# Patient Record
Sex: FEMALE | Race: WHITE | Employment: OTHER | URBAN - METROPOLITAN AREA
[De-identification: names, ages, dates, MRNs, and addresses within clinical notes are randomized per-mention and may not be internally consistent; named-entity substitution may affect disease eponyms.]

---

## 2017-02-12 PROBLEM — E10.10 DIABETIC KETOACIDOSIS WITHOUT COMA ASSOCIATED WITH TYPE 1 DIABETES MELLITUS (HCC): Status: RESOLVED | Noted: 2017-02-12 | Resolved: 2017-02-12

## 2017-02-12 PROBLEM — E87.29 HIGH ANION GAP METABOLIC ACIDOSIS: Chronic | Status: ACTIVE | Noted: 2017-02-12

## 2017-02-12 PROBLEM — E11.10 DKA (DIABETIC KETOACIDOSIS) (HCC): Status: ACTIVE | Noted: 2017-02-12

## 2017-02-12 PROBLEM — E10.10 DIABETIC KETOACIDOSIS WITHOUT COMA ASSOCIATED WITH TYPE 1 DIABETES MELLITUS (HCC): Status: ACTIVE | Noted: 2017-02-12

## 2017-03-29 PROBLEM — G56.03 BILATERAL CARPAL TUNNEL SYNDROME: Status: ACTIVE | Noted: 2017-03-29

## 2017-10-03 PROBLEM — M51.369 BULGING LUMBAR DISC: Status: ACTIVE | Noted: 2017-10-03

## 2017-10-03 PROBLEM — M51.36 BULGING LUMBAR DISC: Status: ACTIVE | Noted: 2017-10-03

## 2017-10-03 PROBLEM — S33.100A: Status: ACTIVE | Noted: 2017-10-03

## 2018-01-08 PROBLEM — M53.2X6 LUMBAR SPINE INSTABILITY: Status: ACTIVE | Noted: 2018-01-08

## 2018-01-09 PROBLEM — I10 HTN (HYPERTENSION), BENIGN: Chronic | Status: ACTIVE | Noted: 2018-01-09

## 2018-03-12 DIAGNOSIS — R52 PAIN: Primary | ICD-10-CM

## 2018-03-20 ENCOUNTER — HOSPITAL ENCOUNTER (OUTPATIENT)
Dept: CT IMAGING | Age: 53
Discharge: HOME | End: 2018-03-22
Payer: MEDICAID

## 2018-03-20 DIAGNOSIS — S33.100D SUBLUXATION OF LUMBAR VERTEBRA, SUBSEQUENT ENCOUNTER: ICD-10-CM

## 2018-03-20 DIAGNOSIS — M53.2X6 LUMBAR SPINE INSTABILITY: ICD-10-CM

## 2018-03-20 DIAGNOSIS — M51.36 BULGING LUMBAR DISC: ICD-10-CM

## 2018-03-20 PROCEDURE — 72131 CT LUMBAR SPINE W/O DYE: CPT

## 2018-03-27 ENCOUNTER — TELEPHONE (OUTPATIENT)
Dept: ORTHOPEDIC SURGERY | Age: 53
End: 2018-03-27

## 2018-04-04 ENCOUNTER — OFFICE VISIT (OUTPATIENT)
Dept: ORTHOPEDIC SURGERY | Age: 53
End: 2018-04-04
Payer: MEDICAID

## 2018-04-04 ENCOUNTER — HOSPITAL ENCOUNTER (OUTPATIENT)
Dept: GENERAL RADIOLOGY | Age: 53
Discharge: HOME | End: 2018-04-06
Payer: MEDICAID

## 2018-04-04 VITALS
HEART RATE: 79 BPM | BODY MASS INDEX: 22.35 KG/M2 | HEIGHT: 72 IN | RESPIRATION RATE: 17 BRPM | WEIGHT: 165 LBS | DIASTOLIC BLOOD PRESSURE: 71 MMHG | SYSTOLIC BLOOD PRESSURE: 102 MMHG

## 2018-04-04 DIAGNOSIS — S82.871A CLOSED DISPLACED PILON FRACTURE OF RIGHT TIBIA, INITIAL ENCOUNTER: Primary | ICD-10-CM

## 2018-04-04 DIAGNOSIS — R52 PAIN: ICD-10-CM

## 2018-04-04 PROCEDURE — 1036F TOBACCO NON-USER: CPT | Performed by: ORTHOPAEDIC SURGERY

## 2018-04-04 PROCEDURE — G8427 DOCREV CUR MEDS BY ELIG CLIN: HCPCS | Performed by: ORTHOPAEDIC SURGERY

## 2018-04-04 PROCEDURE — 3017F COLORECTAL CA SCREEN DOC REV: CPT | Performed by: ORTHOPAEDIC SURGERY

## 2018-04-04 PROCEDURE — 99203 OFFICE O/P NEW LOW 30 MIN: CPT

## 2018-04-04 PROCEDURE — 73590 X-RAY EXAM OF LOWER LEG: CPT

## 2018-04-04 PROCEDURE — G8420 CALC BMI NORM PARAMETERS: HCPCS | Performed by: ORTHOPAEDIC SURGERY

## 2018-04-04 PROCEDURE — 99203 OFFICE O/P NEW LOW 30 MIN: CPT | Performed by: ORTHOPAEDIC SURGERY

## 2018-04-04 PROCEDURE — 3014F SCREEN MAMMO DOC REV: CPT | Performed by: ORTHOPAEDIC SURGERY

## 2018-04-11 ENCOUNTER — HOSPITAL ENCOUNTER (OUTPATIENT)
Dept: CT IMAGING | Age: 53
Discharge: HOME | End: 2018-04-13
Payer: MEDICAID

## 2018-04-11 ENCOUNTER — HOSPITAL ENCOUNTER (OUTPATIENT)
Age: 53
Discharge: HOME | End: 2018-04-11
Payer: MEDICAID

## 2018-04-11 DIAGNOSIS — S82.871A CLOSED DISPLACED PILON FRACTURE OF RIGHT TIBIA, INITIAL ENCOUNTER: ICD-10-CM

## 2018-04-11 LAB
C-REACTIVE PROTEIN: <0.1 MG/DL (ref 0–0.4)
SEDIMENTATION RATE, ERYTHROCYTE: 6 MM/HR (ref 0–20)

## 2018-04-11 PROCEDURE — 36415 COLL VENOUS BLD VENIPUNCTURE: CPT

## 2018-04-11 PROCEDURE — 86140 C-REACTIVE PROTEIN: CPT

## 2018-04-11 PROCEDURE — 85651 RBC SED RATE NONAUTOMATED: CPT

## 2018-04-11 PROCEDURE — 73700 CT LOWER EXTREMITY W/O DYE: CPT

## 2018-04-25 ENCOUNTER — OFFICE VISIT (OUTPATIENT)
Dept: ORTHOPEDIC SURGERY | Age: 53
End: 2018-04-25
Payer: MEDICAID

## 2018-04-25 VITALS
DIASTOLIC BLOOD PRESSURE: 82 MMHG | TEMPERATURE: 97.8 F | RESPIRATION RATE: 18 BRPM | HEIGHT: 72 IN | SYSTOLIC BLOOD PRESSURE: 123 MMHG | BODY MASS INDEX: 22.35 KG/M2 | HEART RATE: 72 BPM | WEIGHT: 165 LBS

## 2018-04-25 DIAGNOSIS — S82.871K CLOSED DISPLACED PILON FRACTURE OF RIGHT TIBIA WITH NONUNION, SUBSEQUENT ENCOUNTER: Primary | ICD-10-CM

## 2018-04-25 PROCEDURE — 3017F COLORECTAL CA SCREEN DOC REV: CPT | Performed by: NURSE PRACTITIONER

## 2018-04-25 PROCEDURE — G8420 CALC BMI NORM PARAMETERS: HCPCS | Performed by: ORTHOPAEDIC SURGERY

## 2018-04-25 PROCEDURE — 1036F TOBACCO NON-USER: CPT | Performed by: NURSE PRACTITIONER

## 2018-04-25 PROCEDURE — G8427 DOCREV CUR MEDS BY ELIG CLIN: HCPCS | Performed by: NURSE PRACTITIONER

## 2018-04-25 PROCEDURE — G8427 DOCREV CUR MEDS BY ELIG CLIN: HCPCS | Performed by: ORTHOPAEDIC SURGERY

## 2018-04-25 PROCEDURE — 99213 OFFICE O/P EST LOW 20 MIN: CPT

## 2018-04-25 PROCEDURE — 3017F COLORECTAL CA SCREEN DOC REV: CPT | Performed by: ORTHOPAEDIC SURGERY

## 2018-04-25 PROCEDURE — G8420 CALC BMI NORM PARAMETERS: HCPCS | Performed by: NURSE PRACTITIONER

## 2018-04-25 PROCEDURE — 99215 OFFICE O/P EST HI 40 MIN: CPT | Performed by: ORTHOPAEDIC SURGERY

## 2018-04-25 PROCEDURE — 1036F TOBACCO NON-USER: CPT | Performed by: ORTHOPAEDIC SURGERY

## 2018-04-25 RX ORDER — ACETAMINOPHEN 325 MG/1
650 TABLET ORAL EVERY 6 HOURS PRN
Status: ON HOLD | COMMUNITY
End: 2018-05-14 | Stop reason: ALTCHOICE

## 2018-05-09 ENCOUNTER — PREP FOR PROCEDURE (OUTPATIENT)
Dept: ORTHOPEDIC SURGERY | Age: 53
End: 2018-05-09

## 2018-05-09 RX ORDER — SODIUM CHLORIDE 0.9 % (FLUSH) 0.9 %
10 SYRINGE (ML) INJECTION PRN
Status: CANCELLED | OUTPATIENT
Start: 2018-05-09

## 2018-05-09 RX ORDER — IBUPROFEN 200 MG
200 TABLET ORAL EVERY 6 HOURS PRN
Status: ON HOLD | COMMUNITY
End: 2018-05-15 | Stop reason: HOSPADM

## 2018-05-09 RX ORDER — LORATADINE 10 MG/1
10 CAPSULE, LIQUID FILLED ORAL DAILY
COMMUNITY
End: 2018-06-28

## 2018-05-09 RX ORDER — ACETAMINOPHEN AND CODEINE PHOSPHATE 300; 30 MG/1; MG/1
1 TABLET ORAL EVERY 4 HOURS PRN
Status: ON HOLD | COMMUNITY
End: 2018-05-14 | Stop reason: ALTCHOICE

## 2018-05-09 RX ORDER — GABAPENTIN 300 MG/1
100 CAPSULE ORAL 2 TIMES DAILY
COMMUNITY
End: 2019-01-08

## 2018-05-09 RX ORDER — SODIUM CHLORIDE, SODIUM LACTATE, POTASSIUM CHLORIDE, CALCIUM CHLORIDE 600; 310; 30; 20 MG/100ML; MG/100ML; MG/100ML; MG/100ML
INJECTION, SOLUTION INTRAVENOUS CONTINUOUS
Status: CANCELLED | OUTPATIENT
Start: 2018-05-09

## 2018-05-09 RX ORDER — SODIUM CHLORIDE 0.9 % (FLUSH) 0.9 %
10 SYRINGE (ML) INJECTION EVERY 12 HOURS SCHEDULED
Status: CANCELLED | OUTPATIENT
Start: 2018-05-09

## 2018-05-13 ENCOUNTER — ANESTHESIA EVENT (OUTPATIENT)
Dept: OPERATING ROOM | Age: 53
End: 2018-05-13
Payer: MEDICAID

## 2018-05-14 ENCOUNTER — APPOINTMENT (OUTPATIENT)
Dept: GENERAL RADIOLOGY | Age: 53
End: 2018-05-14
Attending: ORTHOPAEDIC SURGERY
Payer: MEDICAID

## 2018-05-14 ENCOUNTER — ANESTHESIA (OUTPATIENT)
Dept: OPERATING ROOM | Age: 53
End: 2018-05-14
Payer: MEDICAID

## 2018-05-14 ENCOUNTER — HOSPITAL ENCOUNTER (OUTPATIENT)
Age: 53
Discharge: HOME | End: 2018-05-15
Attending: ORTHOPAEDIC SURGERY | Admitting: ORTHOPAEDIC SURGERY
Payer: MEDICAID

## 2018-05-14 VITALS — SYSTOLIC BLOOD PRESSURE: 102 MMHG | DIASTOLIC BLOOD PRESSURE: 59 MMHG | OXYGEN SATURATION: 96 % | TEMPERATURE: 98.4 F

## 2018-05-14 DIAGNOSIS — S82.251K CLOSED DISPLACED COMMINUTED FRACTURE OF SHAFT OF RIGHT TIBIA WITH NONUNION: Primary | ICD-10-CM

## 2018-05-14 DIAGNOSIS — R52 PAIN: ICD-10-CM

## 2018-05-14 LAB
GLUCOSE BLD-MCNC: 50 MG/DL
HBA1C MFR BLD: 6.1 % (ref 4.8–5.9)
METER GLUCOSE: 111 MG/DL (ref 70–110)
METER GLUCOSE: 208 MG/DL (ref 70–110)
METER GLUCOSE: 50 MG/DL (ref 70–110)
METER GLUCOSE: 68 MG/DL (ref 70–110)

## 2018-05-14 PROCEDURE — 87206 SMEAR FLUORESCENT/ACID STAI: CPT

## 2018-05-14 PROCEDURE — 3700000001 HC ADD 15 MINUTES (ANESTHESIA): Performed by: ORTHOPAEDIC SURGERY

## 2018-05-14 PROCEDURE — 82652 VIT D 1 25-DIHYDROXY: CPT

## 2018-05-14 PROCEDURE — 2500000003 HC RX 250 WO HCPCS: Performed by: NURSE ANESTHETIST, CERTIFIED REGISTERED

## 2018-05-14 PROCEDURE — 6360000002 HC RX W HCPCS: Performed by: STUDENT IN AN ORGANIZED HEALTH CARE EDUCATION/TRAINING PROGRAM

## 2018-05-14 PROCEDURE — 27726 REPAIR FIBULA NONUNION: CPT | Performed by: ORTHOPAEDIC SURGERY

## 2018-05-14 PROCEDURE — 2700000000 HC OXYGEN THERAPY PER DAY

## 2018-05-14 PROCEDURE — 64445 NJX AA&/STRD SCIATIC NRV IMG: CPT | Performed by: ANESTHESIOLOGY

## 2018-05-14 PROCEDURE — 6360000002 HC RX W HCPCS: Performed by: ANESTHESIOLOGY

## 2018-05-14 PROCEDURE — 84134 ASSAY OF PREALBUMIN: CPT

## 2018-05-14 PROCEDURE — 6360000002 HC RX W HCPCS: Performed by: NURSE ANESTHETIST, CERTIFIED REGISTERED

## 2018-05-14 PROCEDURE — 36415 COLL VENOUS BLD VENIPUNCTURE: CPT

## 2018-05-14 PROCEDURE — 6370000000 HC RX 637 (ALT 250 FOR IP): Performed by: STUDENT IN AN ORGANIZED HEALTH CARE EDUCATION/TRAINING PROGRAM

## 2018-05-14 PROCEDURE — 7100000001 HC PACU RECOVERY - ADDTL 15 MIN: Performed by: ORTHOPAEDIC SURGERY

## 2018-05-14 PROCEDURE — C1713 ANCHOR/SCREW BN/BN,TIS/BN: HCPCS | Performed by: ORTHOPAEDIC SURGERY

## 2018-05-14 PROCEDURE — 2720000010 HC SURG SUPPLY STERILE: Performed by: ORTHOPAEDIC SURGERY

## 2018-05-14 PROCEDURE — 87116 MYCOBACTERIA CULTURE: CPT

## 2018-05-14 PROCEDURE — 87205 SMEAR GRAM STAIN: CPT

## 2018-05-14 PROCEDURE — 73610 X-RAY EXAM OF ANKLE: CPT

## 2018-05-14 PROCEDURE — 2580000003 HC RX 258: Performed by: NURSE PRACTITIONER

## 2018-05-14 PROCEDURE — 3600000002 HC SURGERY LEVEL 2 BASE: Performed by: ORTHOPAEDIC SURGERY

## 2018-05-14 PROCEDURE — 3600000012 HC SURGERY LEVEL 2 ADDTL 15MIN: Performed by: ORTHOPAEDIC SURGERY

## 2018-05-14 PROCEDURE — 6370000000 HC RX 637 (ALT 250 FOR IP): Performed by: INTERNAL MEDICINE

## 2018-05-14 PROCEDURE — 87075 CULTR BACTERIA EXCEPT BLOOD: CPT

## 2018-05-14 PROCEDURE — 27724 REPAIR/GRAFT OF TIBIA: CPT | Performed by: ORTHOPAEDIC SURGERY

## 2018-05-14 PROCEDURE — 83036 HEMOGLOBIN GLYCOSYLATED A1C: CPT

## 2018-05-14 PROCEDURE — 73590 X-RAY EXAM OF LOWER LEG: CPT

## 2018-05-14 PROCEDURE — 20680 REMOVAL OF IMPLANT DEEP: CPT | Performed by: ORTHOPAEDIC SURGERY

## 2018-05-14 PROCEDURE — 87102 FUNGUS ISOLATION CULTURE: CPT

## 2018-05-14 PROCEDURE — 7100000000 HC PACU RECOVERY - FIRST 15 MIN: Performed by: ORTHOPAEDIC SURGERY

## 2018-05-14 PROCEDURE — 3700000000 HC ANESTHESIA ATTENDED CARE: Performed by: ORTHOPAEDIC SURGERY

## 2018-05-14 PROCEDURE — 6370000000 HC RX 637 (ALT 250 FOR IP): Performed by: ORTHOPAEDIC SURGERY

## 2018-05-14 PROCEDURE — 82962 GLUCOSE BLOOD TEST: CPT

## 2018-05-14 PROCEDURE — 64447 NJX AA&/STRD FEMORAL NRV IMG: CPT | Performed by: ANESTHESIOLOGY

## 2018-05-14 PROCEDURE — 88331 PATH CONSLTJ SURG 1 BLK 1SPC: CPT

## 2018-05-14 PROCEDURE — 87070 CULTURE OTHR SPECIMN AEROBIC: CPT

## 2018-05-14 PROCEDURE — 3209999900 FLUORO FOR SURGICAL PROCEDURES

## 2018-05-14 PROCEDURE — 87015 SPECIMEN INFECT AGNT CONCNTJ: CPT

## 2018-05-14 PROCEDURE — 88307 TISSUE EXAM BY PATHOLOGIST: CPT

## 2018-05-14 DEVICE — IMPLANTABLE DEVICE: Type: IMPLANTABLE DEVICE | Site: TIBIA | Status: FUNCTIONAL

## 2018-05-14 DEVICE — SCREW BNE L26MM DIA3.5MM CORT S STL ST NONCANNULATED LOK: Type: IMPLANTABLE DEVICE | Site: TIBIA | Status: FUNCTIONAL

## 2018-05-14 DEVICE — SCREW BNE L16MM DIA3.5MM CORT S STL ST NONCANNULATED LOK: Type: IMPLANTABLE DEVICE | Site: TIBIA | Status: FUNCTIONAL

## 2018-05-14 DEVICE — SCREW BNE L46MM DIA3.5MM S STL ST FULL THRD T15 STARDRV: Type: IMPLANTABLE DEVICE | Site: TIBIA | Status: FUNCTIONAL

## 2018-05-14 DEVICE — SCREW BNE L30MM DIA3.5MM CORT S STL ST LOK FULL THRD: Type: IMPLANTABLE DEVICE | Site: TIBIA | Status: FUNCTIONAL

## 2018-05-14 DEVICE — PLATE BNE L W10.1XL84MM THK3.5MM 6 H BILAT S STL STR RIG: Type: IMPLANTABLE DEVICE | Site: TIBIA | Status: FUNCTIONAL

## 2018-05-14 DEVICE — GRAFT BNE SUB 30CC 1.7-10MM CANC CHIP MORSELIZED FRZ DRY: Type: IMPLANTABLE DEVICE | Site: TIBIA | Status: FUNCTIONAL

## 2018-05-14 DEVICE — SCREW BNE L42MM DIA3.5MM CORT S STL ST NONCANNULATED LOK: Type: IMPLANTABLE DEVICE | Site: TIBIA | Status: FUNCTIONAL

## 2018-05-14 DEVICE — BIOACTIVE BONE GRAFT SUBSTITUTE, FOAM PACK; BETA-TRICALCIUM PHOSPHATE, TYPE I BOVINE COLLAGEN, AND BIOACTIVE GLASS
Type: IMPLANTABLE DEVICE | Site: TIBIA | Status: FUNCTIONAL
Brand: VITOSS BBTRAUMA

## 2018-05-14 DEVICE — SCREW BNE L40MM DIA3.5MM CORT S STL ST LOK FULL THRD: Type: IMPLANTABLE DEVICE | Site: TIBIA | Status: FUNCTIONAL

## 2018-05-14 DEVICE — SCREW BNE L34MM DIA3.5MM CORT S STL ST LOK FULL THRD: Type: IMPLANTABLE DEVICE | Site: TIBIA | Status: FUNCTIONAL

## 2018-05-14 DEVICE — SCREW BNE L28MM DIA3.5MM CORT S STL ST NONCANNULATED LOK: Type: IMPLANTABLE DEVICE | Site: TIBIA | Status: FUNCTIONAL

## 2018-05-14 DEVICE — SCREW BNE L14MM DIA3.5MM CORT S STL ST NONCANNULATED LOK: Type: IMPLANTABLE DEVICE | Site: TIBIA | Status: FUNCTIONAL

## 2018-05-14 DEVICE — SCREW BNE L12MM DIA3.5MM CORT S STL ST NONCANNULATED LOK: Type: IMPLANTABLE DEVICE | Site: TIBIA | Status: FUNCTIONAL

## 2018-05-14 RX ORDER — MORPHINE SULFATE 2 MG/ML
2 INJECTION, SOLUTION INTRAMUSCULAR; INTRAVENOUS
Status: DISCONTINUED | OUTPATIENT
Start: 2018-05-14 | End: 2018-05-15 | Stop reason: HOSPADM

## 2018-05-14 RX ORDER — MIDAZOLAM HYDROCHLORIDE 1 MG/ML
1 INJECTION INTRAMUSCULAR; INTRAVENOUS EVERY 5 MIN PRN
Status: COMPLETED | OUTPATIENT
Start: 2018-05-14 | End: 2018-05-14

## 2018-05-14 RX ORDER — FENTANYL CITRATE 50 UG/ML
25 INJECTION, SOLUTION INTRAMUSCULAR; INTRAVENOUS EVERY 5 MIN PRN
Status: DISCONTINUED | OUTPATIENT
Start: 2018-05-14 | End: 2018-05-14 | Stop reason: HOSPADM

## 2018-05-14 RX ORDER — NICOTINE POLACRILEX 4 MG
15 LOZENGE BUCCAL PRN
Status: DISCONTINUED | OUTPATIENT
Start: 2018-05-14 | End: 2018-05-15 | Stop reason: HOSPADM

## 2018-05-14 RX ORDER — CEFAZOLIN SODIUM 1 G/3ML
INJECTION, POWDER, FOR SOLUTION INTRAMUSCULAR; INTRAVENOUS PRN
Status: DISCONTINUED | OUTPATIENT
Start: 2018-05-14 | End: 2018-05-14 | Stop reason: SDUPTHER

## 2018-05-14 RX ORDER — HYDROCODONE BITARTRATE AND ACETAMINOPHEN 5; 325 MG/1; MG/1
2 TABLET ORAL EVERY 4 HOURS PRN
Status: DISCONTINUED | OUTPATIENT
Start: 2018-05-14 | End: 2018-05-15 | Stop reason: HOSPADM

## 2018-05-14 RX ORDER — ACETAMINOPHEN 325 MG/1
650 TABLET ORAL EVERY 4 HOURS PRN
Status: DISCONTINUED | OUTPATIENT
Start: 2018-05-14 | End: 2018-05-15 | Stop reason: HOSPADM

## 2018-05-14 RX ORDER — NEOSTIGMINE METHYLSULFATE 1 MG/ML
INJECTION, SOLUTION INTRAVENOUS PRN
Status: DISCONTINUED | OUTPATIENT
Start: 2018-05-14 | End: 2018-05-14 | Stop reason: SDUPTHER

## 2018-05-14 RX ORDER — DIAPER,BRIEF,INFANT-TODD,DISP
EACH MISCELLANEOUS PRN
Status: DISCONTINUED | OUTPATIENT
Start: 2018-05-14 | End: 2018-05-14 | Stop reason: HOSPADM

## 2018-05-14 RX ORDER — DULOXETIN HYDROCHLORIDE 30 MG/1
90 CAPSULE, DELAYED RELEASE ORAL DAILY
Status: DISCONTINUED | OUTPATIENT
Start: 2018-05-14 | End: 2018-05-15 | Stop reason: HOSPADM

## 2018-05-14 RX ORDER — HYDROCODONE BITARTRATE AND ACETAMINOPHEN 5; 325 MG/1; MG/1
1 TABLET ORAL EVERY 4 HOURS PRN
Status: DISCONTINUED | OUTPATIENT
Start: 2018-05-14 | End: 2018-05-15 | Stop reason: HOSPADM

## 2018-05-14 RX ORDER — SODIUM CHLORIDE 0.9 % (FLUSH) 0.9 %
10 SYRINGE (ML) INJECTION EVERY 12 HOURS SCHEDULED
Status: DISCONTINUED | OUTPATIENT
Start: 2018-05-14 | End: 2018-05-14 | Stop reason: HOSPADM

## 2018-05-14 RX ORDER — ALPRAZOLAM 0.25 MG/1
0.25 TABLET ORAL NIGHTLY
Status: DISCONTINUED | OUTPATIENT
Start: 2018-05-14 | End: 2018-05-15 | Stop reason: HOSPADM

## 2018-05-14 RX ORDER — GABAPENTIN 300 MG/1
300 CAPSULE ORAL 2 TIMES DAILY
Status: DISCONTINUED | OUTPATIENT
Start: 2018-05-14 | End: 2018-05-15 | Stop reason: HOSPADM

## 2018-05-14 RX ORDER — PANTOPRAZOLE SODIUM 40 MG/1
40 TABLET, DELAYED RELEASE ORAL
Status: DISCONTINUED | OUTPATIENT
Start: 2018-05-15 | End: 2018-05-15 | Stop reason: HOSPADM

## 2018-05-14 RX ORDER — PROPOFOL 10 MG/ML
INJECTION, EMULSION INTRAVENOUS PRN
Status: DISCONTINUED | OUTPATIENT
Start: 2018-05-14 | End: 2018-05-14 | Stop reason: SDUPTHER

## 2018-05-14 RX ORDER — ONDANSETRON 2 MG/ML
INJECTION INTRAMUSCULAR; INTRAVENOUS PRN
Status: DISCONTINUED | OUTPATIENT
Start: 2018-05-14 | End: 2018-05-14 | Stop reason: SDUPTHER

## 2018-05-14 RX ORDER — PROMETHAZINE HYDROCHLORIDE 25 MG/ML
6.25 INJECTION, SOLUTION INTRAMUSCULAR; INTRAVENOUS
Status: DISCONTINUED | OUTPATIENT
Start: 2018-05-14 | End: 2018-05-14 | Stop reason: HOSPADM

## 2018-05-14 RX ORDER — FENTANYL CITRATE 50 UG/ML
INJECTION, SOLUTION INTRAMUSCULAR; INTRAVENOUS PRN
Status: DISCONTINUED | OUTPATIENT
Start: 2018-05-14 | End: 2018-05-14 | Stop reason: SDUPTHER

## 2018-05-14 RX ORDER — ERGOCALCIFEROL 1.25 MG/1
50000 CAPSULE ORAL WEEKLY
Status: DISCONTINUED | OUTPATIENT
Start: 2018-05-20 | End: 2018-05-15 | Stop reason: HOSPADM

## 2018-05-14 RX ORDER — INSULIN GLARGINE 100 [IU]/ML
24 INJECTION, SOLUTION SUBCUTANEOUS NIGHTLY
Status: DISCONTINUED | OUTPATIENT
Start: 2018-05-14 | End: 2018-05-15 | Stop reason: HOSPADM

## 2018-05-14 RX ORDER — DEXAMETHASONE SODIUM PHOSPHATE 10 MG/ML
INJECTION, SOLUTION INTRAMUSCULAR; INTRAVENOUS PRN
Status: DISCONTINUED | OUTPATIENT
Start: 2018-05-14 | End: 2018-05-14 | Stop reason: SDUPTHER

## 2018-05-14 RX ORDER — ONDANSETRON 2 MG/ML
4 INJECTION INTRAMUSCULAR; INTRAVENOUS EVERY 6 HOURS PRN
Status: DISCONTINUED | OUTPATIENT
Start: 2018-05-14 | End: 2018-05-15 | Stop reason: HOSPADM

## 2018-05-14 RX ORDER — EPHEDRINE SULFATE 50 MG/ML
INJECTION, SOLUTION INTRAVENOUS PRN
Status: DISCONTINUED | OUTPATIENT
Start: 2018-05-14 | End: 2018-05-14 | Stop reason: SDUPTHER

## 2018-05-14 RX ORDER — DOCUSATE SODIUM 100 MG/1
100 CAPSULE, LIQUID FILLED ORAL 2 TIMES DAILY
Status: DISCONTINUED | OUTPATIENT
Start: 2018-05-14 | End: 2018-05-15 | Stop reason: HOSPADM

## 2018-05-14 RX ORDER — DEXTROSE MONOHYDRATE 50 MG/ML
100 INJECTION, SOLUTION INTRAVENOUS PRN
Status: DISCONTINUED | OUTPATIENT
Start: 2018-05-14 | End: 2018-05-15 | Stop reason: HOSPADM

## 2018-05-14 RX ORDER — SODIUM CHLORIDE 0.9 % (FLUSH) 0.9 %
10 SYRINGE (ML) INJECTION PRN
Status: DISCONTINUED | OUTPATIENT
Start: 2018-05-14 | End: 2018-05-15 | Stop reason: HOSPADM

## 2018-05-14 RX ORDER — OXYCODONE HYDROCHLORIDE AND ACETAMINOPHEN 5; 325 MG/1; MG/1
1 TABLET ORAL
Status: DISCONTINUED | OUTPATIENT
Start: 2018-05-14 | End: 2018-05-14 | Stop reason: HOSPADM

## 2018-05-14 RX ORDER — LIDOCAINE HYDROCHLORIDE 20 MG/ML
INJECTION, SOLUTION EPIDURAL; INFILTRATION; INTRACAUDAL; PERINEURAL PRN
Status: DISCONTINUED | OUTPATIENT
Start: 2018-05-14 | End: 2018-05-14 | Stop reason: SDUPTHER

## 2018-05-14 RX ORDER — HYDROCODONE BITARTRATE AND ACETAMINOPHEN 5; 325 MG/1; MG/1
1 TABLET ORAL EVERY 4 HOURS PRN
Qty: 42 TABLET | Refills: 0 | Status: SHIPPED | OUTPATIENT
Start: 2018-05-14 | End: 2018-06-06 | Stop reason: SDUPTHER

## 2018-05-14 RX ORDER — GLYCOPYRROLATE 0.2 MG/ML
INJECTION INTRAMUSCULAR; INTRAVENOUS PRN
Status: DISCONTINUED | OUTPATIENT
Start: 2018-05-14 | End: 2018-05-14 | Stop reason: SDUPTHER

## 2018-05-14 RX ORDER — SODIUM CHLORIDE 0.9 % (FLUSH) 0.9 %
10 SYRINGE (ML) INJECTION PRN
Status: DISCONTINUED | OUTPATIENT
Start: 2018-05-14 | End: 2018-05-14 | Stop reason: HOSPADM

## 2018-05-14 RX ORDER — FENTANYL CITRATE 50 UG/ML
50 INJECTION, SOLUTION INTRAMUSCULAR; INTRAVENOUS EVERY 5 MIN PRN
Status: DISCONTINUED | OUTPATIENT
Start: 2018-05-14 | End: 2018-05-14 | Stop reason: HOSPADM

## 2018-05-14 RX ORDER — ROCURONIUM BROMIDE 10 MG/ML
INJECTION, SOLUTION INTRAVENOUS PRN
Status: DISCONTINUED | OUTPATIENT
Start: 2018-05-14 | End: 2018-05-14 | Stop reason: SDUPTHER

## 2018-05-14 RX ORDER — ALBUTEROL SULFATE 90 UG/1
2 AEROSOL, METERED RESPIRATORY (INHALATION) EVERY 6 HOURS PRN
Status: DISCONTINUED | OUTPATIENT
Start: 2018-05-14 | End: 2018-05-15 | Stop reason: HOSPADM

## 2018-05-14 RX ORDER — SODIUM CHLORIDE, SODIUM LACTATE, POTASSIUM CHLORIDE, CALCIUM CHLORIDE 600; 310; 30; 20 MG/100ML; MG/100ML; MG/100ML; MG/100ML
INJECTION, SOLUTION INTRAVENOUS CONTINUOUS
Status: DISCONTINUED | OUTPATIENT
Start: 2018-05-14 | End: 2018-05-14

## 2018-05-14 RX ORDER — ATENOLOL 50 MG/1
50 TABLET ORAL NIGHTLY
Status: DISCONTINUED | OUTPATIENT
Start: 2018-05-14 | End: 2018-05-15

## 2018-05-14 RX ORDER — ROPIVACAINE HYDROCHLORIDE 5 MG/ML
40 INJECTION, SOLUTION EPIDURAL; INFILTRATION; PERINEURAL ONCE
Status: COMPLETED | OUTPATIENT
Start: 2018-05-14 | End: 2018-05-14

## 2018-05-14 RX ORDER — FENTANYL CITRATE 50 UG/ML
50 INJECTION, SOLUTION INTRAMUSCULAR; INTRAVENOUS EVERY 5 MIN PRN
Status: COMPLETED | OUTPATIENT
Start: 2018-05-14 | End: 2018-05-14

## 2018-05-14 RX ORDER — DIPHENHYDRAMINE HYDROCHLORIDE 50 MG/ML
12.5 INJECTION INTRAMUSCULAR; INTRAVENOUS
Status: DISCONTINUED | OUTPATIENT
Start: 2018-05-14 | End: 2018-05-14 | Stop reason: HOSPADM

## 2018-05-14 RX ORDER — DEXTROSE MONOHYDRATE 25 G/50ML
12.5 INJECTION, SOLUTION INTRAVENOUS PRN
Status: DISCONTINUED | OUTPATIENT
Start: 2018-05-14 | End: 2018-05-15 | Stop reason: HOSPADM

## 2018-05-14 RX ORDER — MEPERIDINE HYDROCHLORIDE 50 MG/ML
12.5 INJECTION INTRAMUSCULAR; INTRAVENOUS; SUBCUTANEOUS EVERY 5 MIN PRN
Status: DISCONTINUED | OUTPATIENT
Start: 2018-05-14 | End: 2018-05-14 | Stop reason: HOSPADM

## 2018-05-14 RX ORDER — SODIUM CHLORIDE 0.9 % (FLUSH) 0.9 %
10 SYRINGE (ML) INJECTION EVERY 12 HOURS SCHEDULED
Status: DISCONTINUED | OUTPATIENT
Start: 2018-05-14 | End: 2018-05-15 | Stop reason: HOSPADM

## 2018-05-14 RX ORDER — MORPHINE SULFATE 4 MG/ML
4 INJECTION, SOLUTION INTRAMUSCULAR; INTRAVENOUS
Status: DISCONTINUED | OUTPATIENT
Start: 2018-05-14 | End: 2018-05-15 | Stop reason: HOSPADM

## 2018-05-14 RX ADMIN — EPHEDRINE SULFATE 10 MG: 50 INJECTION INTRAMUSCULAR; INTRAVENOUS; SUBCUTANEOUS at 10:43

## 2018-05-14 RX ADMIN — GLYCOPYRROLATE 0.6 MG: 0.2 INJECTION, SOLUTION INTRAMUSCULAR; INTRAVENOUS at 12:43

## 2018-05-14 RX ADMIN — EPHEDRINE SULFATE 10 MG: 50 INJECTION INTRAMUSCULAR; INTRAVENOUS; SUBCUTANEOUS at 10:53

## 2018-05-14 RX ADMIN — INSULIN LISPRO 1 UNITS: 100 INJECTION, SOLUTION INTRAVENOUS; SUBCUTANEOUS at 20:25

## 2018-05-14 RX ADMIN — FENTANYL CITRATE 50 MCG: 50 INJECTION, SOLUTION INTRAMUSCULAR; INTRAVENOUS at 12:03

## 2018-05-14 RX ADMIN — SODIUM CHLORIDE, POTASSIUM CHLORIDE, SODIUM LACTATE AND CALCIUM CHLORIDE: 600; 310; 30; 20 INJECTION, SOLUTION INTRAVENOUS at 09:08

## 2018-05-14 RX ADMIN — INSULIN GLARGINE 24 UNITS: 100 INJECTION, SOLUTION SUBCUTANEOUS at 20:25

## 2018-05-14 RX ADMIN — MIDAZOLAM HYDROCHLORIDE 1 MG: 1 INJECTION, SOLUTION INTRAMUSCULAR; INTRAVENOUS at 09:45

## 2018-05-14 RX ADMIN — GABAPENTIN 300 MG: 300 CAPSULE ORAL at 20:19

## 2018-05-14 RX ADMIN — EPHEDRINE SULFATE 10 MG: 50 INJECTION INTRAMUSCULAR; INTRAVENOUS; SUBCUTANEOUS at 10:49

## 2018-05-14 RX ADMIN — Medication 3 MG: at 12:43

## 2018-05-14 RX ADMIN — CEFAZOLIN SODIUM 2 G: 2 SOLUTION INTRAVENOUS at 20:20

## 2018-05-14 RX ADMIN — MORPHINE SULFATE 4 MG: 4 INJECTION, SOLUTION INTRAMUSCULAR; INTRAVENOUS at 17:40

## 2018-05-14 RX ADMIN — ROCURONIUM BROMIDE 40 MG: 10 INJECTION, SOLUTION INTRAVENOUS at 10:04

## 2018-05-14 RX ADMIN — ROPIVACAINE HYDROCHLORIDE 40 ML: 5 INJECTION, SOLUTION EPIDURAL; INFILTRATION; PERINEURAL at 09:45

## 2018-05-14 RX ADMIN — SODIUM CHLORIDE, POTASSIUM CHLORIDE, SODIUM LACTATE AND CALCIUM CHLORIDE: 600; 310; 30; 20 INJECTION, SOLUTION INTRAVENOUS at 10:40

## 2018-05-14 RX ADMIN — PHENYLEPHRINE HYDROCHLORIDE 100 MCG: 10 INJECTION INTRAMUSCULAR; INTRAVENOUS; SUBCUTANEOUS at 11:07

## 2018-05-14 RX ADMIN — PROPOFOL 150 MG: 10 INJECTION, EMULSION INTRAVENOUS at 10:04

## 2018-05-14 RX ADMIN — ROCURONIUM BROMIDE 10 MG: 10 INJECTION, SOLUTION INTRAVENOUS at 11:55

## 2018-05-14 RX ADMIN — ROCURONIUM BROMIDE 10 MG: 10 INJECTION, SOLUTION INTRAVENOUS at 11:28

## 2018-05-14 RX ADMIN — MIDAZOLAM HYDROCHLORIDE 1 MG: 1 INJECTION, SOLUTION INTRAMUSCULAR; INTRAVENOUS at 09:47

## 2018-05-14 RX ADMIN — SODIUM CHLORIDE, POTASSIUM CHLORIDE, SODIUM LACTATE AND CALCIUM CHLORIDE: 600; 310; 30; 20 INJECTION, SOLUTION INTRAVENOUS at 14:40

## 2018-05-14 RX ADMIN — FENTANYL CITRATE 50 MCG: 50 INJECTION, SOLUTION INTRAMUSCULAR; INTRAVENOUS at 10:04

## 2018-05-14 RX ADMIN — PHENYLEPHRINE HYDROCHLORIDE 100 MCG: 10 INJECTION INTRAMUSCULAR; INTRAVENOUS; SUBCUTANEOUS at 10:50

## 2018-05-14 RX ADMIN — ALPRAZOLAM 0.25 MG: 0.25 TABLET ORAL at 20:19

## 2018-05-14 RX ADMIN — LIDOCAINE HYDROCHLORIDE 80 MG: 20 INJECTION, SOLUTION EPIDURAL; INFILTRATION; INTRACAUDAL; PERINEURAL at 10:04

## 2018-05-14 RX ADMIN — HYDROMORPHONE HYDROCHLORIDE 0.5 MG: 1 INJECTION, SOLUTION INTRAMUSCULAR; INTRAVENOUS; SUBCUTANEOUS at 14:03

## 2018-05-14 RX ADMIN — CEFAZOLIN 2000 MG: 1 INJECTION, POWDER, FOR SOLUTION INTRAVENOUS at 11:03

## 2018-05-14 RX ADMIN — ROCURONIUM BROMIDE 10 MG: 10 INJECTION, SOLUTION INTRAVENOUS at 10:44

## 2018-05-14 RX ADMIN — FENTANYL CITRATE 50 MCG: 50 INJECTION, SOLUTION INTRAMUSCULAR; INTRAVENOUS at 09:53

## 2018-05-14 RX ADMIN — DOCUSATE SODIUM 100 MG: 100 CAPSULE, LIQUID FILLED ORAL at 20:20

## 2018-05-14 RX ADMIN — FENTANYL CITRATE 50 MCG: 50 INJECTION, SOLUTION INTRAMUSCULAR; INTRAVENOUS at 12:59

## 2018-05-14 RX ADMIN — ROCURONIUM BROMIDE 10 MG: 10 INJECTION, SOLUTION INTRAVENOUS at 10:33

## 2018-05-14 RX ADMIN — HYDROCODONE BITARTRATE AND ACETAMINOPHEN 2 TABLET: 5; 325 TABLET ORAL at 20:30

## 2018-05-14 RX ADMIN — ONDANSETRON 4 MG: 2 INJECTION INTRAMUSCULAR; INTRAVENOUS at 12:43

## 2018-05-14 RX ADMIN — METFORMIN HYDROCHLORIDE 500 MG: 500 TABLET ORAL at 20:20

## 2018-05-14 RX ADMIN — FENTANYL CITRATE 50 MCG: 50 INJECTION, SOLUTION INTRAMUSCULAR; INTRAVENOUS at 12:22

## 2018-05-14 RX ADMIN — DEXAMETHASONE SODIUM PHOSPHATE 10 MG: 10 INJECTION, SOLUTION INTRAMUSCULAR; INTRAVENOUS at 10:05

## 2018-05-14 RX ADMIN — PHENYLEPHRINE HYDROCHLORIDE 100 MCG: 10 INJECTION INTRAMUSCULAR; INTRAVENOUS; SUBCUTANEOUS at 10:54

## 2018-05-14 RX ADMIN — FENTANYL CITRATE 50 MCG: 50 INJECTION, SOLUTION INTRAMUSCULAR; INTRAVENOUS at 09:48

## 2018-05-14 RX ADMIN — ROCURONIUM BROMIDE 10 MG: 10 INJECTION, SOLUTION INTRAVENOUS at 11:20

## 2018-05-14 RX ADMIN — FENTANYL CITRATE 50 MCG: 50 INJECTION, SOLUTION INTRAMUSCULAR; INTRAVENOUS at 12:47

## 2018-05-14 ASSESSMENT — PULMONARY FUNCTION TESTS
PIF_VALUE: 23
PIF_VALUE: 21
PIF_VALUE: 22
PIF_VALUE: 2
PIF_VALUE: 12
PIF_VALUE: 23
PIF_VALUE: 22
PIF_VALUE: 21
PIF_VALUE: 23
PIF_VALUE: 22
PIF_VALUE: 22
PIF_VALUE: 23
PIF_VALUE: 23
PIF_VALUE: 17
PIF_VALUE: 23
PIF_VALUE: 22
PIF_VALUE: 23
PIF_VALUE: 23
PIF_VALUE: 21
PIF_VALUE: 23
PIF_VALUE: 20
PIF_VALUE: 24
PIF_VALUE: 24
PIF_VALUE: 22
PIF_VALUE: 22
PIF_VALUE: 23
PIF_VALUE: 24
PIF_VALUE: 17
PIF_VALUE: 22
PIF_VALUE: 23
PIF_VALUE: 22
PIF_VALUE: 21
PIF_VALUE: 23
PIF_VALUE: 19
PIF_VALUE: 23
PIF_VALUE: 2
PIF_VALUE: 23
PIF_VALUE: 16
PIF_VALUE: 23
PIF_VALUE: 21
PIF_VALUE: 15
PIF_VALUE: 0
PIF_VALUE: 21
PIF_VALUE: 21
PIF_VALUE: 23
PIF_VALUE: 21
PIF_VALUE: 23
PIF_VALUE: 19
PIF_VALUE: 23
PIF_VALUE: 22
PIF_VALUE: 24
PIF_VALUE: 23
PIF_VALUE: 2
PIF_VALUE: 2
PIF_VALUE: 21
PIF_VALUE: 23
PIF_VALUE: 22
PIF_VALUE: 23
PIF_VALUE: 23
PIF_VALUE: 21
PIF_VALUE: 20
PIF_VALUE: 13
PIF_VALUE: 23
PIF_VALUE: 22
PIF_VALUE: 23
PIF_VALUE: 19
PIF_VALUE: 24
PIF_VALUE: 21
PIF_VALUE: 22
PIF_VALUE: 22
PIF_VALUE: 23
PIF_VALUE: 23
PIF_VALUE: 22
PIF_VALUE: 23
PIF_VALUE: 19
PIF_VALUE: 15
PIF_VALUE: 23
PIF_VALUE: 22
PIF_VALUE: 2
PIF_VALUE: 21
PIF_VALUE: 23
PIF_VALUE: 21
PIF_VALUE: 23
PIF_VALUE: 21
PIF_VALUE: 21
PIF_VALUE: 23
PIF_VALUE: 21
PIF_VALUE: 22
PIF_VALUE: 23
PIF_VALUE: 22
PIF_VALUE: 5
PIF_VALUE: 23
PIF_VALUE: 24
PIF_VALUE: 21
PIF_VALUE: 22
PIF_VALUE: 22
PIF_VALUE: 23
PIF_VALUE: 3
PIF_VALUE: 22
PIF_VALUE: 22
PIF_VALUE: 16
PIF_VALUE: 22
PIF_VALUE: 23
PIF_VALUE: 22
PIF_VALUE: 23
PIF_VALUE: 18
PIF_VALUE: 24
PIF_VALUE: 23
PIF_VALUE: 23
PIF_VALUE: 22
PIF_VALUE: 22
PIF_VALUE: 24
PIF_VALUE: 22
PIF_VALUE: 22
PIF_VALUE: 23
PIF_VALUE: 22
PIF_VALUE: 21
PIF_VALUE: 22
PIF_VALUE: 21
PIF_VALUE: 22
PIF_VALUE: 23
PIF_VALUE: 21
PIF_VALUE: 18
PIF_VALUE: 24
PIF_VALUE: 23
PIF_VALUE: 1
PIF_VALUE: 23
PIF_VALUE: 23
PIF_VALUE: 22
PIF_VALUE: 23
PIF_VALUE: 1
PIF_VALUE: 23
PIF_VALUE: 21
PIF_VALUE: 23
PIF_VALUE: 16
PIF_VALUE: 23
PIF_VALUE: 22
PIF_VALUE: 24
PIF_VALUE: 22
PIF_VALUE: 2
PIF_VALUE: 23
PIF_VALUE: 22
PIF_VALUE: 23
PIF_VALUE: 1
PIF_VALUE: 23
PIF_VALUE: 16
PIF_VALUE: 23
PIF_VALUE: 22
PIF_VALUE: 3
PIF_VALUE: 23
PIF_VALUE: 2
PIF_VALUE: 22
PIF_VALUE: 21

## 2018-05-14 ASSESSMENT — PAIN DESCRIPTION - ORIENTATION
ORIENTATION: RIGHT

## 2018-05-14 ASSESSMENT — PAIN DESCRIPTION - PAIN TYPE
TYPE: SURGICAL PAIN

## 2018-05-14 ASSESSMENT — PAIN SCALES - GENERAL
PAINLEVEL_OUTOF10: 0
PAINLEVEL_OUTOF10: 2
PAINLEVEL_OUTOF10: 10
PAINLEVEL_OUTOF10: 0
PAINLEVEL_OUTOF10: 3
PAINLEVEL_OUTOF10: 10
PAINLEVEL_OUTOF10: 3
PAINLEVEL_OUTOF10: 10
PAINLEVEL_OUTOF10: 3
PAINLEVEL_OUTOF10: 0

## 2018-05-14 ASSESSMENT — PAIN DESCRIPTION - DESCRIPTORS
DESCRIPTORS: DISCOMFORT;HEAVINESS;PENETRATING
DESCRIPTORS: ACHING;CONSTANT;DISCOMFORT
DESCRIPTORS: ACHING;THROBBING

## 2018-05-14 ASSESSMENT — PAIN DESCRIPTION - LOCATION
LOCATION: LEG
LOCATION: ANKLE;LEG
LOCATION: LEG

## 2018-05-14 ASSESSMENT — PAIN DESCRIPTION - ONSET: ONSET: ON-GOING

## 2018-05-14 ASSESSMENT — PAIN DESCRIPTION - FREQUENCY: FREQUENCY: CONTINUOUS

## 2018-05-14 ASSESSMENT — PAIN - FUNCTIONAL ASSESSMENT: PAIN_FUNCTIONAL_ASSESSMENT: 0-10

## 2018-05-14 NOTE — BRIEF OP NOTE
SYNTHES  Right 1   SCREW CORTX SLFTP FTHRD 3.5X28MM Screw/Plate/Nail/Dharmesh SCREW CORTX SLFTP FTHRD 3.5X28MM  SYNTHES  Right 2   SCREW CORTX SLFTP FTHRD 3.5X26MM Screw/Plate/Nail/Dharmesh SCREW CORTX SLFTP FTHRD 3.5X26MM  SYNTHES  Right 2   PLATE RECON LCP SS 6 HL 3.5X84MM Screw/Plate/Nail/Dharmesh PLATE RECON LCP SS 6 HL 3.5X84MM  SYNTHES  Right 1   SCREW CORTX SLFTP FTHRD 3.5X12MM Screw/Plate/Nail/Dharmesh SCREW CORTX SLFTP FTHRD 3.5X12MM  SYNTHES  Right 1   SCREW CORTX SLFTP FTHRD 3.5X14MM Screw/Plate/Nail/Dharmesh SCREW CORTX SLFTP FTHRD 3.5X14MM  SYNTHES  Right 3   SCREW CORTX SLFTP FTHRD 3.5X16MM Screw/Plate/Nail/Dharmesh SCREW CORTX SLFTP FTHRD 3.5X16MM   SYNTHES   Right 1         Drains:   [REMOVED] Urethral Catheter Non-latex 16 fr (Removed)       Findings: See Op Note    Zaida Griffins, DO  Date: 5/14/2018  Time: 1:09 PM

## 2018-05-15 VITALS
RESPIRATION RATE: 16 BRPM | SYSTOLIC BLOOD PRESSURE: 112 MMHG | OXYGEN SATURATION: 95 % | WEIGHT: 165 LBS | HEART RATE: 67 BPM | HEIGHT: 72 IN | DIASTOLIC BLOOD PRESSURE: 86 MMHG | TEMPERATURE: 98.4 F | BODY MASS INDEX: 22.35 KG/M2

## 2018-05-15 PROBLEM — G56.03 BILATERAL CARPAL TUNNEL SYNDROME: Status: RESOLVED | Noted: 2017-03-29 | Resolved: 2018-05-15

## 2018-05-15 PROBLEM — S33.100A: Status: RESOLVED | Noted: 2017-10-03 | Resolved: 2018-05-15

## 2018-05-15 PROBLEM — M51.36 BULGING LUMBAR DISC: Status: RESOLVED | Noted: 2017-10-03 | Resolved: 2018-05-15

## 2018-05-15 PROBLEM — M51.369 BULGING LUMBAR DISC: Status: RESOLVED | Noted: 2017-10-03 | Resolved: 2018-05-15

## 2018-05-15 PROBLEM — S82.871A CLOSED DISPLACED PILON FRACTURE OF RIGHT TIBIA: Status: RESOLVED | Noted: 2018-04-04 | Resolved: 2018-05-15

## 2018-05-15 PROBLEM — M53.2X6 LUMBAR SPINE INSTABILITY: Status: RESOLVED | Noted: 2018-01-08 | Resolved: 2018-05-15

## 2018-05-15 LAB
ANION GAP SERPL CALCULATED.3IONS-SCNC: 13 MMOL/L (ref 7–16)
BASOPHILS ABSOLUTE: 0.02 E9/L (ref 0–0.2)
BASOPHILS RELATIVE PERCENT: 0.2 % (ref 0–2)
BUN BLDV-MCNC: 13 MG/DL (ref 6–20)
CALCIUM SERPL-MCNC: 8.5 MG/DL (ref 8.6–10.2)
CHLORIDE BLD-SCNC: 100 MMOL/L (ref 98–107)
CO2: 28 MMOL/L (ref 22–29)
CREAT SERPL-MCNC: 0.7 MG/DL (ref 0.5–1)
EOSINOPHILS ABSOLUTE: 0.03 E9/L (ref 0.05–0.5)
EOSINOPHILS RELATIVE PERCENT: 0.3 % (ref 0–6)
GFR AFRICAN AMERICAN: >60
GFR NON-AFRICAN AMERICAN: >60 ML/MIN/1.73
GLUCOSE BLD-MCNC: 107 MG/DL (ref 74–109)
GRAM STAIN ORDERABLE: NORMAL
HCT VFR BLD CALC: 28.2 % (ref 34–48)
HCT VFR BLD CALC: 29.5 % (ref 34–48)
HEMOGLOBIN: 9.4 G/DL (ref 11.5–15.5)
HEMOGLOBIN: 9.5 G/DL (ref 11.5–15.5)
IMMATURE GRANULOCYTES #: 0.09 E9/L
IMMATURE GRANULOCYTES %: 0.9 % (ref 0–5)
LYMPHOCYTES ABSOLUTE: 2.71 E9/L (ref 1.5–4)
LYMPHOCYTES RELATIVE PERCENT: 26.4 % (ref 20–42)
MAGNESIUM: 1.8 MG/DL (ref 1.6–2.6)
MCH RBC QN AUTO: 30.4 PG (ref 26–35)
MCH RBC QN AUTO: 31 PG (ref 26–35)
MCHC RBC AUTO-ENTMCNC: 32.2 % (ref 32–34.5)
MCHC RBC AUTO-ENTMCNC: 33.3 % (ref 32–34.5)
MCV RBC AUTO: 93.1 FL (ref 80–99.9)
MCV RBC AUTO: 94.6 FL (ref 80–99.9)
METER GLUCOSE: 119 MG/DL (ref 70–110)
METER GLUCOSE: 121 MG/DL (ref 70–110)
MONOCYTES ABSOLUTE: 1.5 E9/L (ref 0.1–0.95)
MONOCYTES RELATIVE PERCENT: 14.6 % (ref 2–12)
NEUTROPHILS ABSOLUTE: 5.92 E9/L (ref 1.8–7.3)
NEUTROPHILS RELATIVE PERCENT: 57.6 % (ref 43–80)
PDW BLD-RTO: 13.6 FL (ref 11.5–15)
PDW BLD-RTO: 13.8 FL (ref 11.5–15)
PLATELET # BLD: 198 E9/L (ref 130–450)
PLATELET # BLD: 220 E9/L (ref 130–450)
PMV BLD AUTO: 10 FL (ref 7–12)
PMV BLD AUTO: 9.2 FL (ref 7–12)
POTASSIUM SERPL-SCNC: 4 MMOL/L (ref 3.5–5)
PREALBUMIN: 18 MG/DL (ref 20–40)
RBC # BLD: 3.03 E12/L (ref 3.5–5.5)
RBC # BLD: 3.12 E12/L (ref 3.5–5.5)
SODIUM BLD-SCNC: 141 MMOL/L (ref 132–146)
WBC # BLD: 10.3 E9/L (ref 4.5–11.5)
WBC # BLD: 12.5 E9/L (ref 4.5–11.5)

## 2018-05-15 PROCEDURE — 85025 COMPLETE CBC W/AUTO DIFF WBC: CPT

## 2018-05-15 PROCEDURE — 6360000002 HC RX W HCPCS: Performed by: STUDENT IN AN ORGANIZED HEALTH CARE EDUCATION/TRAINING PROGRAM

## 2018-05-15 PROCEDURE — G8979 MOBILITY GOAL STATUS: HCPCS

## 2018-05-15 PROCEDURE — 36415 COLL VENOUS BLD VENIPUNCTURE: CPT

## 2018-05-15 PROCEDURE — 6370000000 HC RX 637 (ALT 250 FOR IP): Performed by: INTERNAL MEDICINE

## 2018-05-15 PROCEDURE — 97161 PT EVAL LOW COMPLEX 20 MIN: CPT

## 2018-05-15 PROCEDURE — 82962 GLUCOSE BLOOD TEST: CPT

## 2018-05-15 PROCEDURE — 97165 OT EVAL LOW COMPLEX 30 MIN: CPT

## 2018-05-15 PROCEDURE — 85027 COMPLETE CBC AUTOMATED: CPT

## 2018-05-15 PROCEDURE — 2500000003 HC RX 250 WO HCPCS: Performed by: INTERNAL MEDICINE

## 2018-05-15 PROCEDURE — G8987 SELF CARE CURRENT STATUS: HCPCS

## 2018-05-15 PROCEDURE — 2580000003 HC RX 258: Performed by: INTERNAL MEDICINE

## 2018-05-15 PROCEDURE — 6370000000 HC RX 637 (ALT 250 FOR IP): Performed by: STUDENT IN AN ORGANIZED HEALTH CARE EDUCATION/TRAINING PROGRAM

## 2018-05-15 PROCEDURE — 80048 BASIC METABOLIC PNL TOTAL CA: CPT

## 2018-05-15 PROCEDURE — 83735 ASSAY OF MAGNESIUM: CPT

## 2018-05-15 PROCEDURE — 97530 THERAPEUTIC ACTIVITIES: CPT

## 2018-05-15 PROCEDURE — 6360000002 HC RX W HCPCS: Performed by: INTERNAL MEDICINE

## 2018-05-15 PROCEDURE — G8988 SELF CARE GOAL STATUS: HCPCS

## 2018-05-15 PROCEDURE — 2580000003 HC RX 258: Performed by: STUDENT IN AN ORGANIZED HEALTH CARE EDUCATION/TRAINING PROGRAM

## 2018-05-15 PROCEDURE — G8978 MOBILITY CURRENT STATUS: HCPCS

## 2018-05-15 RX ADMIN — FOLIC ACID: 5 INJECTION, SOLUTION INTRAMUSCULAR; INTRAVENOUS; SUBCUTANEOUS at 09:26

## 2018-05-15 RX ADMIN — HYDROCODONE BITARTRATE AND ACETAMINOPHEN 2 TABLET: 5; 325 TABLET ORAL at 08:44

## 2018-05-15 RX ADMIN — HYDROCODONE BITARTRATE AND ACETAMINOPHEN 2 TABLET: 5; 325 TABLET ORAL at 00:40

## 2018-05-15 RX ADMIN — ENOXAPARIN SODIUM 30 MG: 30 INJECTION SUBCUTANEOUS at 09:24

## 2018-05-15 RX ADMIN — DOCUSATE SODIUM 100 MG: 100 CAPSULE, LIQUID FILLED ORAL at 09:25

## 2018-05-15 RX ADMIN — HYDROCODONE BITARTRATE AND ACETAMINOPHEN 2 TABLET: 5; 325 TABLET ORAL at 04:45

## 2018-05-15 RX ADMIN — GABAPENTIN 300 MG: 300 CAPSULE ORAL at 09:25

## 2018-05-15 RX ADMIN — SODIUM CHLORIDE: 9 INJECTION, SOLUTION INTRAVENOUS at 00:00

## 2018-05-15 RX ADMIN — HYDROCODONE BITARTRATE AND ACETAMINOPHEN 2 TABLET: 5; 325 TABLET ORAL at 12:58

## 2018-05-15 RX ADMIN — Medication 10 ML: at 09:24

## 2018-05-15 RX ADMIN — DULOXETINE HYDROCHLORIDE 90 MG: 30 CAPSULE, DELAYED RELEASE ORAL at 09:24

## 2018-05-15 RX ADMIN — CEFAZOLIN SODIUM 2 G: 2 SOLUTION INTRAVENOUS at 02:57

## 2018-05-15 RX ADMIN — METFORMIN HYDROCHLORIDE 500 MG: 500 TABLET ORAL at 08:25

## 2018-05-15 ASSESSMENT — PAIN DESCRIPTION - ORIENTATION
ORIENTATION: RIGHT
ORIENTATION: RIGHT

## 2018-05-15 ASSESSMENT — PAIN SCALES - GENERAL
PAINLEVEL_OUTOF10: 9
PAINLEVEL_OUTOF10: 7
PAINLEVEL_OUTOF10: 0
PAINLEVEL_OUTOF10: 9
PAINLEVEL_OUTOF10: 0
PAINLEVEL_OUTOF10: 10
PAINLEVEL_OUTOF10: 10

## 2018-05-15 ASSESSMENT — PAIN DESCRIPTION - PAIN TYPE
TYPE: SURGICAL PAIN
TYPE: SURGICAL PAIN

## 2018-05-15 ASSESSMENT — PAIN DESCRIPTION - FREQUENCY
FREQUENCY: CONTINUOUS
FREQUENCY: CONTINUOUS

## 2018-05-15 ASSESSMENT — PAIN DESCRIPTION - DESCRIPTORS
DESCRIPTORS: ACHING;CONSTANT;DISCOMFORT
DESCRIPTORS: ACHING;CONSTANT;DISCOMFORT

## 2018-05-15 ASSESSMENT — PAIN DESCRIPTION - LOCATION
LOCATION: LEG
LOCATION: LEG

## 2018-05-15 ASSESSMENT — PAIN DESCRIPTION - ONSET
ONSET: ON-GOING
ONSET: ON-GOING

## 2018-05-15 NOTE — CONSULTS
Consults   Subjective: The patient is awake and alert. Low blood pressure overnight. Denies chest pain, angina, and dyspnea. Denies abdominal pain. Tolerating diet. No nausea or vomiting. Objective:    BP (!) 91/43   Pulse 58   Temp 98.8 °F (37.1 °C) (Temporal)   Resp 16   Ht 6' 1\" (1.854 m)   Wt 165 lb (74.8 kg)   LMP 08/13/2013   SpO2 94%   BMI 21.77 kg/m²     Current medications that patient is taking have been reviewed. Heart:  RRR, no murmurs, gallops, or rubs.   Lungs:  CTA bilaterally, no wheeze, rales or rhonchi  Abd: bowel sounds present, nontender, nondistended, no masses  Extrem:  No clubbing, cyanosis, or edema    CBC with Differential:    Lab Results   Component Value Date    WBC 10.3 05/15/2018    RBC 3.03 05/15/2018    HGB 9.4 05/15/2018    HCT 28.2 05/15/2018     05/15/2018    MCV 93.1 05/15/2018    MCH 31.0 05/15/2018    MCHC 33.3 05/15/2018    RDW 13.8 05/15/2018    SEGSPCT 57 11/08/2013    LYMPHOPCT 26.4 05/15/2018    MONOPCT 14.6 05/15/2018    BASOPCT 0.2 05/15/2018    MONOSABS 1.50 05/15/2018    LYMPHSABS 2.71 05/15/2018    EOSABS 0.03 05/15/2018    BASOSABS 0.02 05/15/2018     CMP:    Lab Results   Component Value Date     05/15/2018    K 4.0 05/15/2018     05/15/2018    CO2 28 05/15/2018    BUN 13 05/15/2018    CREATININE 0.7 05/15/2018    GFRAA >60 05/15/2018    LABGLOM >60 05/15/2018    GLUCOSE 107 05/15/2018    GLUCOSE 96 05/24/2012    PROT 5.3 01/11/2018    LABALBU 2.9 01/11/2018    LABALBU 4.5 05/24/2012    CALCIUM 8.5 05/15/2018    BILITOT 0.5 01/11/2018    ALKPHOS 77 01/11/2018    AST 35 01/11/2018    ALT 26 01/11/2018     BMP:    Lab Results   Component Value Date     05/15/2018    K 4.0 05/15/2018     05/15/2018    CO2 28 05/15/2018    BUN 13 05/15/2018    LABALBU 2.9 01/11/2018    LABALBU 4.5 05/24/2012    CREATININE 0.7 05/15/2018    CALCIUM 8.5 05/15/2018    GFRAA >60 05/15/2018    LABGLOM >60 05/15/2018    GLUCOSE 107 05/15/2018 GLUCOSE 96 05/24/2012     Magnesium:    Lab Results   Component Value Date    MG 1.8 05/15/2018     Phosphorus:    Lab Results   Component Value Date    PHOS 2.8 02/13/2017     PT/INR:    Lab Results   Component Value Date    PROTIME 10.9 01/02/2018    PROTIME 11.0 09/30/2011    INR 1.0 01/02/2018     PTT:    Lab Results   Component Value Date    APTT 18.4 09/30/2011   [APTT}     Assessment:    Patient Active Problem List   Diagnosis    Diabetes mellitus type 2, uncontrolled ([address]    HTN (hypertension), benign    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 Blood pressure running low. Hold atenolol. Status post ORIF. Pt/Ot, pain management and DVT prophylaxis as per ortho. Pt/Ot evaluations for discharge planning.       Claudeen Basta MD  9:26 AM  5/15/2018

## 2018-05-15 NOTE — CARE COORDINATION
[facility] Lovenox form faxed to [phone]  Explained to pt that she will be receiving 2 weeks at her pharmacy and will be receiving the remainder at her home

## 2018-05-15 NOTE — PROGRESS NOTES
Admitted to Same Day Surgery Unit. Preop instructions given to patient & family. Povidine-iodine 5% nasal antiseptic pre-op prep completed 15 seconds per nare and repeated. 2% CHG pre-op skin prep completed in appropriate order using all 6 cloths:    With 1st cloth, wipe the neck, chest, and abdomen. Scrub inside nd  around the navel/belly-button area.  With 2nd cloth, wipe both arms, starting each with the shoulder  and ending at the fingertips. Be sure to thoroughly wipe the arm  pit area.  With 3rd cloth, wipe the right and left hip followed by the groin. Be sure to wipe folds in the abdominal and groin areas.  With 4th cloth, wipe both legs, starting at the thigh and ending   at the toes. Be sure to thoroughly wipe behind the knees.  With 5th cloth, wipe the back starting at the base of the neck and   Ending at the waist line.  With 6th cloth, wipe the buttocks.
Awaiting surgical pathology per ortho for discharge, will reeval if observation order needed later in day
OCCUPATIONAL THERAPY INITIAL EVALUATION      Date:5/15/2018  Patient Name: Rashard Patel  MRN: [id number]  : 1965  Room: [address] [place] [address]     Evaluating OT: Simon Lara, OTR/L   License #  [id number]    Recommended Adaptive Equipment: w/c with elevating R LE leg rest     AM PAC ADL RAW SCORE -  19/24  G code:  CK    Diagnosis: Non-Union R tibia  Surgery: s/p ORIF R tib/fib non union    Past Medical History:       Diagnosis Date    Anxiety     Arthritis     Cervical radiculopathy     Chronic back pain     Depression     Diabetes mellitus type 2, uncontrolled ([address] 2017    GERD (gastroesophageal reflux disease)     History of blood transfusion 2018    back surgery, lumbar, dr Rick Samaniego Hypertension      Precautions: falls, NWB R LE     Pt was agreeable to eval/treatment this day: yes    Home Living: Pt lives alone in a 1st floor Senior apt. with no stairs to enter and no rails; bed/bath on main. Pt. states she has friends who live close and can assist upon d/c  Bathroom setup: tub/shower with grab bars  Equipment owned: shower chair, grab bars in shower, st. walker, st. cane    Prior Level of Function: Pt. states was Ind. with ADLs Ind. with IADLs; using no A.D. for ambulation. Driving: pt. states she does not currently own vehicle, uses public transport or friends drive. Occupation: unemployed    Pain Level: pt c/o 10/10 R LE pain this session     Cognition: oriented x 3; follows 2 step directions with min. cues   Fair- Problem solving skills  Fair- Memory   Fair- Sequencing  Additional Comments: Pt. required cues for walker technique, safety, precautions. Pt. demonstrates min/mod distraction, cues to re-direct to task.   Perseverates on smoking    Sensory:   Hearing: wfl  Vision: wfl    Glasses: yes [x] no [] reading []      UE Assessment:  Hand Dominance: Right [x]  Left []     Strength ROM Additional Info:    RUE   / wfl wfl  and wfl FMC/dexterity noted during ADL tasks     LUE
REMOVED HARDWARE OBTAINED INTROPERATIVELY & NOT FORWARDED TO PATHOLOGY PER DR Vikash Adair. ..Antwon Adams, BSN, RN
Independently. Pt required minimal assistance during sit to stand transfer to initiate transfer. Pt ambulated 40 feet with [facility] CGA for safety and with VCs for sequencing. Pt ambulated back to a bedside chair where she was left with call light in reach and all needs met. Patient education  Pt educated on bed mobility and transfer techniques and NWB status of RLE. Pt educated on using [facility] and proper gait techniques and speed with [facility].     Patient response to education:   Pt verbalized understanding Pt demonstrated skill Pt requires further education in this area   Yes Yes Reinforce     Rehab potential is Good for reaching above PT goals. Pts/ family goals   1. To return home    Patient and or family understand(s) diagnosis, prognosis, and plan of care: yes     PLAN  PT care will be provided in accordance with the objectives noted above. Whenever appropriate, clear delegation orders will be provided for nursing staff. Exercises and functional mobility practice will be used as well as appropriate assistive devices or modalities to obtain goals. Patient and family education will also be administered as needed. Frequency of treatments will be 2-5x/week x 2-5 days. Time in: 1305  Time out: [address], [facility]    Otilia Butcher.  Devi Snyder, [address]  number:  [phone] [phone]
procedure, you may call the pre-op area if you have concerns about your blood sugar [phone]. [x] Use your inhalers the morning of surgery. Bring your emergency inhaler with you day of surgery. [] Follow physician instructions regarding any blood thinners you may be taking. WHAT TO EXPECT:  [x] The day of surgery you will be greeted and  checked in by the [employer] .  A nurse will greet you in accordance to the time you are needed in the pre-op area to prepare you for surgery. Please do not be discouraged if you are not greeted in the order you arrive as there are many variables that are involved in patient preparation. Your patience is greatly appreciated as you wait for your nurse. Please bring in items such as: books, magazines, newspapers, electronics, or any other items  to occupy your time in the waiting area. []  Delays may occur with surgery and staff will make a sincere effort to keep you informed of delays. If any delays occur with your procedure, we apologize ahead of time for your inconvenience as we recognize the value of your time.

## 2018-05-15 NOTE — H&P
New Patient        Handy Tolliver is a 47 y.o. female, her YOB: 1965 with the following history as recorded in [facility]:     HPI: Patient is a very pleasant 50-year-old female who comes in today with a chief complaint of right lower extremity pain. She suffered a traumatic fracture of the right distal tibia/weightbearing surface of the distal tibia in August 2017. She was treated in [place]. She was noted to have a nonunion which was undergoing active treatment. She then subsequently ended up having decompressive surgery on her lower back for right leg pain in January. She was then sent to me for definitive management of her nonunion today. She came with plain film x-rays which reveal a nonunion with mild alignment of the right distal tibia. She does have a tibial nail position which has broken screws. She has not had a CT scan or infectious workup at this point in time. She does currently walk without assistance, a limp and significant pain.  She denies any recent trauma.        Review of Systems - General ROS: negative for - chills, fatigue, fever, malaise or night sweats  Ophthalmic ROS: negative for - blurry vision, decreased vision, double vision, dry eyes, excessive tearing, eye pain or loss of vision  ENT ROS: negative for - headaches, hearing change, nasal congestion, sinus pain, sore throat, tinnitus or vertigo  Allergy and Immunology ROS: negative for - itchy/watery eyes, nasal congestion, postnasal drip or seasonal allergies  Hematological and Lymphatic ROS: negative for - bleeding problems, blood clots, bruising, fatigue, jaundice, night sweats or swollen lymph nodes  Endocrine ROS: negative for - mood swings, palpitations, polydipsia/polyuria, skin changes or temperature intolerance  Respiratory ROS: no cough, shortness of breath, or wheezing  Cardiovascular ROS: no chest pain or dyspnea on palpable lymphadenopathy, no hepatosplenomegaly  Back exam - full range of motion, no tenderness, palpable spasm or pain on motion, negative straight leg raise bilaterally, sacroiliac joints and sciatic notches nontender, normal reflexes and strength bilateral lower extremities, sensory exam intact bilateral lower extremities  Neurological - alert, oriented, normal speech, no focal findings or movement disorder noted, DTR's normal and symmetric, Babinski sign negative, motor and sensory grossly normal bilaterally, normal muscle tone, no tremors, strength 5/5  Musculoskeletal -   Right lower extremity              Skin intact, previous surgical incisions well-healed with no signs of infection              Compartments soft and compressible              Calves soft and non tender               4/5 EHL, TA, GS              2/4 DP and PT pulses              Paresthesias in the superficial radial nerve and along non-anatomic distributions over the foot              Capillary refill less than 3 seconds               Tenderness to palpation over her cross locking screws as well as over the fracture site distally in the tibia              No gross motion fracture site appreciated on physical examination     Extremities - peripheral pulses normal, no pedal edema, no clubbing or cyanosis, no pedal edema noted, no edema, redness or tenderness in the calves or thighs, Rolan's sign negative bilaterally, no ulcers, gangrene or atrophic changes  Skin - normal coloration and turgor, no rashes, no suspicious skin lesions noted        XR: Nonunion right distal tibia with locking screw breakage of tibial nail, ankle and a valgus position     DISCUSSION: I talked to the patient detail about the fact that she has a nonunion. The fracture appears to have been treated appropriately. She has subsequently gone onto a nonunion which is a risk of these fractures.  I did explain to her that we needed do a CT scan and laboratory workup for

## 2018-05-15 NOTE — OP NOTE
tibia.  3.  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 PROCEDURES:  1. Removal of hardware, right tibia and right fibula. 2.  Repair of nonunion with autograft, right tibia using plate and screws. 3.  Repair of nonunion, right fibula, with internal fixation. SURGEON:  Arin Cespedes MD    ASSISTANTS:  Ngoc Lantigua DO, resident. EBL:  Approximately 25 mL. FLUIDS:  Crystalloid. The patient was given 2 gm of Ancef after cultures  were taken. COMPLICATIONS:  There were no complications. PACKS AND DRAINS:  No packs or drains. ANESTHESIA:  General endotracheal with a block. OPERATIVE PROCEDURE:  The patient was brought to the operating room in  supine position on the hospital bed. The patient was transferred to the  operating table by multiple individuals in a safe fashion with Anesthesia  in control of the patient's C-spine and airway. Once on the operating  table, all points of pressure were identified and well padded. A  tourniquet was applied to her right upper thigh. The right lower extremity  was sterilely prepped and draped in a sterile orthopedic fashion. A  time-out was performed indicating the appropriate identification of the  patient, the procedure to be performed, and the site to be performed upon. This was agreed upon by all individuals in the room. After the time-out  was performed, her previous tibial nail was placed through the  suprapatellar approach, went through a straight anterior approach for a  medial parapatellar arthrotomy and marked this out. The knee was placed  over a triangle. An incision was made at the knee. Careful dissection was  carried out down to the retinaculum, which was incised. A guide pin was  inserted under fluoroscopic guidance on the proximal nail. Once this was  in appropriate position, the anterior reamer was used to clear out soft  tissue. The removal device was threaded in the top of the nail.   We was taken  after pathology came back from the distal femur and spun down. The bone  marrow autograft was then put into position along with another 10 mL of  cancellous bone chips as well. This was packed tightly into the fracture  site. X-rays confirmed good filling of the defect. At this point in time,  this wound was then copiously irrigated and closed with 2-0 Monocryl and  3-0 nylon. Attention was turned to the fibula where the incision was made  over the previous incision. The subperiosteal dissection was carried out  around the ____, superficial peroneal nerve was identified and protected. After the plate was removed, the nonunion site was taken down. The  fracture was significantly shortened and therefore it was lengthened and  then a reconstruction plate was put into position with six cortices above  and below the fracture. The defect was then filled with cancellous bone  chips, which were impacted into the nonunion site. This wound was then  copiously irrigated with sterile normal saline. It had a small amount of  bone marrow placed. It was closed with 2-0 Monocryl and 3-0 nylon. Final  x-rays showed good improvement of the fracture with hardware in good  position. The patient then had bacitracin and Adaptic placed over the  incisions with a well-padded splint. She was taken to the PACU in stable  condition. POSTOPERATIVE PLAN: Includes:  1. Strict nonweightbearing, right lower extremity for 8 to 12 weeks. 2.  DVT prophylaxis in the form of Lovenox in the hospital and aspirin as  an outpatient. 3.  We will follow her cultures. If it becomes possible, we will consult  Infectious Disease.         Myron Cheadle, MD    D: 05/14/2018 20:52:00       T: 05/15/2018 0:26:51     [email or web address]  Job#: [id number]     Doc#: [id number]    CC:

## 2018-05-16 ENCOUNTER — TELEPHONE (OUTPATIENT)
Dept: ORTHOPEDIC SURGERY | Age: 53
End: 2018-05-16

## 2018-05-16 LAB
CULTURE SURGICAL: NORMAL
GRAM STAIN RESULT: NORMAL
VITAMIN D 1,25-DIHYDROXY: 36.1 PG/ML (ref 19.9–79.3)

## 2018-05-17 ENCOUNTER — TELEPHONE (OUTPATIENT)
Dept: ORTHOPEDIC SURGERY | Age: 53
End: 2018-05-17

## 2018-05-18 ENCOUNTER — TELEPHONE (OUTPATIENT)
Dept: ORTHOPEDIC SURGERY | Age: 53
End: 2018-05-18

## 2018-05-19 LAB — ANAEROBIC CULTURE: NORMAL

## 2018-05-24 RX ORDER — HYDROCODONE BITARTRATE AND ACETAMINOPHEN 5; 325 MG/1; MG/1
1 TABLET ORAL EVERY 4 HOURS PRN
Qty: 42 TABLET | Refills: 0 | Status: CANCELLED | OUTPATIENT
Start: 2018-05-24 | End: 2018-05-31

## 2018-05-25 DIAGNOSIS — S82.251K CLOSED DISPLACED COMMINUTED FRACTURE OF SHAFT OF RIGHT TIBIA WITH NONUNION: Primary | ICD-10-CM

## 2018-05-29 ENCOUNTER — TELEPHONE (OUTPATIENT)
Dept: ADMINISTRATIVE | Age: 53
End: 2018-05-29

## 2018-05-30 ENCOUNTER — TELEPHONE (OUTPATIENT)
Dept: ORTHOPEDIC SURGERY | Age: 53
End: 2018-05-30

## 2018-06-06 ENCOUNTER — OFFICE VISIT (OUTPATIENT)
Dept: ORTHOPEDIC SURGERY | Age: 53
End: 2018-06-06
Payer: MEDICAID

## 2018-06-06 ENCOUNTER — HOSPITAL ENCOUNTER (OUTPATIENT)
Dept: GENERAL RADIOLOGY | Age: 53
Discharge: HOME | End: 2018-06-08
Payer: MEDICAID

## 2018-06-06 VITALS
DIASTOLIC BLOOD PRESSURE: 76 MMHG | WEIGHT: 165 LBS | HEIGHT: 72 IN | HEART RATE: 66 BPM | BODY MASS INDEX: 22.35 KG/M2 | SYSTOLIC BLOOD PRESSURE: 110 MMHG | RESPIRATION RATE: 18 BRPM

## 2018-06-06 DIAGNOSIS — S82.251K CLOSED DISPLACED COMMINUTED FRACTURE OF SHAFT OF RIGHT TIBIA WITH NONUNION: ICD-10-CM

## 2018-06-06 PROCEDURE — 73610 X-RAY EXAM OF ANKLE: CPT

## 2018-06-06 PROCEDURE — 99213 OFFICE O/P EST LOW 20 MIN: CPT

## 2018-06-06 PROCEDURE — 99024 POSTOP FOLLOW-UP VISIT: CPT | Performed by: ORTHOPAEDIC SURGERY

## 2018-06-06 PROCEDURE — 73590 X-RAY EXAM OF LOWER LEG: CPT

## 2018-06-06 RX ORDER — HYDROCODONE BITARTRATE AND ACETAMINOPHEN 5; 325 MG/1; MG/1
1 TABLET ORAL EVERY 6 HOURS PRN
Qty: 28 TABLET | Refills: 0 | Status: SHIPPED | OUTPATIENT
Start: 2018-06-06 | End: 2018-06-27 | Stop reason: SDUPTHER

## 2018-06-18 LAB
FUNGUS (MYCOLOGY) CULTURE: NORMAL
FUNGUS STAIN: NORMAL

## 2018-06-27 ENCOUNTER — HOSPITAL ENCOUNTER (OUTPATIENT)
Dept: GENERAL RADIOLOGY | Age: 53
Discharge: HOME | End: 2018-06-29
Payer: MEDICAID

## 2018-06-27 ENCOUNTER — OFFICE VISIT (OUTPATIENT)
Dept: ORTHOPEDIC SURGERY | Age: 53
End: 2018-06-27
Payer: MEDICAID

## 2018-06-27 VITALS
HEART RATE: 67 BPM | WEIGHT: 165 LBS | SYSTOLIC BLOOD PRESSURE: 102 MMHG | BODY MASS INDEX: 22.35 KG/M2 | HEIGHT: 72 IN | DIASTOLIC BLOOD PRESSURE: 74 MMHG

## 2018-06-27 DIAGNOSIS — S82.251K CLOSED DISPLACED COMMINUTED FRACTURE OF SHAFT OF RIGHT TIBIA WITH NONUNION: Primary | ICD-10-CM

## 2018-06-27 DIAGNOSIS — S82.251K CLOSED DISPLACED COMMINUTED FRACTURE OF SHAFT OF RIGHT TIBIA WITH NONUNION: ICD-10-CM

## 2018-06-27 PROCEDURE — 99213 OFFICE O/P EST LOW 20 MIN: CPT | Performed by: NURSE PRACTITIONER

## 2018-06-27 PROCEDURE — 73610 X-RAY EXAM OF ANKLE: CPT

## 2018-06-27 PROCEDURE — 99024 POSTOP FOLLOW-UP VISIT: CPT | Performed by: NURSE PRACTITIONER

## 2018-06-27 RX ORDER — HYDROCODONE BITARTRATE AND ACETAMINOPHEN 5; 325 MG/1; MG/1
1 TABLET ORAL EVERY 6 HOURS PRN
Qty: 20 TABLET | Refills: 0 | Status: SHIPPED | OUTPATIENT
Start: 2018-06-27 | End: 2018-06-28

## 2018-06-27 RX ORDER — RANITIDINE 150 MG/1
150 TABLET ORAL DAILY
COMMUNITY
End: 2020-12-11

## 2018-06-27 ASSESSMENT — ENCOUNTER SYMPTOMS
EYE ITCHING: 0
EYE REDNESS: 0
BACK PAIN: 1
SORE THROAT: 0
NAUSEA: 0
EYE PAIN: 0
EYE DISCHARGE: 0
ABDOMINAL PAIN: 0
SHORTNESS OF BREATH: 0
CONSTIPATION: 0
VOMITING: 0
BLOOD IN STOOL: 0
DIARRHEA: 0
COUGH: 0
SINUS PRESSURE: 0
WHEEZING: 0

## 2018-06-29 RX ORDER — SODIUM CHLORIDE 0.9 % (FLUSH) 0.9 %
10 SYRINGE (ML) INJECTION EVERY 12 HOURS SCHEDULED
Status: CANCELLED | OUTPATIENT
Start: 2018-06-29 | End: 2019-06-29

## 2018-06-29 RX ORDER — SODIUM CHLORIDE, SODIUM LACTATE, POTASSIUM CHLORIDE, CALCIUM CHLORIDE 600; 310; 30; 20 MG/100ML; MG/100ML; MG/100ML; MG/100ML
INJECTION, SOLUTION INTRAVENOUS CONTINUOUS
Status: CANCELLED | OUTPATIENT
Start: 2018-06-29 | End: 2019-06-29

## 2018-06-29 RX ORDER — SODIUM CHLORIDE 0.9 % (FLUSH) 0.9 %
10 SYRINGE (ML) INJECTION PRN
Status: CANCELLED | OUTPATIENT
Start: 2018-06-29 | End: 2019-06-29

## 2018-07-03 ENCOUNTER — ANESTHESIA EVENT (OUTPATIENT)
Dept: OPERATING ROOM | Age: 53
DRG: 313 | End: 2018-07-03
Payer: MEDICAID

## 2018-07-03 LAB
AFB CULTURE (MYCOBACTERIA): NORMAL
AFB SMEAR: NORMAL

## 2018-07-05 ENCOUNTER — APPOINTMENT (OUTPATIENT)
Dept: GENERAL RADIOLOGY | Age: 53
DRG: 313 | End: 2018-07-05
Attending: ORTHOPAEDIC SURGERY
Payer: MEDICAID

## 2018-07-05 ENCOUNTER — HOSPITAL ENCOUNTER (INPATIENT)
Age: 53
LOS: 3 days | Discharge: HOME HEALTH | DRG: 313 | End: 2018-07-10
Attending: ORTHOPAEDIC SURGERY | Admitting: ORTHOPAEDIC SURGERY
Payer: MEDICAID

## 2018-07-05 ENCOUNTER — ANESTHESIA (OUTPATIENT)
Dept: OPERATING ROOM | Age: 53
DRG: 313 | End: 2018-07-05
Payer: MEDICAID

## 2018-07-05 VITALS
RESPIRATION RATE: 20 BRPM | OXYGEN SATURATION: 100 % | DIASTOLIC BLOOD PRESSURE: 73 MMHG | TEMPERATURE: 98.4 F | SYSTOLIC BLOOD PRESSURE: 129 MMHG

## 2018-07-05 DIAGNOSIS — R52 PAIN: ICD-10-CM

## 2018-07-05 DIAGNOSIS — Z01.812 PRE-OPERATIVE LABORATORY EXAMINATION: Primary | ICD-10-CM

## 2018-07-05 DIAGNOSIS — Z98.890 HISTORY OF SURGERY: ICD-10-CM

## 2018-07-05 DIAGNOSIS — Z01.818 PREOP TESTING: ICD-10-CM

## 2018-07-05 PROBLEM — S82.201G: Status: ACTIVE | Noted: 2018-07-05

## 2018-07-05 LAB
C-REACTIVE PROTEIN: 0.1 MG/DL (ref 0–0.4)
GLUCOSE BLD-MCNC: 98 MG/DL
METER GLUCOSE: 194 MG/DL (ref 70–110)
METER GLUCOSE: 246 MG/DL (ref 70–110)
METER GLUCOSE: 98 MG/DL (ref 70–110)
SEDIMENTATION RATE, ERYTHROCYTE: 11 MM/HR (ref 0–20)

## 2018-07-05 PROCEDURE — 6360000002 HC RX W HCPCS: Performed by: STUDENT IN AN ORGANIZED HEALTH CARE EDUCATION/TRAINING PROGRAM

## 2018-07-05 PROCEDURE — 0QSG04Z REPOSITION RIGHT TIBIA WITH INTERNAL FIXATION DEVICE, OPEN APPROACH: ICD-10-PCS | Performed by: ORTHOPAEDIC SURGERY

## 2018-07-05 PROCEDURE — 3700000000 HC ANESTHESIA ATTENDED CARE: Performed by: ORTHOPAEDIC SURGERY

## 2018-07-05 PROCEDURE — 20680 REMOVAL OF IMPLANT DEEP: CPT | Performed by: ORTHOPAEDIC SURGERY

## 2018-07-05 PROCEDURE — 6360000002 HC RX W HCPCS: Performed by: ANESTHESIOLOGY

## 2018-07-05 PROCEDURE — 0QPJ04Z REMOVAL OF INTERNAL FIXATION DEVICE FROM RIGHT FIBULA, OPEN APPROACH: ICD-10-PCS | Performed by: ORTHOPAEDIC SURGERY

## 2018-07-05 PROCEDURE — 3600000002 HC SURGERY LEVEL 2 BASE: Performed by: ORTHOPAEDIC SURGERY

## 2018-07-05 PROCEDURE — 87205 SMEAR GRAM STAIN: CPT

## 2018-07-05 PROCEDURE — 85651 RBC SED RATE NONAUTOMATED: CPT

## 2018-07-05 PROCEDURE — 87075 CULTR BACTERIA EXCEPT BLOOD: CPT

## 2018-07-05 PROCEDURE — 0QUG0JZ SUPPLEMENT RIGHT TIBIA WITH SYNTHETIC SUBSTITUTE, OPEN APPROACH: ICD-10-PCS | Performed by: ORTHOPAEDIC SURGERY

## 2018-07-05 PROCEDURE — 96365 THER/PROPH/DIAG IV INF INIT: CPT

## 2018-07-05 PROCEDURE — 6370000000 HC RX 637 (ALT 250 FOR IP): Performed by: ORTHOPAEDIC SURGERY

## 2018-07-05 PROCEDURE — 2720000010 HC SURG SUPPLY STERILE: Performed by: ORTHOPAEDIC SURGERY

## 2018-07-05 PROCEDURE — 87102 FUNGUS ISOLATION CULTURE: CPT

## 2018-07-05 PROCEDURE — 86140 C-REACTIVE PROTEIN: CPT

## 2018-07-05 PROCEDURE — 2700000000 HC OXYGEN THERAPY PER DAY

## 2018-07-05 PROCEDURE — 0QPG04Z REMOVAL OF INTERNAL FIXATION DEVICE FROM RIGHT TIBIA, OPEN APPROACH: ICD-10-PCS | Performed by: ORTHOPAEDIC SURGERY

## 2018-07-05 PROCEDURE — 2500000003 HC RX 250 WO HCPCS: Performed by: NURSE ANESTHETIST, CERTIFIED REGISTERED

## 2018-07-05 PROCEDURE — 87186 SC STD MICRODIL/AGAR DIL: CPT

## 2018-07-05 PROCEDURE — 87077 CULTURE AEROBIC IDENTIFY: CPT

## 2018-07-05 PROCEDURE — 3209999900 FLUORO FOR SURGICAL PROCEDURES

## 2018-07-05 PROCEDURE — 87070 CULTURE OTHR SPECIMN AEROBIC: CPT

## 2018-07-05 PROCEDURE — G0378 HOSPITAL OBSERVATION PER HR: HCPCS

## 2018-07-05 PROCEDURE — 0QUJ0JZ SUPPLEMENT RIGHT FIBULA WITH SYNTHETIC SUBSTITUTE, OPEN APPROACH: ICD-10-PCS | Performed by: ORTHOPAEDIC SURGERY

## 2018-07-05 PROCEDURE — 2580000003 HC RX 258: Performed by: PHYSICIAN ASSISTANT

## 2018-07-05 PROCEDURE — 7100000001 HC PACU RECOVERY - ADDTL 15 MIN: Performed by: ORTHOPAEDIC SURGERY

## 2018-07-05 PROCEDURE — 0QSJ04Z REPOSITION RIGHT FIBULA WITH INTERNAL FIXATION DEVICE, OPEN APPROACH: ICD-10-PCS | Performed by: ORTHOPAEDIC SURGERY

## 2018-07-05 PROCEDURE — C1713 ANCHOR/SCREW BN/BN,TIS/BN: HCPCS | Performed by: ORTHOPAEDIC SURGERY

## 2018-07-05 PROCEDURE — 64447 NJX AA&/STRD FEMORAL NRV IMG: CPT | Performed by: ANESTHESIOLOGY

## 2018-07-05 PROCEDURE — 3700000001 HC ADD 15 MINUTES (ANESTHESIA): Performed by: ORTHOPAEDIC SURGERY

## 2018-07-05 PROCEDURE — 96376 TX/PRO/DX INJ SAME DRUG ADON: CPT

## 2018-07-05 PROCEDURE — 88331 PATH CONSLTJ SURG 1 BLK 1SPC: CPT

## 2018-07-05 PROCEDURE — 27828 TREAT LOWER LEG FRACTURE: CPT | Performed by: ORTHOPAEDIC SURGERY

## 2018-07-05 PROCEDURE — 88300 SURGICAL PATH GROSS: CPT

## 2018-07-05 PROCEDURE — 87015 SPECIMEN INFECT AGNT CONCNTJ: CPT

## 2018-07-05 PROCEDURE — 96375 TX/PRO/DX INJ NEW DRUG ADDON: CPT

## 2018-07-05 PROCEDURE — 6360000002 HC RX W HCPCS: Performed by: NURSE ANESTHETIST, CERTIFIED REGISTERED

## 2018-07-05 PROCEDURE — 7100000000 HC PACU RECOVERY - FIRST 15 MIN: Performed by: ORTHOPAEDIC SURGERY

## 2018-07-05 PROCEDURE — 96372 THER/PROPH/DIAG INJ SC/IM: CPT

## 2018-07-05 PROCEDURE — 87206 SMEAR FLUORESCENT/ACID STAI: CPT

## 2018-07-05 PROCEDURE — 3600000012 HC SURGERY LEVEL 2 ADDTL 15MIN: Performed by: ORTHOPAEDIC SURGERY

## 2018-07-05 PROCEDURE — 88311 DECALCIFY TISSUE: CPT

## 2018-07-05 PROCEDURE — 73590 X-RAY EXAM OF LOWER LEG: CPT

## 2018-07-05 PROCEDURE — 73610 X-RAY EXAM OF ANKLE: CPT

## 2018-07-05 PROCEDURE — 36415 COLL VENOUS BLD VENIPUNCTURE: CPT

## 2018-07-05 PROCEDURE — 6370000000 HC RX 637 (ALT 250 FOR IP): Performed by: STUDENT IN AN ORGANIZED HEALTH CARE EDUCATION/TRAINING PROGRAM

## 2018-07-05 PROCEDURE — 2580000003 HC RX 258: Performed by: STUDENT IN AN ORGANIZED HEALTH CARE EDUCATION/TRAINING PROGRAM

## 2018-07-05 PROCEDURE — 88304 TISSUE EXAM BY PATHOLOGIST: CPT

## 2018-07-05 PROCEDURE — 87116 MYCOBACTERIA CULTURE: CPT

## 2018-07-05 PROCEDURE — 82962 GLUCOSE BLOOD TEST: CPT

## 2018-07-05 DEVICE — SCREW BNE L40MM DIA3.5MM CORT S STL ST LOK FULL THRD: Type: IMPLANTABLE DEVICE | Site: ANKLE | Status: FUNCTIONAL

## 2018-07-05 DEVICE — SCREW BNE L24MM DIA3.5MM CORT S STL ST NONCANNULATED LOK: Type: IMPLANTABLE DEVICE | Site: ANKLE | Status: FUNCTIONAL

## 2018-07-05 DEVICE — SCREW BNE L30MM DIA3.5MM CORT S STL ST NONCANNULATED LOK: Type: IMPLANTABLE DEVICE | Site: ANKLE | Status: FUNCTIONAL

## 2018-07-05 DEVICE — BIOACTIVE BONE GRAFT SUBSTITUTE, FOAM PACK; BETA-TRICALCIUM PHOSPHATE, TYPE I BOVINE COLLAGEN, AND BIOACTIVE GLASS
Type: IMPLANTABLE DEVICE | Site: ANKLE | Status: FUNCTIONAL
Brand: VITOSS BBTRAUMA

## 2018-07-05 DEVICE — SCREW BNE L34MM DIA3.5MM CORT S STL ST LOK FULL THRD: Type: IMPLANTABLE DEVICE | Site: ANKLE | Status: FUNCTIONAL

## 2018-07-05 DEVICE — PLATE BNE L124MM THK3.4MM 9 H BILAT S STL STR LOK COMPR FOR: Type: IMPLANTABLE DEVICE | Site: ANKLE | Status: FUNCTIONAL

## 2018-07-05 DEVICE — GRAFT BNE SUB M 5ML CANC DBM FRMBL CELLULAR VIVIGEN: Type: IMPLANTABLE DEVICE | Site: ANKLE | Status: FUNCTIONAL

## 2018-07-05 DEVICE — SCREW BNE L26MM DIA3.5MM CORT S STL ST NONCANNULATED LOK: Type: IMPLANTABLE DEVICE | Site: ANKLE | Status: FUNCTIONAL

## 2018-07-05 DEVICE — SCREW BNE L16MM DIA3.5MM CORT S STL ST NONCANNULATED LOK: Type: IMPLANTABLE DEVICE | Site: ANKLE | Status: FUNCTIONAL

## 2018-07-05 DEVICE — SCREW BNE L14MM DIA3.5MM CORT S STL ST NONCANNULATED LOK: Type: IMPLANTABLE DEVICE | Site: ANKLE | Status: FUNCTIONAL

## 2018-07-05 DEVICE — IMPLANTABLE DEVICE: Type: IMPLANTABLE DEVICE | Site: ANKLE | Status: FUNCTIONAL

## 2018-07-05 DEVICE — SCREW CORTX SLFTP FTHRD 3.5X18MM: Type: IMPLANTABLE DEVICE | Site: ANKLE | Status: FUNCTIONAL

## 2018-07-05 DEVICE — SCREW BNE L36MM DIA3.5MM CORT S STL ST LOK FULL THRD: Type: IMPLANTABLE DEVICE | Site: ANKLE | Status: FUNCTIONAL

## 2018-07-05 DEVICE — SCREW BNE L22MM DIA3.5MM CORT S STL ST NONCANNULATED LOK: Type: IMPLANTABLE DEVICE | Site: ANKLE | Status: FUNCTIONAL

## 2018-07-05 RX ORDER — FAMOTIDINE 20 MG/1
20 TABLET, FILM COATED ORAL DAILY
Status: DISCONTINUED | OUTPATIENT
Start: 2018-07-06 | End: 2018-07-11 | Stop reason: HOSPADM

## 2018-07-05 RX ORDER — DEXTROSE MONOHYDRATE 50 MG/ML
100 INJECTION, SOLUTION INTRAVENOUS PRN
Status: DISCONTINUED | OUTPATIENT
Start: 2018-07-05 | End: 2018-07-11 | Stop reason: HOSPADM

## 2018-07-05 RX ORDER — ALPRAZOLAM 0.25 MG/1
0.25 TABLET ORAL NIGHTLY
Status: DISCONTINUED | OUTPATIENT
Start: 2018-07-05 | End: 2018-07-11 | Stop reason: HOSPADM

## 2018-07-05 RX ORDER — PROPOFOL 10 MG/ML
INJECTION, EMULSION INTRAVENOUS PRN
Status: DISCONTINUED | OUTPATIENT
Start: 2018-07-05 | End: 2018-07-05 | Stop reason: SDUPTHER

## 2018-07-05 RX ORDER — PROMETHAZINE HYDROCHLORIDE 25 MG/ML
6.25 INJECTION, SOLUTION INTRAMUSCULAR; INTRAVENOUS EVERY 10 MIN PRN
Status: DISCONTINUED | OUTPATIENT
Start: 2018-07-05 | End: 2018-07-05 | Stop reason: HOSPADM

## 2018-07-05 RX ORDER — NICOTINE POLACRILEX 4 MG
15 LOZENGE BUCCAL PRN
Status: DISCONTINUED | OUTPATIENT
Start: 2018-07-05 | End: 2018-07-11 | Stop reason: HOSPADM

## 2018-07-05 RX ORDER — ATENOLOL 50 MG/1
50 TABLET ORAL NIGHTLY
Status: DISCONTINUED | OUTPATIENT
Start: 2018-07-05 | End: 2018-07-11 | Stop reason: HOSPADM

## 2018-07-05 RX ORDER — HYDROCODONE BITARTRATE AND ACETAMINOPHEN 5; 325 MG/1; MG/1
1 TABLET ORAL EVERY 4 HOURS PRN
Qty: 40 TABLET | Refills: 0 | Status: SHIPPED | OUTPATIENT
Start: 2018-07-05 | End: 2018-07-10

## 2018-07-05 RX ORDER — ASPIRIN 325 MG
325 TABLET, DELAYED RELEASE (ENTERIC COATED) ORAL 2 TIMES DAILY
Qty: 56 TABLET | Refills: 3 | Status: SHIPPED | OUTPATIENT
Start: 2018-07-05 | End: 2018-10-17

## 2018-07-05 RX ORDER — FENTANYL CITRATE 50 UG/ML
25 INJECTION, SOLUTION INTRAMUSCULAR; INTRAVENOUS ONCE
Status: DISCONTINUED | OUTPATIENT
Start: 2018-07-05 | End: 2018-07-05

## 2018-07-05 RX ORDER — HYDROCODONE BITARTRATE AND ACETAMINOPHEN 5; 325 MG/1; MG/1
2 TABLET ORAL PRN
Status: DISCONTINUED | OUTPATIENT
Start: 2018-07-05 | End: 2018-07-05 | Stop reason: HOSPADM

## 2018-07-05 RX ORDER — MORPHINE SULFATE 2 MG/ML
2 INJECTION, SOLUTION INTRAMUSCULAR; INTRAVENOUS EVERY 5 MIN PRN
Status: DISCONTINUED | OUTPATIENT
Start: 2018-07-05 | End: 2018-07-05 | Stop reason: HOSPADM

## 2018-07-05 RX ORDER — ROPIVACAINE HYDROCHLORIDE 5 MG/ML
30 INJECTION, SOLUTION EPIDURAL; INFILTRATION; PERINEURAL ONCE
Status: COMPLETED | OUTPATIENT
Start: 2018-07-05 | End: 2018-07-05

## 2018-07-05 RX ORDER — MORPHINE SULFATE 2 MG/ML
2 INJECTION, SOLUTION INTRAMUSCULAR; INTRAVENOUS
Status: DISCONTINUED | OUTPATIENT
Start: 2018-07-05 | End: 2018-07-07

## 2018-07-05 RX ORDER — SODIUM CHLORIDE 0.9 % (FLUSH) 0.9 %
10 SYRINGE (ML) INJECTION EVERY 12 HOURS SCHEDULED
Status: DISCONTINUED | OUTPATIENT
Start: 2018-07-05 | End: 2018-07-05 | Stop reason: HOSPADM

## 2018-07-05 RX ORDER — NEOSTIGMINE METHYLSULFATE 0.5 MG/ML
INJECTION, SOLUTION INTRAVENOUS PRN
Status: DISCONTINUED | OUTPATIENT
Start: 2018-07-05 | End: 2018-07-05 | Stop reason: SDUPTHER

## 2018-07-05 RX ORDER — DIAPER,BRIEF,INFANT-TODD,DISP
EACH MISCELLANEOUS PRN
Status: DISCONTINUED | OUTPATIENT
Start: 2018-07-05 | End: 2018-07-05 | Stop reason: HOSPADM

## 2018-07-05 RX ORDER — HYDROCODONE BITARTRATE AND ACETAMINOPHEN 5; 325 MG/1; MG/1
1 TABLET ORAL PRN
Status: DISCONTINUED | OUTPATIENT
Start: 2018-07-05 | End: 2018-07-05 | Stop reason: HOSPADM

## 2018-07-05 RX ORDER — SODIUM CHLORIDE 0.9 % (FLUSH) 0.9 %
10 SYRINGE (ML) INJECTION EVERY 12 HOURS SCHEDULED
Status: DISCONTINUED | OUTPATIENT
Start: 2018-07-05 | End: 2018-07-11 | Stop reason: HOSPADM

## 2018-07-05 RX ORDER — ROCURONIUM BROMIDE 10 MG/ML
INJECTION, SOLUTION INTRAVENOUS PRN
Status: DISCONTINUED | OUTPATIENT
Start: 2018-07-05 | End: 2018-07-05 | Stop reason: SDUPTHER

## 2018-07-05 RX ORDER — ROPIVACAINE HYDROCHLORIDE 5 MG/ML
30 INJECTION, SOLUTION EPIDURAL; INFILTRATION; PERINEURAL ONCE
Status: DISCONTINUED | OUTPATIENT
Start: 2018-07-05 | End: 2018-07-05

## 2018-07-05 RX ORDER — FENTANYL CITRATE 50 UG/ML
INJECTION, SOLUTION INTRAMUSCULAR; INTRAVENOUS PRN
Status: DISCONTINUED | OUTPATIENT
Start: 2018-07-05 | End: 2018-07-05 | Stop reason: SDUPTHER

## 2018-07-05 RX ORDER — ACETAMINOPHEN 325 MG/1
650 TABLET ORAL EVERY 4 HOURS PRN
Status: DISCONTINUED | OUTPATIENT
Start: 2018-07-05 | End: 2018-07-11 | Stop reason: HOSPADM

## 2018-07-05 RX ORDER — GLYCOPYRROLATE 1 MG/5 ML
SYRINGE (ML) INTRAVENOUS PRN
Status: DISCONTINUED | OUTPATIENT
Start: 2018-07-05 | End: 2018-07-05 | Stop reason: SDUPTHER

## 2018-07-05 RX ORDER — ONDANSETRON 2 MG/ML
INJECTION INTRAMUSCULAR; INTRAVENOUS PRN
Status: DISCONTINUED | OUTPATIENT
Start: 2018-07-05 | End: 2018-07-05 | Stop reason: SDUPTHER

## 2018-07-05 RX ORDER — FENTANYL CITRATE 50 UG/ML
100 INJECTION, SOLUTION INTRAMUSCULAR; INTRAVENOUS ONCE
Status: DISCONTINUED | OUTPATIENT
Start: 2018-07-05 | End: 2018-07-05

## 2018-07-05 RX ORDER — SODIUM CHLORIDE, SODIUM LACTATE, POTASSIUM CHLORIDE, CALCIUM CHLORIDE 600; 310; 30; 20 MG/100ML; MG/100ML; MG/100ML; MG/100ML
INJECTION, SOLUTION INTRAVENOUS CONTINUOUS
Status: DISCONTINUED | OUTPATIENT
Start: 2018-07-05 | End: 2018-07-05

## 2018-07-05 RX ORDER — ONDANSETRON 2 MG/ML
4 INJECTION INTRAMUSCULAR; INTRAVENOUS EVERY 6 HOURS PRN
Status: DISCONTINUED | OUTPATIENT
Start: 2018-07-05 | End: 2018-07-11 | Stop reason: HOSPADM

## 2018-07-05 RX ORDER — MIDAZOLAM HYDROCHLORIDE 1 MG/ML
INJECTION INTRAMUSCULAR; INTRAVENOUS PRN
Status: DISCONTINUED | OUTPATIENT
Start: 2018-07-05 | End: 2018-07-05

## 2018-07-05 RX ORDER — MIDAZOLAM HYDROCHLORIDE 1 MG/ML
2 INJECTION INTRAMUSCULAR; INTRAVENOUS ONCE
Status: COMPLETED | OUTPATIENT
Start: 2018-07-05 | End: 2018-07-05

## 2018-07-05 RX ORDER — ALBUTEROL SULFATE 90 UG/1
2 AEROSOL, METERED RESPIRATORY (INHALATION) EVERY 6 HOURS PRN
Status: DISCONTINUED | OUTPATIENT
Start: 2018-07-05 | End: 2018-07-11 | Stop reason: HOSPADM

## 2018-07-05 RX ORDER — CEFAZOLIN SODIUM 1 G/3ML
INJECTION, POWDER, FOR SOLUTION INTRAMUSCULAR; INTRAVENOUS PRN
Status: DISCONTINUED | OUTPATIENT
Start: 2018-07-05 | End: 2018-07-05 | Stop reason: SDUPTHER

## 2018-07-05 RX ORDER — MEPERIDINE HYDROCHLORIDE 50 MG/ML
12.5 INJECTION INTRAMUSCULAR; INTRAVENOUS; SUBCUTANEOUS EVERY 5 MIN PRN
Status: DISCONTINUED | OUTPATIENT
Start: 2018-07-05 | End: 2018-07-05 | Stop reason: HOSPADM

## 2018-07-05 RX ORDER — GABAPENTIN 300 MG/1
300 CAPSULE ORAL 2 TIMES DAILY
Status: DISCONTINUED | OUTPATIENT
Start: 2018-07-05 | End: 2018-07-11 | Stop reason: HOSPADM

## 2018-07-05 RX ORDER — ERGOCALCIFEROL 1.25 MG/1
50000 CAPSULE ORAL
Status: DISCONTINUED | OUTPATIENT
Start: 2018-07-08 | End: 2018-07-11 | Stop reason: HOSPADM

## 2018-07-05 RX ORDER — SODIUM CHLORIDE 0.9 % (FLUSH) 0.9 %
10 SYRINGE (ML) INJECTION PRN
Status: DISCONTINUED | OUTPATIENT
Start: 2018-07-05 | End: 2018-07-11 | Stop reason: HOSPADM

## 2018-07-05 RX ORDER — DOCUSATE SODIUM 100 MG/1
100 CAPSULE, LIQUID FILLED ORAL 2 TIMES DAILY
Status: DISCONTINUED | OUTPATIENT
Start: 2018-07-05 | End: 2018-07-11 | Stop reason: HOSPADM

## 2018-07-05 RX ORDER — SODIUM CHLORIDE 0.9 % (FLUSH) 0.9 %
10 SYRINGE (ML) INJECTION PRN
Status: DISCONTINUED | OUTPATIENT
Start: 2018-07-05 | End: 2018-07-05 | Stop reason: HOSPADM

## 2018-07-05 RX ORDER — DEXAMETHASONE SODIUM PHOSPHATE 10 MG/ML
INJECTION INTRAMUSCULAR; INTRAVENOUS PRN
Status: DISCONTINUED | OUTPATIENT
Start: 2018-07-05 | End: 2018-07-05 | Stop reason: SDUPTHER

## 2018-07-05 RX ORDER — HYDROCODONE BITARTRATE AND ACETAMINOPHEN 5; 325 MG/1; MG/1
1 TABLET ORAL EVERY 4 HOURS PRN
Status: DISCONTINUED | OUTPATIENT
Start: 2018-07-05 | End: 2018-07-11 | Stop reason: HOSPADM

## 2018-07-05 RX ORDER — FENTANYL CITRATE 50 UG/ML
50 INJECTION, SOLUTION INTRAMUSCULAR; INTRAVENOUS ONCE
Status: COMPLETED | OUTPATIENT
Start: 2018-07-05 | End: 2018-07-05

## 2018-07-05 RX ORDER — DEXTROSE MONOHYDRATE 25 G/50ML
12.5 INJECTION, SOLUTION INTRAVENOUS PRN
Status: DISCONTINUED | OUTPATIENT
Start: 2018-07-05 | End: 2018-07-11 | Stop reason: HOSPADM

## 2018-07-05 RX ORDER — MIDAZOLAM HYDROCHLORIDE 1 MG/ML
2 INJECTION INTRAMUSCULAR; INTRAVENOUS ONCE
Status: DISCONTINUED | OUTPATIENT
Start: 2018-07-05 | End: 2018-07-05

## 2018-07-05 RX ORDER — MORPHINE SULFATE 2 MG/ML
1 INJECTION, SOLUTION INTRAMUSCULAR; INTRAVENOUS EVERY 5 MIN PRN
Status: DISCONTINUED | OUTPATIENT
Start: 2018-07-05 | End: 2018-07-05 | Stop reason: HOSPADM

## 2018-07-05 RX ORDER — HYDROCODONE BITARTRATE AND ACETAMINOPHEN 5; 325 MG/1; MG/1
2 TABLET ORAL EVERY 4 HOURS PRN
Status: DISCONTINUED | OUTPATIENT
Start: 2018-07-05 | End: 2018-07-11 | Stop reason: HOSPADM

## 2018-07-05 RX ORDER — MORPHINE SULFATE 4 MG/ML
4 INJECTION, SOLUTION INTRAMUSCULAR; INTRAVENOUS
Status: DISCONTINUED | OUTPATIENT
Start: 2018-07-05 | End: 2018-07-07

## 2018-07-05 RX ADMIN — MIDAZOLAM HYDROCHLORIDE 1 MG: 1 INJECTION, SOLUTION INTRAMUSCULAR; INTRAVENOUS at 10:10

## 2018-07-05 RX ADMIN — MORPHINE SULFATE 4 MG: 4 INJECTION INTRAVENOUS at 17:54

## 2018-07-05 RX ADMIN — INSULIN LISPRO 1 UNITS: 100 INJECTION, SOLUTION INTRAVENOUS; SUBCUTANEOUS at 18:25

## 2018-07-05 RX ADMIN — HYDROCODONE BITARTRATE AND ACETAMINOPHEN 2 TABLET: 5; 325 TABLET ORAL at 20:19

## 2018-07-05 RX ADMIN — ROCURONIUM BROMIDE 10 MG: 10 INJECTION, SOLUTION INTRAVENOUS at 10:43

## 2018-07-05 RX ADMIN — FENTANYL CITRATE 25 MCG: 50 INJECTION, SOLUTION INTRAMUSCULAR; INTRAVENOUS at 11:40

## 2018-07-05 RX ADMIN — Medication 0.6 MG: at 11:50

## 2018-07-05 RX ADMIN — GABAPENTIN 300 MG: 300 CAPSULE ORAL at 20:22

## 2018-07-05 RX ADMIN — FENTANYL CITRATE 50 MCG: 50 INJECTION, SOLUTION INTRAMUSCULAR; INTRAVENOUS at 12:05

## 2018-07-05 RX ADMIN — CEFAZOLIN 2000 MG: 1 INJECTION, POWDER, FOR SOLUTION INTRAVENOUS at 10:28

## 2018-07-05 RX ADMIN — PROPOFOL 150 MG: 10 INJECTION, EMULSION INTRAVENOUS at 10:13

## 2018-07-05 RX ADMIN — FENTANYL CITRATE 50 MCG: 50 INJECTION, SOLUTION INTRAMUSCULAR; INTRAVENOUS at 11:50

## 2018-07-05 RX ADMIN — INSULIN LISPRO 1 UNITS: 100 INJECTION, SOLUTION INTRAVENOUS; SUBCUTANEOUS at 23:32

## 2018-07-05 RX ADMIN — SODIUM CHLORIDE, POTASSIUM CHLORIDE, SODIUM LACTATE AND CALCIUM CHLORIDE: 600; 310; 30; 20 INJECTION, SOLUTION INTRAVENOUS at 08:07

## 2018-07-05 RX ADMIN — ROPIVACAINE HYDROCHLORIDE 30 ML: 5 INJECTION, SOLUTION EPIDURAL; INFILTRATION; PERINEURAL at 10:05

## 2018-07-05 RX ADMIN — HYDROCODONE BITARTRATE AND ACETAMINOPHEN 2 TABLET: 5; 325 TABLET ORAL at 15:29

## 2018-07-05 RX ADMIN — CEFAZOLIN SODIUM 2 G: 2 SOLUTION INTRAVENOUS at 17:58

## 2018-07-05 RX ADMIN — ROCURONIUM BROMIDE 40 MG: 10 INJECTION, SOLUTION INTRAVENOUS at 10:13

## 2018-07-05 RX ADMIN — NEOSTIGMINE METHYLSULFATE 3 MG: 0.5 INJECTION INTRAVENOUS at 11:50

## 2018-07-05 RX ADMIN — FENTANYL CITRATE 50 MCG: 50 INJECTION, SOLUTION INTRAMUSCULAR; INTRAVENOUS at 10:07

## 2018-07-05 RX ADMIN — PROPOFOL 30 MG: 10 INJECTION, EMULSION INTRAVENOUS at 11:38

## 2018-07-05 RX ADMIN — FENTANYL CITRATE 25 MCG: 50 INJECTION, SOLUTION INTRAMUSCULAR; INTRAVENOUS at 11:38

## 2018-07-05 RX ADMIN — LIDOCAINE HYDROCHLORIDE 80 MG: 20 INJECTION, SOLUTION INTRAVENOUS at 10:13

## 2018-07-05 RX ADMIN — DEXAMETHASONE SODIUM PHOSPHATE 10 MG: 10 INJECTION INTRAMUSCULAR; INTRAVENOUS at 10:26

## 2018-07-05 RX ADMIN — DOCUSATE SODIUM 100 MG: 100 CAPSULE, LIQUID FILLED ORAL at 20:20

## 2018-07-05 RX ADMIN — ONDANSETRON 4 MG: 2 INJECTION INTRAMUSCULAR; INTRAVENOUS at 11:06

## 2018-07-05 RX ADMIN — MORPHINE SULFATE 2 MG: 2 INJECTION, SOLUTION INTRAMUSCULAR; INTRAVENOUS at 12:42

## 2018-07-05 RX ADMIN — FENTANYL CITRATE 100 MCG: 50 INJECTION, SOLUTION INTRAMUSCULAR; INTRAVENOUS at 10:13

## 2018-07-05 RX ADMIN — Medication 10 ML: at 20:21

## 2018-07-05 RX ADMIN — MORPHINE SULFATE 2 MG: 2 INJECTION, SOLUTION INTRAMUSCULAR; INTRAVENOUS at 12:36

## 2018-07-05 RX ADMIN — MORPHINE SULFATE 2 MG: 2 INJECTION, SOLUTION INTRAMUSCULAR; INTRAVENOUS at 13:00

## 2018-07-05 RX ADMIN — MORPHINE SULFATE 2 MG: 2 INJECTION, SOLUTION INTRAMUSCULAR; INTRAVENOUS at 12:50

## 2018-07-05 RX ADMIN — ALPRAZOLAM 0.25 MG: 0.25 TABLET ORAL at 23:31

## 2018-07-05 RX ADMIN — MORPHINE SULFATE 4 MG: 4 INJECTION INTRAVENOUS at 15:01

## 2018-07-05 ASSESSMENT — PAIN DESCRIPTION - PROGRESSION
CLINICAL_PROGRESSION: GRADUALLY IMPROVING

## 2018-07-05 ASSESSMENT — PULMONARY FUNCTION TESTS
PIF_VALUE: 15
PIF_VALUE: 17
PIF_VALUE: 12
PIF_VALUE: 17
PIF_VALUE: 17
PIF_VALUE: 22
PIF_VALUE: 17
PIF_VALUE: 0
PIF_VALUE: 3
PIF_VALUE: 19
PIF_VALUE: 17
PIF_VALUE: 2
PIF_VALUE: 17
PIF_VALUE: 4
PIF_VALUE: 22
PIF_VALUE: 4
PIF_VALUE: 17
PIF_VALUE: 19
PIF_VALUE: 17
PIF_VALUE: 17
PIF_VALUE: 3
PIF_VALUE: 15
PIF_VALUE: 19
PIF_VALUE: 22
PIF_VALUE: 2
PIF_VALUE: 17
PIF_VALUE: 16
PIF_VALUE: 2
PIF_VALUE: 17
PIF_VALUE: 16
PIF_VALUE: 17
PIF_VALUE: 17
PIF_VALUE: 16
PIF_VALUE: 18
PIF_VALUE: 2
PIF_VALUE: 17
PIF_VALUE: 2
PIF_VALUE: 17
PIF_VALUE: 3
PIF_VALUE: 17
PIF_VALUE: 16
PIF_VALUE: 16
PIF_VALUE: 22
PIF_VALUE: 2
PIF_VALUE: 3
PIF_VALUE: 17
PIF_VALUE: 13
PIF_VALUE: 22
PIF_VALUE: 3
PIF_VALUE: 16
PIF_VALUE: 3
PIF_VALUE: 17
PIF_VALUE: 2
PIF_VALUE: 2
PIF_VALUE: 17
PIF_VALUE: 18
PIF_VALUE: 17
PIF_VALUE: 22
PIF_VALUE: 19
PIF_VALUE: 3
PIF_VALUE: 17
PIF_VALUE: 13
PIF_VALUE: 2
PIF_VALUE: 22
PIF_VALUE: 22
PIF_VALUE: 24
PIF_VALUE: 22
PIF_VALUE: 22
PIF_VALUE: 3
PIF_VALUE: 17
PIF_VALUE: 17
PIF_VALUE: 22
PIF_VALUE: 2
PIF_VALUE: 16
PIF_VALUE: 2
PIF_VALUE: 2
PIF_VALUE: 13
PIF_VALUE: 2
PIF_VALUE: 18
PIF_VALUE: 16
PIF_VALUE: 22
PIF_VALUE: 2
PIF_VALUE: 22
PIF_VALUE: 2
PIF_VALUE: 16
PIF_VALUE: 2
PIF_VALUE: 2
PIF_VALUE: 22
PIF_VALUE: 13
PIF_VALUE: 21
PIF_VALUE: 15
PIF_VALUE: 2
PIF_VALUE: 17
PIF_VALUE: 22
PIF_VALUE: 17
PIF_VALUE: 3
PIF_VALUE: 3
PIF_VALUE: 22
PIF_VALUE: 2
PIF_VALUE: 13
PIF_VALUE: 17
PIF_VALUE: 2
PIF_VALUE: 2
PIF_VALUE: 16
PIF_VALUE: 16
PIF_VALUE: 17
PIF_VALUE: 2
PIF_VALUE: 17
PIF_VALUE: 22
PIF_VALUE: 13
PIF_VALUE: 1
PIF_VALUE: 18
PIF_VALUE: 3
PIF_VALUE: 2
PIF_VALUE: 2
PIF_VALUE: 17
PIF_VALUE: 2
PIF_VALUE: 23
PIF_VALUE: 0

## 2018-07-05 ASSESSMENT — PAIN DESCRIPTION - LOCATION
LOCATION: LEG
LOCATION: ANKLE
LOCATION: LEG

## 2018-07-05 ASSESSMENT — PAIN SCALES - GENERAL
PAINLEVEL_OUTOF10: 5
PAINLEVEL_OUTOF10: 7
PAINLEVEL_OUTOF10: 10
PAINLEVEL_OUTOF10: 4
PAINLEVEL_OUTOF10: 5
PAINLEVEL_OUTOF10: 9
PAINLEVEL_OUTOF10: 2
PAINLEVEL_OUTOF10: 6
PAINLEVEL_OUTOF10: 8
PAINLEVEL_OUTOF10: 9
PAINLEVEL_OUTOF10: 7
PAINLEVEL_OUTOF10: 10
PAINLEVEL_OUTOF10: 5

## 2018-07-05 ASSESSMENT — PAIN - FUNCTIONAL ASSESSMENT: PAIN_FUNCTIONAL_ASSESSMENT: 0-10

## 2018-07-05 ASSESSMENT — PAIN DESCRIPTION - DESCRIPTORS
DESCRIPTORS: ACHING;DISCOMFORT;DULL
DESCRIPTORS: ACHING;PRESSURE
DESCRIPTORS: SHARP;THROBBING
DESCRIPTORS: ACHING;DULL
DESCRIPTORS: ACHING;CONSTANT
DESCRIPTORS: ACHING;DISCOMFORT

## 2018-07-05 ASSESSMENT — PAIN DESCRIPTION - PAIN TYPE
TYPE: SURGICAL PAIN

## 2018-07-05 ASSESSMENT — PAIN DESCRIPTION - DIRECTION
RADIATING_TOWARDS: LOWER

## 2018-07-05 ASSESSMENT — PAIN DESCRIPTION - ORIENTATION
ORIENTATION: RIGHT

## 2018-07-05 ASSESSMENT — PAIN DESCRIPTION - FREQUENCY
FREQUENCY: CONTINUOUS

## 2018-07-05 ASSESSMENT — PAIN DESCRIPTION - ONSET: ONSET: ON-GOING

## 2018-07-05 ASSESSMENT — LIFESTYLE VARIABLES: SMOKING_STATUS: 0

## 2018-07-05 NOTE — H&P
Office Note with  History and Physical             Collins Espana is a 47 y.o. 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 Intended supply: 7 days. Take lowest dose possible to manage pain. 20 tablet 0    aspirin 325 MG EC tablet Take 1 tablet by mouth 2 times daily for 28 days 56 tablet 0    gabapentin (NEURONTIN) 300 MG capsule Take 300 mg by mouth 2 times daily. .        Multiple Vitamins-Minerals (HAIR SKIN AND NAILS FORMULA) TABS Take by mouth daily hold        loratadine (CLARITIN) 10 MG capsule Take 10 mg by mouth daily        atenolol (TENORMIN) 50 MG tablet Take 1 tablet by mouth nightly 30 tablet 0    Insulin Glargine (TOUJEO SOLOSTAR SC) Inject 30 Units into the skin nightly PATIENT HAD THAT IT WAS 30 MG.        METFORMIN HCL PO Take 500 mg by mouth 2 times daily         albuterol sulfate  (90 BASE) MCG/ACT inhaler Inhale 2 puffs into the lungs every 6 hours as needed for Wheezing        DULoxetine (CYMBALTA) 30 MG capsule Take 90 mg by mouth daily        vitamin D (ERGOCALCIFEROL) 91385 UNITS CAPS capsule Take 50,000 Units by mouth once a week On Sundays        ALPRAZolam (XANAX) 0.25 MG tablet Take 0.25 mg by mouth nightly           No current facility-administered medications for this visit.          Allergies: Patient has no known allergies.   Past Medical History fracture in 08/2017.  She had surgery in [place]. She presented to Dr Claire Curry with continued pain and was found to have nonunion of the right distal fib/tib. She had surgery for nonunion on 5/14/18 with cultures negative. She is now 6 weeks post op and complaining of increasing pain at the nonunion sites. 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 She has been taking Norco for pain which has been helping. She has not had therapy as of yet as she has been in a splint. She does have small area of incision not completely closed. She states she had place very minimal weight on the leg for balance. She does not recall walking on the leg.            Review of Systems   Constitutional: Negative for appetite change, chills, diaphoresis, fatigue and fever. HENT: Negative for congestion, dental problem, hearing loss, sinus pressure, sneezing, sore throat and tinnitus. Eyes: Negative for pain, discharge, redness and itching. Respiratory: Negative for cough, shortness of breath and wheezing. Cardiovascular: Negative for chest pain, palpitations and leg swelling. Gastrointestinal: Negative for abdominal pain, blood in stool, constipation, diarrhea, nausea and vomiting. Genitourinary: Negative for difficulty urinating and hematuria. Musculoskeletal: Positive for back pain, gait problem, joint swelling, neck pain and neck stiffness. Skin: Positive for wound. Negative for pallor and rash. Small noninfective opening at incision line   Neurological: Positive for numbness. Negative for dizziness, tremors, seizures, weakness, light-headedness and headaches. Psychiatric/Behavioral: Negative.          Objective:   Physical Exam   Constitutional: She is oriented to person, place, and time. She appears well-developed and well-nourished. No distress. HENT:   Head: Normocephalic and atraumatic.    Right Ear: External ear normal.   Left Ear: External ear normal.   Eyes: EOM are normal.   Neck: No thyromegaly present. Stiffness and pain with ROM   Cardiovascular: Normal rate, regular rhythm and normal heart sounds. Exam reveals no gallop and no friction rub. No murmur heard. Pulmonary/Chest: Effort normal and breath sounds normal. No respiratory distress. She has no wheezes. She has no rales. Abdominal: Soft. Bowel sounds are normal. She exhibits no distension. There is no tenderness. Lymphadenopathy:     She has no cervical adenopathy. Neurological: She is alert and oriented to person, place, and time. Skin: Skin is warm and dry. No rash noted. She is not diaphoretic. No erythema. Psychiatric: She has a normal mood and affect. MSK:  Right Lower Extremity  Skin clean dry and intact, previous surgical Incisions with two small opening with scant sang drainage. without signs of redness, warmth or drainage-    Mild to moderate edema noted  Slight deformity of lower leg ankle  Compartments supple throughout thigh and leg,   Calf supple and nontender  Demonstrates active knee flexion/extension,  Weak ankle plantar/dorsiflexion and weak great toe extension. States sensation intact to touch in sural/deep peroneal/superficial peroneal/saphenous/posterior tibial nerve distributions diminished with paresthesias  to foot/ankle. Palpable dorsalis pedis and posterior tibialis pulses, cap refill brisk in toes, foot warm/perfused.      XR: 6/27/2018  Fracture show shift in alignment which broken hardware proximal screw and plate pulling away from tibia proximally. Fibula plate with slight bend and minor displacement of that repair.      Assessment:        Diagnosis Orders   1. 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  She does have a failed fixation which is most likely due to placing weight on that leg. Her bone graft has oozed from the fracture site and to the sides of the bone. For her best recovery this must be re-repaired with surgery. Surgical details were explained to the patient. Plus dr Leon Hopkins did see the patient as well and reviewed the above as well. It was explain that if this second operation fails she may need TTC nail or worse amputation depending on the outcome. She may be placed in long leg cast post-op if necessary to keep her from wB  Risk, benefits and treatment options discussed with Sierra Haskins. She has verbalized understanding of options. The possibility of complications were also discussed to include but not limited to nerve damage, infection, problems with wound healing, vascular injury, chronic pain, stiffness, dysfunction, nonhealing of the bone, symptomatic hardware and/or its failure, need for subsequent surgery, dislocation, and blood clots as well as medical related problems and other problems not specifically discussed. Risk of anesthesia also discussed to include death. Post-op care, work, activity and restrictions which included the use of pain medication and possibility of using blood thinner post op were also discussed with Sierra Haskins and she verbalized and agreed with the restrictions. She will see her PCP Friday for clearance. For surgery  NWB in splint -do not get splint wet  HOLD aspirin and NsAIDs such as ibuprofen and aleve, naproxen  For surgery tentatively Monday  Seen with dr Shannon Hong have seen and evaluated the patient and agree with the above assessment on today's visit. I have performed the key components of the history and physical examination and concur completely with the findings and plans as documented. Agree with ROS, examination, FMH, PMH, PSH, SocHx, and allergies as above. . Patient was physically seen and examined.  Patient was noncompliant applying weight to the right lower extremity. The plate is failed. I discussed the fact this could also be infected or symptoms of failure due to mechanical stress. We will get her medically cleared to get her back in the operating room for revision open reduction internal fixation. I went over at length the fact that she is putting her lower extremity at risk for amputation with her noncompliance. She understands and is tearful in the office today. We will proceed with open reduction internal fixation of the malunion. I explained the risks and complications of surgery with the patient including but not limited to death from anesthesia, possible neurovascular damage, possible infection, possible nonunion, possible hardware failure, possible need for further surgery, etc.  Patient understood this, asked appropriate questions and decided to go forward with the procedure.         Joan Rao MD

## 2018-07-05 NOTE — PROGRESS NOTES
Pt arrived in PACU- All monitors placed, side rails up x2, bed locked and in low position. Pt drowsy s/p Anesthesia, airway clear.  Deep breathing encouraged. -Kamala Wing RN

## 2018-07-05 NOTE — BRIEF OP NOTE
Brief Postoperative Note    Rigoberto Denson  YOB: 1965  [id number]    Pre-operative Diagnosis: Nonunion R pilon fx    Post-operative Diagnosis: Same    Procedure: Removal of hardware, ORIF R pilon fx and distal fibula fx with aplpication of Vitoss and Vivogen bone graft    Anesthesia: General    Surgeons/Assistants: Dr. Mitzi Hutson    Estimated Blood Loss: less than 50     Complications: None    Specimens: Was Obtained: Frozen and Culture of R tibia        Electronically signed by Abner Mckeon DO on 7/5/2018 at 12:20 PM

## 2018-07-05 NOTE — ANESTHESIA PRE PROCEDURE
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 S82.307K       Past Medical History:        Diagnosis Date    Anxiety     Arthritis     Cervical radiculopathy     Chronic back pain     Depression     Diabetes mellitus type 2, uncontrolled ([facility] [address] 2/12/2017    GERD (gastroesophageal reflux disease)     History of blood transfusion 01/2018    back surgery, lumbar, dr Rosana Kapadia    Hypertension        Past Surgical History:        Procedure Laterality Date    BACK SURGERY  01/08/2018    PLIF L2-L5    CARPAL TUNNEL RELEASE Right 04/09/2017    CERVICAL FUSION  2008    dr hu anterior    CERVICAL FUSION  2015    dr Rosana Kapadia anterior    CERVICAL FUSION  04/25/16    ANTERIOR C4-C5, C6-C7 PLATES AND SCREWS    CHOLECYSTECTOMY  2000    COLONOSCOPY      DILATION AND CURETTAGE OF UTERUS  2013    dr Amira Kimbrough ERCP     [address] fauzia jo bilateral    HYSTERECTOMY  2013    dr Sunday Patten partial    OTHER SURGICAL HISTORY Right 05/14/2018    removal of hardware repair nonunion right tibia/fibula    VT OPEN RX TIBIA SHAFT FX,SCREWS Right 5/14/2018    REPAIR NON-UNION RIGHT TIBIA AND FIBULA WITH REMOVAL OF HARDWARE AND BONE GRAFT performed by Romaine Mccall MD at [address]    WISDOM TOOTH EXTRACTION         Social History:    Social History   Substance Use Topics    Smoking status: Former Smoker     Packs/day: 0.50     Years: 10.00     Types: Cigarettes     Quit date: 3/15/2015    Smokeless tobacco: Never Used      Comment: stop 2015    Alcohol use No Counseling given: Not Answered      Vital Signs (Current):   Vitals:    07/05/18 0742   BP: (!) 113/58   Pulse: 68   Resp: 20   Temp: 36.1 °C (97 °F)   TempSrc: Temporal   SpO2: 100%   Weight: 165 lb (74.8 kg)   Height: 6' 1\" (1.854 m)                                              BP Readings from Last 3 Encounters:   07/05/18 (!) 113/58   06/27/18 102/74   06/06/18 110/76       NPO Status: Time of last liquid consumption: 2200                        Time of last solid consumption: 2200                        Date of last liquid consumption: 07/04/18                        Date of last solid food consumption: 07/04/18    BMI:   Wt Readings from Last 3 Encounters:   07/05/18 165 lb (74.8 kg)   06/27/18 165 lb (74.8 kg)   06/06/18 165 lb (74.8 kg)     Body mass index is 21.77 kg/m². CBC:   Lab Results   Component Value Date    WBC 10.3 05/15/2018    RBC 3.03 05/15/2018    HGB 9.4 05/15/2018    HCT 28.2 05/15/2018    MCV 93.1 05/15/2018    RDW 13.8 05/15/2018     05/15/2018       CMP:   Lab Results   Component Value Date     05/15/2018    K 4.0 05/15/2018     05/15/2018    CO2 28 05/15/2018    BUN 13 05/15/2018    CREATININE 0.7 05/15/2018    GFRAA >60 05/15/2018    LABGLOM >60 05/15/2018    GLUCOSE 107 05/15/2018    GLUCOSE 96 05/24/2012    PROT 5.3 01/11/2018    CALCIUM 8.5 05/15/2018    BILITOT 0.5 01/11/2018    ALKPHOS 77 01/11/2018    AST 35 01/11/2018    ALT 26 01/11/2018       POC Tests: No results for input(s): POCGLU, POCNA, POCK, POCCL, POCBUN, POCHEMO, POCHCT in the last 72 hours.     Coags:   Lab Results   Component Value Date    PROTIME 10.9 01/02/2018    PROTIME 11.0 09/30/2011    INR 1.0 01/02/2018    APTT 18.4 09/30/2011       HCG (If Applicable):   Lab Results   Component Value Date    PREGTESTUR negative 05/04/2015        ABGs: No results found for: PHART, PO2ART, EPU9LXX, IZR4PEJ, BEART, J5QIZGWP     Type & Screen (If Applicable):  No results found for: [facility]    2/12/17

## 2018-07-06 LAB
ANION GAP SERPL CALCULATED.3IONS-SCNC: 13 MMOL/L (ref 7–16)
BUN BLDV-MCNC: 14 MG/DL (ref 6–20)
CALCIUM SERPL-MCNC: 8.8 MG/DL (ref 8.6–10.2)
CHLORIDE BLD-SCNC: 94 MMOL/L (ref 98–107)
CO2: 26 MMOL/L (ref 22–29)
CREAT SERPL-MCNC: 0.9 MG/DL (ref 0.5–1)
GFR AFRICAN AMERICAN: >60
GFR NON-AFRICAN AMERICAN: >60 ML/MIN/1.73
GLUCOSE BLD-MCNC: 166 MG/DL (ref 74–109)
GRAM STAIN ORDERABLE: NORMAL
HCT VFR BLD CALC: 31.1 % (ref 34–48)
HEMOGLOBIN: 10 G/DL (ref 11.5–15.5)
MCH RBC QN AUTO: 30.5 PG (ref 26–35)
MCHC RBC AUTO-ENTMCNC: 32.2 % (ref 32–34.5)
MCV RBC AUTO: 94.8 FL (ref 80–99.9)
METER GLUCOSE: 160 MG/DL (ref 70–110)
METER GLUCOSE: 161 MG/DL (ref 70–110)
METER GLUCOSE: 183 MG/DL (ref 70–110)
METER GLUCOSE: 260 MG/DL (ref 70–110)
PDW BLD-RTO: 13.5 FL (ref 11.5–15)
PLATELET # BLD: 285 E9/L (ref 130–450)
PMV BLD AUTO: 9.8 FL (ref 7–12)
POTASSIUM REFLEX MAGNESIUM: 4.6 MMOL/L (ref 3.5–5)
RBC # BLD: 3.28 E12/L (ref 3.5–5.5)
SODIUM BLD-SCNC: 133 MMOL/L (ref 132–146)
WBC # BLD: 12.1 E9/L (ref 4.5–11.5)

## 2018-07-06 PROCEDURE — 82962 GLUCOSE BLOOD TEST: CPT

## 2018-07-06 PROCEDURE — 96366 THER/PROPH/DIAG IV INF ADDON: CPT

## 2018-07-06 PROCEDURE — 97165 OT EVAL LOW COMPLEX 30 MIN: CPT

## 2018-07-06 PROCEDURE — G8987 SELF CARE CURRENT STATUS: HCPCS

## 2018-07-06 PROCEDURE — 2580000003 HC RX 258: Performed by: STUDENT IN AN ORGANIZED HEALTH CARE EDUCATION/TRAINING PROGRAM

## 2018-07-06 PROCEDURE — G0378 HOSPITAL OBSERVATION PER HR: HCPCS

## 2018-07-06 PROCEDURE — G8979 MOBILITY GOAL STATUS: HCPCS

## 2018-07-06 PROCEDURE — G8988 SELF CARE GOAL STATUS: HCPCS

## 2018-07-06 PROCEDURE — 80048 BASIC METABOLIC PNL TOTAL CA: CPT

## 2018-07-06 PROCEDURE — 96372 THER/PROPH/DIAG INJ SC/IM: CPT

## 2018-07-06 PROCEDURE — G8978 MOBILITY CURRENT STATUS: HCPCS

## 2018-07-06 PROCEDURE — 6370000000 HC RX 637 (ALT 250 FOR IP): Performed by: STUDENT IN AN ORGANIZED HEALTH CARE EDUCATION/TRAINING PROGRAM

## 2018-07-06 PROCEDURE — 6370000000 HC RX 637 (ALT 250 FOR IP): Performed by: ORTHOPAEDIC SURGERY

## 2018-07-06 PROCEDURE — 97161 PT EVAL LOW COMPLEX 20 MIN: CPT

## 2018-07-06 PROCEDURE — G8989 SELF CARE D/C STATUS: HCPCS

## 2018-07-06 PROCEDURE — 96376 TX/PRO/DX INJ SAME DRUG ADON: CPT

## 2018-07-06 PROCEDURE — 85027 COMPLETE CBC AUTOMATED: CPT

## 2018-07-06 PROCEDURE — G8980 MOBILITY D/C STATUS: HCPCS

## 2018-07-06 PROCEDURE — 6360000002 HC RX W HCPCS: Performed by: STUDENT IN AN ORGANIZED HEALTH CARE EDUCATION/TRAINING PROGRAM

## 2018-07-06 PROCEDURE — 36415 COLL VENOUS BLD VENIPUNCTURE: CPT

## 2018-07-06 RX ADMIN — DOCUSATE SODIUM 100 MG: 100 CAPSULE, LIQUID FILLED ORAL at 08:05

## 2018-07-06 RX ADMIN — MORPHINE SULFATE 4 MG: 4 INJECTION INTRAVENOUS at 23:15

## 2018-07-06 RX ADMIN — GABAPENTIN 300 MG: 300 CAPSULE ORAL at 23:02

## 2018-07-06 RX ADMIN — ALPRAZOLAM 0.25 MG: 0.25 TABLET ORAL at 23:03

## 2018-07-06 RX ADMIN — MORPHINE SULFATE 4 MG: 4 INJECTION INTRAVENOUS at 20:11

## 2018-07-06 RX ADMIN — CEFAZOLIN SODIUM 2 G: 2 SOLUTION INTRAVENOUS at 10:34

## 2018-07-06 RX ADMIN — DOCUSATE SODIUM 100 MG: 100 CAPSULE, LIQUID FILLED ORAL at 22:58

## 2018-07-06 RX ADMIN — INSULIN LISPRO 1 UNITS: 100 INJECTION, SOLUTION INTRAVENOUS; SUBCUTANEOUS at 08:06

## 2018-07-06 RX ADMIN — MORPHINE SULFATE 4 MG: 4 INJECTION INTRAVENOUS at 01:13

## 2018-07-06 RX ADMIN — Medication 10 ML: at 23:00

## 2018-07-06 RX ADMIN — Medication 1 LOZENGE: at 22:30

## 2018-07-06 RX ADMIN — INSULIN LISPRO 2 UNITS: 100 INJECTION, SOLUTION INTRAVENOUS; SUBCUTANEOUS at 23:06

## 2018-07-06 RX ADMIN — GABAPENTIN 300 MG: 300 CAPSULE ORAL at 08:05

## 2018-07-06 RX ADMIN — FAMOTIDINE 20 MG: 20 TABLET ORAL at 08:05

## 2018-07-06 RX ADMIN — CEFAZOLIN SODIUM 2 G: 2 SOLUTION INTRAVENOUS at 01:47

## 2018-07-06 RX ADMIN — HYDROCODONE BITARTRATE AND ACETAMINOPHEN 2 TABLET: 5; 325 TABLET ORAL at 11:12

## 2018-07-06 RX ADMIN — Medication 10 ML: at 08:05

## 2018-07-06 RX ADMIN — INSULIN LISPRO 1 UNITS: 100 INJECTION, SOLUTION INTRAVENOUS; SUBCUTANEOUS at 12:29

## 2018-07-06 RX ADMIN — HYDROCODONE BITARTRATE AND ACETAMINOPHEN 2 TABLET: 5; 325 TABLET ORAL at 06:59

## 2018-07-06 RX ADMIN — DULOXETINE HYDROCHLORIDE 90 MG: 60 CAPSULE, DELAYED RELEASE ORAL at 08:06

## 2018-07-06 RX ADMIN — ENOXAPARIN SODIUM 40 MG: 40 INJECTION SUBCUTANEOUS at 08:05

## 2018-07-06 RX ADMIN — HYDROCODONE BITARTRATE AND ACETAMINOPHEN 2 TABLET: 5; 325 TABLET ORAL at 15:40

## 2018-07-06 RX ADMIN — CEFAZOLIN SODIUM 2 G: 2 SOLUTION INTRAVENOUS at 17:10

## 2018-07-06 RX ADMIN — MORPHINE SULFATE 4 MG: 4 INJECTION INTRAVENOUS at 17:18

## 2018-07-06 RX ADMIN — INSULIN LISPRO 1 UNITS: 100 INJECTION, SOLUTION INTRAVENOUS; SUBCUTANEOUS at 17:10

## 2018-07-06 RX ADMIN — Medication 10 ML: at 01:14

## 2018-07-06 ASSESSMENT — PAIN DESCRIPTION - DESCRIPTORS
DESCRIPTORS: ACHING;CONSTANT;DISCOMFORT
DESCRIPTORS: ACHING;CONSTANT;DISCOMFORT
DESCRIPTORS: ACHING
DESCRIPTORS: ACHING;CONSTANT;DISCOMFORT
DESCRIPTORS: ACHING
DESCRIPTORS: ACHING;DISCOMFORT;SORE

## 2018-07-06 ASSESSMENT — PAIN DESCRIPTION - ORIENTATION
ORIENTATION: RIGHT

## 2018-07-06 ASSESSMENT — PAIN DESCRIPTION - PAIN TYPE
TYPE: SURGICAL PAIN

## 2018-07-06 ASSESSMENT — PAIN DESCRIPTION - LOCATION
LOCATION: LEG
LOCATION: ANKLE
LOCATION: LEG
LOCATION: ANKLE
LOCATION: LEG

## 2018-07-06 ASSESSMENT — PAIN DESCRIPTION - FREQUENCY
FREQUENCY: CONTINUOUS

## 2018-07-06 ASSESSMENT — PAIN SCALES - GENERAL
PAINLEVEL_OUTOF10: 10
PAINLEVEL_OUTOF10: 4
PAINLEVEL_OUTOF10: 10
PAINLEVEL_OUTOF10: 10
PAINLEVEL_OUTOF10: 9
PAINLEVEL_OUTOF10: 3
PAINLEVEL_OUTOF10: 0
PAINLEVEL_OUTOF10: 4
PAINLEVEL_OUTOF10: 8
PAINLEVEL_OUTOF10: 7
PAINLEVEL_OUTOF10: 9
PAINLEVEL_OUTOF10: 0

## 2018-07-06 ASSESSMENT — PAIN DESCRIPTION - PROGRESSION: CLINICAL_PROGRESSION: GRADUALLY IMPROVING

## 2018-07-06 ASSESSMENT — PAIN DESCRIPTION - ONSET: ONSET: ON-GOING

## 2018-07-06 NOTE — CONSULTS
[address] [facility]  Initial Consult Note      REASON FOR CONSULTATION:  Medical management medical management    ATTENDING/ADMITTING PHYSICIAN: patient     HISTORY OF PRESENT ILLNESS:      The patient is a 47 y.o. female patient of  Dr. Elvie Oseguera who presents  For orthopedic intervention-she has undergone revision and fixation of right tibial pilon fracture including TIB/fib and removal of hardware of the right distal tib-fib  On 705 2018. We are asked to follow along for her medical issues. She has a known history of diabetes mellitus type 2 , chronic back pain and hypertension. On my evaluation she indicates her pain is pretty well controlled. She denies any other complaints. No fevers chills chest pain or shortness of breath reported. Past Medical History:   Diagnosis Date    Anxiety     Arthritis     Cervical radiculopathy     Chronic back pain     Depression     Diabetes mellitus type 2, uncontrolled ([address] 2/12/2017    GERD (gastroesophageal reflux disease)     History of blood transfusion 01/2018    back surgery, lumbar, dr Sherman Combs    Hypertension            Past Surgical History:   Procedure Laterality Date    BACK SURGERY  01/08/2018    PLIF L2-L5    CARPAL TUNNEL RELEASE Right 04/09/2017    CERVICAL FUSION  2008    dr hu anterior    CERVICAL FUSION  2015    dr Sherman Combs anterior    CERVICAL FUSION  04/25/16    ANTERIOR C4-C5, C6-C7 PLATES AND SCREWS    CHOLECYSTECTOMY  2000    COLONOSCOPY      DILATION AND CURETTAGE OF UTERUS  2013    dr Chani Cole    heel spurs bilateral    HYSTERECTOMY  2013    dr Lulu Cordova partial    OTHER SURGICAL HISTORY Right 05/14/2018    removal of hardware repair nonunion right tibia/fibula    [place] OFFICE/OUTPT VISIT,PROCEDURE ONLY Right 7/5/2018    REPAIR NON UNION FAILED FIXATION RIGHT DISTAL TIB / FIB PILON FX. WITH REMOVAL OF HARDWARE & O.R. I. F ------BOP performed by Krissy Rodriguez MD at [facility] round, reactive to light. No scleral icterus. No conjunctival injection. Normal lips, teeth, and gums. No nasal discharge. Neck:  Supple  Heart:  RRR, no murmurs, gallops, rubs  Lungs:  CTA bilaterally, bilat symmetrical expansion, no wheeze, rales, or rhonchi  Abdomen:   Bowel sounds present, soft, nontender, no masses, no organomegaly, no peritoneal signs  Extremities:   Right lower extremity and dressing postop  Skin:  Warm and dry, no open lesions or rash  Neuro:  Cranial nerves 2-12 intact, no focal deficits  Breast: deferred  Rectal: deferred  Genitalia:  deferred    LABS:    CBC with Differential:    Lab Results   Component Value Date    WBC 12.1 07/06/2018    RBC 3.28 07/06/2018    HGB 10.0 07/06/2018    HCT 31.1 07/06/2018     07/06/2018    MCV 94.8 07/06/2018    MCH 30.5 07/06/2018    MCHC 32.2 07/06/2018    RDW 13.5 07/06/2018    SEGSPCT 57 11/08/2013    LYMPHOPCT 26.4 05/15/2018    MONOPCT 14.6 05/15/2018    BASOPCT 0.2 05/15/2018    MONOSABS 1.50 05/15/2018    LYMPHSABS 2.71 05/15/2018    EOSABS 0.03 05/15/2018    BASOSABS 0.02 05/15/2018     BMP:    Lab Results   Component Value Date     07/06/2018    K 4.6 07/06/2018    CL 94 07/06/2018    CO2 26 07/06/2018    BUN 14 07/06/2018    LABALBU 2.9 01/11/2018    LABALBU 4.5 05/24/2012    CREATININE 0.9 07/06/2018    CALCIUM 8.8 07/06/2018    GFRAA >60 07/06/2018    LABGLOM >60 07/06/2018    GLUCOSE 166 07/06/2018    GLUCOSE 96 05/24/2012       ASSESSMENT:      Patient Active Problem List   Diagnosis    Diabetes mellitus type 2, uncontrolled ([address]    HTN (hypertension), benign    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

## 2018-07-06 NOTE — PLAN OF CARE
Problem: Falls - Risk of: Intervention: Gait assessment    Kalin Moreno moved decently w walker and nwb on (operative) R leg.   02 sat at 94/95 prior to getting oob.    02 sat at 90/91 during exertion / ambulating. 02 sat at 94/95 after rtb / being seated. Each of the above readings were on room air. Additionally, she is maxing out on the spirometer.

## 2018-07-06 NOTE — PROGRESS NOTES
Physical Therapy    Room #:  5756/5391-I  Patient Name: Yemi Elizabeth    PHYSICAL THERAPY INITIAL EVALUATION    Plan of care: No skilled needs identified; will discharge from services. If condition changes, please reorder physical therapy. Due to previous surgeries and NWB she has good understanding of mobility. TALKED AT LENGTH ABOUT NEED TO MAINTAIN NWB RLE. 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    Delayed union of tibial shaft fracture, right, closed       Past medical history:       Diagnosis Date    Anxiety     Arthritis     Cervical radiculopathy     Chronic back pain     Depression     Diabetes mellitus type 2, uncontrolled ([facility] [address] 2/12/2017    GERD (gastroesophageal reflux disease)     History of blood transfusion 01/2018    back surgery, lumbar, dr Irene Guevara    Hypertension    ;      Procedure Laterality Date    BACK SURGERY  01/08/2018    PLIF L2-L5    CARPAL TUNNEL RELEASE Right 04/09/2017    CERVICAL FUSION  2008    dr hu anterior    CERVICAL FUSION  2015    dr Irene Guevara anterior    CERVICAL FUSION  04/25/16    ANTERIOR C4-C5, C6-C7 PLATES AND SCREWS    CHOLECYSTECTOMY  2000    COLONOSCOPY      DILATION AND CURETTAGE OF UTERUS  2013    dr Chris Wen ERCP     [address] fauzia jo bilateral    HYSTERECTOMY  2013    dr Cornell Nunez partial    OTHER SURGICAL HISTORY Right 05/14/2018    removal of hardware repair nonunion right tibia/fibula    [place] OFFICE/OUTPT VISIT,PROCEDURE ONLY Right 7/5/2018    REPAIR NON UNION FAILED FIXATION RIGHT DISTAL TIB / FIB PILON FX. WITH REMOVAL OF HARDWARE & O.R. I. F ------BOP performed by Alirio Ivan MD at [address] [place] [address] Right

## 2018-07-06 NOTE — PROGRESS NOTES
Department of Orthopedic Surgery  Resident Progress Note    Patient seen and examined. Pain controlled. No new complaints. Denies chest pain, shortness of breath, calf pain, dizziness/lightheadedness. VITALS:  BP (!) 93/51   Pulse 62   Temp 97.4 °F (36.3 °C) (Temporal)   Resp 16   Ht 6' 1\" (1.854 m)   Wt 165 lb (74.8 kg)   LMP 08/13/2013   SpO2 93%   BMI 21.77 kg/m²     GENERAL: awake, alert  MUSCULOSKELETAL:   right lower extremity:  · Splint C/D/I  · Compartments soft and compressible  · brisk cap refill to toes, foot warm and perfused  · Sensation intact to toes  · Demonstrates +AROM of toes    CBC:   Lab Results   Component Value Date    WBC 10.3 05/15/2018    HGB 9.4 05/15/2018    HCT 28.2 05/15/2018     05/15/2018       ASSESSMENT  · S/p revision R Pilon Fx with bone graft    PLAN    NWB RLE  24 hour abx coverage  Deep venous thrombosis prophylaxis - lovenox, early mobilization  PT/OT  Pain Control: IV and PO  Monitor H&H  · Await surgical cultures    Addendum:  Cultures show growth evaluating for gram + cocci, ID consulted today. Will not discharge until final cultures, and IV abx finalized with PICC line in place    Patient had contacted our office regarding discharge home today. Did talk with the patient face to face this afternoon stated we are unable to discharge home her home today. Also discussed regarding growth on her cultures, awaiting ID consult, and PICC line placement. She expressed understanding of this, will await final cultures and ID recommendations on antibiotics prior to considering discharge home. Rewrapped her splint.      Electronically signed by Marcio Álvarez PA-C on 7/6/2018 at 1:21 PM

## 2018-07-06 NOTE — OP NOTE
[address]                   [address] [facility]  [facility]                                 OPERATIVE REPORT    PATIENT NAME: Trang Morrell                     :        1965  MED REC NO:   [id number]                            ROOM:       0632  ACCOUNT NO:   [de-identified]                           ADMIT DATE: 2018  PROVIDER:     Jessica Shannon MD    DATE OF PROCEDURE:  2018    BRIEF HISTORY:  The patient is a 23-year-old female who had a right tibial  pilon fracture suffered in 2017, fixed with intramedullary nailing in  [place]. She then was subsequently fixed by me in May for a nonunion  with malunion as well. She subsequently was noncompliant, walked on the  fracture and her hardware failed approximately six weeks postoperative. I  had a long discussion with her indicting the fact that she is taking her  limb at risk for amputation. I went over the revision of surgery. I  stated if this did not work, we may need to fuse her ankle or perform  further limb salvage. I explained the risks of surgery including death  from anesthesia, possible infection, possible neurovascular damage,  possibility of limb loss, possibility of deep venous thrombosis, pulmonary  embolism, etc.  She understood this and decided to go forward with the  procedure. PREOPERATIVE DIAGNOSIS:  Noncompliance leading to failed fixation of right  tibial pilon malunion/nonunion including the tibia and the fibula. POSTOPERATIVE DIAGNOSIS:  Noncompliance leading to failed fixation of right  tibial pilon malunion/nonunion including the tibia and the fibula. PROCEDURES:  1. Revision and fixation of right tibial pilon fracture including the  tibia and the fibula. 2.  Removal of hardware, right distal tibia and fibula. SURGEON:  Jessica Shannon M.D.    ASSISTANTS:  Arik Chan D.O; and Serjio Herbert. Robin Patel. Residents.     EBL:  Approximately 25 mL.    FLUIDS:  Crystalloid. ANTIBIOTICS:  The patient was given 2 gm of Ancef IV after cultures of  frozen section taken. COMPLICATIONS:  There were no complications. PACKS AND DRAINS:  No packs or drains. ANESTHESIA:  General endotracheal.    OPERATIVE PROCEDURE:  The patient was brought into the operating room in  supine position on the hospital bed. The patient was then transferred to  the operating room table by multiple individuals in a safe fashion with the  Anesthesia in control with the patient's C-spine and airway. Once on the  operating table, all points of pressure were identified and well padded. A  tourniquet was applied to her right upper thigh. Her right lower extremity  was sterilely prepped and draped in a standard orthopedic fashion. A  time-out was performed indicating the appropriate identification of the  patient, the procedure to be performed, and the site to be performed upon. This was agreed upon by all individuals in the room. After the time-out  was performed, the fibular incision was reopened after the Esmarch and  tourniquet were elevated. Careful dissection was carried out down to the  plate which was then safely removed. A 3.5 LC-DCP plate was placed, the  bone was cleaned off and the fracture was cleaned out. This recreated her  alignment which she had failed, we got into varus, it was now near  anatomically aligned. She is starting to loose bone stock from multiple  failures of fixation. Once this was fixed, it was copiously irrigated with  sterile normal saline. The deep tissue was closed with 0 Vicryl,  subcutaneous tissues with 2-0 Monocryl and the skin with 3-0 nylons. Attention was turned to the tibia, at which the previous anterior approach  was utilized using a 10 blade. A careful dissection was carried out to the  interval between extensor digitorum and the peroneus tertius. The plate  was identified and safely removed.   There were two broken screws, scored  out as well. The previous fracture site was cleaned out and frozen section  cultures were taken. Frozen section showed no PMNs. Therefore, decision  was made to perform definitive fixation and bone grafting. A longer  anterolateral plate was then K-wired in position after we had a reduction. Locking screws were placed along the distal fracture _____. Proximally,  bicortical screw fixation of eight cortices was obtained. This aligned the  tibia nicely in AP, lateral, and oblique views. At this point in time  after meticulous debridement on the fracture site, 5 mL of ViviGen and 5 mL  of Vitoss were then packed into the fracture site after copious irrigation. Final x-ray showed good placement of hardware and near anatomic alignment  of the distal tibia. At this point in time, the site was irrigated once  again. Subcutaneous tissue was closed with 2-0 Monocryl and skin with 3-0  nylons. Patient had Xeroform and a well-padded splint put into position. The patient was taken to PACU in stable condition. POSTOPERATIVE PLAN:  Includes:  1. Strict nonweightbearing and re-education on compliance, right lower  extremity. 2.  DVT prophylaxis in the form of Lovenox in the hospital and aspirin as  an outpatient. 3.  Follow her cultures. If need be, we will get Infectious Disease on  board if something grows out.         Melinda Cramer MD    D: 07/05/2018 11:50:29       T: 07/05/2018 18:01:51     [email or web address]  Job#: [id number]     Doc#: [id number]    CC:

## 2018-07-06 NOTE — PLAN OF CARE
Problem: Falls - Risk of:  Goal: Will remain free from falls  Will remain free from falls   Outcome: Met This Shift    Goal: Absence of physical injury  Absence of physical injury   Outcome: Met This Shift      Problem: Risk for Impaired Skin Integrity  Goal: Tissue integrity - skin and mucous membranes  Structural intactness and normal physiological function of skin and  mucous membranes.    Outcome: Met This Shift      Problem: Pain:  Goal: Pain level will decrease  Pain level will decrease   Outcome: Met This Shift

## 2018-07-06 NOTE — PROGRESS NOTES
Occupational Therapy  OCCUPATIONAL THERAPY INITIAL EVALUATION      Date:2018  Patient Name: Zeinab Mancini  MRN: [id number]  : 1965  Room: [address] [place] [address]     Evaluating OT: Orlando Mehta OTR/L #[id number]     AM PAC:   G-code:  509 [address]  Recommended Adaptive Equipment: none    Diagnosis: Delayed union tibial shaft   Surgery: S/p revision R Pilon Fx with bone graft 18  Past Medical History:   Past Medical History:   Diagnosis Date    Anxiety     Arthritis     Cervical radiculopathy     Chronic back pain     Depression     Diabetes mellitus type 2, uncontrolled ([facility] [address] 2017    GERD (gastroesophageal reflux disease)     History of blood transfusion 2018    back surgery, lumbar, dr Lillian Green Hypertension       Precautions: NWB R LE     Home Living: Pt lives alone in a 1 story home with level entry    Bathroom setup: tub/shower combo with seat  Equipment owned: w/w, w/c, tub bench    Prior Level of Function: Independent with ADLs; mod I with IADLs; ambulated with w/w, utilized w/c for longer disances  Occupation: na    Pain Level: pt c/o 7/10 R LE this session ; therapist facilitated repositioning and elevation of R LE    Cognition: oriented x 4  Additional Comments: Pleasant and cooperative.   Followed commands   Sensory:   Hearing: wfl  Vision: wfl    Glasses: yes [] no [] reading []      UE Assessment:  Hand Dominance: Right [x]  Left []     Strength ROM Additional Info:    RUE  5/5 wfl good  and wfl FMC/dexterity noted during ADL tasks     LUE 5/5 wfl good  and wfl FMC/dexterity noted during ADL tasks   Sensation: wfl  Tone: wfl  Edema: none noted     Functional Assessment:   Current Status  Comments   Feeding  indep    Grooming  indep    Upper Body Dressing indep     Lower Body Dressing indep    Bathing indep    Toileting  indep    Bed Mobility  Supine to Sit: indep  Sit to Supine: NT    Functional Transfers indep    Functional Mobility indep      Sit balance: indep  Stand balance:

## 2018-07-07 PROBLEM — S82.202G: Status: ACTIVE | Noted: 2018-07-07

## 2018-07-07 PROBLEM — S82.402G: Status: ACTIVE | Noted: 2018-07-07

## 2018-07-07 LAB
ANAEROBIC CULTURE: NORMAL
METER GLUCOSE: 136 MG/DL (ref 70–110)
METER GLUCOSE: 145 MG/DL (ref 70–110)
METER GLUCOSE: 152 MG/DL (ref 70–110)
METER GLUCOSE: 244 MG/DL (ref 70–110)

## 2018-07-07 PROCEDURE — G0378 HOSPITAL OBSERVATION PER HR: HCPCS

## 2018-07-07 PROCEDURE — 1200000000 HC SEMI PRIVATE

## 2018-07-07 PROCEDURE — 36415 COLL VENOUS BLD VENIPUNCTURE: CPT

## 2018-07-07 PROCEDURE — 87040 BLOOD CULTURE FOR BACTERIA: CPT

## 2018-07-07 PROCEDURE — 6360000002 HC RX W HCPCS: Performed by: INTERNAL MEDICINE

## 2018-07-07 PROCEDURE — 6360000002 HC RX W HCPCS: Performed by: STUDENT IN AN ORGANIZED HEALTH CARE EDUCATION/TRAINING PROGRAM

## 2018-07-07 PROCEDURE — 6370000000 HC RX 637 (ALT 250 FOR IP): Performed by: STUDENT IN AN ORGANIZED HEALTH CARE EDUCATION/TRAINING PROGRAM

## 2018-07-07 PROCEDURE — 2580000003 HC RX 258: Performed by: STUDENT IN AN ORGANIZED HEALTH CARE EDUCATION/TRAINING PROGRAM

## 2018-07-07 PROCEDURE — 2580000003 HC RX 258: Performed by: INTERNAL MEDICINE

## 2018-07-07 PROCEDURE — 6370000000 HC RX 637 (ALT 250 FOR IP): Performed by: INTERNAL MEDICINE

## 2018-07-07 PROCEDURE — 82962 GLUCOSE BLOOD TEST: CPT

## 2018-07-07 RX ADMIN — INSULIN LISPRO 2 UNITS: 100 INJECTION, SOLUTION INTRAVENOUS; SUBCUTANEOUS at 19:14

## 2018-07-07 RX ADMIN — Medication 10 ML: at 21:20

## 2018-07-07 RX ADMIN — ENOXAPARIN SODIUM 40 MG: 40 INJECTION SUBCUTANEOUS at 08:47

## 2018-07-07 RX ADMIN — HYDROCODONE BITARTRATE AND ACETAMINOPHEN 2 TABLET: 5; 325 TABLET ORAL at 13:26

## 2018-07-07 RX ADMIN — INSULIN LISPRO 1 UNITS: 100 INJECTION, SOLUTION INTRAVENOUS; SUBCUTANEOUS at 22:17

## 2018-07-07 RX ADMIN — CEFAZOLIN SODIUM 2 G: 2 SOLUTION INTRAVENOUS at 08:48

## 2018-07-07 RX ADMIN — HYDROCODONE BITARTRATE AND ACETAMINOPHEN 2 TABLET: 5; 325 TABLET ORAL at 22:16

## 2018-07-07 RX ADMIN — VANCOMYCIN HYDROCHLORIDE 1250 MG: 10 INJECTION, POWDER, LYOPHILIZED, FOR SOLUTION INTRAVENOUS at 21:17

## 2018-07-07 RX ADMIN — DULOXETINE HYDROCHLORIDE 90 MG: 60 CAPSULE, DELAYED RELEASE ORAL at 08:48

## 2018-07-07 RX ADMIN — INSULIN LISPRO 1 UNITS: 100 INJECTION, SOLUTION INTRAVENOUS; SUBCUTANEOUS at 08:56

## 2018-07-07 RX ADMIN — HYDROCODONE BITARTRATE AND ACETAMINOPHEN 2 TABLET: 5; 325 TABLET ORAL at 08:48

## 2018-07-07 RX ADMIN — DOCUSATE SODIUM 100 MG: 100 CAPSULE, LIQUID FILLED ORAL at 13:26

## 2018-07-07 RX ADMIN — FAMOTIDINE 20 MG: 20 TABLET ORAL at 08:47

## 2018-07-07 RX ADMIN — ALPRAZOLAM 0.25 MG: 0.25 TABLET ORAL at 20:51

## 2018-07-07 RX ADMIN — GABAPENTIN 300 MG: 300 CAPSULE ORAL at 20:51

## 2018-07-07 RX ADMIN — Medication 1 LOZENGE: at 00:15

## 2018-07-07 RX ADMIN — HYDROCODONE BITARTRATE AND ACETAMINOPHEN 2 TABLET: 5; 325 TABLET ORAL at 17:51

## 2018-07-07 RX ADMIN — ATENOLOL 50 MG: 50 TABLET ORAL at 20:51

## 2018-07-07 RX ADMIN — GABAPENTIN 300 MG: 300 CAPSULE ORAL at 08:48

## 2018-07-07 RX ADMIN — Medication 10 ML: at 08:47

## 2018-07-07 RX ADMIN — DOCUSATE SODIUM 100 MG: 100 CAPSULE, LIQUID FILLED ORAL at 20:51

## 2018-07-07 ASSESSMENT — PAIN SCALES - GENERAL
PAINLEVEL_OUTOF10: 7
PAINLEVEL_OUTOF10: 8
PAINLEVEL_OUTOF10: 5
PAINLEVEL_OUTOF10: 8
PAINLEVEL_OUTOF10: 0
PAINLEVEL_OUTOF10: 4
PAINLEVEL_OUTOF10: 7

## 2018-07-07 ASSESSMENT — PAIN DESCRIPTION - FREQUENCY
FREQUENCY: CONTINUOUS
FREQUENCY: CONTINUOUS

## 2018-07-07 ASSESSMENT — PAIN DESCRIPTION - ORIENTATION
ORIENTATION: RIGHT
ORIENTATION: RIGHT

## 2018-07-07 ASSESSMENT — PAIN DESCRIPTION - DESCRIPTORS
DESCRIPTORS: CONSTANT;ACHING;SORE
DESCRIPTORS: CONSTANT;ACHING;SORE

## 2018-07-07 ASSESSMENT — PAIN DESCRIPTION - PROGRESSION
CLINICAL_PROGRESSION: GRADUALLY IMPROVING
CLINICAL_PROGRESSION: GRADUALLY IMPROVING

## 2018-07-07 ASSESSMENT — PAIN DESCRIPTION - PAIN TYPE
TYPE: ACUTE PAIN
TYPE: ACUTE PAIN
TYPE: SURGICAL PAIN

## 2018-07-07 ASSESSMENT — PAIN DESCRIPTION - LOCATION
LOCATION: LEG
LOCATION: LEG

## 2018-07-07 NOTE — PROGRESS NOTES
Department of Orthopedic Surgery  Resident Progress Note    Patient seen and examined. Pain medication does deliver relief. No new complaints. Denies chest pain, shortness of breath, calf pain, dizziness/lightheadedness.     VITALS:  BP (!) 96/52   Pulse 80   Temp 99.8 °F (37.7 °C) (Temporal)   Resp 16   Ht 6' 1\" (1.854 m)   Wt 165 lb (74.8 kg)   LMP 08/13/2013   SpO2 92%   BMI 21.77 kg/m²     GENERAL: awake, alert  MUSCULOSKELETAL:   right lower extremity:  · Splint C/D/I  · Compartments soft and compressible  · brisk cap refill to toes, foot warm and perfused  · Sensation intact to toes  · Demonstrates +AROM of toes    CBC:   Lab Results   Component Value Date    WBC 12.1 07/06/2018    HGB 10.0 07/06/2018    HCT 31.1 07/06/2018     07/06/2018       ASSESSMENT  · S/p revision R Pilon Fx with bone graft    PLAN    NWB RLE  abx per ID  Deep venous thrombosis prophylaxis - lovenox, early mobilization  PT/OT  Pain Control:  PO  Monitor H&H  Trend H&H  · Await surgical cultures        Electronically signed by Tasneem Mckeon DO on 7/7/2018 at 6:45 AM

## 2018-07-08 LAB
CULTURE SURGICAL: ABNORMAL
CULTURE SURGICAL: ABNORMAL
GRAM STAIN RESULT: ABNORMAL
METER GLUCOSE: 138 MG/DL (ref 70–110)
METER GLUCOSE: 189 MG/DL (ref 70–110)
METER GLUCOSE: 283 MG/DL (ref 70–110)
ORGANISM: ABNORMAL

## 2018-07-08 PROCEDURE — 02HV33Z INSERTION OF INFUSION DEVICE INTO SUPERIOR VENA CAVA, PERCUTANEOUS APPROACH: ICD-10-PCS | Performed by: INTERNAL MEDICINE

## 2018-07-08 PROCEDURE — C1751 CATH, INF, PER/CENT/MIDLINE: HCPCS

## 2018-07-08 PROCEDURE — 6370000000 HC RX 637 (ALT 250 FOR IP): Performed by: STUDENT IN AN ORGANIZED HEALTH CARE EDUCATION/TRAINING PROGRAM

## 2018-07-08 PROCEDURE — 6360000002 HC RX W HCPCS: Performed by: STUDENT IN AN ORGANIZED HEALTH CARE EDUCATION/TRAINING PROGRAM

## 2018-07-08 PROCEDURE — 82962 GLUCOSE BLOOD TEST: CPT

## 2018-07-08 PROCEDURE — 2580000003 HC RX 258: Performed by: INTERNAL MEDICINE

## 2018-07-08 PROCEDURE — 6360000002 HC RX W HCPCS: Performed by: INTERNAL MEDICINE

## 2018-07-08 PROCEDURE — 36569 INSJ PICC 5 YR+ W/O IMAGING: CPT

## 2018-07-08 PROCEDURE — 76937 US GUIDE VASCULAR ACCESS: CPT

## 2018-07-08 PROCEDURE — 1200000000 HC SEMI PRIVATE

## 2018-07-08 PROCEDURE — 2580000003 HC RX 258: Performed by: STUDENT IN AN ORGANIZED HEALTH CARE EDUCATION/TRAINING PROGRAM

## 2018-07-08 PROCEDURE — 2500000003 HC RX 250 WO HCPCS: Performed by: INTERNAL MEDICINE

## 2018-07-08 RX ORDER — HEPARIN SODIUM (PORCINE) LOCK FLUSH IV SOLN 100 UNIT/ML 100 UNIT/ML
3 SOLUTION INTRAVENOUS EVERY 12 HOURS SCHEDULED
Status: DISCONTINUED | OUTPATIENT
Start: 2018-07-08 | End: 2018-07-11 | Stop reason: HOSPADM

## 2018-07-08 RX ORDER — LIDOCAINE HYDROCHLORIDE 10 MG/ML
5 INJECTION, SOLUTION EPIDURAL; INFILTRATION; INTRACAUDAL; PERINEURAL ONCE
Status: COMPLETED | OUTPATIENT
Start: 2018-07-08 | End: 2018-07-08

## 2018-07-08 RX ORDER — SODIUM CHLORIDE 0.9 % (FLUSH) 0.9 %
10 SYRINGE (ML) INJECTION PRN
Status: DISCONTINUED | OUTPATIENT
Start: 2018-07-08 | End: 2018-07-11 | Stop reason: HOSPADM

## 2018-07-08 RX ADMIN — INSULIN LISPRO 2 UNITS: 100 INJECTION, SOLUTION INTRAVENOUS; SUBCUTANEOUS at 22:02

## 2018-07-08 RX ADMIN — INSULIN LISPRO 1 UNITS: 100 INJECTION, SOLUTION INTRAVENOUS; SUBCUTANEOUS at 12:59

## 2018-07-08 RX ADMIN — FAMOTIDINE 20 MG: 20 TABLET ORAL at 09:04

## 2018-07-08 RX ADMIN — Medication 10 ML: at 09:05

## 2018-07-08 RX ADMIN — ENOXAPARIN SODIUM 40 MG: 40 INJECTION SUBCUTANEOUS at 09:05

## 2018-07-08 RX ADMIN — VANCOMYCIN HYDROCHLORIDE 1250 MG: 10 INJECTION, POWDER, LYOPHILIZED, FOR SOLUTION INTRAVENOUS at 09:09

## 2018-07-08 RX ADMIN — DOCUSATE SODIUM 100 MG: 100 CAPSULE, LIQUID FILLED ORAL at 21:55

## 2018-07-08 RX ADMIN — ERGOCALCIFEROL 50000 UNITS: 1.25 CAPSULE ORAL at 09:05

## 2018-07-08 RX ADMIN — HYDROCODONE BITARTRATE AND ACETAMINOPHEN 2 TABLET: 5; 325 TABLET ORAL at 21:54

## 2018-07-08 RX ADMIN — GABAPENTIN 300 MG: 300 CAPSULE ORAL at 09:05

## 2018-07-08 RX ADMIN — HYDROCODONE BITARTRATE AND ACETAMINOPHEN 2 TABLET: 5; 325 TABLET ORAL at 12:14

## 2018-07-08 RX ADMIN — HYDROCODONE BITARTRATE AND ACETAMINOPHEN 2 TABLET: 5; 325 TABLET ORAL at 03:03

## 2018-07-08 RX ADMIN — ALPRAZOLAM 0.25 MG: 0.25 TABLET ORAL at 21:52

## 2018-07-08 RX ADMIN — HYDROCODONE BITARTRATE AND ACETAMINOPHEN 2 TABLET: 5; 325 TABLET ORAL at 07:56

## 2018-07-08 RX ADMIN — Medication 10 ML: at 21:55

## 2018-07-08 RX ADMIN — LIDOCAINE HYDROCHLORIDE 5 ML: 10 INJECTION, SOLUTION EPIDURAL; INFILTRATION; INTRACAUDAL; PERINEURAL at 13:00

## 2018-07-08 RX ADMIN — DOCUSATE SODIUM 100 MG: 100 CAPSULE, LIQUID FILLED ORAL at 09:05

## 2018-07-08 RX ADMIN — GABAPENTIN 300 MG: 300 CAPSULE ORAL at 21:53

## 2018-07-08 RX ADMIN — DULOXETINE HYDROCHLORIDE 90 MG: 60 CAPSULE, DELAYED RELEASE ORAL at 09:05

## 2018-07-08 RX ADMIN — VANCOMYCIN HYDROCHLORIDE 1250 MG: 10 INJECTION, POWDER, LYOPHILIZED, FOR SOLUTION INTRAVENOUS at 21:53

## 2018-07-08 RX ADMIN — HYDROCODONE BITARTRATE AND ACETAMINOPHEN 2 TABLET: 5; 325 TABLET ORAL at 17:04

## 2018-07-08 ASSESSMENT — PAIN SCALES - GENERAL
PAINLEVEL_OUTOF10: 8
PAINLEVEL_OUTOF10: 7
PAINLEVEL_OUTOF10: 8
PAINLEVEL_OUTOF10: 7
PAINLEVEL_OUTOF10: 8

## 2018-07-08 ASSESSMENT — PAIN DESCRIPTION - ORIENTATION
ORIENTATION: RIGHT
ORIENTATION: RIGHT

## 2018-07-08 ASSESSMENT — PAIN DESCRIPTION - DESCRIPTORS
DESCRIPTORS: ACHING
DESCRIPTORS: ACHING;CONSTANT

## 2018-07-08 ASSESSMENT — PAIN DESCRIPTION - PAIN TYPE
TYPE: SURGICAL PAIN
TYPE: SURGICAL PAIN

## 2018-07-08 ASSESSMENT — PAIN DESCRIPTION - PROGRESSION: CLINICAL_PROGRESSION: GRADUALLY IMPROVING

## 2018-07-08 ASSESSMENT — PAIN DESCRIPTION - LOCATION
LOCATION: LEG
LOCATION: LEG

## 2018-07-08 NOTE — PLAN OF CARE
Problem: Pain:  Intervention: Promote participation in pain management plan    Mireille Ingram had max w ortho team.   Splint adjusted. picc line plan reviewed. vanc dose started after cultures were drawn.

## 2018-07-08 NOTE — CONSULTS
[facility]     Consult Note        Date:   7/8/2018  Patient name:  Norma Narayan  Date of admission:  7/5/2018  7:21 AM  MRN:   [id number]  YOB: 1965    Reason for Consult:  Nonunion infected rt leg    HISTORY OF PRESENT ILLNESS:                The patient is a 47 y.o. with past medical history of chronic back pain, cervical radiculopathy, diabetes type II, hypertension underwent revision and fixation of right EBL pilon fracture including the Tibia and the fibula as well as removal of hardware from the right distal Tibia and fibula old one. Patient currently follows Dr. Israel Lombard   from orthopedics. She had occasional right EBL pilon fracture in August 2017 after she was walking in her driveway and her legs gave up she [facility] but she was not able to follow up with them as her hometown is [facility]. According to the previous no she was noncompliant and walked on the fracture and hardware field 6 weeks postoperative. I WBC count is 12.1 ESR and CRP are low. ESR 11 and CRP 0.1. However her surgical cultures are growing staph capitis, methicillin sensitive  She had a fistulous tract before her surgery with necrotic tissue which clinically explains that she had chronic osteomyelitis. Past Medical History:   has a past medical history of Anxiety; Arthritis; Cervical radiculopathy; Chronic back pain; Depression; Diabetes mellitus type 2, uncontrolled ([address] GERD (gastroesophageal reflux disease); History of blood transfusion; and Hypertension. Past Surgical History:   has a past surgical history that includes Cholecystectomy (2000); Foot surgery (1996); ERCP; Tubal ligation (1991); cervical fusion (2008); cervical fusion (2015); Colonoscopy; Ivor tooth extraction; Dilation and curettage of uterus (2013); Hysterectomy (2013); cervical fusion (04/25/16);  Carpal tunnel release Result   1. Hardware traversing the tibia and fibula, the broken screws seen on   previous exam are no longer present. 2. Otherwise limited evaluation. XR TIBIA FIBULA RIGHT (2 VIEWS)   Final Result   1. Incomplete visualization of hardware of the tibia and fibula,   incompletely visualized. 2. Previous external fixation device removal proximal tibia. 3. Moderate medial compartment joint space narrowing of the right   knee. FLUORO FOR SURGICAL PROCEDURES   Final Result   Fluoroscopic guidance as above, recommend correlation with   ankle and tibia/fibular radiographs. Assessment and Plan:        Surgical site infection  - Wound cultures are growing staph capitis methicillin sensitive  -Pathology on 5/14/2018 was negative for infection  Non union  Removal of hardware  S/p bone grafting and placement of new hardware      plan  - Overall low ESR and CRP but clinically there is concern for acute on chronic osteomyelitis based on fistulous tracks and description from OR note  - Will await pathology results however for now I'll start her on IV antibiotics   - DC Vancomycin and change to cefazolin 2 g every 8 hourly staph sps  - Frozen section had no PMNs but this is not the ideal way to rule out osteomyelitis  - Since a new hardware has been placed already with staph from surgical culture, will need iV abx and possible supression therapy depending on the union  - PICC line order placed  - Duration of treatment will be determined after the pathology results come back. If no signs of osteomyelitis overtreat only for skin and soft tissue with staph capitis.                 Kiara Delong MD

## 2018-07-09 LAB
EKG ATRIAL RATE: 85 BPM
EKG P AXIS: 74 DEGREES
EKG P-R INTERVAL: 150 MS
EKG Q-T INTERVAL: 398 MS
EKG QRS DURATION: 80 MS
EKG QTC CALCULATION (BAZETT): 473 MS
EKG R AXIS: -4 DEGREES
EKG T AXIS: 39 DEGREES
EKG VENTRICULAR RATE: 85 BPM
METER GLUCOSE: 153 MG/DL (ref 70–110)
METER GLUCOSE: 176 MG/DL (ref 70–110)
METER GLUCOSE: 268 MG/DL (ref 70–110)
METER GLUCOSE: 311 MG/DL (ref 70–110)
METER GLUCOSE: 338 MG/DL (ref 70–110)
VANCOMYCIN RANDOM: 5.4 MCG/ML (ref 5–40)

## 2018-07-09 PROCEDURE — 2580000003 HC RX 258: Performed by: STUDENT IN AN ORGANIZED HEALTH CARE EDUCATION/TRAINING PROGRAM

## 2018-07-09 PROCEDURE — 82962 GLUCOSE BLOOD TEST: CPT

## 2018-07-09 PROCEDURE — 6370000000 HC RX 637 (ALT 250 FOR IP): Performed by: STUDENT IN AN ORGANIZED HEALTH CARE EDUCATION/TRAINING PROGRAM

## 2018-07-09 PROCEDURE — 93005 ELECTROCARDIOGRAM TRACING: CPT | Performed by: INTERNAL MEDICINE

## 2018-07-09 PROCEDURE — 6360000002 HC RX W HCPCS: Performed by: INTERNAL MEDICINE

## 2018-07-09 PROCEDURE — 2580000003 HC RX 258: Performed by: INTERNAL MEDICINE

## 2018-07-09 PROCEDURE — 36415 COLL VENOUS BLD VENIPUNCTURE: CPT

## 2018-07-09 PROCEDURE — 6360000002 HC RX W HCPCS: Performed by: STUDENT IN AN ORGANIZED HEALTH CARE EDUCATION/TRAINING PROGRAM

## 2018-07-09 PROCEDURE — 6370000000 HC RX 637 (ALT 250 FOR IP): Performed by: INTERNAL MEDICINE

## 2018-07-09 PROCEDURE — 80202 ASSAY OF VANCOMYCIN: CPT

## 2018-07-09 PROCEDURE — 1200000000 HC SEMI PRIVATE

## 2018-07-09 RX ORDER — 0.9 % SODIUM CHLORIDE 0.9 %
500 INTRAVENOUS SOLUTION INTRAVENOUS ONCE
Status: COMPLETED | OUTPATIENT
Start: 2018-07-09 | End: 2018-07-09

## 2018-07-09 RX ORDER — DIPHENHYDRAMINE HYDROCHLORIDE 50 MG/ML
25 INJECTION INTRAMUSCULAR; INTRAVENOUS ONCE
Status: COMPLETED | OUTPATIENT
Start: 2018-07-09 | End: 2018-07-09

## 2018-07-09 RX ORDER — DIPHENHYDRAMINE HYDROCHLORIDE 50 MG/ML
25 INJECTION INTRAMUSCULAR; INTRAVENOUS ONCE
Status: COMPLETED | OUTPATIENT
Start: 2018-07-09 | End: 2018-07-10

## 2018-07-09 RX ORDER — CEFTRIAXONE 500 MG/1
2 INJECTION, POWDER, FOR SOLUTION INTRAMUSCULAR; INTRAVENOUS EVERY 24 HOURS
Qty: 52 G | Refills: 0 | Status: SHIPPED | OUTPATIENT
Start: 2018-07-09 | End: 2018-07-09 | Stop reason: SINTOL

## 2018-07-09 RX ADMIN — SODIUM CHLORIDE, PRESERVATIVE FREE 300 UNITS: 5 INJECTION INTRAVENOUS at 21:15

## 2018-07-09 RX ADMIN — INSULIN LISPRO 3 UNITS: 100 INJECTION, SOLUTION INTRAVENOUS; SUBCUTANEOUS at 17:45

## 2018-07-09 RX ADMIN — INSULIN LISPRO 1 UNITS: 100 INJECTION, SOLUTION INTRAVENOUS; SUBCUTANEOUS at 13:16

## 2018-07-09 RX ADMIN — DOCUSATE SODIUM 100 MG: 100 CAPSULE, LIQUID FILLED ORAL at 08:25

## 2018-07-09 RX ADMIN — ALPRAZOLAM 0.25 MG: 0.25 TABLET ORAL at 21:14

## 2018-07-09 RX ADMIN — SODIUM CHLORIDE, PRESERVATIVE FREE 300 UNITS: 5 INJECTION INTRAVENOUS at 00:21

## 2018-07-09 RX ADMIN — INSULIN LISPRO 2 UNITS: 100 INJECTION, SOLUTION INTRAVENOUS; SUBCUTANEOUS at 21:16

## 2018-07-09 RX ADMIN — GABAPENTIN 300 MG: 300 CAPSULE ORAL at 21:14

## 2018-07-09 RX ADMIN — DIPHENHYDRAMINE HYDROCHLORIDE 25 MG: 50 INJECTION, SOLUTION INTRAMUSCULAR; INTRAVENOUS at 17:46

## 2018-07-09 RX ADMIN — Medication 10 ML: at 08:25

## 2018-07-09 RX ADMIN — DULOXETINE HYDROCHLORIDE 90 MG: 60 CAPSULE, DELAYED RELEASE ORAL at 08:24

## 2018-07-09 RX ADMIN — HYDROCODONE BITARTRATE AND ACETAMINOPHEN 2 TABLET: 5; 325 TABLET ORAL at 15:14

## 2018-07-09 RX ADMIN — GABAPENTIN 300 MG: 300 CAPSULE ORAL at 08:24

## 2018-07-09 RX ADMIN — ENOXAPARIN SODIUM 40 MG: 40 INJECTION SUBCUTANEOUS at 08:24

## 2018-07-09 RX ADMIN — DOCUSATE SODIUM 100 MG: 100 CAPSULE, LIQUID FILLED ORAL at 21:14

## 2018-07-09 RX ADMIN — ATENOLOL 50 MG: 50 TABLET ORAL at 00:20

## 2018-07-09 RX ADMIN — INSULIN LISPRO 1 UNITS: 100 INJECTION, SOLUTION INTRAVENOUS; SUBCUTANEOUS at 08:26

## 2018-07-09 RX ADMIN — SODIUM CHLORIDE, PRESERVATIVE FREE 300 UNITS: 5 INJECTION INTRAVENOUS at 08:25

## 2018-07-09 RX ADMIN — SODIUM CHLORIDE 500 ML: 9 INJECTION, SOLUTION INTRAVENOUS at 21:03

## 2018-07-09 RX ADMIN — FAMOTIDINE 20 MG: 20 TABLET ORAL at 08:24

## 2018-07-09 RX ADMIN — WATER 2 G: 1 INJECTION INTRAMUSCULAR; INTRAVENOUS; SUBCUTANEOUS at 18:24

## 2018-07-09 RX ADMIN — HYDROCODONE BITARTRATE AND ACETAMINOPHEN 2 TABLET: 5; 325 TABLET ORAL at 10:19

## 2018-07-09 RX ADMIN — Medication 10 ML: at 21:15

## 2018-07-09 ASSESSMENT — PAIN SCALES - GENERAL
PAINLEVEL_OUTOF10: 4
PAINLEVEL_OUTOF10: 7
PAINLEVEL_OUTOF10: 7

## 2018-07-09 NOTE — PLAN OF CARE
MESSAGE LEFT THROUGH MED/DENT THAT PT HAD AN IMMEDIATE REACTION TO HER IV ANTIBIOTIC,DR Taiwo Graham TO CALL BACK

## 2018-07-09 NOTE — SIGNIFICANT EVENT
Medicines:    Infusion(s):   [x] Normal Saline    Amount: 500 cc bolus       [] Lactate Ringers      [] Other:     Imaging:   [] CXR:   [] Normal         [] Pneumothorax         [] Pulmonary Edema  [] Infiltrate          [] CT Head  [] Normal          [] ICB          [] SAH          [] Other:     Laboratory Tests:  vancomycin level        Teams Assessment and Plan:  1. Allergic reaction to rocephin   -Pt is s/p revision R Pilon Fx with bone graft on 07/05  -RRT called because pt had SOB,palpitation,N/V and diarrhea after receiving rocephin  -No known penicillin or other drug allergy   -Rocephin discontinued and added to allergy list  -Received 500 cc bolus,BP improved to 101/62  -She also received Benadryl 25 mg IV  -No wheezing on exam so no breathing treatments ordered   -No sign of shock,pt alert and oriented and BP okay   -Updated ortho resident on call Dr Noble White and Dr Cori Alexander (ID)  -Advised to draw Lata Bannister level   ? ?   Disposition:  [x] No transfer   [] Transfer to monitor floor  [] Transfer to: [] MICU [] NICU [] CCU [] SICU    Patients family updated: [] Yes  [x] No    Discussed with:  [] Critical Care Intensivist:       [] Primary Care Provider: Marisel Herrmann, DO      [x] Other:  Dr Noble White (ortho resident) and [address] DO PGY-3  7/9/2018 7:31 PM  Attending [address], *

## 2018-07-09 NOTE — PROGRESS NOTES
Department of Orthopedic Surgery  Resident Progress Note    Patient seen and examined. Pain medication does deliver relief. No new complaints. Denies chest pain, shortness of breath, calf pain, dizziness/lightheadedness.     VITALS:  /75   Pulse 64   Temp 97.3 °F (36.3 °C) (Temporal)   Resp 18   Ht 6' 1\" (1.854 m)   Wt 165 lb (74.8 kg)   LMP 08/13/2013   SpO2 94%   BMI 21.77 kg/m²     GENERAL: awake, alert  MUSCULOSKELETAL:   right lower extremity:  · Splint C/D/I  · Compartments soft and compressible  · brisk cap refill to toes, foot warm and perfused  · Sensation intact to toes  · Demonstrates +AROM of toes    CBC:   Lab Results   Component Value Date    WBC 12.1 07/06/2018    HGB 10.0 07/06/2018    HCT 31.1 07/06/2018     07/06/2018       ASSESSMENT  · S/p revision R Pilon Fx with bone graft    PLAN    NWB RLE  abx per ID  ID awaiting path reports   PICC placed  Deep venous thrombosis prophylaxis - lovenox, early mobilization  PT/OT  Pain Control:  PO  Monitor H&H  Trend H&H  · surgical cultures- SA  · D/c planning        Electronically signed by Momo Esparza DO on 7/9/2018 at 7:09 AM

## 2018-07-09 NOTE — PROGRESS NOTES
ID Progress Note      Brief Interval History    Subjective: The patient is awake and alert. Tolerating medications. Reports no side effects. Afebrile. 10 ROS otherwise negative unless otherwise specified above. Objective:    Vitals:    07/09/18 0745   BP: 125/68   Pulse: 71   Resp: 18   Temp: 98.8 °F (37.1 °C)   SpO2: 95%     General appearance: Alert, Awake, Oriented times 3, no distress  Skin: Warm and dry  Eyes: Pink palpebral conjunctivae. PERRL  Ears: External ears normal. No tragal tenderness. TM intact  Nose/Sinuses: Nares normal. Septum midline. Mucosa normal. No sinus tenderness. Oropharynx: Oropharynx clear with no exudates seen  Neck: Neck supple. No jugular venous distension, lymphadenopathy or thyromegaly Trachea midline  Lungs: Lungs clear to auscultation bilaterally. No rhonchi, crackles or wheezes  Heart: S1 S2  Regular rate and rhythm. No rub, murmur or gallop  Abdomen: Abdomen soft, non-tender. BS normal. No masses, organomegaly  Extremities: No edema, Peripheral pulses palpable  Musculoskeletal: Right lower leg dressing in place    Labs:  No results for input(s): WBC, RBC, HGB, HCT, MCV, MCH, MCHC, RDW, PLT, MPV in the last 72 hours. CMP:    Lab Results   Component Value Date     07/06/2018    K 4.6 07/06/2018    CL 94 07/06/2018    CO2 26 07/06/2018    BUN 14 07/06/2018    CREATININE 0.9 07/06/2018    GFRAA >60 07/06/2018    LABGLOM >60 07/06/2018    GLUCOSE 166 07/06/2018    GLUCOSE 96 05/24/2012    PROT 5.3 01/11/2018    LABALBU 2.9 01/11/2018    LABALBU 4.5 05/24/2012    CALCIUM 8.8 07/06/2018    BILITOT 0.5 01/11/2018    ALKPHOS 77 01/11/2018    AST 35 01/11/2018    ALT 26 01/11/2018          Microbiology :  Recent Labs      07/07/18 2038   BC  24 Hours- no growth     No results for input(s): BLOODCULT2 in the last 72 hours. No results for input(s): LABURIN in the last 72 hours. No results for input(s): CULTRESP in the last 72 hours.   No results for input(s):

## 2018-07-10 VITALS
HEART RATE: 84 BPM | WEIGHT: 165 LBS | BODY MASS INDEX: 22.35 KG/M2 | DIASTOLIC BLOOD PRESSURE: 64 MMHG | HEIGHT: 72 IN | RESPIRATION RATE: 16 BRPM | TEMPERATURE: 98.6 F | OXYGEN SATURATION: 96 % | SYSTOLIC BLOOD PRESSURE: 112 MMHG

## 2018-07-10 LAB
METER GLUCOSE: 186 MG/DL (ref 70–110)
METER GLUCOSE: 209 MG/DL (ref 70–110)
METER GLUCOSE: 308 MG/DL (ref 70–110)
VANCOMYCIN TROUGH: 8 MCG/ML (ref 5–16)

## 2018-07-10 PROCEDURE — 80202 ASSAY OF VANCOMYCIN: CPT

## 2018-07-10 PROCEDURE — 6360000002 HC RX W HCPCS: Performed by: STUDENT IN AN ORGANIZED HEALTH CARE EDUCATION/TRAINING PROGRAM

## 2018-07-10 PROCEDURE — 6370000000 HC RX 637 (ALT 250 FOR IP): Performed by: STUDENT IN AN ORGANIZED HEALTH CARE EDUCATION/TRAINING PROGRAM

## 2018-07-10 PROCEDURE — 6360000002 HC RX W HCPCS: Performed by: INTERNAL MEDICINE

## 2018-07-10 PROCEDURE — 82962 GLUCOSE BLOOD TEST: CPT

## 2018-07-10 PROCEDURE — 36415 COLL VENOUS BLD VENIPUNCTURE: CPT

## 2018-07-10 PROCEDURE — 2580000003 HC RX 258: Performed by: INTERNAL MEDICINE

## 2018-07-10 PROCEDURE — 2580000003 HC RX 258: Performed by: STUDENT IN AN ORGANIZED HEALTH CARE EDUCATION/TRAINING PROGRAM

## 2018-07-10 RX ORDER — HYDROCODONE BITARTRATE AND ACETAMINOPHEN 5; 325 MG/1; MG/1
1 TABLET ORAL EVERY 4 HOURS PRN
Qty: 40 TABLET | Refills: 0 | Status: SHIPPED | OUTPATIENT
Start: 2018-07-10 | End: 2018-07-15

## 2018-07-10 RX ADMIN — SODIUM CHLORIDE, PRESERVATIVE FREE 300 UNITS: 5 INJECTION INTRAVENOUS at 09:04

## 2018-07-10 RX ADMIN — INSULIN LISPRO 1 UNITS: 100 INJECTION, SOLUTION INTRAVENOUS; SUBCUTANEOUS at 17:53

## 2018-07-10 RX ADMIN — ENOXAPARIN SODIUM 40 MG: 40 INJECTION SUBCUTANEOUS at 09:04

## 2018-07-10 RX ADMIN — HYDROCODONE BITARTRATE AND ACETAMINOPHEN 2 TABLET: 5; 325 TABLET ORAL at 14:23

## 2018-07-10 RX ADMIN — HYDROCODONE BITARTRATE AND ACETAMINOPHEN 2 TABLET: 5; 325 TABLET ORAL at 18:57

## 2018-07-10 RX ADMIN — INSULIN LISPRO 4 UNITS: 100 INJECTION, SOLUTION INTRAVENOUS; SUBCUTANEOUS at 13:40

## 2018-07-10 RX ADMIN — DULOXETINE HYDROCHLORIDE 90 MG: 60 CAPSULE, DELAYED RELEASE ORAL at 09:04

## 2018-07-10 RX ADMIN — DIPHENHYDRAMINE HYDROCHLORIDE 25 MG: 50 INJECTION, SOLUTION INTRAMUSCULAR; INTRAVENOUS at 12:04

## 2018-07-10 RX ADMIN — DOCUSATE SODIUM 100 MG: 100 CAPSULE, LIQUID FILLED ORAL at 09:04

## 2018-07-10 RX ADMIN — INSULIN LISPRO 2 UNITS: 100 INJECTION, SOLUTION INTRAVENOUS; SUBCUTANEOUS at 09:01

## 2018-07-10 RX ADMIN — VANCOMYCIN HYDROCHLORIDE 1250 MG: 10 INJECTION, POWDER, LYOPHILIZED, FOR SOLUTION INTRAVENOUS at 04:29

## 2018-07-10 RX ADMIN — FAMOTIDINE 20 MG: 20 TABLET ORAL at 09:04

## 2018-07-10 RX ADMIN — VANCOMYCIN HYDROCHLORIDE 1500 MG: 10 INJECTION, POWDER, LYOPHILIZED, FOR SOLUTION INTRAVENOUS at 17:05

## 2018-07-10 RX ADMIN — GABAPENTIN 300 MG: 300 CAPSULE ORAL at 09:04

## 2018-07-10 RX ADMIN — Medication 10 ML: at 09:04

## 2018-07-10 ASSESSMENT — PAIN SCALES - GENERAL
PAINLEVEL_OUTOF10: 7
PAINLEVEL_OUTOF10: 7
PAINLEVEL_OUTOF10: 3

## 2018-07-10 ASSESSMENT — PAIN DESCRIPTION - FREQUENCY: FREQUENCY: CONTINUOUS

## 2018-07-10 ASSESSMENT — PAIN DESCRIPTION - PAIN TYPE: TYPE: SURGICAL PAIN

## 2018-07-10 NOTE — PROGRESS NOTES
Call placed to insurance for transportation and they are having difficulty finding provider.  They will call unit with an update as able

## 2018-07-10 NOTE — PROGRESS NOTES
Department of Orthopedic Surgery  Resident Progress Note    Patient seen and examined. Pain medication does deliver relief. Doing much better after RRT from reaction to ceftriaxone on 7/9. ID has switched her abx and she is tolerating it. She denies any new chest pain or shortness of breath. She states she is ready to head home.      VITALS:  BP (!) 102/58   Pulse 89   Temp 98.8 °F (37.1 °C)   Resp 14   Ht 6' 1\" (1.854 m)   Wt 165 lb (74.8 kg)   LMP 08/13/2013   SpO2 93%   BMI 21.77 kg/m²     GENERAL: awake, alert  MUSCULOSKELETAL:   right lower extremity:  · Splint C/D/I  · Compartments soft and compressible  · brisk cap refill to toes, foot warm and perfused  · Sensation intact to toes  · Demonstrates +AROM of toes    CBC:   Lab Results   Component Value Date    WBC 12.1 07/06/2018    HGB 10.0 07/06/2018    HCT 31.1 07/06/2018     07/06/2018       ASSESSMENT  · S/p revision R Pilon Fx with bone graft    PLAN    NWB RLE  abx per ID  PICC placed  Deep venous thrombosis prophylaxis - lovenox, early mobilization  PT/OT  Pain Control:  PO  Monitor H&H  Trend H&H  · surgical cultures- SA  · D/c planning- DC today        Electronically signed by Jan Rothman DO on 7/10/2018 at 1:12 PM

## 2018-07-11 PROCEDURE — 93010 ELECTROCARDIOGRAM REPORT: CPT | Performed by: INTERNAL MEDICINE

## 2018-07-12 ENCOUNTER — TELEPHONE (OUTPATIENT)
Dept: ORTHOPEDIC SURGERY | Age: 53
End: 2018-07-12

## 2018-07-12 ENCOUNTER — HOSPITAL ENCOUNTER (OUTPATIENT)
Age: 53
Discharge: HOME | End: 2018-07-14
Payer: MEDICAID

## 2018-07-12 LAB
ALBUMIN SERPL-MCNC: 3.7 G/DL (ref 3.5–5.2)
ALP BLD-CCNC: 160 U/L (ref 35–104)
ALT SERPL-CCNC: 32 U/L (ref 0–32)
ANION GAP SERPL CALCULATED.3IONS-SCNC: 20 MMOL/L (ref 7–16)
AST SERPL-CCNC: 36 U/L (ref 0–31)
BASOPHILS ABSOLUTE: 0.08 E9/L (ref 0–0.2)
BASOPHILS RELATIVE PERCENT: 0.8 % (ref 0–2)
BILIRUB SERPL-MCNC: 0.3 MG/DL (ref 0–1.2)
BLOOD CULTURE, ROUTINE: NORMAL
BUN BLDV-MCNC: 10 MG/DL (ref 6–20)
C-REACTIVE PROTEIN: 1.3 MG/DL (ref 0–0.4)
CALCIUM SERPL-MCNC: 9.7 MG/DL (ref 8.6–10.2)
CHLORIDE BLD-SCNC: 97 MMOL/L (ref 98–107)
CO2: 23 MMOL/L (ref 22–29)
CREAT SERPL-MCNC: 0.7 MG/DL (ref 0.5–1)
EOSINOPHILS ABSOLUTE: 0.41 E9/L (ref 0.05–0.5)
EOSINOPHILS RELATIVE PERCENT: 4 % (ref 0–6)
GFR AFRICAN AMERICAN: >60
GFR NON-AFRICAN AMERICAN: >60 ML/MIN/1.73
GLUCOSE BLD-MCNC: 142 MG/DL (ref 74–109)
HCT VFR BLD CALC: 36.2 % (ref 34–48)
HEMOGLOBIN: 11.2 G/DL (ref 11.5–15.5)
IMMATURE GRANULOCYTES #: 0.04 E9/L
IMMATURE GRANULOCYTES %: 0.4 % (ref 0–5)
LYMPHOCYTES ABSOLUTE: 1.77 E9/L (ref 1.5–4)
LYMPHOCYTES RELATIVE PERCENT: 17.3 % (ref 20–42)
MCH RBC QN AUTO: 30.2 PG (ref 26–35)
MCHC RBC AUTO-ENTMCNC: 30.9 % (ref 32–34.5)
MCV RBC AUTO: 97.6 FL (ref 80–99.9)
MONOCYTES ABSOLUTE: 1.04 E9/L (ref 0.1–0.95)
MONOCYTES RELATIVE PERCENT: 10.2 % (ref 2–12)
NEUTROPHILS ABSOLUTE: 6.87 E9/L (ref 1.8–7.3)
NEUTROPHILS RELATIVE PERCENT: 67.3 % (ref 43–80)
PDW BLD-RTO: 13.8 FL (ref 11.5–15)
PLATELET # BLD: 287 E9/L (ref 130–450)
PMV BLD AUTO: 10 FL (ref 7–12)
POTASSIUM SERPL-SCNC: 4.4 MMOL/L (ref 3.5–5)
RBC # BLD: 3.71 E12/L (ref 3.5–5.5)
SEDIMENTATION RATE, ERYTHROCYTE: 17 MM/HR (ref 0–20)
SODIUM BLD-SCNC: 140 MMOL/L (ref 132–146)
TOTAL PROTEIN: 6.8 G/DL (ref 6.4–8.3)
VANCOMYCIN TROUGH: 6.8 MCG/ML (ref 5–16)
WBC # BLD: 10.2 E9/L (ref 4.5–11.5)

## 2018-07-12 PROCEDURE — 86140 C-REACTIVE PROTEIN: CPT

## 2018-07-12 PROCEDURE — 80202 ASSAY OF VANCOMYCIN: CPT

## 2018-07-12 PROCEDURE — 85651 RBC SED RATE NONAUTOMATED: CPT

## 2018-07-12 PROCEDURE — 85025 COMPLETE CBC W/AUTO DIFF WBC: CPT

## 2018-07-12 PROCEDURE — 80053 COMPREHEN METABOLIC PANEL: CPT

## 2018-07-12 NOTE — TELEPHONE ENCOUNTER
[facility] [address] called re:  [address] referral.  Shea Little states she saw patient today for [facility] PT evaluation. Patient is doing very well and is maintaining her NWB status with transfers. She is using a standard wheelchair and walker vs crutches. She has all equipment in the home. She still knows her HEP from about 1 month ago when they were in the home. Patient would benefit from PT when her WB status is progressed. She is waiting for a call from us for her post op appt. Phone call forwarded to [facility] at ext [phone] for appt.

## 2018-07-14 ENCOUNTER — HOSPITAL ENCOUNTER (OUTPATIENT)
Age: 53
Discharge: HOME | End: 2018-07-16
Payer: MEDICAID

## 2018-07-14 PROCEDURE — 80202 ASSAY OF VANCOMYCIN: CPT

## 2018-07-14 NOTE — DISCHARGE SUMMARY
Physician Discharge Summary    Patient ID:  Eliana Palencia  [id number]  65 y.o.  1965    Admit date: 7/5/2018    Discharge date and time: 7/10/2018 10:06 PM     Admitting Physician: Mary Squires MD     Discharge Physician: Mary Squires MD    Admission Diagnoses: Delayed union of tibial shaft fracture, right, closed [S82.201G]  Tibia/fibula fracture, left, closed, with delayed healing, subsequent encounter [S82.202G, S82.402G]    Discharge Diagnoses: s/p   1. Revision and fixation of right tibial pilon fracture including the  tibia and the fibula. 2.  Removal of hardware, right distal tibia and fibula. Admission Condition: good    Discharged Condition: good    Hospital Course: The patient was admitted on 7/5/2018. The patient underwent an uneventful course of  Revision and fixation of right tibial pilon fracture including the  tibia and the fibula. Removal of hardware, right distal tibia and fibula. by Mary Squires MD on 7/5/18. The patient was subsequently taken to the PACU and the floor in stable condition. The patient was started on pain control, DVT prophylaxis, antibiotics, and physical therapy as indicated. Appropriate consults were obtained and their recommendations and treatments were distributed as applicable. Blood counts were followed as needed. Discharge planning was performed and tailored in regards to patient progress, therapy progress, home needs, and support. Once the patient had made appropriate gains, they were able to be discharged    Treatments: s/p     1. Revision and fixation of right tibial pilon fracture including the  tibia and the fibula. 2.  Removal of hardware, right distal tibia and fibula. Disposition: The patient was provided instructions to continue antibiotics, pain medication, DVT prophylaxis, Dressing changes as applicable. The patient was instructed on restrictions and activities.  The patient was to follow up with Deedee Beckford Lily Ashley MD, and to call the office for an appointment.       Signed:  Juliet Gerard  7/14/2018  8:18 AM

## 2018-07-15 LAB — VANCOMYCIN TROUGH: 19.5 MCG/ML (ref 5–16)

## 2018-07-16 ENCOUNTER — HOSPITAL ENCOUNTER (OUTPATIENT)
Age: 53
Discharge: HOME | End: 2018-07-18
Payer: MEDICAID

## 2018-07-16 LAB
ALBUMIN SERPL-MCNC: 3.4 G/DL (ref 3.5–5.2)
ALP BLD-CCNC: 151 U/L (ref 35–104)
ALT SERPL-CCNC: 17 U/L (ref 0–32)
ANION GAP SERPL CALCULATED.3IONS-SCNC: 15 MMOL/L (ref 7–16)
AST SERPL-CCNC: 18 U/L (ref 0–31)
BASOPHILS ABSOLUTE: 0.1 E9/L (ref 0–0.2)
BASOPHILS RELATIVE PERCENT: 1 % (ref 0–2)
BILIRUB SERPL-MCNC: 0.3 MG/DL (ref 0–1.2)
BUN BLDV-MCNC: 6 MG/DL (ref 6–20)
C-REACTIVE PROTEIN: 1.3 MG/DL (ref 0–0.4)
CALCIUM SERPL-MCNC: 9.2 MG/DL (ref 8.6–10.2)
CHLORIDE BLD-SCNC: 101 MMOL/L (ref 98–107)
CO2: 22 MMOL/L (ref 22–29)
CREAT SERPL-MCNC: 0.6 MG/DL (ref 0.5–1)
EOSINOPHILS ABSOLUTE: 0.43 E9/L (ref 0.05–0.5)
EOSINOPHILS RELATIVE PERCENT: 4.4 % (ref 0–6)
GFR AFRICAN AMERICAN: >60
GFR NON-AFRICAN AMERICAN: >60 ML/MIN/1.73
GLUCOSE BLD-MCNC: 85 MG/DL (ref 74–109)
HCT VFR BLD CALC: 34.8 % (ref 34–48)
HEMOGLOBIN: 11.1 G/DL (ref 11.5–15.5)
IMMATURE GRANULOCYTES #: 0.03 E9/L
IMMATURE GRANULOCYTES %: 0.3 % (ref 0–5)
LYMPHOCYTES ABSOLUTE: 2.63 E9/L (ref 1.5–4)
LYMPHOCYTES RELATIVE PERCENT: 27 % (ref 20–42)
MCH RBC QN AUTO: 31.2 PG (ref 26–35)
MCHC RBC AUTO-ENTMCNC: 31.9 % (ref 32–34.5)
MCV RBC AUTO: 97.8 FL (ref 80–99.9)
MONOCYTES ABSOLUTE: 1.27 E9/L (ref 0.1–0.95)
MONOCYTES RELATIVE PERCENT: 13.1 % (ref 2–12)
NEUTROPHILS ABSOLUTE: 5.27 E9/L (ref 1.8–7.3)
NEUTROPHILS RELATIVE PERCENT: 54.2 % (ref 43–80)
PDW BLD-RTO: 14.1 FL (ref 11.5–15)
PLATELET # BLD: 412 E9/L (ref 130–450)
PMV BLD AUTO: 9.5 FL (ref 7–12)
POTASSIUM SERPL-SCNC: 4.4 MMOL/L (ref 3.5–5)
RBC # BLD: 3.56 E12/L (ref 3.5–5.5)
SODIUM BLD-SCNC: 138 MMOL/L (ref 132–146)
TOTAL PROTEIN: 6.5 G/DL (ref 6.4–8.3)
VANCOMYCIN TROUGH: 28.6 MCG/ML (ref 5–16)
WBC # BLD: 9.7 E9/L (ref 4.5–11.5)

## 2018-07-16 PROCEDURE — 85025 COMPLETE CBC W/AUTO DIFF WBC: CPT

## 2018-07-16 PROCEDURE — 85651 RBC SED RATE NONAUTOMATED: CPT

## 2018-07-16 PROCEDURE — 80053 COMPREHEN METABOLIC PANEL: CPT

## 2018-07-16 PROCEDURE — 86140 C-REACTIVE PROTEIN: CPT

## 2018-07-16 PROCEDURE — 80202 ASSAY OF VANCOMYCIN: CPT

## 2018-07-17 LAB — SEDIMENTATION RATE, ERYTHROCYTE: 10 MM/HR (ref 0–20)

## 2018-07-19 ENCOUNTER — HOSPITAL ENCOUNTER (OUTPATIENT)
Age: 53
Discharge: HOME | End: 2018-07-21
Payer: MEDICAID

## 2018-07-19 LAB
BASOPHILS ABSOLUTE: 0.15 E9/L (ref 0–0.2)
BASOPHILS RELATIVE PERCENT: 1.5 % (ref 0–2)
EOSINOPHILS ABSOLUTE: 0.92 E9/L (ref 0.05–0.5)
EOSINOPHILS RELATIVE PERCENT: 9 % (ref 0–6)
HCT VFR BLD CALC: 37.6 % (ref 34–48)
HEMOGLOBIN: 11.1 G/DL (ref 11.5–15.5)
IMMATURE GRANULOCYTES #: 0.03 E9/L
IMMATURE GRANULOCYTES %: 0.3 % (ref 0–5)
LYMPHOCYTES ABSOLUTE: 2.42 E9/L (ref 1.5–4)
LYMPHOCYTES RELATIVE PERCENT: 23.7 % (ref 20–42)
MCH RBC QN AUTO: 30.3 PG (ref 26–35)
MCHC RBC AUTO-ENTMCNC: 29.5 % (ref 32–34.5)
MCV RBC AUTO: 102.7 FL (ref 80–99.9)
MONOCYTES ABSOLUTE: 1.07 E9/L (ref 0.1–0.95)
MONOCYTES RELATIVE PERCENT: 10.5 % (ref 2–12)
NEUTROPHILS ABSOLUTE: 5.61 E9/L (ref 1.8–7.3)
NEUTROPHILS RELATIVE PERCENT: 55 % (ref 43–80)
PDW BLD-RTO: 14.4 FL (ref 11.5–15)
PLATELET # BLD: 422 E9/L (ref 130–450)
PMV BLD AUTO: 9.7 FL (ref 7–12)
RBC # BLD: 3.66 E12/L (ref 3.5–5.5)
WBC # BLD: 10.2 E9/L (ref 4.5–11.5)

## 2018-07-19 PROCEDURE — 80202 ASSAY OF VANCOMYCIN: CPT

## 2018-07-19 PROCEDURE — 85025 COMPLETE CBC W/AUTO DIFF WBC: CPT

## 2018-07-19 PROCEDURE — 80053 COMPREHEN METABOLIC PANEL: CPT

## 2018-07-20 DIAGNOSIS — S82.402G TIBIA/FIBULA FRACTURE, LEFT, CLOSED, WITH DELAYED HEALING, SUBSEQUENT ENCOUNTER: Primary | ICD-10-CM

## 2018-07-20 DIAGNOSIS — S82.202G TIBIA/FIBULA FRACTURE, LEFT, CLOSED, WITH DELAYED HEALING, SUBSEQUENT ENCOUNTER: Primary | ICD-10-CM

## 2018-07-20 LAB
ALBUMIN SERPL-MCNC: 3.5 G/DL (ref 3.5–5.2)
ALP BLD-CCNC: 139 U/L (ref 35–104)
ALT SERPL-CCNC: 10 U/L (ref 0–32)
ANION GAP SERPL CALCULATED.3IONS-SCNC: 21 MMOL/L (ref 7–16)
AST SERPL-CCNC: 17 U/L (ref 0–31)
BILIRUB SERPL-MCNC: 0.2 MG/DL (ref 0–1.2)
BUN BLDV-MCNC: 5 MG/DL (ref 6–20)
CALCIUM SERPL-MCNC: 9.4 MG/DL (ref 8.6–10.2)
CHLORIDE BLD-SCNC: 99 MMOL/L (ref 98–107)
CO2: 20 MMOL/L (ref 22–29)
CREAT SERPL-MCNC: 0.7 MG/DL (ref 0.5–1)
GFR AFRICAN AMERICAN: >60
GFR NON-AFRICAN AMERICAN: >60 ML/MIN/1.73
GLUCOSE BLD-MCNC: 17 MG/DL (ref 74–109)
POTASSIUM SERPL-SCNC: 4.3 MMOL/L (ref 3.5–5)
SODIUM BLD-SCNC: 140 MMOL/L (ref 132–146)
TOTAL PROTEIN: 6.4 G/DL (ref 6.4–8.3)
VANCOMYCIN TROUGH: 18.4 MCG/ML (ref 5–16)

## 2018-07-23 ENCOUNTER — HOSPITAL ENCOUNTER (OUTPATIENT)
Dept: GENERAL RADIOLOGY | Age: 53
Discharge: HOME | End: 2018-07-25
Payer: MEDICAID

## 2018-07-23 ENCOUNTER — HOSPITAL ENCOUNTER (OUTPATIENT)
Age: 53
Discharge: HOME | End: 2018-07-25
Payer: MEDICAID

## 2018-07-23 ENCOUNTER — OFFICE VISIT (OUTPATIENT)
Dept: ORTHOPEDIC SURGERY | Age: 53
End: 2018-07-23
Payer: MEDICAID

## 2018-07-23 VITALS
HEART RATE: 76 BPM | SYSTOLIC BLOOD PRESSURE: 131 MMHG | BODY MASS INDEX: 22.35 KG/M2 | HEIGHT: 72 IN | DIASTOLIC BLOOD PRESSURE: 86 MMHG | TEMPERATURE: 98.8 F | WEIGHT: 165 LBS

## 2018-07-23 DIAGNOSIS — S82.402G TIBIA/FIBULA FRACTURE, LEFT, CLOSED, WITH DELAYED HEALING, SUBSEQUENT ENCOUNTER: ICD-10-CM

## 2018-07-23 DIAGNOSIS — S82.202G TIBIA/FIBULA FRACTURE, LEFT, CLOSED, WITH DELAYED HEALING, SUBSEQUENT ENCOUNTER: ICD-10-CM

## 2018-07-23 DIAGNOSIS — S82.251K CLOSED DISPLACED COMMINUTED FRACTURE OF SHAFT OF RIGHT TIBIA WITH NONUNION: Primary | ICD-10-CM

## 2018-07-23 DIAGNOSIS — S82.871K CLOSED DISPLACED PILON FRACTURE OF RIGHT TIBIA WITH NONUNION, SUBSEQUENT ENCOUNTER: ICD-10-CM

## 2018-07-23 LAB
ALBUMIN SERPL-MCNC: 3.4 G/DL (ref 3.5–5.2)
ALP BLD-CCNC: 136 U/L (ref 35–104)
ALT SERPL-CCNC: 12 U/L (ref 0–32)
ANION GAP SERPL CALCULATED.3IONS-SCNC: 21 MMOL/L (ref 7–16)
AST SERPL-CCNC: 23 U/L (ref 0–31)
BASOPHILS ABSOLUTE: 0.12 E9/L (ref 0–0.2)
BASOPHILS RELATIVE PERCENT: 1.4 % (ref 0–2)
BILIRUB SERPL-MCNC: 0.2 MG/DL (ref 0–1.2)
BUN BLDV-MCNC: 7 MG/DL (ref 6–20)
C-REACTIVE PROTEIN: 0.3 MG/DL (ref 0–0.4)
CALCIUM SERPL-MCNC: 9.4 MG/DL (ref 8.6–10.2)
CHLORIDE BLD-SCNC: 100 MMOL/L (ref 98–107)
CO2: 21 MMOL/L (ref 22–29)
CREAT SERPL-MCNC: 0.7 MG/DL (ref 0.5–1)
EOSINOPHILS ABSOLUTE: 0.96 E9/L (ref 0.05–0.5)
EOSINOPHILS RELATIVE PERCENT: 11.1 % (ref 0–6)
GFR AFRICAN AMERICAN: >60
GFR NON-AFRICAN AMERICAN: >60 ML/MIN/1.73
GLUCOSE BLD-MCNC: 51 MG/DL (ref 74–109)
HCT VFR BLD CALC: 37.1 % (ref 34–48)
HEMOGLOBIN: 11.2 G/DL (ref 11.5–15.5)
IMMATURE GRANULOCYTES #: 0.03 E9/L
IMMATURE GRANULOCYTES %: 0.3 % (ref 0–5)
LYMPHOCYTES ABSOLUTE: 2 E9/L (ref 1.5–4)
LYMPHOCYTES RELATIVE PERCENT: 23.1 % (ref 20–42)
MCH RBC QN AUTO: 29.9 PG (ref 26–35)
MCHC RBC AUTO-ENTMCNC: 30.2 % (ref 32–34.5)
MCV RBC AUTO: 98.9 FL (ref 80–99.9)
MONOCYTES ABSOLUTE: 0.9 E9/L (ref 0.1–0.95)
MONOCYTES RELATIVE PERCENT: 10.4 % (ref 2–12)
NEUTROPHILS ABSOLUTE: 4.66 E9/L (ref 1.8–7.3)
NEUTROPHILS RELATIVE PERCENT: 53.7 % (ref 43–80)
PDW BLD-RTO: 14 FL (ref 11.5–15)
PLATELET # BLD: 359 E9/L (ref 130–450)
PMV BLD AUTO: 9.9 FL (ref 7–12)
POTASSIUM SERPL-SCNC: 3.9 MMOL/L (ref 3.5–5)
RBC # BLD: 3.75 E12/L (ref 3.5–5.5)
SEDIMENTATION RATE, ERYTHROCYTE: 7 MM/HR (ref 0–20)
SODIUM BLD-SCNC: 142 MMOL/L (ref 132–146)
TOTAL PROTEIN: 6.5 G/DL (ref 6.4–8.3)
WBC # BLD: 8.7 E9/L (ref 4.5–11.5)

## 2018-07-23 PROCEDURE — 73590 X-RAY EXAM OF LOWER LEG: CPT

## 2018-07-23 PROCEDURE — 86140 C-REACTIVE PROTEIN: CPT

## 2018-07-23 PROCEDURE — 36415 COLL VENOUS BLD VENIPUNCTURE: CPT

## 2018-07-23 PROCEDURE — 85025 COMPLETE CBC W/AUTO DIFF WBC: CPT

## 2018-07-23 PROCEDURE — 80053 COMPREHEN METABOLIC PANEL: CPT

## 2018-07-23 PROCEDURE — 99212 OFFICE O/P EST SF 10 MIN: CPT

## 2018-07-23 PROCEDURE — L4386 NON-PNEUM WALK BOOT PRE CST: HCPCS | Performed by: NURSE PRACTITIONER

## 2018-07-23 PROCEDURE — 85651 RBC SED RATE NONAUTOMATED: CPT

## 2018-07-23 PROCEDURE — 99024 POSTOP FOLLOW-UP VISIT: CPT | Performed by: NURSE PRACTITIONER

## 2018-07-23 NOTE — PATIENT INSTRUCTIONS
We Remove sutures today-steri strips applied  Remove steri strips if they do not fall off in 10 days  Apply cam boot today  WB:  Non-weight bearing in boot and use crutches  boot: On at all times, can remove for range of motion to ankle and bathing only  Evaluate need for therapy next visit  Can shower in couple days, but NO soaking or swimming    DVT: Continue with ASA as ordered  Elevate leg in bed and chair.     Follow up in 4 weeks with XR of the tib and ankle   Dr Madai Azevedo for pain meds

## 2018-07-24 NOTE — PROGRESS NOTES
Sutures were removed from patients right lower extremity without difficulty. Steri strips were applied to area and patient given verbal instructions for care. Patient was fitted for Cam walker boot.    Elise Encarnacion  7/24/18  7:53 AM

## 2018-07-26 ENCOUNTER — HOSPITAL ENCOUNTER (OUTPATIENT)
Age: 53
Discharge: HOME | End: 2018-07-28
Payer: MEDICAID

## 2018-07-26 LAB
ANION GAP SERPL CALCULATED.3IONS-SCNC: 18 MMOL/L (ref 7–16)
BASOPHILS ABSOLUTE: 0.11 E9/L (ref 0–0.2)
BASOPHILS RELATIVE PERCENT: 1.4 % (ref 0–2)
BUN BLDV-MCNC: 5 MG/DL (ref 6–20)
CALCIUM SERPL-MCNC: 9 MG/DL (ref 8.6–10.2)
CHLORIDE BLD-SCNC: 100 MMOL/L (ref 98–107)
CO2: 21 MMOL/L (ref 22–29)
CREAT SERPL-MCNC: 0.7 MG/DL (ref 0.5–1)
EOSINOPHILS ABSOLUTE: 0.63 E9/L (ref 0.05–0.5)
EOSINOPHILS RELATIVE PERCENT: 8 % (ref 0–6)
GFR AFRICAN AMERICAN: >60
GFR NON-AFRICAN AMERICAN: >60 ML/MIN/1.73
GLUCOSE BLD-MCNC: 75 MG/DL (ref 74–109)
HCT VFR BLD CALC: 35.6 % (ref 34–48)
HEMOGLOBIN: 10.8 G/DL (ref 11.5–15.5)
IMMATURE GRANULOCYTES #: 0.02 E9/L
IMMATURE GRANULOCYTES %: 0.3 % (ref 0–5)
LYMPHOCYTES ABSOLUTE: 1.99 E9/L (ref 1.5–4)
LYMPHOCYTES RELATIVE PERCENT: 25.1 % (ref 20–42)
MCH RBC QN AUTO: 29.8 PG (ref 26–35)
MCHC RBC AUTO-ENTMCNC: 30.3 % (ref 32–34.5)
MCV RBC AUTO: 98.1 FL (ref 80–99.9)
MONOCYTES ABSOLUTE: 1.05 E9/L (ref 0.1–0.95)
MONOCYTES RELATIVE PERCENT: 13.3 % (ref 2–12)
NEUTROPHILS ABSOLUTE: 4.12 E9/L (ref 1.8–7.3)
NEUTROPHILS RELATIVE PERCENT: 51.9 % (ref 43–80)
PDW BLD-RTO: 13.8 FL (ref 11.5–15)
PLATELET # BLD: 278 E9/L (ref 130–450)
PMV BLD AUTO: 10.2 FL (ref 7–12)
POTASSIUM SERPL-SCNC: 3.3 MMOL/L (ref 3.5–5)
RBC # BLD: 3.63 E12/L (ref 3.5–5.5)
SEDIMENTATION RATE, ERYTHROCYTE: 10 MM/HR (ref 0–20)
SODIUM BLD-SCNC: 139 MMOL/L (ref 132–146)
VANCOMYCIN TROUGH: 19.1 MCG/ML (ref 5–16)
WBC # BLD: 7.9 E9/L (ref 4.5–11.5)

## 2018-07-26 PROCEDURE — 80048 BASIC METABOLIC PNL TOTAL CA: CPT

## 2018-07-26 PROCEDURE — 85651 RBC SED RATE NONAUTOMATED: CPT

## 2018-07-26 PROCEDURE — 85025 COMPLETE CBC W/AUTO DIFF WBC: CPT

## 2018-07-26 PROCEDURE — 36415 COLL VENOUS BLD VENIPUNCTURE: CPT

## 2018-07-26 PROCEDURE — 80202 ASSAY OF VANCOMYCIN: CPT

## 2018-07-30 ENCOUNTER — HOSPITAL ENCOUNTER (OUTPATIENT)
Age: 53
Discharge: HOME | End: 2018-08-01
Payer: MEDICAID

## 2018-07-30 LAB
ALBUMIN SERPL-MCNC: 3.5 G/DL (ref 3.5–5.2)
ALP BLD-CCNC: 129 U/L (ref 35–104)
ALT SERPL-CCNC: 16 U/L (ref 0–32)
ANION GAP SERPL CALCULATED.3IONS-SCNC: 18 MMOL/L (ref 7–16)
AST SERPL-CCNC: 22 U/L (ref 0–31)
BASOPHILS ABSOLUTE: 0.09 E9/L (ref 0–0.2)
BASOPHILS RELATIVE PERCENT: 1.2 % (ref 0–2)
BILIRUB SERPL-MCNC: 0.2 MG/DL (ref 0–1.2)
BUN BLDV-MCNC: 5 MG/DL (ref 6–20)
CALCIUM SERPL-MCNC: 9.1 MG/DL (ref 8.6–10.2)
CHLORIDE BLD-SCNC: 99 MMOL/L (ref 98–107)
CO2: 22 MMOL/L (ref 22–29)
CREAT SERPL-MCNC: 0.6 MG/DL (ref 0.5–1)
EOSINOPHILS ABSOLUTE: 1.11 E9/L (ref 0.05–0.5)
EOSINOPHILS RELATIVE PERCENT: 15 % (ref 0–6)
GFR AFRICAN AMERICAN: >60
GFR NON-AFRICAN AMERICAN: >60 ML/MIN/1.73
GLUCOSE BLD-MCNC: 69 MG/DL (ref 74–109)
HCT VFR BLD CALC: 32.5 % (ref 34–48)
HEMOGLOBIN: 10.4 G/DL (ref 11.5–15.5)
IMMATURE GRANULOCYTES #: 0.02 E9/L
IMMATURE GRANULOCYTES %: 0.3 % (ref 0–5)
LYMPHOCYTES ABSOLUTE: 1.83 E9/L (ref 1.5–4)
LYMPHOCYTES RELATIVE PERCENT: 24.8 % (ref 20–42)
MCH RBC QN AUTO: 30.9 PG (ref 26–35)
MCHC RBC AUTO-ENTMCNC: 32 % (ref 32–34.5)
MCV RBC AUTO: 96.4 FL (ref 80–99.9)
MONOCYTES ABSOLUTE: 0.76 E9/L (ref 0.1–0.95)
MONOCYTES RELATIVE PERCENT: 10.3 % (ref 2–12)
NEUTROPHILS ABSOLUTE: 3.57 E9/L (ref 1.8–7.3)
NEUTROPHILS RELATIVE PERCENT: 48.4 % (ref 43–80)
PDW BLD-RTO: 13.7 FL (ref 11.5–15)
PLATELET # BLD: 238 E9/L (ref 130–450)
PMV BLD AUTO: 10.4 FL (ref 7–12)
POTASSIUM SERPL-SCNC: 3.5 MMOL/L (ref 3.5–5)
RBC # BLD: 3.37 E12/L (ref 3.5–5.5)
SEDIMENTATION RATE, ERYTHROCYTE: 10 MM/HR (ref 0–20)
SODIUM BLD-SCNC: 139 MMOL/L (ref 132–146)
TOTAL PROTEIN: 6.3 G/DL (ref 6.4–8.3)
VANCOMYCIN TROUGH: 16.7 MCG/ML (ref 5–16)
WBC # BLD: 7.4 E9/L (ref 4.5–11.5)

## 2018-07-30 PROCEDURE — 85651 RBC SED RATE NONAUTOMATED: CPT

## 2018-07-30 PROCEDURE — 80202 ASSAY OF VANCOMYCIN: CPT

## 2018-07-30 PROCEDURE — 86140 C-REACTIVE PROTEIN: CPT

## 2018-07-30 PROCEDURE — 85025 COMPLETE CBC W/AUTO DIFF WBC: CPT

## 2018-07-30 PROCEDURE — 80053 COMPREHEN METABOLIC PANEL: CPT

## 2018-07-30 PROCEDURE — 36415 COLL VENOUS BLD VENIPUNCTURE: CPT

## 2018-07-30 RX ORDER — HYDROCODONE BITARTRATE AND ACETAMINOPHEN 5; 325 MG/1; MG/1
1 TABLET ORAL EVERY 4 HOURS PRN
Qty: 40 TABLET | Refills: 0 | OUTPATIENT
Start: 2018-07-30 | End: 2018-08-06

## 2018-07-30 NOTE — TELEPHONE ENCOUNTER
She will have to check with [address] [place] [address] office to see if he will allow her to increase to Q6, she did get 60 T#3 on 7/15/18 (30 day supply) she already has more than we can give her. She picked up the T# 3  Three days after she was given the Norco. She has been getting these from him for over 2 year.

## 2018-07-31 LAB — C-REACTIVE PROTEIN: 0.3 MG/DL (ref 0–0.4)

## 2018-08-02 ENCOUNTER — HOSPITAL ENCOUNTER (OUTPATIENT)
Age: 53
Discharge: HOME | End: 2018-08-04
Payer: MEDICAID

## 2018-08-02 LAB
ALBUMIN SERPL-MCNC: 3.4 G/DL (ref 3.5–5.2)
ALP BLD-CCNC: 146 U/L (ref 35–104)
ALT SERPL-CCNC: 18 U/L (ref 0–32)
ANION GAP SERPL CALCULATED.3IONS-SCNC: 16 MMOL/L (ref 7–16)
AST SERPL-CCNC: 19 U/L (ref 0–31)
BASOPHILS ABSOLUTE: 0.1 E9/L (ref 0–0.2)
BASOPHILS RELATIVE PERCENT: 1.2 % (ref 0–2)
BILIRUB SERPL-MCNC: <0.2 MG/DL (ref 0–1.2)
BUN BLDV-MCNC: 9 MG/DL (ref 6–20)
C-REACTIVE PROTEIN: 0.3 MG/DL (ref 0–0.4)
CALCIUM SERPL-MCNC: 8.9 MG/DL (ref 8.6–10.2)
CHLORIDE BLD-SCNC: 105 MMOL/L (ref 98–107)
CO2: 22 MMOL/L (ref 22–29)
CREAT SERPL-MCNC: 0.7 MG/DL (ref 0.5–1)
EOSINOPHILS ABSOLUTE: 1.2 E9/L (ref 0.05–0.5)
EOSINOPHILS RELATIVE PERCENT: 13.9 % (ref 0–6)
GFR AFRICAN AMERICAN: >60
GFR NON-AFRICAN AMERICAN: >60 ML/MIN/1.73
GLUCOSE BLD-MCNC: 60 MG/DL (ref 74–109)
HCT VFR BLD CALC: 34.4 % (ref 34–48)
HEMOGLOBIN: 11 G/DL (ref 11.5–15.5)
IMMATURE GRANULOCYTES #: 0.03 E9/L
IMMATURE GRANULOCYTES %: 0.3 % (ref 0–5)
LYMPHOCYTES ABSOLUTE: 1.87 E9/L (ref 1.5–4)
LYMPHOCYTES RELATIVE PERCENT: 21.7 % (ref 20–42)
MCH RBC QN AUTO: 29.7 PG (ref 26–35)
MCHC RBC AUTO-ENTMCNC: 32 % (ref 32–34.5)
MCV RBC AUTO: 93 FL (ref 80–99.9)
MONOCYTES ABSOLUTE: 0.9 E9/L (ref 0.1–0.95)
MONOCYTES RELATIVE PERCENT: 10.4 % (ref 2–12)
NEUTROPHILS ABSOLUTE: 4.53 E9/L (ref 1.8–7.3)
NEUTROPHILS RELATIVE PERCENT: 52.5 % (ref 43–80)
PDW BLD-RTO: 13.6 FL (ref 11.5–15)
PLATELET # BLD: 255 E9/L (ref 130–450)
PMV BLD AUTO: 10.1 FL (ref 7–12)
POTASSIUM SERPL-SCNC: 3.7 MMOL/L (ref 3.5–5)
RBC # BLD: 3.7 E12/L (ref 3.5–5.5)
SEDIMENTATION RATE, ERYTHROCYTE: 10 MM/HR (ref 0–20)
SODIUM BLD-SCNC: 143 MMOL/L (ref 132–146)
TOTAL PROTEIN: 6.1 G/DL (ref 6.4–8.3)
VANCOMYCIN TROUGH: 24 MCG/ML (ref 5–16)
WBC # BLD: 8.6 E9/L (ref 4.5–11.5)

## 2018-08-02 PROCEDURE — 80202 ASSAY OF VANCOMYCIN: CPT

## 2018-08-02 PROCEDURE — 80053 COMPREHEN METABOLIC PANEL: CPT

## 2018-08-02 PROCEDURE — 36415 COLL VENOUS BLD VENIPUNCTURE: CPT

## 2018-08-02 PROCEDURE — 85651 RBC SED RATE NONAUTOMATED: CPT

## 2018-08-02 PROCEDURE — 85025 COMPLETE CBC W/AUTO DIFF WBC: CPT

## 2018-08-02 PROCEDURE — 86140 C-REACTIVE PROTEIN: CPT

## 2018-08-06 ENCOUNTER — HOSPITAL ENCOUNTER (OUTPATIENT)
Age: 53
Discharge: HOME | End: 2018-08-08
Admitting: ORTHOPAEDIC SURGERY
Payer: MEDICAID

## 2018-08-06 LAB
ALBUMIN SERPL-MCNC: 3.7 G/DL (ref 3.5–5.2)
ALP BLD-CCNC: 163 U/L (ref 35–104)
ALT SERPL-CCNC: 14 U/L (ref 0–32)
ANION GAP SERPL CALCULATED.3IONS-SCNC: 17 MMOL/L (ref 7–16)
AST SERPL-CCNC: 21 U/L (ref 0–31)
BASOPHILS ABSOLUTE: 0.14 E9/L (ref 0–0.2)
BASOPHILS RELATIVE PERCENT: 1.4 % (ref 0–2)
BILIRUB SERPL-MCNC: 0.2 MG/DL (ref 0–1.2)
BUN BLDV-MCNC: 4 MG/DL (ref 6–20)
C-REACTIVE PROTEIN: 0.4 MG/DL (ref 0–0.4)
CALCIUM SERPL-MCNC: 9.2 MG/DL (ref 8.6–10.2)
CHLORIDE BLD-SCNC: 101 MMOL/L (ref 98–107)
CO2: 23 MMOL/L (ref 22–29)
CREAT SERPL-MCNC: 0.7 MG/DL (ref 0.5–1)
EOSINOPHILS ABSOLUTE: 1.27 E9/L (ref 0.05–0.5)
EOSINOPHILS RELATIVE PERCENT: 12.3 % (ref 0–6)
FUNGUS (MYCOLOGY) CULTURE: NORMAL
FUNGUS STAIN: NORMAL
GFR AFRICAN AMERICAN: >60
GFR NON-AFRICAN AMERICAN: >60 ML/MIN/1.73
GLUCOSE BLD-MCNC: 95 MG/DL (ref 74–109)
HCT VFR BLD CALC: 35.3 % (ref 34–48)
HEMOGLOBIN: 11.6 G/DL (ref 11.5–15.5)
IMMATURE GRANULOCYTES #: 0.04 E9/L
IMMATURE GRANULOCYTES %: 0.4 % (ref 0–5)
LYMPHOCYTES ABSOLUTE: 2.22 E9/L (ref 1.5–4)
LYMPHOCYTES RELATIVE PERCENT: 21.5 % (ref 20–42)
MCH RBC QN AUTO: 30.6 PG (ref 26–35)
MCHC RBC AUTO-ENTMCNC: 32.9 % (ref 32–34.5)
MCV RBC AUTO: 93.1 FL (ref 80–99.9)
MONOCYTES ABSOLUTE: 1.28 E9/L (ref 0.1–0.95)
MONOCYTES RELATIVE PERCENT: 12.4 % (ref 2–12)
NEUTROPHILS ABSOLUTE: 5.38 E9/L (ref 1.8–7.3)
NEUTROPHILS RELATIVE PERCENT: 52 % (ref 43–80)
PDW BLD-RTO: 13.7 FL (ref 11.5–15)
PLATELET # BLD: 272 E9/L (ref 130–450)
PMV BLD AUTO: 10.5 FL (ref 7–12)
POTASSIUM SERPL-SCNC: 4.1 MMOL/L (ref 3.5–5)
RBC # BLD: 3.79 E12/L (ref 3.5–5.5)
SEDIMENTATION RATE, ERYTHROCYTE: 10 MM/HR (ref 0–20)
SODIUM BLD-SCNC: 141 MMOL/L (ref 132–146)
TOTAL PROTEIN: 6.4 G/DL (ref 6.4–8.3)
VANCOMYCIN TROUGH: 19 MCG/ML (ref 5–16)
WBC # BLD: 10.3 E9/L (ref 4.5–11.5)

## 2018-08-06 PROCEDURE — 80202 ASSAY OF VANCOMYCIN: CPT

## 2018-08-06 PROCEDURE — 85025 COMPLETE CBC W/AUTO DIFF WBC: CPT

## 2018-08-06 PROCEDURE — 85651 RBC SED RATE NONAUTOMATED: CPT

## 2018-08-06 PROCEDURE — 86140 C-REACTIVE PROTEIN: CPT

## 2018-08-06 PROCEDURE — 80053 COMPREHEN METABOLIC PANEL: CPT

## 2018-08-07 ENCOUNTER — TELEPHONE (OUTPATIENT)
Dept: ORTHOPEDIC SURGERY | Age: 53
End: 2018-08-07

## 2018-08-07 NOTE — TELEPHONE ENCOUNTER
Spoke with pt re leg--she stated that nurse was in changing her picc line dressing and noticed a hard nodule to side of pts leg--pt stated she has had increased pain and numbness to leg and that her toes are \"pulling up\"--she stated she has not noticed lump until nurse saw it yesterday--she also stated she has a \"black ring\" around her leg--I did tell her I was concerned about the changes in her leg and that she should go to ED to have her leg checked and US done to make sure there is not a DVT forming--she stated she does not have a ride to get her there and I suggested she call an ambulance if necessary--she stated she will just watch it and wait to see the dr on 8/22--I did let her know that the dr is out of the office this week and can not see her and that she really does need to go to ED--she understood this and still wishes to wait--pt did request pain meds as she has not had any for some time and the pain has gotten worse

## 2018-08-07 NOTE — TELEPHONE ENCOUNTER
Agree needs to go to the ED regarding leg.  Electronically signed by Elvin Shipley PA-C on 8/7/2018 at 5:10 PM

## 2018-08-09 ENCOUNTER — HOSPITAL ENCOUNTER (OUTPATIENT)
Age: 53
Discharge: HOME | End: 2018-08-11
Payer: MEDICAID

## 2018-08-09 LAB
ALBUMIN SERPL-MCNC: 3.5 G/DL (ref 3.5–5.2)
ALP BLD-CCNC: 155 U/L (ref 35–104)
ALT SERPL-CCNC: 12 U/L (ref 0–32)
ANION GAP SERPL CALCULATED.3IONS-SCNC: 18 MMOL/L (ref 7–16)
AST SERPL-CCNC: 14 U/L (ref 0–31)
BASOPHILS ABSOLUTE: 0.08 E9/L (ref 0–0.2)
BASOPHILS RELATIVE PERCENT: 1 % (ref 0–2)
BILIRUB SERPL-MCNC: 0.2 MG/DL (ref 0–1.2)
BUN BLDV-MCNC: 7 MG/DL (ref 6–20)
CALCIUM SERPL-MCNC: 8.8 MG/DL (ref 8.6–10.2)
CHLORIDE BLD-SCNC: 99 MMOL/L (ref 98–107)
CO2: 22 MMOL/L (ref 22–29)
CREAT SERPL-MCNC: 0.7 MG/DL (ref 0.5–1)
EOSINOPHILS ABSOLUTE: 0.82 E9/L (ref 0.05–0.5)
EOSINOPHILS RELATIVE PERCENT: 10.2 % (ref 0–6)
GFR AFRICAN AMERICAN: >60
GFR NON-AFRICAN AMERICAN: >60 ML/MIN/1.73
GLUCOSE BLD-MCNC: 197 MG/DL (ref 74–109)
HCT VFR BLD CALC: 33.3 % (ref 34–48)
HEMOGLOBIN: 10.8 G/DL (ref 11.5–15.5)
IMMATURE GRANULOCYTES #: 0.04 E9/L
IMMATURE GRANULOCYTES %: 0.5 % (ref 0–5)
LYMPHOCYTES ABSOLUTE: 2.16 E9/L (ref 1.5–4)
LYMPHOCYTES RELATIVE PERCENT: 26.9 % (ref 20–42)
MCH RBC QN AUTO: 30.3 PG (ref 26–35)
MCHC RBC AUTO-ENTMCNC: 32.4 % (ref 32–34.5)
MCV RBC AUTO: 93.5 FL (ref 80–99.9)
MONOCYTES ABSOLUTE: 1.06 E9/L (ref 0.1–0.95)
MONOCYTES RELATIVE PERCENT: 13.2 % (ref 2–12)
NEUTROPHILS ABSOLUTE: 3.86 E9/L (ref 1.8–7.3)
NEUTROPHILS RELATIVE PERCENT: 48.2 % (ref 43–80)
PDW BLD-RTO: 13.8 FL (ref 11.5–15)
PLATELET # BLD: 296 E9/L (ref 130–450)
PMV BLD AUTO: 10.2 FL (ref 7–12)
POTASSIUM SERPL-SCNC: 3.8 MMOL/L (ref 3.5–5)
RBC # BLD: 3.56 E12/L (ref 3.5–5.5)
SODIUM BLD-SCNC: 139 MMOL/L (ref 132–146)
TOTAL PROTEIN: 6 G/DL (ref 6.4–8.3)
WBC # BLD: 8 E9/L (ref 4.5–11.5)

## 2018-08-09 PROCEDURE — 36415 COLL VENOUS BLD VENIPUNCTURE: CPT

## 2018-08-09 PROCEDURE — 85025 COMPLETE CBC W/AUTO DIFF WBC: CPT

## 2018-08-09 PROCEDURE — 80053 COMPREHEN METABOLIC PANEL: CPT

## 2018-08-13 ENCOUNTER — HOSPITAL ENCOUNTER (OUTPATIENT)
Age: 53
Discharge: HOME | End: 2018-08-15
Payer: MEDICAID

## 2018-08-13 LAB
ALBUMIN SERPL-MCNC: 3.5 G/DL (ref 3.5–5.2)
ALP BLD-CCNC: 158 U/L (ref 35–104)
ALT SERPL-CCNC: 29 U/L (ref 0–32)
ANION GAP SERPL CALCULATED.3IONS-SCNC: 21 MMOL/L (ref 7–16)
AST SERPL-CCNC: 35 U/L (ref 0–31)
BASOPHILS ABSOLUTE: 0.09 E9/L (ref 0–0.2)
BASOPHILS RELATIVE PERCENT: 1.1 % (ref 0–2)
BILIRUB SERPL-MCNC: 0.2 MG/DL (ref 0–1.2)
BUN BLDV-MCNC: 8 MG/DL (ref 6–20)
C-REACTIVE PROTEIN: 0.3 MG/DL (ref 0–0.4)
CALCIUM SERPL-MCNC: 9.2 MG/DL (ref 8.6–10.2)
CHLORIDE BLD-SCNC: 98 MMOL/L (ref 98–107)
CO2: 21 MMOL/L (ref 22–29)
CREAT SERPL-MCNC: 0.7 MG/DL (ref 0.5–1)
EOSINOPHILS ABSOLUTE: 1.26 E9/L (ref 0.05–0.5)
EOSINOPHILS RELATIVE PERCENT: 15.3 % (ref 0–6)
GFR AFRICAN AMERICAN: >60
GFR NON-AFRICAN AMERICAN: >60 ML/MIN/1.73
GLUCOSE BLD-MCNC: 95 MG/DL (ref 74–109)
HCT VFR BLD CALC: 36.8 % (ref 34–48)
HEMOGLOBIN: 11.3 G/DL (ref 11.5–15.5)
IMMATURE GRANULOCYTES #: 0.02 E9/L
IMMATURE GRANULOCYTES %: 0.2 % (ref 0–5)
LYMPHOCYTES ABSOLUTE: 2.05 E9/L (ref 1.5–4)
LYMPHOCYTES RELATIVE PERCENT: 24.9 % (ref 20–42)
MCH RBC QN AUTO: 29.9 PG (ref 26–35)
MCHC RBC AUTO-ENTMCNC: 30.7 % (ref 32–34.5)
MCV RBC AUTO: 97.4 FL (ref 80–99.9)
MONOCYTES ABSOLUTE: 0.96 E9/L (ref 0.1–0.95)
MONOCYTES RELATIVE PERCENT: 11.7 % (ref 2–12)
NEUTROPHILS ABSOLUTE: 3.85 E9/L (ref 1.8–7.3)
NEUTROPHILS RELATIVE PERCENT: 46.8 % (ref 43–80)
PDW BLD-RTO: 14 FL (ref 11.5–15)
PLATELET # BLD: 327 E9/L (ref 130–450)
PMV BLD AUTO: 10.3 FL (ref 7–12)
POTASSIUM SERPL-SCNC: 4.2 MMOL/L (ref 3.5–5)
RBC # BLD: 3.78 E12/L (ref 3.5–5.5)
SODIUM BLD-SCNC: 140 MMOL/L (ref 132–146)
TOTAL PROTEIN: 6.5 G/DL (ref 6.4–8.3)
WBC # BLD: 8.2 E9/L (ref 4.5–11.5)

## 2018-08-13 PROCEDURE — 80202 ASSAY OF VANCOMYCIN: CPT

## 2018-08-13 PROCEDURE — 86140 C-REACTIVE PROTEIN: CPT

## 2018-08-13 PROCEDURE — 85025 COMPLETE CBC W/AUTO DIFF WBC: CPT

## 2018-08-13 PROCEDURE — 80053 COMPREHEN METABOLIC PANEL: CPT

## 2018-08-13 PROCEDURE — 85651 RBC SED RATE NONAUTOMATED: CPT

## 2018-08-14 LAB
SEDIMENTATION RATE, ERYTHROCYTE: 2 MM/HR (ref 0–20)
VANCOMYCIN TROUGH: 23.6 MCG/ML (ref 5–16)

## 2018-08-17 ENCOUNTER — TELEPHONE (OUTPATIENT)
Dept: ORTHOPEDIC SURGERY | Age: 53
End: 2018-08-17

## 2018-08-17 NOTE — TELEPHONE ENCOUNTER
Jazz nurse [address] AT [facility] called re [address] [facility] discharge. She reports she is discharging patient from [address] [facility] services and will be sending the papers over.

## 2018-08-21 LAB
AFB CULTURE (MYCOBACTERIA): NORMAL
AFB SMEAR: NORMAL

## 2018-08-22 ENCOUNTER — HOSPITAL ENCOUNTER (OUTPATIENT)
Dept: GENERAL RADIOLOGY | Age: 53
Discharge: HOME | End: 2018-08-24
Payer: MEDICAID

## 2018-08-22 ENCOUNTER — OFFICE VISIT (OUTPATIENT)
Dept: ORTHOPEDIC SURGERY | Age: 53
End: 2018-08-22
Payer: MEDICAID

## 2018-08-22 VITALS
WEIGHT: 165 LBS | TEMPERATURE: 97.4 F | HEIGHT: 72 IN | BODY MASS INDEX: 22.35 KG/M2 | HEART RATE: 75 BPM | DIASTOLIC BLOOD PRESSURE: 91 MMHG | SYSTOLIC BLOOD PRESSURE: 133 MMHG

## 2018-08-22 DIAGNOSIS — S82.251K CLOSED DISPLACED COMMINUTED FRACTURE OF SHAFT OF RIGHT TIBIA WITH NONUNION: ICD-10-CM

## 2018-08-22 DIAGNOSIS — S82.871K CLOSED DISPLACED PILON FRACTURE OF RIGHT TIBIA WITH NONUNION, SUBSEQUENT ENCOUNTER: ICD-10-CM

## 2018-08-22 DIAGNOSIS — S82.251K CLOSED DISPLACED COMMINUTED FRACTURE OF SHAFT OF RIGHT TIBIA WITH NONUNION: Primary | ICD-10-CM

## 2018-08-22 PROCEDURE — 73610 X-RAY EXAM OF ANKLE: CPT

## 2018-08-22 PROCEDURE — 73590 X-RAY EXAM OF LOWER LEG: CPT

## 2018-08-22 PROCEDURE — 99024 POSTOP FOLLOW-UP VISIT: CPT | Performed by: NURSE PRACTITIONER

## 2018-08-22 PROCEDURE — 99212 OFFICE O/P EST SF 10 MIN: CPT

## 2018-08-22 NOTE — PROGRESS NOTES
OP: DATE OF PROCEDURE:  07/05/2018  SURGEON:  Yessica Perez M.D. 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     PROCEDURES:  1. Revision and fixation of right tibial pilon fracture including the tibia and the fibula. 2.  Removal of hardware, right distal tibia and fibula.       Subjective:  Erin Merino is approximately 6 weeks follow-up from the above surgery. Patient is NWB on that extremity. She ambulates with assistive device, crutches. Pain to extremity is mild and moderate and is taking pain medication, Tylenol with Codeine per family doctor. They continues to complain of   numbness, tingling today to peroneal nerve SP/DP. Denies Calf pain. Patient continues to use  DVT prophylaxis, ASA . Patient is not participating in home therapy at this time. She did have + culture and is seeing ID, she has finished on ATB per ID and has PICC line recently removed. She is currently on no antibiotics. Review of Systems -    General ROS: negative for - chills, fatigue, fever or night sweats  Respiratory ROS: no cough, shortness of breath, or wheezing  Cardiovascular ROS: no chest pain or dyspnea on exertion  Gastrointestinal ROS: no abdominal pain, nausea, vomiting, diarrhea, constipation,or black or bloody stools  Genitourinary: no hematuria, dysuria, or incontinence   Musculoskeletal ROS: +for back or neck pain or stiffness, also see HPI-she states this is why she is on tylenol with codeine  Neurological ROS: no TIA or stroke symptoms       Objective:    General: Alert and oriented X 3, normocephalic atraumatic, external ears and eye normal, sclera clear, no acute distress, respirations easy and unlabored with no audible wheezes, skin warm and dry, speech and dress appropriate for noted age, affect euthymic.     Extremity:  Right Lower Extremity  Skin clean dry and intact, without signs of infection  Incisions

## 2018-08-22 NOTE — PATIENT INSTRUCTIONS
No weightbearing right leg  Boot on only when up, can have off at night  Continue with following ID  Pain meds per PCP  Follow up in 6 weeks with XR  Home health PT-they will call

## 2018-09-05 ENCOUNTER — HOSPITAL ENCOUNTER (OUTPATIENT)
Age: 53
Discharge: HOME | End: 2018-09-07
Payer: MEDICAID

## 2018-09-05 PROCEDURE — 86140 C-REACTIVE PROTEIN: CPT

## 2018-09-06 LAB — C-REACTIVE PROTEIN: 0.3 MG/DL (ref 0–0.4)

## 2018-09-10 ENCOUNTER — HOSPITAL ENCOUNTER (OUTPATIENT)
Age: 53
Discharge: HOME | End: 2018-09-12
Payer: MEDICAID

## 2018-09-10 LAB — SEDIMENTATION RATE, ERYTHROCYTE: 8 MM/HR (ref 0–20)

## 2018-09-10 PROCEDURE — 36415 COLL VENOUS BLD VENIPUNCTURE: CPT

## 2018-09-10 PROCEDURE — 85651 RBC SED RATE NONAUTOMATED: CPT

## 2018-09-12 ENCOUNTER — TELEPHONE (OUTPATIENT)
Dept: ORTHOPEDIC SURGERY | Age: 53
End: 2018-09-12

## 2018-10-03 ENCOUNTER — OFFICE VISIT (OUTPATIENT)
Dept: ORTHOPEDIC SURGERY | Age: 53
End: 2018-10-03
Payer: MEDICAID

## 2018-10-03 ENCOUNTER — HOSPITAL ENCOUNTER (OUTPATIENT)
Dept: GENERAL RADIOLOGY | Age: 53
Discharge: HOME | End: 2018-10-05
Payer: MEDICAID

## 2018-10-03 VITALS
HEART RATE: 88 BPM | DIASTOLIC BLOOD PRESSURE: 66 MMHG | HEIGHT: 72 IN | BODY MASS INDEX: 22.35 KG/M2 | SYSTOLIC BLOOD PRESSURE: 104 MMHG | WEIGHT: 165 LBS

## 2018-10-03 DIAGNOSIS — S82.251K CLOSED DISPLACED COMMINUTED FRACTURE OF SHAFT OF RIGHT TIBIA WITH NONUNION: Primary | ICD-10-CM

## 2018-10-03 DIAGNOSIS — S82.202G TIBIA/FIBULA FRACTURE, LEFT, CLOSED, WITH DELAYED HEALING, SUBSEQUENT ENCOUNTER: ICD-10-CM

## 2018-10-03 DIAGNOSIS — S82.402G TIBIA/FIBULA FRACTURE, LEFT, CLOSED, WITH DELAYED HEALING, SUBSEQUENT ENCOUNTER: ICD-10-CM

## 2018-10-03 DIAGNOSIS — S82.251K CLOSED DISPLACED COMMINUTED FRACTURE OF SHAFT OF RIGHT TIBIA WITH NONUNION: ICD-10-CM

## 2018-10-03 PROCEDURE — 73610 X-RAY EXAM OF ANKLE: CPT

## 2018-10-03 PROCEDURE — 99024 POSTOP FOLLOW-UP VISIT: CPT | Performed by: ORTHOPAEDIC SURGERY

## 2018-10-03 PROCEDURE — 99212 OFFICE O/P EST SF 10 MIN: CPT

## 2018-10-03 NOTE — PROGRESS NOTES
OP: DATE OF PROCEDURE:  07/05/2018  SURGEON:  Lawrence Rasheed M.D. 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     PROCEDURES:  1. Revision and fixation of right tibial pilon fracture including the tibia and the fibula. 2.  Removal of hardware, right distal tibia and fibula.     Subjective:  Cordell Henriquez is approximately 12 weeks follow-up from the above surgery. Patient is ordered NWB on that extremity, but has been noncompliant in the past. She mariscal profess she is not weightbearing at all. She ambulates with assistive device, crutches and states she recently did get a scooter. Pain to extremity is mild and moderate and is taking pain medication, Tylenol with Codeine per family doctor, but she states pain relief is subpar for her. They continues to complain of numbness, tingling today to peroneal nerve SP/DP. Denies Calf pain. Patient continues to use  DVT prophylaxis, ASA . Patient is not participating in home therapy at this time. She did have + culture and is seeing ID, she has finished on ATB per ID and has PICC line recently removed. She is currently on no antibiotics.      Review of Systems -    General ROS: negative for - chills, fatigue, fever or night sweats  Respiratory ROS: no cough, shortness of breath, or wheezing  Cardiovascular ROS: no chest pain or dyspnea on exertion  Gastrointestinal ROS: no abdominal pain, nausea, vomiting, diarrhea, constipation,or black or bloody stools  Genitourinary: no hematuria, dysuria, or incontinence   Musculoskeletal ROS: +for back or neck pain or stiffness, also see HPI-she states this is why she is on tylenol with codeine  Neurological ROS: no TIA or stroke symptoms       Objective:    General: Alert and oriented X 3, normocephalic atraumatic, external ears and eye normal, sclera clear, no acute distress, respirations easy and unlabored with no audible wheezes, denies weightbearing although she is clearly been weightbearing on the right lower extremity to cause again hardware failure. This is the 2nd time she has done this. I told that we would order a CT scan of her distal tibia see how well it is healing. I would not offer any further intervention due to the fact the patient clearly cannot be compliant. We will see her back after CT scan and discuss touch weightbearing status versus placing her in a cast and transferred this nonoperatively at this point. She appeared frustrated but agreeable with the plan.       Donna Michael MD

## 2018-10-15 ENCOUNTER — HOSPITAL ENCOUNTER (OUTPATIENT)
Dept: CT IMAGING | Age: 53
Discharge: HOME | End: 2018-10-17
Payer: MEDICAID

## 2018-10-15 DIAGNOSIS — S82.251K CLOSED DISPLACED COMMINUTED FRACTURE OF SHAFT OF RIGHT TIBIA WITH NONUNION: ICD-10-CM

## 2018-10-15 DIAGNOSIS — S82.402G TIBIA/FIBULA FRACTURE, LEFT, CLOSED, WITH DELAYED HEALING, SUBSEQUENT ENCOUNTER: ICD-10-CM

## 2018-10-15 DIAGNOSIS — S82.202G TIBIA/FIBULA FRACTURE, LEFT, CLOSED, WITH DELAYED HEALING, SUBSEQUENT ENCOUNTER: ICD-10-CM

## 2018-10-15 PROCEDURE — 73700 CT LOWER EXTREMITY W/O DYE: CPT

## 2018-10-17 ENCOUNTER — OFFICE VISIT (OUTPATIENT)
Dept: ORTHOPEDIC SURGERY | Age: 53
End: 2018-10-17
Payer: MEDICAID

## 2018-10-17 VITALS
SYSTOLIC BLOOD PRESSURE: 129 MMHG | HEIGHT: 72 IN | DIASTOLIC BLOOD PRESSURE: 79 MMHG | HEART RATE: 90 BPM | WEIGHT: 165 LBS | BODY MASS INDEX: 22.35 KG/M2

## 2018-10-17 DIAGNOSIS — S82.251K CLOSED DISPLACED COMMINUTED FRACTURE OF SHAFT OF RIGHT TIBIA WITH NONUNION: Primary | ICD-10-CM

## 2018-10-17 PROCEDURE — G8427 DOCREV CUR MEDS BY ELIG CLIN: HCPCS | Performed by: ORTHOPAEDIC SURGERY

## 2018-10-17 PROCEDURE — 99213 OFFICE O/P EST LOW 20 MIN: CPT

## 2018-10-17 PROCEDURE — 3017F COLORECTAL CA SCREEN DOC REV: CPT | Performed by: ORTHOPAEDIC SURGERY

## 2018-10-17 PROCEDURE — 1036F TOBACCO NON-USER: CPT | Performed by: ORTHOPAEDIC SURGERY

## 2018-10-17 PROCEDURE — G8420 CALC BMI NORM PARAMETERS: HCPCS | Performed by: ORTHOPAEDIC SURGERY

## 2018-10-17 PROCEDURE — 99213 OFFICE O/P EST LOW 20 MIN: CPT | Performed by: ORTHOPAEDIC SURGERY

## 2018-10-17 PROCEDURE — 29405 APPL SHORT LEG CAST: CPT | Performed by: ORTHOPAEDIC SURGERY

## 2018-10-17 PROCEDURE — G8484 FLU IMMUNIZE NO ADMIN: HCPCS | Performed by: ORTHOPAEDIC SURGERY

## 2018-10-17 RX ORDER — ASPIRIN 81 MG/1
81 TABLET ORAL DAILY
Qty: 30 TABLET | Refills: 3 | Status: ON HOLD | OUTPATIENT
Start: 2018-10-17 | End: 2019-01-10 | Stop reason: HOSPADM

## 2018-11-07 ENCOUNTER — HOSPITAL ENCOUNTER (OUTPATIENT)
Dept: GENERAL RADIOLOGY | Age: 53
Discharge: HOME | End: 2018-11-09
Payer: MEDICAID

## 2018-11-07 ENCOUNTER — OFFICE VISIT (OUTPATIENT)
Dept: ORTHOPEDIC SURGERY | Age: 53
End: 2018-11-07
Payer: MEDICAID

## 2018-11-07 ENCOUNTER — HOSPITAL ENCOUNTER (OUTPATIENT)
Age: 53
Discharge: HOME | End: 2018-11-07
Payer: MEDICAID

## 2018-11-07 VITALS
BODY MASS INDEX: 22.35 KG/M2 | HEIGHT: 72 IN | RESPIRATION RATE: 18 BRPM | SYSTOLIC BLOOD PRESSURE: 113 MMHG | WEIGHT: 165 LBS | HEART RATE: 78 BPM | DIASTOLIC BLOOD PRESSURE: 70 MMHG

## 2018-11-07 DIAGNOSIS — M86.9 LEG OSTEOMYELITIS, RIGHT (HCC): ICD-10-CM

## 2018-11-07 DIAGNOSIS — S82.251K CLOSED DISPLACED COMMINUTED FRACTURE OF SHAFT OF RIGHT TIBIA WITH NONUNION: Primary | ICD-10-CM

## 2018-11-07 DIAGNOSIS — S82.251K CLOSED DISPLACED COMMINUTED FRACTURE OF SHAFT OF RIGHT TIBIA WITH NONUNION: ICD-10-CM

## 2018-11-07 LAB
C-REACTIVE PROTEIN: 0.2 MG/DL (ref 0–0.4)
SEDIMENTATION RATE, ERYTHROCYTE: 14 MM/HR (ref 0–20)

## 2018-11-07 PROCEDURE — 36415 COLL VENOUS BLD VENIPUNCTURE: CPT

## 2018-11-07 PROCEDURE — 85651 RBC SED RATE NONAUTOMATED: CPT

## 2018-11-07 PROCEDURE — 29405 APPL SHORT LEG CAST: CPT | Performed by: ORTHOPAEDIC SURGERY

## 2018-11-07 PROCEDURE — 99024 POSTOP FOLLOW-UP VISIT: CPT | Performed by: ORTHOPAEDIC SURGERY

## 2018-11-07 PROCEDURE — 73610 X-RAY EXAM OF ANKLE: CPT

## 2018-11-07 PROCEDURE — 86140 C-REACTIVE PROTEIN: CPT

## 2018-11-12 NOTE — PROGRESS NOTES
Subjective: Patient is back for follow-up on her right distal tibia nonunion. She has had significant noncompliance issues of bearing weight when she is not supposed to. She comes in today for x-rays 3 weeks into immobilization for treatment. She is per her wearing her bone stimulator. She states she has not been bearing weight at this point in time. She states that her pain symptoms are really unchanged. Review of systems: Patient denies chest pain, shortness of breath, fevers, chills, nausea, vomiting        Objective:  Right lower extremity   Skin intact, previous surgical incisions well-healed with no signs of infection   Compartments soft and compressible   Calves soft and non tender    Tenderness to palpation over fracture sites   Fires EHL, TA, GS   2/4 DP and PT pulses   Sensation intact distally, does have some paresthesias since her original surgery in the dorsum of her smaller toes   Capillary refill less than 3 seconds       X-ray-fracture has not performed any further than at the last visit. There is still scant callus formation is difficult to tell there is any further callus formation. Discussion: I talked her detail about the fact that think we should immobilize this for an additional 3 weeks. At that point time we'll get x-rays of her nonviable tell how the callus is forming we'll get another CT scan. She is agreeable to plan.     Impression: Nonunion right distal tibia with noncompliance    Shortly cast  Strict nonweightbearing right lower showing  DVT prophylaxis a form of aspirin  Follow-up in 3 weeks with x-rays  Call sooner with questions or concerns

## 2018-11-17 ENCOUNTER — HOSPITAL ENCOUNTER (EMERGENCY)
Age: 53
Discharge: HOME | End: 2018-11-17
Attending: EMERGENCY MEDICINE
Payer: MEDICAID

## 2018-11-17 VITALS
WEIGHT: 165 LBS | DIASTOLIC BLOOD PRESSURE: 63 MMHG | TEMPERATURE: 98.7 F | SYSTOLIC BLOOD PRESSURE: 105 MMHG | HEART RATE: 72 BPM | HEIGHT: 72 IN | BODY MASS INDEX: 22.35 KG/M2 | OXYGEN SATURATION: 99 % | RESPIRATION RATE: 14 BRPM

## 2018-11-17 DIAGNOSIS — M79.671 RIGHT FOOT PAIN: ICD-10-CM

## 2018-11-17 DIAGNOSIS — Z47.89 PROBLEM WITH FIBERGLASS CAST: Primary | ICD-10-CM

## 2018-11-17 PROCEDURE — 99282 EMERGENCY DEPT VISIT SF MDM: CPT

## 2018-11-17 ASSESSMENT — ENCOUNTER SYMPTOMS
SHORTNESS OF BREATH: 0
ABDOMINAL PAIN: 0
BACK PAIN: 0
COUGH: 0
DIARRHEA: 0
COLOR CHANGE: 1
SORE THROAT: 0
VOMITING: 0
NAUSEA: 0

## 2018-11-18 NOTE — ED NOTES
[employer] contacted, we do not have any orthopedic boots.      Prakash Duran RN  11/17/18 8603

## 2018-11-18 NOTE — ED PROVIDER NOTES
Arthritis; Cervical radiculopathy; Chronic back pain; Depression; Diabetes mellitus type 2, uncontrolled ([facility] [address] GERD (gastroesophageal reflux disease); History of blood transfusion; and Hypertension. Past Surgical History:  has a past surgical history that includes Cholecystectomy (2000); Foot surgery (1996); ERCP; Tubal ligation (1991); cervical fusion (2008); cervical fusion (2015); Colonoscopy; [place] tooth extraction; Dilation and curettage of uterus (2013); Hysterectomy (2013); cervical fusion (04/25/16); Carpal tunnel release (Right, 04/09/2017); back surgery (01/08/2018); Spine surgery; other surgical history (Right, 05/14/2018); pr open rx tibia shaft fx,screws (Right, 5/14/2018); and pr office/outpt visit,procedure only (Right, 7/5/2018). Social History:  reports that she quit smoking about 3 years ago. Her smoking use included Cigarettes. She has a 5.00 pack-year smoking history. She has never used smokeless tobacco. She reports that she does not drink alcohol or use drugs. Family History: family history includes Cancer in her father; Diabetes in her mother. The patients home medications have been reviewed. Allergies: Ceftriaxone    -------------------------------------------------- RESULTS -------------------------------------------------  Labs:  No results found for this visit on 11/17/18. Radiology:  No orders to display       ------------------------- NURSING NOTES AND VITALS REVIEWED ---------------------------  Date / Time Roomed:  11/17/2018  6:38 PM  ED Bed Assignment:  08/08    The nursing notes within the ED encounter and vital signs as below have been reviewed.    /63   Pulse 72   Temp 98.7 °F (37.1 °C) (Oral)   Resp 14   Ht 6' 1\" (1.854 m)   Wt 165 lb (74.8 kg)   LMP 08/13/2013   SpO2 99%   BMI 21.77 kg/m²   Oxygen Saturation Interpretation: Normal      ------------------------------------------ PROGRESS NOTES ------------------------------------------  8:51 PM  I have spoken with the patient and discussed todays results, in addition to providing specific details for the plan of care and counseling regarding the diagnosis and prognosis. Their questions are answered at this time and they are agreeable with the plan. I discussed at length with them reasons for immediate return here for re evaluation. They will followup with their orthopedic physician by calling their office on Monday.      --------------------------------- ADDITIONAL PROVIDER NOTES ---------------------------------  At this time the patient is without objective evidence of an acute process requiring hospitalization or inpatient management. They have remained hemodynamically stable throughout their entire ED visit and are stable for discharge with outpatient follow-up. The plan has been discussed in detail and they are aware of the specific conditions for emergent return, as well as the importance of follow-up. Current Discharge Medication List          Diagnosis:  1. Problem with fiberglass cast    2. Right foot pain        Disposition:  Patient's disposition: Discharge to home  Patient's condition is stable.        [place],   Resident  11/17/18 6233

## 2018-11-18 NOTE — ED NOTES
Patient is asking her transportation if they will pick her up in [place], so she can get cast removed.        Prakash Duran RN  11/17/18 0051

## 2018-12-04 DIAGNOSIS — S82.251K CLOSED DISPLACED COMMINUTED FRACTURE OF SHAFT OF RIGHT TIBIA WITH NONUNION: Primary | ICD-10-CM

## 2018-12-05 ENCOUNTER — HOSPITAL ENCOUNTER (OUTPATIENT)
Dept: GENERAL RADIOLOGY | Age: 53
Discharge: HOME | End: 2018-12-07
Payer: MEDICAID

## 2018-12-05 ENCOUNTER — OFFICE VISIT (OUTPATIENT)
Dept: ORTHOPEDIC SURGERY | Age: 53
End: 2018-12-05
Payer: MEDICAID

## 2018-12-05 VITALS
BODY MASS INDEX: 22.35 KG/M2 | DIASTOLIC BLOOD PRESSURE: 73 MMHG | HEART RATE: 73 BPM | WEIGHT: 165 LBS | SYSTOLIC BLOOD PRESSURE: 106 MMHG | HEIGHT: 72 IN

## 2018-12-05 DIAGNOSIS — S82.251K CLOSED DISPLACED COMMINUTED FRACTURE OF SHAFT OF RIGHT TIBIA WITH NONUNION: ICD-10-CM

## 2018-12-05 DIAGNOSIS — S82.251K CLOSED DISPLACED COMMINUTED FRACTURE OF SHAFT OF RIGHT TIBIA WITH NONUNION: Primary | ICD-10-CM

## 2018-12-05 PROCEDURE — 73590 X-RAY EXAM OF LOWER LEG: CPT

## 2018-12-05 PROCEDURE — G8484 FLU IMMUNIZE NO ADMIN: HCPCS | Performed by: ORTHOPAEDIC SURGERY

## 2018-12-05 PROCEDURE — G8427 DOCREV CUR MEDS BY ELIG CLIN: HCPCS | Performed by: ORTHOPAEDIC SURGERY

## 2018-12-05 PROCEDURE — G8420 CALC BMI NORM PARAMETERS: HCPCS | Performed by: ORTHOPAEDIC SURGERY

## 2018-12-05 PROCEDURE — 3017F COLORECTAL CA SCREEN DOC REV: CPT | Performed by: ORTHOPAEDIC SURGERY

## 2018-12-05 PROCEDURE — 99213 OFFICE O/P EST LOW 20 MIN: CPT | Performed by: ORTHOPAEDIC SURGERY

## 2018-12-05 PROCEDURE — 73610 X-RAY EXAM OF ANKLE: CPT

## 2018-12-05 PROCEDURE — 1036F TOBACCO NON-USER: CPT | Performed by: ORTHOPAEDIC SURGERY

## 2018-12-05 PROCEDURE — 99212 OFFICE O/P EST SF 10 MIN: CPT

## 2018-12-05 NOTE — PROGRESS NOTES
lower extremity   Skin intact, surgical incision well-healed with no signs of infection   Patient does still have some medial swelling although with mild nature   Minimal tenderness to palpation of fracture site   Compartments soft and compressible   Calves soft and non tender    5/5 EHL, TA, GS   2/4 DP and PT pulses   Sensation intact distally   Capillary refill less than 3 seconds     Extremities - peripheral pulses normal, no pedal edema, no clubbing or cyanosis, no pedal edema noted, no edema, redness or tenderness in the calves or thighs, Rolan's sign negative bilaterally, no ulcers, gangrene or atrophic changes  Skin - normal coloration and turgor, no rashes, no suspicious skin lesions noted        X-ray-x-ray showed continued broken screws and proximal portion the plate. Her right distal tibia does appear to have increasing callus formation potentially be bridging with callus. There is no further displacement of the fracture. Discussion: I talked in detail about the fact that would need a CT scan to see she has consolidated her fracture. This is the case he can start weightbearing. She is agreeable to plan.     Impression: Right distal tibia nonunion with what appears to be signs of bridging    CT scan right ankle    Follow-up after CT scan    Continue strict nonweightbearing until CT performed and we can make a decision based on the results      Call with any questions or concerns

## 2018-12-13 ENCOUNTER — HOSPITAL ENCOUNTER (OUTPATIENT)
Dept: CT IMAGING | Age: 53
Discharge: HOME | End: 2018-12-15
Payer: MEDICAID

## 2018-12-13 DIAGNOSIS — S82.251K CLOSED DISPLACED COMMINUTED FRACTURE OF SHAFT OF RIGHT TIBIA WITH NONUNION: ICD-10-CM

## 2018-12-13 PROCEDURE — 73700 CT LOWER EXTREMITY W/O DYE: CPT

## 2018-12-21 ENCOUNTER — TELEPHONE (OUTPATIENT)
Dept: ORTHOPEDIC SURGERY | Age: 53
End: 2018-12-21

## 2019-01-02 ENCOUNTER — HOSPITAL ENCOUNTER (OUTPATIENT)
Age: 54
Discharge: HOME | End: 2019-01-04
Payer: MEDICAID

## 2019-01-02 ENCOUNTER — OFFICE VISIT (OUTPATIENT)
Dept: ORTHOPEDIC SURGERY | Age: 54
End: 2019-01-02
Payer: MEDICAID

## 2019-01-02 ENCOUNTER — HOSPITAL ENCOUNTER (OUTPATIENT)
Dept: GENERAL RADIOLOGY | Age: 54
Discharge: HOME | End: 2019-01-04
Payer: MEDICAID

## 2019-01-02 VITALS
HEART RATE: 79 BPM | HEIGHT: 72 IN | DIASTOLIC BLOOD PRESSURE: 80 MMHG | WEIGHT: 165 LBS | BODY MASS INDEX: 22.35 KG/M2 | SYSTOLIC BLOOD PRESSURE: 114 MMHG

## 2019-01-02 DIAGNOSIS — T84.498D FAILED ORTHOPEDIC IMPLANT, SUBSEQUENT ENCOUNTER: ICD-10-CM

## 2019-01-02 DIAGNOSIS — R52 PAIN: ICD-10-CM

## 2019-01-02 PROBLEM — T84.498A FAILED ORTHOPEDIC IMPLANT (HCC): Status: ACTIVE | Noted: 2019-01-02

## 2019-01-02 PROBLEM — S82.401K: Status: ACTIVE | Noted: 2018-07-07

## 2019-01-02 PROBLEM — S82.201K: Status: ACTIVE | Noted: 2018-07-07

## 2019-01-02 PROCEDURE — G8484 FLU IMMUNIZE NO ADMIN: HCPCS | Performed by: ORTHOPAEDIC SURGERY

## 2019-01-02 PROCEDURE — G8427 DOCREV CUR MEDS BY ELIG CLIN: HCPCS | Performed by: ORTHOPAEDIC SURGERY

## 2019-01-02 PROCEDURE — 99212 OFFICE O/P EST SF 10 MIN: CPT

## 2019-01-02 PROCEDURE — 1036F TOBACCO NON-USER: CPT | Performed by: ORTHOPAEDIC SURGERY

## 2019-01-02 PROCEDURE — 72110 X-RAY EXAM L-2 SPINE 4/>VWS: CPT

## 2019-01-02 PROCEDURE — 99214 OFFICE O/P EST MOD 30 MIN: CPT | Performed by: ORTHOPAEDIC SURGERY

## 2019-01-02 PROCEDURE — G8420 CALC BMI NORM PARAMETERS: HCPCS | Performed by: ORTHOPAEDIC SURGERY

## 2019-01-02 PROCEDURE — 3017F COLORECTAL CA SCREEN DOC REV: CPT | Performed by: ORTHOPAEDIC SURGERY

## 2019-01-03 ENCOUNTER — PREP FOR PROCEDURE (OUTPATIENT)
Dept: ORTHOPEDIC SURGERY | Age: 54
End: 2019-01-03

## 2019-01-03 RX ORDER — SODIUM CHLORIDE 0.9 % (FLUSH) 0.9 %
10 SYRINGE (ML) INJECTION PRN
Status: CANCELLED | OUTPATIENT
Start: 2019-01-03

## 2019-01-03 RX ORDER — SODIUM CHLORIDE 0.9 % (FLUSH) 0.9 %
10 SYRINGE (ML) INJECTION EVERY 12 HOURS SCHEDULED
Status: CANCELLED | OUTPATIENT
Start: 2019-01-03

## 2019-01-03 RX ORDER — SODIUM CHLORIDE, SODIUM LACTATE, POTASSIUM CHLORIDE, CALCIUM CHLORIDE 600; 310; 30; 20 MG/100ML; MG/100ML; MG/100ML; MG/100ML
INJECTION, SOLUTION INTRAVENOUS CONTINUOUS
Status: CANCELLED | OUTPATIENT
Start: 2019-01-03

## 2019-01-09 ENCOUNTER — ANESTHESIA EVENT (OUTPATIENT)
Dept: OPERATING ROOM | Age: 54
End: 2019-01-09
Payer: MEDICAID

## 2019-01-10 ENCOUNTER — ANESTHESIA (OUTPATIENT)
Dept: OPERATING ROOM | Age: 54
End: 2019-01-10
Payer: MEDICAID

## 2019-01-10 ENCOUNTER — HOSPITAL ENCOUNTER (OUTPATIENT)
Age: 54
Setting detail: OBSERVATION
Discharge: HOME | End: 2019-01-11
Attending: ORTHOPAEDIC SURGERY | Admitting: ORTHOPAEDIC SURGERY
Payer: MEDICAID

## 2019-01-10 ENCOUNTER — APPOINTMENT (OUTPATIENT)
Dept: GENERAL RADIOLOGY | Age: 54
End: 2019-01-10
Attending: ORTHOPAEDIC SURGERY
Payer: MEDICAID

## 2019-01-10 VITALS
TEMPERATURE: 98.2 F | RESPIRATION RATE: 2 BRPM | DIASTOLIC BLOOD PRESSURE: 80 MMHG | SYSTOLIC BLOOD PRESSURE: 93 MMHG | OXYGEN SATURATION: 99 %

## 2019-01-10 DIAGNOSIS — S82.201K TIBIA/FIBULA FRACTURE, RIGHT, CLOSED, WITH NONUNION, SUBSEQUENT ENCOUNTER: ICD-10-CM

## 2019-01-10 DIAGNOSIS — S82.401K TIBIA/FIBULA FRACTURE, RIGHT, CLOSED, WITH NONUNION, SUBSEQUENT ENCOUNTER: ICD-10-CM

## 2019-01-10 DIAGNOSIS — T84.498A FAILED ORTHOPEDIC IMPLANT, INITIAL ENCOUNTER (HCC): ICD-10-CM

## 2019-01-10 DIAGNOSIS — S82.201G DELAYED UNION OF TIBIAL SHAFT FRACTURE, RIGHT, CLOSED: Primary | ICD-10-CM

## 2019-01-10 LAB
ANION GAP SERPL CALCULATED.3IONS-SCNC: 10 MMOL/L (ref 7–16)
BUN BLDV-MCNC: 15 MG/DL (ref 6–20)
CALCIUM SERPL-MCNC: 10 MG/DL (ref 8.6–10.2)
CHLORIDE BLD-SCNC: 100 MMOL/L (ref 98–107)
CO2: 27 MMOL/L (ref 22–29)
CREAT SERPL-MCNC: 0.8 MG/DL (ref 0.5–1)
GFR AFRICAN AMERICAN: >60
GFR NON-AFRICAN AMERICAN: >60 ML/MIN/1.73
GLUCOSE BLD-MCNC: 133 MG/DL (ref 74–99)
HCT VFR BLD CALC: 39.7 % (ref 34–48)
HEMOGLOBIN: 13.1 G/DL (ref 11.5–15.5)
MCH RBC QN AUTO: 32.1 PG (ref 26–35)
MCHC RBC AUTO-ENTMCNC: 33 % (ref 32–34.5)
MCV RBC AUTO: 97.3 FL (ref 80–99.9)
METER GLUCOSE: 132 MG/DL (ref 74–99)
METER GLUCOSE: 286 MG/DL (ref 74–99)
METER GLUCOSE: 313 MG/DL (ref 74–99)
PDW BLD-RTO: 12.4 FL (ref 11.5–15)
PLATELET # BLD: 316 E9/L (ref 130–450)
PMV BLD AUTO: 9.8 FL (ref 7–12)
POTASSIUM SERPL-SCNC: 4.4 MMOL/L (ref 3.5–5)
RBC # BLD: 4.08 E12/L (ref 3.5–5.5)
SODIUM BLD-SCNC: 137 MMOL/L (ref 132–146)
WBC # BLD: 12 E9/L (ref 4.5–11.5)

## 2019-01-10 PROCEDURE — 80048 BASIC METABOLIC PNL TOTAL CA: CPT

## 2019-01-10 PROCEDURE — 2500000003 HC RX 250 WO HCPCS: Performed by: ANESTHESIOLOGIST ASSISTANT

## 2019-01-10 PROCEDURE — 20680 REMOVAL OF IMPLANT DEEP: CPT | Performed by: ORTHOPAEDIC SURGERY

## 2019-01-10 PROCEDURE — 87015 SPECIMEN INFECT AGNT CONCNTJ: CPT

## 2019-01-10 PROCEDURE — 6360000002 HC RX W HCPCS: Performed by: ANESTHESIOLOGY

## 2019-01-10 PROCEDURE — 3600000003 HC SURGERY LEVEL 3 BASE: Performed by: ORTHOPAEDIC SURGERY

## 2019-01-10 PROCEDURE — 2709999900 HC NON-CHARGEABLE SUPPLY: Performed by: ORTHOPAEDIC SURGERY

## 2019-01-10 PROCEDURE — 87205 SMEAR GRAM STAIN: CPT

## 2019-01-10 PROCEDURE — 88300 SURGICAL PATH GROSS: CPT

## 2019-01-10 PROCEDURE — 27827 TREAT LOWER LEG FRACTURE: CPT | Performed by: ORTHOPAEDIC SURGERY

## 2019-01-10 PROCEDURE — 2720000010 HC SURG SUPPLY STERILE: Performed by: ORTHOPAEDIC SURGERY

## 2019-01-10 PROCEDURE — 3600000013 HC SURGERY LEVEL 3 ADDTL 15MIN: Performed by: ORTHOPAEDIC SURGERY

## 2019-01-10 PROCEDURE — 7100000001 HC PACU RECOVERY - ADDTL 15 MIN: Performed by: ORTHOPAEDIC SURGERY

## 2019-01-10 PROCEDURE — 97165 OT EVAL LOW COMPLEX 30 MIN: CPT

## 2019-01-10 PROCEDURE — 87206 SMEAR FLUORESCENT/ACID STAI: CPT

## 2019-01-10 PROCEDURE — 64447 NJX AA&/STRD FEMORAL NRV IMG: CPT | Performed by: ANESTHESIOLOGY

## 2019-01-10 PROCEDURE — 87102 FUNGUS ISOLATION CULTURE: CPT

## 2019-01-10 PROCEDURE — 6360000002 HC RX W HCPCS: Performed by: ANESTHESIOLOGIST ASSISTANT

## 2019-01-10 PROCEDURE — 82962 GLUCOSE BLOOD TEST: CPT

## 2019-01-10 PROCEDURE — 6360000002 HC RX W HCPCS: Performed by: STUDENT IN AN ORGANIZED HEALTH CARE EDUCATION/TRAINING PROGRAM

## 2019-01-10 PROCEDURE — G0378 HOSPITAL OBSERVATION PER HR: HCPCS

## 2019-01-10 PROCEDURE — 3700000000 HC ANESTHESIA ATTENDED CARE: Performed by: ORTHOPAEDIC SURGERY

## 2019-01-10 PROCEDURE — 87075 CULTR BACTERIA EXCEPT BLOOD: CPT

## 2019-01-10 PROCEDURE — 87116 MYCOBACTERIA CULTURE: CPT

## 2019-01-10 PROCEDURE — 97530 THERAPEUTIC ACTIVITIES: CPT

## 2019-01-10 PROCEDURE — 2580000003 HC RX 258: Performed by: PHYSICIAN ASSISTANT

## 2019-01-10 PROCEDURE — 6370000000 HC RX 637 (ALT 250 FOR IP): Performed by: STUDENT IN AN ORGANIZED HEALTH CARE EDUCATION/TRAINING PROGRAM

## 2019-01-10 PROCEDURE — C1713 ANCHOR/SCREW BN/BN,TIS/BN: HCPCS | Performed by: ORTHOPAEDIC SURGERY

## 2019-01-10 PROCEDURE — 2580000003 HC RX 258: Performed by: STUDENT IN AN ORGANIZED HEALTH CARE EDUCATION/TRAINING PROGRAM

## 2019-01-10 PROCEDURE — G0480 DRUG TEST DEF 1-7 CLASSES: HCPCS

## 2019-01-10 PROCEDURE — 87070 CULTURE OTHR SPECIMN AEROBIC: CPT

## 2019-01-10 PROCEDURE — 3209999900 FLUORO FOR SURGICAL PROCEDURES

## 2019-01-10 PROCEDURE — 7100000000 HC PACU RECOVERY - FIRST 15 MIN: Performed by: ORTHOPAEDIC SURGERY

## 2019-01-10 PROCEDURE — 3700000001 HC ADD 15 MINUTES (ANESTHESIA): Performed by: ORTHOPAEDIC SURGERY

## 2019-01-10 PROCEDURE — 85027 COMPLETE CBC AUTOMATED: CPT

## 2019-01-10 PROCEDURE — 97161 PT EVAL LOW COMPLEX 20 MIN: CPT

## 2019-01-10 PROCEDURE — 36415 COLL VENOUS BLD VENIPUNCTURE: CPT

## 2019-01-10 DEVICE — PLATE BNE L114MM 5 H STD PROX HUM S STL LOK COMPR FOR 3.5MM: Type: IMPLANTABLE DEVICE | Site: ANKLE | Status: FUNCTIONAL

## 2019-01-10 DEVICE — SCREW BNE L48MM DIA3.5MM CORT S STL ST LOK FULL THRD: Type: IMPLANTABLE DEVICE | Site: ANKLE | Status: FUNCTIONAL

## 2019-01-10 DEVICE — IMPLANTABLE DEVICE: Type: IMPLANTABLE DEVICE | Site: ANKLE | Status: FUNCTIONAL

## 2019-01-10 DEVICE — GII QUICKANCHOR PLUS SIZE 2 (5 METRIC) PANACRYL BRAIDED ABSORBABLE SUTURE 36 INCHES (91CM), DOUBLE ARMED WITH CP-2 NEEDLES, WITH DISPOSABLE INSERTER.
Type: IMPLANTABLE DEVICE | Site: ANKLE | Status: FUNCTIONAL
Brand: GII QUICKANCHOR PANACRYL

## 2019-01-10 DEVICE — SCREW BNE L20MM DIA3.5MM CORT S STL ST LOK FULL THRD: Type: IMPLANTABLE DEVICE | Site: ANKLE | Status: FUNCTIONAL

## 2019-01-10 DEVICE — SCREW BNE L30MM DIA3.5MM CORT S STL ST NONCANNULATED LOK: Type: IMPLANTABLE DEVICE | Site: ANKLE | Status: FUNCTIONAL

## 2019-01-10 DEVICE — SCREW BNE L34MM DIA3.5MM CORT S STL ST LOK FULL THRD: Type: IMPLANTABLE DEVICE | Site: ANKLE | Status: FUNCTIONAL

## 2019-01-10 DEVICE — SCREW BNE L28MM DIA3.5MM CORT S STL ST NONCANNULATED LOK: Type: IMPLANTABLE DEVICE | Site: ANKLE | Status: FUNCTIONAL

## 2019-01-10 DEVICE — SCREW BNE L38MM DIA3.5MM CORT S STL ST LOK FULL THRD: Type: IMPLANTABLE DEVICE | Site: ANKLE | Status: FUNCTIONAL

## 2019-01-10 DEVICE — SCREW BNE L26MM DIA3.5MM CORT S STL ST NONCANNULATED LOK: Type: IMPLANTABLE DEVICE | Site: ANKLE | Status: FUNCTIONAL

## 2019-01-10 DEVICE — SCREW BNE L36MM DIA3.5MM CORT S STL ST LOK FULL THRD: Type: IMPLANTABLE DEVICE | Site: ANKLE | Status: FUNCTIONAL

## 2019-01-10 DEVICE — GRAFT HUM TISS 10ML FRMBL CELLULAR BNE MTRX CRYOPRESERVED: Type: IMPLANTABLE DEVICE | Site: ANKLE | Status: FUNCTIONAL

## 2019-01-10 RX ORDER — OXYCODONE HYDROCHLORIDE AND ACETAMINOPHEN 5; 325 MG/1; MG/1
2 TABLET ORAL EVERY 4 HOURS PRN
Status: DISCONTINUED | OUTPATIENT
Start: 2019-01-10 | End: 2019-01-11 | Stop reason: HOSPADM

## 2019-01-10 RX ORDER — FAMOTIDINE 20 MG/1
20 TABLET, FILM COATED ORAL DAILY
Status: DISCONTINUED | OUTPATIENT
Start: 2019-01-11 | End: 2019-01-11 | Stop reason: HOSPADM

## 2019-01-10 RX ORDER — NICOTINE POLACRILEX 4 MG
15 LOZENGE BUCCAL PRN
Status: DISCONTINUED | OUTPATIENT
Start: 2019-01-10 | End: 2019-01-11 | Stop reason: HOSPADM

## 2019-01-10 RX ORDER — DEXTROSE MONOHYDRATE 25 G/50ML
12.5 INJECTION, SOLUTION INTRAVENOUS PRN
Status: DISCONTINUED | OUTPATIENT
Start: 2019-01-10 | End: 2019-01-11 | Stop reason: HOSPADM

## 2019-01-10 RX ORDER — MORPHINE SULFATE 2 MG/ML
2 INJECTION, SOLUTION INTRAMUSCULAR; INTRAVENOUS
Status: DISCONTINUED | OUTPATIENT
Start: 2019-01-10 | End: 2019-01-11 | Stop reason: HOSPADM

## 2019-01-10 RX ORDER — EPHEDRINE SULFATE/0.9% NACL/PF 50 MG/5 ML
SYRINGE (ML) INTRAVENOUS PRN
Status: DISCONTINUED | OUTPATIENT
Start: 2019-01-10 | End: 2019-01-10 | Stop reason: SDUPTHER

## 2019-01-10 RX ORDER — ALBUTEROL SULFATE 90 UG/1
2 AEROSOL, METERED RESPIRATORY (INHALATION) EVERY 6 HOURS PRN
Status: DISCONTINUED | OUTPATIENT
Start: 2019-01-10 | End: 2019-01-11 | Stop reason: HOSPADM

## 2019-01-10 RX ORDER — PROPOFOL 10 MG/ML
INJECTION, EMULSION INTRAVENOUS PRN
Status: DISCONTINUED | OUTPATIENT
Start: 2019-01-10 | End: 2019-01-10 | Stop reason: SDUPTHER

## 2019-01-10 RX ORDER — OXYCODONE HYDROCHLORIDE AND ACETAMINOPHEN 5; 325 MG/1; MG/1
1 TABLET ORAL EVERY 4 HOURS PRN
Status: DISCONTINUED | OUTPATIENT
Start: 2019-01-10 | End: 2019-01-11 | Stop reason: HOSPADM

## 2019-01-10 RX ORDER — FENTANYL CITRATE 50 UG/ML
100 INJECTION, SOLUTION INTRAMUSCULAR; INTRAVENOUS ONCE
Status: COMPLETED | OUTPATIENT
Start: 2019-01-10 | End: 2019-01-10

## 2019-01-10 RX ORDER — FENTANYL CITRATE 50 UG/ML
INJECTION, SOLUTION INTRAMUSCULAR; INTRAVENOUS PRN
Status: DISCONTINUED | OUTPATIENT
Start: 2019-01-10 | End: 2019-01-10 | Stop reason: SDUPTHER

## 2019-01-10 RX ORDER — MIDAZOLAM HYDROCHLORIDE 1 MG/ML
2 INJECTION INTRAMUSCULAR; INTRAVENOUS ONCE
Status: COMPLETED | OUTPATIENT
Start: 2019-01-10 | End: 2019-01-10

## 2019-01-10 RX ORDER — OXYCODONE HYDROCHLORIDE AND ACETAMINOPHEN 5; 325 MG/1; MG/1
1 TABLET ORAL EVERY 6 HOURS PRN
Qty: 28 TABLET | Refills: 0 | Status: SHIPPED | OUTPATIENT
Start: 2019-01-10 | End: 2019-01-25 | Stop reason: SDUPTHER

## 2019-01-10 RX ORDER — SODIUM CHLORIDE 0.9 % (FLUSH) 0.9 %
10 SYRINGE (ML) INJECTION PRN
Status: DISCONTINUED | OUTPATIENT
Start: 2019-01-10 | End: 2019-01-10 | Stop reason: HOSPADM

## 2019-01-10 RX ORDER — ONDANSETRON 2 MG/ML
4 INJECTION INTRAMUSCULAR; INTRAVENOUS EVERY 6 HOURS PRN
Status: DISCONTINUED | OUTPATIENT
Start: 2019-01-10 | End: 2019-01-11 | Stop reason: HOSPADM

## 2019-01-10 RX ORDER — HYDROCODONE BITARTRATE AND ACETAMINOPHEN 5; 325 MG/1; MG/1
2 TABLET ORAL PRN
Status: DISCONTINUED | OUTPATIENT
Start: 2019-01-10 | End: 2019-01-10 | Stop reason: HOSPADM

## 2019-01-10 RX ORDER — ROPIVACAINE HYDROCHLORIDE 5 MG/ML
30 INJECTION, SOLUTION EPIDURAL; INFILTRATION; PERINEURAL ONCE
Status: COMPLETED | OUTPATIENT
Start: 2019-01-10 | End: 2019-01-10

## 2019-01-10 RX ORDER — MORPHINE SULFATE 2 MG/ML
1 INJECTION, SOLUTION INTRAMUSCULAR; INTRAVENOUS EVERY 5 MIN PRN
Status: DISCONTINUED | OUTPATIENT
Start: 2019-01-10 | End: 2019-01-10 | Stop reason: HOSPADM

## 2019-01-10 RX ORDER — SODIUM CHLORIDE, SODIUM LACTATE, POTASSIUM CHLORIDE, CALCIUM CHLORIDE 600; 310; 30; 20 MG/100ML; MG/100ML; MG/100ML; MG/100ML
INJECTION, SOLUTION INTRAVENOUS CONTINUOUS
Status: DISCONTINUED | OUTPATIENT
Start: 2019-01-10 | End: 2019-01-10

## 2019-01-10 RX ORDER — ERGOCALCIFEROL 1.25 MG/1
50000 CAPSULE ORAL WEEKLY
Status: DISCONTINUED | OUTPATIENT
Start: 2019-01-13 | End: 2019-01-11 | Stop reason: HOSPADM

## 2019-01-10 RX ORDER — DIPHENHYDRAMINE HYDROCHLORIDE 50 MG/ML
25 INJECTION INTRAMUSCULAR; INTRAVENOUS EVERY 6 HOURS PRN
Status: DISCONTINUED | OUTPATIENT
Start: 2019-01-10 | End: 2019-01-11 | Stop reason: HOSPADM

## 2019-01-10 RX ORDER — SODIUM CHLORIDE 0.9 % (FLUSH) 0.9 %
10 SYRINGE (ML) INJECTION EVERY 12 HOURS SCHEDULED
Status: DISCONTINUED | OUTPATIENT
Start: 2019-01-10 | End: 2019-01-11 | Stop reason: HOSPADM

## 2019-01-10 RX ORDER — ALPRAZOLAM 0.25 MG/1
0.25 TABLET ORAL NIGHTLY
Status: DISCONTINUED | OUTPATIENT
Start: 2019-01-10 | End: 2019-01-11 | Stop reason: HOSPADM

## 2019-01-10 RX ORDER — ONDANSETRON 2 MG/ML
INJECTION INTRAMUSCULAR; INTRAVENOUS PRN
Status: DISCONTINUED | OUTPATIENT
Start: 2019-01-10 | End: 2019-01-10 | Stop reason: SDUPTHER

## 2019-01-10 RX ORDER — ROCURONIUM BROMIDE 10 MG/ML
INJECTION, SOLUTION INTRAVENOUS PRN
Status: DISCONTINUED | OUTPATIENT
Start: 2019-01-10 | End: 2019-01-10 | Stop reason: SDUPTHER

## 2019-01-10 RX ORDER — LIDOCAINE HYDROCHLORIDE 20 MG/ML
INJECTION, SOLUTION INFILTRATION; PERINEURAL PRN
Status: DISCONTINUED | OUTPATIENT
Start: 2019-01-10 | End: 2019-01-10 | Stop reason: SDUPTHER

## 2019-01-10 RX ORDER — PROMETHAZINE HYDROCHLORIDE 25 MG/ML
6.25 INJECTION, SOLUTION INTRAMUSCULAR; INTRAVENOUS EVERY 10 MIN PRN
Status: DISCONTINUED | OUTPATIENT
Start: 2019-01-10 | End: 2019-01-10 | Stop reason: HOSPADM

## 2019-01-10 RX ORDER — DEXAMETHASONE SODIUM PHOSPHATE 10 MG/ML
INJECTION, SOLUTION INTRAMUSCULAR; INTRAVENOUS PRN
Status: DISCONTINUED | OUTPATIENT
Start: 2019-01-10 | End: 2019-01-10 | Stop reason: SDUPTHER

## 2019-01-10 RX ORDER — HYDROCODONE BITARTRATE AND ACETAMINOPHEN 5; 325 MG/1; MG/1
1 TABLET ORAL PRN
Status: DISCONTINUED | OUTPATIENT
Start: 2019-01-10 | End: 2019-01-10 | Stop reason: HOSPADM

## 2019-01-10 RX ORDER — DEXTROSE MONOHYDRATE 50 MG/ML
100 INJECTION, SOLUTION INTRAVENOUS PRN
Status: DISCONTINUED | OUTPATIENT
Start: 2019-01-10 | End: 2019-01-11 | Stop reason: HOSPADM

## 2019-01-10 RX ORDER — SODIUM CHLORIDE 0.9 % (FLUSH) 0.9 %
10 SYRINGE (ML) INJECTION EVERY 12 HOURS SCHEDULED
Status: DISCONTINUED | OUTPATIENT
Start: 2019-01-10 | End: 2019-01-10 | Stop reason: HOSPADM

## 2019-01-10 RX ORDER — CEFAZOLIN SODIUM 1 G/3ML
INJECTION, POWDER, FOR SOLUTION INTRAMUSCULAR; INTRAVENOUS PRN
Status: DISCONTINUED | OUTPATIENT
Start: 2019-01-10 | End: 2019-01-10 | Stop reason: SDUPTHER

## 2019-01-10 RX ORDER — ATENOLOL 50 MG/1
50 TABLET ORAL NIGHTLY
Status: DISCONTINUED | OUTPATIENT
Start: 2019-01-10 | End: 2019-01-11 | Stop reason: HOSPADM

## 2019-01-10 RX ORDER — INSULIN GLARGINE 100 [IU]/ML
24 INJECTION, SOLUTION SUBCUTANEOUS NIGHTLY
Status: DISCONTINUED | OUTPATIENT
Start: 2019-01-10 | End: 2019-01-11 | Stop reason: HOSPADM

## 2019-01-10 RX ORDER — ASPIRIN 325 MG
325 TABLET, DELAYED RELEASE (ENTERIC COATED) ORAL 2 TIMES DAILY
Qty: 56 TABLET | Refills: 0 | Status: SHIPPED | OUTPATIENT
Start: 2019-01-10 | End: 2019-02-18 | Stop reason: SDUPTHER

## 2019-01-10 RX ORDER — NEOSTIGMINE METHYLSULFATE 1 MG/ML
INJECTION, SOLUTION INTRAVENOUS PRN
Status: DISCONTINUED | OUTPATIENT
Start: 2019-01-10 | End: 2019-01-10 | Stop reason: SDUPTHER

## 2019-01-10 RX ORDER — MORPHINE SULFATE 2 MG/ML
2 INJECTION, SOLUTION INTRAMUSCULAR; INTRAVENOUS EVERY 5 MIN PRN
Status: DISCONTINUED | OUTPATIENT
Start: 2019-01-10 | End: 2019-01-10 | Stop reason: HOSPADM

## 2019-01-10 RX ORDER — GLYCOPYRROLATE 1 MG/5 ML
SYRINGE (ML) INTRAVENOUS PRN
Status: DISCONTINUED | OUTPATIENT
Start: 2019-01-10 | End: 2019-01-10 | Stop reason: SDUPTHER

## 2019-01-10 RX ORDER — SODIUM CHLORIDE 0.9 % (FLUSH) 0.9 %
10 SYRINGE (ML) INJECTION PRN
Status: DISCONTINUED | OUTPATIENT
Start: 2019-01-10 | End: 2019-01-11 | Stop reason: HOSPADM

## 2019-01-10 RX ORDER — MEPERIDINE HYDROCHLORIDE 50 MG/ML
12.5 INJECTION INTRAMUSCULAR; INTRAVENOUS; SUBCUTANEOUS EVERY 5 MIN PRN
Status: DISCONTINUED | OUTPATIENT
Start: 2019-01-10 | End: 2019-01-10 | Stop reason: HOSPADM

## 2019-01-10 RX ORDER — MORPHINE SULFATE 4 MG/ML
4 INJECTION, SOLUTION INTRAMUSCULAR; INTRAVENOUS
Status: DISCONTINUED | OUTPATIENT
Start: 2019-01-10 | End: 2019-01-11 | Stop reason: HOSPADM

## 2019-01-10 RX ADMIN — SODIUM CHLORIDE, POTASSIUM CHLORIDE, SODIUM LACTATE AND CALCIUM CHLORIDE: 600; 310; 30; 20 INJECTION, SOLUTION INTRAVENOUS at 09:37

## 2019-01-10 RX ADMIN — OXYCODONE AND ACETAMINOPHEN 2 TABLET: 5; 325 TABLET ORAL at 14:21

## 2019-01-10 RX ADMIN — FENTANYL CITRATE 25 MCG: 50 INJECTION, SOLUTION INTRAMUSCULAR; INTRAVENOUS at 10:58

## 2019-01-10 RX ADMIN — MORPHINE SULFATE 2 MG: 2 INJECTION, SOLUTION INTRAMUSCULAR; INTRAVENOUS at 11:57

## 2019-01-10 RX ADMIN — FENTANYL CITRATE 25 MCG: 50 INJECTION, SOLUTION INTRAMUSCULAR; INTRAVENOUS at 10:29

## 2019-01-10 RX ADMIN — FENTANYL CITRATE 50 MCG: 50 INJECTION, SOLUTION INTRAMUSCULAR; INTRAVENOUS at 10:12

## 2019-01-10 RX ADMIN — DEXAMETHASONE SODIUM PHOSPHATE 10 MG: 10 INJECTION INTRAMUSCULAR; INTRAVENOUS at 08:47

## 2019-01-10 RX ADMIN — FENTANYL CITRATE 25 MCG: 50 INJECTION, SOLUTION INTRAMUSCULAR; INTRAVENOUS at 10:46

## 2019-01-10 RX ADMIN — OXYCODONE AND ACETAMINOPHEN 2 TABLET: 5; 325 TABLET ORAL at 22:52

## 2019-01-10 RX ADMIN — ROCURONIUM BROMIDE 30 MG: 10 INJECTION INTRAVENOUS at 08:41

## 2019-01-10 RX ADMIN — DIPHENHYDRAMINE HYDROCHLORIDE 25 MG: 50 INJECTION, SOLUTION INTRAMUSCULAR; INTRAVENOUS at 22:21

## 2019-01-10 RX ADMIN — Medication 0.2 MG: at 09:38

## 2019-01-10 RX ADMIN — OXYCODONE AND ACETAMINOPHEN 2 TABLET: 5; 325 TABLET ORAL at 19:03

## 2019-01-10 RX ADMIN — PROPOFOL 50 MG: 10 INJECTION, EMULSION INTRAVENOUS at 09:05

## 2019-01-10 RX ADMIN — Medication 10 ML: at 22:21

## 2019-01-10 RX ADMIN — FENTANYL CITRATE 100 MCG: 50 INJECTION, SOLUTION INTRAMUSCULAR; INTRAVENOUS at 08:21

## 2019-01-10 RX ADMIN — ALPRAZOLAM 0.25 MG: 0.25 TABLET ORAL at 19:39

## 2019-01-10 RX ADMIN — Medication 0.2 MG: at 10:25

## 2019-01-10 RX ADMIN — PHENYLEPHRINE HYDROCHLORIDE 100 MCG: 10 INJECTION INTRAVENOUS at 09:37

## 2019-01-10 RX ADMIN — Medication 5 MG: at 09:35

## 2019-01-10 RX ADMIN — LIDOCAINE HYDROCHLORIDE 60 MG: 20 INJECTION, SOLUTION INFILTRATION; PERINEURAL at 08:41

## 2019-01-10 RX ADMIN — Medication 10 ML: at 19:38

## 2019-01-10 RX ADMIN — PROPOFOL 150 MG: 10 INJECTION, EMULSION INTRAVENOUS at 08:41

## 2019-01-10 RX ADMIN — ROPIVACAINE HYDROCHLORIDE 30 ML: 5 INJECTION, SOLUTION EPIDURAL; INFILTRATION; PERINEURAL at 08:21

## 2019-01-10 RX ADMIN — MIDAZOLAM HYDROCHLORIDE 2 MG: 1 INJECTION, SOLUTION INTRAMUSCULAR; INTRAVENOUS at 08:21

## 2019-01-10 RX ADMIN — MORPHINE SULFATE 2 MG: 2 INJECTION, SOLUTION INTRAMUSCULAR; INTRAVENOUS at 11:50

## 2019-01-10 RX ADMIN — Medication 5 MG: at 09:40

## 2019-01-10 RX ADMIN — FENTANYL CITRATE 25 MCG: 50 INJECTION, SOLUTION INTRAMUSCULAR; INTRAVENOUS at 10:52

## 2019-01-10 RX ADMIN — ONDANSETRON HYDROCHLORIDE 4 MG: 2 INJECTION, SOLUTION INTRAMUSCULAR; INTRAVENOUS at 10:22

## 2019-01-10 RX ADMIN — METFORMIN HYDROCHLORIDE 500 MG: 500 TABLET ORAL at 17:49

## 2019-01-10 RX ADMIN — PHENYLEPHRINE HYDROCHLORIDE 100 MCG: 10 INJECTION INTRAVENOUS at 09:40

## 2019-01-10 RX ADMIN — Medication 10 MG: at 09:38

## 2019-01-10 RX ADMIN — MORPHINE SULFATE 4 MG: 4 INJECTION, SOLUTION INTRAMUSCULAR; INTRAVENOUS at 22:21

## 2019-01-10 RX ADMIN — Medication 2 MG: at 10:25

## 2019-01-10 RX ADMIN — PHENYLEPHRINE HYDROCHLORIDE 100 MCG: 10 INJECTION INTRAVENOUS at 09:47

## 2019-01-10 RX ADMIN — INSULIN GLARGINE 24 UNITS: 100 INJECTION, SOLUTION SUBCUTANEOUS at 19:47

## 2019-01-10 RX ADMIN — CEFAZOLIN 2000 MG: 1 INJECTION, POWDER, FOR SOLUTION INTRAMUSCULAR; INTRAVENOUS at 09:23

## 2019-01-10 RX ADMIN — SODIUM CHLORIDE, POTASSIUM CHLORIDE, SODIUM LACTATE AND CALCIUM CHLORIDE: 600; 310; 30; 20 INJECTION, SOLUTION INTRAVENOUS at 07:57

## 2019-01-10 RX ADMIN — MORPHINE SULFATE 4 MG: 4 INJECTION, SOLUTION INTRAMUSCULAR; INTRAVENOUS at 19:37

## 2019-01-10 RX ADMIN — FENTANYL CITRATE 100 MCG: 50 INJECTION, SOLUTION INTRAMUSCULAR; INTRAVENOUS at 08:41

## 2019-01-10 ASSESSMENT — PAIN DESCRIPTION - ONSET
ONSET: ON-GOING

## 2019-01-10 ASSESSMENT — PULMONARY FUNCTION TESTS
PIF_VALUE: 23
PIF_VALUE: 2
PIF_VALUE: 23
PIF_VALUE: 2
PIF_VALUE: 22
PIF_VALUE: 0
PIF_VALUE: 24
PIF_VALUE: 23
PIF_VALUE: 24
PIF_VALUE: 23
PIF_VALUE: 2
PIF_VALUE: 19
PIF_VALUE: 3
PIF_VALUE: 18
PIF_VALUE: 2
PIF_VALUE: 24
PIF_VALUE: 23
PIF_VALUE: 2
PIF_VALUE: 2
PIF_VALUE: 4
PIF_VALUE: 24
PIF_VALUE: 21
PIF_VALUE: 24
PIF_VALUE: 23
PIF_VALUE: 2
PIF_VALUE: 2
PIF_VALUE: 5
PIF_VALUE: 24
PIF_VALUE: 23
PIF_VALUE: 22
PIF_VALUE: 23
PIF_VALUE: 25
PIF_VALUE: 24
PIF_VALUE: 23
PIF_VALUE: 1
PIF_VALUE: 2
PIF_VALUE: 22
PIF_VALUE: 2
PIF_VALUE: 22
PIF_VALUE: 2
PIF_VALUE: 24
PIF_VALUE: 2
PIF_VALUE: 3
PIF_VALUE: 7
PIF_VALUE: 24
PIF_VALUE: 3
PIF_VALUE: 24
PIF_VALUE: 24
PIF_VALUE: 23
PIF_VALUE: 3
PIF_VALUE: 23
PIF_VALUE: 2
PIF_VALUE: 4
PIF_VALUE: 2
PIF_VALUE: 2
PIF_VALUE: 24
PIF_VALUE: 24
PIF_VALUE: 2
PIF_VALUE: 24
PIF_VALUE: 18
PIF_VALUE: 24
PIF_VALUE: 2
PIF_VALUE: 16
PIF_VALUE: 23
PIF_VALUE: 2
PIF_VALUE: 3
PIF_VALUE: 23
PIF_VALUE: 22
PIF_VALUE: 23
PIF_VALUE: 2
PIF_VALUE: 23
PIF_VALUE: 23
PIF_VALUE: 21
PIF_VALUE: 23
PIF_VALUE: 2
PIF_VALUE: 23
PIF_VALUE: 3
PIF_VALUE: 3
PIF_VALUE: 5
PIF_VALUE: 2
PIF_VALUE: 24
PIF_VALUE: 24
PIF_VALUE: 2
PIF_VALUE: 23
PIF_VALUE: 22
PIF_VALUE: 2
PIF_VALUE: 24
PIF_VALUE: 24
PIF_VALUE: 3
PIF_VALUE: 3
PIF_VALUE: 23
PIF_VALUE: 19
PIF_VALUE: 2
PIF_VALUE: 2
PIF_VALUE: 24
PIF_VALUE: 16
PIF_VALUE: 22
PIF_VALUE: 24
PIF_VALUE: 3
PIF_VALUE: 24
PIF_VALUE: 24
PIF_VALUE: 4
PIF_VALUE: 24
PIF_VALUE: 23
PIF_VALUE: 23
PIF_VALUE: 5
PIF_VALUE: 23
PIF_VALUE: 24
PIF_VALUE: 23
PIF_VALUE: 15
PIF_VALUE: 24
PIF_VALUE: 2
PIF_VALUE: 23
PIF_VALUE: 23
PIF_VALUE: 24
PIF_VALUE: 2
PIF_VALUE: 2
PIF_VALUE: 25
PIF_VALUE: 24
PIF_VALUE: 23
PIF_VALUE: 23
PIF_VALUE: 2
PIF_VALUE: 24
PIF_VALUE: 23
PIF_VALUE: 24
PIF_VALUE: 2
PIF_VALUE: 2
PIF_VALUE: 20
PIF_VALUE: 24
PIF_VALUE: 2
PIF_VALUE: 23
PIF_VALUE: 19
PIF_VALUE: 2
PIF_VALUE: 23
PIF_VALUE: 24
PIF_VALUE: 23
PIF_VALUE: 0
PIF_VALUE: 23
PIF_VALUE: 4
PIF_VALUE: 2
PIF_VALUE: 2
PIF_VALUE: 17
PIF_VALUE: 22
PIF_VALUE: 23
PIF_VALUE: 2
PIF_VALUE: 23
PIF_VALUE: 1
PIF_VALUE: 1
PIF_VALUE: 24
PIF_VALUE: 2
PIF_VALUE: 24
PIF_VALUE: 23
PIF_VALUE: 2
PIF_VALUE: 24
PIF_VALUE: 24

## 2019-01-10 ASSESSMENT — PAIN DESCRIPTION - FREQUENCY
FREQUENCY: CONTINUOUS

## 2019-01-10 ASSESSMENT — PAIN DESCRIPTION - ORIENTATION
ORIENTATION: RIGHT

## 2019-01-10 ASSESSMENT — PAIN DESCRIPTION - LOCATION
LOCATION: LEG

## 2019-01-10 ASSESSMENT — PAIN SCALES - GENERAL
PAINLEVEL_OUTOF10: 0
PAINLEVEL_OUTOF10: 2
PAINLEVEL_OUTOF10: 0
PAINLEVEL_OUTOF10: 0
PAINLEVEL_OUTOF10: 8
PAINLEVEL_OUTOF10: 0
PAINLEVEL_OUTOF10: 9
PAINLEVEL_OUTOF10: 9
PAINLEVEL_OUTOF10: 8
PAINLEVEL_OUTOF10: 8
PAINLEVEL_OUTOF10: 0
PAINLEVEL_OUTOF10: 10
PAINLEVEL_OUTOF10: 9

## 2019-01-10 ASSESSMENT — PAIN DESCRIPTION - PROGRESSION
CLINICAL_PROGRESSION: GRADUALLY WORSENING
CLINICAL_PROGRESSION: NOT CHANGED

## 2019-01-10 ASSESSMENT — PAIN DESCRIPTION - PAIN TYPE
TYPE: SURGICAL PAIN

## 2019-01-10 ASSESSMENT — PAIN DESCRIPTION - DESCRIPTORS
DESCRIPTORS: ACHING;SHARP;THROBBING
DESCRIPTORS: ACHING;CONSTANT;DISCOMFORT
DESCRIPTORS: ACHING;DISCOMFORT;SHARP;THROBBING
DESCRIPTORS: ACHING;DISCOMFORT;SORE;THROBBING
DESCRIPTORS: CONSTANT;ACHING;BURNING

## 2019-01-10 ASSESSMENT — ENCOUNTER SYMPTOMS: DYSPNEA ACTIVITY LEVEL: AFTER AMBULATING 1 FLIGHT OF STAIRS

## 2019-01-10 ASSESSMENT — PAIN - FUNCTIONAL ASSESSMENT: PAIN_FUNCTIONAL_ASSESSMENT: 0-10

## 2019-01-11 VITALS
HEIGHT: 72 IN | SYSTOLIC BLOOD PRESSURE: 98 MMHG | OXYGEN SATURATION: 96 % | HEART RATE: 76 BPM | DIASTOLIC BLOOD PRESSURE: 56 MMHG | BODY MASS INDEX: 22.35 KG/M2 | TEMPERATURE: 97.7 F | RESPIRATION RATE: 16 BRPM | WEIGHT: 165 LBS

## 2019-01-11 LAB
BASOPHILS ABSOLUTE: 0.05 E9/L (ref 0–0.2)
BASOPHILS RELATIVE PERCENT: 0.3 % (ref 0–2)
EOSINOPHILS ABSOLUTE: 0.04 E9/L (ref 0.05–0.5)
EOSINOPHILS RELATIVE PERCENT: 0.3 % (ref 0–6)
GRAM STAIN ORDERABLE: NORMAL
HCT VFR BLD CALC: 33 % (ref 34–48)
HEMOGLOBIN: 10.9 G/DL (ref 11.5–15.5)
IMMATURE GRANULOCYTES #: 0.05 E9/L
IMMATURE GRANULOCYTES %: 0.3 % (ref 0–5)
LYMPHOCYTES ABSOLUTE: 3.23 E9/L (ref 1.5–4)
LYMPHOCYTES RELATIVE PERCENT: 21.1 % (ref 20–42)
MCH RBC QN AUTO: 32.2 PG (ref 26–35)
MCHC RBC AUTO-ENTMCNC: 33 % (ref 32–34.5)
MCV RBC AUTO: 97.6 FL (ref 80–99.9)
METER GLUCOSE: 129 MG/DL (ref 74–99)
METER GLUCOSE: 173 MG/DL (ref 74–99)
MONOCYTES ABSOLUTE: 2.05 E9/L (ref 0.1–0.95)
MONOCYTES RELATIVE PERCENT: 13.4 % (ref 2–12)
NEUTROPHILS ABSOLUTE: 9.86 E9/L (ref 1.8–7.3)
NEUTROPHILS RELATIVE PERCENT: 64.6 % (ref 43–80)
PDW BLD-RTO: 12.3 FL (ref 11.5–15)
PLATELET # BLD: 277 E9/L (ref 130–450)
PMV BLD AUTO: 10.2 FL (ref 7–12)
RBC # BLD: 3.38 E12/L (ref 3.5–5.5)
RBC # BLD: NORMAL 10*6/UL
WBC # BLD: 15.3 E9/L (ref 4.5–11.5)

## 2019-01-11 PROCEDURE — 82962 GLUCOSE BLOOD TEST: CPT

## 2019-01-11 PROCEDURE — G0378 HOSPITAL OBSERVATION PER HR: HCPCS

## 2019-01-11 PROCEDURE — 96372 THER/PROPH/DIAG INJ SC/IM: CPT

## 2019-01-11 PROCEDURE — 2580000003 HC RX 258: Performed by: STUDENT IN AN ORGANIZED HEALTH CARE EDUCATION/TRAINING PROGRAM

## 2019-01-11 PROCEDURE — 6360000002 HC RX W HCPCS: Performed by: STUDENT IN AN ORGANIZED HEALTH CARE EDUCATION/TRAINING PROGRAM

## 2019-01-11 PROCEDURE — 85025 COMPLETE CBC W/AUTO DIFF WBC: CPT

## 2019-01-11 PROCEDURE — 6370000000 HC RX 637 (ALT 250 FOR IP): Performed by: STUDENT IN AN ORGANIZED HEALTH CARE EDUCATION/TRAINING PROGRAM

## 2019-01-11 PROCEDURE — 36415 COLL VENOUS BLD VENIPUNCTURE: CPT

## 2019-01-11 PROCEDURE — 99024 POSTOP FOLLOW-UP VISIT: CPT | Performed by: PHYSICIAN ASSISTANT

## 2019-01-11 RX ADMIN — Medication 10 ML: at 10:19

## 2019-01-11 RX ADMIN — ENOXAPARIN SODIUM 40 MG: 40 INJECTION SUBCUTANEOUS at 10:20

## 2019-01-11 RX ADMIN — MORPHINE SULFATE 4 MG: 4 INJECTION, SOLUTION INTRAMUSCULAR; INTRAVENOUS at 00:38

## 2019-01-11 RX ADMIN — MORPHINE SULFATE 4 MG: 4 INJECTION, SOLUTION INTRAMUSCULAR; INTRAVENOUS at 04:42

## 2019-01-11 RX ADMIN — OXYCODONE AND ACETAMINOPHEN 2 TABLET: 5; 325 TABLET ORAL at 10:15

## 2019-01-11 RX ADMIN — FAMOTIDINE 20 MG: 20 TABLET, FILM COATED ORAL at 10:16

## 2019-01-11 RX ADMIN — METFORMIN HYDROCHLORIDE 500 MG: 500 TABLET ORAL at 10:16

## 2019-01-11 RX ADMIN — Medication 10 ML: at 07:09

## 2019-01-11 RX ADMIN — Medication 10 ML: at 04:42

## 2019-01-11 RX ADMIN — MORPHINE SULFATE 4 MG: 4 INJECTION, SOLUTION INTRAMUSCULAR; INTRAVENOUS at 07:09

## 2019-01-11 RX ADMIN — DULOXETINE HYDROCHLORIDE 90 MG: 60 CAPSULE, DELAYED RELEASE ORAL at 10:15

## 2019-01-11 RX ADMIN — Medication 10 ML: at 00:38

## 2019-01-11 RX ADMIN — OXYCODONE AND ACETAMINOPHEN 2 TABLET: 5; 325 TABLET ORAL at 03:52

## 2019-01-11 ASSESSMENT — PAIN DESCRIPTION - PAIN TYPE
TYPE: SURGICAL PAIN
TYPE: ACUTE PAIN;CHRONIC PAIN
TYPE: SURGICAL PAIN
TYPE: ACUTE PAIN

## 2019-01-11 ASSESSMENT — PAIN DESCRIPTION - FREQUENCY
FREQUENCY: CONTINUOUS

## 2019-01-11 ASSESSMENT — PAIN DESCRIPTION - LOCATION
LOCATION: LEG

## 2019-01-11 ASSESSMENT — PAIN SCALES - GENERAL
PAINLEVEL_OUTOF10: 0
PAINLEVEL_OUTOF10: 3
PAINLEVEL_OUTOF10: 8
PAINLEVEL_OUTOF10: 8
PAINLEVEL_OUTOF10: 0
PAINLEVEL_OUTOF10: 8
PAINLEVEL_OUTOF10: 7
PAINLEVEL_OUTOF10: 8
PAINLEVEL_OUTOF10: 9

## 2019-01-11 ASSESSMENT — PAIN DESCRIPTION - ORIENTATION
ORIENTATION: RIGHT

## 2019-01-11 ASSESSMENT — PAIN DESCRIPTION - ONSET
ONSET: ON-GOING

## 2019-01-11 ASSESSMENT — PAIN DESCRIPTION - PROGRESSION: CLINICAL_PROGRESSION: NOT CHANGED

## 2019-01-11 ASSESSMENT — PAIN DESCRIPTION - DESCRIPTORS
DESCRIPTORS: ACHING;DISCOMFORT;SORE
DESCRIPTORS: ACHING

## 2019-01-15 LAB
ANAEROBIC CULTURE: NORMAL
CULTURE SURGICAL: NORMAL
GRAM STAIN RESULT: NORMAL

## 2019-01-16 LAB
3-OH-COTININE: 208 NG/ML
COTININE: 354 NG/ML
NICOTINE: 16 NG/ML

## 2019-01-17 ENCOUNTER — TELEPHONE (OUTPATIENT)
Dept: ORTHOPEDIC SURGERY | Age: 54
End: 2019-01-17

## 2019-01-24 DIAGNOSIS — S82.401K: Primary | ICD-10-CM

## 2019-01-24 DIAGNOSIS — S82.201K: Primary | ICD-10-CM

## 2019-01-25 ENCOUNTER — HOSPITAL ENCOUNTER (OUTPATIENT)
Dept: GENERAL RADIOLOGY | Age: 54
Discharge: HOME | End: 2019-01-27
Payer: MEDICAID

## 2019-01-25 ENCOUNTER — OFFICE VISIT (OUTPATIENT)
Dept: ORTHOPEDIC SURGERY | Age: 54
End: 2019-01-25
Payer: MEDICAID

## 2019-01-25 VITALS
HEART RATE: 79 BPM | DIASTOLIC BLOOD PRESSURE: 69 MMHG | SYSTOLIC BLOOD PRESSURE: 101 MMHG | WEIGHT: 165 LBS | BODY MASS INDEX: 22.35 KG/M2 | HEIGHT: 72 IN

## 2019-01-25 DIAGNOSIS — S82.401K TIBIA/FIBULA FRACTURE, RIGHT, CLOSED, WITH NONUNION, SUBSEQUENT ENCOUNTER: ICD-10-CM

## 2019-01-25 DIAGNOSIS — S82.202G TIBIA/FIBULA FRACTURE, LEFT, CLOSED, WITH DELAYED HEALING, SUBSEQUENT ENCOUNTER: Primary | ICD-10-CM

## 2019-01-25 DIAGNOSIS — S82.401K: ICD-10-CM

## 2019-01-25 DIAGNOSIS — Z98.890 STATUS POST OPEN REDUCTION AND INTERNAL FIXATION (ORIF) OF FRACTURE WITH NONUNION: ICD-10-CM

## 2019-01-25 DIAGNOSIS — S82.201K: ICD-10-CM

## 2019-01-25 DIAGNOSIS — Z87.81 STATUS POST OPEN REDUCTION AND INTERNAL FIXATION (ORIF) OF FRACTURE WITH NONUNION: ICD-10-CM

## 2019-01-25 DIAGNOSIS — S82.201K TIBIA/FIBULA FRACTURE, RIGHT, CLOSED, WITH NONUNION, SUBSEQUENT ENCOUNTER: ICD-10-CM

## 2019-01-25 DIAGNOSIS — S82.402G TIBIA/FIBULA FRACTURE, LEFT, CLOSED, WITH DELAYED HEALING, SUBSEQUENT ENCOUNTER: Primary | ICD-10-CM

## 2019-01-25 DIAGNOSIS — T84.498D FAILED ORTHOPEDIC IMPLANT, SUBSEQUENT ENCOUNTER: ICD-10-CM

## 2019-01-25 PROCEDURE — 73610 X-RAY EXAM OF ANKLE: CPT

## 2019-01-25 PROCEDURE — 73590 X-RAY EXAM OF LOWER LEG: CPT

## 2019-01-25 PROCEDURE — 99212 OFFICE O/P EST SF 10 MIN: CPT | Performed by: ORTHOPAEDIC SURGERY

## 2019-01-25 PROCEDURE — 99024 POSTOP FOLLOW-UP VISIT: CPT | Performed by: ORTHOPAEDIC SURGERY

## 2019-01-25 RX ORDER — OXYCODONE HYDROCHLORIDE AND ACETAMINOPHEN 5; 325 MG/1; MG/1
1 TABLET ORAL EVERY 8 HOURS PRN
Qty: 21 TABLET | Refills: 0 | Status: SHIPPED | OUTPATIENT
Start: 2019-01-25 | End: 2019-02-01

## 2019-02-11 LAB
FUNGUS (MYCOLOGY) CULTURE: NORMAL
FUNGUS STAIN: NORMAL

## 2019-02-18 ENCOUNTER — HOSPITAL ENCOUNTER (OUTPATIENT)
Dept: GENERAL RADIOLOGY | Age: 54
Discharge: HOME | End: 2019-02-20
Payer: MEDICAID

## 2019-02-18 ENCOUNTER — OFFICE VISIT (OUTPATIENT)
Dept: ORTHOPEDIC SURGERY | Age: 54
End: 2019-02-18
Payer: MEDICAID

## 2019-02-18 VITALS
HEART RATE: 71 BPM | WEIGHT: 165 LBS | DIASTOLIC BLOOD PRESSURE: 78 MMHG | RESPIRATION RATE: 15 BRPM | HEIGHT: 72 IN | BODY MASS INDEX: 22.35 KG/M2 | TEMPERATURE: 97.2 F | SYSTOLIC BLOOD PRESSURE: 118 MMHG

## 2019-02-18 DIAGNOSIS — Z87.81 STATUS POST OPEN REDUCTION AND INTERNAL FIXATION (ORIF) OF FRACTURE WITH NONUNION: ICD-10-CM

## 2019-02-18 DIAGNOSIS — S82.201K TIBIA/FIBULA FRACTURE, RIGHT, CLOSED, WITH NONUNION, SUBSEQUENT ENCOUNTER: ICD-10-CM

## 2019-02-18 DIAGNOSIS — S82.401K TIBIA/FIBULA FRACTURE, RIGHT, CLOSED, WITH NONUNION, SUBSEQUENT ENCOUNTER: ICD-10-CM

## 2019-02-18 DIAGNOSIS — Z98.890 STATUS POST OPEN REDUCTION AND INTERNAL FIXATION (ORIF) OF FRACTURE WITH NONUNION: ICD-10-CM

## 2019-02-18 PROCEDURE — 99024 POSTOP FOLLOW-UP VISIT: CPT | Performed by: PHYSICIAN ASSISTANT

## 2019-02-18 PROCEDURE — 99213 OFFICE O/P EST LOW 20 MIN: CPT

## 2019-02-18 PROCEDURE — 73610 X-RAY EXAM OF ANKLE: CPT

## 2019-02-18 RX ORDER — ASPIRIN 325 MG
325 TABLET, DELAYED RELEASE (ENTERIC COATED) ORAL 2 TIMES DAILY
Qty: 60 TABLET | Refills: 1 | Status: SHIPPED | OUTPATIENT
Start: 2019-02-18 | End: 2019-05-03

## 2019-02-26 LAB
AFB CULTURE (MYCOBACTERIA): NORMAL
AFB SMEAR: NORMAL

## 2019-03-13 ENCOUNTER — OFFICE VISIT (OUTPATIENT)
Dept: ORTHOPEDIC SURGERY | Age: 54
End: 2019-03-13
Payer: MEDICAID

## 2019-03-13 ENCOUNTER — HOSPITAL ENCOUNTER (OUTPATIENT)
Dept: GENERAL RADIOLOGY | Age: 54
Discharge: HOME | End: 2019-03-15
Payer: MEDICAID

## 2019-03-13 VITALS
HEIGHT: 72 IN | SYSTOLIC BLOOD PRESSURE: 116 MMHG | HEART RATE: 74 BPM | BODY MASS INDEX: 22.35 KG/M2 | DIASTOLIC BLOOD PRESSURE: 82 MMHG | WEIGHT: 165 LBS

## 2019-03-13 DIAGNOSIS — T84.498D FAILED ORTHOPEDIC IMPLANT, SUBSEQUENT ENCOUNTER: ICD-10-CM

## 2019-03-13 DIAGNOSIS — S82.201K TIBIA/FIBULA FRACTURE, RIGHT, CLOSED, WITH NONUNION, SUBSEQUENT ENCOUNTER: ICD-10-CM

## 2019-03-13 DIAGNOSIS — S82.401K TIBIA/FIBULA FRACTURE, RIGHT, CLOSED, WITH NONUNION, SUBSEQUENT ENCOUNTER: ICD-10-CM

## 2019-03-13 DIAGNOSIS — S82.201K TIBIA/FIBULA FRACTURE, RIGHT, CLOSED, WITH NONUNION, SUBSEQUENT ENCOUNTER: Primary | ICD-10-CM

## 2019-03-13 DIAGNOSIS — S82.401K TIBIA/FIBULA FRACTURE, RIGHT, CLOSED, WITH NONUNION, SUBSEQUENT ENCOUNTER: Primary | ICD-10-CM

## 2019-03-13 PROCEDURE — 99213 OFFICE O/P EST LOW 20 MIN: CPT | Performed by: ORTHOPAEDIC SURGERY

## 2019-03-13 PROCEDURE — 73610 X-RAY EXAM OF ANKLE: CPT

## 2019-03-13 PROCEDURE — 99024 POSTOP FOLLOW-UP VISIT: CPT | Performed by: ORTHOPAEDIC SURGERY

## 2019-04-10 ENCOUNTER — HOSPITAL ENCOUNTER (OUTPATIENT)
Dept: GENERAL RADIOLOGY | Age: 54
Discharge: HOME | End: 2019-04-12
Payer: MEDICAID

## 2019-04-10 ENCOUNTER — OFFICE VISIT (OUTPATIENT)
Dept: ORTHOPEDIC SURGERY | Age: 54
End: 2019-04-10
Payer: MEDICAID

## 2019-04-10 VITALS
WEIGHT: 165 LBS | HEART RATE: 67 BPM | SYSTOLIC BLOOD PRESSURE: 122 MMHG | BODY MASS INDEX: 22.35 KG/M2 | DIASTOLIC BLOOD PRESSURE: 83 MMHG | HEIGHT: 72 IN

## 2019-04-10 DIAGNOSIS — S82.401K TIBIA/FIBULA FRACTURE, RIGHT, CLOSED, WITH NONUNION, SUBSEQUENT ENCOUNTER: ICD-10-CM

## 2019-04-10 DIAGNOSIS — S82.201K TIBIA/FIBULA FRACTURE, RIGHT, CLOSED, WITH NONUNION, SUBSEQUENT ENCOUNTER: ICD-10-CM

## 2019-04-10 DIAGNOSIS — S82.401K TIBIA/FIBULA FRACTURE, RIGHT, CLOSED, WITH NONUNION, SUBSEQUENT ENCOUNTER: Primary | ICD-10-CM

## 2019-04-10 DIAGNOSIS — S82.201K TIBIA/FIBULA FRACTURE, RIGHT, CLOSED, WITH NONUNION, SUBSEQUENT ENCOUNTER: Primary | ICD-10-CM

## 2019-04-10 PROCEDURE — 99024 POSTOP FOLLOW-UP VISIT: CPT | Performed by: PHYSICIAN ASSISTANT

## 2019-04-10 PROCEDURE — 73610 X-RAY EXAM OF ANKLE: CPT

## 2019-04-10 PROCEDURE — 99212 OFFICE O/P EST SF 10 MIN: CPT

## 2019-04-10 NOTE — PROGRESS NOTES
OP: SURGEON: Melissa Escobar MD  DATE OF PROCEDURE:  01/10/2019  PROCEDURES PERFORMED:  1.  Removal of deep implant, right distal tibia. 2.  Open reduction and internal fixation of right distal tibia with plate and screws at the weightbearing portion of the tibia. Subjective:  Jus Haas is approximately 3 months out from the above mentioned procedure. States she has been NWB, in cast. Again denies smoking but smells of nicotine in the office. Continues to use bone stimulator daily and takes Vit D and Calcium. Pain is controlled. Denies new complaints or paresthesias. Denies SOB, chest pain, or calf pain. Denies fevers or chills. Review of Systems -    General ROS: negative for - chills, fatigue, fever or night sweats  Respiratory ROS: no cough, shortness of breath, or wheezing  Cardiovascular ROS: no chest pain or dyspnea on exertion  Gastrointestinal ROS: no abdominal pain, nausea, vomiting, diarrhea, constipation,or black or bloody stools  Genitourinary: no hematuria, dysuria, or incontinence   Musculoskeletal ROS: negative for -back or neck pain or stiffness, also see HPI  Neurological ROS: no TIA or stroke symptoms       Objective:    General: Alert and oriented X 3, normocephalic atraumatic, external ears and eye normal, sclera clear, no acute distress, respirations easy and unlabored with no audible wheezes, skin warm and dry, speech and dress appropriate for noted age, affect euthymic. Extremity:  right Lower Extremity Exam:    Incisions over right leg well healed no signs of infection: No erythema, warmth, drainage, or fluctuance appreciated. Skin intact no signs of infection. No erythema/induration/fluctuence present. mild swelling present. no ecchymosis present. Tender to palpation over distal tibia mildly. Stiffness of the right ankle noted  Demonstrates active knee flexion/extension, ankle plantar/dorsiflexion/great toe extension.    Sensation intact to light touch in sural/deep peroneal/superficial peroneal/saphenous/posterior tibial nerve distributions to foot/ankle. Palpable dorsalis pedis and posterior tibialis pulses, cap refill brisk in toes, foot warm/perfused. Compartments supple throughout thigh and leg. Calves soft non tender    /83 (Site: Left Upper Arm, Position: Sitting, Cuff Size: Large Adult)   Pulse 67   Ht 6' 1\" (1.854 m)   Wt 165 lb (74.8 kg)   LMP 08/13/2013   BMI 21.77 kg/m²     XR:  Xrays of the right ankle obtained demonstrating a pilon fracture, no change in fracture alignment evidence of minimal interval healing noted. Xrays also reviewed by Dr. Lindsay Ziegler today. Assessment:   Diagnosis Orders   1.  Tibia/fibula fracture, right, closed, with nonunion, subsequent encounter  CT TIBIA FIBULA RIGHT WO CONTRAST       Plan:  WB:  Non-weight bearing, continue  Boot: On at all times, can remove for bathing and therapy  Ct scan of the right tibia and fibula to be obtained will call with results , if healing noted will increase to [facility]  Return to the office in 6 weeks for recheck  Continue Vit D and Calcium  Continue Bone stimulator daily  Electronically signed by Antwon Esposito PA-C on 4/11/2019 at 9:35 AM

## 2019-04-10 NOTE — PATIENT INSTRUCTIONS
WB:  Non-weight bearing, continue  Boot: On at all times, can remove for bathing and therapy  Ct scan of the right tibia and fibula to be obtained will call with results   Return to the office in 6 weeks for recheck  Continue Vit D and Calcium  Continue Bone stimulator daily

## 2019-04-19 ENCOUNTER — HOSPITAL ENCOUNTER (OUTPATIENT)
Dept: CT IMAGING | Age: 54
Discharge: HOME | End: 2019-04-21
Payer: MEDICAID

## 2019-04-19 DIAGNOSIS — S82.201K TIBIA/FIBULA FRACTURE, RIGHT, CLOSED, WITH NONUNION, SUBSEQUENT ENCOUNTER: ICD-10-CM

## 2019-04-19 DIAGNOSIS — S82.401K TIBIA/FIBULA FRACTURE, RIGHT, CLOSED, WITH NONUNION, SUBSEQUENT ENCOUNTER: ICD-10-CM

## 2019-04-19 PROCEDURE — 73700 CT LOWER EXTREMITY W/O DYE: CPT

## 2019-04-29 ENCOUNTER — TELEPHONE (OUTPATIENT)
Dept: ORTHOPEDIC SURGERY | Age: 54
End: 2019-04-29

## 2019-04-29 NOTE — TELEPHONE ENCOUNTER
Val Catrachita called and left message asking for results of her CT. Also stating that she has a lump on her leg. Her phone number is [phone].

## 2019-04-30 NOTE — TELEPHONE ENCOUNTER
Patient called back in asking to speak to Ronal Palencia PA-C. Patient asked for a return call back, saying she wants to discuss things with Vitor Castañeda. Call back #[phone].

## 2019-05-03 ENCOUNTER — OFFICE VISIT (OUTPATIENT)
Dept: ORTHOPEDIC SURGERY | Age: 54
End: 2019-05-03
Payer: MEDICAID

## 2019-05-03 VITALS — HEIGHT: 72 IN | HEART RATE: 73 BPM | BODY MASS INDEX: 22.35 KG/M2 | RESPIRATION RATE: 17 BRPM | WEIGHT: 165 LBS

## 2019-05-03 DIAGNOSIS — S82.401K TIBIA/FIBULA FRACTURE, RIGHT, CLOSED, WITH NONUNION, SUBSEQUENT ENCOUNTER: Primary | ICD-10-CM

## 2019-05-03 DIAGNOSIS — S82.201K TIBIA/FIBULA FRACTURE, RIGHT, CLOSED, WITH NONUNION, SUBSEQUENT ENCOUNTER: Primary | ICD-10-CM

## 2019-05-03 PROCEDURE — 99212 OFFICE O/P EST SF 10 MIN: CPT | Performed by: ORTHOPAEDIC SURGERY

## 2019-05-03 PROCEDURE — 99213 OFFICE O/P EST LOW 20 MIN: CPT | Performed by: ORTHOPAEDIC SURGERY

## 2019-05-03 PROCEDURE — G8427 DOCREV CUR MEDS BY ELIG CLIN: HCPCS | Performed by: ORTHOPAEDIC SURGERY

## 2019-05-03 PROCEDURE — 3017F COLORECTAL CA SCREEN DOC REV: CPT | Performed by: ORTHOPAEDIC SURGERY

## 2019-05-03 PROCEDURE — 1036F TOBACCO NON-USER: CPT | Performed by: ORTHOPAEDIC SURGERY

## 2019-05-03 PROCEDURE — G8420 CALC BMI NORM PARAMETERS: HCPCS | Performed by: ORTHOPAEDIC SURGERY

## 2019-05-03 RX ORDER — ASPIRIN 81 MG/1
81 TABLET ORAL DAILY
Qty: 28 TABLET | Refills: 0 | Status: SHIPPED | OUTPATIENT
Start: 2019-05-03 | End: 2021-09-29

## 2019-05-03 NOTE — PROGRESS NOTES
OP: SURGEON: Jigar Dorsey MD  DATE OF PROCEDURE:  01/10/2019  PROCEDURES PERFORMED:  1.  Removal of deep implant, right distal tibia. 2.  Open reduction and internal fixation of right distal tibia with plate and screws at the weightbearing portion of the tibia. Subjective:  Adebayo Ewing is approximately 3.5 months out from the above mentioned procedure. States she has been NWB, in boot. Again denies smoking but smells of nicotine in the office. Continues to use bone stimulator daily and takes Vit D and Calcium. Needs refill of aspirin Pain is controlled. Patient complaining of bump on front of shin. Denies new complaints or paresthesias. Denies SOB, chest pain, or calf pain. Denies fevers or chills. Review of Systems -    General ROS: negative for - chills, fatigue, fever or night sweats  Respiratory ROS: no cough, shortness of breath, or wheezing  Cardiovascular ROS: no chest pain or dyspnea on exertion  Gastrointestinal ROS: no abdominal pain, nausea, vomiting, diarrhea, constipation,or black or bloody stools  Genitourinary: no hematuria, dysuria, or incontinence   Musculoskeletal ROS: negative for -back or neck pain or stiffness, also see HPI  Neurological ROS: no TIA or stroke symptoms       Objective:    General: Alert and oriented X 3, normocephalic atraumatic, external ears and eye normal, sclera clear, no acute distress, respirations easy and unlabored with no audible wheezes, skin warm and dry, speech and dress appropriate for noted age, affect euthymic. Extremity:  right Lower Extremity Exam:    Incisions over right leg well healed no signs of infection: No erythema, warmth, drainage, or fluctuance appreciated. Skin intact no signs of infection. No erythema/induration/fluctuence present. mild swelling present. no ecchymosis present. Tender to palpation over distal tibia mildly. Stiffness of the right ankle noted  The bump on anterior shin is the top of the anterior plate. Demonstrates active knee flexion/extension, ankle plantar/dorsiflexion/great toe extension. Sensation intact to light touch in sural/deep peroneal/superficial peroneal/saphenous/posterior tibial nerve distributions to foot/ankle. Palpable dorsalis pedis and posterior tibialis pulses, cap refill brisk in toes, foot warm/perfused. Compartments supple throughout thigh and leg. Calves soft non tender    Pulse 73   Resp 17   Ht 6' 1\" (1.854 m)   Wt 165 lb (74.8 kg)   LMP 08/13/2013   BMI 21.77 kg/m²     XR:  CT right ankle  Shows some callus formation to the distal tibia. The hardware is still well aligned no signs of failure. Xrays of the right ankle obtained demonstrating a pilon fracture, no change in fracture alignment evidence of minimal interval healing noted. Xrays also reviewed by Dr. Ty Guardado today. Assessment:   Diagnosis Orders   1. Tibia/fibula fracture, right, closed, with nonunion, subsequent encounter  [address]:  WB:  Non-weight bearing, continue  Boot: On at all times, can remove for bathing and therapy  Can increase to partial weight bearing increase   Return to the office in 6 weeks for recheck  Continue Vit D and Calcium  Continue Bone stimulator daily    Electronically signed by Tian Esquivel DO on 5/3/2019 at 10:53 AM        Is seen along with resident today. She is over 3 months out from multiply revised right distal tibia fracture due to noncompliance. She continues to smoke although she continues to deny smoking fact is that she smells like cigarette smoke in the office. Her CT scan did show good bridging callus although there are still some deficiencies. Described this to her in the office today. We will start partial weightbearing and see her back in 6 weeks' time. She makes further callus we will advance weightbearing as tolerated that point in time and wean her out of her boot. She is agreeable with the plan.

## 2019-05-15 ENCOUNTER — TELEPHONE (OUTPATIENT)
Dept: ORTHOPEDIC SURGERY | Age: 54
End: 2019-05-15

## 2020-06-16 ENCOUNTER — HOSPITAL ENCOUNTER (OUTPATIENT)
Dept: INTERVENTIONAL RADIOLOGY/VASCULAR | Age: 55
Discharge: HOME | End: 2020-06-18
Payer: MEDICAID

## 2020-06-16 PROCEDURE — 93922 UPR/L XTREMITY ART 2 LEVELS: CPT

## 2020-07-15 ENCOUNTER — HOSPITAL ENCOUNTER (EMERGENCY)
Age: 55
Discharge: HOME | End: 2020-07-15
Attending: EMERGENCY MEDICINE
Payer: MEDICAID

## 2020-07-15 ENCOUNTER — APPOINTMENT (OUTPATIENT)
Dept: GENERAL RADIOLOGY | Age: 55
End: 2020-07-15
Payer: MEDICAID

## 2020-07-15 VITALS
HEART RATE: 77 BPM | RESPIRATION RATE: 18 BRPM | SYSTOLIC BLOOD PRESSURE: 158 MMHG | OXYGEN SATURATION: 97 % | BODY MASS INDEX: 22.35 KG/M2 | DIASTOLIC BLOOD PRESSURE: 88 MMHG | WEIGHT: 165 LBS | HEIGHT: 72 IN | TEMPERATURE: 97.8 F

## 2020-07-15 PROCEDURE — 99283 EMERGENCY DEPT VISIT LOW MDM: CPT

## 2020-07-15 PROCEDURE — 6370000000 HC RX 637 (ALT 250 FOR IP): Performed by: EMERGENCY MEDICINE

## 2020-07-15 PROCEDURE — 71101 X-RAY EXAM UNILAT RIBS/CHEST: CPT

## 2020-07-15 RX ORDER — HYDROCODONE BITARTRATE AND ACETAMINOPHEN 5; 325 MG/1; MG/1
1 TABLET ORAL ONCE
Status: COMPLETED | OUTPATIENT
Start: 2020-07-15 | End: 2020-07-15

## 2020-07-15 RX ADMIN — HYDROCODONE BITARTRATE AND ACETAMINOPHEN 1 TABLET: 5; 325 TABLET ORAL at 17:11

## 2020-07-15 ASSESSMENT — PAIN DESCRIPTION - LOCATION: LOCATION: RIB CAGE

## 2020-07-15 ASSESSMENT — PAIN SCALES - GENERAL
PAINLEVEL_OUTOF10: 8
PAINLEVEL_OUTOF10: 8

## 2020-07-15 ASSESSMENT — PAIN DESCRIPTION - FREQUENCY: FREQUENCY: CONTINUOUS

## 2020-07-15 ASSESSMENT — PAIN DESCRIPTION - ORIENTATION: ORIENTATION: RIGHT

## 2020-07-15 ASSESSMENT — PAIN DESCRIPTION - PAIN TYPE: TYPE: ACUTE PAIN

## 2020-07-15 ASSESSMENT — PAIN DESCRIPTION - ONSET: ONSET: SUDDEN

## 2020-07-15 ASSESSMENT — PAIN DESCRIPTION - DESCRIPTORS: DESCRIPTORS: ACHING
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
## 2020-12-04 ENCOUNTER — TELEPHONE (OUTPATIENT)
Dept: BREAST CENTER | Age: 55
End: 2020-12-04

## 2020-12-04 NOTE — TELEPHONE ENCOUNTER
MA returned patient call and patient unable to move appt due to transportation.          Electronically signed by Susana Mi MA on 12/4/20 at 4:30 PM EST

## 2020-12-04 NOTE — TELEPHONE ENCOUNTER
MA attempt to contact patient to move appt on 12/11/2020 up to 9:30am. MA reached patient VM and left detailed message of why was calling and office number for patient to call office back.       Electronically signed by Benson Lopez MA on 12/4/20 at 3:46 PM EST

## 2020-12-04 NOTE — TELEPHONE ENCOUNTER
Pt returned call back to office. Staff was unavailable due to pt care. Pt was directed to MA line to .   She called from [phone]

## 2020-12-10 ENCOUNTER — TELEPHONE (OUTPATIENT)
Dept: CASE MANAGEMENT | Age: 55
End: 2020-12-10

## 2020-12-10 NOTE — TELEPHONE ENCOUNTER
Patient breast surgery education packet assembled. Nurse Navigator is scheduled to met with patient at new patient appointment on 12/11/20 with Dr. Wanda Singh.

## 2020-12-11 ENCOUNTER — OFFICE VISIT (OUTPATIENT)
Dept: BREAST CENTER | Age: 55
End: 2020-12-11
Payer: MEDICAID

## 2020-12-11 ENCOUNTER — HOSPITAL ENCOUNTER (OUTPATIENT)
Dept: GENERAL RADIOLOGY | Age: 55
Discharge: HOME | End: 2020-12-13
Payer: MEDICAID

## 2020-12-11 ENCOUNTER — TELEPHONE (OUTPATIENT)
Dept: CASE MANAGEMENT | Age: 55
End: 2020-12-11

## 2020-12-11 VITALS
HEART RATE: 76 BPM | HEIGHT: 72 IN | OXYGEN SATURATION: 95 % | RESPIRATION RATE: 15 BRPM | DIASTOLIC BLOOD PRESSURE: 84 MMHG | BODY MASS INDEX: 23.7 KG/M2 | WEIGHT: 175 LBS | SYSTOLIC BLOOD PRESSURE: 138 MMHG | TEMPERATURE: 98.3 F

## 2020-12-11 DIAGNOSIS — C50.911 INVASIVE DUCTAL CARCINOMA OF RIGHT BREAST (HCC): ICD-10-CM

## 2020-12-11 LAB
ALBUMIN SERPL-MCNC: 3.9 G/DL (ref 3.5–5.2)
ALP BLD-CCNC: 102 U/L (ref 35–104)
ALT SERPL-CCNC: 24 U/L (ref 0–32)
ANION GAP SERPL CALCULATED.3IONS-SCNC: 13 MMOL/L (ref 7–16)
AST SERPL-CCNC: 21 U/L (ref 0–31)
BILIRUB SERPL-MCNC: 0.3 MG/DL (ref 0–1.2)
BUN BLDV-MCNC: 12 MG/DL (ref 6–20)
CALCIUM SERPL-MCNC: 9.7 MG/DL (ref 8.6–10.2)
CHLORIDE BLD-SCNC: 101 MMOL/L (ref 98–107)
CO2: 23 MMOL/L (ref 22–29)
CREAT SERPL-MCNC: 0.9 MG/DL (ref 0.5–1)
GFR AFRICAN AMERICAN: >60
GFR NON-AFRICAN AMERICAN: >60 ML/MIN/1.73
GLUCOSE BLD-MCNC: 187 MG/DL (ref 74–99)
HCT VFR BLD CALC: 39.8 % (ref 34–48)
HEMOGLOBIN: 13 G/DL (ref 11.5–15.5)
MCH RBC QN AUTO: 31 PG (ref 26–35)
MCHC RBC AUTO-ENTMCNC: 32.7 % (ref 32–34.5)
MCV RBC AUTO: 95 FL (ref 80–99.9)
PDW BLD-RTO: 12.5 FL (ref 11.5–15)
PLATELET # BLD: 329 E9/L (ref 130–450)
PMV BLD AUTO: 9.8 FL (ref 7–12)
POTASSIUM SERPL-SCNC: 4.5 MMOL/L (ref 3.5–5)
RBC # BLD: 4.19 E12/L (ref 3.5–5.5)
SODIUM BLD-SCNC: 137 MMOL/L (ref 132–146)
TOTAL PROTEIN: 6.7 G/DL (ref 6.4–8.3)
WBC # BLD: 8.9 E9/L (ref 4.5–11.5)

## 2020-12-11 PROCEDURE — G8427 DOCREV CUR MEDS BY ELIG CLIN: HCPCS | Performed by: SURGERY

## 2020-12-11 PROCEDURE — 99203 OFFICE O/P NEW LOW 30 MIN: CPT | Performed by: SURGERY

## 2020-12-11 PROCEDURE — G8484 FLU IMMUNIZE NO ADMIN: HCPCS | Performed by: SURGERY

## 2020-12-11 PROCEDURE — 76642 ULTRASOUND BREAST LIMITED: CPT

## 2020-12-11 PROCEDURE — G9899 SCRN MAM PERF RSLTS DOC: HCPCS | Performed by: SURGERY

## 2020-12-11 PROCEDURE — 99205 OFFICE O/P NEW HI 60 MIN: CPT | Performed by: SURGERY

## 2020-12-11 PROCEDURE — G8420 CALC BMI NORM PARAMETERS: HCPCS | Performed by: SURGERY

## 2020-12-11 PROCEDURE — 71046 X-RAY EXAM CHEST 2 VIEWS: CPT

## 2020-12-11 RX ORDER — AMITRIPTYLINE HYDROCHLORIDE 50 MG/1
50 TABLET, FILM COATED ORAL NIGHTLY
Status: ON HOLD | COMMUNITY
Start: 2020-10-26 | End: 2022-02-24 | Stop reason: HOSPADM

## 2020-12-11 RX ORDER — LORATADINE/PSEUDOEPHEDRINE 10MG-240MG
1 TABLET, EXTENDED RELEASE 24 HR ORAL DAILY
COMMUNITY
Start: 2020-11-23

## 2020-12-11 RX ORDER — ATENOLOL 25 MG/1
TABLET ORAL EVERY EVENING
Status: ON HOLD | COMMUNITY
Start: 2020-11-24 | End: 2022-02-09 | Stop reason: HOSPADM

## 2020-12-11 RX ORDER — FAMOTIDINE 20 MG/1
20 TABLET, FILM COATED ORAL DAILY
COMMUNITY

## 2020-12-11 RX ORDER — ALPRAZOLAM 0.5 MG/1
0.5 TABLET ORAL NIGHTLY
Status: ON HOLD | COMMUNITY
End: 2022-02-24 | Stop reason: HOSPADM

## 2020-12-11 RX ORDER — INSULIN GLARGINE 100 [IU]/ML
30 INJECTION, SOLUTION SUBCUTANEOUS NIGHTLY
COMMUNITY
Start: 2020-11-23

## 2020-12-11 RX ORDER — MIRTAZAPINE 15 MG/1
15 TABLET, FILM COATED ORAL NIGHTLY
Status: ON HOLD | COMMUNITY
End: 2022-02-24 | Stop reason: HOSPADM

## 2020-12-11 RX ORDER — AMITRIPTYLINE HYDROCHLORIDE 100 MG/1
TABLET, FILM COATED ORAL
COMMUNITY
Start: 2020-11-24 | End: 2021-02-02

## 2020-12-11 RX ORDER — PREGABALIN 75 MG/1
75 CAPSULE ORAL DAILY
COMMUNITY
Start: 2020-10-26

## 2020-12-11 ASSESSMENT — ENCOUNTER SYMPTOMS
GASTROINTESTINAL NEGATIVE: 1
NAUSEA: 0
VOMITING: 0
CONSTIPATION: 0
DIARRHEA: 0
RESPIRATORY NEGATIVE: 1
COUGH: 0
BACK PAIN: 1
BLOOD IN STOOL: 0
EYES NEGATIVE: 1
ALLERGIC/IMMUNOLOGIC NEGATIVE: 1
SHORTNESS OF BREATH: 0
ABDOMINAL PAIN: 0

## 2020-12-11 NOTE — TELEPHONE ENCOUNTER
Met with daniel regarding her recent breast cancer diagnosis. Instructed in detail on her breast biopsy pathology findings including cancer type (IDC) and hormone receptor status (ER+, AL+, Her2+). Instructed on next steps including breast surgery options, lymph node biopsy procedures and additional imaging that may be required. Informed patient that this is the beginning of their breast cancer journey and when treatment has been completed she will receive a [address] detailing the events of her specific treatment plan. Provided with extensive literature including \"Be A Survivor: Your guide to breast cancer treatment\", Your Guide to Your Breast Cancer Pathology Report, American Cancer society Exercises after breast surgery and Lymphedema Early Signs and Symptoms. Written materials on local and national support and informational groups. Today patient received copies of their pathology and imaging reports (if available) as well as a list of local medical oncology providers. Provided patient with my contact information, office hours, and encouragement to call me with questions or concerns. Patient verbalizes understanding and appreciative of nurse navigator visit. Emotional support provided and greater than 30 minutes spent with patient. Nurse navigator will continue to follow.

## 2020-12-11 NOTE — LETTER
[address] [address]  Phone: [phone]  Fax: [phone]    Sarahi Villarreal MD        December 11, 2020     [facility], [address] [place] [address]    Patient: Keyla Liu  MR Number: [id number]  YOB: 1965  Date of Visit: 12/11/2020    Dear  [facility]: Thank you for the request for consultation for Josh Carroll to me for the evaluation of right breast cancer. Below are the relevant portions of my assessment and plan of care. Arleth Landa has triple positive breast cancer. I have ordered additional images and labs. Once I have the size of the mass from the MRI, that will determine if she will get neoadjuvant or adjuvant chemotherapy. If you have questions, please do not hesitate to call me. I look forward to following Arleth Landa along with you.     Sincerely,        Sarahi Villarreal MD

## 2020-12-11 NOTE — PROGRESS NOTES
[facility]  PROGRESS NOTE  ATTENDING NOTE    Chief Complaint   Patient presents with    Breast Cancer     NEW BREAST CANCER:  Right breast invasive ductal carcinoma - ER(+) UT(+) HER2 (over expressed) - imaging/biopsy [place] - (Request pathology slides) - Referring Dr Balaji Durham     History and Physical    Patient's Name/Date of Birth: Alma Arredondo / 1965    Date: 2020        Alma Arredondo presents for evaluation of a mammographic abnormality. PCP: Yanna Cheney DO. Gynecologist:none. The mammogram was performed at [facility] on 10/06/2020. The patient has noted a change in BSE since presentation. Patient denies nipple discharge. Patient denies a personal history of breast cancer. Breast cancer risk factors include infrequent SMEs and age and gender. Ashkenazi Sikhism Ancestry: No.    OBSTETRIC RELATED HISTORY:  Age of menarche was 14. Age of menopause was unknown. Patient denies hormonal therapy. Patient is . Age of first live birth was 31. Patient did not breast feed. Is patient interested in fertility information about fertility preservation? No    CANCER SURVEILLANCE HISTORY:  Mammograms: Yes   Breast MRI's: No   Breast Biopsies: No   Colonoscopy: No   GI Polyps: Not Applicable   EGD: Yes   Pelvic Exam: No  Pap Smear: No   Dermatology: No   Lung screening: no      Estimated body mass index is 21.77 kg/m² as calculated from the following:    Height as of 7/15/20: 6' 1\" (1.854 m). Weight as of 7/15/20: 165 lb (74.8 kg). Bra Size: 34B    Because violence is so common, we ask all our patients: are you in a relationship or do you live with a person who threatens, hurts, or controls you:  no    Patient drinks moderate caffeinated beverages. Patient does not smoke cigarettes. Patient does not use recreational drugs.       Past Medical History:   Diagnosis Date    Anxiety     Arthritis     Cervical radiculopathy  Chronic back pain     Depression     Diabetes mellitus type 2, uncontrolled ([facility] [address] 2/12/2017    GERD (gastroesophageal reflux disease)     History of blood transfusion 01/2018    back surgery, lumbar, dr Olga Lidia Garcias    Hypertension        Past Surgical History:   Procedure Laterality Date    BACK SURGERY  01/08/2018    PLIF L2-L5    CARPAL TUNNEL RELEASE Right 04/09/2017    Dr. 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 WITH REMOVAL OF HARDWARE & O.R. I. F ------BOP performed by Tiana Hinojosa MD at [address] [place] [address] Right 5/14/2018    REPAIR NON-UNION RIGHT TIBIA AND FIBULA WITH REMOVAL OF HARDWARE AND BONE GRAFT performed by Tiana Hinojosa MD at [address] [place] [address] Right 1/10/2019    RIGHT TIBIA REMOVAL HARDWARE, RIGHT TIBIA OPEN  TREATMENT OF NON UNION performed by Tiana Hinojosa MD at [facility]    WISDOM TOOTH EXTRACTION         Current Outpatient Medications   Medication Sig Dispense Refill    mirtazapine (REMERON) 15 MG tablet Take 15 mg by mouth nightly      atenolol (TENORMIN) 25 MG tablet       ALPRAZolam (XANAX) 0.5 MG tablet Take 0.5 mg by mouth nightly.       pregabalin (LYRICA) 75 MG capsule       famotidine (PEPCID) 20 MG tablet Take 20 mg by mouth daily      amitriptyline (ELAVIL) 50 MG tablet       LORATADINE-D 24HR  MG per extended release tablet       LANTUS SOLOSTAR 100 UNIT/ML injection pen       aspirin EC 81 MG EC tablet Take 1 tablet by mouth daily for 28 days 28 tablet 0    metFORMIN (GLUCOPHAGE) 500 MG tablet Take 500 mg by mouth 2 times daily       albuterol sulfate  (90 BASE) MCG/ACT inhaler Inhale 2 puffs into the lungs every 6 hours as needed for Wheezing      vitamin D (ERGOCALCIFEROL) 75404 UNITS CAPS capsule Take 50,000 Units by mouth once a week On Sundays      amitriptyline (ELAVIL) 100 MG tablet        No current facility-administered medications for this visit.         Allergies   Allergen Reactions    Ceftriaxone Shortness Of Breath       Family History   Problem Relation Age of Onset    Diabetes Mother     Cancer Father      Ashkenazi Mormonism Ancestry: No    Social History     Socioeconomic History    Marital status:      Spouse name: Not on file    Number of children: Not on file    Years of education: Not on file    Highest education level: Not on file   Occupational History    Not on file   Social Needs    Financial resource strain: Not on file    Food insecurity     Worry: Not on file     Inability: Not on file    Transportation needs     Medical: Not on file     Non-medical: Not on file   Tobacco Use    Smoking status: Former Smoker     Packs/day: 0.50     Years: 10.00     Pack years: 5.00     Types: Cigarettes     Last attempt to quit: 3/15/2015     Years since quittin.7    Smokeless tobacco: Never Used    Tobacco comment: stop    Substance and Sexual Activity    Alcohol use: No    Drug use: No    Sexual activity: Not on file   Lifestyle    Physical activity     Days per week: Not on file     Minutes per session: Not on file    Stress: Not on file   Relationships    Social connections     Talks on phone: Not on file     Gets together: Not on file     Attends Methodist service: Not on file     Active member of club or organization: Not on file     Attends meetings of clubs or organizations: Not on file     Relationship status: Not on file    Intimate partner violence     Fear of current or ex partner: Not on file     Emotionally abused: Not on file     Physically abused: Not on file     Forced sexual activity: Not on file   Other Topics Concern    Not on file   Social History Narrative    Not on file       Occupation: disability; 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 Negative for appetite change, chills, fever and unexpected weight change. HENT: Negative. Eyes: Negative. Respiratory: Negative. Negative for cough and shortness of breath. Cardiovascular: Positive for chest pain (recent, notified PCP, lasted a brief period of time). Gastrointestinal: Negative. Negative for abdominal pain, blood in stool, constipation, diarrhea, nausea and vomiting. Endocrine: Negative. Genitourinary: Negative. Musculoskeletal: Positive for arthralgias, back pain, gait problem, myalgias, neck pain and neck stiffness. All chronic   Skin: Negative. Allergic/Immunologic: Negative. Neurological: Negative for light-headedness and headaches. Hematological: Negative. Psychiatric/Behavioral: Negative. Physical Exam  Constitutional:       Appearance: Normal appearance. HENT:      Head: Normocephalic and atraumatic. Eyes:      Extraocular Movements: Extraocular movements intact. Pupils: Pupils are equal, round, and reactive to light. Neck:      Musculoskeletal: Neck supple. Cardiovascular:      Rate and Rhythm: Normal rate and regular rhythm. Pulses: Normal pulses. Pulmonary:      Effort: Pulmonary effort is normal.      Breath sounds: Normal breath sounds. Chest:      Breasts:         Right: Mass and tenderness present. No swelling, bleeding, inverted nipple, nipple discharge or skin change.          Left: No

## 2020-12-15 ENCOUNTER — TELEPHONE (OUTPATIENT)
Dept: BREAST CENTER | Age: 55
End: 2020-12-15

## 2021-01-05 ENCOUNTER — HOSPITAL ENCOUNTER (OUTPATIENT)
Dept: MRI IMAGING | Age: 56
Discharge: HOME | End: 2021-01-07
Payer: MEDICAID

## 2021-01-05 DIAGNOSIS — Z17.0 MALIGNANT NEOPLASM OF LOWER-INNER QUADRANT OF RIGHT BREAST OF FEMALE, ESTROGEN RECEPTOR POSITIVE (HCC): ICD-10-CM

## 2021-01-05 DIAGNOSIS — C50.311 MALIGNANT NEOPLASM OF LOWER-INNER QUADRANT OF RIGHT BREAST OF FEMALE, ESTROGEN RECEPTOR POSITIVE (HCC): ICD-10-CM

## 2021-01-05 PROCEDURE — 77049 MRI BREAST C-+ W/CAD BI: CPT

## 2021-01-05 PROCEDURE — 6360000004 HC RX CONTRAST MEDICATION: Performed by: RADIOLOGY

## 2021-01-05 PROCEDURE — A9585 GADOBUTROL INJECTION: HCPCS | Performed by: RADIOLOGY

## 2021-01-05 RX ADMIN — GADOBUTROL 8 ML: 604.72 INJECTION INTRAVENOUS at 13:12

## 2021-01-06 ENCOUNTER — TELEPHONE (OUTPATIENT)
Dept: SURGERY | Age: 56
End: 2021-01-06

## 2021-01-06 DIAGNOSIS — Z17.0 MALIGNANT NEOPLASM OF OVERLAPPING SITES OF RIGHT BREAST IN FEMALE, ESTROGEN RECEPTOR POSITIVE (HCC): Primary | ICD-10-CM

## 2021-01-06 DIAGNOSIS — C50.811 MALIGNANT NEOPLASM OF OVERLAPPING SITES OF RIGHT BREAST IN FEMALE, ESTROGEN RECEPTOR POSITIVE (HCC): Primary | ICD-10-CM

## 2021-01-12 NOTE — TELEPHONE ENCOUNTER
Pt scheduled with  on 1/14/2021 @ 11:45am.       Electronically signed by Everett Henry MA on 1/12/21 at 3:18 PM EST

## 2021-01-14 ENCOUNTER — OFFICE VISIT (OUTPATIENT)
Dept: ONCOLOGY | Age: 56
End: 2021-01-14
Payer: MEDICAID

## 2021-01-14 ENCOUNTER — HOSPITAL ENCOUNTER (OUTPATIENT)
Dept: INFUSION THERAPY | Age: 56
Discharge: HOME | End: 2021-01-14
Payer: MEDICAID

## 2021-01-14 ENCOUNTER — TELEPHONE (OUTPATIENT)
Dept: CASE MANAGEMENT | Age: 56
End: 2021-01-14

## 2021-01-14 VITALS
OXYGEN SATURATION: 100 % | BODY MASS INDEX: 22.82 KG/M2 | DIASTOLIC BLOOD PRESSURE: 75 MMHG | HEIGHT: 72 IN | SYSTOLIC BLOOD PRESSURE: 112 MMHG | HEART RATE: 72 BPM | WEIGHT: 168.5 LBS | TEMPERATURE: 97.8 F

## 2021-01-14 DIAGNOSIS — Z85.3 PERSONAL HISTORY OF BREAST CANCER: Primary | ICD-10-CM

## 2021-01-14 PROCEDURE — G8484 FLU IMMUNIZE NO ADMIN: HCPCS | Performed by: INTERNAL MEDICINE

## 2021-01-14 PROCEDURE — G8420 CALC BMI NORM PARAMETERS: HCPCS | Performed by: INTERNAL MEDICINE

## 2021-01-14 PROCEDURE — 99205 OFFICE O/P NEW HI 60 MIN: CPT | Performed by: INTERNAL MEDICINE

## 2021-01-14 PROCEDURE — 99214 OFFICE O/P EST MOD 30 MIN: CPT

## 2021-01-14 PROCEDURE — G9899 SCRN MAM PERF RSLTS DOC: HCPCS | Performed by: INTERNAL MEDICINE

## 2021-01-14 PROCEDURE — G8427 DOCREV CUR MEDS BY ELIG CLIN: HCPCS | Performed by: INTERNAL MEDICINE

## 2021-01-14 NOTE — TELEPHONE ENCOUNTER
Met with patient during their initial consultation with Dr. Cayden Whipple for their recent breast cancer diagnosis. Introduced myself and explained my role with patients receiving treatment at our center. Patient was friendly and receptive. Instructed in detail on their biopsy pathology findings including cancer type . Instructed on next steps including chemotherapy treatments and any additional imaging per Dr. Cayden Whipple' recommendations and follow up care. New patient nursing assessment, medical history, surgical history, family history completed. Medication list reviewed and updated. Provided with my contact information and instructed patient to call me with questions or concerns. Patient Verbalizes understanding and was appreciative of visit.   Patient will continue to be followed my the navigation team.

## 2021-01-14 NOTE — PROGRESS NOTES
Date    Anxiety     Arthritis     Cervical radiculopathy     Chronic back pain     Depression     Diabetes mellitus type 2, uncontrolled ([address] 2/12/2017    GERD (gastroesophageal reflux disease)     History of blood transfusion 01/2018    back surgery, lumbar, dr Wil Hogan    Hypertension        Past Surgical History:   Procedure Laterality Date    BACK SURGERY  01/08/2018    PLIF L2-L5    CARPAL TUNNEL RELEASE Right 04/09/2017    Dr. 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 WITH REMOVAL OF HARDWARE & O.R. I. F ------BOP performed by Abraham Alcazar MD at [address] [place] [address] Right 5/14/2018    REPAIR NON-UNION RIGHT TIBIA AND FIBULA WITH REMOVAL OF HARDWARE AND BONE GRAFT performed by Abraham Alcazar MD at [address] [place] [address] Right 1/10/2019    RIGHT TIBIA REMOVAL HARDWARE, RIGHT TIBIA OPEN  TREATMENT OF NON UNION performed by [facility] MD at [facility]    WISDOM TOOTH EXTRACTION         Family History   Problem Relation Age of Onset    Diabetes Mother        Social History     Socioeconomic History    Marital status:      Spouse name: Not on file    Number of children: Not on file    Years of education: Not on file    Highest education level: Not on file   Occupational History    Not on file   Social Needs  Financial resource strain: Not on file    Food insecurity     Worry: Not on file     Inability: Not on file   [employer] needs     Medical: Not on file     Non-medical: Not on file   Tobacco Use    Smoking status: Former Smoker     Packs/day: 0.50     Years: 10.00     Pack years: 5.00     Types: Cigarettes     Quit date: 3/15/2015     Years since quittin.8    Smokeless tobacco: Never Used    Tobacco comment: stop    Substance and Sexual Activity    Alcohol use: No    Drug use: No    Sexual activity: Not on file   Lifestyle    Physical activity     Days per week: Not on file     Minutes per session: Not on file    Stress: Not on file   Relationships    Social connections     Talks on phone: Not on file     Gets together: Not on file     Attends Hinduism service: Not on file     Active member of club or organization: Not on file     Attends meetings of clubs or organizations: Not on file     Relationship status: Not on file    Intimate partner violence     Fear of current or ex partner: Not on file     Emotionally abused: Not on file     Physically abused: Not on file     Forced sexual activity: Not on file   Other Topics Concern    Not on file   Social History Narrative    Not on file           Occupation: Disability  Retired:  NO          REVIEW OF SYSTEMS: <<For Level 5, 10 or more systems>>     Pacemaker/Defibulator/ICD:  No    Mediport: No           FALLS RISK SCREENING ASSESSMENT    Instructions:  Assess the patient and Solomon the appropriate indicators that are present for fall risk identification. Total the numbers circled and assign a fall risk score from Table 2.  Reassess patient at a minimum every 12 weeks or with status change. Assessment   Date  2021     1. Mental Ability: confusion/cognitively impaired No - 0       2. Elimination Issues: incontinence, frequency No - 0       3.   Ambulatory: use of assistive devices (walker, cane, off-loading devices), attached SCREENING ASSESSMENT    Instructions:  Assess the patient and enter the appropriate indicators that are present for nutrition risk identification. Total the numbers entered and assign a risk score. Follow the appropriate action for total score listed below. Assessment   Date  1/14/2021     1. Have you lost weight without trying? 0- No     2. Have you been eating poorly because of a decreased appetite? 0- No   3. Do you have a diagnosis of head and neck cancer? 0- No                                                                                    TOTAL 0        Score of 0-1: No action  Score 2 or greater:  · For Non-Diabetic Patient: Recommend adding Ensure Enlive 2 x daily and provide patient with Ensure wellness bag with coupons  · For Diabetic Patient: Recommend adding Glucerna Shake 2 x daily and provide patient with Glucerna Wellness bag with coupons  · Route to the dietitian via [employer]    · Are you having  difficulty performing daily routine tasks  due to fatigue or weakness (ie: bathing/showering, dressing, housework, meal prep, work, child Cheryl Larve): No     · Do you have any arm flexibility/ROM restrictions, swelling or pain that limit activity: No     · Any changes in memory, attention/focus that impact daily activities: No     · Do you avoid participation in leisure/social activity due weakness, fatigue or pain: No     ARE ANY OF THE ABOVE ARE ANSWERED YES: No          PT ASSESSMENT FOR REFERRAL    · Have you had any recent falls in past 2 months: Yes     · Do you have difficulty  going up/down stairs: Yes     · Are you having difficulty walking:  Yes     · Do you often hold onto furniture/environmental supports or feel off balance when you are walking: No     · Do you need to take rest breaks when you are walking: No     · Any pain on scale of 1-10 that limits your mobility: Yes 7/10    ARE ANY OF THE ABOVE ARE ANSWERED YES: Yes - PT referral request sent to Patient seeing provider outside of [address]    Is this patient a breast cancer patient requiring neoadjuvant chemotherapy: Yes, but NEITHER PT referral NOR Nutrition request sent due to not needed at this time. PREHAB AUDIOLOGY REFERRAL    - Is patient planned to receive Cisplatin? No. This patient is not planned to start Cisplatin. - Is patient complaining of new onset hearing loss? No. Patient is not complaining of new onset hearing loss.         Mickie Barragan

## 2021-01-14 NOTE — PROGRESS NOTES
Department of [facility] Oncology  Attending Consult Note    Reason for Visit: Consultation on a patient with Right Breast Cancer    Referring Physician: Magui Ricks MD    PCP:  Donn Morgan DO    History of Present Illness: The mass was located in the 6 o'clock position of right breast    Breast cancer risk factors include infrequent SMEs and age and gender    Bilateral Screening Mammogram 10/06/2020: There are suspicious calcifications in the right breast at the 6 o'clock position which appear pleomorphic. These calcifications are in a segmental distribution. These clustered calcifications are suspicious for malignancy. Right breast, calcifications at the 6:00, core biopsy on 10/30/2020:    - Small focus of invasive ductal carcinoma, grade 3 (3+3+2 = 8)    - Ductal carcinoma in situ, high nuclear grade, comedo/cribriform/solid types;    - Microcalcifications in DCIS    - Breast receptor and biomarkers (per outside report without looking the   slides:     ER: Positive, 89.8%, strong intensity     HI: Positive, 52.2%, moderate intensity     HER-2: Positive (score 3+)     Ki-67: High proliferation, 26.2%     P53: Negative, 0.2%     Comment:The invasive carcinoma is intermingled with DCIS and measures   3.0 x 2.0 mm in greatest dimension on the slides. CXR PA/Lateral 12/11/2020:  No acute process. Right Axillary U/S on 12/11/2020: There is no axillary LN. MRI Bilateral Breast 01/05/2021:  Focal, heterogeneous non mass enhancement identified in the right breast 6 o'clock which measures approximately 1.9 x 1.4 x 1.7 cm (image 19 of the axial T1 post contrast series).    No additional foci of disease identified on the right  There is no lymphadenopathy    Review of Systems;  CONSTITUTIONAL: No fever, chills. Good appetite and energy level. ENMT: Eyes: No diplopia; Nose: No epistaxis. Mouth: No sore throat. RESPIRATORY: No hemoptysis, shortness of breath, cough.    CARDIOVASCULAR: No chest pain, palpitations. GASTROINTESTINAL: No nausea/vomiting, abdominal pain, diarrhea/constipation. GENITOURINARY: No dysuria, urinary frequency, hematuria. NEURO: No syncope, presyncope, headache. Remainder:  ROS NEGATIVE    Past Medical History:      Diagnosis Date    Anxiety     Arthritis     Cervical radiculopathy     Chronic back pain     Depression     Diabetes mellitus type 2, uncontrolled ([facility] [address] 2/12/2017    GERD (gastroesophageal reflux disease)     History of blood transfusion 01/2018    back surgery, lumbar, dr Elenora Cockayne    Hypertension      Past Surgical History:      Procedure Laterality Date    BACK SURGERY  01/08/2018    PLIF L2-L5    CARPAL TUNNEL RELEASE Right 04/09/2017    Dr. Ruff Me  2008    dr hu anterior    CERVICAL FUSION  2015    dr Elenora Cockayne anterior    CERVICAL FUSION  04/25/16    ANTERIOR C4-C5, C6-C7 PLATES AND SCREWS    CHOLECYSTECTOMY  2000    COLONOSCOPY      patient did cologuard in 2018   [facility]  2013    dr Brent Valadez    heel spurs bilateral    HYSTERECTOMY  2013    dr Betancur Cage partial    OTHER SURGICAL HISTORY Right 05/14/2018    removal of hardware repair nonunion right tibia/fibula    MD OFFICE/OUTPT VISIT,PROCEDURE ONLY Right 7/5/2018    REPAIR NON UNION FAILED FIXATION RIGHT DISTAL TIB / FIB PILON FX. WITH REMOVAL OF HARDWARE & O.R. I. F ------BOP performed by Gonzalo Urban MD at [address] [facility] Right 5/14/2018    REPAIR NON-UNION RIGHT TIBIA AND FIBULA WITH REMOVAL OF HARDWARE AND BONE GRAFT performed by Gonzalo Urban MD at [address] Right 1/10/2019    RIGHT TIBIA REMOVAL HARDWARE, RIGHT TIBIA OPEN  TREATMENT OF NON UNION performed by Gonzalo Urban MD at [address] [facility]    WISDOM TOOTH EXTRACTION       Family History:  Family History   Problem Relation Age of Onset    Diabetes Mother      Medications:  Reviewed and reconciled. Social History:  Social History     Socioeconomic History    Marital status:      Spouse name: Not on file    Number of children: Not on file    Years of education: Not on file    Highest education level: Not on file   Occupational History    Not on file   Social Needs    Financial resource strain: Not on file    Food insecurity     Worry: Not on file     Inability: Not on file   [employer] needs     Medical: Not on file     Non-medical: Not on file   Tobacco Use    Smoking status: Former Smoker     Packs/day: 0.50     Years: 10.00     Pack years: 5.00     Types: Cigarettes     Quit date: 3/15/2015     Years since quittin.8    Smokeless tobacco: Never Used    Tobacco comment: stop    Substance and Sexual Activity    Alcohol use: No    Drug use: No    Sexual activity: Not on file   Lifestyle    Physical activity     Days per week: Not on file     Minutes per session: Not on file    Stress: Not on file   Relationships    Social connections     Talks on phone: Not on file     Gets together: Not on file     Attends Worship service: Not on file     Active member of club or organization: Not on file     Attends meetings of clubs or organizations: Not on file     Relationship status: Not on file    Intimate partner violence     Fear of current or ex partner: Not on file     Emotionally abused: Not on file     Physically abused: Not on file     Forced sexual activity: Not on file   Other Topics Concern    Not on file   Social History Narrative    Not on file     Allergies: Allergies   Allergen Reactions    Ceftriaxone Shortness Of Breath     Physical Exam:  /75   Pulse 72   Temp 97.8 °F (36.6 °C)   Ht 6' 1\" (1.854 m)   Wt 168 lb 8 oz (76.4 kg)   LMP 2013   SpO2 100%   BMI 22.23 kg/m²   GENERAL: Alert, oriented x 3, not in acute distress. HEENT: PERRLA; EOMI. Oropharynx clear. NECK: Supple.  Without lymphadenopathy. LUNGS: Good air entry bilaterally. No wheezing, crackles or ronchi. CARDIOVASCULAR: Regular rate. No murmurs, rubs or gallops. ABDOMEN: Soft. Non-tender, non-distended. Positive bowel sounds. EXTREMITIES: Without clubbing, cyanosis, or edema. NEUROLOGIC: No focal deficits. ECOG PS 1    Impression/Plan:  64 y/o female with Right Breast Cancer    Bilateral Screening Mammogram 10/06/2020: There are suspicious calcifications in the right breast at the 6 o'clock position which appear pleomorphic. These calcifications are in a segmental distribution. These clustered calcifications are suspicious for malignancy. Right breast, calcifications at the 6:00, core biopsy on 10/30/2020:    - Small focus of invasive ductal carcinoma, grade 3 (3+3+2 = 8)    - Ductal carcinoma in situ, high nuclear grade, comedo/cribriform/solid types;    - Microcalcifications in DCIS    - Breast receptor and biomarkers (per outside report without looking the   slides:     ER: Positive, 89.8%, strong intensity     TN: Positive, 52.2%, moderate intensity     HER-2: Positive (score 3+)     Ki-67: High proliferation, 26.2%     P53: Negative, 0.2%     Comment:The invasive carcinoma is intermingled with DCIS and measures   3.0 x 2.0 mm in greatest dimension on the slides. CXR PA/Lateral 12/11/2020:  No acute process. Right Axillary U/S on 12/11/2020: There is no axillary LN. MRI Bilateral Breast 01/05/2021:  Focal, heterogeneous non mass enhancement identified in the right breast 6 o'clock which measures approximately 1.9 x 1.4 x 1.7 cm (image 19 of the axial T1 post contrast series).    No additional foci of disease identified on the right  There is no lymphadenopathy    Pathology report imaging studies reviewed with patient. Recommended Needle localized Right lumpectomy with SLNBx with mediport placement.     RTC 2 weeks post-operatively to review surgical pathology and discuss adjuvant chemotherapy regimen plan accordingly. 2d-Echo ordered in the interim.     Thank you for allowing us to participate in the care of . Jonas Adhikari MD   6/39/6304  Board Certified Medical Oncologist

## 2021-01-19 ENCOUNTER — TELEPHONE (OUTPATIENT)
Dept: BREAST CENTER | Age: 56
End: 2021-01-19

## 2021-01-19 DIAGNOSIS — C50.911 INVASIVE DUCTAL CARCINOMA OF RIGHT BREAST (HCC): Primary | ICD-10-CM

## 2021-01-22 ENCOUNTER — TELEPHONE (OUTPATIENT)
Dept: BREAST CENTER | Age: 56
End: 2021-01-22

## 2021-01-22 NOTE — TELEPHONE ENCOUNTER
ISABEL Jenkins contacted [facility] for prior authorization of outpatient procedure. No prior authorization needed for right breast lumpectomy with SLNB, poss ALND, poss biozorb & mediport placement ([id number] & [id number]) with Dr. Joe Quesada at [address] [place] [address] in [facility]. MA Spoke with Rebecca Martins, authorization Specialist. Reference number [id number]. MA scanned authorization form in media tab.     Electronically signed by Wei Busby MA on 1/22/21 at 1:45 PM EST

## 2021-02-01 ENCOUNTER — HOSPITAL ENCOUNTER (OUTPATIENT)
Age: 56
Discharge: HOME | End: 2021-02-03
Payer: MEDICAID

## 2021-02-01 DIAGNOSIS — U07.1 COVID-19: ICD-10-CM

## 2021-02-01 PROCEDURE — U0003 INFECTIOUS AGENT DETECTION BY NUCLEIC ACID (DNA OR RNA); SEVERE ACUTE RESPIRATORY SYNDROME CORONAVIRUS 2 (SARS-COV-2) (CORONAVIRUS DISEASE [COVID-19]), AMPLIFIED PROBE TECHNIQUE, MAKING USE OF HIGH THROUGHPUT TECHNOLOGIES AS DESCRIBED BY CMS-2020-01-R: HCPCS

## 2021-02-02 ENCOUNTER — HOSPITAL ENCOUNTER (OUTPATIENT)
Age: 56
Discharge: HOME | End: 2021-02-02
Payer: MEDICAID

## 2021-02-02 LAB
SARS-COV-2: NOT DETECTED
SOURCE: NORMAL

## 2021-02-02 PROCEDURE — 93005 ELECTROCARDIOGRAM TRACING: CPT | Performed by: ANESTHESIOLOGY

## 2021-02-02 RX ORDER — IBUPROFEN 200 MG
200 TABLET ORAL EVERY 6 HOURS PRN
Status: ON HOLD | COMMUNITY
End: 2021-06-23 | Stop reason: HOSPADM

## 2021-02-02 NOTE — PROGRESS NOTES
[facility] [facility] PRE-ADMISSION TESTING GENERAL INSTRUCTIONS- [facility]-phone number:[phone]    GENERAL INSTRUCTIONS  [x] Antibacterial Soap shower Night before and/or AM of Surgery  [] Scott wipe instruction sheet and wipes given. [x] Nothing by mouth after midnight, including gum, candy, mints, or water. [x] You may brush your teeth, gargle, but do NOT swallow water. []Hibiclens shower  the night before and the morning of surgery. Do not use             Hibiclens on your face or head. [x]No smoking, chewing tobacco, illegal drugs, or alcohol within 24 hours of your surgery. [x] Jewelry, valuables or body piercing's should not be brought to the hospital. All body and/or tongue piercing's must be removed prior to arriving to hospital.  ALL hair pins must be removed. [x] Do not wear makeup, lotions, powders, deodorant. Nail polish as directed by the nurse. [x] Arrange transportation with a responsible adult  to and from the hospital. If you do not have a responsible adult  to transport you, you will need to make arrangements with a medical transportation company (i.e. [employer]. A Uber/taxi/bus is not appropriate unless you are accompanied by a responsible adult ). Arrange for someone to be with you for the remainder of the day and for 24 hours after your procedure due to having had anesthesia. Who will be your  for transportation?__________sister________   Who will be staying with you for 24 hrs after your procedure?_______________sister___  [] Bring insurance card and photo ID.  [] Transfusion Bracelet: Please bring with you to hospital, day of surgery  [] Bring urine specimen day of surgery. Any small container is acceptable. [] Use inhalers the morning of surgery and bring with you to hospital.  [] Bring copy of living will or healthcare power of  papers to be placed in your electronic record.   [] CPAP/BI-PAP: Please bring your machine if you are to spend the night in the hospital.     PARKING INSTRUCTIONS:   [x] Arrival Time:__0630 via [address] door___________  · [] Parking lot '\"I\"  is located on [facility] (the corner of [facility] and [facility]). To enter, press the button and the gate will lift. A free token will be provided to exit the lot. One car per patient is allowed to park in this lot. All other cars are to park on [address] [place] [address] Street either in the parking garage or the handicap lot. [] To reach the [facility] lobby from [address] [place] [address], upon entering the hospital, take elevator B to the 3rd floor. EDUCATION INSTRUCTIONS:      [] Knee or hip replacement booklet & exercise pamphlets given. [] [address] placed in chart. [] Pre-admission Testing educational folder given  [] Incentive Spirometry,coughing & deep breathing exercises reviewed. []Medication information sheet(s)   []Fluoroscopy-Xray used in surgery reviewed with patient. Educational pamphlet placed in chart. []Pain: Post-op pain is normal and to be expected. You will be asked to rate your pain from 0-10(a zero is not acceptable-education is needed). Your post-op pain goal is:  [] Ask your nurse for your pain medication. [] Joint camp offered. [] Joint replacement booklets given. [] Other:___________________________    MEDICATION INSTRUCTIONS:   [x]Bring a complete list of your medications, please write the last time you took the medicine, give this list to the nurse. [x] Take the following medications the morning of surgery with 1-2 ounces of water: lyrica pepcid [x] Stop herbal supplements and vitamins 5 days before your surgery. [x] DO NOT take any diabetic medicine the morning of surgery. Follow instructions for insulin the day before surgery. [x] If you are diabetic and your blood sugar is low or you feel symptomatic, you may drink 1-2 ounces of apple juice or take a glucose tablet.   The morning of your procedure, you may call the pre-op area if you have concerns about your blood sugar [phone]. [x] Use your inhalers the morning of surgery. Bring your emergency inhaler with you day of surgery. [x] Follow physician instructions regarding any blood thinners you may be taking. WHAT TO EXPECT:  [x] The day of surgery you will be greeted and checked in by the [facility] [employer] [facility].  In addition, you will be registered in the [facility] by a Patient Access Representative. Please bring your photo ID and insurance card. A nurse will greet you in accordance to the time you are needed in the pre-op area to prepare you for surgery. Please do not be discouraged if you are not greeted in the order you arrive as there are many variables that are involved in patient preparation. Your patience is greatly appreciated as you wait for your nurse. Please bring in items such as: books, magazines, newspapers, electronics, or any other items  to occupy your time in the waiting area. []  Delays may occur with surgery and staff will make a sincere effort to keep you informed of delays. If any delays occur with your procedure, we apologize ahead of time for your inconvenience as we recognize the value of your time.

## 2021-02-03 LAB
EKG ATRIAL RATE: 78 BPM
EKG P AXIS: 65 DEGREES
EKG P-R INTERVAL: 160 MS
EKG Q-T INTERVAL: 380 MS
EKG QRS DURATION: 98 MS
EKG QTC CALCULATION (BAZETT): 433 MS
EKG R AXIS: -16 DEGREES
EKG T AXIS: 33 DEGREES
EKG VENTRICULAR RATE: 78 BPM

## 2021-02-04 ENCOUNTER — PREP FOR PROCEDURE (OUTPATIENT)
Dept: SURGERY | Age: 56
End: 2021-02-04

## 2021-02-04 ENCOUNTER — ANESTHESIA EVENT (OUTPATIENT)
Dept: OPERATING ROOM | Age: 56
End: 2021-02-04
Payer: MEDICAID

## 2021-02-04 RX ORDER — SODIUM CHLORIDE 9 MG/ML
INJECTION, SOLUTION INTRAVENOUS CONTINUOUS
Status: CANCELLED | OUTPATIENT
Start: 2021-02-04

## 2021-02-04 RX ORDER — SODIUM CHLORIDE 0.9 % (FLUSH) 0.9 %
10 SYRINGE (ML) INJECTION EVERY 12 HOURS SCHEDULED
Status: CANCELLED | OUTPATIENT
Start: 2021-02-04

## 2021-02-04 RX ORDER — SODIUM CHLORIDE 0.9 % (FLUSH) 0.9 %
10 SYRINGE (ML) INJECTION PRN
Status: CANCELLED | OUTPATIENT
Start: 2021-02-04

## 2021-02-04 NOTE — H&P
[facility]  PROGRESS NOTE  ATTENDING NOTE          Chief Complaint   Patient presents with    Breast Cancer       NEW BREAST CANCER:  Right breast invasive ductal carcinoma - ER(+) MA(+) HER2 (over expressed) - imaging/biopsy [facility] - (Request pathology slides) - Referring Dr Delfin King      History and Physical     Patient's Name/Date of Birth: Nena Sim / 1965     Date: 2020         Nena Sim presents for evaluation of a mammographic abnormality.     PCP: Kervin Corey DO. Gynecologist:none.     The mammogram was performed at [facility] on 10/06/2020. The patient has noted a change in BSE since presentation. Patient denies nipple discharge. Patient denies a personal history of breast cancer. Breast cancer risk factors include infrequent SMEs and age and gender. Ashkenazi [place] Ancestry: No.     OBSTETRIC RELATED HISTORY:  Age of menarche was 14. Age of menopause was unknown. Patient denies hormonal therapy. Patient is . Age of first live birth was 31. Patient did not breast feed. Is patient interested in fertility information about fertility preservation? No     CANCER SURVEILLANCE HISTORY:  Mammograms: Yes   Breast MRI's: No   Breast Biopsies: No   Colonoscopy: No   GI Polyps: Not Applicable   EGD: Yes   Pelvic Exam: No  Pap Smear: No   Dermatology: No   Lung screening: no        Estimated body mass index is 21.77 kg/m² as calculated from the following:    Height as of 7/15/20: 6' 1\" (1.854 m). Weight as of 7/15/20: 165 lb (74.8 kg). Bra Size: 34B     Because violence is so common, we ask all our patients: are you in a relationship or do you live with a person who threatens, hurts, or controls you:  no     Patient drinks moderate caffeinated beverages. Patient does not smoke cigarettes.  Patient does not use recreational drugs.        Past Medical History        Past Medical History:   Diagnosis Date    0.5 mg by mouth nightly.        pregabalin (LYRICA) 75 MG capsule          famotidine (PEPCID) 20 MG tablet Take 20 mg by mouth daily        amitriptyline (ELAVIL) 50 MG tablet          LORATADINE-D 24HR  MG per extended release tablet          LANTUS SOLOSTAR 100 UNIT/ML injection pen          aspirin EC 81 MG EC tablet Take 1 tablet by mouth daily for 28 days 28 tablet 0    metFORMIN (GLUCOPHAGE) 500 MG tablet Take 500 mg by mouth 2 times daily         albuterol sulfate  (90 BASE) MCG/ACT inhaler Inhale 2 puffs into the lungs every 6 hours as needed for Wheezing        vitamin D (ERGOCALCIFEROL) 87063 UNITS CAPS capsule Take 50,000 Units by mouth once a week On Sundays        amitriptyline (ELAVIL) 100 MG tablet            No current facility-administered medications for this visit.                  Allergies   Allergen Reactions    Ceftriaxone Shortness Of Breath         Family History         Family History   Problem Relation Age of Onset    Diabetes Mother      Cancer Father           Ashkenazi Catholic Ancestry: No     Social History               Socioeconomic History    Marital status:        Spouse name: Not on file    Number of children: Not on file    Years of education: Not on file    Highest education level: Not on file   Occupational History    Not on file   Social Needs    Financial resource strain: Not on file    Food insecurity       Worry: Not on file       Inability: Not on file    Transportation needs       Medical: Not on file       Non-medical: Not on file   Tobacco Use    Smoking status: Former Smoker       Packs/day: 0.50       Years: 10.00       Pack years: 5.00       Types: Cigarettes       Last attempt to quit: 3/15/2015       Years since quittin.7    Smokeless tobacco: Never Used    Tobacco comment: stop    Substance and Sexual Activity    Alcohol use: No    Drug use: No    Sexual activity: Not on file   Lifestyle    Physical activity     Days per week: Not on file       Minutes per session: Not on file    Stress: Not on file   Relationships    Social connections       Talks on phone: Not on file       Gets together: Not on file       Attends Muslim service: Not on file       Active member of club or organization: Not on file       Attends meetings of clubs or organizations: Not on file       Relationship status: Not on file    Intimate partner violence       Fear of current or ex partner: Not on file       Emotionally abused: Not on file       Physically abused: Not on file       Forced sexual activity: Not on file   Other Topics Concern    Not on file   Social History Narrative    Not on file            Occupation: disability; 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 Negative for appetite change, chills, fever and unexpected weight change. HENT: Negative. Eyes: Negative. Respiratory: Negative. Negative for cough and shortness of breath. Cardiovascular: Positive for chest pain (recent, notified PCP, lasted a brief period of time). Gastrointestinal: Negative. Negative for abdominal pain, blood in stool, constipation, diarrhea, nausea and vomiting. Endocrine: Negative. Genitourinary: Negative. Musculoskeletal: Positive for arthralgias, back pain, gait problem, myalgias, neck pain and neck stiffness. All chronic   Skin: Negative. Allergic/Immunologic: Negative. Neurological: Negative for light-headedness and headaches. Hematological: Negative. Psychiatric/Behavioral: Negative.             Physical Exam  Constitutional:       Appearance: Normal appearance. HENT:      Head: Normocephalic and atraumatic. Eyes:      Extraocular Movements: Extraocular movements intact. Pupils: Pupils are equal, round, and reactive to light. Neck:      Musculoskeletal: Neck supple.    Cardiovascular:      Rate and Rhythm: Normal rate and regular rhythm. Pulses: Normal pulses. Pulmonary:      Effort: Pulmonary effort is normal.      Breath sounds: Normal breath sounds. Chest:      Breasts:         Right: Mass and tenderness present. No swelling, bleeding, inverted nipple, nipple discharge or skin change. Left: No swelling, bleeding, inverted nipple, mass, nipple discharge, skin change or tenderness. Musculoskeletal:         General: No tenderness or signs of injury. Lymphadenopathy:      Cervical: No cervical adenopathy. Right cervical: No superficial cervical adenopathy. Left cervical: No superficial cervical adenopathy. Upper Body:      Right upper body: No supraclavicular or axillary adenopathy. Left upper body: No supraclavicular or axillary adenopathy. Skin:     General: Skin is warm and dry. Neurological:      General: No focal deficit present. Mental Status: She is alert and oriented to person, place, and time. Psychiatric:         Mood and Affect: Mood normal.         Behavior: Behavior normal.         Thought Content: Thought content normal.         Judgment: Judgment normal.               MAMMOGRAM:       PATHOLOGY:              ASSESSMENT/PLAN:  Right breast cancer, ER/MA+ HER2+  --CBC, CMP  --CXR  --US breast and axilla  --MRI breast  --I discussed further treatment options will be based on size and axillary status. Clinically her axilla is negative. I will call patient with her results. --I discussed that she will need chemo at some point and when will be determined after reviewing MRI.       Paul Dan MD, MSc, FACS  2/4/2021  6:14 PM

## 2021-02-04 NOTE — H&P (VIEW-ONLY)
[facility]  PROGRESS NOTE  ATTENDING NOTE          Chief Complaint   Patient presents with    Breast Cancer       NEW BREAST CANCER:  Right breast invasive ductal carcinoma - ER(+) MA(+) HER2 (over expressed) - imaging/biopsy [facility] - (Request pathology slides) - Referring Dr Fabrizio Harden      History and Physical     Patient's Name/Date of Birth: Colton Saint Peters / 1965     Date: 2020         Ashlyn Saint Peters presents for evaluation of a mammographic abnormality.     PCP: Viky Barrios DO. Gynecologist:none.     The mammogram was performed at [facility] on 10/06/2020. The patient has noted a change in BSE since presentation. Patient denies nipple discharge. Patient denies a personal history of breast cancer. Breast cancer risk factors include infrequent SMEs and age and gender. Ashkenazi Taoist Ancestry: No.     OBSTETRIC RELATED HISTORY:  Age of menarche was 14. Age of menopause was unknown. Patient denies hormonal therapy. Patient is . Age of first live birth was 31. Patient did not breast feed. Is patient interested in fertility information about fertility preservation? No     CANCER SURVEILLANCE HISTORY:  Mammograms: Yes   Breast MRI's: No   Breast Biopsies: No   Colonoscopy: No   GI Polyps: Not Applicable   EGD: Yes   Pelvic Exam: No  Pap Smear: No   Dermatology: No   Lung screening: no        Estimated body mass index is 21.77 kg/m² as calculated from the following:    Height as of 7/15/20: 6' 1\" (1.854 m). Weight as of 7/15/20: 165 lb (74.8 kg). Bra Size: 34B     Because violence is so common, we ask all our patients: are you in a relationship or do you live with a person who threatens, hurts, or controls you:  no     Patient drinks moderate caffeinated beverages. Patient does not smoke cigarettes.  Patient does not use recreational drugs.        Past Medical History        Past Medical History:   Diagnosis Date  Anxiety      Arthritis      Cervical radiculopathy      Chronic back pain      Depression      Diabetes mellitus type 2, uncontrolled ([facility] [address] 2/12/2017    GERD (gastroesophageal reflux disease)      History of blood transfusion 01/2018     back surgery, lumbar, dr Estrella Neither Hypertension              Past Surgical History         Past Surgical History:   Procedure Laterality Date    BACK SURGERY   01/08/2018     PLIF L2-L5    CARPAL TUNNEL RELEASE Right 04/09/2017     Dr. Renato Sherwood   2008     dr hu anterior    CERVICAL FUSION   2015     dr Ludivina Aguiar anterior    CERVICAL FUSION   04/25/16     ANTERIOR C4-C5, C6-C7 PLATES AND SCREWS    CHOLECYSTECTOMY   2000    COLONOSCOPY         patient did cologuard in 2018   [facility]   2013     dr Neal Panchal ERCP       [address] [facility]     heel spurs bilateral    HYSTERECTOMY   2013     dr Galina Silva partial    OTHER SURGICAL HISTORY Right 05/14/2018     removal of hardware repair nonunion right tibia/fibula    MA OFFICE/OUTPT VISIT,PROCEDURE ONLY Right 7/5/2018     REPAIR NON UNION FAILED FIXATION RIGHT DISTAL TIB / FIB PILON FX. WITH REMOVAL OF HARDWARE & O.R. I. F ------BOP performed by Nahun Mullins MD at [address] [place] [address] Right 5/14/2018     REPAIR NON-UNION RIGHT TIBIA AND FIBULA WITH REMOVAL OF HARDWARE AND BONE GRAFT performed by Nahun Mullins MD at [address] [place] [address] Right 1/10/2019     RIGHT TIBIA REMOVAL HARDWARE, RIGHT TIBIA OPEN  TREATMENT OF NON UNION performed by Nahun Mullins MD at [address]                Current Facility-Administered Medications          Current Outpatient Medications   Medication Sig Dispense Refill    mirtazapine (REMERON) 15 MG tablet Take 15 mg by mouth nightly        atenolol (TENORMIN) 25 MG tablet        ALPRAZolam (XANAX) 0.5 MG tablet Take 0.5 mg by mouth nightly.        pregabalin (LYRICA) 75 MG capsule          famotidine (PEPCID) 20 MG tablet Take 20 mg by mouth daily        amitriptyline (ELAVIL) 50 MG tablet          LORATADINE-D 24HR  MG per extended release tablet          LANTUS SOLOSTAR 100 UNIT/ML injection pen          aspirin EC 81 MG EC tablet Take 1 tablet by mouth daily for 28 days 28 tablet 0    metFORMIN (GLUCOPHAGE) 500 MG tablet Take 500 mg by mouth 2 times daily         albuterol sulfate  (90 BASE) MCG/ACT inhaler Inhale 2 puffs into the lungs every 6 hours as needed for Wheezing        vitamin D (ERGOCALCIFEROL) 22207 UNITS CAPS capsule Take 50,000 Units by mouth once a week On Sundays        amitriptyline (ELAVIL) 100 MG tablet            No current facility-administered medications for this visit.                  Allergies   Allergen Reactions    Ceftriaxone Shortness Of Breath         Family History         Family History   Problem Relation Age of Onset    Diabetes Mother      Cancer Father           Ashkenazi Synagogue Ancestry: No     Social History               Socioeconomic History    Marital status:        Spouse name: Not on file    Number of children: Not on file    Years of education: Not on file    Highest education level: Not on file   Occupational History    Not on file   Social Needs    Financial resource strain: Not on file    Food insecurity       Worry: Not on file       Inability: Not on file    Transportation needs       Medical: Not on file       Non-medical: Not on file   Tobacco Use    Smoking status: Former Smoker       Packs/day: 0.50       Years: 10.00       Pack years: 5.00       Types: Cigarettes       Last attempt to quit: 3/15/2015       Years since quittin.7    Smokeless tobacco: Never Used    Tobacco comment: stop    Substance and Sexual Activity    Alcohol use: No    Drug use:  No Musculoskeletal: Neck supple. Cardiovascular:      Rate and Rhythm: Normal rate and regular rhythm. Pulses: Normal pulses. Pulmonary:      Effort: Pulmonary effort is normal.      Breath sounds: Normal breath sounds. Chest:      Breasts:         Right: Mass and tenderness present. No swelling, bleeding, inverted nipple, nipple discharge or skin change. Left: No swelling, bleeding, inverted nipple, mass, nipple discharge, skin change or tenderness. Musculoskeletal:         General: No tenderness or signs of injury. Lymphadenopathy:      Cervical: No cervical adenopathy. Right cervical: No superficial cervical adenopathy. Left cervical: No superficial cervical adenopathy. Upper Body:      Right upper body: No supraclavicular or axillary adenopathy. Left upper body: No supraclavicular or axillary adenopathy. Skin:     General: Skin is warm and dry. Neurological:      General: No focal deficit present. Mental Status: She is alert and oriented to person, place, and time. Psychiatric:         Mood and Affect: Mood normal.         Behavior: Behavior normal.         Thought Content: Thought content normal.         Judgment: Judgment normal.               MAMMOGRAM:       PATHOLOGY:              ASSESSMENT/PLAN:  Right breast cancer, ER/SC+ HER2+  --CBC, CMP  --CXR  --US breast and axilla  --MRI breast  --I discussed further treatment options will be based on size and axillary status. Clinically her axilla is negative. I will call patient with her results. --I discussed that she will need chemo at some point and when will be determined after reviewing MRI.       Bharati Lam MD, MSc, FACS  2/4/2021  6:14 PM

## 2021-02-05 ENCOUNTER — HOSPITAL ENCOUNTER (OUTPATIENT)
Dept: NUCLEAR MEDICINE | Age: 56
Discharge: HOME | End: 2021-02-07
Payer: MEDICAID

## 2021-02-05 ENCOUNTER — APPOINTMENT (OUTPATIENT)
Dept: GENERAL RADIOLOGY | Age: 56
End: 2021-02-05
Attending: SURGERY
Payer: MEDICAID

## 2021-02-05 ENCOUNTER — HOSPITAL ENCOUNTER (OUTPATIENT)
Age: 56
Setting detail: OUTPATIENT SURGERY
Discharge: HOME | End: 2021-02-05
Attending: SURGERY | Admitting: SURGERY
Payer: MEDICAID

## 2021-02-05 ENCOUNTER — ANESTHESIA (OUTPATIENT)
Dept: OPERATING ROOM | Age: 56
End: 2021-02-05
Payer: MEDICAID

## 2021-02-05 VITALS
DIASTOLIC BLOOD PRESSURE: 74 MMHG | TEMPERATURE: 97.9 F | WEIGHT: 175 LBS | BODY MASS INDEX: 23.7 KG/M2 | HEART RATE: 70 BPM | OXYGEN SATURATION: 95 % | HEIGHT: 72 IN | SYSTOLIC BLOOD PRESSURE: 121 MMHG | RESPIRATION RATE: 17 BRPM

## 2021-02-05 VITALS — OXYGEN SATURATION: 99 % | TEMPERATURE: 97.2 F | SYSTOLIC BLOOD PRESSURE: 119 MMHG | DIASTOLIC BLOOD PRESSURE: 85 MMHG

## 2021-02-05 DIAGNOSIS — U07.1 COVID-19: Primary | ICD-10-CM

## 2021-02-05 DIAGNOSIS — C50.911 MALIGNANT NEOPLASM OF RIGHT FEMALE BREAST, UNSPECIFIED ESTROGEN RECEPTOR STATUS, UNSPECIFIED SITE OF BREAST (HCC): ICD-10-CM

## 2021-02-05 DIAGNOSIS — Z17.0 MALIGNANT NEOPLASM OF CENTRAL PORTION OF RIGHT BREAST IN FEMALE, ESTROGEN RECEPTOR POSITIVE (HCC): ICD-10-CM

## 2021-02-05 DIAGNOSIS — C50.111 MALIGNANT NEOPLASM OF CENTRAL PORTION OF RIGHT BREAST IN FEMALE, ESTROGEN RECEPTOR POSITIVE (HCC): ICD-10-CM

## 2021-02-05 DIAGNOSIS — C50.911 INVASIVE DUCTAL CARCINOMA OF RIGHT BREAST (HCC): ICD-10-CM

## 2021-02-05 DIAGNOSIS — Z01.818 PREOP EXAMINATION: ICD-10-CM

## 2021-02-05 LAB
ANION GAP SERPL CALCULATED.3IONS-SCNC: 10 MMOL/L (ref 7–16)
BUN BLDV-MCNC: 12 MG/DL (ref 6–20)
CALCIUM SERPL-MCNC: 9.7 MG/DL (ref 8.6–10.2)
CHLORIDE BLD-SCNC: 102 MMOL/L (ref 98–107)
CO2: 26 MMOL/L (ref 22–29)
CREAT SERPL-MCNC: 0.8 MG/DL (ref 0.5–1)
GFR AFRICAN AMERICAN: >60
GFR NON-AFRICAN AMERICAN: >60 ML/MIN/1.73
GLUCOSE BLD-MCNC: 65 MG/DL (ref 74–99)
METER GLUCOSE: 70 MG/DL (ref 74–99)
METER GLUCOSE: 79 MG/DL (ref 74–99)
POTASSIUM SERPL-SCNC: 4.6 MMOL/L (ref 3.5–5)
REASON FOR REJECTION: NORMAL
REJECTED TEST: NORMAL
SODIUM BLD-SCNC: 138 MMOL/L (ref 132–146)

## 2021-02-05 PROCEDURE — 38792 RA TRACER ID OF SENTINL NODE: CPT | Performed by: SURGERY

## 2021-02-05 PROCEDURE — 2580000003 HC RX 258: Performed by: SURGERY

## 2021-02-05 PROCEDURE — 36561 INSERT TUNNELED CV CATH: CPT | Performed by: SURGERY

## 2021-02-05 PROCEDURE — 82962 GLUCOSE BLOOD TEST: CPT

## 2021-02-05 PROCEDURE — 6370000000 HC RX 637 (ALT 250 FOR IP)

## 2021-02-05 PROCEDURE — 2709999900 HC NON-CHARGEABLE SUPPLY: Performed by: SURGERY

## 2021-02-05 PROCEDURE — C1788 PORT, INDWELLING, IMP: HCPCS | Performed by: SURGERY

## 2021-02-05 PROCEDURE — 6360000002 HC RX W HCPCS

## 2021-02-05 PROCEDURE — 71045 X-RAY EXAM CHEST 1 VIEW: CPT

## 2021-02-05 PROCEDURE — 2500000003 HC RX 250 WO HCPCS: Performed by: NURSE ANESTHETIST, CERTIFIED REGISTERED

## 2021-02-05 PROCEDURE — 2500000003 HC RX 250 WO HCPCS: Performed by: SURGERY

## 2021-02-05 PROCEDURE — 19301 PARTIAL MASTECTOMY: CPT | Performed by: SURGERY

## 2021-02-05 PROCEDURE — 88307 TISSUE EXAM BY PATHOLOGIST: CPT

## 2021-02-05 PROCEDURE — 3209999900 FLUORO FOR SURGICAL PROCEDURES

## 2021-02-05 PROCEDURE — 3600000003 HC SURGERY LEVEL 3 BASE: Performed by: SURGERY

## 2021-02-05 PROCEDURE — 3430000000 HC RX DIAGNOSTIC RADIOPHARMACEUTICAL: Performed by: RADIOLOGY

## 2021-02-05 PROCEDURE — 80048 BASIC METABOLIC PNL TOTAL CA: CPT

## 2021-02-05 PROCEDURE — 77001 FLUOROGUIDE FOR VEIN DEVICE: CPT | Performed by: SURGERY

## 2021-02-05 PROCEDURE — 7100000001 HC PACU RECOVERY - ADDTL 15 MIN: Performed by: SURGERY

## 2021-02-05 PROCEDURE — 36415 COLL VENOUS BLD VENIPUNCTURE: CPT

## 2021-02-05 PROCEDURE — 76098 X-RAY EXAM SURGICAL SPECIMEN: CPT

## 2021-02-05 PROCEDURE — A9520 TC99 TILMANOCEPT DIAG 0.5MCI: HCPCS | Performed by: RADIOLOGY

## 2021-02-05 PROCEDURE — 7100000000 HC PACU RECOVERY - FIRST 15 MIN: Performed by: SURGERY

## 2021-02-05 PROCEDURE — 3600000013 HC SURGERY LEVEL 3 ADDTL 15MIN: Performed by: SURGERY

## 2021-02-05 PROCEDURE — 38792 RA TRACER ID OF SENTINL NODE: CPT

## 2021-02-05 PROCEDURE — 2720000010 HC SURG SUPPLY STERILE: Performed by: SURGERY

## 2021-02-05 PROCEDURE — 38525 BIOPSY/REMOVAL LYMPH NODES: CPT | Performed by: SURGERY

## 2021-02-05 PROCEDURE — 3700000001 HC ADD 15 MINUTES (ANESTHESIA): Performed by: SURGERY

## 2021-02-05 PROCEDURE — 6360000002 HC RX W HCPCS: Performed by: NURSE ANESTHETIST, CERTIFIED REGISTERED

## 2021-02-05 PROCEDURE — 7100000011 HC PHASE II RECOVERY - ADDTL 15 MIN: Performed by: SURGERY

## 2021-02-05 PROCEDURE — 3700000000 HC ANESTHESIA ATTENDED CARE: Performed by: SURGERY

## 2021-02-05 PROCEDURE — 6360000002 HC RX W HCPCS: Performed by: SURGERY

## 2021-02-05 PROCEDURE — 7100000010 HC PHASE II RECOVERY - FIRST 15 MIN: Performed by: SURGERY

## 2021-02-05 DEVICE — PORT INFUS 8FR PWR INJ CT FOR VASC ACCS CATH: Type: IMPLANTABLE DEVICE | Site: SUBCLAVIAN | Status: FUNCTIONAL

## 2021-02-05 RX ORDER — BUPIVACAINE HYDROCHLORIDE AND EPINEPHRINE 2.5; 5 MG/ML; UG/ML
INJECTION, SOLUTION EPIDURAL; INFILTRATION; INTRACAUDAL; PERINEURAL PRN
Status: DISCONTINUED | OUTPATIENT
Start: 2021-02-05 | End: 2021-02-05 | Stop reason: ALTCHOICE

## 2021-02-05 RX ORDER — CLINDAMYCIN PHOSPHATE 900 MG/50ML
900 INJECTION INTRAVENOUS
Status: COMPLETED | OUTPATIENT
Start: 2021-02-05 | End: 2021-02-05

## 2021-02-05 RX ORDER — SODIUM CHLORIDE 0.9 % (FLUSH) 0.9 %
10 SYRINGE (ML) INJECTION PRN
Status: DISCONTINUED | OUTPATIENT
Start: 2021-02-05 | End: 2021-02-05 | Stop reason: HOSPADM

## 2021-02-05 RX ORDER — HYDROCODONE BITARTRATE AND ACETAMINOPHEN 5; 325 MG/1; MG/1
TABLET ORAL
Status: COMPLETED
Start: 2021-02-05 | End: 2021-02-05

## 2021-02-05 RX ORDER — PROPOFOL 10 MG/ML
INJECTION, EMULSION INTRAVENOUS PRN
Status: DISCONTINUED | OUTPATIENT
Start: 2021-02-05 | End: 2021-02-05 | Stop reason: SDUPTHER

## 2021-02-05 RX ORDER — LIDOCAINE HYDROCHLORIDE 20 MG/ML
INJECTION, SOLUTION INTRAVENOUS PRN
Status: DISCONTINUED | OUTPATIENT
Start: 2021-02-05 | End: 2021-02-05 | Stop reason: SDUPTHER

## 2021-02-05 RX ORDER — GLYCOPYRROLATE 1 MG/5 ML
SYRINGE (ML) INTRAVENOUS PRN
Status: DISCONTINUED | OUTPATIENT
Start: 2021-02-05 | End: 2021-02-05 | Stop reason: SDUPTHER

## 2021-02-05 RX ORDER — NEOSTIGMINE METHYLSULFATE 1 MG/ML
INJECTION, SOLUTION INTRAVENOUS PRN
Status: DISCONTINUED | OUTPATIENT
Start: 2021-02-05 | End: 2021-02-05 | Stop reason: SDUPTHER

## 2021-02-05 RX ORDER — MIDAZOLAM HYDROCHLORIDE 1 MG/ML
INJECTION INTRAMUSCULAR; INTRAVENOUS PRN
Status: DISCONTINUED | OUTPATIENT
Start: 2021-02-05 | End: 2021-02-05 | Stop reason: SDUPTHER

## 2021-02-05 RX ORDER — FENTANYL CITRATE 50 UG/ML
INJECTION, SOLUTION INTRAMUSCULAR; INTRAVENOUS PRN
Status: DISCONTINUED | OUTPATIENT
Start: 2021-02-05 | End: 2021-02-05 | Stop reason: SDUPTHER

## 2021-02-05 RX ORDER — METHYLENE BLUE 10 MG/ML
INJECTION INTRAVENOUS PRN
Status: DISCONTINUED | OUTPATIENT
Start: 2021-02-05 | End: 2021-02-05 | Stop reason: ALTCHOICE

## 2021-02-05 RX ORDER — SODIUM CHLORIDE 9 MG/ML
INJECTION INTRAVENOUS PRN
Status: DISCONTINUED | OUTPATIENT
Start: 2021-02-05 | End: 2021-02-05 | Stop reason: ALTCHOICE

## 2021-02-05 RX ORDER — HYDROCODONE BITARTRATE AND ACETAMINOPHEN 5; 325 MG/1; MG/1
1 TABLET ORAL ONCE
Status: COMPLETED | OUTPATIENT
Start: 2021-02-05 | End: 2021-02-05

## 2021-02-05 RX ORDER — OXYCODONE HYDROCHLORIDE AND ACETAMINOPHEN 5; 325 MG/1; MG/1
1 TABLET ORAL EVERY 6 HOURS PRN
Qty: 28 TABLET | Refills: 0 | Status: SHIPPED | OUTPATIENT
Start: 2021-02-05 | End: 2021-02-12

## 2021-02-05 RX ORDER — SODIUM CHLORIDE 9 MG/ML
INJECTION, SOLUTION INTRAVENOUS CONTINUOUS
Status: DISCONTINUED | OUTPATIENT
Start: 2021-02-05 | End: 2021-02-05 | Stop reason: HOSPADM

## 2021-02-05 RX ORDER — SODIUM CHLORIDE 0.9 % (FLUSH) 0.9 %
10 SYRINGE (ML) INJECTION EVERY 12 HOURS SCHEDULED
Status: DISCONTINUED | OUTPATIENT
Start: 2021-02-05 | End: 2021-02-05 | Stop reason: HOSPADM

## 2021-02-05 RX ORDER — BUPIVACAINE HYDROCHLORIDE AND EPINEPHRINE 5; 5 MG/ML; UG/ML
INJECTION, SOLUTION EPIDURAL; INTRACAUDAL; PERINEURAL PRN
Status: DISCONTINUED | OUTPATIENT
Start: 2021-02-05 | End: 2021-02-05 | Stop reason: ALTCHOICE

## 2021-02-05 RX ORDER — DEXAMETHASONE SODIUM PHOSPHATE 10 MG/ML
INJECTION INTRAMUSCULAR; INTRAVENOUS PRN
Status: DISCONTINUED | OUTPATIENT
Start: 2021-02-05 | End: 2021-02-05 | Stop reason: SDUPTHER

## 2021-02-05 RX ORDER — ROCURONIUM BROMIDE 10 MG/ML
INJECTION, SOLUTION INTRAVENOUS PRN
Status: DISCONTINUED | OUTPATIENT
Start: 2021-02-05 | End: 2021-02-05 | Stop reason: SDUPTHER

## 2021-02-05 RX ORDER — ONDANSETRON 2 MG/ML
INJECTION INTRAMUSCULAR; INTRAVENOUS PRN
Status: DISCONTINUED | OUTPATIENT
Start: 2021-02-05 | End: 2021-02-05 | Stop reason: SDUPTHER

## 2021-02-05 RX ADMIN — TILMANOCEPT 0.5 MILLICURIE: KIT at 08:35

## 2021-02-05 RX ADMIN — ROCURONIUM BROMIDE 50 MG: 10 INJECTION, SOLUTION INTRAVENOUS at 11:26

## 2021-02-05 RX ADMIN — CLINDAMYCIN IN 5 PERCENT DEXTROSE 900 MG: 18 INJECTION, SOLUTION INTRAVENOUS at 11:32

## 2021-02-05 RX ADMIN — HYDROCODONE BITARTRATE AND ACETAMINOPHEN 1 TABLET: 5; 325 TABLET ORAL at 15:59

## 2021-02-05 RX ADMIN — FENTANYL CITRATE 150 MCG: 50 INJECTION, SOLUTION INTRAMUSCULAR; INTRAVENOUS at 11:25

## 2021-02-05 RX ADMIN — PROPOFOL 50 MG: 10 INJECTION, EMULSION INTRAVENOUS at 13:32

## 2021-02-05 RX ADMIN — PROPOFOL 25 MG: 10 INJECTION, EMULSION INTRAVENOUS at 12:45

## 2021-02-05 RX ADMIN — Medication 0.6 MG: at 14:05

## 2021-02-05 RX ADMIN — Medication 0.5 MG: at 14:45

## 2021-02-05 RX ADMIN — MIDAZOLAM 2 MG: 1 INJECTION INTRAMUSCULAR; INTRAVENOUS at 11:08

## 2021-02-05 RX ADMIN — LIDOCAINE HYDROCHLORIDE 100 MG: 20 INJECTION, SOLUTION INTRAVENOUS at 11:25

## 2021-02-05 RX ADMIN — PROPOFOL 25 MG: 10 INJECTION, EMULSION INTRAVENOUS at 12:42

## 2021-02-05 RX ADMIN — DEXAMETHASONE SODIUM PHOSPHATE 10 MG: 10 INJECTION INTRAMUSCULAR; INTRAVENOUS at 11:25

## 2021-02-05 RX ADMIN — Medication 3 MG: at 14:05

## 2021-02-05 RX ADMIN — ONDANSETRON HYDROCHLORIDE 4 MG: 2 INJECTION, SOLUTION INTRAMUSCULAR; INTRAVENOUS at 11:08

## 2021-02-05 RX ADMIN — PROPOFOL 150 MG: 10 INJECTION, EMULSION INTRAVENOUS at 11:25

## 2021-02-05 RX ADMIN — SODIUM CHLORIDE: 9 INJECTION, SOLUTION INTRAVENOUS at 11:08

## 2021-02-05 RX ADMIN — HYDROMORPHONE HYDROCHLORIDE 0.5 MG: 1 INJECTION, SOLUTION INTRAMUSCULAR; INTRAVENOUS; SUBCUTANEOUS at 14:45

## 2021-02-05 RX ADMIN — FENTANYL CITRATE 100 MCG: 50 INJECTION, SOLUTION INTRAMUSCULAR; INTRAVENOUS at 12:09

## 2021-02-05 ASSESSMENT — PULMONARY FUNCTION TESTS
PIF_VALUE: 19
PIF_VALUE: 15
PIF_VALUE: 20
PIF_VALUE: 15
PIF_VALUE: 19
PIF_VALUE: 18
PIF_VALUE: 17
PIF_VALUE: 18
PIF_VALUE: 19
PIF_VALUE: 18
PIF_VALUE: 18
PIF_VALUE: 19
PIF_VALUE: 18
PIF_VALUE: 17
PIF_VALUE: 19
PIF_VALUE: 16
PIF_VALUE: 20
PIF_VALUE: 19
PIF_VALUE: 20
PIF_VALUE: 19
PIF_VALUE: 36
PIF_VALUE: 20
PIF_VALUE: 19
PIF_VALUE: 18
PIF_VALUE: 20
PIF_VALUE: 19
PIF_VALUE: 20
PIF_VALUE: 19
PIF_VALUE: 19
PIF_VALUE: 20
PIF_VALUE: 19
PIF_VALUE: 19
PIF_VALUE: 4
PIF_VALUE: 0
PIF_VALUE: 15
PIF_VALUE: 18
PIF_VALUE: 33
PIF_VALUE: 20
PIF_VALUE: 19
PIF_VALUE: 20
PIF_VALUE: 20
PIF_VALUE: 3
PIF_VALUE: 19
PIF_VALUE: 17
PIF_VALUE: 20
PIF_VALUE: 20
PIF_VALUE: 19
PIF_VALUE: 18
PIF_VALUE: 18
PIF_VALUE: 20
PIF_VALUE: 0
PIF_VALUE: 2
PIF_VALUE: 18
PIF_VALUE: 16
PIF_VALUE: 1
PIF_VALUE: 20
PIF_VALUE: 18
PIF_VALUE: 19
PIF_VALUE: 18
PIF_VALUE: 0
PIF_VALUE: 19
PIF_VALUE: 18
PIF_VALUE: 19
PIF_VALUE: 15
PIF_VALUE: 1
PIF_VALUE: 18
PIF_VALUE: 19
PIF_VALUE: 15
PIF_VALUE: 43
PIF_VALUE: 2
PIF_VALUE: 17
PIF_VALUE: 20
PIF_VALUE: 20
PIF_VALUE: 19
PIF_VALUE: 18
PIF_VALUE: 19
PIF_VALUE: 18
PIF_VALUE: 19
PIF_VALUE: 18
PIF_VALUE: 3
PIF_VALUE: 19
PIF_VALUE: 16
PIF_VALUE: 1
PIF_VALUE: 1
PIF_VALUE: 18
PIF_VALUE: 18
PIF_VALUE: 19
PIF_VALUE: 17
PIF_VALUE: 1
PIF_VALUE: 19
PIF_VALUE: 20
PIF_VALUE: 19
PIF_VALUE: 19
PIF_VALUE: 20
PIF_VALUE: 18
PIF_VALUE: 18
PIF_VALUE: 16
PIF_VALUE: 20
PIF_VALUE: 19
PIF_VALUE: 18
PIF_VALUE: 19
PIF_VALUE: 30
PIF_VALUE: 20
PIF_VALUE: 19
PIF_VALUE: 20
PIF_VALUE: 19
PIF_VALUE: 20

## 2021-02-05 ASSESSMENT — PAIN DESCRIPTION - LOCATION
LOCATION: BREAST

## 2021-02-05 ASSESSMENT — PAIN - FUNCTIONAL ASSESSMENT: PAIN_FUNCTIONAL_ASSESSMENT: 0-10

## 2021-02-05 ASSESSMENT — PAIN SCALES - GENERAL
PAINLEVEL_OUTOF10: 2
PAINLEVEL_OUTOF10: 5
PAINLEVEL_OUTOF10: 10
PAINLEVEL_OUTOF10: 5
PAINLEVEL_OUTOF10: 3

## 2021-02-05 ASSESSMENT — PAIN DESCRIPTION - DESCRIPTORS
DESCRIPTORS: DISCOMFORT;BURNING
DESCRIPTORS: ACHING;BURNING;CONSTANT
DESCRIPTORS: BURNING;DISCOMFORT

## 2021-02-05 ASSESSMENT — ENCOUNTER SYMPTOMS: DYSPNEA ACTIVITY LEVEL: AFTER AMBULATING 1 FLIGHT OF STAIRS

## 2021-02-05 ASSESSMENT — PAIN DESCRIPTION - ORIENTATION
ORIENTATION: RIGHT

## 2021-02-05 NOTE — INTERVAL H&P NOTE
Update History & Physical    The patient's History and Physical of February 4, 2021 was reviewed with the patient and I examined the patient. There was no change. The surgical site was confirmed by the patient and me. Plan: The risks, benefits, expected outcome, and alternative to the recommended procedure have been discussed with the patient. Patient understands and wants to proceed with the procedure.      Electronically signed by Paul Dan MD on 2/5/2021 at 10:54 AM

## 2021-02-05 NOTE — ANESTHESIA POSTPROCEDURE EVALUATION
Department of Anesthesiology  Postprocedure Note    Patient: Davey Cortez  MRN: [id number]  YOB: 1965  Date of evaluation: 2/5/2021  Time:  4:21 PM     Procedure Summary     Date: 02/05/21 Room / Location: [facility] OR 10 / [facility]    Anesthesia Start: 1108 Anesthesia Stop: 5511    Procedures:       RIGHT BREAST LUMPECTOMY WITH  AXILLARY LYMPHNODE DISSECTION (Right Breast)      LEFT MEDI PORT INSERTION (Left Chest) Diagnosis: (BREAST CA)    Surgeons: Mickie Reyes MD Responsible Provider: Zia Avilez MD    Anesthesia Type: general ASA Status: 3          Anesthesia Type: general    Berny Phase I: Berny Score: 10    Berny Phase II:      Last vitals: Reviewed and per EMR flowsheets.        Anesthesia Post Evaluation    Patient location during evaluation: PACU  Patient participation: complete - patient participated  Level of consciousness: awake and alert  Airway patency: patent  Nausea & Vomiting: no nausea and no vomiting  Complications: no  Cardiovascular status: hemodynamically stable  Respiratory status: acceptable  Hydration status: euvolemic

## 2021-02-05 NOTE — OP NOTE
[facility]  OPERATIVE REPORT    Teresa Portal  1965      DATE OF PROCEDURE: 2/5/2021     SURGEON: Shun Jones MD, MSc, FACS     ASSISTANT: ALEKSANDAR Kimball DO, PGY-I     PREOPERATIVE DIAGNOSIS:  Right breast cancer, Stage IA, ER/CA+, HER2- grade 3. POSTOPERATIVE DIAGNOSIS: Same    OPERATION: right lumpectomy with sentinel lymph node biopsy, methylene blue dye injection and insertion of left subclavian mediport, 8F Bard (MRI and CT compatible), using fluoroscopic guidance     ANESTHESIA: GETA     ESTIMATED BLOOD LOSS: 77VS     COMPLICATIONS: None    SPECIMEN:  Right breast lumpectomy, right sentinel lymph node #1 (125, blue), axillary contents    DRAINS:  none    HISTORY:  This is a 63 y. o.female who presented with a palpable mass in the right breast with skin retraction. She underwent biopsy, metastatic work-up and MRI. She presents today for surgery. DESCRIPTION OF PROCEDURE: The patient was identified and the procedure was confirmed. Clindamycin 900mg IV was given, bilateral SCDs in place, lower Xochilt Hugger applied. She was prepped and draped in the standard surgical fashion. 5cc of dilute 1:1 methylene blue was injected in the retroareolar space and the breast massaged x 5 min. 0.25% Marcaine was used to anesthetize throughout the case. An incision in the axillary fold was made with the plasma blade. Dissection carried down through the clavipectoral fascia into the axilla. The Trunode was used to locate the sentinel node. The blue lymphatics were also followed. The node was removed using the Harmonic scalpel. The node was blue and count was 125. There was a second node that was hard that a blue lymphatic appeared to be going to that was removed in the same fashion. Once removed, however it did not appear blue and there was no counts. The axilla was explore. No further blue nodes, no counts seen.   The axilla was irrigated the fascia closed with 2-0 vicryl and the skin closed with 3-0 and 4-0 vicryl in the usual fashion. We then turned our attention to the breast mass. A triangular incision was made at the 6 o'clock position with the plasmablade. Dissection was carried down close to the chest wall. The specimen was marked on the back table with 6 colors of paint using the Vector marking kit. The specimen was x-rayed with the Trident device. The clip was visualized. Flaps were mobilized. The cavity marked with titanium clips. The flaps were tacked together with 2-0 vicryl. The skin incision extended on the inframammary fold to bring the skin together. The skin was closed with 3-0 and 4-0 vicryl. The points of tension were tacked together with 5-0 vicryl. The wounds cleaned and covered with dermabond and 1/2\" steristrips. The patient was the reprepped and draped for the [facility]. The arms were tucked and rolled towel was placed behind the scapula. 0.25% marcaine used for local anesthesia. The left subclavian vein was accessed on the first stick was the 14G needle. Wire was easily passed into the IVC and checked with fluoro. A subcutaneous pocket was made over the left chest wall by first anesthetizing with 0.25% marcaine then making a 4cm skin incision with #15 blade scalpel. Bovie electrocautery was used to incise the subcutaneous tissue to creat a pocket for the port. An #11 blade scalpel was used to enlarge the wire insertion site. The tunneling device was used to tunnel the catheter from the insertion site to the pocket. The dilator and sheath were placed over the wire under fluoro. The dilator was removed and the catheter placed under fluoro. Once in the SVC, the catheter was cut to length and the catheter attached to the locking device and port. The port was flushed with heparin and had good return of blood and then flushed. The pocket irrigated with normal saline. The port was attached to Bari's fascia with 3-0 Prolene x 2.   The skin was then closed with 3-0 Vicryl in a deep dermal fashion and skin closed with 4-0 monocryl in a running subcuticular fashion. The insertion site was closed with 4-0 monocryl. The skin was cleaned with wet and dry sponge. The wounds were dressed with Dermabond. The sponge, instrument and needle counts were correct at the end of the case. The patient tolerated the procedure well. She will now go to recovery room and get a chest x-ray and be discharge home later today. Needle, sponge, and instrument counts were reported as correct x2. The patient tolerated the procedure and was transferred to the recovery area in satisfactory condition.       Agent(s) utilized for sentinel lymph node identification: Blue dye and Radioactive tracer  Agent(s) utilized for sentinel lymph node identification after neoadjuvant chemotherapy: Not Applicable  All colored nodes or non-colored nodes present at the end of a dye-filled lymphatic channel were removed: yes  All significantly radioactive nodes were removed if radionuclide was used: yes  All palpably suspicious nodes were removed, if present: Yes  If clips were placed preoperatively in pathologically-involved nodes, those nodes were identified and removed: Ayleen Price MD, MSc, FACS  2/5/2021  2:26 PM

## 2021-02-05 NOTE — PROGRESS NOTES
Dr zapata up to see patient and her toe, said it ok and to send patient home when criteria met, also aware of cxr.  Melonie Jordan

## 2021-02-05 NOTE — PROGRESS NOTES
Patient complaining of her big left toe burning and is having a red spot, she said it was not there this morning, will page dr zapata to look at it in pacu.

## 2021-02-05 NOTE — ANESTHESIA PRE PROCEDURE
Department of Anesthesiology  Preprocedure Note       Name:  Marcell Valles   Age:  63 y.o.  :  1965                                          MRN:  [id number]         Date:  2021      Surgeon: Sailaja Schulz):  Abraham Bee MD    Procedure: RIGHT BREAST LUMPECTOMY WITH SENTINEL LYMPHNODE BIOPSY, POSSIBLE AXILLARY LYMPHNODE DISSECTION, POSSIBLE BIOZORB (Right )  MEDI PORT INSERTION (N/A )    Medications prior to admission:   Prior to Admission medications    Medication Sig Start Date End Date Taking? Authorizing Provider   ibuprofen (ADVIL;MOTRIN) 200 MG tablet Take 200 mg by mouth every 6 hours as needed for Pain STOP PREOP MED    Historical Provider, MD   MELATONIN PO Take by mouth nightly    Historical Provider, MD   Multiple Vitamins-Minerals (HAIR SKIN AND NAILS FORMULA) TABS Take by mouth STOP PREOP MED    Historical Provider, MD   mirtazapine (REMERON) 15 MG tablet Take 15 mg by mouth nightly    Historical Provider, MD   atenolol (TENORMIN) 25 MG tablet every evening  20   Historical Provider, MD   ALPRAZolam Mariaa [place]ua) 0.5 MG tablet Take 0.5 mg by mouth nightly. Historical Provider, MD   pregabalin (LYRICA) 75 MG capsule daily.   10/26/20   Historical Provider, MD   famotidine (PEPCID) 20 MG tablet Take 20 mg by mouth daily    Historical Provider, MD   amitriptyline (ELAVIL) 50 MG tablet daily Take morning of surgery with a sip of water 10/26/20   Historical Provider, MD   LORATADINE-D 24HR  MG per extended release tablet  20   Historical Provider, MD   LANTUS SOLOSTAR 100 UNIT/ML injection pen 30 Units nightly 1/2 night before surgery 20   Historical Provider, MD   aspirin EC 81 MG EC tablet Take 1 tablet by mouth daily for 28 days 5/3/19 2/2/21  Tiana Ganser, DO   metFORMIN (GLUCOPHAGE) 500 MG tablet Take 500 mg by mouth 2 times daily     Historical Provider, MD   albuterol sulfate  (90 BASE) MCG/ACT inhaler Inhale 2 puffs into the lungs every 6 hours as needed for Wheezing    Historical Provider, MD   vitamin D (ERGOCALCIFEROL) 19718 UNITS CAPS capsule Take 50,000 Units by mouth once a week On Sundays    Historical Provider, MD       Current medications:    No current facility-administered medications for this visit. No current outpatient medications on file. Facility-Administered Medications Ordered in Other Visits   Medication Dose Route Frequency Provider Last Rate Last Admin    0.9 % sodium chloride infusion   Intravenous Continuous Alyssa Foley MD        clindamycin (CLEOCIN) 900 mg in dextrose 5 % 50 mL IVPB  900 mg Intravenous On Call to [address], MD        sodium chloride flush 0.9 % injection 10 mL  10 mL Intravenous 2 times per day Alyssa Foley MD        sodium chloride flush 0.9 % injection 10 mL  10 mL Intravenous PRN Alyssa Foley MD        technetium Tc 99m tilmanocept (LYMPHOSEEK) injection 0.5 millicurie  627 micro curie Injection ONCE PRN Chastity Cabrera II, MD   0.5 millicurie at 04/23/75 7316       Allergies:     Allergies   Allergen Reactions    Ceftriaxone Shortness Of Breath       Problem List:    Patient Active Problem List   Diagnosis Code    Diabetes mellitus type 2, uncontrolled (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 [address] T84.498A    Fracture of tibia and fibula, right, closed, with nonunion, subsequent encounter S82.201K, S82.401K       Past Medical History:        Diagnosis Date    Anxiety     Arthritis     Cervical radiculopathy     Chronic back pain     Depression     Diabetes mellitus type 2, uncontrolled ([address] 2/12/2017    GERD (gastroesophageal reflux disease)     History of blood transfusion 01/2018 back surgery, lumbar, dr Clare Le Hypertension        Past Surgical History:        Procedure Laterality Date    BACK SURGERY  2018    PLIF L2-L5    CARPAL TUNNEL RELEASE Right 2017    Dr. Alexandra Nava      dr hu anterior    CERVICAL FUSION      dr Ayleen Forde anterior    CERVICAL FUSION  16    ANTERIOR C4-C5, C6-C7 PLATES AND SCREWS    CHOLECYSTECTOMY  2000    COLONOSCOPY      patient did cologuard in 2018   [facility]      dr Ricardo Griffin    heel spurs bilateral    HYSTERECTOMY      dr Jb Juárez partial    OTHER SURGICAL HISTORY Right 2018    removal of hardware repair nonunion right tibia/fibula    CT OFFICE/OUTPT VISIT,PROCEDURE ONLY Right 2018    REPAIR NON UNION FAILED FIXATION RIGHT DISTAL TIB / FIB PILON FX. WITH REMOVAL OF HARDWARE & O.R. I. F ------BOP performed by Malka Sidhu MD at [address] [place] [address] Right 2018    REPAIR NON-UNION RIGHT TIBIA AND FIBULA WITH REMOVAL OF HARDWARE AND BONE GRAFT performed by Malka Sidhu MD at [address] [place] [address] Right 1/10/2019    RIGHT TIBIA REMOVAL HARDWARE, RIGHT TIBIA OPEN  TREATMENT OF NON UNION performed by [facility]a [facility]hu MD at [facility]    WISDOM TOOTH EXTRACTION         Social History:    Social History     Tobacco Use    Smoking status: Former Smoker     Packs/day: 0.50     Years: 10.00     Pack years: 5.00     Types: Cigarettes     Quit date: 3/15/2015     Years since quittin.9    Smokeless tobacco: Never Used    Tobacco comment: stop    Substance Use Topics    Alcohol use: No                                Counseling given: Not Answered  Comment: stop       Vital Signs (Current): There were no vitals filed for this visit.                                            BP Readings from Last 3 Encounters:   21 127/74 01/14/21 112/75   12/11/20 138/84       NPO Status:                                                                                 BMI:   Wt Readings from Last 3 Encounters:   02/05/21 175 lb (79.4 kg)   01/14/21 168 lb 8 oz (76.4 kg)   12/11/20 175 lb (79.4 kg)     There is no height or weight on file to calculate BMI.    CBC:   Lab Results   Component Value Date    WBC 8.9 12/11/2020    RBC 4.19 12/11/2020    HGB 13.0 12/11/2020    HCT 39.8 12/11/2020    MCV 95.0 12/11/2020    RDW 12.5 12/11/2020     12/11/2020       CMP:   Lab Results   Component Value Date     02/05/2021    K 4.6 02/05/2021    K 4.6 07/06/2018     02/05/2021    CO2 26 02/05/2021    BUN 12 02/05/2021    CREATININE 0.8 02/05/2021    GFRAA >60 02/05/2021    LABGLOM >60 02/05/2021    GLUCOSE 65 02/05/2021    GLUCOSE 96 05/24/2012    PROT 6.7 12/11/2020    CALCIUM 9.7 02/05/2021    BILITOT 0.3 12/11/2020    ALKPHOS 102 12/11/2020    AST 21 12/11/2020    ALT 24 12/11/2020       POC Tests: No results for input(s): POCGLU, POCNA, POCK, POCCL, POCBUN, POCHEMO, POCHCT in the last 72 hours. Coags:   Lab Results   Component Value Date    PROTIME 10.9 01/02/2018    PROTIME 11.0 09/30/2011    INR 1.0 01/02/2018    APTT 18.4 09/30/2011       HCG (If Applicable):   Lab Results   Component Value Date    PREGTESTUR negative 05/04/2015        ABGs: No results found for: PHART, PO2ART, ODY5JLU, RIE1SGB, BEART, E5ENELHQ     Type & Screen (If Applicable):  No results found for: LABABO, [address]    Anesthesia Evaluation  Patient summary reviewed and Nursing notes reviewed no history of anesthetic complications:   Airway: Mallampati: II  TM distance: >3 FB   Neck ROM: limited  Mouth opening: > = 3 FB Dental:      Comment: Denies missing loose or chipped teeth    Pulmonary:normal exam  breath sounds clear to auscultation  (+) asthma:     Smoker: former smoker.                           ROS comment: Former smoker   Cardiovascular:  Exercise tolerance:

## 2021-02-08 ENCOUNTER — TELEPHONE (OUTPATIENT)
Dept: ONCOLOGY | Age: 56
End: 2021-02-08

## 2021-02-08 NOTE — TELEPHONE ENCOUNTER
Spoke with representative, Amelia, @ [facility] concerning if there was a prior auth needed for an echocardiogram. She stated that no prior Silvia Rosas is required for this procedure. The call ref.  # for this is [phone]

## 2021-02-11 ENCOUNTER — TELEPHONE (OUTPATIENT)
Dept: SURGERY | Age: 56
End: 2021-02-11

## 2021-02-11 DIAGNOSIS — Z17.0 MALIGNANT NEOPLASM OF LOWER-INNER QUADRANT OF RIGHT BREAST OF FEMALE, ESTROGEN RECEPTOR POSITIVE (HCC): Primary | ICD-10-CM

## 2021-02-11 DIAGNOSIS — C50.311 MALIGNANT NEOPLASM OF LOWER-INNER QUADRANT OF RIGHT BREAST OF FEMALE, ESTROGEN RECEPTOR POSITIVE (HCC): Primary | ICD-10-CM

## 2021-02-11 NOTE — TELEPHONE ENCOUNTER
MA scheduled patient for appointment per  request on 2/12/2020 @ 11am in [facility].          Electronically signed by Rhona Homans, MA on 2/11/21 at 2:12 PM EST

## 2021-02-11 NOTE — TELEPHONE ENCOUNTER
I called Samuel Javy and went over her pathology. I am going to see her tomorrow in the office as I don't think she was taking in all the information I was giving her. She will need CT scans and bone scan. She will need reexcision for margins.

## 2021-02-12 ENCOUNTER — OFFICE VISIT (OUTPATIENT)
Dept: BREAST CENTER | Age: 56
End: 2021-02-12
Payer: MEDICAID

## 2021-02-12 ENCOUNTER — TELEPHONE (OUTPATIENT)
Dept: BREAST CENTER | Age: 56
End: 2021-02-12

## 2021-02-12 VITALS
BODY MASS INDEX: 23.7 KG/M2 | SYSTOLIC BLOOD PRESSURE: 110 MMHG | RESPIRATION RATE: 15 BRPM | DIASTOLIC BLOOD PRESSURE: 58 MMHG | HEIGHT: 72 IN | OXYGEN SATURATION: 98 % | HEART RATE: 78 BPM | TEMPERATURE: 97.5 F | WEIGHT: 175 LBS

## 2021-02-12 DIAGNOSIS — C50.111 MALIGNANT NEOPLASM OF CENTRAL PORTION OF RIGHT BREAST IN FEMALE, ESTROGEN RECEPTOR POSITIVE (HCC): ICD-10-CM

## 2021-02-12 DIAGNOSIS — Z17.0 MALIGNANT NEOPLASM OF CENTRAL PORTION OF RIGHT BREAST IN FEMALE, ESTROGEN RECEPTOR POSITIVE (HCC): ICD-10-CM

## 2021-02-12 PROCEDURE — 99212 OFFICE O/P EST SF 10 MIN: CPT | Performed by: SURGERY

## 2021-02-12 PROCEDURE — 99024 POSTOP FOLLOW-UP VISIT: CPT | Performed by: SURGERY

## 2021-02-12 NOTE — TELEPHONE ENCOUNTER
ISABEL Jenkins contacted [facility] for prior authorization of outpatient imaging. A prior authorization needed for [place] Bone scan & Ct C/A/P ([id number]) [facility] in [place]. MA subitted authorization via web. Authorization number NM bone scan [id number], CT chest [id number], CT A/P [id number]. Authorization good from 02/12/2021-03/29/2021 MA scanned authorization form in media tab.     Electronically signed by Wei Busby MA on 2/12/21 at 3:38 PM EST

## 2021-02-16 ENCOUNTER — TELEPHONE (OUTPATIENT)
Dept: BREAST CENTER | Age: 56
End: 2021-02-16

## 2021-02-17 ENCOUNTER — HOSPITAL ENCOUNTER (OUTPATIENT)
Dept: NON INVASIVE DIAGNOSTICS | Age: 56
Discharge: HOME | End: 2021-02-17
Payer: MEDICAID

## 2021-02-17 DIAGNOSIS — Z85.3 PERSONAL HISTORY OF BREAST CANCER: ICD-10-CM

## 2021-02-17 PROCEDURE — 93308 TTE F-UP OR LMTD: CPT

## 2021-02-22 DIAGNOSIS — Z85.3 PERSONAL HISTORY OF BREAST CANCER: Primary | ICD-10-CM

## 2021-02-25 ENCOUNTER — OFFICE VISIT (OUTPATIENT)
Dept: ONCOLOGY | Age: 56
End: 2021-02-25
Payer: MEDICAID

## 2021-02-25 ENCOUNTER — HOSPITAL ENCOUNTER (OUTPATIENT)
Dept: INFUSION THERAPY | Age: 56
Discharge: HOME | End: 2021-02-25
Payer: MEDICAID

## 2021-02-25 VITALS
HEART RATE: 93 BPM | BODY MASS INDEX: 22.58 KG/M2 | WEIGHT: 166.7 LBS | OXYGEN SATURATION: 99 % | HEIGHT: 72 IN | DIASTOLIC BLOOD PRESSURE: 79 MMHG | SYSTOLIC BLOOD PRESSURE: 108 MMHG | TEMPERATURE: 97.3 F

## 2021-02-25 DIAGNOSIS — Z85.3 PERSONAL HISTORY OF BREAST CANCER: ICD-10-CM

## 2021-02-25 DIAGNOSIS — Z85.3 PERSONAL HISTORY OF BREAST CANCER: Primary | ICD-10-CM

## 2021-02-25 PROCEDURE — G9899 SCRN MAM PERF RSLTS DOC: HCPCS | Performed by: INTERNAL MEDICINE

## 2021-02-25 PROCEDURE — G8484 FLU IMMUNIZE NO ADMIN: HCPCS | Performed by: INTERNAL MEDICINE

## 2021-02-25 PROCEDURE — G8420 CALC BMI NORM PARAMETERS: HCPCS | Performed by: INTERNAL MEDICINE

## 2021-02-25 PROCEDURE — 99215 OFFICE O/P EST HI 40 MIN: CPT | Performed by: INTERNAL MEDICINE

## 2021-02-25 PROCEDURE — 99212 OFFICE O/P EST SF 10 MIN: CPT

## 2021-02-25 PROCEDURE — G8427 DOCREV CUR MEDS BY ELIG CLIN: HCPCS | Performed by: INTERNAL MEDICINE

## 2021-02-25 PROCEDURE — 1036F TOBACCO NON-USER: CPT | Performed by: INTERNAL MEDICINE

## 2021-02-25 PROCEDURE — 3017F COLORECTAL CA SCREEN DOC REV: CPT | Performed by: INTERNAL MEDICINE

## 2021-02-25 RX ORDER — SODIUM CHLORIDE 0.9 % (FLUSH) 0.9 %
10 SYRINGE (ML) INJECTION PRN
Status: CANCELLED | OUTPATIENT
Start: 2021-02-25

## 2021-02-25 RX ORDER — HEPARIN SODIUM (PORCINE) LOCK FLUSH IV SOLN 100 UNIT/ML 100 UNIT/ML
500 SOLUTION INTRAVENOUS PRN
Status: CANCELLED | OUTPATIENT
Start: 2021-02-25

## 2021-02-25 NOTE — PROGRESS NOTES
Department of [facility] Oncology  Attending Clinic Note    Reason for Visit: Follow-up on a patient with Right Breast Cancer    PCP:  Adry Hyde DO    History of Present Illness: The mass was located in the 6 o'clock position of right breast    Breast cancer risk factors include infrequent SMEs and age and gender    Bilateral Screening Mammogram 10/06/2020: There are suspicious calcifications in the right breast at the 6 o'clock position which appear pleomorphic. These calcifications are in a segmental distribution. These clustered calcifications are suspicious for malignancy. Right breast, calcifications at the 6:00, core biopsy on 10/30/2020:    - Small focus of invasive ductal carcinoma, grade 3 (3+3+2 = 8)    - Ductal carcinoma in situ, high nuclear grade, comedo/cribriform/solid types;    - Microcalcifications in DCIS    - Breast receptor and biomarkers (per outside report without looking the   slides:     ER: Positive, 89.8%, strong intensity     [place]: Positive, 52.2%, moderate intensity     HER-2: Positive (score 3+)     Ki-67: High proliferation, 26.2%     P53: Negative, 0.2%     Comment:The invasive carcinoma is intermingled with DCIS and measures   3.0 x 2.0 mm in greatest dimension on the slides. CXR PA/Lateral 12/11/2020:  No acute process. Right Axillary U/S on 12/11/2020: There is no axillary LN.     MRI Bilateral Breast 01/05/2021:  Focal, heterogeneous non mass enhancement identified in the right breast 6 o'clock which measures approximately 1.9 x 1.4 x 1.7 cm (image 19 of the axial T1 post contrast series).    No additional foci of disease identified on the right  There is no lymphadenopathy    Needle localized Right lumpectomy with SLNBx with mediport placement on 02/05/2021  A.  Right axillary sentinel lymph node #1, excision: 1 lymph node negative for malignancy     B.  Right axillary contents, regional resection: 1 lymph node positive for metastatic, poorly differentiated ductal carcinoma (grade 3)   Extranodal extension present     C.  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;   positive for extensive intraductal component   Skin-uninvolved by malignancy   Invasive carcinoma margins-uninvolved by invasive carcinoma        Distance from closest margin: 4 mm from anterior margin   DCIS margins-inferior and medial margins involved by DCIS   Regional lymph nodes-involved by tumor cells        Number of lymph nodes with macrometastasis: 1        Number of lymph nodes with micrometastasis: 0        Number of lymph nodes with isolated tumor cells: 0        Size of largest metastatic deposit: 1.5 cm        Extranodal extension: Present        Total number of lymph nodes examined: 2        Number of sentinel nodes examined: 1   Treatment effect in the breast-no known presurgical therapy   TNM classification (AJCC eighth edition)-  pT1c N1a MX     Review of Systems;  CONSTITUTIONAL: No fever, chills. Good appetite and energy level. ENMT: Eyes: No diplopia; Nose: No epistaxis. Mouth: No sore throat. RESPIRATORY: No hemoptysis, shortness of breath, cough. CARDIOVASCULAR: No chest pain, palpitations. GASTROINTESTINAL: No nausea/vomiting, abdominal pain, diarrhea/constipation. GENITOURINARY: No dysuria, urinary frequency, hematuria. NEURO: No syncope, presyncope, headache.   Remainder:  ROS NEGATIVE    Past Medical History:      Diagnosis Date    Anxiety     Arthritis     Cervical radiculopathy     Chronic back pain     Depression     Diabetes mellitus type 2, uncontrolled ([facility] [address] 2/12/2017    GERD (gastroesophageal reflux disease)     History of blood transfusion 01/2018    back surgery, lumbar, dr Roger Langston    Hypertension      Medications:  Reviewed and reconciled. Allergies: Allergies   Allergen Reactions    Ceftriaxone Shortness Of Breath     Physical Exam:  /79 (Site: Right Upper Arm, Position: Sitting, Cuff Size: Medium Adult)   Pulse 93   Temp 97.3 °F (36.3 °C) (Temporal)   Ht 6' 1\" (1.854 m)   Wt 166 lb 11.2 oz (75.6 kg)   LMP 08/13/2013   SpO2 99%   BMI 21.99 kg/m²   GENERAL: Alert, oriented x 3, not in acute distress. HEENT: PERRLA; EOMI. Oropharynx clear. NECK: Supple. Without lymphadenopathy. LUNGS: Good air entry bilaterally. No wheezing, crackles or ronchi. CARDIOVASCULAR: Regular rate. No murmurs, rubs or gallops. ABDOMEN: Soft. Non-tender, non-distended. Positive bowel sounds. EXTREMITIES: Without clubbing, cyanosis, or edema. NEUROLOGIC: No focal deficits. ECOG PS 1    Impression/Plan:  64 y/o female with Right Breast Cancer    Bilateral Screening Mammogram 10/06/2020: There are suspicious calcifications in the right breast at the 6 o'clock position which appear pleomorphic. These calcifications are in a segmental distribution. These clustered calcifications are suspicious for malignancy. Right breast, calcifications at the 6:00, core biopsy on 10/30/2020:    - Small focus of invasive ductal carcinoma, grade 3 (3+3+2 = 8)    - Ductal carcinoma in situ, high nuclear grade, comedo/cribriform/solid types;    - Microcalcifications in DCIS    - Breast receptor and biomarkers (per outside report without looking the   slides:     ER: Positive, 89.8%, strong intensity     MS: Positive, 52.2%, moderate intensity     HER-2: Positive (score 3+)     Ki-67: High proliferation, 26.2%     P53: Negative, 0.2%     Comment: The of largest metastatic deposit: 1.5 cm        Extranodal extension: Present        Total number of lymph nodes examined: 2        Number of sentinel nodes examined: 1   Treatment effect in the breast-no known presurgical therapy   TNM classification (AJCC eighth edition)-  pT1c N1a MX     Pathology report reviewed extensively with patient. CT chest/abdomen/pelvis, bone scan scheduled tomorrow 02/26/2021.     Re-excision lumpectomy of inferior and medial margins on 03/09/2021    RTC 03/17/2021 to review pathology and discuss adjuvant chemotherapy ([facility])   2d-echo 02/17/2021 noted EF 60-65%    Ese Baugh MD   3/71/3999  Board Certified Medical Oncologist

## 2021-02-26 ENCOUNTER — HOSPITAL ENCOUNTER (OUTPATIENT)
Dept: NUCLEAR MEDICINE | Age: 56
Discharge: HOME | End: 2021-02-28
Payer: MEDICAID

## 2021-02-26 ENCOUNTER — HOSPITAL ENCOUNTER (OUTPATIENT)
Dept: CT IMAGING | Age: 56
Discharge: HOME | End: 2021-02-28
Payer: MEDICAID

## 2021-02-26 ENCOUNTER — APPOINTMENT (OUTPATIENT)
Dept: NUCLEAR MEDICINE | Age: 56
End: 2021-02-26
Payer: MEDICAID

## 2021-02-26 DIAGNOSIS — Z17.0 MALIGNANT NEOPLASM OF LOWER-INNER QUADRANT OF RIGHT BREAST OF FEMALE, ESTROGEN RECEPTOR POSITIVE (HCC): ICD-10-CM

## 2021-02-26 DIAGNOSIS — C50.311 MALIGNANT NEOPLASM OF LOWER-INNER QUADRANT OF RIGHT BREAST OF FEMALE, ESTROGEN RECEPTOR POSITIVE (HCC): ICD-10-CM

## 2021-02-26 PROCEDURE — A9503 TC99M MEDRONATE: HCPCS | Performed by: RADIOLOGY

## 2021-02-26 PROCEDURE — 6360000004 HC RX CONTRAST MEDICATION: Performed by: RADIOLOGY

## 2021-02-26 PROCEDURE — 2580000003 HC RX 258: Performed by: RADIOLOGY

## 2021-02-26 PROCEDURE — 74177 CT ABD & PELVIS W/CONTRAST: CPT

## 2021-02-26 PROCEDURE — 3430000000 HC RX DIAGNOSTIC RADIOPHARMACEUTICAL: Performed by: RADIOLOGY

## 2021-02-26 PROCEDURE — 78306 BONE IMAGING WHOLE BODY: CPT

## 2021-02-26 PROCEDURE — 78306 BONE IMAGING WHOLE BODY: CPT | Performed by: RADIOLOGY

## 2021-02-26 PROCEDURE — 71260 CT THORAX DX C+: CPT

## 2021-02-26 RX ORDER — TC 99M MEDRONATE 20 MG/10ML
25 INJECTION, POWDER, LYOPHILIZED, FOR SOLUTION INTRAVENOUS
Status: COMPLETED | OUTPATIENT
Start: 2021-02-26 | End: 2021-02-26

## 2021-02-26 RX ORDER — SODIUM CHLORIDE 0.9 % (FLUSH) 0.9 %
10 SYRINGE (ML) INJECTION
Status: COMPLETED | OUTPATIENT
Start: 2021-02-26 | End: 2021-02-26

## 2021-02-26 RX ADMIN — IOPAMIDOL 90 ML: 755 INJECTION, SOLUTION INTRAVENOUS at 09:33

## 2021-02-26 RX ADMIN — IOHEXOL 50 ML: 240 INJECTION, SOLUTION INTRATHECAL; INTRAVASCULAR; INTRAVENOUS; ORAL at 09:33

## 2021-02-26 RX ADMIN — TC 99M MEDRONATE 29 MILLICURIE: 20 INJECTION, POWDER, LYOPHILIZED, FOR SOLUTION INTRAVENOUS at 08:20

## 2021-02-26 RX ADMIN — SODIUM CHLORIDE, PRESERVATIVE FREE 10 ML: 5 INJECTION INTRAVENOUS at 09:33

## 2021-03-01 NOTE — PROGRESS NOTES
Patient agreed to COVID test on 3-4 at the  [facility] ([facility])   located at  off of [address].between the hours of 6 am- 2:30 pm, instructed to bring ID. Patient instructed to self isolate until day of surgery.

## 2021-03-01 NOTE — PROGRESS NOTES
[facility] [facility] PRE-ADMISSION TESTING GENERAL INSTRUCTIONS- [facility]-phone number:[phone]    GENERAL INSTRUCTIONS  [x] Antibacterial Soap shower Night before and/or AM of Surgery  [] Scott wipe instruction sheet and wipes given. [x] Nothing by mouth after midnight, including gum, candy, mints, or water. [x] You may brush your teeth, gargle, but do NOT swallow water. []Hibiclens shower  the night before and the morning of surgery. Do not use             Hibiclens on your face or head. [x]No smoking, chewing tobacco, illegal drugs, or alcohol within 24 hours of your surgery. [x] Jewelry, valuables or body piercing's should not be brought to the hospital. All body and/or tongue piercing's must be removed prior to arriving to hospital.  ALL hair pins must be removed. [x] Do not wear makeup, lotions, powders, deodorant. Nail polish as directed by the nurse. [x] Arrange transportation with a responsible adult  to and from the hospital. If you do not have a responsible adult  to transport you, you will need to make arrangements with a medical transportation company (i.e. [employer]. A Uber/taxi/bus is not appropriate unless you are accompanied by a responsible adult ). Arrange for someone to be with you for the remainder of the day and for 24 hours after your procedure due to having had anesthesia. Who will be your  for transportation?____________family member______   Who will be staying with you for 24 hrs after your procedure?___________sister_______  [] Bring insurance card and photo ID.  [] Transfusion Bracelet: Please bring with you to hospital, day of surgery  [] Bring urine specimen day of surgery. Any small container is acceptable. [x] Use inhalers the morning of surgery and bring with you to hospital.  [] Bring copy of living will or healthcare power of  papers to be placed in your electronic record.   [] CPAP/BI-PAP: Please bring your machine if you are to spend the night in the hospital.     PARKING INSTRUCTIONS:   [x] Arrival Time:_0630 via [address] door____________  · [] Parking lot '\"I\"  is located on [facility] (the corner of [facility] and [facility]). To enter, press the button and the gate will lift. A free token will be provided to exit the lot. One car per patient is allowed to park in this lot. All other cars are to park on [address] [place] [address] Street either in the parking garage or the handicap lot. [] To reach the [facility] lobby from [address] [place] [address], upon entering the hospital, take elevator B to the 3rd floor. EDUCATION INSTRUCTIONS:      [] Knee or hip replacement booklet & exercise pamphlets given. [] [address] placed in chart. [] Pre-admission Testing educational folder given  [] Incentive Spirometry,coughing & deep breathing exercises reviewed. []Medication information sheet(s)   []Fluoroscopy-Xray used in surgery reviewed with patient. Educational pamphlet placed in chart. []Pain: Post-op pain is normal and to be expected. You will be asked to rate your pain from 0-10(a zero is not acceptable-education is needed). Your post-op pain goal is:  [] Ask your nurse for your pain medication. [] Joint camp offered. [] Joint replacement booklets given. [] Other:___________________________    MEDICATION INSTRUCTIONS:   [x]Bring a complete list of your medications, please write the last time you took the medicine, give this list to the nurse. [x] Take the following medications the morning of surgery with 1-2 ounces of water: lyrica elavil pepcid [x] Stop herbal supplements and vitamins 5 days before your surgery. [x] DO NOT take any diabetic medicine the morning of surgery. Follow instructions for insulin the day before surgery. [x] If you are diabetic and your blood sugar is low or you feel symptomatic, you may drink 1-2 ounces of apple juice or take a glucose tablet.   The morning of your procedure, you may call the pre-op area if you have concerns about your blood sugar [phone]. [x] Use your inhalers the morning of surgery. Bring your emergency inhaler with you day of surgery. [x] Follow physician instructions regarding any blood thinners you may be taking. Stop motrin  No need to hold asa per note not to take morning of surgery  WHAT TO EXPECT:  [x] The day of surgery you will be greeted and checked in by the [employer].  In addition, you will be registered in the [place] by a Patient Access Representative. Please bring your photo ID and insurance card. A nurse will greet you in accordance to the time you are needed in the pre-op area to prepare you for surgery. Please do not be discouraged if you are not greeted in the order you arrive as there are many variables that are involved in patient preparation. Your patience is greatly appreciated as you wait for your nurse. Please bring in items such as: books, magazines, newspapers, electronics, or any other items  to occupy your time in the waiting area. []  Delays may occur with surgery and staff will make a sincere effort to keep you informed of delays. If any delays occur with your procedure, we apologize ahead of time for your inconvenience as we recognize the value of your time.

## 2021-03-03 ENCOUNTER — TELEPHONE (OUTPATIENT)
Dept: SURGERY | Age: 56
End: 2021-03-03

## 2021-03-05 ENCOUNTER — HOSPITAL ENCOUNTER (OUTPATIENT)
Age: 56
Discharge: HOME | End: 2021-03-07
Payer: MEDICAID

## 2021-03-05 DIAGNOSIS — U07.1 COVID-19: ICD-10-CM

## 2021-03-05 PROCEDURE — U0003 INFECTIOUS AGENT DETECTION BY NUCLEIC ACID (DNA OR RNA); SEVERE ACUTE RESPIRATORY SYNDROME CORONAVIRUS 2 (SARS-COV-2) (CORONAVIRUS DISEASE [COVID-19]), AMPLIFIED PROBE TECHNIQUE, MAKING USE OF HIGH THROUGHPUT TECHNOLOGIES AS DESCRIBED BY CMS-2020-01-R: HCPCS

## 2021-03-07 LAB — SARS-COV-2, PCR: NOT DETECTED

## 2021-03-08 ENCOUNTER — PREP FOR PROCEDURE (OUTPATIENT)
Dept: SURGERY | Age: 56
End: 2021-03-08

## 2021-03-08 RX ORDER — SODIUM CHLORIDE 9 MG/ML
INJECTION, SOLUTION INTRAVENOUS CONTINUOUS
Status: CANCELLED | OUTPATIENT
Start: 2021-03-08

## 2021-03-08 RX ORDER — SODIUM CHLORIDE 0.9 % (FLUSH) 0.9 %
10 SYRINGE (ML) INJECTION PRN
Status: CANCELLED | OUTPATIENT
Start: 2021-03-08

## 2021-03-08 RX ORDER — SODIUM CHLORIDE 0.9 % (FLUSH) 0.9 %
10 SYRINGE (ML) INJECTION EVERY 12 HOURS SCHEDULED
Status: CANCELLED | OUTPATIENT
Start: 2021-03-08

## 2021-03-08 NOTE — H&P (VIEW-ONLY)
[facility]  PROGRESS NOTE  ATTENDING NOTE             Chief Complaint   Patient presents with    Breast Cancer       NEW BREAST CANCER:  Right breast invasive ductal carcinoma - ER(+) ID(+) HER2 (over expressed) - imaging/biopsy [place] - (Request pathology slides) - Referring Dr Chandni Beckman      History and Physical     Patient's Name/Date of Birth: Sidra Alberts / 1965     Date: 2020         Jessica Alberts presents for evaluation of a mammographic abnormality.     PCP: Keyon Quintero DO. Gynecologist:none.     The mammogram was performed at [facility] on 10/06/2020. The patient has noted a change in BSE since presentation.  Patient denies nipple discharge. Patient denies a personal history of breast cancer.  Breast cancer risk factors include infrequent SMEs and age and gender. [place] Hindu Ancestry: No.     OBSTETRIC RELATED HISTORY:  Age of menarche was 11. Age of menopause was unknown.    Patient denies hormonal therapy. Patient is . Age of first live birth was 29. Patient did not breast feed. Is patient interested in fertility information about fertility preservation? No     CANCER SURVEILLANCE HISTORY:  Mammograms: Yes   Breast MRI's: No   Breast Biopsies: No   Colonoscopy: No   GI Polyps: Not Applicable   EGD: Yes   Pelvic Exam: No  Pap Smear: No   Dermatology: No   Lung screening: no        Estimated body mass index is 21.77 kg/m² as calculated from the following:    Height as of 7/15/20: 6' 1\" (1.854 m).    Weight as of 7/15/20: 165 lb (74.8 kg). Bra Size: 34B     Because violence is so common, we ask all our patients: are you in a relationship or do you live with a person who threatens, hurts, or controls you:  no     Patient drinks moderate caffeinated beverages. Patient does not smoke cigarettes.  Patient does not use recreational drugs.        Past Medical History           Past Medical History:   Diagnosis Date  Anxiety      Arthritis      Cervical radiculopathy      Chronic back pain      Depression      Diabetes mellitus type 2, uncontrolled ([address] 2/12/2017    GERD (gastroesophageal reflux disease)      History of blood transfusion 01/2018     back surgery, lumbar, dr Fredo Costello Hypertension              Past Surgical History             Past Surgical History:   Procedure Laterality Date    BACK SURGERY   01/08/2018     PLIF L2-L5    CARPAL TUNNEL RELEASE Right 04/09/2017     Dr. Justin Foy   2008     dr hu anterior    CERVICAL FUSION   2015     dr Gil Middleton anterior    CERVICAL FUSION   04/25/16     ANTERIOR C4-C5, C6-C7 PLATES AND SCREWS    CHOLECYSTECTOMY   2000    COLONOSCOPY         patient did cologuard in 2018   [facility] [place]   2013     dr Corinne Horvath ERCP       [facility]     heel spurs bilateral    HYSTERECTOMY   2013     dr Farrah Brooks partial    OTHER SURGICAL HISTORY Right 05/14/2018     removal of hardware repair nonunion right tibia/fibula    NC OFFICE/OUTPT VISIT,PROCEDURE ONLY Right 7/5/2018     REPAIR NON UNION FAILED FIXATION RIGHT DISTAL TIB / FIB PILON FX. WITH REMOVAL OF HARDWARE & O.R. I. F ------BOP performed by Miguel Angel Wood MD at [address] [place] [address] Right 5/14/2018     REPAIR NON-UNION RIGHT TIBIA AND FIBULA WITH REMOVAL OF HARDWARE AND BONE GRAFT performed by Miguel Angel Wood MD at [address] [place] [address] Right 1/10/2019     RIGHT TIBIA REMOVAL HARDWARE, RIGHT TIBIA OPEN  TREATMENT OF NON UNION performed by Miguel Angel Wood MD at [address]                Current Facility-Administered Medications               Current Outpatient Medications   Medication Sig Dispense Refill    mirtazapine (REMERON) 15 MG tablet Take 15 mg by mouth nightly        atenolol (TENORMIN) 25 MG tablet          ALPRAZolam (XANAX) 0.5 MG Lifestyle    Physical activity       Days per week: Not on file       Minutes per session: Not on file    Stress: Not on file   Relationships    Social connections       Talks on phone: Not on file       Gets together: Not on file       Attends Baptism service: Not on file       Active member of club or organization: Not on file       Attends meetings of clubs or organizations: Not on file       Relationship status: Not on file    Intimate partner violence       Fear of current or ex partner: Not on file       Emotionally abused: Not on file       Physically abused: Not on file       Forced sexual activity: Not on file   Other Topics Concern    Not on file   Social History Narrative    Not on file            Occupation: disability; use to be a , [employer],      ECOG PS 1        Review of Systems   Constitutional: Positive for activity change. Negative for appetite change, chills, fever and unexpected weight change. HENT: Negative.    Eyes: Negative.    Respiratory: Negative.  Negative for cough and shortness of breath.    Cardiovascular: Positive for chest pain (recent, notified PCP, lasted a brief period of time). Gastrointestinal: Negative. Danette Burdock for abdominal pain, blood in stool, constipation, diarrhea, nausea and vomiting. Endocrine: Negative.    Genitourinary: Negative.    Musculoskeletal: Positive for arthralgias, back pain, gait problem, myalgias, neck pain and neck stiffness.        All chronic   Skin: Negative.    Allergic/Immunologic: Negative.    Neurological: Negative for light-headedness and headaches. Hematological: Negative.    Psychiatric/Behavioral: Negative.             Physical Exam  Constitutional:       Appearance: Normal appearance. HENT:      Head: Normocephalic and atraumatic. Eyes:      Extraocular Movements: Extraocular movements intact.      Pupils: Pupils are equal, round, and reactive to light. Neck:      Musculoskeletal: Neck supple. Cardiovascular:      Rate and Rhythm: Normal rate and regular rhythm.      Pulses: Normal pulses. Pulmonary:      Effort: Pulmonary effort is normal.      Breath sounds: Normal breath sounds. Chest:      Breasts:         Right: Mass and tenderness present. No swelling, bleeding, inverted nipple, nipple discharge or skin change.         Left: No swelling, bleeding, inverted nipple, mass, nipple discharge, skin change or tenderness.       Musculoskeletal:         General: No tenderness or signs of injury. Lymphadenopathy:      Cervical: No cervical adenopathy.      Right cervical: No superficial cervical adenopathy.     Left cervical: No superficial cervical adenopathy.      Upper Body:      Right upper body: No supraclavicular or axillary adenopathy.      Left upper body: No supraclavicular or axillary adenopathy. Skin:     General: Skin is warm and dry. Neurological:      General: No focal deficit present.      Mental Status: She is alert and oriented to person, place, and time. Psychiatric:         Mood and Affect: Mood normal.         BehaviorCheral Congo         Thought Content: Thought content normal.         Judgment: Judgment normal.               MAMMOGRAM:       PATHOLOGY:              MRI BREAST:  EXAMINATION:   MRI OF THE BILATERAL BREASTS WITHOUT AND WITH CONTRAST 1/5/2021 12:46 pm:   TECHNIQUE:   Multiplanar, multisequence MRI of the bilateral breasts was performed without   and with the administration of intravenous contrast.   Data analysis was performed with color parametric mapping, image subtraction,   and 3D reconstructions. COMPARISON:   Multiple prior studies, the most recent dated December 11, 2020.    HISTORY:   ORDERING SYSTEM PROVIDED HISTORY: Malignant neoplasm of lower-inner quadrant   of right breast of female, estrogen receptor positive ([facility] [address]   TECHNOLOGIST PROVIDED HISTORY:   Reason for exam:->breast cancer   FINDINGS:   There is heterogeneous fibroglandular tissue with minimal background   parenchymal enhancement.  Focal, heterogeneous non mass enhancement   identified in the right breast 6 o'clock which measures approximately 1.9 x   1.4 x 1.7 cm (image 19 of the axial T1 post contrast series).  No suspicious   mass or non mass enhancement seen on the left.  There is no lymphadenopathy. Bilateral skin and nipple-areolar complexes are normal.  Visualized chest and   abdominal organs are unremarkable.       Impression   1.  Focal, heterogeneous non mass enhancement in the right breast 6 o'clock   is consistent with biopsy proven neoplasm.  No additional foci of disease   identified on the right. 2.  No evidence of neoplasm on the left. 3. Lawerance Reichmann is no lymphadenopathy. RECOMMENDATION:   Continued surgical and oncologic consultation is advised. BIRADS:   BIRADS - CATEGORY 6   Known Biopsy-Proven Cancer. Appropriate action should be taken. OVERALL ASSESSMENT - KNOWN BIOPSY PROVEN MALIGNANCY. PATHOLOGY:    Diagnosis:   A.  Right axillary sentinel lymph node #1, excision: 1 lymph node   negative for malignancy     B.  Right axillary contents, regional resection: 1 lymph node positive   for metastatic, poorly differentiated ductal carcinoma (grade 3)   Extranodal extension present     C.  Right breast, lumpectomy: Invasive, poorly differentiated ductal   carcinoma (grade 3) and high-grade ductal carcinoma in situ   High-grade ductal carcinoma in situ involves inferior and medial surgical   margins     Comment:     Intradepartmental consultation is obtained.      CANCER CASE SUMMARY   Procedure-excision   Specimen laterality-right   Tumor size-1.1 x 0.8 x 0.5 cm   Histologic type-invasive carcinoma of no special type (ductal)   [place] histologic score-3 (tubule formation) +3 (nuclear   pleomorphism) +2 (mitotic count) = 8   Overall grade-grade 3   Ductal carcinoma in situ-high-grade DCIS with comedonecrosis is present;   positive for extensive intraductal component Skin-uninvolved by malignancy   Invasive carcinoma margins-uninvolved by invasive carcinoma        Distance from closest margin: 4 mm from anterior margin   DCIS margins-inferior and medial margins involved by DCIS   Regional lymph nodes-involved by tumor cells        Number of lymph nodes with macrometastasis: 1        Number of lymph nodes with micrometastasis: 0        Number of lymph nodes with isolated tumor cells: 0        Size of largest metastatic deposit: 1.5 cm        Extranodal extension: Present        Total number of lymph nodes examined: 2        Number of sentinel nodes examined: 1   Treatment effect in the breast-no known presurgical therapy   TNM classification (AJCC eighth edition)-pT1c N1a MX   Additional pathologic findings-fibrocystic change with fibroadenomatoid   nodule, apocrine metaplasia, and sclerosing adenosis   Ancillary studies-see prior report [facility]  (ER positive, KS positive,   HER-2 positive)     CT chest/abdomen/pelvis:  EXAMINATION:   CT OF THE CHEST WITH CONTRAST 2/26/2021 9:30 am   TECHNIQUE:   CT of the chest was performed with the administration of intravenous   contrast. Multiplanar reformatted images are provided for review. Dose   modulation, iterative reconstruction, and/or weight based adjustment of the   mA/kV was utilized to reduce the radiation dose to as low as reasonably   achievable. COMPARISON:   CT lumbar spine January 9, 2018. HISTORY:   ORDERING SYSTEM PROVIDED HISTORY: Malignant neoplasm of lower-inner quadrant   of right breast of female, estrogen receptor positive ([facility] [address]   TECHNOLOGIST PROVIDED HISTORY:   Reason for exam:->metastatic breast cancer work-up   What reading provider will be dictating this exam?->[facility]   FINDINGS:   Mediastinum: No abnormal lymphadenopathy.  The pulmonary trunk is normal in   size.  Airways are clear. Lungs/pleura: No suspicious pulmonary nodules.  No pleural effusions. Bibasilar atelectasis.    Upper Abdomen: Pneumobilia identified in the ducts of the left and right   lobes. Soft Tissues/Bones: Fluid collection identified in the right breast measuring   approximately 2 x 7 x 4.6 cm.  Increase in soft tissue density in the right   axilla postoperative.  Again demonstrated is diffuse sclerosis of the   thoracic spine, unchanged 2018.       Impression   Fluid collection in the right breast likely represent postoperative seroma. Partially imaged pneumobilia.  Please correlate with recent biliary   intervention.  Otherwise, further evaluation with CT of the abdomen pelvis   may be considered. Bone scan:  Patient MRN: [id number]   : 1965   Age:  62 years   Gender: Female   Order Date: 2021 7:50 AM   Exam: NM BONE SCAN WHOLE BODY   Number of Images: 3 views   Indication:  C50.311 Malignant neoplasm of lower-inner quadrant of   right breast of female, estrogen receptor positive ([address]    metastatic breast cancer work-up   What reading provider will be dictating this exam?->[facility]   Comparison: CT scan abdomen 2021   Findings:   Bone scan was performed following the injection of mCi of MDP. Tracer uptake is seen compatible with degenerative changes   There is increased tracer uptake seen at the left hemipelvis and a   pattern suspicious for Paget's disease. There are corresponding   findings on the CT scan. Conceivably stress reaction could give this   appearance   In addition there is increased tracer uptake seen at T10 and L2-3 and   L5 possibly posttraumatic. There is evidence for prior lumbar spine   surgery   There is tracer uptake involving the right leg below the knee   specifically involving the tibia and fibula distally although   extending proximally compatible with prior trauma.    There is otherwise no convincing evidence for metastatic disease   The remaining biodistribution of radiotracer appears to be within   normal limits       Impression   Findings compatible with degenerative changes    Findings related to prior trauma   Findings consistent with postsurgical changes   Increased tracer uptake involving the left pelvis, the appearance   would be unusual for metastatic disease, stress reaction, or Paget's   disease could give this appearance         ASSESSMENT/PLAN:  Right breast cancer + DCIS, ER/ND+ HER2+  --reexcision of inferior and medial margins for DICS at margins  --patient understands r/b/a to procedure and wishes to proceed.     Pat Crook MD, MSc, FACS  3/8/2021  4:28 PM

## 2021-03-08 NOTE — H&P
[facility]  PROGRESS NOTE  ATTENDING NOTE             Chief Complaint   Patient presents with    Breast Cancer       NEW BREAST CANCER:  Right breast invasive ductal carcinoma - ER(+) MT(+) HER2 (over expressed) - imaging/biopsy [place] - (Request pathology slides) - Referring Dr Diandra Esposito      History and Physical     Patient's Name/Date of Birth: Sidra Alberts / 1965     Date: 2020         Felicia Alberts presents for evaluation of a mammographic abnormality.     PCP: Keyon Quintero DO. Gynecologist:none.     The mammogram was performed at [facility] on 10/06/2020. The patient has noted a change in BSE since presentation.  Patient denies nipple discharge. Patient denies a personal history of breast cancer.  Breast cancer risk factors include infrequent SMEs and age and gender. Jenny Eng Yarsanism Ancestry: No.     OBSTETRIC RELATED HISTORY:  Age of menarche was 11. Age of menopause was unknown.    Patient denies hormonal therapy. Patient is . Age of first live birth was 29. Patient did not breast feed. Is patient interested in fertility information about fertility preservation? No     CANCER SURVEILLANCE HISTORY:  Mammograms: Yes   Breast MRI's: No   Breast Biopsies: No   Colonoscopy: No   GI Polyps: Not Applicable   EGD: Yes   Pelvic Exam: No  Pap Smear: No   Dermatology: No   Lung screening: no        Estimated body mass index is 21.77 kg/m² as calculated from the following:    Height as of 7/15/20: 6' 1\" (1.854 m).    Weight as of 7/15/20: 165 lb (74.8 kg). Bra Size: 34B     Because violence is so common, we ask all our patients: are you in a relationship or do you live with a person who threatens, hurts, or controls you:  no     Patient drinks moderate caffeinated beverages. Patient does not smoke cigarettes.  Patient does not use recreational drugs.        Past Medical History           Past Medical History:   Diagnosis Date tablet Take 0.5 mg by mouth nightly.        pregabalin (LYRICA) 75 MG capsule          famotidine (PEPCID) 20 MG tablet Take 20 mg by mouth daily        amitriptyline (ELAVIL) 50 MG tablet          LORATADINE-D 24HR  MG per extended release tablet          LANTUS SOLOSTAR 100 UNIT/ML injection pen          aspirin EC 81 MG EC tablet Take 1 tablet by mouth daily for 28 days 28 tablet 0    metFORMIN (GLUCOPHAGE) 500 MG tablet Take 500 mg by mouth 2 times daily         albuterol sulfate  (90 BASE) MCG/ACT inhaler Inhale 2 puffs into the lungs every 6 hours as needed for Wheezing        vitamin D (ERGOCALCIFEROL) 51228 UNITS CAPS capsule Take 50,000 Units by mouth once a week On Sundays        amitriptyline (ELAVIL) 100 MG tablet            No current facility-administered medications for this visit.                     Allergies   Allergen Reactions    Ceftriaxone Shortness Of Breath         Family History             Family History   Problem Relation Age of Onset    Diabetes Mother      Cancer Father           [employer] Ancestry: No     Social History                   Socioeconomic History    Marital status:        Spouse name: Not on file    Number of children: Not on file    Years of education: Not on file    Highest education level: Not on file   Occupational History    Not on file   Social Needs    Financial resource strain: Not on file    Food insecurity       Worry: Not on file       Inability: Not on file    Transportation needs       Medical: Not on file       Non-medical: Not on file   Tobacco Use    Smoking status: Former Smoker       Packs/day: 0.50       Years: 10.00       Pack years: 5.00       Types: Cigarettes       Last attempt to quit: 3/15/2015       Years since quittin.7    Smokeless tobacco: Never Used    Tobacco comment: stop    Substance and Sexual Activity    Alcohol use: No    Drug use: No    Sexual activity: Not on file Cardiovascular:      Rate and Rhythm: Normal rate and regular rhythm.      Pulses: Normal pulses. Pulmonary:      Effort: Pulmonary effort is normal.      Breath sounds: Normal breath sounds. Chest:      Breasts:         Right: Mass and tenderness present. No swelling, bleeding, inverted nipple, nipple discharge or skin change.         Left: No swelling, bleeding, inverted nipple, mass, nipple discharge, skin change or tenderness.       Musculoskeletal:         General: No tenderness or signs of injury. Lymphadenopathy:      Cervical: No cervical adenopathy.      Right cervical: No superficial cervical adenopathy.     Left cervical: No superficial cervical adenopathy.      Upper Body:      Right upper body: No supraclavicular or axillary adenopathy.      Left upper body: No supraclavicular or axillary adenopathy. Skin:     General: Skin is warm and dry. Neurological:      General: No focal deficit present.      Mental Status: She is alert and oriented to person, place, and time. Psychiatric:         Mood and Affect: Mood normal.         BehaviorPascal Román         Thought Content: Thought content normal.         Judgment: Judgment normal.               MAMMOGRAM:       PATHOLOGY:              MRI BREAST:  EXAMINATION:   MRI OF THE BILATERAL BREASTS WITHOUT AND WITH CONTRAST 1/5/2021 12:46 pm:   TECHNIQUE:   Multiplanar, multisequence MRI of the bilateral breasts was performed without   and with the administration of intravenous contrast.   Data analysis was performed with color parametric mapping, image subtraction,   and 3D reconstructions. COMPARISON:   Multiple prior studies, the most recent dated December 11, 2020.    HISTORY:   ORDERING SYSTEM PROVIDED HISTORY: Malignant neoplasm of lower-inner quadrant   of right breast of female, estrogen receptor positive ([facility] [address]   TECHNOLOGIST PROVIDED HISTORY:   Reason for exam:->breast cancer   FINDINGS:   There is heterogeneous fibroglandular tissue with minimal background   parenchymal enhancement.  Focal, heterogeneous non mass enhancement   identified in the right breast 6 o'clock which measures approximately 1.9 x   1.4 x 1.7 cm (image 19 of the axial T1 post contrast series).  No suspicious   mass or non mass enhancement seen on the left.  There is no lymphadenopathy. Bilateral skin and nipple-areolar complexes are normal.  Visualized chest and   abdominal organs are unremarkable.       Impression   1.  Focal, heterogeneous non mass enhancement in the right breast 6 o'clock   is consistent with biopsy proven neoplasm.  No additional foci of disease   identified on the right. 2.  No evidence of neoplasm on the left. 3. Otilia Mash is no lymphadenopathy. RECOMMENDATION:   Continued surgical and oncologic consultation is advised. BIRADS:   BIRADS - CATEGORY 6   Known Biopsy-Proven Cancer. Appropriate action should be taken. OVERALL ASSESSMENT - KNOWN BIOPSY PROVEN MALIGNANCY. PATHOLOGY:    Diagnosis:   A.  Right axillary sentinel lymph node #1, excision: 1 lymph node   negative for malignancy     B.  Right axillary contents, regional resection: 1 lymph node positive   for metastatic, poorly differentiated ductal carcinoma (grade 3)   Extranodal extension present     C.  Right breast, lumpectomy: Invasive, poorly differentiated ductal   carcinoma (grade 3) and high-grade ductal carcinoma in situ   High-grade ductal carcinoma in situ involves inferior and medial surgical   margins     Comment:     Intradepartmental consultation is obtained.      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;   positive for extensive intraductal component Skin-uninvolved by malignancy   Invasive carcinoma margins-uninvolved by invasive carcinoma        Distance from closest margin: 4 mm from anterior margin   DCIS margins-inferior and medial margins involved by DCIS   Regional lymph nodes-involved by tumor cells        Number of lymph nodes with macrometastasis: 1        Number of lymph nodes with micrometastasis: 0        Number of lymph nodes with isolated tumor cells: 0        Size of largest metastatic deposit: 1.5 cm        Extranodal extension: Present        Total number of lymph nodes examined: 2        Number of sentinel nodes examined: 1   Treatment effect in the breast-no known presurgical therapy   TNM classification (AJCC eighth edition)-pT1c N1a MX   Additional pathologic findings-fibrocystic change with fibroadenomatoid   nodule, apocrine metaplasia, and sclerosing adenosis   Ancillary studies-see prior report [facility]  (ER positive, OK positive,   HER-2 positive)     CT chest/abdomen/pelvis:  EXAMINATION:   CT OF THE CHEST WITH CONTRAST 2/26/2021 9:30 am   TECHNIQUE:   CT of the chest was performed with the administration of intravenous   contrast. Multiplanar reformatted images are provided for review. Dose   modulation, iterative reconstruction, and/or weight based adjustment of the   mA/kV was utilized to reduce the radiation dose to as low as reasonably   achievable. COMPARISON:   CT lumbar spine January 9, 2018. HISTORY:   ORDERING SYSTEM PROVIDED HISTORY: Malignant neoplasm of lower-inner quadrant   of right breast of female, estrogen receptor positive ([facility] [address]   TECHNOLOGIST PROVIDED HISTORY:   Reason for exam:->metastatic breast cancer work-up   What reading provider will be dictating this exam?->[facility]   FINDINGS:   Mediastinum: No abnormal lymphadenopathy.  The pulmonary trunk is normal in   size.  Airways are clear. Lungs/pleura: No suspicious pulmonary nodules.  No pleural effusions. Bibasilar atelectasis.    Upper Abdomen: Pneumobilia identified in the ducts of the left and right   lobes. Soft Tissues/Bones: Fluid collection identified in the right breast measuring   approximately 2 x 7 x 4.6 cm.  Increase in soft tissue density in the right   axilla postoperative.  Again demonstrated is diffuse sclerosis of the   thoracic spine, unchanged 2018.       Impression   Fluid collection in the right breast likely represent postoperative seroma. Partially imaged pneumobilia.  Please correlate with recent biliary   intervention.  Otherwise, further evaluation with CT of the abdomen pelvis   may be considered. Bone scan:  Patient MRN: [id number]   : 1965   Age:  62 years   Gender: Female   Order Date: 2021 7:50 AM   Exam: NM BONE SCAN WHOLE BODY   Number of Images: 3 views   Indication:  C50.311 Malignant neoplasm of lower-inner quadrant of   right breast of female, estrogen receptor positive ([address]    metastatic breast cancer work-up   What reading provider will be dictating this exam?->[facility]   Comparison: CT scan abdomen 2021   Findings:   Bone scan was performed following the injection of mCi of MDP. Tracer uptake is seen compatible with degenerative changes   There is increased tracer uptake seen at the left hemipelvis and a   pattern suspicious for Paget's disease. There are corresponding   findings on the CT scan. Conceivably stress reaction could give this   appearance   In addition there is increased tracer uptake seen at T10 and L2-3 and   L5 possibly posttraumatic. There is evidence for prior lumbar spine   surgery   There is tracer uptake involving the right leg below the knee   specifically involving the tibia and fibula distally although   extending proximally compatible with prior trauma.    There is otherwise no convincing evidence for metastatic disease   The remaining biodistribution of radiotracer appears to be within   normal limits       Impression   Findings compatible with degenerative changes    Findings related to prior trauma   Findings consistent with postsurgical changes   Increased tracer uptake involving the left pelvis, the appearance   would be unusual for metastatic disease, stress reaction, or Paget's   disease could give this appearance         ASSESSMENT/PLAN:  Right breast cancer + DCIS, ER/OR+ HER2+  --reexcision of inferior and medial margins for DICS at margins  --patient understands r/b/a to procedure and wishes to proceed.     Zach Diego MD, MSc, FACS  3/8/2021  4:28 PM

## 2021-03-09 ENCOUNTER — ANESTHESIA (OUTPATIENT)
Dept: OPERATING ROOM | Age: 56
End: 2021-03-09
Payer: MEDICAID

## 2021-03-09 ENCOUNTER — ANESTHESIA EVENT (OUTPATIENT)
Dept: OPERATING ROOM | Age: 56
End: 2021-03-09
Payer: MEDICAID

## 2021-03-09 ENCOUNTER — HOSPITAL ENCOUNTER (OUTPATIENT)
Age: 56
Setting detail: OUTPATIENT SURGERY
Discharge: HOME | End: 2021-03-09
Attending: SURGERY | Admitting: SURGERY
Payer: MEDICAID

## 2021-03-09 VITALS — TEMPERATURE: 93 F | DIASTOLIC BLOOD PRESSURE: 55 MMHG | SYSTOLIC BLOOD PRESSURE: 90 MMHG | OXYGEN SATURATION: 97 %

## 2021-03-09 VITALS
TEMPERATURE: 97 F | DIASTOLIC BLOOD PRESSURE: 66 MMHG | BODY MASS INDEX: 22.48 KG/M2 | OXYGEN SATURATION: 100 % | RESPIRATION RATE: 18 BRPM | SYSTOLIC BLOOD PRESSURE: 118 MMHG | WEIGHT: 166 LBS | HEIGHT: 72 IN | HEART RATE: 65 BPM

## 2021-03-09 DIAGNOSIS — U07.1 COVID-19: Primary | ICD-10-CM

## 2021-03-09 DIAGNOSIS — C50.111 MALIGNANT NEOPLASM OF CENTRAL PORTION OF RIGHT BREAST IN FEMALE, ESTROGEN RECEPTOR POSITIVE (HCC): ICD-10-CM

## 2021-03-09 DIAGNOSIS — Z17.0 MALIGNANT NEOPLASM OF CENTRAL PORTION OF RIGHT BREAST IN FEMALE, ESTROGEN RECEPTOR POSITIVE (HCC): ICD-10-CM

## 2021-03-09 DIAGNOSIS — Z01.818 PREOP TESTING: ICD-10-CM

## 2021-03-09 LAB
ANION GAP SERPL CALCULATED.3IONS-SCNC: 9 MMOL/L (ref 7–16)
BUN BLDV-MCNC: 16 MG/DL (ref 6–20)
CALCIUM SERPL-MCNC: 10 MG/DL (ref 8.6–10.2)
CHLORIDE BLD-SCNC: 102 MMOL/L (ref 98–107)
CO2: 28 MMOL/L (ref 22–29)
CREAT SERPL-MCNC: 1 MG/DL (ref 0.5–1)
GFR AFRICAN AMERICAN: >60
GFR NON-AFRICAN AMERICAN: 57 ML/MIN/1.73
GLUCOSE BLD-MCNC: 77 MG/DL (ref 74–99)
HCT VFR BLD CALC: 42.4 % (ref 34–48)
HEMOGLOBIN: 13.7 G/DL (ref 11.5–15.5)
MCH RBC QN AUTO: 30.7 PG (ref 26–35)
MCHC RBC AUTO-ENTMCNC: 32.3 % (ref 32–34.5)
MCV RBC AUTO: 95.1 FL (ref 80–99.9)
METER GLUCOSE: 83 MG/DL (ref 74–99)
PDW BLD-RTO: 12.5 FL (ref 11.5–15)
PLATELET # BLD: 335 E9/L (ref 130–450)
PMV BLD AUTO: 9.5 FL (ref 7–12)
POTASSIUM SERPL-SCNC: 4.5 MMOL/L (ref 3.5–5)
RBC # BLD: 4.46 E12/L (ref 3.5–5.5)
SODIUM BLD-SCNC: 139 MMOL/L (ref 132–146)
WBC # BLD: 12.8 E9/L (ref 4.5–11.5)

## 2021-03-09 PROCEDURE — 6360000002 HC RX W HCPCS: Performed by: SURGERY

## 2021-03-09 PROCEDURE — 7100000001 HC PACU RECOVERY - ADDTL 15 MIN: Performed by: SURGERY

## 2021-03-09 PROCEDURE — 88307 TISSUE EXAM BY PATHOLOGIST: CPT

## 2021-03-09 PROCEDURE — 2580000003 HC RX 258: Performed by: SURGERY

## 2021-03-09 PROCEDURE — 7100000010 HC PHASE II RECOVERY - FIRST 15 MIN: Performed by: SURGERY

## 2021-03-09 PROCEDURE — 19301 PARTIAL MASTECTOMY: CPT | Performed by: SURGERY

## 2021-03-09 PROCEDURE — 80048 BASIC METABOLIC PNL TOTAL CA: CPT

## 2021-03-09 PROCEDURE — 85027 COMPLETE CBC AUTOMATED: CPT

## 2021-03-09 PROCEDURE — 7100000011 HC PHASE II RECOVERY - ADDTL 15 MIN: Performed by: SURGERY

## 2021-03-09 PROCEDURE — 3700000000 HC ANESTHESIA ATTENDED CARE: Performed by: SURGERY

## 2021-03-09 PROCEDURE — 2580000003 HC RX 258

## 2021-03-09 PROCEDURE — 82962 GLUCOSE BLOOD TEST: CPT

## 2021-03-09 PROCEDURE — 6360000002 HC RX W HCPCS

## 2021-03-09 PROCEDURE — 2500000003 HC RX 250 WO HCPCS

## 2021-03-09 PROCEDURE — 36415 COLL VENOUS BLD VENIPUNCTURE: CPT

## 2021-03-09 PROCEDURE — 2709999900 HC NON-CHARGEABLE SUPPLY: Performed by: SURGERY

## 2021-03-09 PROCEDURE — 6360000002 HC RX W HCPCS: Performed by: ANESTHESIOLOGY

## 2021-03-09 PROCEDURE — 3600000002 HC SURGERY LEVEL 2 BASE: Performed by: SURGERY

## 2021-03-09 PROCEDURE — 7100000000 HC PACU RECOVERY - FIRST 15 MIN: Performed by: SURGERY

## 2021-03-09 PROCEDURE — 3700000001 HC ADD 15 MINUTES (ANESTHESIA): Performed by: SURGERY

## 2021-03-09 PROCEDURE — 6370000000 HC RX 637 (ALT 250 FOR IP): Performed by: ANESTHESIOLOGY

## 2021-03-09 PROCEDURE — 3600000012 HC SURGERY LEVEL 2 ADDTL 15MIN: Performed by: SURGERY

## 2021-03-09 PROCEDURE — 2720000010 HC SURG SUPPLY STERILE: Performed by: SURGERY

## 2021-03-09 RX ORDER — OXYCODONE HYDROCHLORIDE AND ACETAMINOPHEN 5; 325 MG/1; MG/1
1 TABLET ORAL PRN
Status: COMPLETED | OUTPATIENT
Start: 2021-03-09 | End: 2021-03-09

## 2021-03-09 RX ORDER — ONDANSETRON 2 MG/ML
INJECTION INTRAMUSCULAR; INTRAVENOUS PRN
Status: DISCONTINUED | OUTPATIENT
Start: 2021-03-09 | End: 2021-03-09 | Stop reason: SDUPTHER

## 2021-03-09 RX ORDER — DIPHENHYDRAMINE HYDROCHLORIDE 50 MG/ML
12.5 INJECTION INTRAMUSCULAR; INTRAVENOUS
Status: DISCONTINUED | OUTPATIENT
Start: 2021-03-09 | End: 2021-03-09 | Stop reason: HOSPADM

## 2021-03-09 RX ORDER — LIDOCAINE HYDROCHLORIDE AND EPINEPHRINE 10; 10 MG/ML; UG/ML
INJECTION, SOLUTION INFILTRATION; PERINEURAL PRN
Status: DISCONTINUED | OUTPATIENT
Start: 2021-03-09 | End: 2021-03-09 | Stop reason: ALTCHOICE

## 2021-03-09 RX ORDER — SODIUM CHLORIDE 9 MG/ML
INJECTION, SOLUTION INTRAVENOUS CONTINUOUS PRN
Status: DISCONTINUED | OUTPATIENT
Start: 2021-03-09 | End: 2021-03-09 | Stop reason: SDUPTHER

## 2021-03-09 RX ORDER — FENTANYL CITRATE 50 UG/ML
25 INJECTION, SOLUTION INTRAMUSCULAR; INTRAVENOUS EVERY 5 MIN PRN
Status: DISCONTINUED | OUTPATIENT
Start: 2021-03-09 | End: 2021-03-09 | Stop reason: HOSPADM

## 2021-03-09 RX ORDER — SODIUM CHLORIDE 0.9 % (FLUSH) 0.9 %
10 SYRINGE (ML) INJECTION EVERY 12 HOURS SCHEDULED
Status: DISCONTINUED | OUTPATIENT
Start: 2021-03-09 | End: 2021-03-09 | Stop reason: HOSPADM

## 2021-03-09 RX ORDER — GLYCOPYRROLATE 1 MG/5 ML
SYRINGE (ML) INTRAVENOUS PRN
Status: DISCONTINUED | OUTPATIENT
Start: 2021-03-09 | End: 2021-03-09 | Stop reason: SDUPTHER

## 2021-03-09 RX ORDER — LIDOCAINE HYDROCHLORIDE 20 MG/ML
INJECTION, SOLUTION INTRAVENOUS PRN
Status: DISCONTINUED | OUTPATIENT
Start: 2021-03-09 | End: 2021-03-09 | Stop reason: SDUPTHER

## 2021-03-09 RX ORDER — MEPERIDINE HYDROCHLORIDE 25 MG/ML
12.5 INJECTION INTRAMUSCULAR; INTRAVENOUS; SUBCUTANEOUS EVERY 10 MIN PRN
Status: COMPLETED | OUTPATIENT
Start: 2021-03-09 | End: 2021-03-09

## 2021-03-09 RX ORDER — PROPOFOL 10 MG/ML
INJECTION, EMULSION INTRAVENOUS PRN
Status: DISCONTINUED | OUTPATIENT
Start: 2021-03-09 | End: 2021-03-09 | Stop reason: SDUPTHER

## 2021-03-09 RX ORDER — ROCURONIUM BROMIDE 10 MG/ML
INJECTION, SOLUTION INTRAVENOUS PRN
Status: DISCONTINUED | OUTPATIENT
Start: 2021-03-09 | End: 2021-03-09 | Stop reason: SDUPTHER

## 2021-03-09 RX ORDER — OXYCODONE HYDROCHLORIDE AND ACETAMINOPHEN 5; 325 MG/1; MG/1
1 TABLET ORAL EVERY 6 HOURS PRN
Qty: 12 TABLET | Refills: 0 | Status: SHIPPED | OUTPATIENT
Start: 2021-03-09 | End: 2021-03-12

## 2021-03-09 RX ORDER — FENTANYL CITRATE 50 UG/ML
INJECTION, SOLUTION INTRAMUSCULAR; INTRAVENOUS PRN
Status: DISCONTINUED | OUTPATIENT
Start: 2021-03-09 | End: 2021-03-09 | Stop reason: SDUPTHER

## 2021-03-09 RX ORDER — SODIUM CHLORIDE 9 MG/ML
INJECTION, SOLUTION INTRAVENOUS CONTINUOUS
Status: DISCONTINUED | OUTPATIENT
Start: 2021-03-09 | End: 2021-03-09 | Stop reason: HOSPADM

## 2021-03-09 RX ORDER — PROMETHAZINE HYDROCHLORIDE 25 MG/ML
6.25 INJECTION, SOLUTION INTRAMUSCULAR; INTRAVENOUS PRN
Status: DISCONTINUED | OUTPATIENT
Start: 2021-03-09 | End: 2021-03-09 | Stop reason: HOSPADM

## 2021-03-09 RX ORDER — SODIUM CHLORIDE 0.9 % (FLUSH) 0.9 %
10 SYRINGE (ML) INJECTION PRN
Status: DISCONTINUED | OUTPATIENT
Start: 2021-03-09 | End: 2021-03-09 | Stop reason: HOSPADM

## 2021-03-09 RX ORDER — CLINDAMYCIN PHOSPHATE 150 MG/ML
INJECTION, SOLUTION INTRAVENOUS PRN
Status: DISCONTINUED | OUTPATIENT
Start: 2021-03-09 | End: 2021-03-09 | Stop reason: SDUPTHER

## 2021-03-09 RX ORDER — MIDAZOLAM HYDROCHLORIDE 1 MG/ML
INJECTION INTRAMUSCULAR; INTRAVENOUS PRN
Status: DISCONTINUED | OUTPATIENT
Start: 2021-03-09 | End: 2021-03-09 | Stop reason: SDUPTHER

## 2021-03-09 RX ORDER — CLINDAMYCIN PHOSPHATE 900 MG/50ML
900 INJECTION INTRAVENOUS
Status: DISCONTINUED | OUTPATIENT
Start: 2021-03-09 | End: 2021-03-09 | Stop reason: HOSPADM

## 2021-03-09 RX ADMIN — PHENYLEPHRINE HYDROCHLORIDE 100 MCG: 10 INJECTION INTRAVENOUS at 09:36

## 2021-03-09 RX ADMIN — PROPOFOL 200 MG: 10 INJECTION, EMULSION INTRAVENOUS at 09:15

## 2021-03-09 RX ADMIN — PHENYLEPHRINE HYDROCHLORIDE 200 MCG: 10 INJECTION INTRAVENOUS at 09:23

## 2021-03-09 RX ADMIN — FENTANYL CITRATE 25 MCG: 50 INJECTION, SOLUTION INTRAMUSCULAR; INTRAVENOUS at 10:12

## 2021-03-09 RX ADMIN — ONDANSETRON HYDROCHLORIDE 4 MG: 2 INJECTION, SOLUTION INTRAMUSCULAR; INTRAVENOUS at 09:56

## 2021-03-09 RX ADMIN — ROCURONIUM BROMIDE 10 MG: 10 INJECTION, SOLUTION INTRAVENOUS at 09:15

## 2021-03-09 RX ADMIN — FENTANYL CITRATE 50 MCG: 50 INJECTION, SOLUTION INTRAMUSCULAR; INTRAVENOUS at 09:58

## 2021-03-09 RX ADMIN — SODIUM CHLORIDE: 9 INJECTION, SOLUTION INTRAVENOUS at 09:09

## 2021-03-09 RX ADMIN — MIDAZOLAM 2 MG: 1 INJECTION INTRAMUSCULAR; INTRAVENOUS at 09:05

## 2021-03-09 RX ADMIN — MEPERIDINE HYDROCHLORIDE 12.5 MG: 25 INJECTION, SOLUTION INTRAMUSCULAR; INTRAVENOUS; SUBCUTANEOUS at 11:12

## 2021-03-09 RX ADMIN — SODIUM CHLORIDE: 9 INJECTION, SOLUTION INTRAVENOUS at 06:56

## 2021-03-09 RX ADMIN — PHENYLEPHRINE HYDROCHLORIDE 100 MCG: 10 INJECTION INTRAVENOUS at 09:26

## 2021-03-09 RX ADMIN — OXYCODONE AND ACETAMINOPHEN 1 TABLET: 5; 325 TABLET ORAL at 12:57

## 2021-03-09 RX ADMIN — FENTANYL CITRATE 50 MCG: 50 INJECTION, SOLUTION INTRAMUSCULAR; INTRAVENOUS at 09:15

## 2021-03-09 RX ADMIN — SODIUM CHLORIDE: 9 INJECTION, SOLUTION INTRAVENOUS at 09:54

## 2021-03-09 RX ADMIN — MEPERIDINE HYDROCHLORIDE 12.5 MG: 25 INJECTION, SOLUTION INTRAMUSCULAR; INTRAVENOUS; SUBCUTANEOUS at 11:01

## 2021-03-09 RX ADMIN — Medication 0.2 MG: at 09:27

## 2021-03-09 RX ADMIN — LIDOCAINE HYDROCHLORIDE 20 MG: 20 INJECTION, SOLUTION INTRAVENOUS at 09:15

## 2021-03-09 RX ADMIN — CLINDAMYCIN PHOSPHATE 900 MG: 150 INJECTION, SOLUTION INTRAVENOUS at 09:18

## 2021-03-09 ASSESSMENT — PULMONARY FUNCTION TESTS
PIF_VALUE: 0
PIF_VALUE: 16
PIF_VALUE: 2
PIF_VALUE: 2
PIF_VALUE: 17
PIF_VALUE: 2
PIF_VALUE: 2
PIF_VALUE: 16
PIF_VALUE: 2
PIF_VALUE: 1
PIF_VALUE: 17
PIF_VALUE: 16
PIF_VALUE: 2
PIF_VALUE: 1
PIF_VALUE: 15
PIF_VALUE: 2
PIF_VALUE: 2
PIF_VALUE: 16
PIF_VALUE: 16
PIF_VALUE: 2
PIF_VALUE: 2
PIF_VALUE: 16
PIF_VALUE: 0
PIF_VALUE: 1
PIF_VALUE: 16
PIF_VALUE: 2
PIF_VALUE: 17
PIF_VALUE: 2
PIF_VALUE: 16
PIF_VALUE: 0
PIF_VALUE: 16

## 2021-03-09 ASSESSMENT — PAIN SCALES - GENERAL
PAINLEVEL_OUTOF10: 0
PAINLEVEL_OUTOF10: 7
PAINLEVEL_OUTOF10: 0
PAINLEVEL_OUTOF10: 10
PAINLEVEL_OUTOF10: 0

## 2021-03-09 ASSESSMENT — PAIN DESCRIPTION - PROGRESSION: CLINICAL_PROGRESSION: GRADUALLY WORSENING

## 2021-03-09 ASSESSMENT — LIFESTYLE VARIABLES: SMOKING_STATUS: 0

## 2021-03-09 ASSESSMENT — PAIN DESCRIPTION - DESCRIPTORS: DESCRIPTORS: ACHING;DISCOMFORT

## 2021-03-09 ASSESSMENT — PAIN DESCRIPTION - PAIN TYPE
TYPE: SURGICAL PAIN
TYPE: SURGICAL PAIN

## 2021-03-09 ASSESSMENT — PAIN DESCRIPTION - ONSET: ONSET: ON-GOING

## 2021-03-09 ASSESSMENT — PAIN DESCRIPTION - ORIENTATION: ORIENTATION: RIGHT

## 2021-03-09 ASSESSMENT — ENCOUNTER SYMPTOMS
DYSPNEA ACTIVITY LEVEL: AFTER AMBULATING 1 FLIGHT OF STAIRS
SHORTNESS OF BREATH: 0

## 2021-03-09 ASSESSMENT — PAIN DESCRIPTION - LOCATION: LOCATION: BREAST

## 2021-03-09 NOTE — ANESTHESIA PRE PROCEDURE
Department of Anesthesiology  Preprocedure Note       Name:  Jacob Jin   Age:  63 y.o.  :  1965                                          MRN:  [id number]         Date:  3/9/2021      Surgeon: Cassandra Brar):  Tia Bey MD    Procedure: RIGHT BREAST RE-EXCISION LUMPECTOMY OF INFERIOR AND MEDIAL MARGINS, POSSIBLE BIOZORB (Right )    Medications prior to admission:   Prior to Admission medications    Medication Sig Start Date End Date Taking? Authorizing Provider   ibuprofen (ADVIL;MOTRIN) 200 MG tablet Take 200 mg by mouth every 6 hours as needed for Pain STOP PREOP MED    Historical Provider, MD   MELATONIN PO Take by mouth nightly    Historical Provider, MD   Multiple Vitamins-Minerals (HAIR SKIN AND NAILS FORMULA) TABS Take by mouth STOP PREOP MED    Historical Provider, MD   mirtazapine (REMERON) 15 MG tablet Take 15 mg by mouth nightly    Historical Provider, MD   atenolol (TENORMIN) 25 MG tablet every evening  20   Historical Provider, MD   ALPRAZolam Clorinda Mary) 0.5 MG tablet Take 0.5 mg by mouth nightly. Historical Provider, MD   pregabalin (LYRICA) 75 MG capsule daily.   10/26/20   Historical Provider, MD   famotidine (PEPCID) 20 MG tablet Take 20 mg by mouth daily    Historical Provider, MD   amitriptyline (ELAVIL) 50 MG tablet daily Take morning of surgery with a sip of water 10/26/20   Historical Provider, MD   LORATADINE-D 24HR  MG per extended release tablet  20   Historical Provider, MD   LANPETER SOLOSTAR 100 UNIT/ML injection pen 30 Units nightly 1/2 night before surgery 20   Historical Provider, MD   aspirin EC 81 MG EC tablet Take 1 tablet by mouth daily for 28 days 5/3/19 3/1/21  Refugia DanitaDO   metFORMIN (GLUCOPHAGE) 500 MG tablet Take 500 mg by mouth 2 times daily     Historical Provider, MD   albuterol sulfate  (90 BASE) MCG/ACT inhaler Inhale 2 puffs into the lungs every 6 hours as needed for Wheezing    Historical Provider, MD   vitamin D (ERGOCALCIFEROL) 12528 UNITS CAPS capsule Take 50,000 Units by mouth once a week On Sundays    Historical Provider, MD       Current medications:    No current facility-administered medications for this visit. No current outpatient medications on file. Facility-Administered Medications Ordered in Other Visits   Medication Dose Route Frequency Provider Last Rate Last Admin    0.9 % sodium chloride infusion   Intravenous Continuous Jurgen Aparicio  mL/hr at 03/09/21 0656 New Bag at 03/09/21 0656    clindamycin (CLEOCIN) 900 mg in dextrose 5 % 50 mL IVPB  900 mg Intravenous On Call to [address], MD        sodium chloride flush 0.9 % injection 10 mL  10 mL Intravenous 2 times per day Jurgen Aparicio MD        sodium chloride flush 0.9 % injection 10 mL  10 mL Intravenous PRN Jurgen Aparicio MD           Allergies:     Allergies   Allergen Reactions    Ceftriaxone Shortness Of Breath       Problem List:    Patient Active Problem List   Diagnosis Code    Diabetes mellitus type 2, uncontrolled ([address] E11.65    HTN (hypertension), benign I10    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 S82.251K    Delayed union of tibial shaft fracture, right, closed S82.201G    Tibia/fibula fracture, right, closed, with nonunion, subsequent encounter S82.201K, S82.401K    Failed orthopedic implant ([address] T84.498A    Fracture of tibia and fibula, right, closed, with nonunion, subsequent encounter S82.201K, S82.401K    Malignant neoplasm of central portion of right breast in female, estrogen receptor positive ([address] C50.111, Z17.0       Past Medical History:        Diagnosis Date    Anxiety     Arthritis     Cancer ([address] 2021    breast    Cervical radiculopathy     Chronic back pain     Depression     Diabetes mellitus type 2, uncontrolled ([address] 2/12/2017    GERD (gastroesophageal reflux disease)     History of blood transfusion 01/2018    back surgery, lumbar, dr Ramon Rodriguez Hypertension     Right leg pain     seeing doctor currently problems with hardware       Past Surgical History:        Procedure Laterality Date    BACK SURGERY  2018    PLIF L2-L5    BREAST BIOPSY Right 2021    RIGHT BREAST LUMPECTOMY WITH  AXILLARY LYMPHNODE DISSECTION performed by Grabiel Loyola MD at [address] Right 2017    Dr. Truett Soulier      dr hu anterior    CERVICAL FUSION      dr Shayan Dhaliwal anterior    CERVICAL FUSION  16    ANTERIOR C4-C5, C6-C7 PLATES AND SCREWS    CHOLECYSTECTOMY  2000    COLONOSCOPY      patient did cologuard in 2018   [facility]      dr Alyssa Johnson    heel spurs bilateral    HYSTERECTOMY      dr Marie Santos partial    OTHER SURGICAL HISTORY Right 2018    removal of hardware repair nonunion right tibia/fibula    PORT SURGERY Left 2021    LEFT MEDI PORT INSERTION performed by rGabiel Loyola MD at [place] [address] OFFICE/OUTPT VISIT,PROCEDURE ONLY Right 2018    REPAIR NON UNION FAILED FIXATION RIGHT DISTAL TIB / FIB PILON FX. WITH REMOVAL OF HARDWARE & O.R. I. F ------BOP performed by David Martin MD at [address] [place] [address] Right 2018    REPAIR NON-UNION RIGHT TIBIA AND FIBULA WITH REMOVAL OF HARDWARE AND BONE GRAFT performed by David Martin MD at [address] [place] [address] Right 1/10/2019    RIGHT TIBIA REMOVAL HARDWARE, RIGHT TIBIA OPEN  TREATMENT OF NON UNION performed by David Martin MD at [place] [address]    WISDOM TOOTH EXTRACTION         Social History:    Social History     Tobacco Use    Smoking status: Former Smoker     Packs/day: 0.50     Years: 10.00     Pack years: 5.00     Types: Cigarettes     Quit date: 3/15/2015     Years since quittin.9    Smokeless tobacco: Never Used    Tobacco comment: stop  Substance Use Topics    Alcohol use: No                                Counseling given: Not Answered  Comment: stop 2015      Vital Signs (Current): There were no vitals filed for this visit. BP Readings from Last 3 Encounters:   03/09/21 (!) 104/55   02/25/21 108/79   02/12/21 (!) 110/58       NPO Status:                                                                                 BMI:   Wt Readings from Last 3 Encounters:   03/09/21 166 lb (75.3 kg)   02/25/21 166 lb 11.2 oz (75.6 kg)   02/12/21 175 lb (79.4 kg)     There is no height or weight on file to calculate BMI.    CBC:   Lab Results   Component Value Date    WBC 8.9 12/11/2020    RBC 4.19 12/11/2020    HGB 13.0 12/11/2020    HCT 39.8 12/11/2020    MCV 95.0 12/11/2020    RDW 12.5 12/11/2020     12/11/2020       CMP:   Lab Results   Component Value Date     02/05/2021    K 4.6 02/05/2021    K 4.6 07/06/2018     02/05/2021    CO2 26 02/05/2021    BUN 12 02/05/2021    CREATININE 0.8 02/05/2021    GFRAA >60 02/05/2021    LABGLOM >60 02/05/2021    GLUCOSE 65 02/05/2021    GLUCOSE 96 05/24/2012    PROT 6.7 12/11/2020    CALCIUM 9.7 02/05/2021    BILITOT 0.3 12/11/2020    ALKPHOS 102 12/11/2020    AST 21 12/11/2020    ALT 24 12/11/2020       POC Tests: No results for input(s): POCGLU, POCNA, POCK, POCCL, POCBUN, POCHEMO, POCHCT in the last 72 hours.     Coags:   Lab Results   Component Value Date    PROTIME 10.9 01/02/2018    PROTIME 11.0 09/30/2011    INR 1.0 01/02/2018    APTT 18.4 09/30/2011       HCG (If Applicable):   Lab Results   Component Value Date    PREGTESTUR negative 05/04/2015        ABGs: No results found for: PHART, PO2ART, JUC4YZL, NHA7PBR, BEART, F9YGXCKJ     Type & Screen (If Applicable):  No results found for: [facility]    Anesthesia Evaluation  Patient summary reviewed and Nursing notes reviewed no history of anesthetic complications:   Airway: Mallampati: II  TM distance: >3 FB   Neck ROM: limited  Mouth opening: > = 3 FB Dental:      Comment: Denies missing loose or chipped teeth    Pulmonary:normal exam  breath sounds clear to auscultation  (+) asthma:     (-) shortness of breath, recent URI and not a current smoker (former smoker)                          ROS comment: Former smoker   Cardiovascular:  Exercise tolerance: poor (<4 METS),   (+) hypertension:, [place]: after ambulating 1 flight of stairs,       ECG reviewed  Rhythm: regular  Rate: normal           Beta Blocker:  Dose within 24 Hrs         Neuro/Psych:   (+) neuromuscular disease:, psychiatric history:             ROS comment: Previous cervical fusion  CHRONIC BACK PAIN  r LEG PAIN GI/Hepatic/Renal:   (+) GERD: well controlled,           Endo/Other:    (+) DiabetesType II DM, , : arthritis:., malignancy/cancer. 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 Anesthesia Plan      general     ASA 3       Induction: intravenous. BIS    Anesthetic plan and risks discussed with patient and child/children.                       Gary Olivas MD   3/9/2021

## 2021-03-09 NOTE — BRIEF OP NOTE
Brief Postoperative Note      Patient: Melissa Hodge  YOB: 1965  MRN: [id number]    Date of Procedure: 3/9/2021    Pre-Op Diagnosis: BREAST CA    Post-Op Diagnosis: Same       Procedure(s):  RIGHT BREAST RE-EXCISION LUMPECTOMY OF INFERIOR AND MEDIAL MARGINS    Surgeon(s):  Earlean Klinefelter, MD    Assistant:  Resident: Ellen Frankel DO    Anesthesia: General    Estimated Blood Loss (mL): Minimal    Complications: None    Specimens:   ID Type Source Tests Collected by Time Destination   A : New inferior margin right breast Tissue Tissue SURGICAL PATHOLOGY Earlean Klinefelter, MD 3/9/2021 5519    B : New medial margin right breast Tissue Tissue SURGICAL PATHOLOGY Earlean Klinefelter, MD 3/9/2021 1307        Implants:  * No implants in log *      Drains: * No LDAs found *    Findings: Previous lumpectomy cavity with small seroma present.  One inferior and one medial margin from previous lumpectomy cavity obtained for specimen    Electronically signed by Narciso Aguillon DO on 3/9/2021 at 10:25 AM

## 2021-03-09 NOTE — PROGRESS NOTES
Patient signed out per anesthesia. VSS. Waiting for space in [address]. Patient comfortable at this time.

## 2021-03-09 NOTE — OP NOTE
[address] [facility]  OPERATIVE REPORT    Nena Sim  1965      DATE OF PROCEDURE: 3/9/2021     SURGEON: Mireille López MD, MSc, FACS     ASSISTANT: ALEKSANDAR Kimball DO, PGY-I; Elena Reyes, MS-3     PREOPERATIVE DIAGNOSIS:  Right breast cancer, Stage IA ER/LA+ HER2+; DCIS at inferior and medial margins. POSTOPERATIVE DIAGNOSIS: Same    OPERATION: re-excision lumpectomy of medial and inferior margins     ANESTHESIA: LMAC     ESTIMATED BLOOD LOSS: 51PX     COMPLICATIONS: None    SPECIMEN:  New medial margin; new inferior margin    DRAINS:  none    HISTORY:  This is a 63 y. o.female who presented with palpable mass originally. She underwent a right breast lumpectomy with SLNBx and the pathology had DCIS at the inferior and medial margins. She consented for re-excision. She understood r/b/a to procedure and wished to proceed. DESCRIPTION OF PROCEDURE: The patient was identified and the procedure was confirmed. Clindamycin  900mg IV was given, bilateral SCDs in place, lower Xochilt hugger applied. She was cleansed with MICHAEL wipes and then prepped and draped in the standard surgical fashion. I reopened the inframammary incision with the PEAK plasma blade. I dissected to the seroma cavity. The seroma cavity was evacuated. A new inferior margin was taken and marked on the back table with 6 colors of paint using the Vector margin marker kit. A new medial margin was taken using the plasma blade and marked in the same fashion. The cavity was marked with titanium clips. The cavity irrigated with gentamicin solution. Lateral and superior tissue was mobilized to fill in the space. Note the dissection was all the way to the chest wall posteriorly. The tissue was tacked with 2-0 vicryl to fill the space. The breast was irrigated again. The incision closed with 3-0 and 4-0 vicryl in the usual fashion. The breast cleaned and Dermabond Prineo applied.       Needle, sponge, and instrument counts were reported as correct x2. The patient tolerated the procedure and was transferred to the recovery area in satisfactory condition. Family was not present to update.       Analy Ayala MD, MSc, FACS  3/9/2021  10:40 AM

## 2021-03-09 NOTE — INTERVAL H&P NOTE
Update History & Physical    The patient's History and Physical of March 8, 2021 was reviewed with the patient and I examined the patient. There was no change. The surgical site was confirmed by the patient and me. Plan: The risks, benefits, expected outcome, and alternative to the recommended procedure have been discussed with the patient. Patient understands and wants to proceed with the procedure.      Electronically signed by Clark Arauz MD on 3/9/2021 at 8:43 AM

## 2021-03-11 ENCOUNTER — TELEPHONE (OUTPATIENT)
Dept: SURGERY | Age: 56
End: 2021-03-11

## 2021-03-11 NOTE — TELEPHONE ENCOUNTER
I called Perla Mendez and went over her pathology results. All the DCIS has been removed. Although the margins are close, it has been completely removed and the IDC outranks the DCIS according to the NCCN guidelines. She will undergo chemo and radiation.

## 2021-03-12 DIAGNOSIS — G89.18 POSTOPERATIVE PAIN: Primary | ICD-10-CM

## 2021-03-12 RX ORDER — OXYCODONE HYDROCHLORIDE AND ACETAMINOPHEN 5; 325 MG/1; MG/1
1 TABLET ORAL EVERY 6 HOURS PRN
Qty: 28 TABLET | Refills: 0 | Status: SHIPPED | OUTPATIENT
Start: 2021-03-12 | End: 2021-03-19

## 2021-03-12 NOTE — ANESTHESIA POSTPROCEDURE EVALUATION
Department of Anesthesiology  Postprocedure Note    Patient: Charity Mahmood  MRN: [id number]  YOB: 1965  Date of evaluation: 3/12/2021  Time:  9:26 AM     Procedure Summary     Date: 03/09/21 Room / Location: [facility] / [facility]    Anesthesia Start: 5482 Anesthesia Stop: 4067    Procedure: RIGHT BREAST RE-EXCISION LUMPECTOMY OF INFERIOR AND MEDIAL MARGINS (Right ) Diagnosis: (BREAST CA)    Surgeons: Mor Oliveira MD Responsible Provider: Corie Leblanc MD    Anesthesia Type: general ASA Status: 3          Anesthesia Type: general    Berny Phase I: Berny Score: 9    Berny Phase II: Berny Score: 10    Last vitals: Reviewed and per EMR flowsheets.        Anesthesia Post Evaluation    Patient location during evaluation: PACU  Patient participation: complete - patient participated  Level of consciousness: awake and alert  Airway patency: patent  Nausea & Vomiting: no vomiting and no nausea  Complications: no  Cardiovascular status: blood pressure returned to baseline  Respiratory status: acceptable  Hydration status: euvolemic

## 2021-03-22 ENCOUNTER — TELEPHONE (OUTPATIENT)
Dept: BREAST CENTER | Age: 56
End: 2021-03-22

## 2021-03-22 NOTE — TELEPHONE ENCOUNTER
MA contacted patient to arrange appointment for Wednesday per . Patient has her ride coming to have her at hospital at [address] [facility] verbalized understanding and advised patient that the appointment for 03/26/2021 has been rescheduled to 03/24/2021.       Electronically signed by Johnathon Wong MA on 3/22/21 at 1:32 PM EDT

## 2021-03-22 NOTE — TELEPHONE ENCOUNTER
Patient called in to state she awoke this morning with her right breast dressing saturated with yellow drainage. She states her breast is swollen and hard and she has some burning under her left arm. She denies any fever. Pharmacy is [facility] in [facility]. Will discuss with Dr Salina Dodson.

## 2021-03-22 NOTE — TELEPHONE ENCOUNTER
Spoke with patient after notifying Dr Dulce Sutton. Appointment give for Wednesday, the 24th of March at 1100.

## 2021-03-23 DIAGNOSIS — Z85.3 PERSONAL HISTORY OF BREAST CANCER: Primary | ICD-10-CM

## 2021-03-24 ENCOUNTER — OFFICE VISIT (OUTPATIENT)
Dept: BREAST CENTER | Age: 56
End: 2021-03-24
Payer: MEDICAID

## 2021-03-24 VITALS
TEMPERATURE: 97 F | SYSTOLIC BLOOD PRESSURE: 132 MMHG | OXYGEN SATURATION: 93 % | DIASTOLIC BLOOD PRESSURE: 70 MMHG | HEART RATE: 87 BPM | WEIGHT: 165 LBS | BODY MASS INDEX: 22.35 KG/M2 | RESPIRATION RATE: 14 BRPM | HEIGHT: 72 IN

## 2021-03-24 DIAGNOSIS — Z59.9 INABILITY TO RETURN TO LIVING SITUATION: ICD-10-CM

## 2021-03-24 DIAGNOSIS — Z17.0 MALIGNANT NEOPLASM OF CENTRAL PORTION OF RIGHT BREAST IN FEMALE, ESTROGEN RECEPTOR POSITIVE (HCC): Primary | ICD-10-CM

## 2021-03-24 DIAGNOSIS — C50.111 MALIGNANT NEOPLASM OF CENTRAL PORTION OF RIGHT BREAST IN FEMALE, ESTROGEN RECEPTOR POSITIVE (HCC): Primary | ICD-10-CM

## 2021-03-24 PROCEDURE — 99024 POSTOP FOLLOW-UP VISIT: CPT | Performed by: SURGERY

## 2021-03-24 RX ORDER — CLINDAMYCIN HYDROCHLORIDE 300 MG/1
300 CAPSULE ORAL 3 TIMES DAILY
Qty: 21 CAPSULE | Refills: 0 | Status: SHIPPED | OUTPATIENT
Start: 2021-03-24 | End: 2021-03-31

## 2021-03-24 SDOH — ECONOMIC STABILITY - INCOME SECURITY: PROBLEM RELATED TO HOUSING AND ECONOMIC CIRCUMSTANCES, UNSPECIFIED: Z59.9

## 2021-03-24 NOTE — PROGRESS NOTES
[facility]  PROGRESS NOTE  ATTENDING NOTE    POSTOP APPOINTMENT    Chief Complaint   Patient presents with    Post-Op Check     P/O right breast re-excision lumpectomy DOS 03/9/2021    Drainage from Incision     yellow drainge, saturating gauze pads and through clothing. Patient states started about a week ago.  Breast Pain     Patient states has breast pain and burning feeling. all the time. S:  63 y. o.female s/p right lumpectomy requiring re-excision. She presents today for f/u but also having drainage along incision under breast.  She states the dermabond prineo just wore off. She was using cast webril as dressing. She states it burns 6/10 pain and she had a yellow to brown drainage. She has gone through 3 shirts so far. She has been wearing a bra. She denies paresthesias. /70 (Site: Left Upper Arm, Position: Sitting, Cuff Size: Medium Adult)   Pulse 87   Temp 97 °F (36.1 °C) (Temporal)   Resp 14   Ht 6' (1.829 m)   Wt 165 lb (74.8 kg)   LMP 08/13/2013   SpO2 93%   BMI 22.38 kg/m²   Physical Exam  Constitutional:       Appearance: Normal appearance. HENT:      Head: Normocephalic and atraumatic. Eyes:      Extraocular Movements: Extraocular movements intact. Pupils: Pupils are equal, round, and reactive to light. Chest:       Skin:     General: Skin is warm and dry. Neurological:      General: No focal deficit present. Mental Status: She is alert and oriented to person, place, and time. Psychiatric:         Mood and Affect: Mood normal.         Behavior: Behavior normal.         Thought Content:  Thought content normal.         Judgment: Judgment normal.           PATHOLOGY:    Diagnosis:   A.  Right axillary sentinel lymph node #1, excision: 1 lymph node   negative for malignancy     B.  Right axillary contents, regional resection: 1 lymph node positive   for metastatic, poorly differentiated ductal carcinoma (grade 3) Extranodal extension present     C.  Right breast, lumpectomy: Invasive, poorly differentiated ductal   carcinoma (grade 3) and high-grade ductal carcinoma in situ   High-grade ductal carcinoma in situ involves inferior and medial surgical   margins     Comment:     Intradepartmental consultation is obtained. CANCER CASE SUMMARY   Procedure-excision   Specimen laterality-right   Tumor size-1.1 x 0.8 x 0.5 cm   Histologic type-invasive carcinoma of no special type (ductal)   [place] histologic score-3 (tubule formation) +3 (nuclear   pleomorphism) +2 (mitotic count) = 8   Overall grade-grade 3   Ductal carcinoma in situ-high-grade DCIS with comedonecrosis is present;   positive for extensive intraductal component   Skin-uninvolved by malignancy   Invasive carcinoma margins-uninvolved by invasive carcinoma        Distance from closest margin: 4 mm from anterior margin   DCIS margins-inferior and medial margins involved by DCIS   Regional lymph nodes-involved by tumor cells        Number of lymph nodes with macrometastasis: 1        Number of lymph nodes with micrometastasis: 0        Number of lymph nodes with isolated tumor cells: 0        Size of largest metastatic deposit: 1.5 cm        Extranodal extension: Present        Total number of lymph nodes examined: 2        Number of sentinel nodes examined: 1   Treatment effect in the breast-no known presurgical therapy   TNM classification (AJCC eighth edition)-pT1c N1a MX   Additional pathologic findings-fibrocystic change with fibroadenomatoid   nodule, apocrine metaplasia, and sclerosing adenosis   Ancillary studies-see prior report [facility]  (ER positive, PA positive,   HER-2 positive)       Diagnosis:   A.  Right breast, new inferior margin, excision: Fat necrosis, foreign   body-type giant cell reaction, chronic inflammation, and fibrosis   consistent with previous procedure   No evidence of residual carcinoma   Final inferior margin negative for carcinoma     B. Right breast, new medial margin, excision: Residual high-grade ductal   carcinoma in situ   Ductal carcinoma in situ present less than 1 mm from red/superior margin   and green/anterior margin   Fat necrosis, foreign body-type giant cell reaction, chronic   inflammation, and fibrosis consistent with previous procedure   Fibrocystic change   Microcalcifications associated with benign breast tissue and DCIS     Comment:    Residual ductal carcinoma in situ is present in 5 out of 13   tissue blocks of part B.  Intradepartmental consultation is obtained on   part B. GENETIC TESTING:  N/A    LAST COLONOSCOPY:  Will need colonoscopy at some point, never had      ASSESSMENT/PLAN:  right breast cancer, T1cN1(sn)M0 ER+WV+HER2+, grade 3, Stage IA  1. Patient instructed to keep incision clean and dry well after bathing. Patient can start scar massage once incision is completely healed and strong enough to handle the motion (usually 10 - 14 days post operatively). Rub in a circular motion on and around the scar with firm, even pressure for 5 minutes four times per day. Use lotion or moisturizer to do the scar massage to allow ease with motion over the scar and prevent friction at the area. 2. Continue monthly breast self examination. Bring any changes to your physician's attention. 3. Range of motion exercises for right shoulder encouraged. Lymphedema precautions discussed. 4. no IV, venipuncture, BPs on right side  5. Education/Lifestyle recommendations: A regular exercise program is encouraged. Avoid excessive caffeine intake. Maintain a diet rich in vegetables and fruits avoiding processed and fast food. 6. Consultations: Oncology appointment will be scheduled with Oncologist of patient's and/or PCP's preference. 7. Consultations:  radiation oncology appointment with be scheduled with radiation oncologist of patient's and/or PCP's preference.     8. Continue regular appointments with PCP & Gynecologist.  9. Office follow-up visit should be scheduled in 1  weeks and PRN. Will see patient next Friday  Referral made to XRT, [facility]  Keep wound clean and dry. Use dry dressing NOT webril  Clindamycin escribed.       Ana Diaz MD, MSc, FACS  3/24/2021  11:40 AM

## 2021-03-25 ENCOUNTER — HOSPITAL ENCOUNTER (OUTPATIENT)
Dept: INFUSION THERAPY | Age: 56
Discharge: HOME | End: 2021-03-25
Payer: MEDICAID

## 2021-03-25 ENCOUNTER — OFFICE VISIT (OUTPATIENT)
Dept: ONCOLOGY | Age: 56
End: 2021-03-25
Payer: MEDICAID

## 2021-03-25 VITALS
WEIGHT: 164 LBS | BODY MASS INDEX: 22.21 KG/M2 | DIASTOLIC BLOOD PRESSURE: 81 MMHG | TEMPERATURE: 96.8 F | SYSTOLIC BLOOD PRESSURE: 121 MMHG | HEIGHT: 72 IN | OXYGEN SATURATION: 98 % | HEART RATE: 83 BPM

## 2021-03-25 DIAGNOSIS — Z85.3 PERSONAL HISTORY OF BREAST CANCER: ICD-10-CM

## 2021-03-25 DIAGNOSIS — Z85.3 PERSONAL HISTORY OF BREAST CANCER: Primary | ICD-10-CM

## 2021-03-25 LAB
ALBUMIN SERPL-MCNC: 3.9 G/DL (ref 3.5–5.2)
ALP BLD-CCNC: 136 U/L (ref 35–104)
ALT SERPL-CCNC: 12 U/L (ref 0–32)
ANION GAP SERPL CALCULATED.3IONS-SCNC: 7 MMOL/L (ref 7–16)
AST SERPL-CCNC: 13 U/L (ref 0–31)
BASOPHILS ABSOLUTE: 0.09 E9/L (ref 0–0.2)
BASOPHILS RELATIVE PERCENT: 1.2 % (ref 0–2)
BILIRUB SERPL-MCNC: <0.2 MG/DL (ref 0–1.2)
BUN BLDV-MCNC: 8 MG/DL (ref 6–20)
CALCIUM SERPL-MCNC: 9.9 MG/DL (ref 8.6–10.2)
CHLORIDE BLD-SCNC: 103 MMOL/L (ref 98–107)
CO2: 27 MMOL/L (ref 22–29)
CREAT SERPL-MCNC: 0.8 MG/DL (ref 0.5–1)
EOSINOPHILS ABSOLUTE: 0.19 E9/L (ref 0.05–0.5)
EOSINOPHILS RELATIVE PERCENT: 2.5 % (ref 0–6)
GFR AFRICAN AMERICAN: >60
GFR NON-AFRICAN AMERICAN: >60 ML/MIN/1.73
GLUCOSE BLD-MCNC: 201 MG/DL (ref 74–99)
HCT VFR BLD CALC: 42 % (ref 34–48)
HEMOGLOBIN: 13.5 G/DL (ref 11.5–15.5)
IMMATURE GRANULOCYTES #: 0.04 E9/L
IMMATURE GRANULOCYTES %: 0.5 % (ref 0–5)
LYMPHOCYTES ABSOLUTE: 3.15 E9/L (ref 1.5–4)
LYMPHOCYTES RELATIVE PERCENT: 41.2 % (ref 20–42)
MAGNESIUM: 2 MG/DL (ref 1.6–2.6)
MCH RBC QN AUTO: 30.5 PG (ref 26–35)
MCHC RBC AUTO-ENTMCNC: 32.1 % (ref 32–34.5)
MCV RBC AUTO: 94.8 FL (ref 80–99.9)
MONOCYTES ABSOLUTE: 0.64 E9/L (ref 0.1–0.95)
MONOCYTES RELATIVE PERCENT: 8.4 % (ref 2–12)
NEUTROPHILS ABSOLUTE: 3.54 E9/L (ref 1.8–7.3)
NEUTROPHILS RELATIVE PERCENT: 46.2 % (ref 43–80)
PDW BLD-RTO: 12.3 FL (ref 11.5–15)
PLATELET # BLD: 363 E9/L (ref 130–450)
PMV BLD AUTO: 9.4 FL (ref 7–12)
POTASSIUM SERPL-SCNC: 4.8 MMOL/L (ref 3.5–5)
RBC # BLD: 4.43 E12/L (ref 3.5–5.5)
SODIUM BLD-SCNC: 137 MMOL/L (ref 132–146)
TOTAL PROTEIN: 6.8 G/DL (ref 6.4–8.3)
WBC # BLD: 7.7 E9/L (ref 4.5–11.5)

## 2021-03-25 PROCEDURE — 80053 COMPREHEN METABOLIC PANEL: CPT

## 2021-03-25 PROCEDURE — G8427 DOCREV CUR MEDS BY ELIG CLIN: HCPCS | Performed by: INTERNAL MEDICINE

## 2021-03-25 PROCEDURE — G8484 FLU IMMUNIZE NO ADMIN: HCPCS | Performed by: INTERNAL MEDICINE

## 2021-03-25 PROCEDURE — G8420 CALC BMI NORM PARAMETERS: HCPCS | Performed by: INTERNAL MEDICINE

## 2021-03-25 PROCEDURE — 1036F TOBACCO NON-USER: CPT | Performed by: INTERNAL MEDICINE

## 2021-03-25 PROCEDURE — 3017F COLORECTAL CA SCREEN DOC REV: CPT | Performed by: INTERNAL MEDICINE

## 2021-03-25 PROCEDURE — 83735 ASSAY OF MAGNESIUM: CPT

## 2021-03-25 PROCEDURE — 36415 COLL VENOUS BLD VENIPUNCTURE: CPT

## 2021-03-25 PROCEDURE — G9899 SCRN MAM PERF RSLTS DOC: HCPCS | Performed by: INTERNAL MEDICINE

## 2021-03-25 PROCEDURE — 99214 OFFICE O/P EST MOD 30 MIN: CPT | Performed by: INTERNAL MEDICINE

## 2021-03-25 PROCEDURE — 85025 COMPLETE CBC W/AUTO DIFF WBC: CPT

## 2021-03-25 PROCEDURE — 99212 OFFICE O/P EST SF 10 MIN: CPT

## 2021-03-25 RX ORDER — LIDOCAINE AND PRILOCAINE 25; 25 MG/G; MG/G
CREAM TOPICAL
Qty: 1 TUBE | Refills: 0 | Status: SHIPPED | OUTPATIENT
Start: 2021-03-25 | End: 2021-03-30

## 2021-03-25 NOTE — PROGRESS NOTES
Department of [facility] Oncology  Attending Clinic Note    Reason for Visit: Follow-up on a patient with Right Breast Cancer    PCP:  Robinson Horowitz DO    History of Present Illness: The mass was located in the 6 o'clock position of right breast    Breast cancer risk factors include infrequent SMEs and age and gender    Bilateral Screening Mammogram 10/06/2020: There are suspicious calcifications in the right breast at the 6 o'clock position which appear pleomorphic. These calcifications are in a segmental distribution. These clustered calcifications are suspicious for malignancy. Right breast, calcifications at the 6:00, core biopsy on 10/30/2020:    - Small focus of invasive ductal carcinoma, grade 3 (3+3+2 = 8)    - Ductal carcinoma in situ, high nuclear grade, comedo/cribriform/solid types;    - Microcalcifications in DCIS    - Breast receptor and biomarkers (per outside report without looking the   slides:     ER: Positive, 89.8%, strong intensity     CO: Positive, 52.2%, moderate intensity     HER-2: Positive (score 3+)     Ki-67: High proliferation, 26.2%     P53: Negative, 0.2%     Comment:The invasive carcinoma is intermingled with DCIS and measures   3.0 x 2.0 mm in greatest dimension on the slides. CXR PA/Lateral 12/11/2020:  No acute process. Right Axillary U/S on 12/11/2020: There is no axillary LN.     MRI Bilateral Breast 01/05/2021:  Focal, heterogeneous non mass enhancement identified in the right breast 6 o'clock which measures approximately 1.9 x 1.4 x 1.7 cm (image 19 of the axial T1 post contrast series).    No additional foci of disease identified on the right  There is no lymphadenopathy    Needle localized Right lumpectomy with SLNBx with mediport placement on 02/05/2021  A.  Right axillary sentinel lymph node #1, excision: 1 lymph node negative for malignancy     B.  Right axillary contents, regional resection: 1 lymph node positive for metastatic, poorly differentiated ductal carcinoma (grade 3)   Extranodal extension present     C.  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;   positive for extensive intraductal component   Skin-uninvolved by malignancy   Invasive carcinoma margins-uninvolved by invasive carcinoma        Distance from closest margin: 4 mm from anterior margin   DCIS margins-inferior and medial margins involved by DCIS   Regional lymph nodes-involved by tumor cells        Number of lymph nodes with macrometastasis: 1        Number of lymph nodes with micrometastasis: 0        Number of lymph nodes with isolated tumor cells: 0        Size of largest metastatic deposit: 1.5 cm        Extranodal extension: Present        Total number of lymph nodes examined: 2        Number of sentinel nodes examined: 1   Treatment effect in the breast-no known presurgical therapy   TNM classification (AJCC eighth edition)-  pT1c N1a MX     Bone scan, CT chest/abdomen/pelvis 02/26/2021 negative for metastatic disease    Re-excision lumpectomy of inferior and medial margins on 03/09/2021  A. Right breast, new inferior margin, excision: Fat necrosis, foreign body-type giant cell reaction, chronic inflammation, and fibrosis consistent with previous procedure   No evidence of residual carcinoma   Final inferior margin negative for carcinoma     B.  Right breast, new medial margin, excision: Residual high-grade ductal carcinoma in situ   Ductal carcinoma in situ present less than 1 mm from red/superior margin and green/anterior margin   Fat necrosis, foreign body-type giant cell reaction, chronic   inflammation, and fibrosis consistent with previous procedure   Fibrocystic change   Microcalcifications associated with benign breast tissue and DCIS   Comment:Residual ductal carcinoma in situ is present in 5 out of 13 tissue blocks of part B    Review of Systems;  CONSTITUTIONAL: No fever, chills. Good appetite and energy level. ENMT: Eyes: No diplopia; Nose: No epistaxis. Mouth: No sore throat. RESPIRATORY: No hemoptysis, shortness of breath, cough. CARDIOVASCULAR: No chest pain, palpitations. GASTROINTESTINAL: No nausea/vomiting, abdominal pain, diarrhea/constipation. GENITOURINARY: No dysuria, urinary frequency, hematuria. NEURO: No syncope, presyncope, headache. Remainder:  ROS NEGATIVE    Past Medical History:      Diagnosis Date    Anxiety     Arthritis     [facility]    [facility]    Cervical radiculopathy     Chronic back pain     Depression     Diabetes mellitus type 2, uncontrolled ([facility] 2/12/2017    GERD (gastroesophageal reflux disease)     History of blood transfusion 01/2018    back surgery, lumbar, dr Carol Wong    Hypertension     Right leg pain     seeing doctor currently problems with hardware     Medications:  Reviewed and reconciled. Allergies: Allergies   Allergen Reactions    Ceftriaxone Shortness Of Breath     Physical Exam:  /81 (Site: Left Upper Arm, Position: Sitting, Cuff Size: Medium Adult)   Pulse 83   Temp 96.8 °F (36 °C) (Temporal)   Ht 6' 1\" (1.854 m)   Wt 164 lb (74.4 kg)   LMP 08/13/2013   SpO2 98%   BMI 21.64 kg/m²   GENERAL: Alert, oriented x 3, not in acute distress. HEENT: PERRLA; EOMI. Oropharynx clear. NECK: Supple. Without lymphadenopathy. LUNGS: Good air entry bilaterally. No wheezing, crackles or ronchi. CARDIOVASCULAR: Regular rate. No murmurs, rubs or gallops. ABDOMEN: Soft.  Non-tender, non-distended. Positive bowel sounds. EXTREMITIES: Without clubbing, cyanosis, or edema. NEUROLOGIC: No focal deficits. ECOG PS 1    Impression/Plan:  64 y/o female with Right Breast Cancer    Bilateral Screening Mammogram 10/06/2020: There are suspicious calcifications in the right breast at the 6 o'clock position which appear pleomorphic. These calcifications are in a segmental distribution. These clustered calcifications are suspicious for malignancy. Right breast, calcifications at the 6:00, core biopsy on 10/30/2020:    - Small focus of invasive ductal carcinoma, grade 3 (3+3+2 = 8)    - Ductal carcinoma in situ, high nuclear grade, comedo/cribriform/solid types;    - Microcalcifications in DCIS    - Breast receptor and biomarkers (per outside report without looking the   slides:     ER: Positive, 89.8%, strong intensity     ME: Positive, 52.2%, moderate intensity     HER-2: Positive (score 3+)     Ki-67: High proliferation, 26.2%     P53: Negative, 0.2%     Comment:The invasive carcinoma is intermingled with DCIS and measures   3.0 x 2.0 mm in greatest dimension on the slides. CXR PA/Lateral 12/11/2020:  No acute process. Right Axillary U/S on 12/11/2020: There is no axillary LN.     MRI Bilateral Breast 01/05/2021:  Focal, heterogeneous non mass enhancement identified in the right breast 6 o'clock which measures approximately 1.9 x 1.4 x 1.7 cm (image 19 of the axial T1 post contrast series).    No additional foci of disease identified on the right  There is no lymphadenopathy    Needle localized Right lumpectomy with SLNBx with mediport placement on 02/05/2021  A.  Right axillary sentinel lymph node #1, excision: 1 lymph node negative for malignancy     B.  Right axillary contents, regional resection: 1 lymph node positive for metastatic, poorly differentiated ductal carcinoma (grade 3)   Extranodal extension present     C.  Right breast, lumpectomy: Invasive, poorly differentiated ductal carcinoma (grade 3) and high-grade ductal carcinoma in situ   High-grade ductal carcinoma in situ involves inferior and medial surgical margins     CANCER CASE SUMMARY   Procedure-excision   Specimen laterality-right   Tumor size-1.1 x 0.8 x 0.5 cm   Histologic type-invasive carcinoma of no special type (ductal)   [place] histologic score-3 (tubule formation) +3 (nuclear   pleomorphism) +2 (mitotic count) = 8   Overall grade-grade 3   Ductal carcinoma in situ-high-grade DCIS with comedonecrosis is present;   positive for extensive intraductal component   Skin-uninvolved by malignancy   Invasive carcinoma margins-uninvolved by invasive carcinoma        Distance from closest margin: 4 mm from anterior margin   DCIS margins-inferior and medial margins involved by DCIS   Regional lymph nodes-involved by tumor cells        Number of lymph nodes with macrometastasis: 1        Number of lymph nodes with micrometastasis: 0        Number of lymph nodes with isolated tumor cells: 0        Size of largest metastatic deposit: 1.5 cm        Extranodal extension: Present        Total number of lymph nodes examined: 2        Number of sentinel nodes examined: 1   Treatment effect in the breast-no known presurgical therapy   TNM classification (AJCC eighth edition)-  pT1c N1a MX     Bone scan, CT chest/abdomen/pelvis 02/26/2021 negative for metastatic disease    Re-excision lumpectomy of inferior and medial margins on 03/09/2021  A. Right breast, new inferior margin, excision: Fat necrosis, foreign body-type giant cell reaction, chronic inflammation, and fibrosis consistent with previous procedure   No evidence of residual carcinoma   Final inferior margin negative for carcinoma     B.  Right breast, new medial margin, excision: Residual high-grade ductal carcinoma in situ   Ductal carcinoma in situ present less than 1 mm from red/superior margin and green/anterior margin   Fat necrosis, foreign body-type giant cell reaction, chronic   inflammation, and fibrosis consistent with previous procedure   Fibrocystic change   Microcalcifications associated with benign breast tissue and DCIS   Comment:Residual ductal carcinoma in situ is present in 5 out of 13 tissue blocks of part B    Recommended adjuvant chemotherapy (TCHP) for 6 cycles followed by adjuvant RT followed lastly by endocrine therapy. Side effects TCHP reviewed with patient. She agreed to proceed.  2d-echo 02/17/2021 noted EF 60-65%    RTC 2 weeks for Cycle # 1 adjuvant TCHP    Jason Mercer MD   0/29/5225  Board Certified Medical Oncologist

## 2021-03-26 ENCOUNTER — TELEPHONE (OUTPATIENT)
Dept: INFUSION THERAPY | Age: 56
End: 2021-03-26

## 2021-03-26 NOTE — TELEPHONE ENCOUNTER
SW received referral to contact pt today re: housing concerns. Pt currently residing at home with her sister in law but states she lives in the basement area as she was evicted from her previous home. She has been looking for alternative housing, but states that with an eviction on her record and has been very difficult. Pt also notes that she has been arrested for an assault which has further complicated things. She states that she has retained an  to assist her but has been having financial difficulties trying to continue payments without her stimulus check. Pt does state that she collects disability monthly for previous leg surgeries. Pt tearful throughout conversation and states she has been overwhelmed by the information and appointments that are required of her. She is also worried about her hair loss which she states she was informed will occur after her second cycle of treatment. Discussed community resources available for wigs/hair loss and pt was receptive to receiving this resource. Will request nurse navigator provide during chemo teach as SW will be at alternative cancer center location. Support provided at length today and offered to assist pt with completing a [place] application for alternative housing options. Pt did not want to apply in [place] their housing is only open for [place] and [place]. Application completed with pt via phone today and confirmation number provided to pt. No other needs at this time. Contact information provided for further follow up as needed.  Sara Thomas, MSW, TERRI-S, OSW-C  Oncology Social Worker

## 2021-03-29 ENCOUNTER — TELEPHONE (OUTPATIENT)
Dept: BREAST CENTER | Age: 56
End: 2021-03-29

## 2021-03-29 NOTE — TELEPHONE ENCOUNTER
Patient called stating she still has the same yellow drainage soaking through the guaze and onto her clothes. No other complaints. She was prescribed Clindamycin since 3/24/21 and has been taking it as directed. She comes in for a chemotherapy teach tomorrow at [facility]. She has a follow up appointment this Friday with Dr. Marko Alejandro as well. Will notify Dr. Sebastián Arndt in the meantime to see if anything else is needed at this time.

## 2021-03-29 NOTE — TELEPHONE ENCOUNTER
Just keep changing out the gauze. May need to change more than once per day. Clean with dial soap daily in shower. If having fevers or chills, please notify us.

## 2021-03-29 NOTE — TELEPHONE ENCOUNTER
MA contacted patient and relayed message. Patient verbalized understanding. MA reminded patient of Post op appointment on 04/2/2021.       Electronically signed by Uma Sargent MA on 3/29/21 at 10:54 AM EDT

## 2021-03-29 NOTE — PROGRESS NOTES
Peer to peer with Dr Livia Argueta completed 3/29/2021 for Samreen was approved. Reference # [id number].  Selene bryant notified

## 2021-03-30 ENCOUNTER — OFFICE VISIT (OUTPATIENT)
Dept: ONCOLOGY | Age: 56
End: 2021-03-30
Payer: MEDICAID

## 2021-03-30 DIAGNOSIS — L65.8 ALOPECIA DUE TO CYTOTOXIC DRUG: ICD-10-CM

## 2021-03-30 DIAGNOSIS — T45.1X5A ALOPECIA DUE TO CYTOTOXIC DRUG: ICD-10-CM

## 2021-03-30 DIAGNOSIS — Z17.0 MALIGNANT NEOPLASM OF CENTRAL PORTION OF RIGHT BREAST IN FEMALE, ESTROGEN RECEPTOR POSITIVE (HCC): Primary | ICD-10-CM

## 2021-03-30 DIAGNOSIS — C50.111 MALIGNANT NEOPLASM OF CENTRAL PORTION OF RIGHT BREAST IN FEMALE, ESTROGEN RECEPTOR POSITIVE (HCC): Primary | ICD-10-CM

## 2021-03-30 PROCEDURE — 99999 PR OFFICE/OUTPT VISIT,PROCEDURE ONLY: CPT | Performed by: INTERNAL MEDICINE

## 2021-03-30 PROCEDURE — G8484 FLU IMMUNIZE NO ADMIN: HCPCS | Performed by: INTERNAL MEDICINE

## 2021-03-30 PROCEDURE — 99999 PR CRANIAL PROSTHESIS: CPT | Performed by: INTERNAL MEDICINE

## 2021-03-30 PROCEDURE — 1036F TOBACCO NON-USER: CPT | Performed by: INTERNAL MEDICINE

## 2021-03-30 PROCEDURE — G8428 CUR MEDS NOT DOCUMENT: HCPCS | Performed by: INTERNAL MEDICINE

## 2021-03-30 PROCEDURE — G8420 CALC BMI NORM PARAMETERS: HCPCS | Performed by: INTERNAL MEDICINE

## 2021-03-30 PROCEDURE — 3017F COLORECTAL CA SCREEN DOC REV: CPT | Performed by: INTERNAL MEDICINE

## 2021-03-30 PROCEDURE — G9899 SCRN MAM PERF RSLTS DOC: HCPCS | Performed by: INTERNAL MEDICINE

## 2021-03-30 RX ORDER — PROCHLORPERAZINE MALEATE 10 MG
10 TABLET ORAL EVERY 6 HOURS PRN
Qty: 60 TABLET | Refills: 1 | Status: SHIPPED
Start: 2021-03-30 | End: 2021-07-22 | Stop reason: SDUPTHER

## 2021-03-30 RX ORDER — ONDANSETRON 4 MG/1
4 TABLET, FILM COATED ORAL EVERY 8 HOURS PRN
Qty: 60 TABLET | Refills: 1 | Status: SHIPPED
Start: 2021-03-30 | End: 2021-07-22 | Stop reason: SDUPTHER

## 2021-03-30 RX ORDER — LOPERAMIDE HYDROCHLORIDE 2 MG/1
2 CAPSULE ORAL 4 TIMES DAILY PRN
Qty: 120 CAPSULE | Refills: 1 | Status: SHIPPED
Start: 2021-03-30 | End: 2021-04-14

## 2021-03-30 RX ORDER — LIDOCAINE AND PRILOCAINE 25; 25 MG/G; MG/G
CREAM TOPICAL
Qty: 30 G | Refills: 2 | Status: SHIPPED
Start: 2021-03-30 | End: 2021-09-23 | Stop reason: SDUPTHER

## 2021-03-30 NOTE — PROGRESS NOTES
Spoke with patient about adjuvant chemotherapy (pertuzumab/trastuzumab/docetaxel/carboplatin with pegfilgrastim). We went over the protocol to be used, what to expect as far as side effects, and when to call with problems. This was intended to reinforce the education done by Dr. Kena Lindsey. Patient was provided Rx for cranial prothesis and sent in the following prescriptions for patient to have prior to start of chemotherapy - loperamide, compazine, zofran, and emla cream for port access for treatment days. Patient is aware of labs to be drawn before treatment and provided lab location list.    Patient was provided medication handouts to the patient to take home and told patient to call if there are any questions once they had a chance to absorb the information. Patient was provided business cards for Rachel DYSON, RN-BC, Nurse Manager for Medical Oncology/[phone] and Ramu Robb RN, Assistant Nurse Manager for [address] [place] [address], [phone] for all questions regarding Oncology Infusion restrictions and education. Provided patient with contacts for Oncology Nurse Navigation, JUSTIN Soto, for follow up with paper work related to short term, FMLA, and start date for chemotherapy. Patient to follow up with Dr. Jazz Delgado on 4/2/2021 for clearance to start chemotherapy due to drainage still and antibiotics for incision. Patient rides needs 4 days notice for scheduling to appointments. Provided patient with contact for dietitian, Mauricio Romano, for any restrictions and education and follow up for diarrhea recommendations. Patient requesting Celestina Miguel, MSW, LISW-S, OSW-C  Oncology Social Worker, to follow up regarding housing. Patient had the opportunity to ask questions with all questions being answered to their satisfaction. The patient expresses understanding and acceptance of instructions.     Electronically signed by Aureliano Fernandez Maryann Daily [facility] on 3/30/2021 at 1:31 PM

## 2021-04-02 ENCOUNTER — OFFICE VISIT (OUTPATIENT)
Dept: BREAST CENTER | Age: 56
End: 2021-04-02
Payer: MEDICAID

## 2021-04-02 VITALS
WEIGHT: 165 LBS | OXYGEN SATURATION: 95 % | SYSTOLIC BLOOD PRESSURE: 90 MMHG | TEMPERATURE: 96.8 F | HEIGHT: 72 IN | DIASTOLIC BLOOD PRESSURE: 62 MMHG | HEART RATE: 75 BPM | BODY MASS INDEX: 22.35 KG/M2

## 2021-04-02 DIAGNOSIS — C50.111 MALIGNANT NEOPLASM OF CENTRAL PORTION OF RIGHT BREAST IN FEMALE, ESTROGEN RECEPTOR POSITIVE (HCC): Primary | ICD-10-CM

## 2021-04-02 DIAGNOSIS — Z17.0 MALIGNANT NEOPLASM OF CENTRAL PORTION OF RIGHT BREAST IN FEMALE, ESTROGEN RECEPTOR POSITIVE (HCC): Primary | ICD-10-CM

## 2021-04-02 PROCEDURE — 99024 POSTOP FOLLOW-UP VISIT: CPT | Performed by: SURGERY

## 2021-04-02 NOTE — PROGRESS NOTES
[facility]  PROGRESS NOTE  ATTENDING NOTE    POSTOP APPOINTMENT    Chief Complaint   Patient presents with    Post-Op Check     2nd P/O right breast re-excision lumpectomy DOS 03/9/2021    Other     states infection appears to be gone, no drainage    Other     denies nausea, vomiting, fever, chills, increased redness or swelling    Pain     6/10 pain on chest and into underarm - right side          S:  63 y. o.female s/p right lumpectomy with re-excision. The drainage has now stopped for her. She is complaining of a lot of burning. BP 90/62 (Site: Right Upper Arm, Position: Sitting, Cuff Size: Medium Adult)   Pulse 75   Temp 96.8 °F (36 °C) (Infrared)   Ht 6' (1.829 m)   Wt 165 lb (74.8 kg)   LMP 08/13/2013   SpO2 95%   BMI 22.38 kg/m²   Physical Exam  HENT:      Head: Normocephalic and atraumatic. Nose: Nose normal.      Mouth/Throat:      Mouth: Mucous membranes are moist.      Pharynx: Oropharynx is clear. Eyes:      Extraocular Movements: Extraocular movements intact. Pupils: Pupils are equal, round, and reactive to light. Neck:      Musculoskeletal: Normal range of motion and neck supple. Chest:       Skin:     General: Skin is warm and dry. Neurological:      General: No focal deficit present. Psychiatric:         Mood and Affect: Mood normal.         Behavior: Behavior normal.         Thought Content:  Thought content normal.         Judgment: Judgment normal.           PATHOLOGY:    Diagnosis:   A.  Right axillary sentinel lymph node #1, excision: 1 lymph node   negative for malignancy     B.  Right axillary contents, regional resection: 1 lymph node positive   for metastatic, poorly differentiated ductal carcinoma (grade 3)   Extranodal extension present     C.  Right breast, lumpectomy: Invasive, poorly differentiated ductal   carcinoma (grade 3) and high-grade ductal carcinoma in situ   High-grade ductal carcinoma in situ involves inferior and medial surgical   margins     Comment:     Intradepartmental consultation is obtained. 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;   positive for extensive intraductal component   Skin-uninvolved by malignancy   Invasive carcinoma margins-uninvolved by invasive carcinoma        Distance from closest margin: 4 mm from anterior margin   DCIS margins-inferior and medial margins involved by DCIS   Regional lymph nodes-involved by tumor cells        Number of lymph nodes with macrometastasis: 1        Number of lymph nodes with micrometastasis: 0        Number of lymph nodes with isolated tumor cells: 0        Size of largest metastatic deposit: 1.5 cm        Extranodal extension: Present        Total number of lymph nodes examined: 2        Number of sentinel nodes examined: 1   Treatment effect in the breast-no known presurgical therapy   TNM classification (AJCC eighth edition)-pT1c N1a MX   Additional pathologic findings-fibrocystic change with fibroadenomatoid   nodule, apocrine metaplasia, and sclerosing adenosis   Ancillary studies-see prior report HES  (ER positive, MS positive,   HER-2 positive)        Diagnosis:   A. Right breast, new inferior margin, excision: Fat necrosis, foreign   body-type giant cell reaction, chronic inflammation, and fibrosis   consistent with previous procedure   No evidence of residual carcinoma   Final inferior margin negative for carcinoma     B.  Right breast, new medial margin, excision: Residual high-grade ductal   carcinoma in situ   Ductal carcinoma in situ present less than 1 mm from red/superior margin   and green/anterior margin   Fat necrosis, FACS  4/2/2021  4:33 PM

## 2021-04-05 ENCOUNTER — HOSPITAL ENCOUNTER (OUTPATIENT)
Dept: INFUSION THERAPY | Age: 56
Discharge: HOME | End: 2021-04-05
Payer: MEDICAID

## 2021-04-05 ENCOUNTER — HOSPITAL ENCOUNTER (OUTPATIENT)
Dept: RADIATION ONCOLOGY | Age: 56
Discharge: HOME | End: 2021-04-05
Payer: MEDICAID

## 2021-04-05 ENCOUNTER — TELEPHONE (OUTPATIENT)
Dept: CASE MANAGEMENT | Age: 56
End: 2021-04-05

## 2021-04-05 VITALS
OXYGEN SATURATION: 98 % | SYSTOLIC BLOOD PRESSURE: 108 MMHG | RESPIRATION RATE: 18 BRPM | DIASTOLIC BLOOD PRESSURE: 72 MMHG | TEMPERATURE: 97.1 F | HEART RATE: 84 BPM

## 2021-04-05 DIAGNOSIS — Z85.3 PERSONAL HISTORY OF BREAST CANCER: ICD-10-CM

## 2021-04-05 DIAGNOSIS — C50.111 MALIGNANT NEOPLASM OF CENTRAL PORTION OF RIGHT BREAST IN FEMALE, ESTROGEN RECEPTOR POSITIVE (HCC): Primary | ICD-10-CM

## 2021-04-05 DIAGNOSIS — C50.919 MALIGNANT NEOPLASM OF FEMALE BREAST, UNSPECIFIED ESTROGEN RECEPTOR STATUS, UNSPECIFIED LATERALITY, UNSPECIFIED SITE OF BREAST (HCC): Primary | ICD-10-CM

## 2021-04-05 DIAGNOSIS — Z17.0 MALIGNANT NEOPLASM OF CENTRAL PORTION OF RIGHT BREAST IN FEMALE, ESTROGEN RECEPTOR POSITIVE (HCC): Primary | ICD-10-CM

## 2021-04-05 LAB
ALBUMIN SERPL-MCNC: 4 G/DL (ref 3.5–5.2)
ALP BLD-CCNC: 123 U/L (ref 35–104)
ALT SERPL-CCNC: 17 U/L (ref 0–32)
ANION GAP SERPL CALCULATED.3IONS-SCNC: 12 MMOL/L (ref 7–16)
AST SERPL-CCNC: 18 U/L (ref 0–31)
BASOPHILS ABSOLUTE: 0.06 E9/L (ref 0–0.2)
BASOPHILS RELATIVE PERCENT: 0.6 % (ref 0–2)
BILIRUB SERPL-MCNC: <0.2 MG/DL (ref 0–1.2)
BUN BLDV-MCNC: 9 MG/DL (ref 6–20)
CALCIUM SERPL-MCNC: 10.1 MG/DL (ref 8.6–10.2)
CHLORIDE BLD-SCNC: 102 MMOL/L (ref 98–107)
CO2: 24 MMOL/L (ref 22–29)
CREAT SERPL-MCNC: 0.8 MG/DL (ref 0.5–1)
EOSINOPHILS ABSOLUTE: 0.23 E9/L (ref 0.05–0.5)
EOSINOPHILS RELATIVE PERCENT: 2.1 % (ref 0–6)
GFR AFRICAN AMERICAN: >60
GFR NON-AFRICAN AMERICAN: >60 ML/MIN/1.73
GLUCOSE BLD-MCNC: 106 MG/DL (ref 74–99)
HCT VFR BLD CALC: 41.6 % (ref 34–48)
HEMOGLOBIN: 13.4 G/DL (ref 11.5–15.5)
IMMATURE GRANULOCYTES #: 0.03 E9/L
IMMATURE GRANULOCYTES %: 0.3 % (ref 0–5)
LYMPHOCYTES ABSOLUTE: 3.95 E9/L (ref 1.5–4)
LYMPHOCYTES RELATIVE PERCENT: 36.9 % (ref 20–42)
MCH RBC QN AUTO: 30.7 PG (ref 26–35)
MCHC RBC AUTO-ENTMCNC: 32.2 % (ref 32–34.5)
MCV RBC AUTO: 95.4 FL (ref 80–99.9)
MONOCYTES ABSOLUTE: 0.88 E9/L (ref 0.1–0.95)
MONOCYTES RELATIVE PERCENT: 8.2 % (ref 2–12)
NEUTROPHILS ABSOLUTE: 5.55 E9/L (ref 1.8–7.3)
NEUTROPHILS RELATIVE PERCENT: 51.9 % (ref 43–80)
PDW BLD-RTO: 12.9 FL (ref 11.5–15)
PLATELET # BLD: 365 E9/L (ref 130–450)
PMV BLD AUTO: 9.5 FL (ref 7–12)
POTASSIUM SERPL-SCNC: 4.8 MMOL/L (ref 3.5–5)
RBC # BLD: 4.36 E12/L (ref 3.5–5.5)
SODIUM BLD-SCNC: 138 MMOL/L (ref 132–146)
TOTAL PROTEIN: 7 G/DL (ref 6.4–8.3)
WBC # BLD: 10.7 E9/L (ref 4.5–11.5)

## 2021-04-05 PROCEDURE — 99205 OFFICE O/P NEW HI 60 MIN: CPT

## 2021-04-05 PROCEDURE — 36415 COLL VENOUS BLD VENIPUNCTURE: CPT

## 2021-04-05 PROCEDURE — 99205 OFFICE O/P NEW HI 60 MIN: CPT | Performed by: RADIOLOGY

## 2021-04-05 ASSESSMENT — PAIN DESCRIPTION - DESCRIPTORS: DESCRIPTORS: BURNING

## 2021-04-05 ASSESSMENT — PAIN DESCRIPTION - PAIN TYPE: TYPE: CHRONIC PAIN

## 2021-04-05 NOTE — PATIENT INSTRUCTIONS
SIM after [employer] MD Linda Duff [facility] Oncology  Cell: [phone]    [facility]:  [phone]   FAX: [phone]  [facility]:  [address] [place] [address] Avenue:    [phone]  [address] [place] [address] Road:  [phone]   FAX:  [phone]  Email: [email or web address]

## 2021-04-05 NOTE — PROGRESS NOTES
Radiation Oncology      [facility]. Gaurang Ta MD [facility]      Referring Physician: / Katty Rizo      Primary Care Physician:Keyon Mcqueen DO   Primary Oncologist: Dr. Ray Sol      Diagnosis: Ravinder Edwards right breast cancer   -TYRESE+   -HER2+      Service:  Radiation Oncology consultation performed on 4/5/21        HPI:        Kyung Matos is a pleasant 63year old with node + breast cancer. She had a mammogram 10/6/2020 at [facility] that the breast looked suspicious. Patient then had a core biopsy 10/30/2020 that present right breast invasive ductal carcinoma, grade 3(3+3+2=8), DCIS, high nuclear grade, ER/RI/Her2 positive. [id number]. On2/5/2021 patient had surgery with Dr. Nguyen Fischer for a right lumpectomy with SLN biopsy and a mediport placed. On 2/26/2021 she had a CT chest for a post op seroma. 3/9/2021 pt had surgery  for a re-excison lumpectomy of inferior and medial margins. Pt follows with Dr. Nick Siddiqi and his plan is adjuvant chemo(TCHP) for 6 cycles followed by adjuvant RT followed by endocrine therapy with plan to start 4/8/2021. The patient presents today to discuss fractionated external beam radiation therapy as a component of multidisciplinary, definative/adjuvant management. We reviewed the available medical records including the complete medical history of this pt today prior to consultation. Epic -CE and available scanned documents per the Epic Media tab were reviewed PRN. A complete ROS was also performed today and is noted below. During consultation today I personally discussed the pts workup to date; including but not limited to applicable imaging studies, Pathology reports, and interventions. The NCCN guidelines, as pertaining to the above diagnosis were also recapped for the pt today in brief. Today, Jia Whitaker  notes Sx that include pain (unrelated to the breast CA).    S 70.        -----  SARS-CoV-2:    -pt asymptomatic and afebrile      [facility] NOV 2020: [email or web address].her.[email or web address] html:    The likelihood of recovering replication-competent virus also declines after onset of symptoms. For patients with mild to moderate ILNNO-20, replication-competent virus has not been recovered after 10 days following symptom onset ([facility], unpublished data, 2020; Dennise Kim et al., 2020; Harlan et al., 2020; Bábrara et al., 2020; Kiesha et al., 9260; personal communication with Kristina Steffany., 2241; [address],[address] [facility], 2020).       -----  Per [address]:    History of Present Illness: The mass was located in the 6 o'clock position of right breast     Breast cancer risk factors include infrequent SMEs and age and gender     Bilateral Screening Mammogram 10/06/2020: There are suspicious calcifications in the right breast at the 6 o'clock position which appear pleomorphic. These calcifications are in a segmental distribution. These clustered calcifications are suspicious for malignancy.     Right breast, calcifications at the 6:00, core biopsy on 10/30/2020:    - Small focus of invasive ductal carcinoma, grade 3 (3+3+2 = 8)    - Ductal carcinoma in situ, high nuclear grade, comedo/cribriform/solid types;    - Microcalcifications in DCIS    - Breast receptor and biomarker (per outside report without looking the   slides:     ER: Positive, 89.8%, strong intensity     CA: Positive, 52.2%, moderate intensity     HER-2: Positive (score 3+)     Ki-67: High proliferation, 26.2%     P53: Negative, 0.2%     Comment:The invasive carcinoma is intermingled with DCIS and measures   3.0 x 2.0 mm in greatest dimension on the slides.      CXR PA/Lateral 12/11/2020:  No acute process.      Right Axillary U/S on 12/11/2020:   There is no axillary LN.     MRI Bilateral Breast 01/05/2021:  Focal, heterogeneous non mass enhancement identified in the right breast 6 o'clock which measures approximately 1.9 x 1.4 x 1.7 cm (image 19 of the axial T1 post contrast series).    No additional foci of disease identified on the right  There is no lymphadenopathy     Needle localized Right lumpectomy with SLNBx with mediport placement on 02/05/2021  A.  Right axillary sentinel lymph node #1, excision: 1 lymph node negative for malignancy     B.  Right axillary contents, regional resection: 1 lymph node positive for metastatic, poorly differentiated ductal carcinoma (grade 3)   Extranodal extension present     C.  Right breast, lumpectomy: Invasive, poorly differentiated ductal carcinoma (grade 3) and high-grade ductal carcinoma in situ   High-grade ductal carcinoma in situ involves inferior and medial surgical margins     CANCER CASE SUMMARY   Procedure-excision   Specimen laterality-right   Tumor size-1.1 x 0.8 x 0.5 cm   Histologic type-invasive carcinoma of no special type (ductal)   [place] histologic score-3 (tubule formation) +3 (nuclear   pleomorphism) +2 (mitotic count) = 8   Overall grade-grade 3   Ductal carcinoma in situ-high-grade DCIS with comedonecrosis is present;   positive for extensive intraductal component   Skin-uninvolved by malignancy   Invasive carcinoma margins-uninvolved by invasive carcinoma        Distance from closest margin: 4 mm from anterior margin   DCIS margins-inferior and medial margins involved by DCIS   Regional lymph nodes-involved by tumor cells        Number of lymph nodes with macro metastasis: 1        Number of lymph nodes with micrometastasis: 0        Number of lymph nodes with isolated tumor cells: 0        Size of largest metastatic deposit: 1.5 cm        Extranodal extension: Present        Total number of lymph nodes examined: 2        Number of sentinel nodes examined: 1   Treatment effect in the breast-no known presurgical therapy   TNM classification (AJCC eighth edition)-  pT1c N1a MX      Bone scan, CT chest/abdomen/pelvis 02/26/2021 negative for metastatic Bilateral Breast 01/05/2021:  Focal, heterogeneous non mass enhancement identified in the right breast 6 o'clock which measures approximately 1.9 x 1.4 x 1.7 cm (image 19 of the axial T1 post contrast series).    No additional foci of disease identified on the right  There is no lymphadenopathy     Needle localized Right lumpectomy with SLNBx with mediport placement on 02/05/2021  A.  Right axillary sentinel lymph node #1, excision: 1 lymph node negative for malignancy     B.  Right axillary contents, regional resection: 1 lymph node positive for metastatic, poorly differentiated ductal carcinoma (grade 3)   Extranodal extension present     C.  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;   positive for extensive intraductal component   Skin-uninvolved by malignancy   Invasive carcinoma margins-uninvolved by invasive carcinoma        Distance from closest margin: 4 mm from anterior margin   DCIS margins-inferior and medial margins involved by DCIS   Regional lymph nodes-involved by tumor cells        Number of lymph nodes with macro metastasis: 1        Number of lymph nodes with micrometastasis: 0        Number of lymph nodes with isolated tumor cells: 0        Size of largest metastatic deposit: 1.5 cm        Extranodal extension: Present        Total number of lymph nodes examined: 2        Number of sentinel nodes examined: 1   Treatment effect in the breast-no known presurgical therapy TNM classification (AJCC eighth edition)-  pT1c N1a MX      Bone scan, CT chest/abdomen/pelvis 02/26/2021 negative for metastatic disease     Re-excision lumpectomy of inferior and medial margins on 03/09/2021  A. Right breast, new inferior margin, excision: Fat necrosis, foreign body-type giant cell reaction, chronic inflammation, and fibrosis consistent with previous procedure   No evidence of residual carcinoma   Final inferior margin negative for carcinoma     B. Right breast, new medial margin, excision: Residual high-grade ductal carcinoma in situ   Ductal carcinoma in situ present less than 1 mm from red/superior margin and green/anterior margin   Fat necrosis, foreign body-type giant cell reaction, chronic   inflammation, and fibrosis consistent with previous procedure   Fibrocystic change   Microcalcifications associated with benign breast tissue and DCIS   Comment:Residual ductal carcinoma in situ is present in 5 out of 13 tissue blocks of part B     Recommended adjuvant chemotherapy (TCHP) for 6 cycles followed by adjuvant RT followed lastly by endocrine therapy. Side effects TCHP reviewed with patient. She agreed to proceed. 2d-echo 02/17/2021 noted EF 60-65%     RTC 2 weeks for Cycle # 1 adjuvant TCHP      -----  Pathology reviewed:        Diagnosis:   A.  Right axillary sentinel lymph node #1, excision: 1 lymph node   negative for malignancy     B.  Right axillary contents, regional resection: 1 lymph node positive   for metastatic, poorly differentiated ductal carcinoma (grade 3)   Extranodal extension present     C.  Right breast, lumpectomy: Invasive, poorly differentiated ductal   carcinoma (grade 3) and high-grade ductal carcinoma in situ   High-grade ductal carcinoma in situ involves inferior and medial surgical   margins     Comment:     Intradepartmental consultation is obtained.      CANCER CASE SUMMARY   Procedure-excision   Specimen laterality-right   Tumor size-1.1 x 0.8 x 0.5 cm Histologic type-invasive carcinoma of no special type (ductal)   [place] histologic score-3 (tubule formation) +3 (nuclear   pleomorphism) +2 (mitotic count) = 8   Overall grade-grade 3   Ductal carcinoma in situ-high-grade DCIS with comedonecrosis is present;   positive for extensive intraductal component   Skin-uninvolved by malignancy   Invasive carcinoma margins-uninvolved by invasive carcinoma        Distance from closest margin: 4 mm from anterior margin   DCIS margins-inferior and medial margins involved by DCIS   Regional lymph nodes-involved by tumor cells        Number of lymph nodes with macrometastasis: 1        Number of lymph nodes with micrometastasis: 0        Number of lymph nodes with isolated tumor cells: 0        Size of largest metastatic deposit: 1.5 cm        Extranodal extension: Present        Total number of lymph nodes examined: 2        Number of sentinel nodes examined: 1   Treatment effect in the breast-no known presurgical therapy   TNM classification (AJCC eighth edition)-pT1c N1a MX   Additional pathologic findings-fibrocystic change with fibroadenomatoid   nodule, apocrine metaplasia, and sclerosing adenosis   Ancillary studies-see prior report [facility]  (ER positive, CA positive,   HER-2 positive)       -----      Past Medical History:   Diagnosis Date    Anxiety     Arthritis     Cancer ([facility] [address] 2021    breast    Cervical radiculopathy     Chronic back pain     Depression     Diabetes mellitus type 2, uncontrolled ([facility] [address] 2/12/2017    GERD (gastroesophageal reflux disease)     History of blood transfusion 01/2018    back surgery, lumbar, dr Matthew Samson    Hypertension     Right leg pain     seeing doctor currently problems with hardware       Past Surgical History:   Procedure Laterality Date    BACK SURGERY  01/08/2018    PLIF L2-L5    BREAST BIOPSY Right 2/5/2021    RIGHT BREAST LUMPECTOMY WITH  sentinel LYMPHNODE biopsy performed by Queen Dank MD at [place] [address] BREAST SURGERY Right 3/9/2021    RIGHT BREAST RE-EXCISION LUMPECTOMY OF INFERIOR AND MEDIAL MARGINS performed by Queen Dank MD at [facility] Right 04/09/2017    Dr. 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 WITH REMOVAL OF HARDWARE & O.R. I. F ------BOP performed by Edilberto Diaz MD at [facility] Right 5/14/2018    REPAIR NON-UNION RIGHT TIBIA AND FIBULA WITH REMOVAL OF HARDWARE AND BONE GRAFT performed by Edilberto Diaz MD at [address] [place] [address] Right 1/10/2019    RIGHT TIBIA REMOVAL HARDWARE, RIGHT TIBIA OPEN  TREATMENT OF NON UNION performed by Edilberto Diaz MD at [place] [address]    WISDOM TOOTH EXTRACTION         Family History   Problem Relation Age of Onset    Diabetes Mother        Current Outpatient Medications   Medication Sig Dispense Refill    Diclofenac Sodium 1 % CREA Apply 10 mLs topically 2 times daily 120 g 1    prochlorperazine (COMPAZINE) 10 MG tablet Take 1 tablet by mouth every 6 hours as needed (nausea) 60 tablet 1    lidocaine-prilocaine (EMLA) 2.5-2.5 % cream Apply topically to port 30 minutes prior to chemotherapy.  30 g 2    ondansetron (ZOFRAN) 4 MG tablet Take 1 tablet by mouth every 8 hours as needed for Nausea or Vomiting 60 tablet 1    loperamide (RA ANTI-DIARRHEAL) 2 MG capsule Take 1 capsule by mouth 4 times daily as needed for Diarrhea (take 2 capsules (4 mg) at first sign of diarrhea then take 1 cap (2mg) PRN) CALL OFFICE IF NOT STOPPED AFTER 24 HOURS. 120 capsule 1    ibuprofen (ADVIL;MOTRIN) 200 MG tablet Take 200 mg by mouth every 6 hours as needed for Pain STOP PREOP MED      MELATONIN PO Take by mouth nightly      Multiple Vitamins-Minerals (HAIR SKIN AND NAILS FORMULA) TABS Take by mouth STOP PREOP MED      mirtazapine (REMERON) 15 MG tablet Take 15 mg by mouth nightly      atenolol (TENORMIN) 25 MG tablet every evening       ALPRAZolam (XANAX) 0.5 MG tablet Take 0.5 mg by mouth nightly.  pregabalin (LYRICA) 75 MG capsule daily.  famotidine (PEPCID) 20 MG tablet Take 20 mg by mouth daily      amitriptyline (ELAVIL) 50 MG tablet Take 50 mg by mouth nightly       LORATADINE-D 24HR  MG per extended release tablet       LANTUS SOLOSTAR 100 UNIT/ML injection pen 30 Units nightly 1/2 night before surgery      aspirin EC 81 MG EC tablet Take 1 tablet by mouth daily for 28 days (Patient taking differently: Take 81 mg by mouth daily ) 28 tablet 0    metFORMIN (GLUCOPHAGE) 500 MG tablet Take 500 mg by mouth 2 times daily       albuterol sulfate  (90 BASE) MCG/ACT inhaler Inhale 2 puffs into the lungs every 6 hours as needed for Wheezing      vitamin D (ERGOCALCIFEROL) 48393 UNITS CAPS capsule Take 50,000 Units by mouth once a week On Sundays       No current facility-administered medications for this encounter.         Allergies   Allergen Reactions    Ceftriaxone Shortness Of Breath         Social History     Socioeconomic History    Marital status:      Spouse name: None    Number of children: None    Years of education: None    Highest education level: None   Occupational History    None   Social Needs    Financial resource strain: None    Food insecurity     Worry: None     Inability: None    Transportation needs     Medical: None     Non-medical: None   Tobacco Use    Smoking status: Former Smoker     Packs/day: 0.50     Years: 10.00     Pack years: 5.00     Types: Cigarettes     Quit date: 3/15/2015     Years since quittin.0    Smokeless tobacco: Never Used    Tobacco comment: stop    Substance and Sexual Activity    Alcohol use: No    Drug use: No    Sexual activity: None   Lifestyle    Physical activity     Days per week: None     Minutes per session: None    Stress: None   Relationships    Social connections     Talks on phone: None     Gets together: None     Attends Rastafari service: None     Active member of club or organization: None     Attends meetings of clubs or organizations: None     Relationship status: None    Intimate partner violence     Fear of current or ex partner: None     Emotionally abused: None     Physically abused: None     Forced sexual activity: None   Other Topics Concern    None   Social History Narrative    None         Review of Systems - History obtained from chart review and the patient  General ROS: positive for  - fatigue and joint pain  Psychological ROS: negative  Ophthalmic ROS: negative  ENT ROS: negative  Allergy and Immunology ROS: negative  Hematological and Lymphatic ROS: negative  Endocrine ROS: negative  Breast ROS: negative for new breast lumps  Respiratory ROS: no cough, shortness of breath, or wheezing  Cardiovascular ROS: no chest pain or dyspnea on exertion  Gastrointestinal ROS: no abdominal pain, change in bowel habits, or black or bloody stools  Genito-Urinary ROS: no dysuria, trouble voiding, or hematuria  Musculoskeletal ROS: negative  Neurological ROS: no TIA or stroke symptoms  Dermatological ROS: negative        Physical Exam  HENT:      Head: Normocephalic and atraumatic.       Right Ear: External ear normal.      Nose: Nose normal. Mouth/Throat:      Mouth: Mucous membranes are moist.   Eyes:      Pupils: Pupils are equal, round, and reactive to light. Neck:      Musculoskeletal: Normal range of motion. Cardiovascular:      Rate and Rhythm: Normal rate and regular rhythm. Pulses: Normal pulses. Heart sounds: Normal heart sounds. Pulmonary:      Effort: Pulmonary effort is normal.      Breath sounds: Normal breath sounds. Abdominal:      General: Abdomen is flat. Musculoskeletal: Normal range of motion. Skin:     General: Skin is warm. Neurological:      General: No focal deficit present. Mental Status: She is oriented to person, place, and time. Psychiatric:         Mood and Affect: Mood normal.         Behavior: Behavior normal.         Thought Content: Thought content normal.         Judgment: Judgment normal.             Imaging reviewed:    MRI breast 1/5/21:  Impression   1.  Focal, heterogeneous non mass enhancement in the right breast 6 o'clock   is consistent with biopsy proven neoplasm.  No additional foci of disease   identified on the right. 2.  No evidence of neoplasm on the left. 3. Maria Teresa Guise is no lymphadenopathy. RECOMMENDATION:   Continued surgical and oncologic consultation is advised. BIRADS:   BIRADS - CATEGORY 6   Known Biopsy-Proven Cancer. Appropriate action should be taken. OVERALL ASSESSMENT - KNOWN BIOPSY PROVEN MALIGNANCY.            Radiation Safety and Treatment Support:  -previous Radiation history: No  -history of connective tissue disease: No  -history of autoimmune disease: No  -pregnant: no  -fertility conservation and /or contraception discussed: no  -nutrition consult prior to [address]: Yes  -PEG: No  -Dental evaluation prior to treatment:No  -Social Work requested: Yes  -Oncology Nurse Navigator requested: Yes  -pre + post treatment PT / Rehab / PM+R evaluation considered: Yes  -ICD: No   -ICD brand: -  -[facility] patient navigator: May Lizama  -Nurse Practitioners for Radiation Oncology:    ---Veronica Shah, EMILY, RN, FNP-C   ---Ivelisse Marks, EMILY, RN, FNP-BC        Assessment and Plan: Eunice Tabor is a pleasant and cooperative 63year old with a recent diagnosis of AJCC stage group II A left breast cancer TYRESE+. We recommend adjuvant external beam radiation therapy. With a breast conserving surgery, combined with fractionated external beam radiation therapy, there is no difference in overall survival compared to mastectomy: ipsilateral breast tumor recurrence rates drop from over 30 % (35 % in the 12 year analysis of NSABP B-06) to 10% [20 year IBRT ; 39% lumpectomy alone vs 14% lumpectomy + RT /// Hernan Casas et al. [facility]. 2002 Oct 17;347(16):1233-41]. There is no excess risk of contralateral breast cancer recurrence per [facility] data from a similar era. Multiple other trials have demonstrated a reduction in risk of ipsilateral breast tumor recurrence by 2/3 (whole breast radiation), with further possible benefit of Tamoxifen / Aromasin therapy (per Medical Oncology). The recent EBCTCG meta analysis shows an overall local recurrence rate of less the 10 % for node negative patients. Whole breast radiation to 50 Gy in standard fractions may be combined with a boost based on specific disease and patient characteristics to further improve local control [Tram H, J Clin Oncol. 2007 Aug 1;25(22):0146-58] and was discussed in detail with the patient as applicable [place] / hypofractionation will also be considered based on the ACR / TRUNG guidelines) as well as regional LN irradiation based on clinical and pathology characteristic (a thorough discussion of the potential benefits and risks on this specific aspect of treatment was performed PRN). The risks (detailed discussion), benefits, alternatives, process and logistics of external beam radiation were reviewed.  Specifically, we discussed the possible chronic radiation toxicity which may include but is not limited to lymphedema, pneumonitis, skin/cartilage necrosis (rare), rib fracture (rare) , joint pain, brachioplexopathy and cosmetic changes. We answered all of the patient's questions to the best of our ability who verbalized understanding and seemed satisfied. Radiation planning will commence within 7-21 days from completing CHEMO; the next step in management being the simulation scan, with external beam radiation to commence in a timely fashion thereafter. For left sided breast cancer and select right sided cases, utilization of the Active Breathing Coordinator and / or surface guided fractionated external beam radiation therapy (with Vision-RT / 4D) will be considered to reduce radiation dose to the heart (and lung in certain situations) [Omer et al. PRO. 2015 /// Loree et al. Lezlie Krabbe. 2011 /// Maren. Debbe Alpers. 2012  /// Esperanza Langley. 2017]. It was a pleasure meeting Kyung Matos today and we appreciate the referral and opportunity to be involved in Her care. We had an extensive discussion today regarding the course to date (including a focused review of theapplicable radiographic and laboratory information), multidisciplinary approach to cancer care, and indications for external beam radiation therapy as a component therein. A literature review and multidisciplinary discussion was performed after seeing this patient due to the complexity of the medical decision making in this case. I personally spent greater than 75 minutes on this case and with this patient. I performed the complete history and physical as above at today's visit, at least 45 minutes was in direct discussion and  regarding disease management. -FU for SIM post CHEMO. [facility].  Gaurang Ta MD [place] [address] Oncology  Cell: [phone]    [facility]:  [facility]: [phone]  [address] [place] [address] Street:  [phone]   FAX:    [phone]  [facility]:  [phone]   FAX:  [phone]        NOTE: This report was transcribed using voice recognition software. Every effort was made to ensure accuracy; however, inadvertent computerized transcription errors may be present.

## 2021-04-05 NOTE — TELEPHONE ENCOUNTER
Met with patient at consult with Dr Shaneka Goodson. Reviewed upcoming appointments including lab draw today in Medical Oncology after Radiation Oncology consult. Patient also scheduled for follow up with Dr Tiburcio Mayo on Thursday 4/8 at . [facility] with chemotherapy to follow. She verbalized understanding of informations and has a printout with appointments listed on it. Answered all questions to her apparent satisfaction.

## 2021-04-05 NOTE — PROGRESS NOTES
for Pain STOP PREOP MED      MELATONIN PO Take by mouth nightly      Multiple Vitamins-Minerals (HAIR SKIN AND NAILS FORMULA) TABS Take by mouth STOP PREOP MED      mirtazapine (REMERON) 15 MG tablet Take 15 mg by mouth nightly      atenolol (TENORMIN) 25 MG tablet every evening       ALPRAZolam (XANAX) 0.5 MG tablet Take 0.5 mg by mouth nightly.  pregabalin (LYRICA) 75 MG capsule daily.  famotidine (PEPCID) 20 MG tablet Take 20 mg by mouth daily      amitriptyline (ELAVIL) 50 MG tablet Take 50 mg by mouth nightly       LORATADINE-D 24HR  MG per extended release tablet       LANTUS SOLOSTAR 100 UNIT/ML injection pen 30 Units nightly 1/2 night before surgery      aspirin EC 81 MG EC tablet Take 1 tablet by mouth daily for 28 days (Patient taking differently: Take 81 mg by mouth daily ) 28 tablet 0    metFORMIN (GLUCOPHAGE) 500 MG tablet Take 500 mg by mouth 2 times daily       albuterol sulfate  (90 BASE) MCG/ACT inhaler Inhale 2 puffs into the lungs every 6 hours as needed for Wheezing      vitamin D (ERGOCALCIFEROL) 78204 UNITS CAPS capsule Take 50,000 Units by mouth once a week On Sundays       No current facility-administered medications for this encounter.         Past Medical History:   Diagnosis Date    Anxiety     Arthritis     Cancer ([facility] [address] 2021    breast    Cervical radiculopathy     Chronic back pain     Depression     Diabetes mellitus type 2, uncontrolled ([facility] [address] 2/12/2017    GERD (gastroesophageal reflux disease)     History of blood transfusion 01/2018    back surgery, lumbar, dr Boone Geiger    Hypertension     Right leg pain     seeing doctor currently problems with hardware       Past Surgical History:   Procedure Laterality Date    BACK SURGERY  01/08/2018    PLIF L2-L5    BREAST BIOPSY Right 2/5/2021    RIGHT BREAST LUMPECTOMY WITH  sentinel LYMPHNODE biopsy performed by Asa Quezada MD at [facility] Right 3/9/2021    RIGHT BREAST RE-EXCISION LUMPECTOMY OF INFERIOR AND MEDIAL MARGINS performed by Imelda Currie MD at [facility] Right 04/09/2017    Dr. James Fairchild  2008    dr hu anterior    CERVICAL FUSION  2015    dr Cristiane Ruvalcaba anterior    CERVICAL FUSION  04/25/16    ANTERIOR C4-C5, C6-C7 PLATES AND SCREWS    CHOLECYSTECTOMY  2000    COLONOSCOPY      patient did cologuard in 2018   [facility]  2013    dr Yesika Fairbanks    heel spurs bilateral    HYSTERECTOMY  2013    dr [place] Teran partial    OTHER SURGICAL HISTORY Right 05/14/2018    removal of hardware repair nonunion right tibia/fibula    PORT SURGERY Left 2/5/2021    LEFT MEDI PORT INSERTION performed by Imelda Currie MD at [facility] OFFICE/OUTPT VISIT,PROCEDURE ONLY Right 7/5/2018    REPAIR NON UNION FAILED FIXATION RIGHT DISTAL TIB / FIB PILON FX. WITH REMOVAL OF HARDWARE & O.R. I. F ------BOP performed by Frank Rogers MD at [address] [place] [address] Right 5/14/2018    REPAIR NON-UNION RIGHT TIBIA AND FIBULA WITH REMOVAL OF HARDWARE AND BONE GRAFT performed by Frank Rogers MD at [address] [place] [address] Right 1/10/2019    RIGHT TIBIA REMOVAL HARDWARE, RIGHT TIBIA OPEN  TREATMENT OF NON UNION performed by Frank Rogers MD at [facility]    WISDOM TOOTH EXTRACTION         Family History   Problem Relation Age of Onset    Diabetes Mother        Social History     Socioeconomic History    Marital status:      Spouse name: Not on file    Number of children: Not on file    Years of education: Not on file    Highest education level: Not on file   Occupational History    Not on file   Social Needs    Financial resource strain: Not on file    Food insecurity     Worry: Not on file     Inability: Not on file    Transportation needs     Medical: Not on file     Non-medical: Not on file Tobacco Use    Smoking status: Former Smoker     Packs/day: 0.50     Years: 10.00     Pack years: 5.00     Types: Cigarettes     Quit date: 3/15/2015     Years since quittin.0    Smokeless tobacco: Never Used    Tobacco comment: stop    Substance and Sexual Activity    Alcohol use: No    Drug use: No    Sexual activity: Not on file   Lifestyle    Physical activity     Days per week: Not on file     Minutes per session: Not on file    Stress: Not on file   Relationships    Social connections     Talks on phone: Not on file     Gets together: Not on file     Attends Yazidi service: Not on file     Active member of club or organization: Not on file     Attends meetings of clubs or organizations: Not on file     Relationship status: Not on file    Intimate partner violence     Fear of current or ex partner: Not on file     Emotionally abused: Not on file     Physically abused: Not on file     Forced sexual activity: Not on file   Other Topics Concern    Not on file   Social History Narrative    Not on file           Occupation: disabled  Retired:  NO        REVIEW OF SYSTEMS: <<For Level 5, 10 or more systems>> Approximately 20 mins spent with patient about radiation therapy to her right breast utilizing handouts and slides. Patient had a mammogram 10/6/[facility] at [facility] that the breast looked suspicious. Patient then had a core biopsy 10/30/2020 that present right breast invasive ductal carcinoma, grade 3(3+3+2=8), DCIS, high nuclear grade, ER/NJ/Her2 positive. [id number]. On2021 Patient  had surgery with Dr. Orly Trujillo for a right lumpectomy with SLN biopsy and a mediport placed. On 2021 she had a CT chest for a post op seroma. 3/9/2021 pt had surgery  for a re-excison lumpectomy of inferior and medial margins. Pt follows with Dr. Vernon Chambers and his plan is adjuvant chemo(TCHP) for 6 cycles followed by adjuvant RT followed by endocrine therapy with plan to start 2021.  Pt verbalized understanding of care and all questions answered from a nursing perspective. Pacemaker/Defibulator/ICD:  No    Mediport: Yes        FALLS RISK SCREENING ASSESSMENT    Instructions:  Assess the patient and enter the appropriate indicators that are present for fall risk identification. Total the numbers entered and assign a fall risk score from Table 2.  Reassess patient at a minimum every 12 weeks or with status change. Assessment   Date  4/5/2021     1. Mental Ability: confusion/cognitively impaired No - 0       2. Elimination Issues: incontinence, frequency No - 0       3. Ambulatory: use of assistive devices (walker, cane, off-loading devices), attached to equipment (IV pole, oxygen) Yes - 2     4. Sensory Limitations: dizziness, vertigo, impaired vision No - 0       5. Age Less than 65 years - 0       6. Medication: diuretics, strong analgesics, hypnotics, sedatives, antihypertensive agents   Yes - 3   7. Falls:  recent history of falls within the last 3 months (not to include slipping or tripping)   No - 0   TOTAL **5*    If score of 4 or greater was education given? Yes       TABLE 2   Risk Score Risk Level Plan of Care   0-3 Little or  No Risk 1. Provide assistance as indicated for ambulation activities  2. Reorient confused/cognitively impaired patient  3. Call-light/bell within patient's reach  4. Chair/bed in low position, stretcher/bed with siderails up except when performing patient care activities  5. Educate patient/family/caregiver on falls prevention  6.  Reassess in 12 weeks or with any noted change in patient condition which places them at a risk for a fall   4-6 Moderate Risk 1. Provide assistance as indicated for ambulation activities  2. Reorient confused/cognitively impaired patient  3. Call-light/bell within patient's reach  4. Chair/bed in low position, stretcher/bed with siderails up except when performing patient care activities  5.   Educate patient/family/caregiver on falls prevention  6. Falls risk precaution (Yellow sticker Level II) placed on patient chart   7 or   Higher High Risk 1. Place patient in easily observable treatment room  2. Patient attended at all times by family member or staff  3. Provide assistance as indicated for ambulation activities  4. Reorient confused/cognitively impaired patient  5. Call-light/bell within patient's reach  6. Chair/bed in low position, stretcher/bed with siderails up except when performing patient care activities  7. Educate patient/family/caregiver on falls prevention  8. Falls risk precaution (Yellow sticker Level III) placed on patient chart           MALNUTRITION RISK SCREENING ASSESSMENT    Instructions:  Assess the patient and enter the appropriate indicators that are present for nutrition risk identification. Total the numbers entered and assign a risk score. Follow the appropriate action for total score listed below. Assessment   Date  4/5/2021     1. Have you lost weight without trying? 0- No     2. Have you been eating poorly because of a decreased appetite? 0- No   3. Do you have a diagnosis of head and neck cancer?       0- No                                                                                    TOTAL 0          Score of 0-1: No action  Score 2 or greater:  · For Non-Diabetic Patient: Recommend adding Ensure Complete 2 x daily and provide patient with Ensure wellness bag with coupons  · For Diabetic Patient: Recommend adding Glucerna Shake 2 x daily and provide patient with Glucerna Wellness bag with coupons  · Route to the dietitian via [id number]    · Are you having  difficulty performing daily routine tasks  due to fatigue or weakness (ie: bathing/showering, dressing, housework, meal prep, work, child Rob Simpers): No     · Do you have any arm flexibility/ROM restrictions, swelling or pain that limit activity: No     · Any changes in memory, attention/focus that impact daily activities: No     · Do you avoid participation in leisure/social activity due weakness, fatigue or pain: No     ARE ANY OF THE ABOVE ARE ANSWERED YES: No          PT ASSESSMENT FOR REFERRAL    · Have you had any recent falls in past 2 months: No     · Do you have difficulty  going up/down stairs: Yes     · Are you having difficulty walking: No     · Do you often hold onto furniture/environmental supports or feel off balance when you are walking: Yes     · Do you need to take rest breaks when you are walking: Yes     · Any pain on scale of 1-10 that limits your mobility: Yes 7/10    ARE ANY OF THE ABOVE ARE ANSWERED YES: Yes - but NO PT referral request sent due to patient refusal.           LYMPHEDEMA SCREENING ASSESSMENT FOR PATIENTS WITH BREAST CANCER    The patient reports the following signs/symptoms of lymphedema: None    Please ask the provider to assess patient for lymphedema for any reported signs or symptoms so a referral to Lymphedema Therapy can be considered. PREHAB AUDIOLOGY REFERRAL    - Is patient planned to receive Cisplatin? No. This patient is not planned to start Cisplatin. - Is patient planned to receive radiation therapy that may be directed toward auditory canals or nerves? No. Patient is not planned to start radiation therapy to auditory canals or nerves. - Is patient complaining of new onset hearing loss? No. Patient is not complaining of new onset hearing loss. Patient education given on radiation therapy to the right breast.. The patient expresses understanding and acceptance of instructions.  Eduarda Fernandez 4/5/2021 11:19 AM           Eduarda Fernandez

## 2021-04-08 ENCOUNTER — OFFICE VISIT (OUTPATIENT)
Dept: ONCOLOGY | Age: 56
End: 2021-04-08
Payer: MEDICAID

## 2021-04-08 ENCOUNTER — HOSPITAL ENCOUNTER (OUTPATIENT)
Dept: INFUSION THERAPY | Age: 56
Discharge: HOME | End: 2021-04-08
Payer: MEDICAID

## 2021-04-08 ENCOUNTER — TELEPHONE (OUTPATIENT)
Dept: BREAST CENTER | Age: 56
End: 2021-04-08

## 2021-04-08 VITALS — SYSTOLIC BLOOD PRESSURE: 135 MMHG | DIASTOLIC BLOOD PRESSURE: 90 MMHG | RESPIRATION RATE: 16 BRPM | HEART RATE: 88 BPM

## 2021-04-08 VITALS
HEIGHT: 72 IN | SYSTOLIC BLOOD PRESSURE: 124 MMHG | WEIGHT: 163 LBS | TEMPERATURE: 97.2 F | DIASTOLIC BLOOD PRESSURE: 74 MMHG | HEART RATE: 75 BPM | BODY MASS INDEX: 22.08 KG/M2

## 2021-04-08 DIAGNOSIS — Z17.0 MALIGNANT NEOPLASM OF CENTRAL PORTION OF RIGHT BREAST IN FEMALE, ESTROGEN RECEPTOR POSITIVE (HCC): Primary | ICD-10-CM

## 2021-04-08 DIAGNOSIS — C50.111 MALIGNANT NEOPLASM OF CENTRAL PORTION OF RIGHT BREAST IN FEMALE, ESTROGEN RECEPTOR POSITIVE (HCC): Primary | ICD-10-CM

## 2021-04-08 DIAGNOSIS — Z85.3 PERSONAL HISTORY OF BREAST CANCER: Primary | ICD-10-CM

## 2021-04-08 PROCEDURE — G9899 SCRN MAM PERF RSLTS DOC: HCPCS | Performed by: INTERNAL MEDICINE

## 2021-04-08 PROCEDURE — 2580000003 HC RX 258: Performed by: INTERNAL MEDICINE

## 2021-04-08 PROCEDURE — 96417 CHEMO IV INFUS EACH ADDL SEQ: CPT

## 2021-04-08 PROCEDURE — 99214 OFFICE O/P EST MOD 30 MIN: CPT | Performed by: INTERNAL MEDICINE

## 2021-04-08 PROCEDURE — 1036F TOBACCO NON-USER: CPT | Performed by: INTERNAL MEDICINE

## 2021-04-08 PROCEDURE — 3017F COLORECTAL CA SCREEN DOC REV: CPT | Performed by: INTERNAL MEDICINE

## 2021-04-08 PROCEDURE — 96375 TX/PRO/DX INJ NEW DRUG ADDON: CPT

## 2021-04-08 PROCEDURE — 96415 CHEMO IV INFUSION ADDL HR: CPT

## 2021-04-08 PROCEDURE — 36593 DECLOT VASCULAR DEVICE: CPT

## 2021-04-08 PROCEDURE — 6360000002 HC RX W HCPCS: Performed by: INTERNAL MEDICINE

## 2021-04-08 PROCEDURE — G8427 DOCREV CUR MEDS BY ELIG CLIN: HCPCS | Performed by: INTERNAL MEDICINE

## 2021-04-08 PROCEDURE — G8420 CALC BMI NORM PARAMETERS: HCPCS | Performed by: INTERNAL MEDICINE

## 2021-04-08 PROCEDURE — 96361 HYDRATE IV INFUSION ADD-ON: CPT

## 2021-04-08 PROCEDURE — 96413 CHEMO IV INFUSION 1 HR: CPT

## 2021-04-08 PROCEDURE — 96366 THER/PROPH/DIAG IV INF ADDON: CPT

## 2021-04-08 PROCEDURE — 96377 APPLICATON ON-BODY INJECTOR: CPT

## 2021-04-08 RX ORDER — DIPHENHYDRAMINE HYDROCHLORIDE 50 MG/ML
50 INJECTION INTRAMUSCULAR; INTRAVENOUS ONCE
Status: CANCELLED | OUTPATIENT
Start: 2021-04-08 | End: 2021-04-08

## 2021-04-08 RX ORDER — DEXAMETHASONE SODIUM PHOSPHATE 10 MG/ML
10 INJECTION, SOLUTION INTRAMUSCULAR; INTRAVENOUS ONCE
Status: COMPLETED | OUTPATIENT
Start: 2021-04-08 | End: 2021-04-08

## 2021-04-08 RX ORDER — PALONOSETRON HYDROCHLORIDE 0.05 MG/ML
0.25 INJECTION, SOLUTION INTRAVENOUS ONCE
Status: CANCELLED | OUTPATIENT
Start: 2021-04-08

## 2021-04-08 RX ORDER — SODIUM CHLORIDE 0.9 % (FLUSH) 0.9 %
5 SYRINGE (ML) INJECTION PRN
Status: CANCELLED | OUTPATIENT
Start: 2021-04-08

## 2021-04-08 RX ORDER — SODIUM CHLORIDE 0.9 % (FLUSH) 0.9 %
10 SYRINGE (ML) INJECTION PRN
Status: DISCONTINUED | OUTPATIENT
Start: 2021-04-08 | End: 2021-04-09 | Stop reason: HOSPADM

## 2021-04-08 RX ORDER — HEPARIN SODIUM (PORCINE) LOCK FLUSH IV SOLN 100 UNIT/ML 100 UNIT/ML
500 SOLUTION INTRAVENOUS PRN
Status: CANCELLED | OUTPATIENT
Start: 2021-04-08

## 2021-04-08 RX ORDER — DEXAMETHASONE SODIUM PHOSPHATE 10 MG/ML
10 INJECTION, SOLUTION INTRAMUSCULAR; INTRAVENOUS ONCE
Status: CANCELLED | OUTPATIENT
Start: 2021-04-08

## 2021-04-08 RX ORDER — HEPARIN SODIUM (PORCINE) LOCK FLUSH IV SOLN 100 UNIT/ML 100 UNIT/ML
500 SOLUTION INTRAVENOUS PRN
Status: DISCONTINUED | OUTPATIENT
Start: 2021-04-08 | End: 2021-04-09 | Stop reason: HOSPADM

## 2021-04-08 RX ORDER — PALONOSETRON HYDROCHLORIDE 0.05 MG/ML
0.25 INJECTION, SOLUTION INTRAVENOUS ONCE
Status: COMPLETED | OUTPATIENT
Start: 2021-04-08 | End: 2021-04-08

## 2021-04-08 RX ORDER — METHYLPREDNISOLONE SODIUM SUCCINATE 125 MG/2ML
125 INJECTION, POWDER, LYOPHILIZED, FOR SOLUTION INTRAMUSCULAR; INTRAVENOUS ONCE
Status: CANCELLED | OUTPATIENT
Start: 2021-04-08 | End: 2021-04-08

## 2021-04-08 RX ORDER — 0.9 % SODIUM CHLORIDE 0.9 %
1000 INTRAVENOUS SOLUTION INTRAVENOUS ONCE
Status: CANCELLED
Start: 2021-04-08 | End: 2021-04-08

## 2021-04-08 RX ORDER — EPINEPHRINE 1 MG/ML
0.3 INJECTION, SOLUTION, CONCENTRATE INTRAVENOUS PRN
Status: CANCELLED | OUTPATIENT
Start: 2021-04-08

## 2021-04-08 RX ORDER — SODIUM CHLORIDE 0.9 % (FLUSH) 0.9 %
10 SYRINGE (ML) INJECTION PRN
Status: CANCELLED | OUTPATIENT
Start: 2021-04-08

## 2021-04-08 RX ORDER — 0.9 % SODIUM CHLORIDE 0.9 %
1000 INTRAVENOUS SOLUTION INTRAVENOUS ONCE
Status: COMPLETED | OUTPATIENT
Start: 2021-04-08 | End: 2021-04-08

## 2021-04-08 RX ORDER — MEPERIDINE HYDROCHLORIDE 25 MG/ML
12.5 INJECTION INTRAMUSCULAR; INTRAVENOUS; SUBCUTANEOUS ONCE
Status: CANCELLED | OUTPATIENT
Start: 2021-04-08 | End: 2021-04-08

## 2021-04-08 RX ORDER — SODIUM CHLORIDE 9 MG/ML
INJECTION, SOLUTION INTRAVENOUS CONTINUOUS
Status: CANCELLED | OUTPATIENT
Start: 2021-04-08

## 2021-04-08 RX ADMIN — HEPARIN 500 UNITS: 100 SYRINGE at 16:21

## 2021-04-08 RX ADMIN — SODIUM CHLORIDE, PRESERVATIVE FREE 10 ML: 5 INJECTION INTRAVENOUS at 10:20

## 2021-04-08 RX ADMIN — DOCETAXEL 140 MG: 20 INJECTION, SOLUTION INTRAVENOUS at 14:21

## 2021-04-08 RX ADMIN — ALTEPLASE 2 MG: 2.2 INJECTION, POWDER, LYOPHILIZED, FOR SOLUTION INTRAVENOUS at 10:55

## 2021-04-08 RX ADMIN — SODIUM CHLORIDE, PRESERVATIVE FREE 10 ML: 5 INJECTION INTRAVENOUS at 12:15

## 2021-04-08 RX ADMIN — SODIUM CHLORIDE, PRESERVATIVE FREE 10 ML: 5 INJECTION INTRAVENOUS at 09:38

## 2021-04-08 RX ADMIN — FOSAPREPITANT 150 MG: 150 INJECTION, POWDER, LYOPHILIZED, FOR SOLUTION INTRAVENOUS at 09:36

## 2021-04-08 RX ADMIN — SODIUM CHLORIDE 1000 ML: 9 INJECTION, SOLUTION INTRAVENOUS at 09:26

## 2021-04-08 RX ADMIN — DEXAMETHASONE SODIUM PHOSPHATE 10 MG: 10 INJECTION, SOLUTION INTRAMUSCULAR; INTRAVENOUS at 09:30

## 2021-04-08 RX ADMIN — SODIUM CHLORIDE, PRESERVATIVE FREE 10 ML: 5 INJECTION INTRAVENOUS at 10:21

## 2021-04-08 RX ADMIN — TRASTUZUMAB-ANNS 609 MG: 420 INJECTION, POWDER, LYOPHILIZED, FOR SOLUTION INTRAVENOUS at 10:23

## 2021-04-08 RX ADMIN — SODIUM CHLORIDE, PRESERVATIVE FREE 10 ML: 5 INJECTION INTRAVENOUS at 11:54

## 2021-04-08 RX ADMIN — SODIUM CHLORIDE, PRESERVATIVE FREE 10 ML: 5 INJECTION INTRAVENOUS at 16:21

## 2021-04-08 RX ADMIN — PALONOSETRON 0.25 MG: 0.25 INJECTION, SOLUTION INTRAVENOUS at 09:27

## 2021-04-08 RX ADMIN — SODIUM CHLORIDE, PRESERVATIVE FREE 10 ML: 5 INJECTION INTRAVENOUS at 10:18

## 2021-04-08 RX ADMIN — HEPARIN 500 UNITS: 100 SYRINGE at 10:22

## 2021-04-08 RX ADMIN — SODIUM CHLORIDE, PRESERVATIVE FREE 10 ML: 5 INJECTION INTRAVENOUS at 10:19

## 2021-04-08 RX ADMIN — SODIUM CHLORIDE, PRESERVATIVE FREE 10 ML: 5 INJECTION INTRAVENOUS at 10:52

## 2021-04-08 RX ADMIN — WATER 2.2 ML: 1 INJECTION INTRAMUSCULAR; INTRAVENOUS; SUBCUTANEOUS at 10:55

## 2021-04-08 RX ADMIN — CARBOPLATIN 700 MG: 10 INJECTION, SOLUTION INTRAVENOUS at 15:31

## 2021-04-08 RX ADMIN — PERTUZUMAB 840 MG: 30 INJECTION, SOLUTION, CONCENTRATE INTRAVENOUS at 12:08

## 2021-04-08 RX ADMIN — PEGFILGRASTIM 6 MG: KIT SUBCUTANEOUS at 15:36

## 2021-04-08 NOTE — TELEPHONE ENCOUNTER
MA received call from patient in regards to her breast is still having drainage and swelling. Pt states has redness but not warm to touch or fever. Patient states keeps it covered with gauze and changes it once a day or if needed. Patient calling to see if needs to be seen again or what she needs to do. MA routing message to  for advisement.    Pt can be reached at [phone]      Electronically signed by Yfn Gorman MA on 4/8/21 at 2:26 PM EDT

## 2021-04-08 NOTE — TELEPHONE ENCOUNTER
MA contacted patient and relayed  message. MA scheduled patient for 04/16/2021 @ 9:30am. Pt verbalized understanding.          Electronically signed by Mari Nava MA on 4/8/21 at 3:20 PM EDT

## 2021-04-08 NOTE — PROGRESS NOTES
Department of [facility] Oncology  Attending Clinic Note    Reason for Visit: Follow-up on a patient with Right Breast Cancer    PCP:  Angelica Cruz DO    History of Present Illness: The mass was located in the 6 o'clock position of right breast    Breast cancer risk factors include infrequent SMEs and age and gender    Bilateral Screening Mammogram 10/06/2020: There are suspicious calcifications in the right breast at the 6 o'clock position which appear pleomorphic. These calcifications are in a segmental distribution. These clustered calcifications are suspicious for malignancy. Right breast, calcifications at the 6:00, core biopsy on 10/30/2020:    - Small focus of invasive ductal carcinoma, grade 3 (3+3+2 = 8)    - Ductal carcinoma in situ, high nuclear grade, comedo/cribriform/solid types;    - Microcalcifications in DCIS    - Breast receptor and biomarkers (per outside report without looking the   slides:     ER: Positive, 89.8%, strong intensity     MS: Positive, 52.2%, moderate intensity     HER-2: Positive (score 3+)     Ki-67: High proliferation, 26.2%     P53: Negative, 0.2%     Comment:The invasive carcinoma is intermingled with DCIS and measures   3.0 x 2.0 mm in greatest dimension on the slides. CXR PA/Lateral 12/11/2020:  No acute process. Right Axillary U/S on 12/11/2020: There is no axillary LN.     MRI Bilateral Breast 01/05/2021:  Focal, heterogeneous non mass enhancement identified in the right breast 6 o'clock which measures approximately 1.9 x 1.4 x 1.7 cm (image 19 of the axial T1 post contrast series).    No additional foci of disease identified on the right  There is no lymphadenopathy    Needle localized Right lumpectomy with SLNBx with mediport placement on 02/05/2021  A.  Right axillary sentinel lymph node #1, excision: 1 lymph node negative for malignancy     B.  Right axillary contents, regional resection: 1 lymph node positive for metastatic, poorly differentiated ductal carcinoma (grade 3)   Extranodal extension present     C.  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;   positive for extensive intraductal component   Skin-uninvolved by malignancy   Invasive carcinoma margins-uninvolved by invasive carcinoma        Distance from closest margin: 4 mm from anterior margin   DCIS margins-inferior and medial margins involved by DCIS   Regional lymph nodes-involved by tumor cells        Number of lymph nodes with macrometastasis: 1        Number of lymph nodes with micrometastasis: 0        Number of lymph nodes with isolated tumor cells: 0        Size of largest metastatic deposit: 1.5 cm        Extranodal extension: Present        Total number of lymph nodes examined: 2        Number of sentinel nodes examined: 1   Treatment effect in the breast-no known presurgical therapy   TNM classification (AJCC eighth edition)-  pT1c N1a MX     Bone scan, CT chest/abdomen/pelvis 02/26/2021 negative for metastatic disease    Re-excision lumpectomy of inferior and medial margins on 03/09/2021  A. Right breast, new inferior margin, excision: Fat necrosis, foreign body-type giant cell reaction, chronic inflammation, and fibrosis consistent with previous procedure   No evidence of residual carcinoma   Final inferior margin negative for carcinoma     B.  Right breast, new medial margin, excision: Residual high-grade ductal carcinoma in situ   Ductal carcinoma in situ present less than 1 mm from red/superior margin and green/anterior margin   Fat necrosis, foreign body-type giant cell reaction, chronic   inflammation, and fibrosis consistent with previous procedure   Fibrocystic change   Microcalcifications associated with benign breast tissue and DCIS   Comment:Residual ductal carcinoma in situ is present in 5 out of 13 tissue blocks of part B    Recommended adjuvant chemotherapy (TCHP) for 6 cycles followed by adjuvant RT followed lastly by endocrine therapy. Side effects TCHP reviewed with patient. She agreed to proceed. 2d-echo 02/17/2021 noted EF 60-65%  Cycle # 1 adjuvant TCHP is today 04/08/2021    Review of Systems;  CONSTITUTIONAL: No fever, chills. Good appetite and energy level. ENMT: Eyes: No diplopia; Nose: No epistaxis. Mouth: No sore throat. RESPIRATORY: No hemoptysis, shortness of breath, cough. CARDIOVASCULAR: No chest pain, palpitations. GASTROINTESTINAL: No nausea/vomiting, abdominal pain, diarrhea/constipation. GENITOURINARY: No dysuria, urinary frequency, hematuria. NEURO: No syncope, presyncope, headache. Remainder:  ROS NEGATIVE    Past Medical History:      Diagnosis Date    Anxiety     Arthritis     Cancer ([facility] [address] 2021    breast    Cervical radiculopathy     Chronic back pain     Depression     Diabetes mellitus type 2, uncontrolled ([facility] [address] 2/12/2017    GERD (gastroesophageal reflux disease)     History of blood transfusion 01/2018    back surgery, lumbar, dr Fabián Miller    Hypertension     Right leg pain     seeing doctor currently problems with hardware     Medications:  Reviewed and reconciled. Allergies:   Allergies   Allergen Reactions    Ceftriaxone Shortness Of Breath     Physical Exam:  /74 (Site: Left Upper Arm, Position: Sitting, Cuff Size: Medium Adult)   Pulse 75   Temp 97.2 °F (36.2 °C) (Temporal)   Ht 6' 1\" (1.854 m)   Wt 163 lb (73.9 kg)   LMP 08/13/2013   BMI 21.51 kg/m²   GENERAL: Alert, oriented x 3, not in acute distress. HEENT: PERRLA; EOMI. Oropharynx clear. NECK: Supple. Without lymphadenopathy. LUNGS: Good air entry bilaterally. No wheezing, crackles or ronchi. CARDIOVASCULAR: Regular rate. No murmurs, rubs or gallops. ABDOMEN: Soft. Non-tender, non-distended. Positive bowel sounds. EXTREMITIES: Without clubbing, cyanosis, or edema. NEUROLOGIC: No focal deficits. ECOG PS 1    Impression/Plan:  64 y/o female with Right Breast Cancer    Bilateral Screening Mammogram 10/06/2020: There are suspicious calcifications in the right breast at the 6 o'clock position which appear pleomorphic. These calcifications are in a segmental distribution. These clustered calcifications are suspicious for malignancy. Right breast, calcifications at the 6:00, core biopsy on 10/30/2020:    - Small focus of invasive ductal carcinoma, grade 3 (3+3+2 = 8)    - Ductal carcinoma in situ, high nuclear grade, comedo/cribriform/solid types;    - Microcalcifications in DCIS    - Breast receptor and biomarkers (per outside report without looking the   slides:     ER: Positive, 89.8%, strong intensity     DE: Positive, 52.2%, moderate intensity     HER-2: Positive (score 3+)     Ki-67: High proliferation, 26.2%     P53: Negative, 0.2%     Comment:The invasive carcinoma is intermingled with DCIS and measures   3.0 x 2.0 mm in greatest dimension on the slides. CXR PA/Lateral 12/11/2020:  No acute process. Right Axillary U/S on 12/11/2020: There is no axillary LN.     MRI Bilateral Breast 01/05/2021:  Focal, heterogeneous non mass enhancement identified in the right breast 6 o'clock which measures approximately 1.9 x 1.4 x 1.7 cm (image 19 of the axial T1 post contrast series).    No additional foci of disease identified on the right  There is no lymphadenopathy    Needle localized Right lumpectomy with SLNBx with mediport placement on 02/05/2021  A.  Right axillary sentinel lymph node #1, excision: 1 lymph node negative for B. Right breast, new medial margin, excision: Residual high-grade ductal carcinoma in situ   Ductal carcinoma in situ present less than 1 mm from red/superior margin and green/anterior margin   Fat necrosis, foreign body-type giant cell reaction, chronic   inflammation, and fibrosis consistent with previous procedure   Fibrocystic change   Microcalcifications associated with benign breast tissue and DCIS   Comment:Residual ductal carcinoma in situ is present in 5 out of 13 tissue blocks of part B    Recommended adjuvant chemotherapy (TCHP) for 6 cycles followed by adjuvant RT followed lastly by endocrine therapy. Side effects TCHP reviewed with patient. She agreed to proceed. 2d-echo 02/17/2021 noted EF 60-65%  Cycle # 1 adjuvant TCHP is today 04/08/2021. Labs reviewed ok to proceed. Compazine Zofran prn for nausea.      RTC 3 weeks for Cycle # 2 adjuvant TCHP    Sandy Meyers MD   5/9/5993  Board Certified Medical Oncologist

## 2021-04-08 NOTE — PROGRESS NOTES
Patient tolerating infusion well, resting quietly with eyes closed, respirations non labored, skin warm and dry.

## 2021-04-08 NOTE — PROGRESS NOTES
SW met with pt today in follow up on her appointment for chemotherapy. Pt confirmed she did receive wig resources and has received one from [employer]. She has also not heard anything back on her housing application and remains on the wait list. Assessed for any additional psychosocial needs at time, none noted. Will continue to be available for ongoing psychosocial support as needed.  JULIO Milan, LISW-S, OSW-C  Oncology Social Worker

## 2021-04-12 ENCOUNTER — TELEPHONE (OUTPATIENT)
Dept: CASE MANAGEMENT | Age: 56
End: 2021-04-12

## 2021-04-12 NOTE — TELEPHONE ENCOUNTER
Patient had called in last week and left VM re: patient wasn't sure what she was to do with the onbody neulasta. Returned patient call and asked her to please call me back.

## 2021-04-14 ENCOUNTER — TELEPHONE (OUTPATIENT)
Dept: ONCOLOGY | Age: 56
End: 2021-04-14

## 2021-04-14 ENCOUNTER — TELEPHONE (OUTPATIENT)
Dept: BREAST CENTER | Age: 56
End: 2021-04-14

## 2021-04-14 DIAGNOSIS — T45.1X5A CHEMOTHERAPY INDUCED DIARRHEA: ICD-10-CM

## 2021-04-14 DIAGNOSIS — Z85.3 PERSONAL HISTORY OF BREAST CANCER: Primary | ICD-10-CM

## 2021-04-14 DIAGNOSIS — K52.1 CHEMOTHERAPY INDUCED DIARRHEA: ICD-10-CM

## 2021-04-14 RX ORDER — DIPHENOXYLATE HYDROCHLORIDE AND ATROPINE SULFATE 2.5; .025 MG/1; MG/1
1 TABLET ORAL 4 TIMES DAILY PRN
Qty: 40 TABLET | Refills: 0 | Status: SHIPPED | OUTPATIENT
Start: 2021-04-14 | End: 2021-04-24

## 2021-04-14 NOTE — TELEPHONE ENCOUNTER
Pt. called into office this morning at 10:17 AM stating that she is having consistent diarrhea and that imodium is not helping. She is also complaining of headaches and fatigue.

## 2021-04-14 NOTE — TELEPHONE ENCOUNTER
Patient called to cancel her appointment with Dr. Evaline Closs on Friday. She states her wound is doing better and does not feel she needs to reschedule at this time. She also reports diarrhea and is unable to go anywhere. She has a follow up appointment scheduled in 6 months with us. I encouraged her to call us in the meantime if she has any questions or concerns.

## 2021-04-15 DIAGNOSIS — Z85.3 PERSONAL HISTORY OF BREAST CANCER: Primary | ICD-10-CM

## 2021-04-15 RX ORDER — SODIUM CHLORIDE 9 MG/ML
25 INJECTION, SOLUTION INTRAVENOUS PRN
Status: CANCELLED | OUTPATIENT
Start: 2021-04-16

## 2021-04-15 RX ORDER — HEPARIN SODIUM (PORCINE) LOCK FLUSH IV SOLN 100 UNIT/ML 100 UNIT/ML
500 SOLUTION INTRAVENOUS PRN
Status: CANCELLED | OUTPATIENT
Start: 2021-04-16

## 2021-04-15 RX ORDER — SODIUM CHLORIDE 0.9 % (FLUSH) 0.9 %
5-40 SYRINGE (ML) INJECTION PRN
Status: CANCELLED | OUTPATIENT
Start: 2021-04-16

## 2021-04-15 RX ORDER — 0.9 % SODIUM CHLORIDE 0.9 %
1000 INTRAVENOUS SOLUTION INTRAVENOUS ONCE
Status: CANCELLED | OUTPATIENT
Start: 2021-04-16 | End: 2021-04-16

## 2021-04-16 ENCOUNTER — HOSPITAL ENCOUNTER (OUTPATIENT)
Dept: INFUSION THERAPY | Age: 56
Discharge: HOME | End: 2021-04-16
Payer: MEDICAID

## 2021-04-16 VITALS
TEMPERATURE: 98 F | RESPIRATION RATE: 18 BRPM | HEART RATE: 86 BPM | DIASTOLIC BLOOD PRESSURE: 67 MMHG | SYSTOLIC BLOOD PRESSURE: 106 MMHG

## 2021-04-16 DIAGNOSIS — Z17.0 MALIGNANT NEOPLASM OF CENTRAL PORTION OF RIGHT BREAST IN FEMALE, ESTROGEN RECEPTOR POSITIVE (HCC): ICD-10-CM

## 2021-04-16 DIAGNOSIS — C50.111 MALIGNANT NEOPLASM OF CENTRAL PORTION OF RIGHT BREAST IN FEMALE, ESTROGEN RECEPTOR POSITIVE (HCC): ICD-10-CM

## 2021-04-16 DIAGNOSIS — Z85.3 PERSONAL HISTORY OF BREAST CANCER: Primary | ICD-10-CM

## 2021-04-16 LAB
ALBUMIN SERPL-MCNC: 3.8 G/DL (ref 3.5–5.2)
ALP BLD-CCNC: 225 U/L (ref 35–104)
ALT SERPL-CCNC: 49 U/L (ref 0–32)
ANION GAP SERPL CALCULATED.3IONS-SCNC: 15 MMOL/L (ref 7–16)
AST SERPL-CCNC: 34 U/L (ref 0–31)
BASOPHILS ABSOLUTE: 0 E9/L (ref 0–0.2)
BASOPHILS RELATIVE PERCENT: 0.7 % (ref 0–2)
BILIRUB SERPL-MCNC: <0.2 MG/DL (ref 0–1.2)
BUN BLDV-MCNC: 22 MG/DL (ref 6–20)
CALCIUM SERPL-MCNC: 9.5 MG/DL (ref 8.6–10.2)
CHLORIDE BLD-SCNC: 95 MMOL/L (ref 98–107)
CO2: 20 MMOL/L (ref 22–29)
CREAT SERPL-MCNC: 2.8 MG/DL (ref 0.5–1)
EOSINOPHILS ABSOLUTE: 0.2 E9/L (ref 0.05–0.5)
EOSINOPHILS RELATIVE PERCENT: 0.9 % (ref 0–6)
GFR AFRICAN AMERICAN: 21
GFR NON-AFRICAN AMERICAN: 17 ML/MIN/1.73
GLUCOSE BLD-MCNC: 137 MG/DL (ref 74–99)
HCT VFR BLD CALC: 38.4 % (ref 34–48)
HEMOGLOBIN: 12.7 G/DL (ref 11.5–15.5)
LYMPHOCYTES ABSOLUTE: 2.25 E9/L (ref 1.5–4)
LYMPHOCYTES RELATIVE PERCENT: 10.4 % (ref 20–42)
MAGNESIUM: 1.6 MG/DL (ref 1.6–2.6)
MCH RBC QN AUTO: 30.8 PG (ref 26–35)
MCHC RBC AUTO-ENTMCNC: 33.1 % (ref 32–34.5)
MCV RBC AUTO: 93.2 FL (ref 80–99.9)
METAMYELOCYTES RELATIVE PERCENT: 0.9 % (ref 0–1)
MONOCYTES ABSOLUTE: 2.25 E9/L (ref 0.1–0.95)
MONOCYTES RELATIVE PERCENT: 10.4 % (ref 2–12)
MYELOCYTE PERCENT: 5.2 % (ref 0–0)
NEUTROPHILS ABSOLUTE: 17.55 E9/L (ref 1.8–7.3)
NEUTROPHILS RELATIVE PERCENT: 72.2 % (ref 43–80)
OVALOCYTES: ABNORMAL
PDW BLD-RTO: 12.7 FL (ref 11.5–15)
PLATELET # BLD: 228 E9/L (ref 130–450)
PMV BLD AUTO: 10.7 FL (ref 7–12)
POIKILOCYTES: ABNORMAL
POTASSIUM SERPL-SCNC: 4.1 MMOL/L (ref 3.5–5)
RBC # BLD: 4.12 E12/L (ref 3.5–5.5)
SODIUM BLD-SCNC: 130 MMOL/L (ref 132–146)
TOTAL PROTEIN: 6.9 G/DL (ref 6.4–8.3)
WBC # BLD: 22.5 E9/L (ref 4.5–11.5)

## 2021-04-16 PROCEDURE — 85025 COMPLETE CBC W/AUTO DIFF WBC: CPT

## 2021-04-16 PROCEDURE — 6360000002 HC RX W HCPCS: Performed by: NURSE PRACTITIONER

## 2021-04-16 PROCEDURE — 2580000003 HC RX 258: Performed by: NURSE PRACTITIONER

## 2021-04-16 PROCEDURE — 2580000003 HC RX 258

## 2021-04-16 PROCEDURE — 80053 COMPREHEN METABOLIC PANEL: CPT

## 2021-04-16 PROCEDURE — 96361 HYDRATE IV INFUSION ADD-ON: CPT

## 2021-04-16 PROCEDURE — 96360 HYDRATION IV INFUSION INIT: CPT

## 2021-04-16 PROCEDURE — 83735 ASSAY OF MAGNESIUM: CPT

## 2021-04-16 RX ORDER — 0.9 % SODIUM CHLORIDE 0.9 %
1000 INTRAVENOUS SOLUTION INTRAVENOUS ONCE
Status: CANCELLED | OUTPATIENT
Start: 2021-04-16 | End: 2021-04-16

## 2021-04-16 RX ORDER — SODIUM CHLORIDE 0.9 % (FLUSH) 0.9 %
5-40 SYRINGE (ML) INJECTION PRN
Status: CANCELLED | OUTPATIENT
Start: 2021-04-16

## 2021-04-16 RX ORDER — SODIUM CHLORIDE 9 MG/ML
INJECTION, SOLUTION INTRAVENOUS
Status: COMPLETED
Start: 2021-04-16 | End: 2021-04-16

## 2021-04-16 RX ORDER — SODIUM CHLORIDE 9 MG/ML
25 INJECTION, SOLUTION INTRAVENOUS PRN
Status: CANCELLED | OUTPATIENT
Start: 2021-04-16

## 2021-04-16 RX ORDER — HEPARIN SODIUM (PORCINE) LOCK FLUSH IV SOLN 100 UNIT/ML 100 UNIT/ML
500 SOLUTION INTRAVENOUS PRN
Status: DISCONTINUED | OUTPATIENT
Start: 2021-04-16 | End: 2021-04-17 | Stop reason: HOSPADM

## 2021-04-16 RX ORDER — HEPARIN SODIUM (PORCINE) LOCK FLUSH IV SOLN 100 UNIT/ML 100 UNIT/ML
500 SOLUTION INTRAVENOUS PRN
Status: CANCELLED | OUTPATIENT
Start: 2021-04-16

## 2021-04-16 RX ORDER — 0.9 % SODIUM CHLORIDE 0.9 %
1000 INTRAVENOUS SOLUTION INTRAVENOUS ONCE
Status: COMPLETED | OUTPATIENT
Start: 2021-04-16 | End: 2021-04-16

## 2021-04-16 RX ORDER — SODIUM CHLORIDE 0.9 % (FLUSH) 0.9 %
5-40 SYRINGE (ML) INJECTION PRN
Status: DISCONTINUED | OUTPATIENT
Start: 2021-04-16 | End: 2021-04-17 | Stop reason: HOSPADM

## 2021-04-16 RX ADMIN — SODIUM CHLORIDE 1000 ML: 9 INJECTION, SOLUTION INTRAVENOUS at 13:21

## 2021-04-16 RX ADMIN — SODIUM CHLORIDE 1000 ML: 9 INJECTION, SOLUTION INTRAVENOUS at 14:31

## 2021-04-16 RX ADMIN — Medication 1000 ML: at 14:31

## 2021-04-16 RX ADMIN — SODIUM CHLORIDE, PRESERVATIVE FREE 10 ML: 5 INJECTION INTRAVENOUS at 13:21

## 2021-04-16 RX ADMIN — HEPARIN 500 UNITS: 100 SYRINGE at 15:40

## 2021-04-16 RX ADMIN — SODIUM CHLORIDE, PRESERVATIVE FREE 10 ML: 5 INJECTION INTRAVENOUS at 15:40

## 2021-04-16 NOTE — PROGRESS NOTES
Notified Lisa  at [address] [place] [address] about patient lab work from today results with BUN/CR elevated. Angel Back said that Dr. Harle Councilman wanted an additional liter of saline hydration given and that orders would be placed by the nurse practioner at [facility].

## 2021-04-19 ENCOUNTER — TELEPHONE (OUTPATIENT)
Dept: INFUSION THERAPY | Age: 56
End: 2021-04-19

## 2021-04-19 NOTE — TELEPHONE ENCOUNTER
Attempted to contact patient due to side effects from chemotherapy. Patient unavailable, VM left for call back.  Jose Melara RD,,LD

## 2021-04-20 ENCOUNTER — HOSPITAL ENCOUNTER (OUTPATIENT)
Dept: INFUSION THERAPY | Age: 56
Discharge: HOME | End: 2021-04-20
Payer: MEDICAID

## 2021-04-20 VITALS — RESPIRATION RATE: 16 BRPM | HEART RATE: 89 BPM | DIASTOLIC BLOOD PRESSURE: 69 MMHG | SYSTOLIC BLOOD PRESSURE: 101 MMHG

## 2021-04-20 DIAGNOSIS — Z17.0 MALIGNANT NEOPLASM OF CENTRAL PORTION OF RIGHT BREAST IN FEMALE, ESTROGEN RECEPTOR POSITIVE (HCC): Primary | ICD-10-CM

## 2021-04-20 DIAGNOSIS — T45.1X5A CHEMOTHERAPY INDUCED DIARRHEA: Primary | ICD-10-CM

## 2021-04-20 DIAGNOSIS — K52.1 CHEMOTHERAPY INDUCED DIARRHEA: Primary | ICD-10-CM

## 2021-04-20 DIAGNOSIS — C50.111 MALIGNANT NEOPLASM OF CENTRAL PORTION OF RIGHT BREAST IN FEMALE, ESTROGEN RECEPTOR POSITIVE (HCC): Primary | ICD-10-CM

## 2021-04-20 PROCEDURE — 6360000002 HC RX W HCPCS: Performed by: NURSE PRACTITIONER

## 2021-04-20 PROCEDURE — 2580000003 HC RX 258: Performed by: NURSE PRACTITIONER

## 2021-04-20 PROCEDURE — 2580000003 HC RX 258

## 2021-04-20 PROCEDURE — 96361 HYDRATE IV INFUSION ADD-ON: CPT

## 2021-04-20 PROCEDURE — 96360 HYDRATION IV INFUSION INIT: CPT

## 2021-04-20 PROCEDURE — 6370000000 HC RX 637 (ALT 250 FOR IP)

## 2021-04-20 RX ORDER — LOPERAMIDE HYDROCHLORIDE 2 MG/1
2 CAPSULE ORAL ONCE
Status: CANCELLED
Start: 2021-04-20 | End: 2021-04-20

## 2021-04-20 RX ORDER — SODIUM CHLORIDE 0.9 % (FLUSH) 0.9 %
5-40 SYRINGE (ML) INJECTION PRN
Status: CANCELLED | OUTPATIENT
Start: 2021-04-20

## 2021-04-20 RX ORDER — HEPARIN SODIUM (PORCINE) LOCK FLUSH IV SOLN 100 UNIT/ML 100 UNIT/ML
500 SOLUTION INTRAVENOUS PRN
Status: CANCELLED | OUTPATIENT
Start: 2021-04-20

## 2021-04-20 RX ORDER — SODIUM CHLORIDE 9 MG/ML
25 INJECTION, SOLUTION INTRAVENOUS PRN
Status: CANCELLED | OUTPATIENT
Start: 2021-04-20

## 2021-04-20 RX ORDER — SODIUM CHLORIDE 9 MG/ML
INJECTION, SOLUTION INTRAVENOUS
Status: COMPLETED
Start: 2021-04-20 | End: 2021-04-20

## 2021-04-20 RX ORDER — 0.9 % SODIUM CHLORIDE 0.9 %
2000 INTRAVENOUS SOLUTION INTRAVENOUS ONCE
Status: CANCELLED | OUTPATIENT
Start: 2021-04-20 | End: 2021-04-20

## 2021-04-20 RX ORDER — LOPERAMIDE HYDROCHLORIDE 2 MG/1
CAPSULE ORAL
Status: COMPLETED
Start: 2021-04-20 | End: 2021-04-20

## 2021-04-20 RX ORDER — LOPERAMIDE HYDROCHLORIDE 2 MG/1
2 CAPSULE ORAL ONCE
Status: DISCONTINUED | OUTPATIENT
Start: 2021-04-20 | End: 2021-05-19 | Stop reason: ALTCHOICE

## 2021-04-20 RX ORDER — 0.9 % SODIUM CHLORIDE 0.9 %
2000 INTRAVENOUS SOLUTION INTRAVENOUS ONCE
Status: COMPLETED | OUTPATIENT
Start: 2021-04-20 | End: 2021-04-20

## 2021-04-20 RX ORDER — HEPARIN SODIUM (PORCINE) LOCK FLUSH IV SOLN 100 UNIT/ML 100 UNIT/ML
500 SOLUTION INTRAVENOUS PRN
Status: DISCONTINUED | OUTPATIENT
Start: 2021-04-20 | End: 2021-04-21 | Stop reason: HOSPADM

## 2021-04-20 RX ORDER — SODIUM CHLORIDE 0.9 % (FLUSH) 0.9 %
5-40 SYRINGE (ML) INJECTION PRN
Status: DISCONTINUED | OUTPATIENT
Start: 2021-04-20 | End: 2021-04-21 | Stop reason: HOSPADM

## 2021-04-20 RX ORDER — LOPERAMIDE HYDROCHLORIDE 2 MG/1
2 CAPSULE ORAL 4 TIMES DAILY PRN
Status: DISCONTINUED | OUTPATIENT
Start: 2021-04-20 | End: 2021-04-20

## 2021-04-20 RX ADMIN — HEPARIN 500 UNITS: 100 SYRINGE at 15:31

## 2021-04-20 RX ADMIN — Medication 2000 ML: at 13:26

## 2021-04-20 RX ADMIN — SODIUM CHLORIDE 2000 ML: 9 INJECTION, SOLUTION INTRAVENOUS at 13:26

## 2021-04-20 RX ADMIN — LOPERAMIDE HYDROCHLORIDE 2 MG: 2 CAPSULE ORAL at 15:38

## 2021-04-20 RX ADMIN — SODIUM CHLORIDE, PRESERVATIVE FREE 10 ML: 5 INJECTION INTRAVENOUS at 15:31

## 2021-04-20 NOTE — PROGRESS NOTES
Called patient in regards to her labs and elevated creatinine. Left VM to call back to office to come in for fluids to help with hydration.  Will continue to follow up

## 2021-04-20 NOTE — PROGRESS NOTES
Pt presents to clinic for 2L of hydration. Tolerated well without complications. Discharged in stable condition via wheelchair.

## 2021-04-20 NOTE — PROGRESS NOTES
Patient called with continued diarrhea 4-6 times a day since treatment Friday, labs resulted with creatinine at 2.2 form 0.8. Advised patient to come in for hydration. Patient states she is taking imodium for diarrhea 4 times a day, has not picked up the Lomotil I Rx. I advised imodium after each loose stool and Lomotil 4 times daily. Expressed understanding.

## 2021-04-20 NOTE — PROGRESS NOTES
Cheryl Beltran  4/20/2021  Wt Readings from Last 10 Encounters:   04/08/21 163 lb (73.9 kg)   04/02/21 165 lb (74.8 kg)   03/25/21 164 lb (74.4 kg)   03/24/21 165 lb (74.8 kg)   03/09/21 166 lb (75.3 kg)   02/25/21 166 lb 11.2 oz (75.6 kg)   02/12/21 175 lb (79.4 kg)   02/05/21 175 lb (79.4 kg)   01/14/21 168 lb 8 oz (76.4 kg)   12/11/20 175 lb (79.4 kg)     Ht Readings from Last 1 Encounters:   04/08/21 6' 1\" (1.854 m)     There is no height or weight on file to calculate BMI. Met with patient today, she is here for IV hydration. She has had a really tough week, dealing with constant diarrhea. She reports she is diabetic, she is drinking mostly powerade zero and crystal light for fluids. She does not love the taste of water. She is eating her \"normal diet\", has not adjusted due to the diarrhea. Labs revealed dehydration with hyponatremia and slight elevated GLU. She is questioning how to eat, for she is really miserable and does not want to have to go through this again. Spent time reviewing with patient the foods to choose and what to avoid. Spoke with her about ways to help decrease diarrhea, such as using powdered pectin in applesauce or yogurt. Discussed avoiding lactose and choosing Fairlife milk. She is taking carnation instant breakfast BID, which was encouraged. Reviewed with patient that she should limit or avoid artificially sweetened beverages at this time due to their tendency to increase diarrhea output and frequency. Encouraged diluting regular powerade or gatorade instead. Weight change: stable  Appetite: fair  N/V/D/C: diarrhea  Calculated Needs if applicable: KWV=117#; 03-7116PKCW (54x30-32), 70-80gm PRO (54x1.30), 1750ml (54x32)    Pre-Hab Eligible?:  no        Recommendations: Avoid artificially sweetened beverages due to diarrhea; Push fluids 72oz daily or more. Dilute regular version and choose water; Continue CIB twice daily, use Fairlife milk instead of regular milk;  Follow expanded BRAT diet to lessen side effects. [employer] GUIDELINES FOR CLINICAL CHARACTERISTICS OF MALNUTRITION IN CHRONIC ILLNESS     Moderate Malnutrition  Severe Malnutrition    Energy intake  <75% energy intake compared to estimated needs for >1month <75% energy intake compared to estimated needs for >1month   Weight changes  5% x 1 month  7.5% x 3 months   10% x 6 months   20% x 1 year  >5% x 1 month  >7.5% x 3 months   >10% x 6 months   >20% x 1 year    Physical findings  Mild   Decrease subcutaneous fat    Decrease muscle mass     Increase fluid/edema   Severe  Decrease subcutaneous fat    Decrease muscle mass     Increase fluid/edema      No malnutrition at this time.     Damir Carson

## 2021-04-28 ENCOUNTER — HOSPITAL ENCOUNTER (OUTPATIENT)
Dept: INFUSION THERAPY | Age: 56
Discharge: HOME | End: 2021-04-28
Payer: MEDICAID

## 2021-04-28 DIAGNOSIS — C50.111 MALIGNANT NEOPLASM OF CENTRAL PORTION OF RIGHT BREAST IN FEMALE, ESTROGEN RECEPTOR POSITIVE (HCC): ICD-10-CM

## 2021-04-28 DIAGNOSIS — Z17.0 MALIGNANT NEOPLASM OF CENTRAL PORTION OF RIGHT BREAST IN FEMALE, ESTROGEN RECEPTOR POSITIVE (HCC): ICD-10-CM

## 2021-04-28 DIAGNOSIS — Z85.3 PERSONAL HISTORY OF BREAST CANCER: Primary | ICD-10-CM

## 2021-04-28 LAB
ALBUMIN SERPL-MCNC: 3.2 G/DL (ref 3.5–5.2)
ALP BLD-CCNC: 134 U/L (ref 35–104)
ALT SERPL-CCNC: 42 U/L (ref 0–32)
ANION GAP SERPL CALCULATED.3IONS-SCNC: 7 MMOL/L (ref 7–16)
AST SERPL-CCNC: 30 U/L (ref 0–31)
BASOPHILS ABSOLUTE: 0.08 E9/L (ref 0–0.2)
BASOPHILS RELATIVE PERCENT: 0.8 % (ref 0–2)
BILIRUB SERPL-MCNC: 0.3 MG/DL (ref 0–1.2)
BUN BLDV-MCNC: 7 MG/DL (ref 6–20)
CALCIUM SERPL-MCNC: 8.7 MG/DL (ref 8.6–10.2)
CHLORIDE BLD-SCNC: 104 MMOL/L (ref 98–107)
CO2: 26 MMOL/L (ref 22–29)
CREAT SERPL-MCNC: 0.8 MG/DL (ref 0.5–1)
EOSINOPHILS ABSOLUTE: 0.03 E9/L (ref 0.05–0.5)
EOSINOPHILS RELATIVE PERCENT: 0.3 % (ref 0–6)
GFR AFRICAN AMERICAN: >60
GFR NON-AFRICAN AMERICAN: >60 ML/MIN/1.73
GLUCOSE BLD-MCNC: 181 MG/DL (ref 74–99)
HCT VFR BLD CALC: 30.3 % (ref 34–48)
HEMOGLOBIN: 9.9 G/DL (ref 11.5–15.5)
IMMATURE GRANULOCYTES #: 0.06 E9/L
IMMATURE GRANULOCYTES %: 0.6 % (ref 0–5)
LYMPHOCYTES ABSOLUTE: 2.96 E9/L (ref 1.5–4)
LYMPHOCYTES RELATIVE PERCENT: 31.2 % (ref 20–42)
MCH RBC QN AUTO: 30.8 PG (ref 26–35)
MCHC RBC AUTO-ENTMCNC: 32.7 % (ref 32–34.5)
MCV RBC AUTO: 94.4 FL (ref 80–99.9)
MONOCYTES ABSOLUTE: 1.15 E9/L (ref 0.1–0.95)
MONOCYTES RELATIVE PERCENT: 12.1 % (ref 2–12)
NEUTROPHILS ABSOLUTE: 5.2 E9/L (ref 1.8–7.3)
NEUTROPHILS RELATIVE PERCENT: 55 % (ref 43–80)
PDW BLD-RTO: 13.8 FL (ref 11.5–15)
PLATELET # BLD: 296 E9/L (ref 130–450)
PMV BLD AUTO: 9.5 FL (ref 7–12)
POTASSIUM SERPL-SCNC: 4.6 MMOL/L (ref 3.5–5)
RBC # BLD: 3.21 E12/L (ref 3.5–5.5)
SODIUM BLD-SCNC: 137 MMOL/L (ref 132–146)
TOTAL PROTEIN: 5.4 G/DL (ref 6.4–8.3)
WBC # BLD: 9.5 E9/L (ref 4.5–11.5)

## 2021-04-28 PROCEDURE — 36591 DRAW BLOOD OFF VENOUS DEVICE: CPT

## 2021-04-28 PROCEDURE — 85025 COMPLETE CBC W/AUTO DIFF WBC: CPT

## 2021-04-28 PROCEDURE — 2580000003 HC RX 258: Performed by: INTERNAL MEDICINE

## 2021-04-28 PROCEDURE — 80053 COMPREHEN METABOLIC PANEL: CPT

## 2021-04-28 PROCEDURE — 6360000002 HC RX W HCPCS: Performed by: INTERNAL MEDICINE

## 2021-04-28 RX ORDER — HEPARIN SODIUM (PORCINE) LOCK FLUSH IV SOLN 100 UNIT/ML 100 UNIT/ML
500 SOLUTION INTRAVENOUS PRN
Status: DISCONTINUED | OUTPATIENT
Start: 2021-04-28 | End: 2021-04-29 | Stop reason: HOSPADM

## 2021-04-28 RX ORDER — SODIUM CHLORIDE 0.9 % (FLUSH) 0.9 %
10 SYRINGE (ML) INJECTION PRN
Status: DISCONTINUED | OUTPATIENT
Start: 2021-04-28 | End: 2021-04-29 | Stop reason: HOSPADM

## 2021-04-28 RX ORDER — SODIUM CHLORIDE 0.9 % (FLUSH) 0.9 %
10 SYRINGE (ML) INJECTION PRN
Status: CANCELLED | OUTPATIENT
Start: 2021-04-28

## 2021-04-28 RX ORDER — HEPARIN SODIUM (PORCINE) LOCK FLUSH IV SOLN 100 UNIT/ML 100 UNIT/ML
500 SOLUTION INTRAVENOUS PRN
Status: CANCELLED | OUTPATIENT
Start: 2021-04-28

## 2021-04-28 RX ADMIN — SODIUM CHLORIDE, PRESERVATIVE FREE 10 ML: 5 INJECTION INTRAVENOUS at 13:19

## 2021-04-28 RX ADMIN — SODIUM CHLORIDE, PRESERVATIVE FREE 10 ML: 5 INJECTION INTRAVENOUS at 13:18

## 2021-04-28 RX ADMIN — HEPARIN 500 UNITS: 100 SYRINGE at 13:19

## 2021-04-29 ENCOUNTER — HOSPITAL ENCOUNTER (OUTPATIENT)
Dept: INFUSION THERAPY | Age: 56
Discharge: HOME | End: 2021-04-29
Payer: MEDICAID

## 2021-04-29 ENCOUNTER — OFFICE VISIT (OUTPATIENT)
Dept: ONCOLOGY | Age: 56
End: 2021-04-29
Payer: MEDICAID

## 2021-04-29 VITALS
BODY MASS INDEX: 21.77 KG/M2 | SYSTOLIC BLOOD PRESSURE: 101 MMHG | TEMPERATURE: 97.3 F | HEART RATE: 88 BPM | DIASTOLIC BLOOD PRESSURE: 68 MMHG | RESPIRATION RATE: 16 BRPM | OXYGEN SATURATION: 96 % | WEIGHT: 165 LBS

## 2021-04-29 VITALS
SYSTOLIC BLOOD PRESSURE: 110 MMHG | TEMPERATURE: 98.3 F | HEART RATE: 81 BPM | RESPIRATION RATE: 16 BRPM | DIASTOLIC BLOOD PRESSURE: 7 MMHG

## 2021-04-29 DIAGNOSIS — Z85.3 PERSONAL HISTORY OF BREAST CANCER: Primary | ICD-10-CM

## 2021-04-29 DIAGNOSIS — Z17.0 MALIGNANT NEOPLASM OF CENTRAL PORTION OF RIGHT BREAST IN FEMALE, ESTROGEN RECEPTOR POSITIVE (HCC): Primary | ICD-10-CM

## 2021-04-29 DIAGNOSIS — C50.111 MALIGNANT NEOPLASM OF CENTRAL PORTION OF RIGHT BREAST IN FEMALE, ESTROGEN RECEPTOR POSITIVE (HCC): Primary | ICD-10-CM

## 2021-04-29 PROCEDURE — G8420 CALC BMI NORM PARAMETERS: HCPCS | Performed by: INTERNAL MEDICINE

## 2021-04-29 PROCEDURE — 96361 HYDRATE IV INFUSION ADD-ON: CPT

## 2021-04-29 PROCEDURE — 96366 THER/PROPH/DIAG IV INF ADDON: CPT

## 2021-04-29 PROCEDURE — 96417 CHEMO IV INFUS EACH ADDL SEQ: CPT

## 2021-04-29 PROCEDURE — 99214 OFFICE O/P EST MOD 30 MIN: CPT | Performed by: INTERNAL MEDICINE

## 2021-04-29 PROCEDURE — G9899 SCRN MAM PERF RSLTS DOC: HCPCS | Performed by: INTERNAL MEDICINE

## 2021-04-29 PROCEDURE — 3017F COLORECTAL CA SCREEN DOC REV: CPT | Performed by: INTERNAL MEDICINE

## 2021-04-29 PROCEDURE — 1036F TOBACCO NON-USER: CPT | Performed by: INTERNAL MEDICINE

## 2021-04-29 PROCEDURE — 96413 CHEMO IV INFUSION 1 HR: CPT

## 2021-04-29 PROCEDURE — 96367 TX/PROPH/DG ADDL SEQ IV INF: CPT

## 2021-04-29 PROCEDURE — 2580000003 HC RX 258: Performed by: INTERNAL MEDICINE

## 2021-04-29 PROCEDURE — 6360000002 HC RX W HCPCS: Performed by: INTERNAL MEDICINE

## 2021-04-29 PROCEDURE — G8427 DOCREV CUR MEDS BY ELIG CLIN: HCPCS | Performed by: INTERNAL MEDICINE

## 2021-04-29 PROCEDURE — 96377 APPLICATON ON-BODY INJECTOR: CPT

## 2021-04-29 PROCEDURE — 96375 TX/PRO/DX INJ NEW DRUG ADDON: CPT

## 2021-04-29 RX ORDER — 0.9 % SODIUM CHLORIDE 0.9 %
1000 INTRAVENOUS SOLUTION INTRAVENOUS ONCE
Status: COMPLETED | OUTPATIENT
Start: 2021-04-29 | End: 2021-04-29

## 2021-04-29 RX ORDER — SODIUM CHLORIDE 0.9 % (FLUSH) 0.9 %
10 SYRINGE (ML) INJECTION PRN
Status: DISCONTINUED | OUTPATIENT
Start: 2021-04-29 | End: 2021-04-30 | Stop reason: HOSPADM

## 2021-04-29 RX ORDER — MEPERIDINE HYDROCHLORIDE 25 MG/ML
12.5 INJECTION INTRAMUSCULAR; INTRAVENOUS; SUBCUTANEOUS ONCE
Status: CANCELLED | OUTPATIENT
Start: 2021-04-29 | End: 2021-04-29

## 2021-04-29 RX ORDER — DEXAMETHASONE SODIUM PHOSPHATE 10 MG/ML
10 INJECTION, SOLUTION INTRAMUSCULAR; INTRAVENOUS ONCE
Status: CANCELLED | OUTPATIENT
Start: 2021-04-29

## 2021-04-29 RX ORDER — METHYLPREDNISOLONE SODIUM SUCCINATE 125 MG/2ML
125 INJECTION, POWDER, LYOPHILIZED, FOR SOLUTION INTRAMUSCULAR; INTRAVENOUS ONCE
Status: CANCELLED | OUTPATIENT
Start: 2021-04-29 | End: 2021-04-29

## 2021-04-29 RX ORDER — SODIUM CHLORIDE 9 MG/ML
INJECTION, SOLUTION INTRAVENOUS CONTINUOUS
Status: CANCELLED | OUTPATIENT
Start: 2021-04-29

## 2021-04-29 RX ORDER — HEPARIN SODIUM (PORCINE) LOCK FLUSH IV SOLN 100 UNIT/ML 100 UNIT/ML
500 SOLUTION INTRAVENOUS PRN
Status: DISCONTINUED | OUTPATIENT
Start: 2021-04-29 | End: 2021-04-30 | Stop reason: HOSPADM

## 2021-04-29 RX ORDER — SODIUM CHLORIDE 0.9 % (FLUSH) 0.9 %
5 SYRINGE (ML) INJECTION PRN
Status: CANCELLED | OUTPATIENT
Start: 2021-04-29

## 2021-04-29 RX ORDER — SODIUM CHLORIDE 0.9 % (FLUSH) 0.9 %
10 SYRINGE (ML) INJECTION PRN
Status: CANCELLED | OUTPATIENT
Start: 2021-04-29

## 2021-04-29 RX ORDER — DIPHENHYDRAMINE HYDROCHLORIDE 50 MG/ML
50 INJECTION INTRAMUSCULAR; INTRAVENOUS ONCE
Status: CANCELLED | OUTPATIENT
Start: 2021-04-29 | End: 2021-04-29

## 2021-04-29 RX ORDER — PALONOSETRON HYDROCHLORIDE 0.05 MG/ML
0.25 INJECTION, SOLUTION INTRAVENOUS ONCE
Status: COMPLETED | OUTPATIENT
Start: 2021-04-29 | End: 2021-04-29

## 2021-04-29 RX ORDER — DEXAMETHASONE SODIUM PHOSPHATE 10 MG/ML
10 INJECTION, SOLUTION INTRAMUSCULAR; INTRAVENOUS ONCE
Status: COMPLETED | OUTPATIENT
Start: 2021-04-29 | End: 2021-04-29

## 2021-04-29 RX ORDER — PALONOSETRON HYDROCHLORIDE 0.05 MG/ML
0.25 INJECTION, SOLUTION INTRAVENOUS ONCE
Status: CANCELLED | OUTPATIENT
Start: 2021-04-29

## 2021-04-29 RX ORDER — 0.9 % SODIUM CHLORIDE 0.9 %
1000 INTRAVENOUS SOLUTION INTRAVENOUS ONCE
Status: CANCELLED
Start: 2021-04-29 | End: 2021-04-29

## 2021-04-29 RX ORDER — HEPARIN SODIUM (PORCINE) LOCK FLUSH IV SOLN 100 UNIT/ML 100 UNIT/ML
500 SOLUTION INTRAVENOUS PRN
Status: CANCELLED | OUTPATIENT
Start: 2021-04-29

## 2021-04-29 RX ORDER — EPINEPHRINE 1 MG/ML
0.3 INJECTION, SOLUTION, CONCENTRATE INTRAVENOUS PRN
Status: CANCELLED | OUTPATIENT
Start: 2021-04-29

## 2021-04-29 RX ADMIN — DEXAMETHASONE SODIUM PHOSPHATE 10 MG: 10 INJECTION, SOLUTION INTRAMUSCULAR; INTRAVENOUS at 09:03

## 2021-04-29 RX ADMIN — DOCETAXEL 140 MG: 20 INJECTION, SOLUTION INTRAVENOUS at 11:49

## 2021-04-29 RX ADMIN — PEGFILGRASTIM 6 MG: KIT SUBCUTANEOUS at 14:20

## 2021-04-29 RX ADMIN — TRASTUZUMAB-ANNS 462 MG: 420 INJECTION, POWDER, LYOPHILIZED, FOR SOLUTION INTRAVENOUS at 09:10

## 2021-04-29 RX ADMIN — SODIUM CHLORIDE, PRESERVATIVE FREE 10 ML: 5 INJECTION INTRAVENOUS at 09:00

## 2021-04-29 RX ADMIN — HEPARIN 500 UNITS: 100 SYRINGE at 14:27

## 2021-04-29 RX ADMIN — SODIUM CHLORIDE, PRESERVATIVE FREE 10 ML: 5 INJECTION INTRAVENOUS at 09:03

## 2021-04-29 RX ADMIN — SODIUM CHLORIDE, PRESERVATIVE FREE 10 ML: 5 INJECTION INTRAVENOUS at 14:27

## 2021-04-29 RX ADMIN — PERTUZUMAB 420 MG: 30 INJECTION, SOLUTION, CONCENTRATE INTRAVENOUS at 10:30

## 2021-04-29 RX ADMIN — PALONOSETRON 0.25 MG: 0.25 INJECTION, SOLUTION INTRAVENOUS at 09:01

## 2021-04-29 RX ADMIN — FOSAPREPITANT 150 MG: 150 INJECTION, POWDER, LYOPHILIZED, FOR SOLUTION INTRAVENOUS at 11:13

## 2021-04-29 RX ADMIN — SODIUM CHLORIDE 1000 ML: 9 INJECTION, SOLUTION INTRAVENOUS at 09:00

## 2021-04-29 RX ADMIN — CARBOPLATIN 700 MG: 10 INJECTION, SOLUTION INTRAVENOUS at 13:00

## 2021-04-29 NOTE — PROGRESS NOTES
Department of [facility] Oncology  Attending Clinic Note    Reason for Visit: Follow-up on a patient with Right Breast Cancer    PCP:  Hosea New DO    History of Present Illness: The mass was located in the 6 o'clock position of right breast    Breast cancer risk factors include infrequent SMEs and age and gender    Bilateral Screening Mammogram 10/06/2020: There are suspicious calcifications in the right breast at the 6 o'clock position which appear pleomorphic. These calcifications are in a segmental distribution. These clustered calcifications are suspicious for malignancy. Right breast, calcifications at the 6:00, core biopsy on 10/30/2020:    - Small focus of invasive ductal carcinoma, grade 3 (3+3+2 = 8)    - Ductal carcinoma in situ, high nuclear grade, comedo/cribriform/solid types;    - Microcalcifications in DCIS    - Breast receptor and biomarkers (per outside report without looking the   slides:     ER: Positive, 89.8%, strong intensity     VT: Positive, 52.2%, moderate intensity     HER-2: Positive (score 3+)     Ki-67: High proliferation, 26.2%     P53: Negative, 0.2%     Comment:The invasive carcinoma is intermingled with DCIS and measures   3.0 x 2.0 mm in greatest dimension on the slides. CXR PA/Lateral 12/11/2020:  No acute process. Right Axillary U/S on 12/11/2020: There is no axillary LN.     MRI Bilateral Breast 01/05/2021:  Focal, heterogeneous non mass enhancement identified in the right breast 6 o'clock which measures approximately 1.9 x 1.4 x 1.7 cm (image 19 of the axial T1 post contrast series).    No additional foci of disease identified on the right  There is no lymphadenopathy    Needle localized Right lumpectomy with SLNBx with mediport placement on 02/05/2021  A.  Right axillary sentinel lymph node #1, excision: 1 lymph node negative for malignancy     B.  Right axillary contents, regional resection: 1 lymph node positive for metastatic, poorly differentiated ductal carcinoma (grade 3)   Extranodal extension present     C.  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;   positive for extensive intraductal component   Skin-uninvolved by malignancy   Invasive carcinoma margins-uninvolved by invasive carcinoma        Distance from closest margin: 4 mm from anterior margin   DCIS margins-inferior and medial margins involved by DCIS   Regional lymph nodes-involved by tumor cells        Number of lymph nodes with macrometastasis: 1        Number of lymph nodes with micrometastasis: 0        Number of lymph nodes with isolated tumor cells: 0        Size of largest metastatic deposit: 1.5 cm        Extranodal extension: Present        Total number of lymph nodes examined: 2        Number of sentinel nodes examined: 1   Treatment effect in the breast-no known presurgical therapy   TNM classification (AJCC eighth edition)-  pT1c N1a MX     Bone scan, CT chest/abdomen/pelvis 02/26/2021 negative for metastatic disease    Re-excision lumpectomy of inferior and medial margins on 03/09/2021  A. Right breast, new inferior margin, excision: Fat necrosis, foreign body-type giant cell reaction, chronic inflammation, and fibrosis consistent with previous procedure   No evidence of residual carcinoma   Final inferior margin negative for carcinoma     B.  Right breast, new medial margin, excision: Residual high-grade ductal carcinoma in situ   Ductal carcinoma in situ present less than 1 mm from red/superior margin and green/anterior margin   Fat necrosis, foreign body-type giant cell reaction, chronic   inflammation, and fibrosis consistent with previous procedure   Fibrocystic change   Microcalcifications associated with benign breast tissue and DCIS   Comment:Residual ductal carcinoma in situ is present in 5 out of 13 tissue blocks of part B    Recommended adjuvant chemotherapy (TCHP) for 6 cycles followed by adjuvant RT followed lastly by endocrine therapy. Side effects TCHP reviewed with patient. She agreed to proceed. 2d-echo 02/17/2021 noted EF 60-65%  Cycle # 1 adjuvant TCHP was on 04/08/2021. Cycle # 2 adjuvant TCHP is today 04/29/2021. No fever, chills. Fair appetite and energy level. No N/V. No diarrhea. Dry skin     Review of Systems;  CONSTITUTIONAL: No fever, chills. Fair appetite and energy level. ENMT: Eyes: No diplopia; Nose: No epistaxis. Mouth: No sore throat. RESPIRATORY: No hemoptysis, shortness of breath, cough. CARDIOVASCULAR: No chest pain, palpitations. GASTROINTESTINAL: No nausea/vomiting, abdominal pain. GENITOURINARY: No dysuria, urinary frequency, hematuria. NEURO: No syncope, presyncope, headache. Remainder:  ROS NEGATIVE    Past Medical History:      Diagnosis Date    Anxiety     Arthritis     Cancer ([address] 2021    breast    Cervical radiculopathy     Chronic back pain     Depression     Diabetes mellitus type 2, uncontrolled ([address] 2/12/2017    GERD (gastroesophageal reflux disease)     History of blood transfusion 01/2018    back surgery, lumbar, dr Bruno Search    Hypertension     Right leg pain     seeing doctor currently problems with hardware     Medications:  Reviewed and reconciled. Allergies: Allergies   Allergen Reactions    Ceftriaxone Shortness Of Breath     Physical Exam:  Wt 165 lb (74.8 kg)   LMP 08/13/2013   BMI 21.77 kg/m²   GENERAL: Alert, oriented x 3, not in acute distress. HEENT: PERRLA; EOMI.  Oropharynx contents, regional resection: 1 lymph node positive for metastatic, poorly differentiated ductal carcinoma (grade 3)   Extranodal extension present     C.  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;   positive for extensive intraductal component   Skin-uninvolved by malignancy   Invasive carcinoma margins-uninvolved by invasive carcinoma        Distance from closest margin: 4 mm from anterior margin   DCIS margins-inferior and medial margins involved by DCIS   Regional lymph nodes-involved by tumor cells        Number of lymph nodes with macrometastasis: 1        Number of lymph nodes with micrometastasis: 0        Number of lymph nodes with isolated tumor cells: 0        Size of largest metastatic deposit: 1.5 cm        Extranodal extension: Present        Total number of lymph nodes examined: 2        Number of sentinel nodes examined: 1   Treatment effect in the breast-no known presurgical therapy   TNM classification (AJCC eighth edition)-  pT1c N1a MX     Bone scan, CT chest/abdomen/pelvis 02/26/2021 negative for metastatic disease    Re-excision lumpectomy of inferior and medial margins on 03/09/2021  A. Right breast, new inferior margin, excision: Fat necrosis, foreign body-type giant cell reaction, chronic inflammation, and fibrosis consistent with previous procedure   No evidence of residual carcinoma   Final inferior margin negative for carcinoma     B.  Right breast, new medial margin, excision: Residual high-grade ductal carcinoma in situ   Ductal carcinoma in situ present less than 1 mm from red/superior margin and green/anterior margin   Fat necrosis, foreign body-type giant cell reaction, chronic   inflammation, and fibrosis consistent with previous procedure   Fibrocystic change   Microcalcifications associated with benign breast tissue and DCIS   Comment:Residual ductal carcinoma in situ is present in 5 out of 13 tissue blocks of part B    Recommended adjuvant chemotherapy (TCHP) for 6 cycles followed by adjuvant RT followed lastly by endocrine therapy. Side effects TCHP reviewed with patient. She agreed to proceed. 2d-echo 02/17/2021 noted EF 60-65%  Cycle # 1 adjuvant TCHP was on 04/08/2021. Cycle # 2 adjuvant TCHP is today 04/29/2021. Labs reviewed; ok to proceed.  Use skin moisturizers for dry skin    RTC 3 weeks for Cycle # 3 adjuvant TCHP    Ivy Kamara MD   6/48/1695  Board Certified Medical Oncologist

## 2021-05-04 ENCOUNTER — TELEPHONE (OUTPATIENT)
Dept: ONCOLOGY | Age: 56
End: 2021-05-04

## 2021-05-04 DIAGNOSIS — T45.1X5A CHEMOTHERAPY INDUCED DIARRHEA: Primary | ICD-10-CM

## 2021-05-04 DIAGNOSIS — K52.1 CHEMOTHERAPY INDUCED DIARRHEA: Primary | ICD-10-CM

## 2021-05-04 RX ORDER — DIPHENOXYLATE HYDROCHLORIDE AND ATROPINE SULFATE 2.5; .025 MG/1; MG/1
1 TABLET ORAL 4 TIMES DAILY PRN
Qty: 40 TABLET | Refills: 0 | Status: SHIPPED | OUTPATIENT
Start: 2021-05-04 | End: 2021-05-14

## 2021-05-04 NOTE — PROGRESS NOTES
Patient called with c/o diarrhea roughly 5-6 loose stools a day for the past few days. States she is taking Imodium 4 times a day. Advised imodium with every loose stool and will Rx Lomotil to her pharmacy to use q6 hours. Taper the lomotil as the diarrhea improves. Patient expressed understanding. Will call if it worsens or does not get better. Instructed to continue to work on her oral intake to prevent dehydration. S/S of dehydration explained to ciaran and will call with these to see if she needs fluids.  OARRS reviewed

## 2021-05-06 ENCOUNTER — TELEPHONE (OUTPATIENT)
Dept: ONCOLOGY | Age: 56
End: 2021-05-06

## 2021-05-06 DIAGNOSIS — T45.1X5A CHEMOTHERAPY INDUCED DIARRHEA: Primary | ICD-10-CM

## 2021-05-06 DIAGNOSIS — K52.1 CHEMOTHERAPY INDUCED DIARRHEA: Primary | ICD-10-CM

## 2021-05-06 RX ORDER — HEPARIN SODIUM (PORCINE) LOCK FLUSH IV SOLN 100 UNIT/ML 100 UNIT/ML
500 SOLUTION INTRAVENOUS PRN
Status: CANCELLED | OUTPATIENT
Start: 2021-05-07

## 2021-05-06 RX ORDER — SODIUM CHLORIDE 0.9 % (FLUSH) 0.9 %
5-40 SYRINGE (ML) INJECTION PRN
Status: CANCELLED | OUTPATIENT
Start: 2021-05-07

## 2021-05-06 RX ORDER — SODIUM CHLORIDE 9 MG/ML
25 INJECTION, SOLUTION INTRAVENOUS PRN
Status: CANCELLED | OUTPATIENT
Start: 2021-05-07

## 2021-05-06 RX ORDER — 0.9 % SODIUM CHLORIDE 0.9 %
1000 INTRAVENOUS SOLUTION INTRAVENOUS ONCE
Status: CANCELLED | OUTPATIENT
Start: 2021-05-07 | End: 2021-05-07

## 2021-05-07 ENCOUNTER — HOSPITAL ENCOUNTER (OUTPATIENT)
Dept: INFUSION THERAPY | Age: 56
Discharge: HOME | End: 2021-05-07
Payer: MEDICAID

## 2021-05-07 VITALS
RESPIRATION RATE: 16 BRPM | SYSTOLIC BLOOD PRESSURE: 113 MMHG | HEART RATE: 83 BPM | TEMPERATURE: 97.2 F | DIASTOLIC BLOOD PRESSURE: 68 MMHG

## 2021-05-07 DIAGNOSIS — T45.1X5A CHEMOTHERAPY INDUCED DIARRHEA: Primary | ICD-10-CM

## 2021-05-07 DIAGNOSIS — C50.111 MALIGNANT NEOPLASM OF CENTRAL PORTION OF RIGHT BREAST IN FEMALE, ESTROGEN RECEPTOR POSITIVE (HCC): ICD-10-CM

## 2021-05-07 DIAGNOSIS — Z85.3 PERSONAL HISTORY OF BREAST CANCER: Primary | ICD-10-CM

## 2021-05-07 DIAGNOSIS — Z17.0 MALIGNANT NEOPLASM OF CENTRAL PORTION OF RIGHT BREAST IN FEMALE, ESTROGEN RECEPTOR POSITIVE (HCC): ICD-10-CM

## 2021-05-07 DIAGNOSIS — K52.1 CHEMOTHERAPY INDUCED DIARRHEA: Primary | ICD-10-CM

## 2021-05-07 LAB
ALBUMIN SERPL-MCNC: 3.5 G/DL (ref 3.5–5.2)
ALP BLD-CCNC: 227 U/L (ref 35–104)
ALT SERPL-CCNC: 72 U/L (ref 0–32)
ANION GAP SERPL CALCULATED.3IONS-SCNC: 10 MMOL/L (ref 7–16)
AST SERPL-CCNC: 39 U/L (ref 0–31)
BASOPHILS ABSOLUTE: 0.11 E9/L (ref 0–0.2)
BASOPHILS RELATIVE PERCENT: 0.9 % (ref 0–2)
BILIRUB SERPL-MCNC: 0.2 MG/DL (ref 0–1.2)
BUN BLDV-MCNC: 9 MG/DL (ref 6–20)
CALCIUM SERPL-MCNC: 9.1 MG/DL (ref 8.6–10.2)
CHLORIDE BLD-SCNC: 100 MMOL/L (ref 98–107)
CO2: 25 MMOL/L (ref 22–29)
CREAT SERPL-MCNC: 0.8 MG/DL (ref 0.5–1)
EOSINOPHILS ABSOLUTE: 0 E9/L (ref 0.05–0.5)
EOSINOPHILS RELATIVE PERCENT: 0.3 % (ref 0–6)
GFR AFRICAN AMERICAN: >60
GFR NON-AFRICAN AMERICAN: >60 ML/MIN/1.73
GLUCOSE BLD-MCNC: 127 MG/DL (ref 74–99)
HCT VFR BLD CALC: 32 % (ref 34–48)
HEMOGLOBIN: 10.5 G/DL (ref 11.5–15.5)
LYMPHOCYTES ABSOLUTE: 1.74 E9/L (ref 1.5–4)
LYMPHOCYTES RELATIVE PERCENT: 14 % (ref 20–42)
MAGNESIUM: 1.2 MG/DL (ref 1.6–2.6)
MCH RBC QN AUTO: 31.8 PG (ref 26–35)
MCHC RBC AUTO-ENTMCNC: 32.8 % (ref 32–34.5)
MCV RBC AUTO: 97 FL (ref 80–99.9)
MONOCYTES ABSOLUTE: 1.61 E9/L (ref 0.1–0.95)
MONOCYTES RELATIVE PERCENT: 13.2 % (ref 2–12)
MYELOCYTE PERCENT: 1.8 % (ref 0–0)
NEUTROPHILS ABSOLUTE: 8.93 E9/L (ref 1.8–7.3)
NEUTROPHILS RELATIVE PERCENT: 70.2 % (ref 43–80)
PDW BLD-RTO: 13.9 FL (ref 11.5–15)
PLATELET # BLD: 190 E9/L (ref 130–450)
PMV BLD AUTO: 11 FL (ref 7–12)
POTASSIUM SERPL-SCNC: 4 MMOL/L (ref 3.5–5)
RBC # BLD: 3.3 E12/L (ref 3.5–5.5)
RBC # BLD: NORMAL 10*6/UL
SODIUM BLD-SCNC: 135 MMOL/L (ref 132–146)
TOTAL PROTEIN: 5.9 G/DL (ref 6.4–8.3)
WBC # BLD: 12.4 E9/L (ref 4.5–11.5)

## 2021-05-07 PROCEDURE — 96365 THER/PROPH/DIAG IV INF INIT: CPT

## 2021-05-07 PROCEDURE — 85025 COMPLETE CBC W/AUTO DIFF WBC: CPT

## 2021-05-07 PROCEDURE — 2580000003 HC RX 258: Performed by: INTERNAL MEDICINE

## 2021-05-07 PROCEDURE — 80053 COMPREHEN METABOLIC PANEL: CPT

## 2021-05-07 PROCEDURE — 96360 HYDRATION IV INFUSION INIT: CPT

## 2021-05-07 PROCEDURE — 96366 THER/PROPH/DIAG IV INF ADDON: CPT

## 2021-05-07 PROCEDURE — 6360000002 HC RX W HCPCS: Performed by: INTERNAL MEDICINE

## 2021-05-07 PROCEDURE — 83735 ASSAY OF MAGNESIUM: CPT

## 2021-05-07 PROCEDURE — 2580000003 HC RX 258: Performed by: NURSE PRACTITIONER

## 2021-05-07 PROCEDURE — 36591 DRAW BLOOD OFF VENOUS DEVICE: CPT

## 2021-05-07 PROCEDURE — 6360000002 HC RX W HCPCS: Performed by: NURSE PRACTITIONER

## 2021-05-07 RX ORDER — 0.9 % SODIUM CHLORIDE 0.9 %
1000 INTRAVENOUS SOLUTION INTRAVENOUS ONCE
Status: DISCONTINUED | OUTPATIENT
Start: 2021-05-07 | End: 2021-05-07

## 2021-05-07 RX ORDER — SODIUM CHLORIDE 9 MG/ML
25 INJECTION, SOLUTION INTRAVENOUS PRN
Status: CANCELLED | OUTPATIENT
Start: 2021-05-07

## 2021-05-07 RX ORDER — SODIUM CHLORIDE 0.9 % (FLUSH) 0.9 %
5-40 SYRINGE (ML) INJECTION PRN
Status: CANCELLED | OUTPATIENT
Start: 2021-05-07

## 2021-05-07 RX ORDER — HEPARIN SODIUM (PORCINE) LOCK FLUSH IV SOLN 100 UNIT/ML 100 UNIT/ML
500 SOLUTION INTRAVENOUS PRN
Status: DISCONTINUED | OUTPATIENT
Start: 2021-05-07 | End: 2021-05-07

## 2021-05-07 RX ORDER — SODIUM CHLORIDE 0.9 % (FLUSH) 0.9 %
5-40 SYRINGE (ML) INJECTION PRN
Status: DISCONTINUED | OUTPATIENT
Start: 2021-05-07 | End: 2021-05-07

## 2021-05-07 RX ORDER — HEPARIN SODIUM (PORCINE) LOCK FLUSH IV SOLN 100 UNIT/ML 100 UNIT/ML
500 SOLUTION INTRAVENOUS PRN
Status: CANCELLED | OUTPATIENT
Start: 2021-05-07

## 2021-05-07 RX ORDER — SODIUM CHLORIDE 0.9 % (FLUSH) 0.9 %
10 SYRINGE (ML) INJECTION PRN
Status: CANCELLED | OUTPATIENT
Start: 2021-05-07

## 2021-05-07 RX ORDER — HEPARIN SODIUM (PORCINE) LOCK FLUSH IV SOLN 100 UNIT/ML 100 UNIT/ML
500 SOLUTION INTRAVENOUS PRN
Status: DISCONTINUED | OUTPATIENT
Start: 2021-05-07 | End: 2021-05-08 | Stop reason: HOSPADM

## 2021-05-07 RX ORDER — SODIUM CHLORIDE 9 MG/ML
25 INJECTION, SOLUTION INTRAVENOUS PRN
Status: DISCONTINUED | OUTPATIENT
Start: 2021-05-07 | End: 2021-05-07

## 2021-05-07 RX ORDER — SODIUM CHLORIDE 0.9 % (FLUSH) 0.9 %
10 SYRINGE (ML) INJECTION PRN
Status: DISCONTINUED | OUTPATIENT
Start: 2021-05-07 | End: 2021-05-08 | Stop reason: HOSPADM

## 2021-05-07 RX ORDER — 0.9 % SODIUM CHLORIDE 0.9 %
1000 INTRAVENOUS SOLUTION INTRAVENOUS ONCE
Status: CANCELLED | OUTPATIENT
Start: 2021-05-07 | End: 2021-05-07

## 2021-05-07 RX ADMIN — SODIUM CHLORIDE, PRESERVATIVE FREE 10 ML: 5 INJECTION INTRAVENOUS at 08:50

## 2021-05-07 RX ADMIN — MAGNESIUM SULFATE HEPTAHYDRATE: 500 INJECTION, SOLUTION INTRAMUSCULAR; INTRAVENOUS at 10:25

## 2021-05-07 RX ADMIN — SODIUM CHLORIDE, PRESERVATIVE FREE 10 ML: 5 INJECTION INTRAVENOUS at 14:53

## 2021-05-07 RX ADMIN — SODIUM CHLORIDE, PRESERVATIVE FREE 10 ML: 5 INJECTION INTRAVENOUS at 08:49

## 2021-05-07 RX ADMIN — SODIUM CHLORIDE 1000 ML: 9 INJECTION, SOLUTION INTRAVENOUS at 08:53

## 2021-05-07 RX ADMIN — HEPARIN 500 UNITS: 100 SYRINGE at 14:53

## 2021-05-07 NOTE — PROGRESS NOTES
Patient tolerating infusion well. Resting comfortably in chair - Lunch provided. No additional needs at this time.

## 2021-05-12 ENCOUNTER — TELEPHONE (OUTPATIENT)
Dept: PALLATIVE CARE | Age: 56
End: 2021-05-12

## 2021-05-13 ENCOUNTER — TELEPHONE (OUTPATIENT)
Dept: PALLATIVE CARE | Age: 56
End: 2021-05-13

## 2021-05-13 NOTE — TELEPHONE ENCOUNTER
Spoke with Vane Marcos regarding referral for Palliative care. [place] Marcos was very worried why we were calling and thought her cancer was worse and she needed Hospice. Explained supportive care to her as she continues to get treatment. Offered an appointment but [place] [facility] Marcos is unable to come to clinic Monday or Tuesday due to transportation issues. Vane Marcos reviewed with this nurse what she is doing for her diarrhea and I reassured her to continue to follow the directions form Medical Oncology as she continues to have diarrhea. Vane Marcos states she has an appointment next week and will follow up with them. No appointment set per patient.

## 2021-05-14 ENCOUNTER — HOSPITAL ENCOUNTER (OUTPATIENT)
Dept: INFUSION THERAPY | Age: 56
Discharge: HOME | End: 2021-05-14
Payer: MEDICAID

## 2021-05-14 ENCOUNTER — TELEPHONE (OUTPATIENT)
Dept: ONCOLOGY | Age: 56
End: 2021-05-14

## 2021-05-14 VITALS
HEART RATE: 75 BPM | SYSTOLIC BLOOD PRESSURE: 112 MMHG | OXYGEN SATURATION: 100 % | DIASTOLIC BLOOD PRESSURE: 55 MMHG | RESPIRATION RATE: 18 BRPM | TEMPERATURE: 98.1 F

## 2021-05-14 DIAGNOSIS — Z17.0 MALIGNANT NEOPLASM OF CENTRAL PORTION OF RIGHT BREAST IN FEMALE, ESTROGEN RECEPTOR POSITIVE (HCC): Primary | ICD-10-CM

## 2021-05-14 DIAGNOSIS — K52.1 CHEMOTHERAPY INDUCED DIARRHEA: Primary | ICD-10-CM

## 2021-05-14 DIAGNOSIS — T45.1X5A CHEMOTHERAPY INDUCED DIARRHEA: Primary | ICD-10-CM

## 2021-05-14 DIAGNOSIS — C50.111 MALIGNANT NEOPLASM OF CENTRAL PORTION OF RIGHT BREAST IN FEMALE, ESTROGEN RECEPTOR POSITIVE (HCC): Primary | ICD-10-CM

## 2021-05-14 PROCEDURE — 6360000002 HC RX W HCPCS: Performed by: NURSE PRACTITIONER

## 2021-05-14 PROCEDURE — 96360 HYDRATION IV INFUSION INIT: CPT

## 2021-05-14 PROCEDURE — 2580000003 HC RX 258: Performed by: NURSE PRACTITIONER

## 2021-05-14 RX ORDER — HEPARIN SODIUM (PORCINE) LOCK FLUSH IV SOLN 100 UNIT/ML 100 UNIT/ML
500 SOLUTION INTRAVENOUS PRN
Status: DISCONTINUED | OUTPATIENT
Start: 2021-05-14 | End: 2021-05-15 | Stop reason: HOSPADM

## 2021-05-14 RX ORDER — SODIUM CHLORIDE 0.9 % (FLUSH) 0.9 %
5-40 SYRINGE (ML) INJECTION PRN
Status: DISCONTINUED | OUTPATIENT
Start: 2021-05-14 | End: 2021-05-15 | Stop reason: HOSPADM

## 2021-05-14 RX ORDER — 0.9 % SODIUM CHLORIDE 0.9 %
1000 INTRAVENOUS SOLUTION INTRAVENOUS ONCE
Status: COMPLETED | OUTPATIENT
Start: 2021-05-14 | End: 2021-05-14

## 2021-05-14 RX ORDER — HEPARIN SODIUM (PORCINE) LOCK FLUSH IV SOLN 100 UNIT/ML 100 UNIT/ML
500 SOLUTION INTRAVENOUS PRN
Status: CANCELLED | OUTPATIENT
Start: 2021-05-14

## 2021-05-14 RX ORDER — SODIUM CHLORIDE 0.9 % (FLUSH) 0.9 %
5-40 SYRINGE (ML) INJECTION PRN
Status: CANCELLED | OUTPATIENT
Start: 2021-05-14

## 2021-05-14 RX ORDER — SODIUM CHLORIDE 9 MG/ML
25 INJECTION, SOLUTION INTRAVENOUS PRN
Status: CANCELLED | OUTPATIENT
Start: 2021-05-14

## 2021-05-14 RX ORDER — 0.9 % SODIUM CHLORIDE 0.9 %
1000 INTRAVENOUS SOLUTION INTRAVENOUS ONCE
Status: CANCELLED | OUTPATIENT
Start: 2021-05-14 | End: 2021-05-14

## 2021-05-14 RX ADMIN — SODIUM CHLORIDE 1000 ML: 9 INJECTION, SOLUTION INTRAVENOUS at 10:53

## 2021-05-14 RX ADMIN — SODIUM CHLORIDE, PRESERVATIVE FREE 10 ML: 5 INJECTION INTRAVENOUS at 10:52

## 2021-05-14 RX ADMIN — SODIUM CHLORIDE, PRESERVATIVE FREE 10 ML: 5 INJECTION INTRAVENOUS at 12:05

## 2021-05-14 RX ADMIN — HEPARIN 500 UNITS: 100 SYRINGE at 12:05

## 2021-05-14 NOTE — PROGRESS NOTES
Patient called with continued diarrhea roughly 6-8 times a day. Coming in for labs and hydration, last needed hydration and Mg 5/7/2021. Patient is taking Imodium ATC and lomotil q6 hours, she also added Pepto bismol. Palliative has been consulted-patient thought this was Hopsice and originally declined because she was \"not ready to die\" we discussed difference between hospice and palliative and need to have appointment with them-she was agreeable and is calling back.

## 2021-05-18 DIAGNOSIS — K52.1 CHEMOTHERAPY INDUCED DIARRHEA: Primary | ICD-10-CM

## 2021-05-18 DIAGNOSIS — T45.1X5A CHEMOTHERAPY INDUCED DIARRHEA: Primary | ICD-10-CM

## 2021-05-19 ENCOUNTER — HOSPITAL ENCOUNTER (OUTPATIENT)
Dept: INFUSION THERAPY | Age: 56
Discharge: HOME | End: 2021-05-19
Payer: MEDICAID

## 2021-05-19 DIAGNOSIS — Z17.0 MALIGNANT NEOPLASM OF CENTRAL PORTION OF RIGHT BREAST IN FEMALE, ESTROGEN RECEPTOR POSITIVE (HCC): ICD-10-CM

## 2021-05-19 DIAGNOSIS — K52.1 CHEMOTHERAPY INDUCED DIARRHEA: ICD-10-CM

## 2021-05-19 DIAGNOSIS — Z85.3 PERSONAL HISTORY OF BREAST CANCER: Primary | ICD-10-CM

## 2021-05-19 DIAGNOSIS — T45.1X5A CHEMOTHERAPY INDUCED DIARRHEA: ICD-10-CM

## 2021-05-19 DIAGNOSIS — C50.111 MALIGNANT NEOPLASM OF CENTRAL PORTION OF RIGHT BREAST IN FEMALE, ESTROGEN RECEPTOR POSITIVE (HCC): ICD-10-CM

## 2021-05-19 LAB
ALBUMIN SERPL-MCNC: 3.3 G/DL (ref 3.5–5.2)
ALP BLD-CCNC: 165 U/L (ref 35–104)
ALT SERPL-CCNC: 60 U/L (ref 0–32)
ANION GAP SERPL CALCULATED.3IONS-SCNC: 10 MMOL/L (ref 7–16)
AST SERPL-CCNC: 44 U/L (ref 0–31)
BASOPHILS ABSOLUTE: 0.03 E9/L (ref 0–0.2)
BASOPHILS RELATIVE PERCENT: 0.3 % (ref 0–2)
BILIRUB SERPL-MCNC: 0.3 MG/DL (ref 0–1.2)
BUN BLDV-MCNC: 14 MG/DL (ref 6–20)
CALCIUM SERPL-MCNC: 8.7 MG/DL (ref 8.6–10.2)
CHLORIDE BLD-SCNC: 98 MMOL/L (ref 98–107)
CO2: 25 MMOL/L (ref 22–29)
CREAT SERPL-MCNC: 0.7 MG/DL (ref 0.5–1)
EOSINOPHILS ABSOLUTE: 0 E9/L (ref 0.05–0.5)
EOSINOPHILS RELATIVE PERCENT: 0 % (ref 0–6)
GFR AFRICAN AMERICAN: >60
GFR NON-AFRICAN AMERICAN: >60 ML/MIN/1.73
GLUCOSE BLD-MCNC: 139 MG/DL (ref 74–99)
HCT VFR BLD CALC: 28.1 % (ref 34–48)
HEMOGLOBIN: 9.2 G/DL (ref 11.5–15.5)
IMMATURE GRANULOCYTES #: 0.04 E9/L
IMMATURE GRANULOCYTES %: 0.4 % (ref 0–5)
LYMPHOCYTES ABSOLUTE: 2.5 E9/L (ref 1.5–4)
LYMPHOCYTES RELATIVE PERCENT: 25.2 % (ref 20–42)
MAGNESIUM: 1.6 MG/DL (ref 1.6–2.6)
MCH RBC QN AUTO: 32.2 PG (ref 26–35)
MCHC RBC AUTO-ENTMCNC: 32.7 % (ref 32–34.5)
MCV RBC AUTO: 98.3 FL (ref 80–99.9)
MONOCYTES ABSOLUTE: 0.94 E9/L (ref 0.1–0.95)
MONOCYTES RELATIVE PERCENT: 9.5 % (ref 2–12)
NEUTROPHILS ABSOLUTE: 6.42 E9/L (ref 1.8–7.3)
NEUTROPHILS RELATIVE PERCENT: 64.6 % (ref 43–80)
PDW BLD-RTO: 16.3 FL (ref 11.5–15)
PLATELET # BLD: 187 E9/L (ref 130–450)
PMV BLD AUTO: 10.1 FL (ref 7–12)
POTASSIUM SERPL-SCNC: 4.4 MMOL/L (ref 3.5–5)
RBC # BLD: 2.86 E12/L (ref 3.5–5.5)
SODIUM BLD-SCNC: 133 MMOL/L (ref 132–146)
TOTAL PROTEIN: 5.9 G/DL (ref 6.4–8.3)
WBC # BLD: 9.9 E9/L (ref 4.5–11.5)

## 2021-05-19 PROCEDURE — 83735 ASSAY OF MAGNESIUM: CPT

## 2021-05-19 PROCEDURE — 80053 COMPREHEN METABOLIC PANEL: CPT

## 2021-05-19 PROCEDURE — 85025 COMPLETE CBC W/AUTO DIFF WBC: CPT

## 2021-05-19 PROCEDURE — 2580000003 HC RX 258: Performed by: INTERNAL MEDICINE

## 2021-05-19 PROCEDURE — 36591 DRAW BLOOD OFF VENOUS DEVICE: CPT

## 2021-05-19 PROCEDURE — 6360000002 HC RX W HCPCS: Performed by: INTERNAL MEDICINE

## 2021-05-19 RX ORDER — SODIUM CHLORIDE 0.9 % (FLUSH) 0.9 %
10 SYRINGE (ML) INJECTION PRN
Status: CANCELLED | OUTPATIENT
Start: 2021-05-19

## 2021-05-19 RX ORDER — SODIUM CHLORIDE 0.9 % (FLUSH) 0.9 %
10 SYRINGE (ML) INJECTION PRN
Status: DISCONTINUED | OUTPATIENT
Start: 2021-05-19 | End: 2021-05-20 | Stop reason: HOSPADM

## 2021-05-19 RX ORDER — HEPARIN SODIUM (PORCINE) LOCK FLUSH IV SOLN 100 UNIT/ML 100 UNIT/ML
500 SOLUTION INTRAVENOUS PRN
Status: CANCELLED | OUTPATIENT
Start: 2021-05-19

## 2021-05-19 RX ORDER — HEPARIN SODIUM (PORCINE) LOCK FLUSH IV SOLN 100 UNIT/ML 100 UNIT/ML
500 SOLUTION INTRAVENOUS PRN
Status: DISCONTINUED | OUTPATIENT
Start: 2021-05-19 | End: 2021-05-20 | Stop reason: HOSPADM

## 2021-05-19 RX ADMIN — HEPARIN 500 UNITS: 100 SYRINGE at 13:19

## 2021-05-19 RX ADMIN — SODIUM CHLORIDE, PRESERVATIVE FREE 10 ML: 5 INJECTION INTRAVENOUS at 13:19

## 2021-05-20 ENCOUNTER — OFFICE VISIT (OUTPATIENT)
Dept: ONCOLOGY | Age: 56
End: 2021-05-20
Payer: MEDICAID

## 2021-05-20 ENCOUNTER — TELEPHONE (OUTPATIENT)
Dept: PALLATIVE CARE | Age: 56
End: 2021-05-20

## 2021-05-20 ENCOUNTER — TELEPHONE (OUTPATIENT)
Dept: BREAST CENTER | Age: 56
End: 2021-05-20

## 2021-05-20 ENCOUNTER — HOSPITAL ENCOUNTER (OUTPATIENT)
Dept: INFUSION THERAPY | Age: 56
Discharge: HOME | End: 2021-05-20
Payer: MEDICAID

## 2021-05-20 VITALS
HEART RATE: 78 BPM | SYSTOLIC BLOOD PRESSURE: 103 MMHG | BODY MASS INDEX: 21.54 KG/M2 | HEIGHT: 72 IN | OXYGEN SATURATION: 97 % | DIASTOLIC BLOOD PRESSURE: 68 MMHG | WEIGHT: 159 LBS | TEMPERATURE: 97.2 F | RESPIRATION RATE: 16 BRPM

## 2021-05-20 VITALS — DIASTOLIC BLOOD PRESSURE: 68 MMHG | SYSTOLIC BLOOD PRESSURE: 112 MMHG | RESPIRATION RATE: 16 BRPM | HEART RATE: 86 BPM

## 2021-05-20 DIAGNOSIS — T45.1X5A CHEMOTHERAPY INDUCED DIARRHEA: Primary | ICD-10-CM

## 2021-05-20 DIAGNOSIS — Z17.0 MALIGNANT NEOPLASM OF CENTRAL PORTION OF RIGHT BREAST IN FEMALE, ESTROGEN RECEPTOR POSITIVE (HCC): Primary | ICD-10-CM

## 2021-05-20 DIAGNOSIS — C50.111 MALIGNANT NEOPLASM OF CENTRAL PORTION OF RIGHT BREAST IN FEMALE, ESTROGEN RECEPTOR POSITIVE (HCC): Primary | ICD-10-CM

## 2021-05-20 DIAGNOSIS — K52.1 CHEMOTHERAPY INDUCED DIARRHEA: Primary | ICD-10-CM

## 2021-05-20 PROCEDURE — 6360000002 HC RX W HCPCS: Performed by: NURSE PRACTITIONER

## 2021-05-20 PROCEDURE — G8420 CALC BMI NORM PARAMETERS: HCPCS | Performed by: NURSE PRACTITIONER

## 2021-05-20 PROCEDURE — 3017F COLORECTAL CA SCREEN DOC REV: CPT | Performed by: NURSE PRACTITIONER

## 2021-05-20 PROCEDURE — 96361 HYDRATE IV INFUSION ADD-ON: CPT

## 2021-05-20 PROCEDURE — 96377 APPLICATON ON-BODY INJECTOR: CPT

## 2021-05-20 PROCEDURE — 99214 OFFICE O/P EST MOD 30 MIN: CPT | Performed by: NURSE PRACTITIONER

## 2021-05-20 PROCEDURE — 96375 TX/PRO/DX INJ NEW DRUG ADDON: CPT

## 2021-05-20 PROCEDURE — G9899 SCRN MAM PERF RSLTS DOC: HCPCS | Performed by: NURSE PRACTITIONER

## 2021-05-20 PROCEDURE — 96413 CHEMO IV INFUSION 1 HR: CPT

## 2021-05-20 PROCEDURE — 1036F TOBACCO NON-USER: CPT | Performed by: NURSE PRACTITIONER

## 2021-05-20 PROCEDURE — 2580000003 HC RX 258: Performed by: NURSE PRACTITIONER

## 2021-05-20 PROCEDURE — 96417 CHEMO IV INFUS EACH ADDL SEQ: CPT

## 2021-05-20 PROCEDURE — G8427 DOCREV CUR MEDS BY ELIG CLIN: HCPCS | Performed by: NURSE PRACTITIONER

## 2021-05-20 PROCEDURE — 96366 THER/PROPH/DIAG IV INF ADDON: CPT

## 2021-05-20 RX ORDER — DIPHENHYDRAMINE HYDROCHLORIDE 50 MG/ML
50 INJECTION INTRAMUSCULAR; INTRAVENOUS ONCE
Status: CANCELLED | OUTPATIENT
Start: 2021-05-20 | End: 2021-05-20

## 2021-05-20 RX ORDER — DEXAMETHASONE SODIUM PHOSPHATE 10 MG/ML
10 INJECTION, SOLUTION INTRAMUSCULAR; INTRAVENOUS ONCE
Status: CANCELLED | OUTPATIENT
Start: 2021-05-20

## 2021-05-20 RX ORDER — SODIUM CHLORIDE 0.9 % (FLUSH) 0.9 %
5-40 SYRINGE (ML) INJECTION PRN
Status: CANCELLED | OUTPATIENT
Start: 2021-05-20

## 2021-05-20 RX ORDER — HEPARIN SODIUM (PORCINE) LOCK FLUSH IV SOLN 100 UNIT/ML 100 UNIT/ML
500 SOLUTION INTRAVENOUS PRN
Status: CANCELLED | OUTPATIENT
Start: 2021-05-20

## 2021-05-20 RX ORDER — EPINEPHRINE 1 MG/ML
0.3 INJECTION, SOLUTION, CONCENTRATE INTRAVENOUS PRN
Status: CANCELLED | OUTPATIENT
Start: 2021-05-20

## 2021-05-20 RX ORDER — PALONOSETRON HYDROCHLORIDE 0.05 MG/ML
0.25 INJECTION, SOLUTION INTRAVENOUS ONCE
Status: COMPLETED | OUTPATIENT
Start: 2021-05-20 | End: 2021-05-20

## 2021-05-20 RX ORDER — MEPERIDINE HYDROCHLORIDE 25 MG/ML
12.5 INJECTION INTRAMUSCULAR; INTRAVENOUS; SUBCUTANEOUS ONCE
Status: CANCELLED | OUTPATIENT
Start: 2021-05-20 | End: 2021-05-20

## 2021-05-20 RX ORDER — METHOCARBAMOL 750 MG/1
TABLET ORAL
Status: ON HOLD | COMMUNITY
Start: 2021-05-05 | End: 2021-06-23 | Stop reason: HOSPADM

## 2021-05-20 RX ORDER — SODIUM CHLORIDE 0.9 % (FLUSH) 0.9 %
10 SYRINGE (ML) INJECTION PRN
Status: CANCELLED | OUTPATIENT
Start: 2021-05-20

## 2021-05-20 RX ORDER — 0.9 % SODIUM CHLORIDE 0.9 %
1000 INTRAVENOUS SOLUTION INTRAVENOUS ONCE
Status: COMPLETED | OUTPATIENT
Start: 2021-05-20 | End: 2021-05-20

## 2021-05-20 RX ORDER — 0.9 % SODIUM CHLORIDE 0.9 %
1000 INTRAVENOUS SOLUTION INTRAVENOUS ONCE
Status: CANCELLED
Start: 2021-05-20 | End: 2021-05-20

## 2021-05-20 RX ORDER — DEXAMETHASONE SODIUM PHOSPHATE 10 MG/ML
10 INJECTION, SOLUTION INTRAMUSCULAR; INTRAVENOUS ONCE
Status: COMPLETED | OUTPATIENT
Start: 2021-05-20 | End: 2021-05-20

## 2021-05-20 RX ORDER — DIPHENOXYLATE HYDROCHLORIDE AND ATROPINE SULFATE 2.5; .025 MG/1; MG/1
1 TABLET ORAL 4 TIMES DAILY PRN
Qty: 60 TABLET | Refills: 0 | Status: SHIPPED | OUTPATIENT
Start: 2021-05-20 | End: 2021-06-09

## 2021-05-20 RX ORDER — 0.9 % SODIUM CHLORIDE 0.9 %
1000 INTRAVENOUS SOLUTION INTRAVENOUS ONCE
Status: CANCELLED | OUTPATIENT
Start: 2021-05-20 | End: 2021-05-20

## 2021-05-20 RX ORDER — SODIUM CHLORIDE 0.9 % (FLUSH) 0.9 %
5 SYRINGE (ML) INJECTION PRN
Status: CANCELLED | OUTPATIENT
Start: 2021-05-20

## 2021-05-20 RX ORDER — SODIUM CHLORIDE 9 MG/ML
INJECTION, SOLUTION INTRAVENOUS CONTINUOUS
Status: CANCELLED | OUTPATIENT
Start: 2021-05-20

## 2021-05-20 RX ORDER — PALONOSETRON HYDROCHLORIDE 0.05 MG/ML
0.25 INJECTION, SOLUTION INTRAVENOUS ONCE
Status: CANCELLED | OUTPATIENT
Start: 2021-05-20

## 2021-05-20 RX ORDER — SODIUM CHLORIDE 9 MG/ML
25 INJECTION, SOLUTION INTRAVENOUS PRN
Status: CANCELLED | OUTPATIENT
Start: 2021-05-20

## 2021-05-20 RX ORDER — METHYLPREDNISOLONE SODIUM SUCCINATE 125 MG/2ML
125 INJECTION, POWDER, LYOPHILIZED, FOR SOLUTION INTRAMUSCULAR; INTRAVENOUS ONCE
Status: CANCELLED | OUTPATIENT
Start: 2021-05-20 | End: 2021-05-20

## 2021-05-20 RX ORDER — HEPARIN SODIUM (PORCINE) LOCK FLUSH IV SOLN 100 UNIT/ML 100 UNIT/ML
500 SOLUTION INTRAVENOUS PRN
Status: DISCONTINUED | OUTPATIENT
Start: 2021-05-20 | End: 2021-05-21 | Stop reason: HOSPADM

## 2021-05-20 RX ORDER — SODIUM CHLORIDE 0.9 % (FLUSH) 0.9 %
10 SYRINGE (ML) INJECTION PRN
Status: DISCONTINUED | OUTPATIENT
Start: 2021-05-20 | End: 2021-05-21 | Stop reason: HOSPADM

## 2021-05-20 RX ADMIN — TRASTUZUMAB-ANNS 462 MG: 420 INJECTION, POWDER, LYOPHILIZED, FOR SOLUTION INTRAVENOUS at 09:25

## 2021-05-20 RX ADMIN — DEXAMETHASONE SODIUM PHOSPHATE 10 MG: 10 INJECTION, SOLUTION INTRAMUSCULAR; INTRAVENOUS at 09:06

## 2021-05-20 RX ADMIN — SODIUM CHLORIDE, PRESERVATIVE FREE 10 ML: 5 INJECTION INTRAVENOUS at 09:06

## 2021-05-20 RX ADMIN — SODIUM CHLORIDE 1000 ML: 9 INJECTION, SOLUTION INTRAVENOUS at 08:56

## 2021-05-20 RX ADMIN — FOSAPREPITANT 150 MG: 150 INJECTION, POWDER, LYOPHILIZED, FOR SOLUTION INTRAVENOUS at 10:51

## 2021-05-20 RX ADMIN — SODIUM CHLORIDE, PRESERVATIVE FREE 10 ML: 5 INJECTION INTRAVENOUS at 08:56

## 2021-05-20 RX ADMIN — PALONOSETRON 0.25 MG: 0.25 INJECTION, SOLUTION INTRAVENOUS at 09:05

## 2021-05-20 RX ADMIN — CARBOPLATIN 700 MG: 10 INJECTION, SOLUTION INTRAVENOUS at 12:38

## 2021-05-20 RX ADMIN — SODIUM CHLORIDE, PRESERVATIVE FREE 10 ML: 5 INJECTION INTRAVENOUS at 13:27

## 2021-05-20 RX ADMIN — DOCETAXEL 140 MG: 20 INJECTION, SOLUTION INTRAVENOUS at 11:32

## 2021-05-20 RX ADMIN — HEPARIN 500 UNITS: 100 SYRINGE at 13:27

## 2021-05-20 RX ADMIN — PERTUZUMAB 420 MG: 30 INJECTION, SOLUTION, CONCENTRATE INTRAVENOUS at 10:08

## 2021-05-20 RX ADMIN — PEGFILGRASTIM 6 MG: KIT SUBCUTANEOUS at 13:22

## 2021-05-20 NOTE — TELEPHONE ENCOUNTER
Patient called while in the infusion center receiving chemotherapy with concerns regarding the area open along her incision. I was able to assess the area while she was here. Opening is located in the middle of the surgical incision along the skin fold. Patient addressed the area previously (see notes), and she reported that it was doing better at that time. Currently, there is scant amount of clear drainage noted. Measures 1cm x 0.5cm. Patient states area smells. I did not notice a smell during the assessment. No signs of infection or tunneling. Patient instructed to keep area clean. Dr. Catarina Lopez currently out of the office. Discussed with Dr. Tess Smith who recommends wet to dry dressings in the meantime. Provided education on wet to dry dressings and provided a hand out to patient. She will call us if area shows any worsening changes or signs of infection. She understands that healing will likely take a while due to having chemotherapy. Patient verbalized her understanding. I encouraged her to call if she has any questions or concerns.

## 2021-05-20 NOTE — TELEPHONE ENCOUNTER
SW met with pt during chemo treatment to explain PM and pt referral. Pt was agreeable to appt with PM. Scheduled appt for Mon 5-24 @ 2pm. SW provided pt with list of transportation resources, explained to pt to call if any issues or assistance was needed.

## 2021-05-20 NOTE — TELEPHONE ENCOUNTER
SW contacted pt regarding scheduling an appt for PM referral. Pt spoke with nurse week prior and stated she had issues with transportation to appts on clinic days. SW advised pt of providing assistance to finding transportation to appt if pt was interested in coming to clinic.

## 2021-05-24 ENCOUNTER — OFFICE VISIT (OUTPATIENT)
Dept: PALLATIVE CARE | Age: 56
End: 2021-05-24
Payer: MEDICAID

## 2021-05-24 VITALS
HEART RATE: 95 BPM | WEIGHT: 156 LBS | DIASTOLIC BLOOD PRESSURE: 76 MMHG | BODY MASS INDEX: 20.58 KG/M2 | OXYGEN SATURATION: 97 % | SYSTOLIC BLOOD PRESSURE: 105 MMHG

## 2021-05-24 DIAGNOSIS — K52.1 CHEMOTHERAPY INDUCED DIARRHEA: Primary | ICD-10-CM

## 2021-05-24 DIAGNOSIS — Z51.5 PALLIATIVE CARE BY SPECIALIST: ICD-10-CM

## 2021-05-24 DIAGNOSIS — T45.1X5A CHEMOTHERAPY INDUCED DIARRHEA: Primary | ICD-10-CM

## 2021-05-24 PROCEDURE — G8420 CALC BMI NORM PARAMETERS: HCPCS | Performed by: NURSE PRACTITIONER

## 2021-05-24 PROCEDURE — 99204 OFFICE O/P NEW MOD 45 MIN: CPT | Performed by: NURSE PRACTITIONER

## 2021-05-24 PROCEDURE — G8428 CUR MEDS NOT DOCUMENT: HCPCS | Performed by: NURSE PRACTITIONER

## 2021-05-24 PROCEDURE — 3017F COLORECTAL CA SCREEN DOC REV: CPT | Performed by: NURSE PRACTITIONER

## 2021-05-24 PROCEDURE — G9899 SCRN MAM PERF RSLTS DOC: HCPCS | Performed by: NURSE PRACTITIONER

## 2021-05-24 PROCEDURE — 1036F TOBACCO NON-USER: CPT | Performed by: NURSE PRACTITIONER

## 2021-05-24 PROCEDURE — 99203 OFFICE O/P NEW LOW 30 MIN: CPT | Performed by: NURSE PRACTITIONER

## 2021-05-24 RX ORDER — GREEN TEA/HOODIA GORDONII 315-12.5MG
1 CAPSULE ORAL 2 TIMES DAILY
Qty: 60 TABLET | Refills: 0 | Status: SHIPPED | OUTPATIENT
Start: 2021-05-24 | End: 2021-06-23

## 2021-05-24 NOTE — PROGRESS NOTES
Palliative Care Department  Palliative Care Initial Consult  Provider: WES Nieves CNP    Location of Service:   [address] [place] [address]    Reason for Consult:  []  Code status Discussion  []  Assist with goals of care  [x]  Psychosocial support  [x]  Symptom Management  []  Advanced Care Planning    Chief Complaint: Devendra Fuller is a 63 y.o. female with chief complaint of diarrhea, fatigue    Assessment/Plan      Breast cancer:   -Status post lumpectomy   -Followed with Dr. Erik Pereira   -Receiving systemic therapy   -Plan for radiation therapy    Diarrhea:   -Continue Lomotil and Imodium   -Continue Pepto-Bismol   -Add probiotic, as well as encourage use of yogurt with live bacteria   -Instructed to decrease use of flavor/sweet knee packets to her water, as well as decrease use of sports drinks, she may continue with use of Pedialyte encourage continued hydration    Fatigue:   -Encouraged to increase activity as tolerated, though she is limited due to right lower extremity injury    Palliative Care Encounter:  Prognosis: unknown    Referrals to: none today    Follow Up:  4 weeks. They were encouraged to call with any questions, concerns, needs, or changes in symptoms. Subjective:     HPI:  Devendra Fuller is a 63 y.o. female with significant medical history of breast cancer, status post lumpectomy, followed with Dr. Erik Pereira for systemic therapy, with plans for radiation therapy, who was referred to [address] [place] [address] symptomatic support    Subjective/Events/Discussions:  5/24/2021:  Claudia Batista presents today unaccompanied. She is alert and oriented, able voice needs concerns well, does not show any signs sedation, confusion, or any acute distress. Introduced myself, the palliative medicine service, we discussed the role palliative medicine will plan her care, including psychosocial support, as well as support regarding symptomatic needs.   #1 complaint today is that of diarrhea, which began shortly after initiation of chemotherapy, she reports having typically up to 3 watery bowel movements per day. This is not improved with the use of Imodium, Lomotil, or Pepto-Bismol, and she continues utilize the several times per day for her diarrhea. She has been drinking water with flavor/sweetener packets, as well as sports drinks, and Pedialyte. She also drinks decaffeinated coffee. She is instructed to decrease use of flavor/sweetener packets, as well as sports drinks, and to continue to hydrate with water alone as well as some Pedialyte. She is also encouraged to introduce yogurt with live bacteria, and we will prescribe a probiotic for her use. She will also continue with Lomotil, Imodium, as well as Pepto-Bismol, she is instructed to decrease use as her diarrhea decreases. Otherwise would not make any further changes to her plan of care, will see her again approximate 4 weeks to reassess her needs.       Past Medical History:   Diagnosis Date    Anxiety     Arthritis     [facility] [address] 2021    breast    Cervical radiculopathy     Chronic back pain     Depression     Diabetes mellitus type 2, uncontrolled ([facility] 2/12/2017    GERD (gastroesophageal reflux disease)     History of blood transfusion 01/2018    back surgery, lumbar, dr Letitia Garcia Hypertension     Right leg pain     seeing doctor currently problems with hardware       Past Surgical History:   Procedure Laterality Date    BACK SURGERY  01/08/2018    PLIF L2-L5    BREAST BIOPSY Right 2/5/2021    RIGHT BREAST LUMPECTOMY WITH  sentinel LYMPHNODE biopsy performed by Nighat Quiroz MD at [address] [place] Right 3/9/2021    RIGHT BREAST RE-EXCISION LUMPECTOMY OF INFERIOR AND MEDIAL MARGINS performed by Nighat Quiroz MD at [address] [facility] Right 04/09/2017    Dr. 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 WITH REMOVAL OF HARDWARE & O.R. I. F ------BOP performed by Myrtle Martínez MD at [address] [facility] Right 5/14/2018    REPAIR NON-UNION RIGHT TIBIA AND FIBULA WITH REMOVAL OF HARDWARE AND BONE GRAFT performed by Myrtle Martínez MD at [address] [place] Right 1/10/2019    RIGHT TIBIA REMOVAL HARDWARE, RIGHT TIBIA OPEN  TREATMENT OF NON UNION performed by Myrtle Martínez MD at [place] [address]    WISDOM TOOTH EXTRACTION         Family History   Problem Relation Age of Onset    Diabetes Mother        ROS: UNLESS STATED ABOVE PATIENT DENIES:  CONSTITUTIONAL:  fever, chill, rigors, nausea, vomiting, fatigue. HEENT: blurry vision, double vision, hearing problem, tinnitus, hoarseness, dysphagia, odynophagia  RESPIRATORY: cough, shortness of breath, sputum expectoration. CARDIOVASCULAR:  Chest pain/pressure, palpitation, syncope, irregular beats  GASTROINTESTINAL:  abdominal or rectal pain, diarrhea, constipation, . GENITOURINARY:  Burning, frequency, urgency, incontinence, discharge  INTEGUMENTARY: rash, wound, pruritis  HEMATOLOGIC/LYMPHATIC:  Swelling, sores, gum bleeding, easy bruising, pica.   MUSCULOSKELETAL:  pain, edema, joint swelling or redness  NEUROLOGICAL:  light headed, dizziness, loss of consciousness, weakness, change in memory, seizures, tremors    Objective:     Physical Exam  /76   Pulse 95   Wt 156 lb (70.8 kg)   LMP 08/13/2013   SpO2 97%   BMI 20.58 kg/m²     Gen:  Alert, appears stated age, well nourished, in no acute distress  HEENT:  Normocephalic, conjunctiva pink, no drainage, mucosa moist  Neck:  Supple  Lungs:  CTA bilaterally, no audible rhonchi or wheezes noted  Heart[de-identified]  RRR, no murmur, rub, or gallop noted during exam  Abd:  Soft, non tender, non distended, BS+  M/S/Ext:  Moving all extremities, no edema, pulses present, right ankle splinted  Skin:  Warm and dry  Neuro:  PERRL, Alert, oriented x 3; following commands    Flushing Symptom Assessment Score   Flushing Score 5/24/2021   Pain Score No pain   Tiredness Score 8   Nausea Score Not nauseated   Depression Score 7   Anxiety Score Not anxious   Drowsiness Score Not drowsy   Appetite Score Best appetite   Wellbeing Score 5   Dyspnea Score No shortness of breath   Other Problem Score 7   Total Assessment Score(calculated) 27     Assessed by: patient and provider. Current Medications:  Medications reviewed: yes    WES Tyson - CNP  Palliative Medicine    Time/Communication  Greater than 50% of time spent, total 45 minutes in face-to-face counseling and coordination of care regarding goals of care, symptom management and see above. Discussed the plan of care with the other IDT members of the Palliative Care Team, and with patient. Thank you for allowing Palliative Medicine to participate in the care of Kong Chaves. Note: This report was completed using PowerVision voiced recognition software. Every effort has been made to ensure accuracy; however, inadvertent computerized transcription errors may be present.

## 2021-06-09 ENCOUNTER — HOSPITAL ENCOUNTER (OUTPATIENT)
Dept: INFUSION THERAPY | Age: 56
Discharge: HOME | End: 2021-06-09
Payer: MEDICAID

## 2021-06-09 DIAGNOSIS — Z85.3 PERSONAL HISTORY OF BREAST CANCER: ICD-10-CM

## 2021-06-09 DIAGNOSIS — Z51.12 ENCOUNTER FOR MONOCLONAL ANTIBODY TREATMENT FOR MALIGNANCY: Primary | ICD-10-CM

## 2021-06-09 DIAGNOSIS — Z17.0 MALIGNANT NEOPLASM OF CENTRAL PORTION OF RIGHT BREAST IN FEMALE, ESTROGEN RECEPTOR POSITIVE (HCC): Primary | ICD-10-CM

## 2021-06-09 DIAGNOSIS — C50.111 MALIGNANT NEOPLASM OF CENTRAL PORTION OF RIGHT BREAST IN FEMALE, ESTROGEN RECEPTOR POSITIVE (HCC): Primary | ICD-10-CM

## 2021-06-09 LAB
ALBUMIN SERPL-MCNC: 3 G/DL (ref 3.5–5.2)
ALP BLD-CCNC: 105 U/L (ref 35–104)
ALT SERPL-CCNC: 20 U/L (ref 0–32)
ANION GAP SERPL CALCULATED.3IONS-SCNC: 8 MMOL/L (ref 7–16)
ANISOCYTOSIS: ABNORMAL
AST SERPL-CCNC: 23 U/L (ref 0–31)
BASOPHILS ABSOLUTE: 0 E9/L (ref 0–0.2)
BASOPHILS RELATIVE PERCENT: 0.4 % (ref 0–2)
BILIRUB SERPL-MCNC: <0.2 MG/DL (ref 0–1.2)
BUN BLDV-MCNC: 9 MG/DL (ref 6–20)
BURR CELLS: ABNORMAL
CALCIUM SERPL-MCNC: 8.3 MG/DL (ref 8.6–10.2)
CHLORIDE BLD-SCNC: 107 MMOL/L (ref 98–107)
CO2: 23 MMOL/L (ref 22–29)
CREAT SERPL-MCNC: 0.7 MG/DL (ref 0.5–1)
EOSINOPHILS ABSOLUTE: 0 E9/L (ref 0.05–0.5)
EOSINOPHILS RELATIVE PERCENT: 0 % (ref 0–6)
GFR AFRICAN AMERICAN: >60
GFR NON-AFRICAN AMERICAN: >60 ML/MIN/1.73
GLUCOSE BLD-MCNC: 164 MG/DL (ref 74–99)
HCT VFR BLD CALC: 26.2 % (ref 34–48)
HEMOGLOBIN: 8.4 G/DL (ref 11.5–15.5)
LYMPHOCYTES ABSOLUTE: 2.48 E9/L (ref 1.5–4)
LYMPHOCYTES RELATIVE PERCENT: 33 % (ref 20–42)
MCH RBC QN AUTO: 32.9 PG (ref 26–35)
MCHC RBC AUTO-ENTMCNC: 32.1 % (ref 32–34.5)
MCV RBC AUTO: 102.7 FL (ref 80–99.9)
MONOCYTES ABSOLUTE: 0.45 E9/L (ref 0.1–0.95)
MONOCYTES RELATIVE PERCENT: 6.1 % (ref 2–12)
NEUTROPHILS ABSOLUTE: 4.58 E9/L (ref 1.8–7.3)
NEUTROPHILS RELATIVE PERCENT: 60.9 % (ref 43–80)
PDW BLD-RTO: 17.9 FL (ref 11.5–15)
PLATELET # BLD: 97 E9/L (ref 130–450)
PLATELET CONFIRMATION: NORMAL
PMV BLD AUTO: 10.9 FL (ref 7–12)
POIKILOCYTES: ABNORMAL
POTASSIUM SERPL-SCNC: 4.1 MMOL/L (ref 3.5–5)
RBC # BLD: 2.55 E12/L (ref 3.5–5.5)
SODIUM BLD-SCNC: 138 MMOL/L (ref 132–146)
TOTAL PROTEIN: 4.9 G/DL (ref 6.4–8.3)
WBC # BLD: 7.5 E9/L (ref 4.5–11.5)

## 2021-06-09 PROCEDURE — 36591 DRAW BLOOD OFF VENOUS DEVICE: CPT

## 2021-06-09 PROCEDURE — 83735 ASSAY OF MAGNESIUM: CPT

## 2021-06-09 PROCEDURE — 2580000003 HC RX 258: Performed by: INTERNAL MEDICINE

## 2021-06-09 PROCEDURE — 85025 COMPLETE CBC W/AUTO DIFF WBC: CPT

## 2021-06-09 PROCEDURE — 6360000002 HC RX W HCPCS: Performed by: INTERNAL MEDICINE

## 2021-06-09 PROCEDURE — 80053 COMPREHEN METABOLIC PANEL: CPT

## 2021-06-09 RX ORDER — SODIUM CHLORIDE 0.9 % (FLUSH) 0.9 %
10 SYRINGE (ML) INJECTION PRN
Status: DISCONTINUED | OUTPATIENT
Start: 2021-06-09 | End: 2021-06-10 | Stop reason: HOSPADM

## 2021-06-09 RX ORDER — SODIUM CHLORIDE 0.9 % (FLUSH) 0.9 %
10 SYRINGE (ML) INJECTION PRN
Status: CANCELLED | OUTPATIENT
Start: 2021-06-09

## 2021-06-09 RX ORDER — HEPARIN SODIUM (PORCINE) LOCK FLUSH IV SOLN 100 UNIT/ML 100 UNIT/ML
500 SOLUTION INTRAVENOUS PRN
Status: CANCELLED | OUTPATIENT
Start: 2021-06-09

## 2021-06-09 RX ORDER — HEPARIN SODIUM (PORCINE) LOCK FLUSH IV SOLN 100 UNIT/ML 100 UNIT/ML
500 SOLUTION INTRAVENOUS PRN
Status: DISCONTINUED | OUTPATIENT
Start: 2021-06-09 | End: 2021-06-10 | Stop reason: HOSPADM

## 2021-06-09 RX ADMIN — SODIUM CHLORIDE, PRESERVATIVE FREE 10 ML: 5 INJECTION INTRAVENOUS at 14:01

## 2021-06-09 RX ADMIN — HEPARIN 500 UNITS: 100 SYRINGE at 14:01

## 2021-06-09 RX ADMIN — SODIUM CHLORIDE, PRESERVATIVE FREE 10 ML: 5 INJECTION INTRAVENOUS at 14:00

## 2021-06-10 ENCOUNTER — HOSPITAL ENCOUNTER (OUTPATIENT)
Dept: INFUSION THERAPY | Age: 56
Discharge: HOME | End: 2021-06-10
Payer: MEDICAID

## 2021-06-10 ENCOUNTER — TELEPHONE (OUTPATIENT)
Dept: ONCOLOGY | Age: 56
End: 2021-06-10

## 2021-06-10 ENCOUNTER — TELEPHONE (OUTPATIENT)
Dept: BREAST CENTER | Age: 56
End: 2021-06-10

## 2021-06-10 ENCOUNTER — OFFICE VISIT (OUTPATIENT)
Dept: ONCOLOGY | Age: 56
End: 2021-06-10
Payer: MEDICAID

## 2021-06-10 VITALS
BODY MASS INDEX: 20.52 KG/M2 | DIASTOLIC BLOOD PRESSURE: 66 MMHG | SYSTOLIC BLOOD PRESSURE: 109 MMHG | WEIGHT: 155.5 LBS | TEMPERATURE: 97.3 F | HEART RATE: 83 BPM | OXYGEN SATURATION: 97 % | RESPIRATION RATE: 16 BRPM

## 2021-06-10 VITALS
BODY MASS INDEX: 21.06 KG/M2 | HEART RATE: 85 BPM | RESPIRATION RATE: 18 BRPM | HEIGHT: 72 IN | WEIGHT: 155.5 LBS | SYSTOLIC BLOOD PRESSURE: 121 MMHG | DIASTOLIC BLOOD PRESSURE: 63 MMHG | TEMPERATURE: 98.1 F

## 2021-06-10 DIAGNOSIS — Z17.0 MALIGNANT NEOPLASM OF CENTRAL PORTION OF RIGHT BREAST IN FEMALE, ESTROGEN RECEPTOR POSITIVE (HCC): Primary | ICD-10-CM

## 2021-06-10 DIAGNOSIS — Z85.3 PERSONAL HISTORY OF BREAST CANCER: Primary | ICD-10-CM

## 2021-06-10 DIAGNOSIS — Z85.3 PERSONAL HISTORY OF BREAST CANCER: ICD-10-CM

## 2021-06-10 DIAGNOSIS — C50.111 MALIGNANT NEOPLASM OF CENTRAL PORTION OF RIGHT BREAST IN FEMALE, ESTROGEN RECEPTOR POSITIVE (HCC): Primary | ICD-10-CM

## 2021-06-10 LAB — MAGNESIUM: 1.3 MG/DL (ref 1.6–2.6)

## 2021-06-10 PROCEDURE — 3017F COLORECTAL CA SCREEN DOC REV: CPT | Performed by: INTERNAL MEDICINE

## 2021-06-10 PROCEDURE — 96377 APPLICATON ON-BODY INJECTOR: CPT

## 2021-06-10 PROCEDURE — 96413 CHEMO IV INFUSION 1 HR: CPT

## 2021-06-10 PROCEDURE — G9899 SCRN MAM PERF RSLTS DOC: HCPCS | Performed by: INTERNAL MEDICINE

## 2021-06-10 PROCEDURE — 6360000002 HC RX W HCPCS: Performed by: INTERNAL MEDICINE

## 2021-06-10 PROCEDURE — G8420 CALC BMI NORM PARAMETERS: HCPCS | Performed by: INTERNAL MEDICINE

## 2021-06-10 PROCEDURE — 96375 TX/PRO/DX INJ NEW DRUG ADDON: CPT

## 2021-06-10 PROCEDURE — 96549 UNLISTED CHEMOTHERAPY PX: CPT

## 2021-06-10 PROCEDURE — 1036F TOBACCO NON-USER: CPT | Performed by: INTERNAL MEDICINE

## 2021-06-10 PROCEDURE — 96417 CHEMO IV INFUS EACH ADDL SEQ: CPT

## 2021-06-10 PROCEDURE — 99214 OFFICE O/P EST MOD 30 MIN: CPT | Performed by: INTERNAL MEDICINE

## 2021-06-10 PROCEDURE — 2580000003 HC RX 258: Performed by: INTERNAL MEDICINE

## 2021-06-10 PROCEDURE — G8427 DOCREV CUR MEDS BY ELIG CLIN: HCPCS | Performed by: INTERNAL MEDICINE

## 2021-06-10 PROCEDURE — 96366 THER/PROPH/DIAG IV INF ADDON: CPT

## 2021-06-10 PROCEDURE — 96361 HYDRATE IV INFUSION ADD-ON: CPT

## 2021-06-10 RX ORDER — SODIUM CHLORIDE 9 MG/ML
INJECTION, SOLUTION INTRAVENOUS CONTINUOUS
Status: CANCELLED | OUTPATIENT
Start: 2021-06-10

## 2021-06-10 RX ORDER — DEXAMETHASONE SODIUM PHOSPHATE 10 MG/ML
10 INJECTION, SOLUTION INTRAMUSCULAR; INTRAVENOUS ONCE
Status: COMPLETED | OUTPATIENT
Start: 2021-06-10 | End: 2021-06-10

## 2021-06-10 RX ORDER — 0.9 % SODIUM CHLORIDE 0.9 %
1000 INTRAVENOUS SOLUTION INTRAVENOUS ONCE
Status: CANCELLED
Start: 2021-06-10 | End: 2021-06-10

## 2021-06-10 RX ORDER — DIPHENHYDRAMINE HYDROCHLORIDE 50 MG/ML
50 INJECTION INTRAMUSCULAR; INTRAVENOUS ONCE
Status: CANCELLED | OUTPATIENT
Start: 2021-06-10 | End: 2021-06-10

## 2021-06-10 RX ORDER — PALONOSETRON HYDROCHLORIDE 0.05 MG/ML
0.25 INJECTION, SOLUTION INTRAVENOUS ONCE
Status: COMPLETED | OUTPATIENT
Start: 2021-06-10 | End: 2021-06-10

## 2021-06-10 RX ORDER — METHYLPREDNISOLONE SODIUM SUCCINATE 125 MG/2ML
125 INJECTION, POWDER, LYOPHILIZED, FOR SOLUTION INTRAMUSCULAR; INTRAVENOUS ONCE
Status: CANCELLED | OUTPATIENT
Start: 2021-06-10 | End: 2021-06-10

## 2021-06-10 RX ORDER — HEPARIN SODIUM (PORCINE) LOCK FLUSH IV SOLN 100 UNIT/ML 100 UNIT/ML
500 SOLUTION INTRAVENOUS PRN
Status: CANCELLED | OUTPATIENT
Start: 2021-06-10

## 2021-06-10 RX ORDER — SODIUM CHLORIDE 0.9 % (FLUSH) 0.9 %
10 SYRINGE (ML) INJECTION PRN
Status: DISCONTINUED | OUTPATIENT
Start: 2021-06-10 | End: 2021-06-11 | Stop reason: HOSPADM

## 2021-06-10 RX ORDER — PALONOSETRON HYDROCHLORIDE 0.05 MG/ML
0.25 INJECTION, SOLUTION INTRAVENOUS ONCE
Status: CANCELLED | OUTPATIENT
Start: 2021-06-10

## 2021-06-10 RX ORDER — MEPERIDINE HYDROCHLORIDE 25 MG/ML
12.5 INJECTION INTRAMUSCULAR; INTRAVENOUS; SUBCUTANEOUS ONCE
Status: CANCELLED | OUTPATIENT
Start: 2021-06-10 | End: 2021-06-10

## 2021-06-10 RX ORDER — 0.9 % SODIUM CHLORIDE 0.9 %
1000 INTRAVENOUS SOLUTION INTRAVENOUS ONCE
Status: COMPLETED | OUTPATIENT
Start: 2021-06-10 | End: 2021-06-10

## 2021-06-10 RX ORDER — HEPARIN SODIUM (PORCINE) LOCK FLUSH IV SOLN 100 UNIT/ML 100 UNIT/ML
500 SOLUTION INTRAVENOUS PRN
Status: DISCONTINUED | OUTPATIENT
Start: 2021-06-10 | End: 2021-06-11 | Stop reason: HOSPADM

## 2021-06-10 RX ORDER — DEXAMETHASONE SODIUM PHOSPHATE 10 MG/ML
10 INJECTION, SOLUTION INTRAMUSCULAR; INTRAVENOUS ONCE
Status: CANCELLED | OUTPATIENT
Start: 2021-06-10

## 2021-06-10 RX ORDER — EPINEPHRINE 1 MG/ML
0.3 INJECTION, SOLUTION, CONCENTRATE INTRAVENOUS PRN
Status: CANCELLED | OUTPATIENT
Start: 2021-06-10

## 2021-06-10 RX ORDER — SODIUM CHLORIDE 0.9 % (FLUSH) 0.9 %
5 SYRINGE (ML) INJECTION PRN
Status: CANCELLED | OUTPATIENT
Start: 2021-06-10

## 2021-06-10 RX ORDER — SODIUM CHLORIDE 0.9 % (FLUSH) 0.9 %
10 SYRINGE (ML) INJECTION PRN
Status: CANCELLED | OUTPATIENT
Start: 2021-06-10

## 2021-06-10 RX ADMIN — DOCETAXEL 140 MG: 20 INJECTION, SOLUTION INTRAVENOUS at 10:42

## 2021-06-10 RX ADMIN — SODIUM CHLORIDE, PRESERVATIVE FREE 10 ML: 5 INJECTION INTRAVENOUS at 09:05

## 2021-06-10 RX ADMIN — FOSAPREPITANT 150 MG: 150 INJECTION, POWDER, LYOPHILIZED, FOR SOLUTION INTRAVENOUS at 09:11

## 2021-06-10 RX ADMIN — CARBOPLATIN 700 MG: 10 INJECTION, SOLUTION INTRAVENOUS at 12:03

## 2021-06-10 RX ADMIN — PEGFILGRASTIM 6 MG: KIT SUBCUTANEOUS at 13:08

## 2021-06-10 RX ADMIN — SODIUM CHLORIDE, PRESERVATIVE FREE 10 ML: 5 INJECTION INTRAVENOUS at 08:52

## 2021-06-10 RX ADMIN — PERTUZUMAB 420 MG: 30 INJECTION, SOLUTION, CONCENTRATE INTRAVENOUS at 09:49

## 2021-06-10 RX ADMIN — HEPARIN 500 UNITS: 100 SYRINGE at 13:12

## 2021-06-10 RX ADMIN — SODIUM CHLORIDE, PRESERVATIVE FREE 10 ML: 5 INJECTION INTRAVENOUS at 13:12

## 2021-06-10 RX ADMIN — SODIUM CHLORIDE 1000 ML: 9 INJECTION, SOLUTION INTRAVENOUS at 08:52

## 2021-06-10 RX ADMIN — TRASTUZUMAB-ANNS 462 MG: 420 INJECTION, POWDER, LYOPHILIZED, FOR SOLUTION INTRAVENOUS at 09:49

## 2021-06-10 RX ADMIN — DEXAMETHASONE SODIUM PHOSPHATE 10 MG: 10 INJECTION, SOLUTION INTRAMUSCULAR; INTRAVENOUS at 09:05

## 2021-06-10 RX ADMIN — PALONOSETRON 0.25 MG: 0.25 INJECTION, SOLUTION INTRAVENOUS at 09:03

## 2021-06-10 NOTE — PROGRESS NOTES
Devendra Fuller  6/10/2021  Wt Readings from Last 10 Encounters:   06/10/21 155 lb 8 oz (70.5 kg)   06/10/21 155 lb 8 oz (70.5 kg)   05/24/21 156 lb (70.8 kg)   05/20/21 159 lb (72.1 kg)   04/29/21 165 lb (74.8 kg)   04/08/21 163 lb (73.9 kg)   04/02/21 165 lb (74.8 kg)   03/25/21 164 lb (74.4 kg)   03/24/21 165 lb (74.8 kg)   03/09/21 166 lb (75.3 kg)     Ht Readings from Last 1 Encounters:   06/10/21 6' 1\" (1.854 m)     Body mass index is 20.52 kg/m². Met with patient today for follow up. She is doing fair. She notes that she is usually having diarrhea for about 10 days following chemo, then it starts subsiding. She is taking Lomotil, pepto bismol, and following the other tricks such as marshmallows and pectin. She was stable in weight this week, which is favorable. Labs reviewed, hydration status is adequate, but her TP is 4.9L and Al 3.0L. She is taking carnation instant breakfast twice daily. Spoke with her about ways to add more protein in her diet. Recommended adding a protein powder additionally, she was encouraged to then blend it with some fruit or non-dairy milk to help provide the protein she needs. She provided with a  today as well. She was very appreciative and receptive. Reviewed with her the protein levels she is aiming for daily. Weight change: stable this week, hx 10# loss in 3mo   Appetite: fair  N/V/D/C: diarrhea  Calculated Needs if applicable:VHB=472#; [id number] (54x30-32), 70-80gm PRO (54x1.30), 1750ml (54x32)    Pre-Hab Eligible?: no        Recommendations:Avoid artificially sweetened beverages due to diarrhea; Push fluids 72oz daily or more. Dilute regular version and choose water; Continue CIB twice daily, use Fairlife milk instead of regular milk; Add protein powder twice daily (aim for closer to 60gm supplemental protein);  Follow expanded BRAT diet to lessen side effects      ASPEN GUIDELINES FOR CLINICAL CHARACTERISTICS OF MALNUTRITION IN CHRONIC ILLNESS     Moderate Malnutrition  Severe Malnutrition    Energy intake  <75% energy intake compared to estimated needs for >1month <75% energy intake compared to estimated needs for >1month   Weight changes  5% x 1 month  7.5% x 3 months   10% x 6 months   20% x 1 year  >5% x 1 month  >7.5% x 3 months   >10% x 6 months   >20% x 1 year    Physical findings  Mild   Decrease subcutaneous fat    Decrease muscle mass     Increase fluid/edema   Severe  Decrease subcutaneous fat    Decrease muscle mass     Increase fluid/edema      At risk for malnutrition due to 6% weight loss in 3mo, <75% of needs x6mo, diarrhea and s/e of chemo.     Chau Reich, RD, LD,RD,LD

## 2021-06-10 NOTE — PROGRESS NOTES
Patient c/o burning under right arm and seeping along incision right breast area. Estrella AHUMADA notified and will come to see patient.

## 2021-06-10 NOTE — PROGRESS NOTES
ZITA De La Rosa from [facility] here to evaluate patient's surgical wound.

## 2021-06-10 NOTE — TELEPHONE ENCOUNTER
Called [facility] concerning a prior authorization for an echocardiogram. Spoke with Hanna who said that there is no auth required as long as it's performed outpatient. Ref # [id number] for this phone call. Patient was notified of this appt.

## 2021-06-10 NOTE — PROGRESS NOTES
Department of [facility]  Attending Clinic Note    Reason for Visit: Follow-up on a patient with Right Breast Cancer    PCP:  Bakari Mary DO    History of Present Illness: The mass was located in the 6 o'clock position of right breast    Breast cancer risk factors include infrequent SMEs and age and gender    Bilateral Screening Mammogram 10/06/2020: There are suspicious calcifications in the right breast at the 6 o'clock position which appear pleomorphic. These calcifications are in a segmental distribution. These clustered calcifications are suspicious for malignancy. Right breast, calcifications at the 6:00, core biopsy on 10/30/2020:    - Small focus of invasive ductal carcinoma, grade 3 (3+3+2 = 8)    - Ductal carcinoma in situ, high nuclear grade, comedo/cribriform/solid types;    - Microcalcifications in DCIS    - Breast receptor and biomarkers (per outside report without looking the   slides:     ER: Positive, 89.8%, strong intensity     CA: Positive, 52.2%, moderate intensity     HER-2: Positive (score 3+)     Ki-67: High proliferation, 26.2%     P53: Negative, 0.2%     Comment:The invasive carcinoma is intermingled with DCIS and measures   3.0 x 2.0 mm in greatest dimension on the slides. CXR PA/Lateral 12/11/2020:  No acute process. Right Axillary U/S on 12/11/2020: There is no axillary LN.     MRI Bilateral Breast 01/05/2021:  Focal, heterogeneous non mass enhancement identified in the right breast 6 o'clock which measures approximately 1.9 x 1.4 x 1.7 cm (image 19 of the axial T1 post contrast series).    No additional foci of disease identified on the right  There is no lymphadenopathy    Needle localized Right lumpectomy with SLNBx with mediport placement on 02/05/2021  A.  Right axillary sentinel lymph node #1, excision: 1 lymph node negative for malignancy     B.  Right axillary contents, regional resection: 1 lymph node positive for metastatic, poorly differentiated ductal carcinoma (grade 3)   Extranodal extension present     C.  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;   positive for extensive intraductal component   Skin-uninvolved by malignancy   Invasive carcinoma margins-uninvolved by invasive carcinoma        Distance from closest margin: 4 mm from anterior margin   DCIS margins-inferior and medial margins involved by DCIS   Regional lymph nodes-involved by tumor cells        Number of lymph nodes with macrometastasis: 1        Number of lymph nodes with micrometastasis: 0        Number of lymph nodes with isolated tumor cells: 0        Size of largest metastatic deposit: 1.5 cm        Extranodal extension: Present        Total number of lymph nodes examined: 2        Number of sentinel nodes examined: 1   Treatment effect in the breast-no known presurgical therapy   TNM classification (AJCC eighth edition)-  pT1c N1a MX     Bone scan, CT chest/abdomen/pelvis 02/26/2021 negative for metastatic disease    Re-excision lumpectomy of inferior and medial margins on 03/09/2021  A. Right breast, new inferior margin, excision: Fat necrosis, foreign body-type giant cell reaction, chronic inflammation, and fibrosis consistent with previous procedure   No evidence of residual carcinoma   Final inferior margin negative for carcinoma     B.  Right breast, new medial margin, excision: Residual high-grade ductal carcinoma in situ   Ductal carcinoma in situ present less than 1 mm from red/superior margin and green/anterior margin   Fat necrosis, foreign body-type giant cell reaction, chronic   inflammation, and fibrosis consistent with previous procedure   Fibrocystic change   Microcalcifications associated with benign breast tissue and DCIS   Comment:Residual ductal carcinoma in situ is present in 5 out of 13 tissue blocks of part B    Recommended adjuvant chemotherapy (TCHP) for 6 cycles followed by adjuvant RT followed lastly by endocrine therapy. Side effects TCHP reviewed with patient. She agreed to proceed. 2d-echo 02/17/2021 noted EF 60-65%  Cycle # 1 adjuvant TCHP was on 04/08/2021. Cycle # 2 adjuvant TCHP was on 04/29/2021. Cycle # 3 adjuvant TCHP was on 05/20/2021. Cycle # 4 adjuvant TCHP is today 06/10/2021. No fever, chills. Fair appetite and energy level. No N/V. No diarrhea. Review of Systems;  CONSTITUTIONAL: No fever, chills. Fair appetite and energy level. ENMT: Eyes: No diplopia; Nose: No epistaxis. Mouth: No sore throat. RESPIRATORY: No hemoptysis, shortness of breath, cough. CARDIOVASCULAR: No chest pain, palpitations. GASTROINTESTINAL: No nausea/vomiting, abdominal pain. GENITOURINARY: No dysuria, urinary frequency, hematuria. NEURO: No syncope, presyncope, headache. Remainder:  ROS NEGATIVE    Past Medical History:      Diagnosis Date    Anxiety     Arthritis     Cancer ([facility] [address] 2021    breast    Cervical radiculopathy     Chronic back pain     Depression     Diabetes mellitus type 2, uncontrolled ([facility] [address] 2/12/2017    GERD (gastroesophageal reflux disease)     History of blood transfusion 01/2018    back surgery, lumbar, dr Quinn Sos    Hypertension     Right leg pain     seeing doctor currently problems with hardware     Medications:  Reviewed and reconciled. Allergies:   Allergies   Allergen Reactions    Ceftriaxone Shortness Of Breath     Physical Exam:  /66 (Site: Right Lower Arm)   Pulse 83   Temp 97.3 °F (36.3 °C) (Temporal)   Resp 16   Wt 155 lb 8 oz (70.5 kg)   LMP 08/13/2013   SpO2 97%   BMI 20.52 kg/m²   GENERAL: Alert, oriented x 3, not in acute distress. HEENT: PERRLA; EOMI. Oropharynx clear. NECK: Supple. Without lymphadenopathy. LUNGS: Good air entry bilaterally. No wheezing, crackles or ronchi. CARDIOVASCULAR: Regular rate. No murmurs, rubs or gallops. ABDOMEN: Soft. Non-tender, non-distended. Positive bowel sounds. EXTREMITIES: Without clubbing, cyanosis, or edema. Dry skin  NEUROLOGIC: No focal deficits. ECOG PS 1    Impression/Plan:  64 y/o female with Right Breast Cancer    Bilateral Screening Mammogram 10/06/2020: There are suspicious calcifications in the right breast at the 6 o'clock position which appear pleomorphic. These calcifications are in a segmental distribution. These clustered calcifications are suspicious for malignancy. Right breast, calcifications at the 6:00, core biopsy on 10/30/2020:    - Small focus of invasive ductal carcinoma, grade 3 (3+3+2 = 8)    - Ductal carcinoma in situ, high nuclear grade, comedo/cribriform/solid types;    - Microcalcifications in DCIS    - Breast receptor and biomarkers (per outside report without looking the   slides:     ER: Positive, 89.8%, strong intensity     MN: Positive, 52.2%, moderate intensity     HER-2: Positive (score 3+)     Ki-67: High proliferation, 26.2%     P53: Negative, 0.2%     Comment:The invasive carcinoma is intermingled with DCIS and measures   3.0 x 2.0 mm in greatest dimension on the slides. CXR PA/Lateral 12/11/2020:  No acute process. Right Axillary U/S on 12/11/2020: There is no axillary LN.     MRI Bilateral Breast 01/05/2021:  Focal, heterogeneous non mass enhancement identified in the right breast 6 o'clock which measures approximately 1.9 x 1.4 x 1.7 cm (image 19 of the axial T1 post contrast series).    No additional foci of disease identified on the right  There is no lymphadenopathy    Needle localized Right lumpectomy with SLNBx with mediport placement on 02/05/2021  A.  Right axillary sentinel lymph node #1, excision: 1 lymph node negative for malignancy     B.  Right axillary contents, regional resection: 1 lymph node positive for metastatic, poorly differentiated ductal carcinoma (grade 3)   Extranodal extension present     C.  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;   positive for extensive intraductal component   Skin-uninvolved by malignancy   Invasive carcinoma margins-uninvolved by invasive carcinoma        Distance from closest margin: 4 mm from anterior margin   DCIS margins-inferior and medial margins involved by DCIS   Regional lymph nodes-involved by tumor cells        Number of lymph nodes with macrometastasis: 1        Number of lymph nodes with micrometastasis: 0        Number of lymph nodes with isolated tumor cells: 0        Size of largest metastatic deposit: 1.5 cm        Extranodal extension: Present        Total number of lymph nodes examined: 2        Number of sentinel nodes examined: 1   Treatment effect in the breast-no known presurgical therapy   TNM classification (AJCC eighth edition)-  pT1c N1a MX     Bone scan, CT chest/abdomen/pelvis 02/26/2021 negative for metastatic disease    Re-excision lumpectomy of inferior and medial margins on 03/09/2021  A.  Right breast, new inferior margin, excision: Fat necrosis, foreign body-type giant cell

## 2021-06-10 NOTE — TELEPHONE ENCOUNTER
Patient currently in infusion center and asked for her incision to be checked. No change noted in the appearance of the opening, as noted from last note. No signs of infection. Opening is superficial and measures [id number]. She is applying wet to dry dressings at home, and will continue to do so. She understands the healing should improve after she completes chemotherapy. I encouraged her to call us if anything changes. Wet to dry dressing applied to the area. I told her I would be happy to stop by again next time she is here.

## 2021-06-21 ENCOUNTER — TELEPHONE (OUTPATIENT)
Dept: CASE MANAGEMENT | Age: 56
End: 2021-06-21

## 2021-06-21 ENCOUNTER — HOSPITAL ENCOUNTER (INPATIENT)
Age: 56
LOS: 2 days | Discharge: HOME | DRG: 469 | End: 2021-06-23
Attending: EMERGENCY MEDICINE
Payer: MEDICAID

## 2021-06-21 ENCOUNTER — APPOINTMENT (OUTPATIENT)
Dept: GENERAL RADIOLOGY | Age: 56
DRG: 469 | End: 2021-06-21
Payer: MEDICAID

## 2021-06-21 DIAGNOSIS — N17.9 AKI (ACUTE KIDNEY INJURY) (HCC): Primary | ICD-10-CM

## 2021-06-21 DIAGNOSIS — E87.6 HYPOKALEMIA: ICD-10-CM

## 2021-06-21 LAB
ALBUMIN SERPL-MCNC: 3.2 G/DL (ref 3.5–5.2)
ALP BLD-CCNC: 153 U/L (ref 35–104)
ALT SERPL-CCNC: 28 U/L (ref 0–32)
ANION GAP SERPL CALCULATED.3IONS-SCNC: 14 MMOL/L (ref 7–16)
ANISOCYTOSIS: ABNORMAL
AST SERPL-CCNC: 17 U/L (ref 0–31)
BACTERIA: ABNORMAL /HPF
BASOPHILS ABSOLUTE: 0 E9/L (ref 0–0.2)
BASOPHILS RELATIVE PERCENT: 0.3 % (ref 0–2)
BILIRUB SERPL-MCNC: <0.2 MG/DL (ref 0–1.2)
BILIRUBIN URINE: NEGATIVE
BLOOD, URINE: NEGATIVE
BUN BLDV-MCNC: 41 MG/DL (ref 6–20)
BURR CELLS: ABNORMAL
CALCIUM SERPL-MCNC: 8.8 MG/DL (ref 8.6–10.2)
CHLORIDE BLD-SCNC: 94 MMOL/L (ref 98–107)
CLARITY: CLEAR
CO2: 20 MMOL/L (ref 22–29)
COLOR: YELLOW
CREAT SERPL-MCNC: 2.6 MG/DL (ref 0.5–1)
EOSINOPHILS ABSOLUTE: 0 E9/L (ref 0.05–0.5)
EOSINOPHILS RELATIVE PERCENT: 0 % (ref 0–6)
EPITHELIAL CELLS, UA: ABNORMAL /HPF
GFR AFRICAN AMERICAN: 23
GFR NON-AFRICAN AMERICAN: 19 ML/MIN/1.73
GLUCOSE BLD-MCNC: 135 MG/DL (ref 74–99)
GLUCOSE URINE: NEGATIVE MG/DL
HCT VFR BLD CALC: 27.9 % (ref 34–48)
HEMOGLOBIN: 9.8 G/DL (ref 11.5–15.5)
KETONES, URINE: NEGATIVE MG/DL
LACTIC ACID: 0.8 MMOL/L (ref 0.5–2.2)
LEUKOCYTE ESTERASE, URINE: ABNORMAL
LIPASE: 5 U/L (ref 13–60)
LYMPHOCYTES ABSOLUTE: 1.27 E9/L (ref 1.5–4)
LYMPHOCYTES RELATIVE PERCENT: 7 % (ref 20–42)
MAGNESIUM: 1.7 MG/DL (ref 1.6–2.6)
MCH RBC QN AUTO: 35.4 PG (ref 26–35)
MCHC RBC AUTO-ENTMCNC: 35.1 % (ref 32–34.5)
MCV RBC AUTO: 100.7 FL (ref 80–99.9)
METER GLUCOSE: 119 MG/DL (ref 74–99)
MONOCYTES ABSOLUTE: 1.09 E9/L (ref 0.1–0.95)
MONOCYTES RELATIVE PERCENT: 6.1 % (ref 2–12)
NEUTROPHILS ABSOLUTE: 15.83 E9/L (ref 1.8–7.3)
NEUTROPHILS RELATIVE PERCENT: 87 % (ref 43–80)
NITRITE, URINE: NEGATIVE
PDW BLD-RTO: 16.8 FL (ref 11.5–15)
PH UA: 6 (ref 5–9)
PLATELET # BLD: 135 E9/L (ref 130–450)
PMV BLD AUTO: 11.4 FL (ref 7–12)
POIKILOCYTES: ABNORMAL
POLYCHROMASIA: ABNORMAL
POTASSIUM REFLEX MAGNESIUM: 2.4 MMOL/L (ref 3.5–5)
PROCALCITONIN: 0.31 NG/ML (ref 0–0.08)
PROTEIN UA: 30 MG/DL
RBC # BLD: 2.77 E12/L (ref 3.5–5.5)
RBC UA: ABNORMAL /HPF (ref 0–2)
SCHISTOCYTES: ABNORMAL
SODIUM BLD-SCNC: 128 MMOL/L (ref 132–146)
SPECIFIC GRAVITY UA: 1.02 (ref 1–1.03)
TEAR DROP CELLS: ABNORMAL
TOTAL PROTEIN: 5.6 G/DL (ref 6.4–8.3)
UROBILINOGEN, URINE: 0.2 E.U./DL
WBC # BLD: 18.2 E9/L (ref 4.5–11.5)
WBC UA: ABNORMAL /HPF (ref 0–5)

## 2021-06-21 PROCEDURE — 6370000000 HC RX 637 (ALT 250 FOR IP): Performed by: EMERGENCY MEDICINE

## 2021-06-21 PROCEDURE — 83605 ASSAY OF LACTIC ACID: CPT

## 2021-06-21 PROCEDURE — 82962 GLUCOSE BLOOD TEST: CPT

## 2021-06-21 PROCEDURE — 96372 THER/PROPH/DIAG INJ SC/IM: CPT

## 2021-06-21 PROCEDURE — 83036 HEMOGLOBIN GLYCOSYLATED A1C: CPT

## 2021-06-21 PROCEDURE — 1200000000 HC SEMI PRIVATE

## 2021-06-21 PROCEDURE — 2580000003 HC RX 258: Performed by: INTERNAL MEDICINE

## 2021-06-21 PROCEDURE — 83690 ASSAY OF LIPASE: CPT

## 2021-06-21 PROCEDURE — 83735 ASSAY OF MAGNESIUM: CPT

## 2021-06-21 PROCEDURE — 85025 COMPLETE CBC W/AUTO DIFF WBC: CPT

## 2021-06-21 PROCEDURE — 71045 X-RAY EXAM CHEST 1 VIEW: CPT

## 2021-06-21 PROCEDURE — 2580000003 HC RX 258: Performed by: EMERGENCY MEDICINE

## 2021-06-21 PROCEDURE — 6370000000 HC RX 637 (ALT 250 FOR IP)

## 2021-06-21 PROCEDURE — 96374 THER/PROPH/DIAG INJ IV PUSH: CPT

## 2021-06-21 PROCEDURE — 99285 EMERGENCY DEPT VISIT HI MDM: CPT

## 2021-06-21 PROCEDURE — 6360000002 HC RX W HCPCS: Performed by: EMERGENCY MEDICINE

## 2021-06-21 PROCEDURE — 6370000000 HC RX 637 (ALT 250 FOR IP): Performed by: INTERNAL MEDICINE

## 2021-06-21 PROCEDURE — 81001 URINALYSIS AUTO W/SCOPE: CPT

## 2021-06-21 PROCEDURE — 87040 BLOOD CULTURE FOR BACTERIA: CPT

## 2021-06-21 PROCEDURE — 84145 PROCALCITONIN (PCT): CPT

## 2021-06-21 PROCEDURE — 36415 COLL VENOUS BLD VENIPUNCTURE: CPT

## 2021-06-21 PROCEDURE — 80053 COMPREHEN METABOLIC PANEL: CPT

## 2021-06-21 RX ORDER — LIDOCAINE AND PRILOCAINE 25; 25 MG/G; MG/G
CREAM TOPICAL
Status: COMPLETED
Start: 2021-06-21 | End: 2021-06-21

## 2021-06-21 RX ORDER — NICOTINE POLACRILEX 4 MG
15 LOZENGE BUCCAL PRN
Status: DISCONTINUED | OUTPATIENT
Start: 2021-06-21 | End: 2021-06-23 | Stop reason: HOSPADM

## 2021-06-21 RX ORDER — POLYETHYLENE GLYCOL 3350 17 G/17G
17 POWDER, FOR SOLUTION ORAL DAILY PRN
Status: DISCONTINUED | OUTPATIENT
Start: 2021-06-21 | End: 2021-06-23 | Stop reason: HOSPADM

## 2021-06-21 RX ORDER — LIDOCAINE HYDROCHLORIDE 20 MG/ML
15 SOLUTION OROPHARYNGEAL ONCE
Status: COMPLETED | OUTPATIENT
Start: 2021-06-21 | End: 2021-06-21

## 2021-06-21 RX ORDER — MIRTAZAPINE 15 MG/1
15 TABLET, FILM COATED ORAL NIGHTLY
Status: DISCONTINUED | OUTPATIENT
Start: 2021-06-21 | End: 2021-06-23 | Stop reason: HOSPADM

## 2021-06-21 RX ORDER — SODIUM CHLORIDE 9 MG/ML
25 INJECTION, SOLUTION INTRAVENOUS PRN
Status: DISCONTINUED | OUTPATIENT
Start: 2021-06-21 | End: 2021-06-23 | Stop reason: HOSPADM

## 2021-06-21 RX ORDER — OXYCODONE HYDROCHLORIDE 5 MG/1
5 TABLET ORAL EVERY 4 HOURS PRN
Status: DISCONTINUED | OUTPATIENT
Start: 2021-06-21 | End: 2021-06-23 | Stop reason: HOSPADM

## 2021-06-21 RX ORDER — ACETAMINOPHEN 650 MG/1
650 SUPPOSITORY RECTAL EVERY 6 HOURS PRN
Status: DISCONTINUED | OUTPATIENT
Start: 2021-06-21 | End: 2021-06-23 | Stop reason: HOSPADM

## 2021-06-21 RX ORDER — ALBUTEROL SULFATE 90 UG/1
2 AEROSOL, METERED RESPIRATORY (INHALATION) EVERY 6 HOURS PRN
Status: DISCONTINUED | OUTPATIENT
Start: 2021-06-21 | End: 2021-06-21 | Stop reason: CLARIF

## 2021-06-21 RX ORDER — 0.9 % SODIUM CHLORIDE 0.9 %
1000 INTRAVENOUS SOLUTION INTRAVENOUS ONCE
Status: COMPLETED | OUTPATIENT
Start: 2021-06-21 | End: 2021-06-21

## 2021-06-21 RX ORDER — DEXTROSE MONOHYDRATE 50 MG/ML
100 INJECTION, SOLUTION INTRAVENOUS PRN
Status: DISCONTINUED | OUTPATIENT
Start: 2021-06-21 | End: 2021-06-23 | Stop reason: HOSPADM

## 2021-06-21 RX ORDER — METOPROLOL SUCCINATE 25 MG/1
25 TABLET, EXTENDED RELEASE ORAL DAILY
Status: DISCONTINUED | OUTPATIENT
Start: 2021-06-22 | End: 2021-06-23 | Stop reason: HOSPADM

## 2021-06-21 RX ORDER — ALPRAZOLAM 0.25 MG/1
0.5 TABLET ORAL NIGHTLY
Status: DISCONTINUED | OUTPATIENT
Start: 2021-06-21 | End: 2021-06-23 | Stop reason: HOSPADM

## 2021-06-21 RX ORDER — ALBUTEROL SULFATE 2.5 MG/3ML
2.5 SOLUTION RESPIRATORY (INHALATION) EVERY 6 HOURS PRN
Status: DISCONTINUED | OUTPATIENT
Start: 2021-06-21 | End: 2021-06-23 | Stop reason: HOSPADM

## 2021-06-21 RX ORDER — PROMETHAZINE HYDROCHLORIDE 25 MG/ML
25 INJECTION, SOLUTION INTRAMUSCULAR; INTRAVENOUS ONCE
Status: COMPLETED | OUTPATIENT
Start: 2021-06-21 | End: 2021-06-21

## 2021-06-21 RX ORDER — SODIUM CHLORIDE 0.9 % (FLUSH) 0.9 %
5-40 SYRINGE (ML) INJECTION EVERY 12 HOURS SCHEDULED
Status: DISCONTINUED | OUTPATIENT
Start: 2021-06-21 | End: 2021-06-23 | Stop reason: HOSPADM

## 2021-06-21 RX ORDER — ACETAMINOPHEN 325 MG/1
650 TABLET ORAL EVERY 6 HOURS PRN
Status: DISCONTINUED | OUTPATIENT
Start: 2021-06-21 | End: 2021-06-23 | Stop reason: HOSPADM

## 2021-06-21 RX ORDER — FAMOTIDINE 20 MG/1
10 TABLET, FILM COATED ORAL DAILY
Status: DISCONTINUED | OUTPATIENT
Start: 2021-06-22 | End: 2021-06-23 | Stop reason: HOSPADM

## 2021-06-21 RX ORDER — SODIUM CHLORIDE 0.9 % (FLUSH) 0.9 %
5-40 SYRINGE (ML) INJECTION PRN
Status: DISCONTINUED | OUTPATIENT
Start: 2021-06-21 | End: 2021-06-23 | Stop reason: HOSPADM

## 2021-06-21 RX ORDER — DEXTROSE MONOHYDRATE 25 G/50ML
12.5 INJECTION, SOLUTION INTRAVENOUS PRN
Status: DISCONTINUED | OUTPATIENT
Start: 2021-06-21 | End: 2021-06-23 | Stop reason: HOSPADM

## 2021-06-21 RX ORDER — MAGNESIUM SULFATE IN WATER 40 MG/ML
2000 INJECTION, SOLUTION INTRAVENOUS ONCE
Status: COMPLETED | OUTPATIENT
Start: 2021-06-21 | End: 2021-06-21

## 2021-06-21 RX ORDER — FAMOTIDINE 20 MG/1
20 TABLET, FILM COATED ORAL DAILY
Status: DISCONTINUED | OUTPATIENT
Start: 2021-06-22 | End: 2021-06-21 | Stop reason: DRUGHIGH

## 2021-06-21 RX ORDER — PREGABALIN 75 MG/1
75 CAPSULE ORAL DAILY
Status: DISCONTINUED | OUTPATIENT
Start: 2021-06-22 | End: 2021-06-23 | Stop reason: HOSPADM

## 2021-06-21 RX ORDER — ONDANSETRON 2 MG/ML
4 INJECTION INTRAMUSCULAR; INTRAVENOUS EVERY 6 HOURS PRN
Status: DISCONTINUED | OUTPATIENT
Start: 2021-06-21 | End: 2021-06-23 | Stop reason: HOSPADM

## 2021-06-21 RX ORDER — INSULIN GLARGINE 100 [IU]/ML
30 INJECTION, SOLUTION SUBCUTANEOUS NIGHTLY
Status: DISCONTINUED | OUTPATIENT
Start: 2021-06-21 | End: 2021-06-23 | Stop reason: HOSPADM

## 2021-06-21 RX ORDER — ONDANSETRON 4 MG/1
4 TABLET, ORALLY DISINTEGRATING ORAL EVERY 8 HOURS PRN
Status: DISCONTINUED | OUTPATIENT
Start: 2021-06-21 | End: 2021-06-23 | Stop reason: HOSPADM

## 2021-06-21 RX ORDER — LIDOCAINE HYDROCHLORIDE 20 MG/ML
15 SOLUTION OROPHARYNGEAL
Status: DISCONTINUED | OUTPATIENT
Start: 2021-06-21 | End: 2021-06-23 | Stop reason: HOSPADM

## 2021-06-21 RX ORDER — SODIUM CHLORIDE, SODIUM LACTATE, POTASSIUM CHLORIDE, CALCIUM CHLORIDE 600; 310; 30; 20 MG/100ML; MG/100ML; MG/100ML; MG/100ML
INJECTION, SOLUTION INTRAVENOUS CONTINUOUS
Status: DISCONTINUED | OUTPATIENT
Start: 2021-06-21 | End: 2021-06-22

## 2021-06-21 RX ADMIN — POTASSIUM BICARBONATE 40 MEQ: 782 TABLET, EFFERVESCENT ORAL at 17:05

## 2021-06-21 RX ADMIN — OXYCODONE HYDROCHLORIDE 5 MG: 5 TABLET ORAL at 22:02

## 2021-06-21 RX ADMIN — SODIUM CHLORIDE 1000 ML: 9 INJECTION, SOLUTION INTRAVENOUS at 15:37

## 2021-06-21 RX ADMIN — LIDOCAINE AND PRILOCAINE: 25; 25 CREAM TOPICAL at 14:34

## 2021-06-21 RX ADMIN — MAGNESIUM SULFATE HEPTAHYDRATE 2000 MG: 40 INJECTION, SOLUTION INTRAVENOUS at 17:05

## 2021-06-21 RX ADMIN — SODIUM CHLORIDE 1000 ML: 9 INJECTION, SOLUTION INTRAVENOUS at 19:14

## 2021-06-21 RX ADMIN — LIDOCAINE HYDROCHLORIDE 15 ML: 20 SOLUTION ORAL; TOPICAL at 15:37

## 2021-06-21 RX ADMIN — ALPRAZOLAM 0.5 MG: 0.25 TABLET ORAL at 21:58

## 2021-06-21 RX ADMIN — SODIUM CHLORIDE, POTASSIUM CHLORIDE, SODIUM LACTATE AND CALCIUM CHLORIDE: 600; 310; 30; 20 INJECTION, SOLUTION INTRAVENOUS at 21:57

## 2021-06-21 RX ADMIN — PROMETHAZINE HYDROCHLORIDE 25 MG: 25 INJECTION INTRAMUSCULAR; INTRAVENOUS at 15:38

## 2021-06-21 RX ADMIN — MIRTAZAPINE 15 MG: 15 TABLET, FILM COATED ORAL at 21:58

## 2021-06-21 RX ADMIN — INSULIN GLARGINE 30 UNITS: 100 INJECTION, SOLUTION SUBCUTANEOUS at 21:58

## 2021-06-21 ASSESSMENT — ENCOUNTER SYMPTOMS
ABDOMINAL PAIN: 0
NAUSEA: 1
SHORTNESS OF BREATH: 0
COUGH: 0
BACK PAIN: 0

## 2021-06-21 ASSESSMENT — PAIN SCALES - GENERAL: PAINLEVEL_OUTOF10: 7

## 2021-06-21 NOTE — ED PROVIDER NOTES
This is a 79-year-old female with a past medical history of breast cancer who presents to the ED for evaluation nausea. Patient states she started last received chemotherapy on June 1. Patient states that since then she has been having a decrease in p.o. intake as well as nausea. She states she has multiple mouth sores which make it difficult for her to eat. Patient states she has tried occasions that her oncologist have given her however does not seem to improve her nausea. She has no chest pain or abdominal pain. Patient states she has not vomited today. There is no dysuria or hematuria. There are no reported mitigating or exacerbating factors. She was told by her oncology team to come to the ED for further evaluation. The history is provided by the patient. No  was used. Review of Systems   Constitutional: Negative for fever. HENT: Negative for congestion. Eyes: Negative for visual disturbance. Respiratory: Negative for cough and shortness of breath. Cardiovascular: Negative for chest pain. Gastrointestinal: Positive for nausea. Negative for abdominal pain. Endocrine: Negative for polyuria. Genitourinary: Negative for dysuria. Musculoskeletal: Negative for back pain. Skin: Negative for rash. Allergic/Immunologic: Positive for immunocompromised state. Neurological: Negative for headaches. Hematological: Does not bruise/bleed easily. Psychiatric/Behavioral: Negative for confusion. Physical Exam  Vitals and nursing note reviewed. Constitutional:       General: She is not in acute distress. Appearance: She is well-developed. She is not ill-appearing. HENT:      Head: Normocephalic and atraumatic. Mouth/Throat:      Mouth: Mucous membranes are dry. Eyes:      Extraocular Movements: Extraocular movements intact. Pupils: Pupils are equal, round, and reactive to light. Neck:      Vascular: No JVD.    Cardiovascular:      Rate and Rhythm: Normal rate and regular rhythm. Heart sounds: No murmur heard. Pulmonary:      Effort: Pulmonary effort is normal.      Breath sounds: No wheezing, rhonchi or rales. Comments: Port to left chest clean dry and intact  Abdominal:      General: There is no distension. Palpations: Abdomen is soft. Tenderness: There is no abdominal tenderness. There is no guarding or rebound. Hernia: No hernia is present. Musculoskeletal:      Cervical back: Normal range of motion and neck supple. Right lower leg: No edema. Left lower leg: No edema. Skin:     General: Skin is warm and dry. Capillary Refill: Capillary refill takes less than 2 seconds. Neurological:      General: No focal deficit present. Mental Status: She is alert and oriented to person, place, and time. Cranial Nerves: No cranial nerve deficit. Psychiatric:         Mood and Affect: Mood normal.         Behavior: Behavior normal.          Procedures     MDM  Number of Diagnoses or Management Options  [place]USTIN (acute kidney injury) ([facility] [address]  Hypokalemia  Diagnosis management comments: Patient is a 13-year-old female who is currently under chemotherapy for breast cancer. She presented for feeling unwell with decreased p.o. intake and nausea for the past several weeks. Patient was found to have a significant hypokalemia as well as an elevation in her creatinine from her baseline. Patient was treated with multiple doses of IV fluids as well as had magnesium repleted as well as her potassium pleated here in the department. Even the patient's profound dehydration electrolyte abnormalities she was admitted to the medicine team for further evaluation.        Amount and/or Complexity of Data Reviewed  Decide to obtain previous medical records or to obtain history from someone other than the patient: yes                  --------------------------------------------- PAST HISTORY negative  *  *  Reference range: negative  Giardia antigen testing is performed routinely in place of  [facility] and Parasite Examination on stool specimens. Additional  testing requires consultation with the laboratory. All stool  specimen preservative containers are held 10 days after the  performance of Giardia Antigen to accommodate any additional  testing.      Comprehensive Metabolic Panel w/ Reflex to MG   Result Value Ref Range    Sodium 128 (L) 132 - 146 mmol/L    Potassium reflex Magnesium 2.4 (LL) 3.5 - 5.0 mmol/L    Chloride 94 (L) 98 - 107 mmol/L    CO2 20 (L) 22 - 29 mmol/L    Anion Gap 14 7 - 16 mmol/L    Glucose 135 (H) 74 - 99 mg/dL    BUN 41 (H) 6 - 20 mg/dL    CREATININE 2.6 (H) 0.5 - 1.0 mg/dL    GFR Non-African American 19 >=60 mL/min/1.73    GFR African American 23     Calcium 8.8 8.6 - 10.2 mg/dL    Total Protein 5.6 (L) 6.4 - 8.3 g/dL    Albumin 3.2 (L) 3.5 - 5.2 g/dL    Total Bilirubin <0.2 0.0 - 1.2 mg/dL    Alkaline Phosphatase 153 (H) 35 - 104 U/L    ALT 28 0 - 32 U/L    AST 17 0 - 31 U/L   CBC Auto Differential   Result Value Ref Range    WBC 18.2 (H) 4.5 - 11.5 E9/L    RBC 2.77 (L) 3.50 - 5.50 E12/L    Hemoglobin 9.8 (L) 11.5 - 15.5 g/dL    Hematocrit 27.9 (L) 34.0 - 48.0 %    .7 (H) 80.0 - 99.9 fL    MCH 35.4 (H) 26.0 - 35.0 pg    MCHC 35.1 (H) 32.0 - 34.5 %    RDW 16.8 (H) 11.5 - 15.0 fL    Platelets 286 842 - 187 E9/L    MPV 11.4 7.0 - 12.0 fL    Neutrophils % 87.0 (H) 43.0 - 80.0 %    Lymphocytes % 7.0 (L) 20.0 - 42.0 %    Monocytes % 6.1 2.0 - 12.0 %    Eosinophils % 0.0 0.0 - 6.0 %    Basophils % 0.3 0.0 - 2.0 %    Neutrophils Absolute 15.83 (H) 1.80 - 7.30 E9/L    Lymphocytes Absolute 1.27 (L) 1.50 - 4.00 E9/L    Monocytes Absolute 1.09 (H) 0.10 - 0.95 E9/L    Eosinophils Absolute 0.00 (L) 0.05 - 0.50 E9/L    Basophils Absolute 0.00 0.00 - 0.20 E9/L    Anisocytosis 1+     Polychromasia 1+     Poikilocytes 1+     Schistocytes 1+     [place] Cells 1+     Tear Drop Cells 1+    Lipase Result Value Ref Range    Lipase 5 (L) 13 - 60 U/L   Lactic Acid, Plasma   Result Value Ref Range    Lactic Acid 0.8 0.5 - 2.2 mmol/L   Urinalysis with Microscopic   Result Value Ref Range    Color, UA Yellow Straw/Yellow    Clarity, UA Clear Clear    Glucose, Ur Negative Negative mg/dL    Bilirubin Urine Negative Negative    Ketones, Urine Negative Negative mg/dL    Specific Gravity, UA 1.025 1.005 - 1.030    Blood, Urine Negative Negative    pH, UA 6.0 5.0 - 9.0    Protein, UA 30 (A) Negative mg/dL    Urobilinogen, Urine 0.2 <2.0 E.U./dL    Nitrite, Urine Negative Negative    Leukocyte Esterase, Urine TRACE (A) Negative    WBC, UA 2-5 0 - 5 /HPF    RBC, UA NONE 0 - 2 /HPF    Epithelial Cells, UA RARE /HPF    Bacteria, UA RARE (A) None Seen /HPF   Magnesium   Result Value Ref Range    Magnesium 1.7 1.6 - 2.6 mg/dL   Procalcitonin   Result Value Ref Range    Procalcitonin 0.31 (H) 0.00 - 0.08 ng/mL   Hemoglobin A1c   Result Value Ref Range    Hemoglobin A1C 5.8 (H) 4.0 - 5.6 %   Comprehensive Metabolic Panel w/ Reflex to MG   Result Value Ref Range    Sodium 134 132 - 146 mmol/L    Potassium reflex Magnesium 2.3 (LL) 3.5 - 5.0 mmol/L    Chloride 103 98 - 107 mmol/L    CO2 21 (L) 22 - 29 mmol/L    Anion Gap 10 7 - 16 mmol/L    Glucose 59 (L) 74 - 99 mg/dL    BUN 33 (H) 6 - 20 mg/dL    CREATININE 1.7 (H) 0.5 - 1.0 mg/dL    GFR Non-African American 31 >=60 mL/min/1.73    GFR African American 38     Calcium 8.2 (L) 8.6 - 10.2 mg/dL    Total Protein 4.8 (L) 6.4 - 8.3 g/dL    Albumin 2.8 (L) 3.5 - 5.2 g/dL    Total Bilirubin <0.2 0.0 - 1.2 mg/dL    Alkaline Phosphatase 127 (H) 35 - 104 U/L    ALT 21 0 - 32 U/L    AST 15 0 - 31 U/L   CBC auto differential   Result Value Ref Range    WBC 20.9 (H) 4.5 - 11.5 E9/L    RBC 2.35 (L) 3.50 - 5.50 E12/L    Hemoglobin 8.3 (L) 11.5 - 15.5 g/dL    Hematocrit 23.8 (L) 34.0 - 48.0 %    .3 (H) 80.0 - 99.9 fL    MCH 35.3 (H) 26.0 - 35.0 pg    MCHC 34.9 (H) 32.0 - 34.5 %    RDW 17.3 (H) 11.5 - 15.0 fL    Platelets 245 (L) 832 - 450 E9/L    MPV 10.3 7.0 - 12.0 fL    Neutrophils % 82.6 (H) 43.0 - 80.0 %    Lymphocytes % 12.2 (L) 20.0 - 42.0 %    Monocytes % 3.5 2.0 - 12.0 %    Eosinophils % 0.0 0.0 - 6.0 %    Basophils % 0.3 0.0 - 2.0 %    Neutrophils Absolute 17.56 (H) 1.80 - 7.30 E9/L    Lymphocytes Absolute 2.51 1.50 - 4.00 E9/L    Monocytes Absolute 0.84 0.10 - 0.95 E9/L    Eosinophils Absolute 0.00 (L) 0.05 - 0.50 E9/L    Basophils Absolute 0.00 0.00 - 0.20 E9/L    Myelocyte Percent 1.7 0 - 0 %    Toxic Granulation 3+     Anisocytosis 1+     Polychromasia 1+     Poikilocytes 1+     [place] Cells 1+     Tear Drop Cells 1+    ROTAVIRUS ANTIGEN, STOOL   Result Value Ref Range    Rotavirus       Rotavirus antigen result: Not detected  *  *  Normal range: Not detected     Magnesium   Result Value Ref Range    Magnesium 2.2 1.6 - 2.6 mg/dL   Potassium   Result Value Ref Range    Potassium 2.7 (LL) 3.5 - 5.0 mmol/L   POCT Glucose   Result Value Ref Range    Meter Glucose 119 (H) 74 - 99 mg/dL   POCT Glucose   Result Value Ref Range    Meter Glucose 57 (L) 74 - 99 mg/dL   POCT Glucose   Result Value Ref Range    Meter Glucose 63 (L) 74 - 99 mg/dL   POCT Glucose   Result Value Ref Range    Meter Glucose 66 (L) 74 - 99 mg/dL   POCT Glucose   Result Value Ref Range    Meter Glucose 84 74 - 99 mg/dL       RADIOLOGY:  XR CHEST PORTABLE   Final Result   No acute process. ------------------------- NURSING NOTES AND VITALS REVIEWED ---------------------------  Date / Time Roomed:  6/21/2021  2:05 PM  ED Bed Assignment:  [id number]    The nursing notes within the ED encounter and vital signs as below have been reviewed.      Patient Vitals for the past 24 hrs:   BP Temp Temp src Pulse Resp SpO2   06/22/21 0815 (!) 103/50 97.6 °F (36.4 °C) Oral 72 18 100 %   06/21/21 2030 92/61 97 °F (36.1 °C) Oral 68 18 100 %   06/21/21 1902 (!) 84/53 97 °F (36.1 °C) Oral 78 18 99 %   06/21/21 1709 96/61 --

## 2021-06-21 NOTE — TELEPHONE ENCOUNTER
Patient had called into office and phone call missed. I returned patient call and no answer. Left message and my direct line contact information for patient to call me back. Await call back.

## 2021-06-22 PROBLEM — N17.9 AKI (ACUTE KIDNEY INJURY) (HCC): Status: ACTIVE | Noted: 2021-06-22

## 2021-06-22 PROBLEM — R19.7 DIARRHEA: Status: ACTIVE | Noted: 2021-06-22

## 2021-06-22 PROBLEM — E87.1 HYPONATREMIA: Status: ACTIVE | Noted: 2021-06-22

## 2021-06-22 PROBLEM — R11.0 NAUSEA: Status: ACTIVE | Noted: 2021-06-22

## 2021-06-22 LAB
ALBUMIN SERPL-MCNC: 2.8 G/DL (ref 3.5–5.2)
ALP BLD-CCNC: 127 U/L (ref 35–104)
ALT SERPL-CCNC: 21 U/L (ref 0–32)
ANION GAP SERPL CALCULATED.3IONS-SCNC: 10 MMOL/L (ref 7–16)
ANISOCYTOSIS: ABNORMAL
AST SERPL-CCNC: 15 U/L (ref 0–31)
BASOPHILS ABSOLUTE: 0 E9/L (ref 0–0.2)
BASOPHILS RELATIVE PERCENT: 0.3 % (ref 0–2)
BILIRUB SERPL-MCNC: <0.2 MG/DL (ref 0–1.2)
BUN BLDV-MCNC: 33 MG/DL (ref 6–20)
BURR CELLS: ABNORMAL
C DIFF TOXIN/ANTIGEN: NORMAL
CALCIUM SERPL-MCNC: 8.2 MG/DL (ref 8.6–10.2)
CHLORIDE BLD-SCNC: 103 MMOL/L (ref 98–107)
CO2: 21 MMOL/L (ref 22–29)
CREAT SERPL-MCNC: 1.7 MG/DL (ref 0.5–1)
EOSINOPHILS ABSOLUTE: 0 E9/L (ref 0.05–0.5)
EOSINOPHILS RELATIVE PERCENT: 0 % (ref 0–6)
GFR AFRICAN AMERICAN: 38
GFR NON-AFRICAN AMERICAN: 31 ML/MIN/1.73
GIARDIA ANTIGEN STOOL: NORMAL
GLUCOSE BLD-MCNC: 59 MG/DL (ref 74–99)
HBA1C MFR BLD: 5.8 % (ref 4–5.6)
HCT VFR BLD CALC: 23.8 % (ref 34–48)
HEMOGLOBIN: 8.3 G/DL (ref 11.5–15.5)
LYMPHOCYTES ABSOLUTE: 2.51 E9/L (ref 1.5–4)
LYMPHOCYTES RELATIVE PERCENT: 12.2 % (ref 20–42)
MAGNESIUM: 2.2 MG/DL (ref 1.6–2.6)
MCH RBC QN AUTO: 35.3 PG (ref 26–35)
MCHC RBC AUTO-ENTMCNC: 34.9 % (ref 32–34.5)
MCV RBC AUTO: 101.3 FL (ref 80–99.9)
METER GLUCOSE: 106 MG/DL (ref 74–99)
METER GLUCOSE: 140 MG/DL (ref 74–99)
METER GLUCOSE: 57 MG/DL (ref 74–99)
METER GLUCOSE: 63 MG/DL (ref 74–99)
METER GLUCOSE: 66 MG/DL (ref 74–99)
METER GLUCOSE: 84 MG/DL (ref 74–99)
MONOCYTES ABSOLUTE: 0.84 E9/L (ref 0.1–0.95)
MONOCYTES RELATIVE PERCENT: 3.5 % (ref 2–12)
MYELOCYTE PERCENT: 1.7 % (ref 0–0)
NEUTROPHILS ABSOLUTE: 17.56 E9/L (ref 1.8–7.3)
NEUTROPHILS RELATIVE PERCENT: 82.6 % (ref 43–80)
PDW BLD-RTO: 17.3 FL (ref 11.5–15)
PLATELET # BLD: 116 E9/L (ref 130–450)
PMV BLD AUTO: 10.3 FL (ref 7–12)
POIKILOCYTES: ABNORMAL
POLYCHROMASIA: ABNORMAL
POTASSIUM REFLEX MAGNESIUM: 2.3 MMOL/L (ref 3.5–5)
POTASSIUM SERPL-SCNC: 2.7 MMOL/L (ref 3.5–5)
POTASSIUM SERPL-SCNC: 3.7 MMOL/L (ref 3.5–5)
RBC # BLD: 2.35 E12/L (ref 3.5–5.5)
ROTAVIRUS ANTIGEN: NORMAL
SODIUM BLD-SCNC: 134 MMOL/L (ref 132–146)
TEAR DROP CELLS: ABNORMAL
TOTAL PROTEIN: 4.8 G/DL (ref 6.4–8.3)
TOXIC GRANULATION: ABNORMAL
WBC # BLD: 20.9 E9/L (ref 4.5–11.5)

## 2021-06-22 PROCEDURE — 6370000000 HC RX 637 (ALT 250 FOR IP): Performed by: INTERNAL MEDICINE

## 2021-06-22 PROCEDURE — 84132 ASSAY OF SERUM POTASSIUM: CPT

## 2021-06-22 PROCEDURE — 36415 COLL VENOUS BLD VENIPUNCTURE: CPT

## 2021-06-22 PROCEDURE — 83735 ASSAY OF MAGNESIUM: CPT

## 2021-06-22 PROCEDURE — 87077 CULTURE AEROBIC IDENTIFY: CPT

## 2021-06-22 PROCEDURE — 82962 GLUCOSE BLOOD TEST: CPT

## 2021-06-22 PROCEDURE — 2500000003 HC RX 250 WO HCPCS: Performed by: INTERNAL MEDICINE

## 2021-06-22 PROCEDURE — 85025 COMPLETE CBC W/AUTO DIFF WBC: CPT

## 2021-06-22 PROCEDURE — 6360000002 HC RX W HCPCS: Performed by: INTERNAL MEDICINE

## 2021-06-22 PROCEDURE — 87324 CLOSTRIDIUM AG IA: CPT

## 2021-06-22 PROCEDURE — 87045 FECES CULTURE AEROBIC BACT: CPT

## 2021-06-22 PROCEDURE — 6360000002 HC RX W HCPCS: Performed by: NURSE PRACTITIONER

## 2021-06-22 PROCEDURE — 87449 NOS EACH ORGANISM AG IA: CPT

## 2021-06-22 PROCEDURE — 87329 GIARDIA AG IA: CPT

## 2021-06-22 PROCEDURE — 87425 ROTAVIRUS AG IA: CPT

## 2021-06-22 PROCEDURE — 1200000000 HC SEMI PRIVATE

## 2021-06-22 PROCEDURE — 89055 LEUKOCYTE ASSESSMENT FECAL: CPT

## 2021-06-22 PROCEDURE — 80053 COMPREHEN METABOLIC PANEL: CPT

## 2021-06-22 RX ORDER — POTASSIUM CHLORIDE 7.45 MG/ML
10 INJECTION INTRAVENOUS
Status: COMPLETED | OUTPATIENT
Start: 2021-06-22 | End: 2021-06-22

## 2021-06-22 RX ORDER — POTASSIUM CHLORIDE 20 MEQ/1
40 TABLET, EXTENDED RELEASE ORAL ONCE
Status: COMPLETED | OUTPATIENT
Start: 2021-06-22 | End: 2021-06-22

## 2021-06-22 RX ORDER — LOPERAMIDE HYDROCHLORIDE 2 MG/1
2 CAPSULE ORAL 4 TIMES DAILY
Status: DISCONTINUED | OUTPATIENT
Start: 2021-06-22 | End: 2021-06-22

## 2021-06-22 RX ORDER — DEXTROSE, SODIUM CHLORIDE, AND POTASSIUM CHLORIDE 5; .45; .15 G/100ML; G/100ML; G/100ML
INJECTION INTRAVENOUS CONTINUOUS
Status: DISCONTINUED | OUTPATIENT
Start: 2021-06-22 | End: 2021-06-22

## 2021-06-22 RX ORDER — CHOLESTYRAMINE 4 G/9G
1 POWDER, FOR SUSPENSION ORAL 2 TIMES DAILY
Status: DISCONTINUED | OUTPATIENT
Start: 2021-06-22 | End: 2021-06-23 | Stop reason: HOSPADM

## 2021-06-22 RX ORDER — LOPERAMIDE HYDROCHLORIDE 2 MG/1
4 CAPSULE ORAL 2 TIMES DAILY
Status: DISCONTINUED | OUTPATIENT
Start: 2021-06-22 | End: 2021-06-23 | Stop reason: HOSPADM

## 2021-06-22 RX ORDER — DEXTROSE, SODIUM CHLORIDE, AND POTASSIUM CHLORIDE 5; .9; .15 G/100ML; G/100ML; G/100ML
INJECTION INTRAVENOUS CONTINUOUS
Status: DISCONTINUED | OUTPATIENT
Start: 2021-06-22 | End: 2021-06-23 | Stop reason: HOSPADM

## 2021-06-22 RX ADMIN — ALPRAZOLAM 0.5 MG: 0.25 TABLET ORAL at 21:07

## 2021-06-22 RX ADMIN — MIRTAZAPINE 15 MG: 15 TABLET, FILM COATED ORAL at 21:07

## 2021-06-22 RX ADMIN — POTASSIUM CHLORIDE 10 MEQ: 7.46 INJECTION, SOLUTION INTRAVENOUS at 16:23

## 2021-06-22 RX ADMIN — POTASSIUM CHLORIDE 10 MEQ: 7.46 INJECTION, SOLUTION INTRAVENOUS at 18:43

## 2021-06-22 RX ADMIN — FAMOTIDINE 10 MG: 20 TABLET ORAL at 10:07

## 2021-06-22 RX ADMIN — CHOLESTYRAMINE 4 G: 4 POWDER, FOR SUSPENSION ORAL at 21:07

## 2021-06-22 RX ADMIN — POTASSIUM CHLORIDE, DEXTROSE MONOHYDRATE AND SODIUM CHLORIDE: 150; 5; 450 INJECTION, SOLUTION INTRAVENOUS at 12:26

## 2021-06-22 RX ADMIN — PSYLLIUM HUSK 1 PACKET: 3.4 GRANULE ORAL at 15:19

## 2021-06-22 RX ADMIN — POTASSIUM CHLORIDE 40 MEQ: 1500 TABLET, EXTENDED RELEASE ORAL at 15:18

## 2021-06-22 RX ADMIN — PREGABALIN 75 MG: 75 CAPSULE ORAL at 10:06

## 2021-06-22 RX ADMIN — POTASSIUM CHLORIDE 10 MEQ: 10 INJECTION, SOLUTION INTRAVENOUS at 08:13

## 2021-06-22 RX ADMIN — POTASSIUM CHLORIDE, DEXTROSE MONOHYDRATE AND SODIUM CHLORIDE: 150; 5; 900 INJECTION, SOLUTION INTRAVENOUS at 20:50

## 2021-06-22 RX ADMIN — POTASSIUM CHLORIDE 10 MEQ: 7.46 INJECTION, SOLUTION INTRAVENOUS at 15:18

## 2021-06-22 RX ADMIN — POTASSIUM CHLORIDE 10 MEQ: 10 INJECTION, SOLUTION INTRAVENOUS at 06:03

## 2021-06-22 RX ADMIN — LOPERAMIDE HYDROCHLORIDE 2 MG: 2 CAPSULE ORAL at 10:07

## 2021-06-22 RX ADMIN — POTASSIUM CHLORIDE 10 MEQ: 10 INJECTION, SOLUTION INTRAVENOUS at 10:50

## 2021-06-22 RX ADMIN — ENOXAPARIN SODIUM 30 MG: 30 INJECTION, SOLUTION INTRAVENOUS; SUBCUTANEOUS at 10:06

## 2021-06-22 RX ADMIN — POTASSIUM CHLORIDE 10 MEQ: 10 INJECTION, SOLUTION INTRAVENOUS at 09:42

## 2021-06-22 RX ADMIN — POTASSIUM CHLORIDE 10 MEQ: 7.46 INJECTION, SOLUTION INTRAVENOUS at 17:35

## 2021-06-22 RX ADMIN — POTASSIUM BICARBONATE 40 MEQ: 782 TABLET, EFFERVESCENT ORAL at 21:07

## 2021-06-22 RX ADMIN — LOPERAMIDE HYDROCHLORIDE 4 MG: 2 CAPSULE ORAL at 15:18

## 2021-06-22 RX ADMIN — INSULIN LISPRO 1 UNITS: 100 INJECTION, SOLUTION INTRAVENOUS; SUBCUTANEOUS at 21:20

## 2021-06-22 ASSESSMENT — PAIN SCALES - GENERAL: PAINLEVEL_OUTOF10: 7

## 2021-06-22 ASSESSMENT — PAIN DESCRIPTION - PAIN TYPE: TYPE: CHRONIC PAIN

## 2021-06-22 ASSESSMENT — PAIN DESCRIPTION - DESCRIPTORS: DESCRIPTORS: ACHING;DISCOMFORT;SORE

## 2021-06-22 ASSESSMENT — PAIN DESCRIPTION - LOCATION: LOCATION: LEG

## 2021-06-22 ASSESSMENT — PAIN DESCRIPTION - FREQUENCY: FREQUENCY: CONTINUOUS

## 2021-06-22 ASSESSMENT — PAIN DESCRIPTION - ONSET: ONSET: ON-GOING

## 2021-06-22 ASSESSMENT — PAIN DESCRIPTION - ORIENTATION: ORIENTATION: RIGHT

## 2021-06-22 NOTE — CARE COORDINATION
Social Work / Discharge Planning : SW and CM met with patient and explained role as discharge planner/ transition of care. Patient verified plan at discharge is HOME where she resides in the basement of her sister in laws home. Patient asked for section 8 housing information for [facility]. due to wanting closer to her cancer treatment in [facility]. SW provided patient a resource booklet that covers all resources in the area for everything including Housing. Patient independent and drives., SHe does have a cane and walker for when she neds it as well as a BSC. Patient PCP is DR Mary Perez. AWait plan. Patient denies any additional needs at this time. SW to follow.  Electronically signed by MAIA Frances on 6/22/21 at 10:05 AM EDT

## 2021-06-22 NOTE — PROGRESS NOTES
Call placed to Dr. Matias Mesa regarding K redraw of 2.73    Electronically signed by Wandy Costello RN on 6/22/2021 at 1:45 PM

## 2021-06-22 NOTE — H&P
[address] [facility]  History and Physical      CHIEF COMPLAINT: Diarrhea      HISTORY OF PRESENT ILLNESS:      The patient is a 63 y.o. female patient of Dr. Ivelisse Aldana history of breast CA on chemotherapy, DM 2, GERD, hypertension, anxiety who presents with diarrhea. Patient had last chemo on June 1. Since then she has had decreased oral intake with nausea. Patient notes persistent daily diarrhea. She denies antibiotics. Symptoms are mild to moderate, intermittent and worsening. She notes mouth ulcers which are causing her difficulty with eating. Patient has tried multiple medications in conjunction with her oncologist without improvement. No vomiting   No blood in her stool. No fevers + always chills. No abdominal pain. No chest pain palpitations or trouble breathing. Exacerbated by eating no relief.     Past Medical History:    Past Medical History:   Diagnosis Date    Anxiety     Arthritis     Cancer ([facility] [address] 2021    breast    Cervical radiculopathy     Chronic back pain     Depression     Diabetes mellitus type 2, uncontrolled ([facility] [address] 2/12/2017    GERD (gastroesophageal reflux disease)     History of blood transfusion 01/2018    back surgery, lumbar, dr Alissa Candelaria Hypertension     Right leg pain     seeing doctor currently problems with hardware       Past Surgical History:    Past Surgical History:   Procedure Laterality Date    BACK SURGERY  01/08/2018    PLIF L2-L5    BREAST BIOPSY Right 2/5/2021    RIGHT BREAST LUMPECTOMY WITH  sentinel LYMPHNODE biopsy performed by Charlette Pantoja MD at [facility] Right 3/9/2021    RIGHT BREAST RE-EXCISION LUMPECTOMY OF INFERIOR AND MEDIAL MARGINS performed by Charlette Pantoja MD at [address] [place] [address] Right 04/09/2017    Dr. Deborah Tay  2008    dr hu anterior    CERVICAL FUSION  2015    dr Fabián Miller anterior    CERVICAL FUSION  04/25/16    ANTERIOR C4-C5, C6-C7 PLATES AND SCREWS    CHOLECYSTECTOMY  2000    COLONOSCOPY      patient did cologuard in 2018   [address] [facility]  2013    dr Peter Grijalva    heel spurs bilateral    HYSTERECTOMY  2013    dr Ross Page partial    OTHER SURGICAL HISTORY Right 05/14/2018    removal of hardware repair nonunion right tibia/fibula    PORT SURGERY Left 2/5/2021    LEFT MEDI PORT INSERTION performed by Marisela Glass MD at [address] [facility] OFFICE/OUTPT VISIT,PROCEDURE ONLY Right 7/5/2018    REPAIR NON UNION FAILED FIXATION RIGHT DISTAL TIB / FIB PILON FX. WITH REMOVAL OF HARDWARE & O.R. I. F ------BOP performed by Charles Ernandez MD at [facility] Right 5/14/2018    REPAIR NON-UNION RIGHT TIBIA AND FIBULA WITH REMOVAL OF HARDWARE AND BONE GRAFT performed by Charles Ernandez MD at [address] [place] Right 1/10/2019    RIGHT TIBIA REMOVAL HARDWARE, RIGHT TIBIA OPEN  TREATMENT OF NON UNION performed by Charles Ernandez MD at [address] [address] Connector EXTRACTION         Medications Prior to Admission:    Medications Prior to Admission: diclofenac sodium (VOLTAREN) 1 % GEL,   Diclofenac Sodium 1 % CREA, Apply 10 mLs topically 2 times daily  prochlorperazine (COMPAZINE) 10 MG tablet, Take 1 tablet by mouth every 6 hours as needed (nausea)  lidocaine-prilocaine (EMLA) 2.5-2.5 % cream, Apply topically to port 30 minutes prior to chemotherapy.   ondansetron (ZOFRAN) 4 MG tablet, Take 1 tablet by mouth every 8 hours as needed for Nausea or Vomiting  ibuprofen (ADVIL;MOTRIN) 200 MG tablet, Take 200 mg by mouth every 6 hours as needed for Pain STOP PREOP MED  MELATONIN PO, Take by mouth nightly  Multiple Vitamins-Minerals (HAIR SKIN AND NAILS FORMULA) TABS, Take by mouth STOP PREOP MED  mirtazapine (REMERON) 15 MG tablet, Take 15 mg by mouth nightly  atenolol (TENORMIN) 25 MG tablet, every evening   ALPRAZolam (XANAX) 0.5 Supple  Heart:  RRR, no murmurs, gallops, rubs  Lungs:  CTA bilaterally, bilat symmetrical expansion, no wheeze, rales, or rhonchi  Abdomen:   Bowel sounds present, soft, nontender, no masses, no organomegaly, no peritoneal signs  Extremities:  No clubbing, cyanosis, or edema  Skin:  Warm and dry, no open lesions or rash  Neuro:  Cranial nerves 2-12 intact, no focal deficits  Breast: deferred  Rectal: deferred  Genitalia:  deferred    LABS:    CBC with Differential:    Lab Results   Component Value Date    WBC 20.9 06/22/2021    RBC 2.35 06/22/2021    HGB 8.3 06/22/2021    HCT 23.8 06/22/2021     06/22/2021    .3 06/22/2021    MCH 35.3 06/22/2021    MCHC 34.9 06/22/2021    RDW 17.3 06/22/2021    SEGSPCT 57 11/08/2013    METASPCT 0.9 04/16/2021    LYMPHOPCT 7.0 06/21/2021    MONOPCT 6.1 06/21/2021    MYELOPCT 1.8 05/07/2021    BASOPCT 0.3 06/21/2021    MONOSABS 1.09 06/21/2021    LYMPHSABS 1.27 06/21/2021    EOSABS 0.00 06/21/2021    BASOSABS 0.00 06/21/2021     CMP:    Lab Results   Component Value Date     06/22/2021    K 2.3 06/22/2021     06/22/2021    CO2 21 06/22/2021    BUN 33 06/22/2021    CREATININE 1.7 06/22/2021    GFRAA 38 06/22/2021    LABGLOM 31 06/22/2021    GLUCOSE 59 06/22/2021    GLUCOSE 96 05/24/2012    PROT 4.8 06/22/2021    LABALBU 2.8 06/22/2021    LABALBU 4.5 05/24/2012    CALCIUM 8.2 06/22/2021    BILITOT <0.2 06/22/2021    ALKPHOS 127 06/22/2021    AST 15 06/22/2021    ALT 21 06/22/2021     Magnesium:    Lab Results   Component Value Date    MG 2.2 06/22/2021     Phosphorus:    Lab Results   Component Value Date    PHOS 2.8 02/13/2017     PT/INR:    Lab Results   Component Value Date    PROTIME 10.9 01/02/2018    PROTIME 11.0 09/30/2011    INR 1.0 01/02/2018     Last 3 Troponin:    Lab Results   Component Value Date    TROPONINI <0.01 02/12/2017     U/A:    Lab Results   Component Value Date    COLORU Yellow 06/21/2021    PROTEINU 30 06/21/2021    PHUR 6.0 06/21/2021 WBCUA 2-5 06/21/2021    WBCUA 10-20 07/21/2011    RBCUA NONE 06/21/2021    RBCUA 1-3 07/21/2011    BACTERIA RARE 06/21/2021    CLARITYU Clear 06/21/2021    SPECGRAV 1.025 06/21/2021    LEUKOCYTESUR TRACE 06/21/2021    UROBILINOGEN 0.2 06/21/2021    BILIRUBINUR Negative 06/21/2021    BILIRUBINUR SMALL 07/21/2011    BLOODU Negative 06/21/2021    GLUCOSEU Negative 06/21/2021    GLUCOSEU NEGATIVE 07/21/2011     ABG:  No results found for: PH, PCO2, PO2, HCO3, BE, THGB, TCO2, O2SAT  HgBA1c:    Lab Results   Component Value Date    LABA1C 5.8 06/21/2021     FLP:    Lab Results   Component Value Date    TRIG 129 03/20/2012    HDL 59.0 03/20/2012    LDLCALC 83 03/20/2012     TSH:    Lab Results   Component Value Date    TSH 1.470 01/10/2018       XR CHEST PORTABLE   Final Result   No acute process. ASSESSMENT:      Principal Problem:    AUSTIN (acute kidney injury) ([facility] [address]  Active Problems:    Diabetes mellitus type 2, uncontrolled ([facility] [address]    HTN (hypertension), benign    Malignant neoplasm of central portion of right breast in female, estrogen receptor positive ([address]    Hypokalemia    Nausea    Hyponatremia    Diarrhea  Resolved Problems:    * No resolved hospital problems.  *      PLAN: 79-year-old female history of breast CA on chemotherapy admitted with AUSTIN, hypokalemia, diarrhea    Admit   IVF  electrolyte replacement  Hold nephrotoxins  Monitor labs closely  Antiemetics  Stool studies  Cholestyramine, Imodium, psyllium fiber  Medications for other co morbidities cont as appropriate w dosage adjustments as necessary PT/OT  DVT PPx  DC planning        Electronically signed by Daniella Serna MD on 6/22/2021 at 8:45 AM

## 2021-06-22 NOTE — PROGRESS NOTES
Patient requesting something to help stop diarrhea. Sent stool culture, c-diff, and other stool studies before getting something to help control diarrhea. Patient in contact for c-diff.

## 2021-06-23 VITALS
HEART RATE: 70 BPM | TEMPERATURE: 98.4 F | WEIGHT: 145 LBS | OXYGEN SATURATION: 99 % | HEIGHT: 72 IN | DIASTOLIC BLOOD PRESSURE: 75 MMHG | RESPIRATION RATE: 18 BRPM | SYSTOLIC BLOOD PRESSURE: 120 MMHG | BODY MASS INDEX: 19.64 KG/M2

## 2021-06-23 PROBLEM — S21.009A INCISIONAL BREAST WOUND: Status: ACTIVE | Noted: 2021-06-23

## 2021-06-23 LAB
ANION GAP SERPL CALCULATED.3IONS-SCNC: 7 MMOL/L (ref 7–16)
BASOPHILS ABSOLUTE: 0.03 E9/L (ref 0–0.2)
BASOPHILS RELATIVE PERCENT: 0.2 % (ref 0–2)
BUN BLDV-MCNC: 12 MG/DL (ref 6–20)
CALCIUM SERPL-MCNC: 8.2 MG/DL (ref 8.6–10.2)
CHLORIDE BLD-SCNC: 107 MMOL/L (ref 98–107)
CO2: 22 MMOL/L (ref 22–29)
CREAT SERPL-MCNC: 0.8 MG/DL (ref 0.5–1)
EOSINOPHILS ABSOLUTE: 0 E9/L (ref 0.05–0.5)
EOSINOPHILS RELATIVE PERCENT: 0 % (ref 0–6)
GFR AFRICAN AMERICAN: >60
GFR NON-AFRICAN AMERICAN: >60 ML/MIN/1.73
GLUCOSE BLD-MCNC: 157 MG/DL (ref 74–99)
HCT VFR BLD CALC: 24.1 % (ref 34–48)
HEMOGLOBIN: 8 G/DL (ref 11.5–15.5)
IMMATURE GRANULOCYTES #: 0.25 E9/L
IMMATURE GRANULOCYTES %: 1.5 % (ref 0–5)
LYMPHOCYTES ABSOLUTE: 2.29 E9/L (ref 1.5–4)
LYMPHOCYTES RELATIVE PERCENT: 13.6 % (ref 20–42)
MAGNESIUM: 1.4 MG/DL (ref 1.6–2.6)
MCH RBC QN AUTO: 34 PG (ref 26–35)
MCHC RBC AUTO-ENTMCNC: 33.2 % (ref 32–34.5)
MCV RBC AUTO: 102.6 FL (ref 80–99.9)
METER GLUCOSE: 100 MG/DL (ref 74–99)
METER GLUCOSE: 161 MG/DL (ref 74–99)
MONOCYTES ABSOLUTE: 1.08 E9/L (ref 0.1–0.95)
MONOCYTES RELATIVE PERCENT: 6.4 % (ref 2–12)
NEUTROPHILS ABSOLUTE: 13.13 E9/L (ref 1.8–7.3)
NEUTROPHILS RELATIVE PERCENT: 78.3 % (ref 43–80)
PDW BLD-RTO: 18 FL (ref 11.5–15)
PLATELET # BLD: 119 E9/L (ref 130–450)
PMV BLD AUTO: 10.4 FL (ref 7–12)
POTASSIUM SERPL-SCNC: 4 MMOL/L (ref 3.5–5)
RBC # BLD: 2.35 E12/L (ref 3.5–5.5)
SODIUM BLD-SCNC: 136 MMOL/L (ref 132–146)
WBC # BLD: 16.8 E9/L (ref 4.5–11.5)
WHITE BLOOD CELLS (WBC), STOOL: NORMAL

## 2021-06-23 PROCEDURE — 6370000000 HC RX 637 (ALT 250 FOR IP): Performed by: INTERNAL MEDICINE

## 2021-06-23 PROCEDURE — 2500000003 HC RX 250 WO HCPCS: Performed by: INTERNAL MEDICINE

## 2021-06-23 PROCEDURE — 36591 DRAW BLOOD OFF VENOUS DEVICE: CPT

## 2021-06-23 PROCEDURE — 36415 COLL VENOUS BLD VENIPUNCTURE: CPT

## 2021-06-23 PROCEDURE — 6360000002 HC RX W HCPCS: Performed by: INTERNAL MEDICINE

## 2021-06-23 PROCEDURE — 87186 SC STD MICRODIL/AGAR DIL: CPT

## 2021-06-23 PROCEDURE — 82962 GLUCOSE BLOOD TEST: CPT

## 2021-06-23 PROCEDURE — 87070 CULTURE OTHR SPECIMN AEROBIC: CPT

## 2021-06-23 PROCEDURE — 87075 CULTR BACTERIA EXCEPT BLOOD: CPT

## 2021-06-23 PROCEDURE — 85025 COMPLETE CBC W/AUTO DIFF WBC: CPT

## 2021-06-23 PROCEDURE — 83735 ASSAY OF MAGNESIUM: CPT

## 2021-06-23 PROCEDURE — 80048 BASIC METABOLIC PNL TOTAL CA: CPT

## 2021-06-23 RX ORDER — LANOLIN ALCOHOL/MO/W.PET/CERES
400 CREAM (GRAM) TOPICAL 2 TIMES DAILY
Qty: 60 TABLET | Refills: 0 | Status: SHIPPED | OUTPATIENT
Start: 2021-06-23

## 2021-06-23 RX ORDER — CHOLESTYRAMINE 4 G/9G
1 POWDER, FOR SUSPENSION ORAL 2 TIMES DAILY
Qty: 90 PACKET | Refills: 3 | Status: SHIPPED | OUTPATIENT
Start: 2021-06-23

## 2021-06-23 RX ORDER — HEPARIN SODIUM (PORCINE) LOCK FLUSH IV SOLN 100 UNIT/ML 100 UNIT/ML
300 SOLUTION INTRAVENOUS PRN
Status: DISCONTINUED | OUTPATIENT
Start: 2021-06-23 | End: 2021-06-23 | Stop reason: HOSPADM

## 2021-06-23 RX ORDER — LOPERAMIDE HYDROCHLORIDE 2 MG/1
4 CAPSULE ORAL 2 TIMES DAILY
Qty: 40 CAPSULE | Refills: 0 | Status: SHIPPED | OUTPATIENT
Start: 2021-06-23 | End: 2021-07-03

## 2021-06-23 RX ADMIN — LOPERAMIDE HYDROCHLORIDE 4 MG: 2 CAPSULE ORAL at 10:05

## 2021-06-23 RX ADMIN — FAMOTIDINE 10 MG: 20 TABLET ORAL at 08:46

## 2021-06-23 RX ADMIN — Medication 300 UNITS: at 14:05

## 2021-06-23 RX ADMIN — METOPROLOL SUCCINATE 25 MG: 25 TABLET, EXTENDED RELEASE ORAL at 08:47

## 2021-06-23 RX ADMIN — CHOLESTYRAMINE 4 G: 4 POWDER, FOR SUSPENSION ORAL at 08:47

## 2021-06-23 RX ADMIN — POTASSIUM CHLORIDE, DEXTROSE MONOHYDRATE AND SODIUM CHLORIDE: 150; 5; 900 INJECTION, SOLUTION INTRAVENOUS at 06:53

## 2021-06-23 RX ADMIN — PREGABALIN 75 MG: 75 CAPSULE ORAL at 08:46

## 2021-06-23 RX ADMIN — PSYLLIUM HUSK 1 PACKET: 3.4 GRANULE ORAL at 08:47

## 2021-06-23 RX ADMIN — ENOXAPARIN SODIUM 30 MG: 30 INJECTION, SOLUTION INTRAVENOUS; SUBCUTANEOUS at 08:47

## 2021-06-23 RX ADMIN — INSULIN LISPRO 1 UNITS: 100 INJECTION, SOLUTION INTRAVENOUS; SUBCUTANEOUS at 11:47

## 2021-06-23 NOTE — DISCHARGE SUMMARY
Physician Discharge Summary     Patient ID:  Lizeth Jackson  [id number]  63 y.o.  1965    Admit date: 6/21/2021    Discharge date and time:  6/23/2021    Discharge Diagnoses: Principal Problem:    AUSTIN (acute kidney injury) ([facility] [address]  Active Problems:    Diabetes mellitus type 2, uncontrolled ([facility]    HTN (hypertension), benign    Malignant neoplasm of central portion of right breast in female, estrogen receptor positive ([facility]    Hypokalemia    Nausea    Hyponatremia    Diarrhea    Incisional breast wound  Resolved Problems:    * No resolved hospital problems.  *      Consults: IP CONSULT TO INTERNAL MEDICINE    Procedures: See below    Hospital Course:   48-year-old female history of breast CA on chemotherapy admitted with AUSTIN, hypokalemia, diarrhea     IVF-- improved  electrolyte replacement  Hold nephrotoxins  Monitor labs closely  Antiemetics  hypomagnesium pt prefers oral w DC than IV here  Stool studies C. difficile, Giardia, rotavirus negative,  Cholestyramine, Imodium, psyllium fiber  Medications for other co morbidities cont as appropriate w dosage adjustments as necessary   PT/OT  DVT PPx  DC planning    Patient does note some foul-smelling purulent drainage from under her right breast cultures taken from wound tract recommend follow-up with surgery as outpatient    Discharge Exam:  See progress note from today    Condition:  Stable    Disposition: home    Patient Instructions:   Current Discharge Medication List      START taking these medications    Details   loperamide (IMODIUM) 2 MG capsule Take 2 capsules by mouth 2 times daily for 10 days  Qty: 40 capsule, Refills: 0      cholestyramine (QUESTRAN) 4 g packet Take 1 packet by mouth 2 times daily  Qty: 90 packet, Refills: 3      psyllium (KONSYL) 28.3 % PACK Take 1 packet by mouth daily  Qty: 30 each, Refills: 3    Comments: Refer to OTC products if not covered         CONTINUE these medications which have NOT CHANGED    Details   diclofenac sodium Refills: 0      Magic Mouthwash (MIRACLE MOUTHWASH) Swish and spit 5 mLs 4 times daily as needed for Irritation 1:1:1 lidocaine, maalox: benadryl  Qty: 1800 mL, Refills: 1      aspirin EC 81 MG EC tablet Take 1 tablet by mouth daily for 28 days  Qty: 28 tablet, Refills: 0         STOP taking these medications       D3-50 1.25 MG (46530 UT) CAPS Comments:   Reason for Stopping:         ibuprofen (ADVIL;MOTRIN) 200 MG tablet Comments:   Reason for Stopping:             Activity: activity as tolerated  Diet: regular diet    Follow-up with PCP, Oncology, breast surgeon-Dr. Salinas Ear    Note that over 30 minutes was spent in preparing discharge papers, discussing discharge with patient, staff, consultants, medication review, arranging follow up, etc.    Signed:  Kiara Johnson MD  6/23/2021  12:47 PM

## 2021-06-23 NOTE — PROGRESS NOTES
Subjective:  Feeling better diarrhea  No CP or SOB  No fever or chills   No uncontrolled pain  No vomiting or diarrhea     Objective:    /75   Pulse 70   Temp 98.4 °F (36.9 °C) (Oral)   Resp 18   Ht 6' 1\" (1.854 m)   Wt 145 lb (65.8 kg)   LMP 08/13/2013   SpO2 99%   BMI 19.13 kg/m²     24HR INTAKE/OUTPUT:      Intake/Output Summary (Last 24 hours) at 6/23/2021 0914  Last data filed at 6/23/2021 0846  Gross per 24 hour   Intake 240 ml   Output --   Net 240 ml       General appearance: NAD, conversant  Neck: FROM, supple   Lungs: Clear bilaterally no wheezes, no rhonchi, no crackles  CV: RRR, no MRGs; normal carotid upstroke and amplitude without Bruits  Abdomen: Soft, non-tender; no masses or HSM  Extremities: No edema, no cyanosis, no clubbing  Skin: Intact no rash, no lesions, no ulcers    Psych: Alert and oriented normal affect  Neuro: Nonfocal  Most Recent Labs  Lab Results   Component Value Date    WBC 20.9 (H) 06/22/2021    HGB 8.3 (L) 06/22/2021    HCT 23.8 (L) 06/22/2021     (L) 06/22/2021     06/22/2021    K 3.7 06/22/2021     06/22/2021    CREATININE 1.7 (H) 06/22/2021    BUN 33 (H) 06/22/2021    CO2 21 (L) 06/22/2021    GLUCOSE 59 (L) 06/22/2021    ALT 21 06/22/2021    AST 15 06/22/2021    INR 1.0 01/02/2018    TSH 1.470 01/10/2018    LABA1C 5.8 (H) 06/21/2021     Recent Labs     06/22/21  0441   MG 2.2     Lab Results   Component Value Date    CALCIUM 8.2 (L) 06/22/2021    PHOS 2.8 02/13/2017        XR CHEST PORTABLE   Final Result   No acute process. Assessment    Principal Problem:    AUSTIN (acute kidney injury) ([facility] [address]  Active Problems:    Diabetes mellitus type 2, uncontrolled ([facility]    HTN (hypertension), benign    Malignant neoplasm of central portion of right breast in female, estrogen receptor positive ([address]    Hypokalemia    Nausea    Hyponatremia    Diarrhea  Resolved Problems:    * No resolved hospital problems.  *      Plan:  56-year-old female history of breast CA on chemotherapy admitted with AUSTIN, hypokalemia, diarrhea     IVF-- improved  electrolyte replacement  Hold nephrotoxins  Monitor labs closely  Antiemetics  hypomagnesium pt prefers oral w DC than IV here  Stool studies C. difficile, Giardia, rotavirus negative,  Cholestyramine, Imodium, psyllium fiber  Medications for other co morbidities cont as appropriate w dosage adjustments as necessary PT/OT  DVT PPx  DC planning  DC home    Electronically signed by Chester Chance MD on 6/23/2021 at 9:14 AM

## 2021-06-25 LAB
CULTURE, STOOL: NORMAL
ORGANISM: ABNORMAL
WOUND/ABSCESS: ABNORMAL

## 2021-06-26 LAB
BLOOD CULTURE, ROUTINE: NORMAL
CULTURE, BLOOD 2: NORMAL

## 2021-06-28 LAB — ANAEROBIC CULTURE: NORMAL

## 2021-06-29 ENCOUNTER — HOSPITAL ENCOUNTER (OUTPATIENT)
Age: 56
Discharge: HOME | End: 2021-06-29
Payer: MEDICAID

## 2021-06-29 DIAGNOSIS — Z51.12 ENCOUNTER FOR MONOCLONAL ANTIBODY TREATMENT FOR MALIGNANCY: ICD-10-CM

## 2021-06-29 LAB
LV EF: 63 %
LVEF MODALITY: NORMAL

## 2021-06-29 PROCEDURE — 93306 TTE W/DOPPLER COMPLETE: CPT

## 2021-06-30 ENCOUNTER — HOSPITAL ENCOUNTER (OUTPATIENT)
Dept: INFUSION THERAPY | Age: 56
End: 2021-06-30

## 2021-07-01 ENCOUNTER — HOSPITAL ENCOUNTER (OUTPATIENT)
Dept: INFUSION THERAPY | Age: 56
End: 2021-07-01

## 2021-07-14 ENCOUNTER — HOSPITAL ENCOUNTER (OUTPATIENT)
Dept: ULTRASOUND IMAGING | Age: 56
Discharge: HOME | End: 2021-07-16
Payer: MEDICAID

## 2021-07-14 DIAGNOSIS — I87.2 VENOUS INSUFFICIENCY: ICD-10-CM

## 2021-07-14 PROCEDURE — 93970 EXTREMITY STUDY: CPT

## 2021-07-16 ENCOUNTER — OFFICE VISIT (OUTPATIENT)
Dept: BREAST CENTER | Age: 56
End: 2021-07-16
Payer: MEDICAID

## 2021-07-16 VITALS
OXYGEN SATURATION: 100 % | WEIGHT: 154.9 LBS | HEIGHT: 72 IN | RESPIRATION RATE: 12 BRPM | DIASTOLIC BLOOD PRESSURE: 70 MMHG | SYSTOLIC BLOOD PRESSURE: 107 MMHG | HEART RATE: 80 BPM | BODY MASS INDEX: 20.98 KG/M2 | TEMPERATURE: 98.2 F

## 2021-07-16 DIAGNOSIS — Z51.89 VISIT FOR WOUND CHECK: ICD-10-CM

## 2021-07-16 PROCEDURE — 1036F TOBACCO NON-USER: CPT | Performed by: SURGERY

## 2021-07-16 PROCEDURE — G8420 CALC BMI NORM PARAMETERS: HCPCS | Performed by: SURGERY

## 2021-07-16 PROCEDURE — 99212 OFFICE O/P EST SF 10 MIN: CPT | Performed by: SURGERY

## 2021-07-16 PROCEDURE — G8427 DOCREV CUR MEDS BY ELIG CLIN: HCPCS | Performed by: SURGERY

## 2021-07-16 PROCEDURE — 99213 OFFICE O/P EST LOW 20 MIN: CPT | Performed by: SURGERY

## 2021-07-16 PROCEDURE — G9899 SCRN MAM PERF RSLTS DOC: HCPCS | Performed by: SURGERY

## 2021-07-16 PROCEDURE — 3017F COLORECTAL CA SCREEN DOC REV: CPT | Performed by: SURGERY

## 2021-07-16 PROCEDURE — 1111F DSCHRG MED/CURRENT MED MERGE: CPT | Performed by: SURGERY

## 2021-07-16 NOTE — PROGRESS NOTES
[facility]  PROGRESS NOTE  ATTENDING NOTE    Chief Complaint   Patient presents with    Wound Check     ESTABLISHED: wound check. patient was instructed to follow up for a wound check by jose alberto doctor while she was the hospital from complications from chemotherapy.  Drainage     Pt states area is draining constantly, wound has never closed per patient since last surgery. S: 58-year-old female who has right breast cancer. 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 She has had some issues with her incision that is on the inframammary fold. It had some difficulty with healing she also had to have a reexcision. It has continued to drain from the T section of the wound. She has been seen almost every time she is coming for chemo by our nurse practitioners and nurses. She states she changes her dressing about twice per day and its light yellow to clear drainage. /70   Pulse 80   Temp 98.2 °F (36.8 °C)   Resp 12   Ht 6' 1\" (1.854 m)   Wt 154 lb 14.4 oz (70.3 kg)   LMP 08/13/2013   SpO2 100%   BMI 20.44 kg/m²   Physical Exam  Constitutional:       Appearance: Normal appearance. HENT:      Head: Normocephalic and atraumatic. Eyes:      Extraocular Movements: Extraocular movements intact. Pupils: Pupils are equal, round, and reactive to light. Chest:       Musculoskeletal:      Cervical back: Normal range of motion and neck supple. Skin:     General: Skin is warm and dry. Neurological:      General: No focal deficit present. Mental Status: She is alert and oriented to person, place, and time. Psychiatric:         Mood and Affect: Mood normal.         Behavior: Behavior normal.         Thought Content:  Thought content normal.         Judgment: Judgment normal.       ASSESSMENT/PLAN:  Stage Ia right breast cancer, grade 3, ER/MT positive HER-2 positive  --Continue with wound care  --I have explained to patient that since she is undergoing chemotherapy and then will be undergoing radiation this wound will be very slow to heal.  I explained to her that it looks the best it has looked since we have been taking care of this wound though. There is no signs of infection. Continue to wash with Dial soap every day and keep the wound covered when she is out in public.       Return to clinic 2 months    Pipe Rodriguez MD, MSc, [facility]  7/16/2021  2:33 PM

## 2021-07-21 ENCOUNTER — HOSPITAL ENCOUNTER (OUTPATIENT)
Dept: INFUSION THERAPY | Age: 56
Discharge: HOME | End: 2021-07-21
Payer: MEDICAID

## 2021-07-21 DIAGNOSIS — T45.1X5A CHEMOTHERAPY INDUCED DIARRHEA: ICD-10-CM

## 2021-07-21 DIAGNOSIS — C50.111 MALIGNANT NEOPLASM OF CENTRAL PORTION OF RIGHT BREAST IN FEMALE, ESTROGEN RECEPTOR POSITIVE (HCC): ICD-10-CM

## 2021-07-21 DIAGNOSIS — K52.1 CHEMOTHERAPY INDUCED DIARRHEA: ICD-10-CM

## 2021-07-21 DIAGNOSIS — Z17.0 MALIGNANT NEOPLASM OF CENTRAL PORTION OF RIGHT BREAST IN FEMALE, ESTROGEN RECEPTOR POSITIVE (HCC): ICD-10-CM

## 2021-07-21 DIAGNOSIS — Z85.3 PERSONAL HISTORY OF BREAST CANCER: Primary | ICD-10-CM

## 2021-07-21 LAB
ALBUMIN SERPL-MCNC: 3.5 G/DL (ref 3.5–5.2)
ALP BLD-CCNC: 234 U/L (ref 35–104)
ALT SERPL-CCNC: 55 U/L (ref 0–32)
ANION GAP SERPL CALCULATED.3IONS-SCNC: 8 MMOL/L (ref 7–16)
AST SERPL-CCNC: 43 U/L (ref 0–31)
BASOPHILS ABSOLUTE: 0.05 E9/L (ref 0–0.2)
BASOPHILS RELATIVE PERCENT: 0.5 % (ref 0–2)
BILIRUB SERPL-MCNC: 0.3 MG/DL (ref 0–1.2)
BUN BLDV-MCNC: 10 MG/DL (ref 6–20)
CALCIUM SERPL-MCNC: 9.4 MG/DL (ref 8.6–10.2)
CHLORIDE BLD-SCNC: 103 MMOL/L (ref 98–107)
CO2: 24 MMOL/L (ref 22–29)
CREAT SERPL-MCNC: 0.7 MG/DL (ref 0.5–1)
EOSINOPHILS ABSOLUTE: 0.08 E9/L (ref 0.05–0.5)
EOSINOPHILS RELATIVE PERCENT: 0.8 % (ref 0–6)
GFR AFRICAN AMERICAN: >60
GFR NON-AFRICAN AMERICAN: >60 ML/MIN/1.73
GLUCOSE BLD-MCNC: 160 MG/DL (ref 74–99)
HCT VFR BLD CALC: 36.1 % (ref 34–48)
HEMOGLOBIN: 11.4 G/DL (ref 11.5–15.5)
IMMATURE GRANULOCYTES #: 0.05 E9/L
IMMATURE GRANULOCYTES %: 0.5 % (ref 0–5)
LYMPHOCYTES ABSOLUTE: 3.32 E9/L (ref 1.5–4)
LYMPHOCYTES RELATIVE PERCENT: 32.5 % (ref 20–42)
MAGNESIUM: 1.9 MG/DL (ref 1.6–2.6)
MCH RBC QN AUTO: 34.2 PG (ref 26–35)
MCHC RBC AUTO-ENTMCNC: 31.6 % (ref 32–34.5)
MCV RBC AUTO: 108.4 FL (ref 80–99.9)
MONOCYTES ABSOLUTE: 0.81 E9/L (ref 0.1–0.95)
MONOCYTES RELATIVE PERCENT: 7.9 % (ref 2–12)
NEUTROPHILS ABSOLUTE: 5.9 E9/L (ref 1.8–7.3)
NEUTROPHILS RELATIVE PERCENT: 57.8 % (ref 43–80)
PDW BLD-RTO: 14.2 FL (ref 11.5–15)
PLATELET # BLD: 255 E9/L (ref 130–450)
PMV BLD AUTO: 9.9 FL (ref 7–12)
POTASSIUM SERPL-SCNC: 5.4 MMOL/L (ref 3.5–5)
RBC # BLD: 3.33 E12/L (ref 3.5–5.5)
SODIUM BLD-SCNC: 135 MMOL/L (ref 132–146)
TOTAL PROTEIN: 6.6 G/DL (ref 6.4–8.3)
WBC # BLD: 10.2 E9/L (ref 4.5–11.5)

## 2021-07-21 PROCEDURE — 36415 COLL VENOUS BLD VENIPUNCTURE: CPT

## 2021-07-21 PROCEDURE — 36591 DRAW BLOOD OFF VENOUS DEVICE: CPT

## 2021-07-21 PROCEDURE — 83735 ASSAY OF MAGNESIUM: CPT

## 2021-07-21 PROCEDURE — 6360000002 HC RX W HCPCS: Performed by: INTERNAL MEDICINE

## 2021-07-21 PROCEDURE — 85025 COMPLETE CBC W/AUTO DIFF WBC: CPT

## 2021-07-21 PROCEDURE — 80053 COMPREHEN METABOLIC PANEL: CPT

## 2021-07-21 PROCEDURE — 2580000003 HC RX 258: Performed by: INTERNAL MEDICINE

## 2021-07-21 RX ORDER — HEPARIN SODIUM (PORCINE) LOCK FLUSH IV SOLN 100 UNIT/ML 100 UNIT/ML
500 SOLUTION INTRAVENOUS PRN
Status: CANCELLED | OUTPATIENT
Start: 2021-07-21

## 2021-07-21 RX ORDER — SODIUM CHLORIDE 0.9 % (FLUSH) 0.9 %
10 SYRINGE (ML) INJECTION PRN
Status: DISCONTINUED | OUTPATIENT
Start: 2021-07-21 | End: 2021-07-22 | Stop reason: HOSPADM

## 2021-07-21 RX ORDER — HEPARIN SODIUM (PORCINE) LOCK FLUSH IV SOLN 100 UNIT/ML 100 UNIT/ML
500 SOLUTION INTRAVENOUS PRN
Status: DISCONTINUED | OUTPATIENT
Start: 2021-07-21 | End: 2021-07-22 | Stop reason: HOSPADM

## 2021-07-21 RX ORDER — SODIUM CHLORIDE 0.9 % (FLUSH) 0.9 %
10 SYRINGE (ML) INJECTION PRN
Status: CANCELLED | OUTPATIENT
Start: 2021-07-21

## 2021-07-21 RX ADMIN — HEPARIN 500 UNITS: 100 SYRINGE at 13:21

## 2021-07-21 RX ADMIN — SODIUM CHLORIDE, PRESERVATIVE FREE 10 ML: 5 INJECTION INTRAVENOUS at 13:21

## 2021-07-21 RX ADMIN — SODIUM CHLORIDE, PRESERVATIVE FREE 10 ML: 5 INJECTION INTRAVENOUS at 13:20

## 2021-07-22 ENCOUNTER — TELEPHONE (OUTPATIENT)
Dept: ONCOLOGY | Age: 56
End: 2021-07-22

## 2021-07-22 ENCOUNTER — HOSPITAL ENCOUNTER (OUTPATIENT)
Dept: INFUSION THERAPY | Age: 56
Discharge: HOME | End: 2021-07-22
Payer: MEDICAID

## 2021-07-22 ENCOUNTER — TELEPHONE (OUTPATIENT)
Dept: CASE MANAGEMENT | Age: 56
End: 2021-07-22

## 2021-07-22 ENCOUNTER — OFFICE VISIT (OUTPATIENT)
Dept: ONCOLOGY | Age: 56
End: 2021-07-22
Payer: MEDICAID

## 2021-07-22 VITALS
DIASTOLIC BLOOD PRESSURE: 72 MMHG | SYSTOLIC BLOOD PRESSURE: 118 MMHG | WEIGHT: 153 LBS | BODY MASS INDEX: 20.72 KG/M2 | OXYGEN SATURATION: 96 % | HEIGHT: 72 IN | TEMPERATURE: 97.3 F | HEART RATE: 80 BPM

## 2021-07-22 VITALS
TEMPERATURE: 96.3 F | SYSTOLIC BLOOD PRESSURE: 114 MMHG | HEART RATE: 77 BPM | DIASTOLIC BLOOD PRESSURE: 71 MMHG | RESPIRATION RATE: 16 BRPM

## 2021-07-22 DIAGNOSIS — C50.111 MALIGNANT NEOPLASM OF CENTRAL PORTION OF RIGHT BREAST IN FEMALE, ESTROGEN RECEPTOR POSITIVE (HCC): Primary | ICD-10-CM

## 2021-07-22 DIAGNOSIS — Z17.0 MALIGNANT NEOPLASM OF CENTRAL PORTION OF RIGHT BREAST IN FEMALE, ESTROGEN RECEPTOR POSITIVE (HCC): Primary | ICD-10-CM

## 2021-07-22 DIAGNOSIS — Z17.0 MALIGNANT NEOPLASM OF CENTRAL PORTION OF RIGHT BREAST IN FEMALE, ESTROGEN RECEPTOR POSITIVE (HCC): ICD-10-CM

## 2021-07-22 DIAGNOSIS — C50.111 MALIGNANT NEOPLASM OF CENTRAL PORTION OF RIGHT BREAST IN FEMALE, ESTROGEN RECEPTOR POSITIVE (HCC): ICD-10-CM

## 2021-07-22 DIAGNOSIS — Z85.3 PERSONAL HISTORY OF BREAST CANCER: Primary | ICD-10-CM

## 2021-07-22 PROCEDURE — 3017F COLORECTAL CA SCREEN DOC REV: CPT | Performed by: INTERNAL MEDICINE

## 2021-07-22 PROCEDURE — 96377 APPLICATON ON-BODY INJECTOR: CPT

## 2021-07-22 PROCEDURE — 96417 CHEMO IV INFUS EACH ADDL SEQ: CPT

## 2021-07-22 PROCEDURE — G9899 SCRN MAM PERF RSLTS DOC: HCPCS | Performed by: INTERNAL MEDICINE

## 2021-07-22 PROCEDURE — G8428 CUR MEDS NOT DOCUMENT: HCPCS | Performed by: INTERNAL MEDICINE

## 2021-07-22 PROCEDURE — 36593 DECLOT VASCULAR DEVICE: CPT

## 2021-07-22 PROCEDURE — 1111F DSCHRG MED/CURRENT MED MERGE: CPT | Performed by: INTERNAL MEDICINE

## 2021-07-22 PROCEDURE — 2580000003 HC RX 258: Performed by: INTERNAL MEDICINE

## 2021-07-22 PROCEDURE — 99214 OFFICE O/P EST MOD 30 MIN: CPT | Performed by: INTERNAL MEDICINE

## 2021-07-22 PROCEDURE — 6360000002 HC RX W HCPCS: Performed by: INTERNAL MEDICINE

## 2021-07-22 PROCEDURE — 96367 TX/PROPH/DG ADDL SEQ IV INF: CPT

## 2021-07-22 PROCEDURE — G8420 CALC BMI NORM PARAMETERS: HCPCS | Performed by: INTERNAL MEDICINE

## 2021-07-22 PROCEDURE — 96413 CHEMO IV INFUSION 1 HR: CPT

## 2021-07-22 PROCEDURE — 96375 TX/PRO/DX INJ NEW DRUG ADDON: CPT

## 2021-07-22 PROCEDURE — 96361 HYDRATE IV INFUSION ADD-ON: CPT

## 2021-07-22 PROCEDURE — 96549 UNLISTED CHEMOTHERAPY PX: CPT

## 2021-07-22 PROCEDURE — 1036F TOBACCO NON-USER: CPT | Performed by: INTERNAL MEDICINE

## 2021-07-22 RX ORDER — MEPERIDINE HYDROCHLORIDE 25 MG/ML
12.5 INJECTION INTRAMUSCULAR; INTRAVENOUS; SUBCUTANEOUS ONCE
Status: CANCELLED | OUTPATIENT
Start: 2021-07-22 | End: 2021-07-22

## 2021-07-22 RX ORDER — PALONOSETRON HYDROCHLORIDE 0.05 MG/ML
0.25 INJECTION, SOLUTION INTRAVENOUS ONCE
Status: COMPLETED | OUTPATIENT
Start: 2021-07-22 | End: 2021-07-22

## 2021-07-22 RX ORDER — LOPERAMIDE HYDROCHLORIDE 2 MG/1
CAPSULE ORAL
COMMUNITY
End: 2021-07-22 | Stop reason: SDUPTHER

## 2021-07-22 RX ORDER — ONDANSETRON 4 MG/1
4 TABLET, FILM COATED ORAL EVERY 8 HOURS PRN
Qty: 60 TABLET | Refills: 1 | Status: SHIPPED
Start: 2021-07-22 | End: 2021-08-12 | Stop reason: SDUPTHER

## 2021-07-22 RX ORDER — METHYLPREDNISOLONE SODIUM SUCCINATE 125 MG/2ML
125 INJECTION, POWDER, LYOPHILIZED, FOR SOLUTION INTRAMUSCULAR; INTRAVENOUS ONCE
Status: CANCELLED | OUTPATIENT
Start: 2021-07-22 | End: 2021-07-22

## 2021-07-22 RX ORDER — SODIUM CHLORIDE 0.9 % (FLUSH) 0.9 %
5 SYRINGE (ML) INJECTION PRN
Status: CANCELLED | OUTPATIENT
Start: 2021-07-22

## 2021-07-22 RX ORDER — SODIUM CHLORIDE 0.9 % (FLUSH) 0.9 %
10 SYRINGE (ML) INJECTION PRN
Status: DISCONTINUED | OUTPATIENT
Start: 2021-07-22 | End: 2021-07-23 | Stop reason: HOSPADM

## 2021-07-22 RX ORDER — DEXAMETHASONE SODIUM PHOSPHATE 10 MG/ML
10 INJECTION, SOLUTION INTRAMUSCULAR; INTRAVENOUS ONCE
Status: CANCELLED | OUTPATIENT
Start: 2021-07-22

## 2021-07-22 RX ORDER — 0.9 % SODIUM CHLORIDE 0.9 %
1000 INTRAVENOUS SOLUTION INTRAVENOUS ONCE
Status: COMPLETED | OUTPATIENT
Start: 2021-07-22 | End: 2021-07-22

## 2021-07-22 RX ORDER — PROCHLORPERAZINE MALEATE 10 MG
10 TABLET ORAL EVERY 6 HOURS PRN
Qty: 60 TABLET | Refills: 1 | Status: SHIPPED
Start: 2021-07-22 | End: 2021-08-12 | Stop reason: SDUPTHER

## 2021-07-22 RX ORDER — SODIUM CHLORIDE 0.9 % (FLUSH) 0.9 %
10 SYRINGE (ML) INJECTION PRN
Status: CANCELLED | OUTPATIENT
Start: 2021-07-22

## 2021-07-22 RX ORDER — HEPARIN SODIUM (PORCINE) LOCK FLUSH IV SOLN 100 UNIT/ML 100 UNIT/ML
500 SOLUTION INTRAVENOUS PRN
Status: CANCELLED | OUTPATIENT
Start: 2021-07-22

## 2021-07-22 RX ORDER — DEXAMETHASONE SODIUM PHOSPHATE 10 MG/ML
10 INJECTION, SOLUTION INTRAMUSCULAR; INTRAVENOUS ONCE
Status: COMPLETED | OUTPATIENT
Start: 2021-07-22 | End: 2021-07-22

## 2021-07-22 RX ORDER — DIPHENHYDRAMINE HYDROCHLORIDE 50 MG/ML
50 INJECTION INTRAMUSCULAR; INTRAVENOUS ONCE
Status: CANCELLED | OUTPATIENT
Start: 2021-07-22 | End: 2021-07-22

## 2021-07-22 RX ORDER — PALONOSETRON HYDROCHLORIDE 0.05 MG/ML
0.25 INJECTION, SOLUTION INTRAVENOUS ONCE
Status: CANCELLED | OUTPATIENT
Start: 2021-07-22

## 2021-07-22 RX ORDER — SODIUM CHLORIDE 9 MG/ML
INJECTION, SOLUTION INTRAVENOUS CONTINUOUS
Status: CANCELLED | OUTPATIENT
Start: 2021-07-22

## 2021-07-22 RX ORDER — EPINEPHRINE 1 MG/ML
0.3 INJECTION, SOLUTION, CONCENTRATE INTRAVENOUS PRN
Status: CANCELLED | OUTPATIENT
Start: 2021-07-22

## 2021-07-22 RX ORDER — 0.9 % SODIUM CHLORIDE 0.9 %
1000 INTRAVENOUS SOLUTION INTRAVENOUS ONCE
Status: CANCELLED
Start: 2021-07-22 | End: 2021-07-22

## 2021-07-22 RX ORDER — HEPARIN SODIUM (PORCINE) LOCK FLUSH IV SOLN 100 UNIT/ML 100 UNIT/ML
500 SOLUTION INTRAVENOUS PRN
Status: DISCONTINUED | OUTPATIENT
Start: 2021-07-22 | End: 2021-07-23 | Stop reason: HOSPADM

## 2021-07-22 RX ORDER — LOPERAMIDE HYDROCHLORIDE 2 MG/1
CAPSULE ORAL
Qty: 80 CAPSULE | Refills: 0 | Status: SHIPPED | OUTPATIENT
Start: 2021-07-22 | End: 2021-08-12 | Stop reason: SDUPTHER

## 2021-07-22 RX ADMIN — DOCETAXEL 140 MG: 20 INJECTION, SOLUTION INTRAVENOUS at 11:14

## 2021-07-22 RX ADMIN — PALONOSETRON 0.25 MG: 0.25 INJECTION, SOLUTION INTRAVENOUS at 09:37

## 2021-07-22 RX ADMIN — PEGFILGRASTIM 6 MG: KIT SUBCUTANEOUS at 13:39

## 2021-07-22 RX ADMIN — SODIUM CHLORIDE 1000 ML: 9 INJECTION, SOLUTION INTRAVENOUS at 09:37

## 2021-07-22 RX ADMIN — TRASTUZUMAB-ANNS 462 MG: 420 INJECTION, POWDER, LYOPHILIZED, FOR SOLUTION INTRAVENOUS at 10:29

## 2021-07-22 RX ADMIN — PERTUZUMAB 420 MG: 30 INJECTION, SOLUTION, CONCENTRATE INTRAVENOUS at 10:29

## 2021-07-22 RX ADMIN — DEXAMETHASONE SODIUM PHOSPHATE 10 MG: 10 INJECTION, SOLUTION INTRAMUSCULAR; INTRAVENOUS at 09:39

## 2021-07-22 RX ADMIN — CARBOPLATIN 700 MG: 10 INJECTION, SOLUTION INTRAVENOUS at 12:38

## 2021-07-22 RX ADMIN — SODIUM CHLORIDE, PRESERVATIVE FREE 10 ML: 5 INJECTION INTRAVENOUS at 13:58

## 2021-07-22 RX ADMIN — Medication 500 UNITS: at 13:58

## 2021-07-22 RX ADMIN — SODIUM CHLORIDE, PRESERVATIVE FREE 10 ML: 5 INJECTION INTRAVENOUS at 09:39

## 2021-07-22 RX ADMIN — ALTEPLASE 2 MG: 2.2 INJECTION, POWDER, LYOPHILIZED, FOR SOLUTION INTRAVENOUS at 08:49

## 2021-07-22 RX ADMIN — FOSAPREPITANT 150 MG: 150 INJECTION, POWDER, LYOPHILIZED, FOR SOLUTION INTRAVENOUS at 09:44

## 2021-07-22 RX ADMIN — WATER 2.2 ML: 1 INJECTION INTRAMUSCULAR; INTRAVENOUS; SUBCUTANEOUS at 08:49

## 2021-07-22 NOTE — PROGRESS NOTES
Department of [facility] Oncology  Attending Clinic Note    Reason for Visit: Follow-up on a patient with Right Breast Cancer    PCP:  Yanna Cheney DO    History of Present Illness: The mass was located in the 6 o'clock position of right breast    Breast cancer risk factors include infrequent SMEs and age and gender    Bilateral Screening Mammogram 10/06/2020: There are suspicious calcifications in the right breast at the 6 o'clock position which appear pleomorphic. These calcifications are in a segmental distribution. These clustered calcifications are suspicious for malignancy. Right breast, calcifications at the 6:00, core biopsy on 10/30/2020:    - Small focus of invasive ductal carcinoma, grade 3 (3+3+2 = 8)    - Ductal carcinoma in situ, high nuclear grade, comedo/cribriform/solid types;    - Microcalcifications in DCIS    - Breast receptor and biomarkers (per outside report without looking the   slides:     ER: Positive, 89.8%, strong intensity     [place]: Positive, 52.2%, moderate intensity     HER-2: Positive (score 3+)     Ki-67: High proliferation, 26.2%     P53: Negative, 0.2%     Comment:The invasive carcinoma is intermingled with DCIS and measures   3.0 x 2.0 mm in greatest dimension on the slides. CXR PA/Lateral 12/11/2020:  No acute process. Right Axillary U/S on 12/11/2020: There is no axillary LN.     MRI Bilateral Breast 01/05/2021:  Focal, heterogeneous non mass enhancement identified in the right breast 6 o'clock which measures approximately 1.9 x 1.4 x 1.7 cm (image 19 of the axial T1 post contrast series).    No additional foci of disease identified on the right  There is no lymphadenopathy    Needle localized Right lumpectomy with SLNBx with mediport placement on 02/05/2021  A.  Right axillary sentinel lymph node #1, excision: 1 lymph node negative for malignancy     B.  Right axillary contents, regional resection: 1 lymph node positive for metastatic, poorly differentiated ductal carcinoma (grade 3)   Extranodal extension present     C.  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;   positive for extensive intraductal component   Skin-uninvolved by malignancy   Invasive carcinoma margins-uninvolved by invasive carcinoma        Distance from closest margin: 4 mm from anterior margin   DCIS margins-inferior and medial margins involved by DCIS   Regional lymph nodes-involved by tumor cells        Number of lymph nodes with macrometastasis: 1        Number of lymph nodes with micrometastasis: 0        Number of lymph nodes with isolated tumor cells: 0        Size of largest metastatic deposit: 1.5 cm        Extranodal extension: Present        Total number of lymph nodes examined: 2        Number of sentinel nodes examined: 1   Treatment effect in the breast-no known presurgical therapy   TNM classification (AJCC eighth edition)-  pT1c N1a MX     Bone scan, CT chest/abdomen/pelvis 02/26/2021 negative for metastatic disease    Re-excision lumpectomy of inferior and medial margins on 03/09/2021  A. Right breast, new inferior margin, excision: Fat necrosis, foreign body-type giant cell reaction, chronic inflammation, and fibrosis consistent with previous procedure   No evidence of residual carcinoma   Final inferior margin negative for carcinoma     B.  Right breast, new medial margin, excision: Residual high-grade ductal carcinoma in situ   Ductal carcinoma in situ present less than 1 mm from red/superior margin and green/anterior margin   Fat necrosis, foreign body-type giant cell reaction, chronic   inflammation, and fibrosis consistent with previous procedure   Fibrocystic change   Microcalcifications associated with benign breast tissue and DCIS   Comment:Residual ductal carcinoma in situ is present in 5 out of 13 tissue blocks of part B    Recommended adjuvant chemotherapy (TCHP) for 6 cycles followed by adjuvant RT followed lastly by endocrine therapy. Side effects TCHP reviewed with patient. She agreed to proceed. 2d-echo 02/17/2021 noted EF 60-65%  Cycle # 1 adjuvant TCHP was on 04/08/2021. Cycle # 2 adjuvant TCHP was on 04/29/2021. Cycle # 3 adjuvant TCHP was on 05/20/2021. Cycle # 4 adjuvant TCHP was on 06/10/2021. Cycle # 5 adjuvant TCHP is today 07/22/2021. Today 07/22/2021. No fever, chills. Fair appetite and energy level. No N/V. No diarrhea. Review of Systems;  CONSTITUTIONAL: No fever, chills. Fair appetite and energy level. ENMT: Eyes: No diplopia; Nose: No epistaxis. Mouth: No sore throat. RESPIRATORY: No hemoptysis, shortness of breath, cough. CARDIOVASCULAR: No chest pain, palpitations. GASTROINTESTINAL: No nausea/vomiting, abdominal pain. GENITOURINARY: No dysuria, urinary frequency, hematuria. NEURO: No syncope, presyncope, headache. Remainder:  ROS NEGATIVE    Past Medical History:      Diagnosis Date    Anxiety     Arthritis     Cancer ([address] 2021    breast    Cervical radiculopathy     Chronic back pain     Depression     Diabetes mellitus type 2, uncontrolled ([address] 2/12/2017    GERD (gastroesophageal reflux disease)     History of blood transfusion 01/2018    back surgery, lumbar, dr Lico Ball    Hypertension     Right leg pain     seeing doctor currently problems with hardware     Medications:  Reviewed and reconciled. Allergies:   Allergies   Allergen Reactions    Ceftriaxone Shortness Of Breath     Physical Exam:  BP 118/72   Pulse 80   Temp 97.3 °F (36.3 °C)   Ht 6' 1\" (1.854 m)   Wt 153 lb (69.4 kg)   LMP 08/13/2013   SpO2 96%   BMI 20.19 kg/m²   GENERAL: Alert, oriented x 3, not in acute distress. HEENT: PERRLA; EOMI. Oropharynx clear. NECK: Supple. Without lymphadenopathy. LUNGS: Good air entry bilaterally. No wheezing, crackles or ronchi. CARDIOVASCULAR: Regular rate. No murmurs, rubs or gallops. ABDOMEN: Soft. Non-tender, non-distended. Positive bowel sounds. EXTREMITIES: Without clubbing, cyanosis, or edema. NEUROLOGIC: No focal deficits. ECOG PS 1    Impression/Plan:  62 y/o female with Right Breast Cancer    Bilateral Screening Mammogram 10/06/2020: There are suspicious calcifications in the right breast at the 6 o'clock position which appear pleomorphic. These calcifications are in a segmental distribution. These clustered calcifications are suspicious for malignancy. Right breast, calcifications at the 6:00, core biopsy on 10/30/2020:    - Small focus of invasive ductal carcinoma, grade 3 (3+3+2 = 8)    - Ductal carcinoma in situ, high nuclear grade, comedo/cribriform/solid types;    - Microcalcifications in DCIS    - Breast receptor and biomarkers (per outside report without looking the   slides:     ER: Positive, 89.8%, strong intensity     [place]: Positive, 52.2%, moderate intensity     HER-2: Positive (score 3+)     Ki-67: High proliferation, 26.2%     P53: Negative, 0.2%     Comment:The invasive carcinoma is intermingled with DCIS and measures   3.0 x 2.0 mm in greatest dimension on the slides. CXR PA/Lateral 12/11/2020:  No acute process. Right Axillary U/S on 12/11/2020: There is no axillary LN.     MRI Bilateral Breast 01/05/2021:  Focal, heterogeneous non mass enhancement identified in the right breast 6 o'clock which measures approximately 1.9 x 1.4 x 1.7 cm (image 19 of the axial T1 post contrast series).    No additional foci of disease identified on the right  There is no lymphadenopathy    Needle localized Right lumpectomy with SLNBx with mediport placement on 02/05/2021  A.  Right axillary sentinel lymph node #1, excision: 1 lymph node negative for malignancy     B.  Right axillary contents, regional resection: 1 lymph node positive for metastatic, poorly differentiated ductal carcinoma (grade 3)   Extranodal extension present     C.  Right breast, lumpectomy: Invasive, poorly differentiated ductal carcinoma (grade 3) and high-grade ductal carcinoma in situ   High-grade ductal carcinoma in situ involves inferior and medial surgical margins     CANCER CASE SUMMARY   Procedure-excision   Specimen laterality-right   Tumor size-1.1 x 0.8 x 0.5 cm   Histologic type-invasive carcinoma of no special type (ductal)   [place] histologic score-3 (tubule formation) +3 (nuclear   pleomorphism) +2 (mitotic count) = 8   Overall grade-grade 3   Ductal carcinoma in situ-high-grade DCIS with comedonecrosis is present;   positive for extensive intraductal component   Skin-uninvolved by malignancy   Invasive carcinoma margins-uninvolved by invasive carcinoma        Distance from closest margin: 4 mm from anterior margin   DCIS margins-inferior and medial margins involved by DCIS   Regional lymph nodes-involved by tumor cells        Number of lymph nodes with macrometastasis: 1        Number of lymph nodes with micrometastasis: 0        Number of lymph nodes with isolated tumor cells: 0        Size of largest metastatic deposit: 1.5 cm        Extranodal extension: Present        Total number of lymph nodes examined: 2        Number of sentinel nodes examined: 1   Treatment effect in the breast-no known presurgical therapy   TNM classification (AJCC eighth edition)-  pT1c N1a MX     Bone scan, CT chest/abdomen/pelvis 02/26/2021 negative for metastatic disease    Re-excision lumpectomy of inferior and medial margins on 03/09/2021  A.  Right breast, new inferior margin, excision: Fat necrosis, foreign body-type giant cell reaction, chronic inflammation, and fibrosis consistent with previous procedure   No evidence of residual carcinoma   Final inferior margin negative for carcinoma     B. Right breast, new medial margin, excision: Residual high-grade ductal carcinoma in situ   Ductal carcinoma in situ present less than 1 mm from red/superior margin and green/anterior margin   Fat necrosis, foreign body-type giant cell reaction, chronic   inflammation, and fibrosis consistent with previous procedure   Fibrocystic change   Microcalcifications associated with benign breast tissue and DCIS   Comment:Residual ductal carcinoma in situ is present in 5 out of 13 tissue blocks of part B    Recommended adjuvant chemotherapy (TCHP) for 6 cycles followed by adjuvant RT followed lastly by endocrine therapy. Side effects TCHP reviewed with patient. She agreed to proceed. 2d-echo 02/17/2021 noted EF 60-65%  Cycle # 1 adjuvant TCHP was on 04/08/2021. Cycle # 2 adjuvant TCHP was on 04/29/2021. Cycle # 3 adjuvant TCHP was on 05/20/2021. Cycle # 4 adjuvant TCHP was on 06/10/2021. Cycle # 5 adjuvant TCHP is today 07/22/2021. Labs reviewed ok to proceed.      RTC 3 weeks for Cycle # 6 adjuvant TCHP    Krishna Ruiz MD   [id number]  Board Certified Medical Oncologist

## 2021-07-22 NOTE — TELEPHONE ENCOUNTER
Met with pt at request of med onc NP re: housing concerns. Pt is 48-year-old female being treated for breast CA. Pt's mood appeared euthymic with full affect, she appeared A&Ox4, and she was willing/able to participate in session. She appeared appropriately dressed/groomed and was seated in infusion chair. Pt noted that she is currently residing with her sister in [place] stating \"it's not working out. \"  Indicated that she would like to move to [facility] in order to be closer for her CA tx. Inquired about applying at [facility]. Advised that per chart review pt completed application with oncology SW on 3/26/2021; pt reported that she believed this was for [facility]. Yogesh ([phone]) to review status of pt's application. Was advised that there are [phone] applicants on wait list prior to pt but that she would qualify for seniors high rises. Pt to provide written notice to [employer] if she wishes to be placed on wait list for senior high rises. Notified pt of above. Information on housing options provided to pt for her review. Met with pt to review housing options. Assisted with drafting letter to [employer] to request to be placed on wait list for senior high rises. Letter faxed to [employer] per pt's request and copy was provided to pt. No additional needs identified at this time.

## 2021-07-22 NOTE — PROGRESS NOTES
Disccusd patient with RT  before starting RT as patient lives in [facility] and will find it hard to get transportation for Monday-Friday appointments

## 2021-07-22 NOTE — PROGRESS NOTES
medication. [employer] GUIDELINES FOR CLINICAL CHARACTERISTICS OF MALNUTRITION IN CHRONIC ILLNESS     Moderate Malnutrition  Severe Malnutrition    Energy intake  <75% energy intake compared to estimated needs for >1month <75% energy intake compared to estimated needs for >1month   Weight changes  5% x 1 month  7.5% x 3 months   10% x 6 months   20% x 1 year  >5% x 1 month  >7.5% x 3 months   >10% x 6 months   >20% x 1 year    Physical findings  Mild   Decrease subcutaneous fat    Decrease muscle mass     Increase fluid/edema   Severe  Decrease subcutaneous fat    Decrease muscle mass     Increase fluid/edema      Moderate malnutrition evidenced by 7% weight loss in 3mo, <75%intake x3mo with diarrhea and nausea from chemotherapy.       Jitendra Hernandez, RD, LD,RD,LD

## 2021-07-22 NOTE — TELEPHONE ENCOUNTER
Met with patient in the treatment room during her chemotherapy treatment for follow up. Patient states that she is doing ok. She is working with [address] [place] [address] Dr [place] for housing needs. I called and spoke to Sae Michel re: patient with more questions about housing and possible meal delivery. Reports eating not well, she is concerned about losing weight. Sleeping well. Provided support and encouragement. Denies any further needs for assistance. Patient appreciative of visit. Will continue to follow.

## 2021-07-28 ENCOUNTER — HOSPITAL ENCOUNTER (OUTPATIENT)
Dept: INFUSION THERAPY | Age: 56
Discharge: HOME | End: 2021-07-28
Payer: MEDICAID

## 2021-07-28 VITALS
DIASTOLIC BLOOD PRESSURE: 63 MMHG | SYSTOLIC BLOOD PRESSURE: 108 MMHG | TEMPERATURE: 97.9 F | HEART RATE: 85 BPM | RESPIRATION RATE: 16 BRPM

## 2021-07-28 DIAGNOSIS — C50.111 MALIGNANT NEOPLASM OF CENTRAL PORTION OF RIGHT BREAST IN FEMALE, ESTROGEN RECEPTOR POSITIVE (HCC): Primary | ICD-10-CM

## 2021-07-28 DIAGNOSIS — Z17.0 MALIGNANT NEOPLASM OF CENTRAL PORTION OF RIGHT BREAST IN FEMALE, ESTROGEN RECEPTOR POSITIVE (HCC): Primary | ICD-10-CM

## 2021-07-28 PROCEDURE — 6360000002 HC RX W HCPCS: Performed by: NURSE PRACTITIONER

## 2021-07-28 PROCEDURE — 96360 HYDRATION IV INFUSION INIT: CPT

## 2021-07-28 PROCEDURE — 2580000003 HC RX 258: Performed by: NURSE PRACTITIONER

## 2021-07-28 RX ORDER — SODIUM CHLORIDE 0.9 % (FLUSH) 0.9 %
5-40 SYRINGE (ML) INJECTION PRN
Status: CANCELLED | OUTPATIENT
Start: 2021-07-28

## 2021-07-28 RX ORDER — 0.9 % SODIUM CHLORIDE 0.9 %
1000 INTRAVENOUS SOLUTION INTRAVENOUS ONCE
Status: COMPLETED | OUTPATIENT
Start: 2021-07-28 | End: 2021-07-28

## 2021-07-28 RX ORDER — HEPARIN SODIUM (PORCINE) LOCK FLUSH IV SOLN 100 UNIT/ML 100 UNIT/ML
500 SOLUTION INTRAVENOUS PRN
Status: DISCONTINUED | OUTPATIENT
Start: 2021-07-28 | End: 2021-07-29 | Stop reason: HOSPADM

## 2021-07-28 RX ORDER — SODIUM CHLORIDE 9 MG/ML
25 INJECTION, SOLUTION INTRAVENOUS PRN
Status: CANCELLED | OUTPATIENT
Start: 2021-07-28

## 2021-07-28 RX ORDER — 0.9 % SODIUM CHLORIDE 0.9 %
1000 INTRAVENOUS SOLUTION INTRAVENOUS ONCE
Status: CANCELLED | OUTPATIENT
Start: 2021-07-28 | End: 2021-07-28

## 2021-07-28 RX ORDER — HEPARIN SODIUM (PORCINE) LOCK FLUSH IV SOLN 100 UNIT/ML 100 UNIT/ML
500 SOLUTION INTRAVENOUS PRN
Status: CANCELLED | OUTPATIENT
Start: 2021-07-28

## 2021-07-28 RX ORDER — SODIUM CHLORIDE 0.9 % (FLUSH) 0.9 %
5-40 SYRINGE (ML) INJECTION PRN
Status: DISCONTINUED | OUTPATIENT
Start: 2021-07-28 | End: 2021-07-29 | Stop reason: HOSPADM

## 2021-07-28 RX ORDER — SODIUM CHLORIDE 9 MG/ML
25 INJECTION, SOLUTION INTRAVENOUS PRN
Status: DISCONTINUED | OUTPATIENT
Start: 2021-07-28 | End: 2021-07-29 | Stop reason: HOSPADM

## 2021-07-28 RX ADMIN — HEPARIN 500 UNITS: 100 SYRINGE at 13:47

## 2021-07-28 RX ADMIN — SODIUM CHLORIDE 1000 ML: 9 INJECTION, SOLUTION INTRAVENOUS at 12:32

## 2021-08-05 ENCOUNTER — TELEPHONE (OUTPATIENT)
Dept: ONCOLOGY | Age: 56
End: 2021-08-05

## 2021-08-11 ENCOUNTER — HOSPITAL ENCOUNTER (OUTPATIENT)
Dept: INFUSION THERAPY | Age: 56
Discharge: HOME | End: 2021-08-11
Payer: MEDICAID

## 2021-08-11 DIAGNOSIS — C50.111 MALIGNANT NEOPLASM OF CENTRAL PORTION OF RIGHT BREAST IN FEMALE, ESTROGEN RECEPTOR POSITIVE (HCC): Primary | ICD-10-CM

## 2021-08-11 DIAGNOSIS — Z17.0 MALIGNANT NEOPLASM OF CENTRAL PORTION OF RIGHT BREAST IN FEMALE, ESTROGEN RECEPTOR POSITIVE (HCC): Primary | ICD-10-CM

## 2021-08-11 DIAGNOSIS — Z85.3 PERSONAL HISTORY OF BREAST CANCER: ICD-10-CM

## 2021-08-11 LAB
ALBUMIN SERPL-MCNC: 3.5 G/DL (ref 3.5–5.2)
ALP BLD-CCNC: 179 U/L (ref 35–104)
ALT SERPL-CCNC: 52 U/L (ref 0–32)
ANION GAP SERPL CALCULATED.3IONS-SCNC: 7 MMOL/L (ref 7–16)
AST SERPL-CCNC: 50 U/L (ref 0–31)
BASOPHILS ABSOLUTE: 0.03 E9/L (ref 0–0.2)
BASOPHILS RELATIVE PERCENT: 0.4 % (ref 0–2)
BILIRUB SERPL-MCNC: 0.2 MG/DL (ref 0–1.2)
BUN BLDV-MCNC: 8 MG/DL (ref 6–20)
CALCIUM SERPL-MCNC: 9.1 MG/DL (ref 8.6–10.2)
CHLORIDE BLD-SCNC: 101 MMOL/L (ref 98–107)
CO2: 27 MMOL/L (ref 22–29)
CREAT SERPL-MCNC: 0.7 MG/DL (ref 0.5–1)
EOSINOPHILS ABSOLUTE: 0 E9/L (ref 0.05–0.5)
EOSINOPHILS RELATIVE PERCENT: 0 % (ref 0–6)
GFR AFRICAN AMERICAN: >60
GFR NON-AFRICAN AMERICAN: >60 ML/MIN/1.73
GLUCOSE BLD-MCNC: 117 MG/DL (ref 74–99)
HCT VFR BLD CALC: 31.5 % (ref 34–48)
HEMOGLOBIN: 10.5 G/DL (ref 11.5–15.5)
IMMATURE GRANULOCYTES #: 0.03 E9/L
IMMATURE GRANULOCYTES %: 0.4 % (ref 0–5)
LYMPHOCYTES ABSOLUTE: 2.89 E9/L (ref 1.5–4)
LYMPHOCYTES RELATIVE PERCENT: 33.9 % (ref 20–42)
MAGNESIUM: 1.5 MG/DL (ref 1.6–2.6)
MCH RBC QN AUTO: 34.8 PG (ref 26–35)
MCHC RBC AUTO-ENTMCNC: 33.3 % (ref 32–34.5)
MCV RBC AUTO: 104.3 FL (ref 80–99.9)
MONOCYTES ABSOLUTE: 0.92 E9/L (ref 0.1–0.95)
MONOCYTES RELATIVE PERCENT: 10.8 % (ref 2–12)
NEUTROPHILS ABSOLUTE: 4.65 E9/L (ref 1.8–7.3)
NEUTROPHILS RELATIVE PERCENT: 54.5 % (ref 43–80)
PDW BLD-RTO: 13.6 FL (ref 11.5–15)
PLATELET # BLD: 125 E9/L (ref 130–450)
PMV BLD AUTO: 10.4 FL (ref 7–12)
POTASSIUM SERPL-SCNC: 4.4 MMOL/L (ref 3.5–5)
RBC # BLD: 3.02 E12/L (ref 3.5–5.5)
SODIUM BLD-SCNC: 135 MMOL/L (ref 132–146)
TOTAL PROTEIN: 6 G/DL (ref 6.4–8.3)
WBC # BLD: 8.5 E9/L (ref 4.5–11.5)

## 2021-08-11 PROCEDURE — 36591 DRAW BLOOD OFF VENOUS DEVICE: CPT

## 2021-08-11 PROCEDURE — 85025 COMPLETE CBC W/AUTO DIFF WBC: CPT

## 2021-08-11 PROCEDURE — 80053 COMPREHEN METABOLIC PANEL: CPT

## 2021-08-11 PROCEDURE — 6360000002 HC RX W HCPCS: Performed by: INTERNAL MEDICINE

## 2021-08-11 PROCEDURE — 2580000003 HC RX 258: Performed by: INTERNAL MEDICINE

## 2021-08-11 PROCEDURE — 83735 ASSAY OF MAGNESIUM: CPT

## 2021-08-11 RX ORDER — SODIUM CHLORIDE 0.9 % (FLUSH) 0.9 %
10 SYRINGE (ML) INJECTION PRN
Status: DISCONTINUED | OUTPATIENT
Start: 2021-08-11 | End: 2021-08-12 | Stop reason: HOSPADM

## 2021-08-11 RX ORDER — HEPARIN SODIUM (PORCINE) LOCK FLUSH IV SOLN 100 UNIT/ML 100 UNIT/ML
500 SOLUTION INTRAVENOUS PRN
Status: DISCONTINUED | OUTPATIENT
Start: 2021-08-11 | End: 2021-08-12 | Stop reason: HOSPADM

## 2021-08-11 RX ORDER — HEPARIN SODIUM (PORCINE) LOCK FLUSH IV SOLN 100 UNIT/ML 100 UNIT/ML
500 SOLUTION INTRAVENOUS PRN
Status: CANCELLED | OUTPATIENT
Start: 2021-08-11

## 2021-08-11 RX ORDER — SODIUM CHLORIDE 0.9 % (FLUSH) 0.9 %
10 SYRINGE (ML) INJECTION PRN
Status: CANCELLED | OUTPATIENT
Start: 2021-08-11

## 2021-08-11 RX ADMIN — SODIUM CHLORIDE, PRESERVATIVE FREE 10 ML: 5 INJECTION INTRAVENOUS at 13:37

## 2021-08-11 RX ADMIN — HEPARIN 500 UNITS: 100 SYRINGE at 13:37

## 2021-08-12 ENCOUNTER — OFFICE VISIT (OUTPATIENT)
Dept: ONCOLOGY | Age: 56
End: 2021-08-12
Payer: MEDICAID

## 2021-08-12 ENCOUNTER — TELEPHONE (OUTPATIENT)
Dept: ONCOLOGY | Age: 56
End: 2021-08-12

## 2021-08-12 ENCOUNTER — HOSPITAL ENCOUNTER (OUTPATIENT)
Dept: INFUSION THERAPY | Age: 56
Discharge: HOME | End: 2021-08-12
Payer: MEDICAID

## 2021-08-12 VITALS
DIASTOLIC BLOOD PRESSURE: 65 MMHG | HEART RATE: 91 BPM | SYSTOLIC BLOOD PRESSURE: 105 MMHG | RESPIRATION RATE: 16 BRPM | TEMPERATURE: 97.7 F

## 2021-08-12 VITALS
OXYGEN SATURATION: 100 % | HEIGHT: 72 IN | HEART RATE: 89 BPM | TEMPERATURE: 98 F | SYSTOLIC BLOOD PRESSURE: 126 MMHG | RESPIRATION RATE: 14 BRPM | WEIGHT: 153 LBS | BODY MASS INDEX: 20.72 KG/M2 | DIASTOLIC BLOOD PRESSURE: 62 MMHG

## 2021-08-12 DIAGNOSIS — C50.111 MALIGNANT NEOPLASM OF CENTRAL PORTION OF RIGHT BREAST IN FEMALE, ESTROGEN RECEPTOR POSITIVE (HCC): ICD-10-CM

## 2021-08-12 DIAGNOSIS — Z17.0 MALIGNANT NEOPLASM OF CENTRAL PORTION OF RIGHT BREAST IN FEMALE, ESTROGEN RECEPTOR POSITIVE (HCC): Primary | ICD-10-CM

## 2021-08-12 DIAGNOSIS — C50.111 MALIGNANT NEOPLASM OF CENTRAL PORTION OF RIGHT BREAST IN FEMALE, ESTROGEN RECEPTOR POSITIVE (HCC): Primary | ICD-10-CM

## 2021-08-12 DIAGNOSIS — Z17.0 MALIGNANT NEOPLASM OF CENTRAL PORTION OF RIGHT BREAST IN FEMALE, ESTROGEN RECEPTOR POSITIVE (HCC): ICD-10-CM

## 2021-08-12 PROCEDURE — G8420 CALC BMI NORM PARAMETERS: HCPCS | Performed by: INTERNAL MEDICINE

## 2021-08-12 PROCEDURE — 96549 UNLISTED CHEMOTHERAPY PX: CPT

## 2021-08-12 PROCEDURE — 3017F COLORECTAL CA SCREEN DOC REV: CPT | Performed by: INTERNAL MEDICINE

## 2021-08-12 PROCEDURE — 96366 THER/PROPH/DIAG IV INF ADDON: CPT

## 2021-08-12 PROCEDURE — 6360000002 HC RX W HCPCS: Performed by: INTERNAL MEDICINE

## 2021-08-12 PROCEDURE — 96417 CHEMO IV INFUS EACH ADDL SEQ: CPT

## 2021-08-12 PROCEDURE — G8427 DOCREV CUR MEDS BY ELIG CLIN: HCPCS | Performed by: INTERNAL MEDICINE

## 2021-08-12 PROCEDURE — G9899 SCRN MAM PERF RSLTS DOC: HCPCS | Performed by: INTERNAL MEDICINE

## 2021-08-12 PROCEDURE — 96413 CHEMO IV INFUSION 1 HR: CPT

## 2021-08-12 PROCEDURE — 96377 APPLICATON ON-BODY INJECTOR: CPT

## 2021-08-12 PROCEDURE — 96361 HYDRATE IV INFUSION ADD-ON: CPT

## 2021-08-12 PROCEDURE — 2580000003 HC RX 258: Performed by: INTERNAL MEDICINE

## 2021-08-12 PROCEDURE — 1036F TOBACCO NON-USER: CPT | Performed by: INTERNAL MEDICINE

## 2021-08-12 PROCEDURE — 2580000003 HC RX 258

## 2021-08-12 PROCEDURE — 6360000002 HC RX W HCPCS: Performed by: NURSE PRACTITIONER

## 2021-08-12 PROCEDURE — 99214 OFFICE O/P EST MOD 30 MIN: CPT | Performed by: INTERNAL MEDICINE

## 2021-08-12 PROCEDURE — 96375 TX/PRO/DX INJ NEW DRUG ADDON: CPT

## 2021-08-12 PROCEDURE — 96367 TX/PROPH/DG ADDL SEQ IV INF: CPT

## 2021-08-12 RX ORDER — SODIUM CHLORIDE 9 MG/ML
INJECTION, SOLUTION INTRAVENOUS CONTINUOUS
Status: DISCONTINUED | OUTPATIENT
Start: 2021-08-12 | End: 2021-08-13 | Stop reason: HOSPADM

## 2021-08-12 RX ORDER — 0.9 % SODIUM CHLORIDE 0.9 %
1000 INTRAVENOUS SOLUTION INTRAVENOUS ONCE
Status: COMPLETED | OUTPATIENT
Start: 2021-08-12 | End: 2021-08-12

## 2021-08-12 RX ORDER — DIPHENHYDRAMINE HYDROCHLORIDE 50 MG/ML
50 INJECTION INTRAMUSCULAR; INTRAVENOUS ONCE
Status: CANCELLED | OUTPATIENT
Start: 2021-08-12 | End: 2021-08-12

## 2021-08-12 RX ORDER — PROCHLORPERAZINE MALEATE 10 MG
10 TABLET ORAL EVERY 6 HOURS PRN
Qty: 60 TABLET | Refills: 1 | Status: SHIPPED | OUTPATIENT
Start: 2021-08-12

## 2021-08-12 RX ORDER — SODIUM CHLORIDE 9 MG/ML
INJECTION, SOLUTION INTRAVENOUS CONTINUOUS
Status: CANCELLED | OUTPATIENT
Start: 2021-08-12

## 2021-08-12 RX ORDER — MEPERIDINE HYDROCHLORIDE 25 MG/ML
12.5 INJECTION INTRAMUSCULAR; INTRAVENOUS; SUBCUTANEOUS ONCE
Status: CANCELLED | OUTPATIENT
Start: 2021-08-12 | End: 2021-08-12

## 2021-08-12 RX ORDER — DEXAMETHASONE SODIUM PHOSPHATE 10 MG/ML
10 INJECTION, SOLUTION INTRAMUSCULAR; INTRAVENOUS ONCE
Status: COMPLETED | OUTPATIENT
Start: 2021-08-12 | End: 2021-08-12

## 2021-08-12 RX ORDER — SODIUM CHLORIDE 0.9 % (FLUSH) 0.9 %
5 SYRINGE (ML) INJECTION PRN
Status: CANCELLED | OUTPATIENT
Start: 2021-08-12

## 2021-08-12 RX ORDER — MAGNESIUM SULFATE IN WATER 40 MG/ML
2000 INJECTION, SOLUTION INTRAVENOUS ONCE
Status: COMPLETED | OUTPATIENT
Start: 2021-08-12 | End: 2021-08-12

## 2021-08-12 RX ORDER — DEXAMETHASONE SODIUM PHOSPHATE 10 MG/ML
10 INJECTION, SOLUTION INTRAMUSCULAR; INTRAVENOUS ONCE
Status: CANCELLED | OUTPATIENT
Start: 2021-08-12

## 2021-08-12 RX ORDER — MAGNESIUM SULFATE IN WATER 40 MG/ML
2000 INJECTION, SOLUTION INTRAVENOUS ONCE
Status: CANCELLED
Start: 2021-08-12 | End: 2021-08-12

## 2021-08-12 RX ORDER — HEPARIN SODIUM (PORCINE) LOCK FLUSH IV SOLN 100 UNIT/ML 100 UNIT/ML
500 SOLUTION INTRAVENOUS PRN
Status: DISCONTINUED | OUTPATIENT
Start: 2021-08-12 | End: 2021-08-13 | Stop reason: HOSPADM

## 2021-08-12 RX ORDER — EPINEPHRINE 1 MG/ML
0.3 INJECTION, SOLUTION, CONCENTRATE INTRAVENOUS PRN
Status: CANCELLED | OUTPATIENT
Start: 2021-08-12

## 2021-08-12 RX ORDER — 0.9 % SODIUM CHLORIDE 0.9 %
1000 INTRAVENOUS SOLUTION INTRAVENOUS ONCE
Status: CANCELLED
Start: 2021-08-12 | End: 2021-08-12

## 2021-08-12 RX ORDER — HEPARIN SODIUM (PORCINE) LOCK FLUSH IV SOLN 100 UNIT/ML 100 UNIT/ML
500 SOLUTION INTRAVENOUS PRN
Status: CANCELLED | OUTPATIENT
Start: 2021-08-12

## 2021-08-12 RX ORDER — LOPERAMIDE HYDROCHLORIDE 2 MG/1
CAPSULE ORAL
Qty: 80 CAPSULE | Refills: 0 | Status: SHIPPED | OUTPATIENT
Start: 2021-08-12 | End: 2021-09-29 | Stop reason: ALTCHOICE

## 2021-08-12 RX ORDER — SODIUM CHLORIDE 0.9 % (FLUSH) 0.9 %
10 SYRINGE (ML) INJECTION PRN
Status: CANCELLED | OUTPATIENT
Start: 2021-08-12

## 2021-08-12 RX ORDER — PALONOSETRON HYDROCHLORIDE 0.05 MG/ML
0.25 INJECTION, SOLUTION INTRAVENOUS ONCE
Status: COMPLETED | OUTPATIENT
Start: 2021-08-12 | End: 2021-08-12

## 2021-08-12 RX ORDER — SODIUM CHLORIDE 9 MG/ML
INJECTION, SOLUTION INTRAVENOUS
Status: DISPENSED
Start: 2021-08-12 | End: 2021-08-12

## 2021-08-12 RX ORDER — SODIUM CHLORIDE 0.9 % (FLUSH) 0.9 %
10 SYRINGE (ML) INJECTION PRN
Status: DISCONTINUED | OUTPATIENT
Start: 2021-08-12 | End: 2021-08-13 | Stop reason: HOSPADM

## 2021-08-12 RX ORDER — PALONOSETRON HYDROCHLORIDE 0.05 MG/ML
0.25 INJECTION, SOLUTION INTRAVENOUS ONCE
Status: CANCELLED | OUTPATIENT
Start: 2021-08-12

## 2021-08-12 RX ORDER — ONDANSETRON 4 MG/1
4 TABLET, FILM COATED ORAL EVERY 8 HOURS PRN
Qty: 60 TABLET | Refills: 1 | Status: SHIPPED
Start: 2021-08-12 | End: 2021-10-12

## 2021-08-12 RX ORDER — METHYLPREDNISOLONE SODIUM SUCCINATE 125 MG/2ML
125 INJECTION, POWDER, LYOPHILIZED, FOR SOLUTION INTRAMUSCULAR; INTRAVENOUS ONCE
Status: CANCELLED | OUTPATIENT
Start: 2021-08-12 | End: 2021-08-12

## 2021-08-12 RX ADMIN — DEXAMETHASONE SODIUM PHOSPHATE 10 MG: 10 INJECTION, SOLUTION INTRAMUSCULAR; INTRAVENOUS at 08:58

## 2021-08-12 RX ADMIN — SODIUM CHLORIDE 1000 ML: 9 INJECTION, SOLUTION INTRAVENOUS at 08:47

## 2021-08-12 RX ADMIN — SODIUM CHLORIDE, PRESERVATIVE FREE 10 ML: 5 INJECTION INTRAVENOUS at 08:58

## 2021-08-12 RX ADMIN — SODIUM CHLORIDE, PRESERVATIVE FREE 10 ML: 5 INJECTION INTRAVENOUS at 08:47

## 2021-08-12 RX ADMIN — SODIUM CHLORIDE, PRESERVATIVE FREE 10 ML: 5 INJECTION INTRAVENOUS at 13:08

## 2021-08-12 RX ADMIN — HEPARIN 500 UNITS: 100 SYRINGE at 13:08

## 2021-08-12 RX ADMIN — PALONOSETRON 0.25 MG: 0.25 INJECTION, SOLUTION INTRAVENOUS at 08:56

## 2021-08-12 RX ADMIN — MAGNESIUM SULFATE HEPTAHYDRATE 2000 MG: 40 INJECTION, SOLUTION INTRAVENOUS at 09:15

## 2021-08-12 RX ADMIN — DOCETAXEL 140 MG: 20 INJECTION, SOLUTION INTRAVENOUS at 10:41

## 2021-08-12 RX ADMIN — TRASTUZUMAB-ANNS 462 MG: 420 INJECTION, POWDER, LYOPHILIZED, FOR SOLUTION INTRAVENOUS at 09:56

## 2021-08-12 RX ADMIN — FOSAPREPITANT 150 MG: 150 INJECTION, POWDER, LYOPHILIZED, FOR SOLUTION INTRAVENOUS at 09:05

## 2021-08-12 RX ADMIN — CARBOPLATIN 700 MG: 10 INJECTION, SOLUTION INTRAVENOUS at 11:58

## 2021-08-12 RX ADMIN — SODIUM CHLORIDE: 9 INJECTION, SOLUTION INTRAVENOUS at 10:37

## 2021-08-12 RX ADMIN — PEGFILGRASTIM 6 MG: KIT SUBCUTANEOUS at 12:40

## 2021-08-12 RX ADMIN — PERTUZUMAB 420 MG: 30 INJECTION, SOLUTION, CONCENTRATE INTRAVENOUS at 09:56

## 2021-08-12 NOTE — PROGRESS NOTES
Department of [facility] Oncology  Attending Clinic Note    Reason for Visit: Follow-up on a patient with Right Breast Cancer    PCP:  Caitlin Kessler DO    History of Present Illness: The mass was located in the 6 o'clock position of right breast    Breast cancer risk factors include infrequent SMEs and age and gender    Bilateral Screening Mammogram 10/06/2020: There are suspicious calcifications in the right breast at the 6 o'clock position which appear pleomorphic. These calcifications are in a segmental distribution. These clustered calcifications are suspicious for malignancy. Right breast, calcifications at the 6:00, core biopsy on 10/30/2020:    - Small focus of invasive ductal carcinoma, grade 3 (3+3+2 = 8)    - Ductal carcinoma in situ, high nuclear grade, comedo/cribriform/solid types;    - Microcalcifications in DCIS    - Breast receptor and biomarkers (per outside report without looking the   slides:     ER: Positive, 89.8%, strong intensity     [place]: Positive, 52.2%, moderate intensity     HER-2: Positive (score 3+)     Ki-67: High proliferation, 26.2%     P53: Negative, 0.2%     Comment:The invasive carcinoma is intermingled with DCIS and measures   3.0 x 2.0 mm in greatest dimension on the slides. CXR PA/Lateral 12/11/2020:  No acute process. Right Axillary U/S on 12/11/2020: There is no axillary LN.     MRI Bilateral Breast 01/05/2021:  Focal, heterogeneous non mass enhancement identified in the right breast 6 o'clock which measures approximately 1.9 x 1.4 x 1.7 cm (image 19 of the axial T1 post contrast series).    No additional foci of disease identified on the right  There is no lymphadenopathy    Needle localized Right lumpectomy with SLNBx with mediport placement on 02/05/2021  A.  Right axillary sentinel lymph node #1, excision: 1 lymph node negative for malignancy     B.  Right axillary contents, regional resection: 1 lymph node positive for metastatic, poorly differentiated ductal carcinoma (grade 3)   Extranodal extension present     C.  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;   positive for extensive intraductal component   Skin-uninvolved by malignancy   Invasive carcinoma margins-uninvolved by invasive carcinoma        Distance from closest margin: 4 mm from anterior margin   DCIS margins-inferior and medial margins involved by DCIS   Regional lymph nodes-involved by tumor cells        Number of lymph nodes with macrometastasis: 1        Number of lymph nodes with micrometastasis: 0        Number of lymph nodes with isolated tumor cells: 0        Size of largest metastatic deposit: 1.5 cm        Extranodal extension: Present        Total number of lymph nodes examined: 2        Number of sentinel nodes examined: 1   Treatment effect in the breast-no known presurgical therapy   TNM classification (AJCC eighth edition)-  pT1c N1a MX     Bone scan, CT chest/abdomen/pelvis 02/26/2021 negative for metastatic disease    Re-excision lumpectomy of inferior and medial margins on 03/09/2021  A. Right breast, new inferior margin, excision: Fat necrosis, foreign body-type giant cell reaction, chronic inflammation, and fibrosis consistent with previous procedure   No evidence of residual carcinoma   Final inferior margin negative for carcinoma     B.  Right breast, new medial margin, excision: Residual high-grade ductal carcinoma in situ   Ductal carcinoma in situ present less than 1 mm from red/superior margin and green/anterior margin   Fat necrosis, foreign body-type giant cell reaction, chronic   inflammation, and fibrosis consistent with previous procedure   Fibrocystic change   Microcalcifications associated with benign breast tissue and DCIS   Comment:Residual ductal carcinoma in situ is present in 5 out of 13 tissue blocks of part B    Recommended adjuvant chemotherapy (TCHP) for 6 cycles followed by adjuvant RT followed lastly by endocrine therapy. Side effects TCHP reviewed with patient. She agreed to proceed. 2d-echo 02/17/2021 noted EF 60-65%  Cycle # 1 adjuvant TCHP was on 04/08/2021. Cycle # 2 adjuvant TCHP was on 04/29/2021. Cycle # 3 adjuvant TCHP was on 05/20/2021. Cycle # 4 adjuvant TCHP was on 06/10/2021. Cycle # 5 adjuvant TCHP was on 07/22/2021. Cycle # 6 adjuvant TCHP is today 08/12/2021  Today 08/12/2021. No fever, chills. Fair appetite and energy level. No N/V. No diarrhea. Review of Systems;  CONSTITUTIONAL: No fever, chills. Fair appetite and energy level. ENMT: Eyes: No diplopia; Nose: No epistaxis. Mouth: No sore throat. RESPIRATORY: No hemoptysis, shortness of breath, cough. CARDIOVASCULAR: No chest pain, palpitations. GASTROINTESTINAL: No nausea/vomiting, abdominal pain. GENITOURINARY: No dysuria, urinary frequency, hematuria. NEURO: No syncope, presyncope, headache. Remainder: ROS NEGATIVE    Past Medical History:      Diagnosis Date    Anxiety     Arthritis     Cancer ([facility] [address] 2021    breast    Cervical radiculopathy     Chronic back pain     Depression     Diabetes mellitus type 2, uncontrolled ([address] 2/12/2017    GERD (gastroesophageal reflux disease)     History of blood transfusion 01/2018    back surgery, lumbar, dr Shirley Lowe    Hypertension     Right leg pain     seeing doctor currently problems with hardware     Medications:  Reviewed and reconciled. Allergies:   Allergies   Allergen Reactions    Ceftriaxone Shortness Of Breath     Physical Exam:  /62   Pulse 89   Temp 98 °F (36.7 °C)   Resp 14   Ht 6' 1\" (1.854 m)   Wt 153 lb (69.4 kg)   LMP 08/13/2013   SpO2 100%   BMI 20.19 kg/m²   GENERAL: Alert, oriented x 3, not in acute distress. HEENT: PERRLA; EOMI. Oropharynx clear. NECK: Supple. Without lymphadenopathy. LUNGS: Good air entry bilaterally. No wheezing, crackles or ronchi. CARDIOVASCULAR: Regular rate. No murmurs, rubs or gallops. ABDOMEN: Soft. Non-tender, non-distended. Positive bowel sounds. EXTREMITIES: Without clubbing, cyanosis, or edema. NEUROLOGIC: No focal deficits. ECOG PS 1    Lab Results   Component Value Date    WBC 8.5 08/11/2021    HGB 10.5 (L) 08/11/2021    HCT 31.5 (L) 08/11/2021    .3 (H) 08/11/2021     (L) 08/11/2021     Lab Results   Component Value Date     08/11/2021    K 4.4 08/11/2021     08/11/2021    CO2 27 08/11/2021    BUN 8 08/11/2021    CREATININE 0.7 08/11/2021    GLUCOSE 117 (H) 08/11/2021    CALCIUM 9.1 08/11/2021    PROT 6.0 (L) 08/11/2021    LABALBU 3.5 08/11/2021    BILITOT 0.2 08/11/2021    ALKPHOS 179 (H) 08/11/2021    AST 50 (H) 08/11/2021    ALT 52 (H) 08/11/2021    LABGLOM >60 08/11/2021    GFRAA >60 08/11/2021     Impression/Plan:  62 y/o female with Right Breast Cancer    Bilateral Screening Mammogram 10/06/2020: There are suspicious calcifications in the right breast at the 6 o'clock position which appear pleomorphic. These calcifications are in a segmental distribution. These clustered calcifications are suspicious for malignancy.     Right breast, calcifications at the 6:00, core biopsy on 10/30/2020:    - Small focus of invasive ductal carcinoma, grade 3 (3+3+2 = 8)    - Ductal carcinoma in situ, high nuclear grade, comedo/cribriform/solid types;    - Microcalcifications in DCIS    - Breast receptor and biomarkers (per outside report without looking the   slides:     ER: Positive, 89.8%, strong intensity     [place]: Positive, 52.2%, moderate intensity     HER-2: Positive (score 3+)     Ki-67: High proliferation, 26.2%     P53: Negative, 0.2%     Comment:The invasive carcinoma is intermingled with DCIS and measures   3.0 x 2.0 mm in greatest dimension on the slides. CXR PA/Lateral 12/11/2020:  No acute process. Right Axillary U/S on 12/11/2020: There is no axillary LN.     MRI Bilateral Breast 01/05/2021:  Focal, heterogeneous non mass enhancement identified in the right breast 6 o'clock which measures approximately 1.9 x 1.4 x 1.7 cm (image 19 of the axial T1 post contrast series).    No additional foci of disease identified on the right  There is no lymphadenopathy    Needle localized Right lumpectomy with SLNBx with mediport placement on 02/05/2021  A.  Right axillary sentinel lymph node #1, excision: 1 lymph node negative for malignancy     B.  Right axillary contents, regional resection: 1 lymph node positive for metastatic, poorly differentiated ductal carcinoma (grade 3)   Extranodal extension present     C.  Right breast, lumpectomy: Invasive, poorly differentiated ductal carcinoma (grade 3) and high-grade ductal carcinoma in situ   High-grade ductal carcinoma in situ involves inferior and medial surgical margins     CANCER CASE SUMMARY   Procedure-excision   Specimen laterality-right   Tumor size-1.1 x 0.8 x 0.5 cm   Histologic type-invasive carcinoma of no special type (ductal)   [place] histologic score-3 (tubule formation) +3 (nuclear   pleomorphism) +2 (mitotic count) = 8   Overall grade-grade 3   Ductal carcinoma in situ-high-grade DCIS with comedonecrosis is present;   positive for extensive intraductal component   Skin-uninvolved by malignancy   Invasive carcinoma margins-uninvolved by invasive carcinoma        Distance from closest margin: 4 mm from anterior margin   DCIS margins-inferior and medial margins involved by DCIS   Regional lymph nodes-involved by tumor cells        Number of lymph nodes Oncology C/S in the interim.     Kalpesh Brady MD   7/[id number]  Board Certified Medical Oncologist

## 2021-08-12 NOTE — TELEPHONE ENCOUNTER
Met with pt in conjunction with chemo re: housing concerns. Pt is 54-year-old female being treated for breast CA. Pt's mood appeared euthymic with full affect, she appeared A&Ox4, and she was willing/able to participate in session. She appeared appropriately dressed/groomed and was seated in infusion chair. Pt discussed ongoing concerns re: current housing. Stated that she continues to reside with her sister noting Talia Kim wants me out. \"  Pt reported that she did contact the [facility] stating \"they told me it wasn't for me\" but was unable to clarify the meaning of this statement. Stated that she did not contact [facility] but stated that she will on this date. Noted that pt remains on wait list for [facility] indicating that she was notified she will receive update around 8/20/2021 re: place on wait list.  Resources list provided for additional homeless resources should she be unable to remain with sister. No additional needs identified at this time. Will remain available as needed.     Artur Cameron, MSW, LISW-S  Oncology Social Worker

## 2021-08-16 ENCOUNTER — HOSPITAL ENCOUNTER (OUTPATIENT)
Dept: INFUSION THERAPY | Age: 56
Discharge: HOME | End: 2021-08-16
Payer: MEDICAID

## 2021-08-16 VITALS
TEMPERATURE: 97.5 F | DIASTOLIC BLOOD PRESSURE: 76 MMHG | RESPIRATION RATE: 16 BRPM | HEART RATE: 100 BPM | SYSTOLIC BLOOD PRESSURE: 108 MMHG

## 2021-08-16 DIAGNOSIS — C50.111 MALIGNANT NEOPLASM OF CENTRAL PORTION OF RIGHT BREAST IN FEMALE, ESTROGEN RECEPTOR POSITIVE (HCC): Primary | ICD-10-CM

## 2021-08-16 DIAGNOSIS — Z17.0 MALIGNANT NEOPLASM OF CENTRAL PORTION OF RIGHT BREAST IN FEMALE, ESTROGEN RECEPTOR POSITIVE (HCC): Primary | ICD-10-CM

## 2021-08-16 PROCEDURE — 6360000002 HC RX W HCPCS: Performed by: NURSE PRACTITIONER

## 2021-08-16 PROCEDURE — 2580000003 HC RX 258: Performed by: NURSE PRACTITIONER

## 2021-08-16 PROCEDURE — 96360 HYDRATION IV INFUSION INIT: CPT

## 2021-08-16 RX ORDER — 0.9 % SODIUM CHLORIDE 0.9 %
1000 INTRAVENOUS SOLUTION INTRAVENOUS ONCE
Status: COMPLETED | OUTPATIENT
Start: 2021-08-16 | End: 2021-08-16

## 2021-08-16 RX ORDER — SODIUM CHLORIDE 0.9 % (FLUSH) 0.9 %
5-40 SYRINGE (ML) INJECTION PRN
Status: CANCELLED | OUTPATIENT
Start: 2021-08-16

## 2021-08-16 RX ORDER — SODIUM CHLORIDE 0.9 % (FLUSH) 0.9 %
5-40 SYRINGE (ML) INJECTION PRN
Status: DISCONTINUED | OUTPATIENT
Start: 2021-08-16 | End: 2021-08-17 | Stop reason: HOSPADM

## 2021-08-16 RX ORDER — HEPARIN SODIUM (PORCINE) LOCK FLUSH IV SOLN 100 UNIT/ML 100 UNIT/ML
500 SOLUTION INTRAVENOUS PRN
Status: DISCONTINUED | OUTPATIENT
Start: 2021-08-16 | End: 2021-08-17 | Stop reason: HOSPADM

## 2021-08-16 RX ORDER — 0.9 % SODIUM CHLORIDE 0.9 %
1000 INTRAVENOUS SOLUTION INTRAVENOUS ONCE
Status: CANCELLED | OUTPATIENT
Start: 2021-08-16 | End: 2021-08-16

## 2021-08-16 RX ORDER — SODIUM CHLORIDE 9 MG/ML
25 INJECTION, SOLUTION INTRAVENOUS PRN
Status: CANCELLED | OUTPATIENT
Start: 2021-08-16

## 2021-08-16 RX ORDER — HEPARIN SODIUM (PORCINE) LOCK FLUSH IV SOLN 100 UNIT/ML 100 UNIT/ML
500 SOLUTION INTRAVENOUS PRN
Status: CANCELLED | OUTPATIENT
Start: 2021-08-16

## 2021-08-16 RX ADMIN — HEPARIN 500 UNITS: 100 SYRINGE at 12:30

## 2021-08-16 RX ADMIN — SODIUM CHLORIDE 1000 ML: 9 INJECTION, SOLUTION INTRAVENOUS at 11:27

## 2021-08-16 RX ADMIN — SODIUM CHLORIDE, PRESERVATIVE FREE 10 ML: 5 INJECTION INTRAVENOUS at 11:27

## 2021-08-16 RX ADMIN — SODIUM CHLORIDE, PRESERVATIVE FREE 10 ML: 5 INJECTION INTRAVENOUS at 12:30

## 2021-08-20 DIAGNOSIS — T45.1X5A CHEMOTHERAPY INDUCED DIARRHEA: Primary | ICD-10-CM

## 2021-08-20 DIAGNOSIS — K52.1 CHEMOTHERAPY INDUCED DIARRHEA: Primary | ICD-10-CM

## 2021-08-20 RX ORDER — DIPHENOXYLATE HYDROCHLORIDE AND ATROPINE SULFATE 2.5; .025 MG/1; MG/1
1 TABLET ORAL 4 TIMES DAILY PRN
Qty: 20 TABLET | Refills: 0 | Status: SHIPPED | OUTPATIENT
Start: 2021-08-20 | End: 2021-08-25

## 2021-09-01 ENCOUNTER — HOSPITAL ENCOUNTER (OUTPATIENT)
Dept: INFUSION THERAPY | Age: 56
Discharge: HOME | End: 2021-09-01
Payer: MEDICAID

## 2021-09-01 ENCOUNTER — HOSPITAL ENCOUNTER (OUTPATIENT)
Dept: RADIATION ONCOLOGY | Age: 56
Discharge: HOME | End: 2021-09-01
Payer: MEDICAID

## 2021-09-01 VITALS
OXYGEN SATURATION: 95 % | HEART RATE: 93 BPM | BODY MASS INDEX: 19.55 KG/M2 | TEMPERATURE: 96 F | WEIGHT: 148.19 LBS | DIASTOLIC BLOOD PRESSURE: 78 MMHG | SYSTOLIC BLOOD PRESSURE: 106 MMHG | RESPIRATION RATE: 18 BRPM

## 2021-09-01 DIAGNOSIS — C50.919 MALIGNANT NEOPLASM OF FEMALE BREAST, UNSPECIFIED ESTROGEN RECEPTOR STATUS, UNSPECIFIED LATERALITY, UNSPECIFIED SITE OF BREAST (HCC): Primary | ICD-10-CM

## 2021-09-01 DIAGNOSIS — Z17.0 MALIGNANT NEOPLASM OF CENTRAL PORTION OF RIGHT BREAST IN FEMALE, ESTROGEN RECEPTOR POSITIVE (HCC): Primary | ICD-10-CM

## 2021-09-01 DIAGNOSIS — Z85.3 PERSONAL HISTORY OF BREAST CANCER: ICD-10-CM

## 2021-09-01 DIAGNOSIS — C50.111 MALIGNANT NEOPLASM OF CENTRAL PORTION OF RIGHT BREAST IN FEMALE, ESTROGEN RECEPTOR POSITIVE (HCC): Primary | ICD-10-CM

## 2021-09-01 LAB
ALBUMIN SERPL-MCNC: 3.2 G/DL (ref 3.5–5.2)
ALP BLD-CCNC: 163 U/L (ref 35–104)
ALT SERPL-CCNC: 48 U/L (ref 0–32)
ANION GAP SERPL CALCULATED.3IONS-SCNC: 6 MMOL/L (ref 7–16)
ANISOCYTOSIS: ABNORMAL
AST SERPL-CCNC: 45 U/L (ref 0–31)
BASOPHILS ABSOLUTE: 0.03 E9/L (ref 0–0.2)
BASOPHILS RELATIVE PERCENT: 0.4 % (ref 0–2)
BILIRUB SERPL-MCNC: <0.2 MG/DL (ref 0–1.2)
BUN BLDV-MCNC: 10 MG/DL (ref 6–20)
BURR CELLS: ABNORMAL
CALCIUM SERPL-MCNC: 8.8 MG/DL (ref 8.6–10.2)
CHLORIDE BLD-SCNC: 105 MMOL/L (ref 98–107)
CO2: 24 MMOL/L (ref 22–29)
CREAT SERPL-MCNC: 0.6 MG/DL (ref 0.5–1)
EOSINOPHILS ABSOLUTE: 0 E9/L (ref 0.05–0.5)
EOSINOPHILS RELATIVE PERCENT: 0 % (ref 0–6)
GFR AFRICAN AMERICAN: >60
GFR NON-AFRICAN AMERICAN: >60 ML/MIN/1.73
GLUCOSE BLD-MCNC: 77 MG/DL (ref 74–99)
HCT VFR BLD CALC: 28.7 % (ref 34–48)
HEMOGLOBIN: 9.5 G/DL (ref 11.5–15.5)
HYPOCHROMIA: ABNORMAL
IMMATURE GRANULOCYTES #: 0.02 E9/L
IMMATURE GRANULOCYTES %: 0.3 % (ref 0–5)
LYMPHOCYTES ABSOLUTE: 2.58 E9/L (ref 1.5–4)
LYMPHOCYTES RELATIVE PERCENT: 34.5 % (ref 20–42)
MAGNESIUM: 1.5 MG/DL (ref 1.6–2.6)
MCH RBC QN AUTO: 34.9 PG (ref 26–35)
MCHC RBC AUTO-ENTMCNC: 33.1 % (ref 32–34.5)
MCV RBC AUTO: 105.5 FL (ref 80–99.9)
MONOCYTES ABSOLUTE: 0.9 E9/L (ref 0.1–0.95)
MONOCYTES RELATIVE PERCENT: 12 % (ref 2–12)
NEUTROPHILS ABSOLUTE: 3.94 E9/L (ref 1.8–7.3)
NEUTROPHILS RELATIVE PERCENT: 52.8 % (ref 43–80)
OVALOCYTES: ABNORMAL
PDW BLD-RTO: 14.8 FL (ref 11.5–15)
PLATELET # BLD: 93 E9/L (ref 130–450)
PLATELET CONFIRMATION: NORMAL
PMV BLD AUTO: 11 FL (ref 7–12)
POLYCHROMASIA: ABNORMAL
POTASSIUM SERPL-SCNC: 4.2 MMOL/L (ref 3.5–5)
RBC # BLD: 2.72 E12/L (ref 3.5–5.5)
SODIUM BLD-SCNC: 135 MMOL/L (ref 132–146)
TEAR DROP CELLS: ABNORMAL
TOTAL PROTEIN: 5.6 G/DL (ref 6.4–8.3)
WBC # BLD: 7.5 E9/L (ref 4.5–11.5)

## 2021-09-01 PROCEDURE — 99999 PR OFFICE/OUTPT VISIT,PROCEDURE ONLY: CPT | Performed by: RADIOLOGY

## 2021-09-01 PROCEDURE — 80053 COMPREHEN METABOLIC PANEL: CPT

## 2021-09-01 PROCEDURE — 85025 COMPLETE CBC W/AUTO DIFF WBC: CPT

## 2021-09-01 PROCEDURE — 83735 ASSAY OF MAGNESIUM: CPT

## 2021-09-01 PROCEDURE — 36591 DRAW BLOOD OFF VENOUS DEVICE: CPT

## 2021-09-01 RX ORDER — SODIUM CHLORIDE 0.9 % (FLUSH) 0.9 %
10 SYRINGE (ML) INJECTION PRN
Status: CANCELLED | OUTPATIENT
Start: 2021-09-01

## 2021-09-01 RX ORDER — HEPARIN SODIUM (PORCINE) LOCK FLUSH IV SOLN 100 UNIT/ML 100 UNIT/ML
500 SOLUTION INTRAVENOUS PRN
Status: DISCONTINUED | OUTPATIENT
Start: 2021-09-01 | End: 2021-09-02 | Stop reason: HOSPADM

## 2021-09-01 RX ORDER — HEPARIN SODIUM (PORCINE) LOCK FLUSH IV SOLN 100 UNIT/ML 100 UNIT/ML
500 SOLUTION INTRAVENOUS PRN
Status: CANCELLED | OUTPATIENT
Start: 2021-09-01

## 2021-09-01 RX ORDER — SODIUM CHLORIDE 0.9 % (FLUSH) 0.9 %
10 SYRINGE (ML) INJECTION PRN
Status: DISCONTINUED | OUTPATIENT
Start: 2021-09-01 | End: 2021-09-02 | Stop reason: HOSPADM

## 2021-09-01 NOTE — PROGRESS NOTES
20 mg by mouth daily, Disp: , Rfl:     amitriptyline (ELAVIL) 50 MG tablet, Take 50 mg by mouth nightly , Disp: , Rfl:     LORATADINE-D 24HR  MG per extended release tablet, , Disp: , Rfl:     LANTUS SOLOSTAR 100 UNIT/ML injection pen, 30 Units nightly 1/2 night before surgery, Disp: , Rfl:     aspirin EC 81 MG EC tablet, Take 1 tablet by mouth daily for 28 days (Patient taking differently: Take 81 mg by mouth daily ), Disp: 28 tablet, Rfl: 0    metFORMIN (GLUCOPHAGE) 500 MG tablet, Take 500 mg by mouth 2 times daily , Disp: , Rfl:     albuterol sulfate  (90 BASE) MCG/ACT inhaler, Inhale 2 puffs into the lungs every 6 hours as needed for Wheezing, Disp: , Rfl:     vitamin D (ERGOCALCIFEROL) 49938 UNITS CAPS capsule, Take 50,000 Units by mouth once a week On Sundays, Disp: , Rfl:       Patient is seen today in follow up for Breast cancer    Ludivina Ospina is here today for a follow up to discuss receiving radiation therapy to right breast after completing TCHP. She is alert and oriented x 4, arrived on a motorized wheelchair, she denies pain at this time. She has a small open area to her lower right breast (surgical site), dressing with small amount of serosanguinous drainage. Patient states she cleans and changed the dressing every day. She was at Dr Martin Rising office July 16, 2021, and her notes mentioned about the wound also. I updated Dr Nanda Mckeon and he will write an order for Wound care. I gave her education and handout re: fall prevention and safety, she verbalizes understanding. FALLS RISK SCREENING ASSESSMENT    Instructions:  Assess the patient and enter the appropriate indicators that are present for fall risk identification. Total the numbers entered and assign a fall risk score from Table 2.  Reassess patient at a minimum every 12 weeks or with status change. Assessment   Date  9/1/2021     1. Mental Ability: confusion/cognitively impaired No - 0       2.   Elimination Issues: incontinence, frequency Yes - 3/ wear adult diaper R/T diarrhea sometimes can't hold it       3. Ambulatory: use of assistive devices (walker, cane, off-loading devices), attached to equipment (IV pole, oxygen) Yes - 2/ motorized      4. Sensory Limitations: dizziness, vertigo, impaired vision No - 0       5. Age Less than 65 years - 0       6. Medication: diuretics, strong analgesics, hypnotics, sedatives, antihypertensive agents   Yes - 3   7. Falls:  recent history of falls within the last 3 months (not to include slipping or tripping)   No - 0   TOTAL 8    If score of 4 or greater was education given? Yes       TABLE 2   Risk Score Risk Level Plan of Care   0-3 Little or  No Risk 1. Provide assistance as indicated for ambulation activities  2. Reorient confused/cognitively impaired patient  3. Call-light/bell within patient's reach  4. Chair/bed in low position, stretcher/bed with siderails up except when performing patient care activities  5. Educate patient/family/caregiver on falls prevention  6.  Reassess in 12 weeks or with any noted change in patient condition which places them at a risk for a fall   4-6 Moderate Risk 1. Provide assistance as indicated for ambulation activities  2. Reorient confused/cognitively impaired patient  3. Call-light/bell within patient's reach  4. Chair/bed in low position, stretcher/bed with siderails up except when performing patient care activities  5. Educate patient/family/caregiver on falls prevention  6. Falls risk precaution (Yellow sticker Level II) placed on patient chart   7 or   Higher High Risk 1. Place patient in easily observable treatment room  2. Patient attended at all times by family member or staff  3. Provide assistance as indicated for ambulation activities  4. Reorient confused/cognitively impaired patient  5. Call-light/bell within patient's reach  6.   Chair/bed in low position, stretcher/bed with siderails up except when performing patient care activities  7. Educate patient/family/caregiver on falls prevention  8. Falls risk precaution (Yellow sticker Level III) placed on patient chart           MALNUTRITION RISK SCREENING ASSESSMENT    9/1/2021   Patient:  Chanell Galdamez  Sex:  female    Instructions:  Assess the patient and enter the appropriate indicators that are present for nutrition risk identification. Total the numbers entered and assign a risk score. Follow the appropriate action for total score listed below. Assessment   Date  9/1/2021     1. Have you lost weight without trying? 1- Yes, 0.5 kg to 5 kg     2. Have you been eating poorly because of a decreased appetite? 0- No   3. Do you have a diagnosis of head and neck cancer?       0- No                                                                                    TOTAL 1          Score of 0-1: No action  Score 2 or greater:  · For Non-Diabetic Patient: Recommend adding Ensure Complete 2 x daily and provide patient with Ensure wellness bag with coupons  · For Diabetic Patient: Recommend adding Glucerna Shake 2 x daily and provide patient with Glucerna Wellness bag with coupons  · Route to the dietitian via Mihai Silverman RN

## 2021-09-02 ENCOUNTER — OFFICE VISIT (OUTPATIENT)
Dept: ONCOLOGY | Age: 56
End: 2021-09-02
Payer: MEDICAID

## 2021-09-02 ENCOUNTER — TELEPHONE (OUTPATIENT)
Dept: RADIATION ONCOLOGY | Age: 56
End: 2021-09-02

## 2021-09-02 ENCOUNTER — TELEPHONE (OUTPATIENT)
Dept: ONCOLOGY | Age: 56
End: 2021-09-02

## 2021-09-02 ENCOUNTER — HOSPITAL ENCOUNTER (OUTPATIENT)
Dept: INFUSION THERAPY | Age: 56
Discharge: HOME | End: 2021-09-02
Payer: MEDICAID

## 2021-09-02 VITALS
TEMPERATURE: 98.2 F | SYSTOLIC BLOOD PRESSURE: 101 MMHG | BODY MASS INDEX: 20.36 KG/M2 | RESPIRATION RATE: 18 BRPM | HEIGHT: 72 IN | DIASTOLIC BLOOD PRESSURE: 70 MMHG | WEIGHT: 150.3 LBS | HEART RATE: 92 BPM

## 2021-09-02 VITALS
TEMPERATURE: 96.3 F | SYSTOLIC BLOOD PRESSURE: 98 MMHG | DIASTOLIC BLOOD PRESSURE: 52 MMHG | HEART RATE: 83 BPM | RESPIRATION RATE: 16 BRPM

## 2021-09-02 DIAGNOSIS — Z85.3 PERSONAL HISTORY OF BREAST CANCER: Primary | ICD-10-CM

## 2021-09-02 DIAGNOSIS — Z17.0 MALIGNANT NEOPLASM OF CENTRAL PORTION OF RIGHT BREAST IN FEMALE, ESTROGEN RECEPTOR POSITIVE (HCC): Primary | ICD-10-CM

## 2021-09-02 DIAGNOSIS — C50.111 MALIGNANT NEOPLASM OF CENTRAL PORTION OF RIGHT BREAST IN FEMALE, ESTROGEN RECEPTOR POSITIVE (HCC): Primary | ICD-10-CM

## 2021-09-02 PROCEDURE — 2580000003 HC RX 258: Performed by: NURSE PRACTITIONER

## 2021-09-02 PROCEDURE — 96413 CHEMO IV INFUSION 1 HR: CPT

## 2021-09-02 PROCEDURE — 2580000003 HC RX 258: Performed by: INTERNAL MEDICINE

## 2021-09-02 PROCEDURE — 99214 OFFICE O/P EST MOD 30 MIN: CPT | Performed by: INTERNAL MEDICINE

## 2021-09-02 PROCEDURE — 96366 THER/PROPH/DIAG IV INF ADDON: CPT

## 2021-09-02 PROCEDURE — 6360000002 HC RX W HCPCS: Performed by: NURSE PRACTITIONER

## 2021-09-02 PROCEDURE — 6360000002 HC RX W HCPCS: Performed by: INTERNAL MEDICINE

## 2021-09-02 PROCEDURE — 3017F COLORECTAL CA SCREEN DOC REV: CPT | Performed by: INTERNAL MEDICINE

## 2021-09-02 PROCEDURE — 96549 UNLISTED CHEMOTHERAPY PX: CPT

## 2021-09-02 PROCEDURE — 96367 TX/PROPH/DG ADDL SEQ IV INF: CPT

## 2021-09-02 PROCEDURE — G9899 SCRN MAM PERF RSLTS DOC: HCPCS | Performed by: INTERNAL MEDICINE

## 2021-09-02 PROCEDURE — 1036F TOBACCO NON-USER: CPT | Performed by: INTERNAL MEDICINE

## 2021-09-02 PROCEDURE — G8427 DOCREV CUR MEDS BY ELIG CLIN: HCPCS | Performed by: INTERNAL MEDICINE

## 2021-09-02 PROCEDURE — G8420 CALC BMI NORM PARAMETERS: HCPCS | Performed by: INTERNAL MEDICINE

## 2021-09-02 RX ORDER — SODIUM CHLORIDE 0.9 % (FLUSH) 0.9 %
5 SYRINGE (ML) INJECTION PRN
Status: CANCELLED | OUTPATIENT
Start: 2021-09-02

## 2021-09-02 RX ORDER — SODIUM CHLORIDE 9 MG/ML
25 INJECTION, SOLUTION INTRAVENOUS PRN
Status: CANCELLED | OUTPATIENT
Start: 2021-09-02

## 2021-09-02 RX ORDER — METHYLPREDNISOLONE SODIUM SUCCINATE 125 MG/2ML
125 INJECTION, POWDER, LYOPHILIZED, FOR SOLUTION INTRAMUSCULAR; INTRAVENOUS ONCE
Status: CANCELLED | OUTPATIENT
Start: 2021-09-02 | End: 2021-09-02

## 2021-09-02 RX ORDER — HEPARIN SODIUM (PORCINE) LOCK FLUSH IV SOLN 100 UNIT/ML 100 UNIT/ML
500 SOLUTION INTRAVENOUS PRN
Status: CANCELLED | OUTPATIENT
Start: 2021-09-02

## 2021-09-02 RX ORDER — EPINEPHRINE 1 MG/ML
0.3 INJECTION, SOLUTION, CONCENTRATE INTRAVENOUS PRN
Status: CANCELLED | OUTPATIENT
Start: 2021-09-02

## 2021-09-02 RX ORDER — 0.9 % SODIUM CHLORIDE 0.9 %
1000 INTRAVENOUS SOLUTION INTRAVENOUS ONCE
Status: COMPLETED | OUTPATIENT
Start: 2021-09-02 | End: 2021-09-02

## 2021-09-02 RX ORDER — SODIUM CHLORIDE 9 MG/ML
20 INJECTION, SOLUTION INTRAVENOUS ONCE
Status: CANCELLED | OUTPATIENT
Start: 2021-09-02 | End: 2021-09-02

## 2021-09-02 RX ORDER — SODIUM CHLORIDE 9 MG/ML
20 INJECTION, SOLUTION INTRAVENOUS ONCE
Status: DISCONTINUED | OUTPATIENT
Start: 2021-09-02 | End: 2021-09-03 | Stop reason: HOSPADM

## 2021-09-02 RX ORDER — DIPHENHYDRAMINE HYDROCHLORIDE 50 MG/ML
50 INJECTION INTRAMUSCULAR; INTRAVENOUS ONCE
Status: CANCELLED | OUTPATIENT
Start: 2021-09-02 | End: 2021-09-02

## 2021-09-02 RX ORDER — SODIUM CHLORIDE 0.9 % (FLUSH) 0.9 %
10 SYRINGE (ML) INJECTION PRN
Status: DISCONTINUED | OUTPATIENT
Start: 2021-09-02 | End: 2021-09-03 | Stop reason: HOSPADM

## 2021-09-02 RX ORDER — SODIUM CHLORIDE 0.9 % (FLUSH) 0.9 %
5-40 SYRINGE (ML) INJECTION PRN
Status: CANCELLED | OUTPATIENT
Start: 2021-09-02

## 2021-09-02 RX ORDER — SODIUM CHLORIDE 0.9 % (FLUSH) 0.9 %
10 SYRINGE (ML) INJECTION PRN
Status: CANCELLED | OUTPATIENT
Start: 2021-09-02

## 2021-09-02 RX ORDER — 0.9 % SODIUM CHLORIDE 0.9 %
1000 INTRAVENOUS SOLUTION INTRAVENOUS ONCE
Status: CANCELLED | OUTPATIENT
Start: 2021-09-02 | End: 2021-09-02

## 2021-09-02 RX ORDER — MEPERIDINE HYDROCHLORIDE 25 MG/ML
12.5 INJECTION INTRAMUSCULAR; INTRAVENOUS; SUBCUTANEOUS ONCE
Status: CANCELLED | OUTPATIENT
Start: 2021-09-02 | End: 2021-09-02

## 2021-09-02 RX ORDER — SODIUM CHLORIDE 9 MG/ML
INJECTION, SOLUTION INTRAVENOUS CONTINUOUS
Status: CANCELLED | OUTPATIENT
Start: 2021-09-02

## 2021-09-02 RX ORDER — HEPARIN SODIUM (PORCINE) LOCK FLUSH IV SOLN 100 UNIT/ML 100 UNIT/ML
500 SOLUTION INTRAVENOUS PRN
Status: DISCONTINUED | OUTPATIENT
Start: 2021-09-02 | End: 2021-09-03 | Stop reason: HOSPADM

## 2021-09-02 RX ADMIN — TRASTUZUMAB-ANNS 462 MG: 420 INJECTION, POWDER, LYOPHILIZED, FOR SOLUTION INTRAVENOUS at 11:37

## 2021-09-02 RX ADMIN — PERTUZUMAB 420 MG: 30 INJECTION, SOLUTION, CONCENTRATE INTRAVENOUS at 11:37

## 2021-09-02 RX ADMIN — HEPARIN 500 UNITS: 100 SYRINGE at 14:05

## 2021-09-02 RX ADMIN — SODIUM CHLORIDE 1000 ML: 9 INJECTION, SOLUTION INTRAVENOUS at 11:34

## 2021-09-02 RX ADMIN — MAGNESIUM SULFATE HEPTAHYDRATE 2000 MG: 500 INJECTION, SOLUTION INTRAMUSCULAR; INTRAVENOUS at 11:17

## 2021-09-02 RX ADMIN — SODIUM CHLORIDE, PRESERVATIVE FREE 10 ML: 5 INJECTION INTRAVENOUS at 14:05

## 2021-09-02 RX ADMIN — SODIUM CHLORIDE 20 ML/HR: 9 INJECTION, SOLUTION INTRAVENOUS at 11:43

## 2021-09-02 NOTE — PROGRESS NOTES
Department of [facility] Oncology  Attending Clinic Note    Reason for Visit: Follow-up on a patient with Right Breast Cancer    PCP:  Ming Lou DO    History of Present Illness: The mass was located in the 6 o'clock position of right breast    Breast cancer risk factors include infrequent SMEs and age and gender    Bilateral Screening Mammogram 10/06/2020: There are suspicious calcifications in the right breast at the 6 o'clock position which appear pleomorphic. These calcifications are in a segmental distribution. These clustered calcifications are suspicious for malignancy. Right breast, calcifications at the 6:00, core biopsy on 10/30/2020:    - Small focus of invasive ductal carcinoma, grade 3 (3+3+2 = 8)    - Ductal carcinoma in situ, high nuclear grade, comedo/cribriform/solid types;    - Microcalcifications in DCIS    - Breast receptor and biomarkers (per outside report without looking the   slides:     ER: Positive, 89.8%, strong intensity     MO: Positive, 52.2%, moderate intensity     HER-2: Positive (score 3+)     Ki-67: High proliferation, 26.2%     P53: Negative, 0.2%     Comment:The invasive carcinoma is intermingled with DCIS and measures   3.0 x 2.0 mm in greatest dimension on the slides. CXR PA/Lateral 12/11/2020:  No acute process. Right Axillary U/S on 12/11/2020: There is no axillary LN.     MRI Bilateral Breast 01/05/2021:  Focal, heterogeneous non mass enhancement identified in the right breast 6 o'clock which measures approximately 1.9 x 1.4 x 1.7 cm (image 19 of the axial T1 post contrast series).    No additional foci of disease identified on the right  There is no lymphadenopathy    Needle localized Right lumpectomy with SLNBx with mediport placement on 02/05/2021  A.  Right axillary sentinel lymph node #1, excision: 1 lymph node negative for malignancy     B.  Right axillary contents, regional resection: 1 lymph node positive for metastatic, poorly differentiated ductal carcinoma (grade 3)   Extranodal extension present     C.  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;   positive for extensive intraductal component   Skin-uninvolved by malignancy   Invasive carcinoma margins-uninvolved by invasive carcinoma        Distance from closest margin: 4 mm from anterior margin   DCIS margins-inferior and medial margins involved by DCIS   Regional lymph nodes-involved by tumor cells        Number of lymph nodes with macrometastasis: 1        Number of lymph nodes with micrometastasis: 0        Number of lymph nodes with isolated tumor cells: 0        Size of largest metastatic deposit: 1.5 cm        Extranodal extension: Present        Total number of lymph nodes examined: 2        Number of sentinel nodes examined: 1   Treatment effect in the breast-no known presurgical therapy   TNM classification (AJCC eighth edition)-  pT1c N1a MX     Bone scan, CT chest/abdomen/pelvis 02/26/2021 negative for metastatic disease    Re-excision lumpectomy of inferior and medial margins on 03/09/2021  A. Right breast, new inferior margin, excision: Fat necrosis, foreign body-type giant cell reaction, chronic inflammation, and fibrosis consistent with previous procedure   No evidence of residual carcinoma   Final inferior margin negative for carcinoma     B.  Right breast, new medial margin, excision: Residual high-grade ductal carcinoma in situ   Ductal carcinoma in situ present less than 1 mm from red/superior margin and green/anterior margin   Fat necrosis, foreign body-type giant cell reaction, chronic   inflammation, and fibrosis consistent with previous procedure   Fibrocystic change   Microcalcifications associated with benign breast tissue and DCIS   Comment:Residual ductal carcinoma in situ is present in 5 out of 13 tissue blocks of part B    Recommended adjuvant chemotherapy (TCHP) for 6 cycles followed by adjuvant RT followed lastly by endocrine therapy. Side effects TCHP reviewed with patient. She agreed to proceed. 2d-echo 02/17/2021 noted EF 60-65%  Cycle # 1 adjuvant TCHP was on 04/08/2021. Cycle # 2 adjuvant TCHP was on 04/29/2021. Cycle # 3 adjuvant TCHP was on 05/20/2021. Cycle # 4 adjuvant TCHP was on 06/10/2021. Cycle # 5 adjuvant TCHP was on 07/22/2021. Cycle # 6 adjuvant TCHP was on 08/12/2021. Herceptin/Perjeta today 09/02/2021. Today 09/02/2021. No fever, chills. Fair appetite and energy level. No N/V. No diarrhea. Review of Systems;  CONSTITUTIONAL: No fever, chills. Fair appetite and energy level. ENMT: Eyes: No diplopia; Nose: No epistaxis. Mouth: No sore throat. RESPIRATORY: No hemoptysis, shortness of breath, cough. CARDIOVASCULAR: No chest pain, palpitations. GASTROINTESTINAL: No nausea/vomiting, abdominal pain. GENITOURINARY: No dysuria, urinary frequency, hematuria. NEURO: No syncope, presyncope, headache. Remainder: ROS NEGATIVE    Past Medical History:      Diagnosis Date    Anxiety     Arthritis     Cancer ([address] 2021    breast    Cervical radiculopathy     Chronic back pain     Depression     Diabetes mellitus type 2, uncontrolled ([address] 2/12/2017    GERD (gastroesophageal reflux disease)     History of blood transfusion 01/2018    back surgery, lumbar, dr Caroline Rosas    Hypertension     Right leg pain     seeing doctor currently problems with hardware     Medications:  Reviewed and reconciled. Allergies:   Allergies Allergen Reactions    Ceftriaxone Shortness Of Breath     Physical Exam:  /70   Pulse 92   Temp 98.2 °F (36.8 °C)   Resp 18   Ht 6' 1\" (1.854 m)   Wt 150 lb 4.8 oz (68.2 kg)   LMP 08/13/2013   BMI 19.83 kg/m²   GENERAL: Alert, oriented x 3, not in acute distress. HEENT: PERRLA; EOMI. Oropharynx clear. NECK: Supple. Without lymphadenopathy. LUNGS: Good air entry bilaterally. No wheezing, crackles or ronchi. CARDIOVASCULAR: Regular rate. No murmurs, rubs or gallops. ABDOMEN: Soft. Non-tender, non-distended. Positive bowel sounds. EXTREMITIES: Without clubbing, cyanosis, or edema. NEUROLOGIC: No focal deficits. ECOG PS 1    Lab Results   Component Value Date    WBC 7.5 09/01/2021    HGB 9.5 (L) 09/01/2021    HCT 28.7 (L) 09/01/2021    .5 (H) 09/01/2021    PLT 93 (L) 09/01/2021     Lab Results   Component Value Date     09/01/2021    K 4.2 09/01/2021     09/01/2021    CO2 24 09/01/2021    BUN 10 09/01/2021    CREATININE 0.6 09/01/2021    GLUCOSE 77 09/01/2021    CALCIUM 8.8 09/01/2021    PROT 5.6 (L) 09/01/2021    LABALBU 3.2 (L) 09/01/2021    BILITOT <0.2 09/01/2021    ALKPHOS 163 (H) 09/01/2021    AST 45 (H) 09/01/2021    ALT 48 (H) 09/01/2021    LABGLOM >60 09/01/2021    GFRAA >60 09/01/2021     Impression/Plan:  62 y/o female with Right Breast Cancer    Bilateral Screening Mammogram 10/06/2020: There are suspicious calcifications in the right breast at the 6 o'clock position which appear pleomorphic. These calcifications are in a segmental distribution. These clustered calcifications are suspicious for malignancy.     Right breast, calcifications at the 6:00, core biopsy on 10/30/2020:    - Small focus of invasive ductal carcinoma, grade 3 (3+3+2 = 8)    - Ductal carcinoma in situ, high nuclear grade, comedo/cribriform/solid types;    - Microcalcifications in DCIS    - Breast receptor and biomarkers (per outside report without looking the   slides:     ER: Positive, 89.8%, strong intensity     OH: Positive, 52.2%, moderate intensity     HER-2: Positive (score 3+)     Ki-67: High proliferation, 26.2%     P53: Negative, 0.2%     Comment:The invasive carcinoma is intermingled with DCIS and measures   3.0 x 2.0 mm in greatest dimension on the slides. CXR PA/Lateral 12/11/2020:  No acute process. Right Axillary U/S on 12/11/2020: There is no axillary LN.     MRI Bilateral Breast 01/05/2021:  Focal, heterogeneous non mass enhancement identified in the right breast 6 o'clock which measures approximately 1.9 x 1.4 x 1.7 cm (image 19 of the axial T1 post contrast series).    No additional foci of disease identified on the right  There is no lymphadenopathy    Needle localized Right lumpectomy with SLNBx with mediport placement on 02/05/2021  A.  Right axillary sentinel lymph node #1, excision: 1 lymph node negative for malignancy     B.  Right axillary contents, regional resection: 1 lymph node positive for metastatic, poorly differentiated ductal carcinoma (grade 3)   Extranodal extension present     C.  Right breast, lumpectomy: Invasive, poorly differentiated ductal carcinoma (grade 3) and high-grade ductal carcinoma in situ   High-grade ductal carcinoma in situ involves inferior and medial surgical margins     CANCER CASE SUMMARY   Procedure-excision   Specimen laterality-right   Tumor size-1.1 x 0.8 x 0.5 cm   Histologic type-invasive carcinoma of no special type (ductal)   [place] histologic score-3 (tubule formation) +3 (nuclear   pleomorphism) +2 (mitotic count) = 8   Overall grade-grade 3   Ductal carcinoma in situ-high-grade DCIS with comedonecrosis is present;   positive for extensive intraductal component   Skin-uninvolved by malignancy   Invasive carcinoma margins-uninvolved by invasive carcinoma        Distance from closest margin: 4 mm from anterior margin   DCIS margins-inferior and medial margins involved by DCIS   Regional lymph nodes-involved by tumor cells      Number of lymph nodes with macrometastasis: 1        Number of lymph nodes with micrometastasis: 0        Number of lymph nodes with isolated tumor cells: 0        Size of largest metastatic deposit: 1.5 cm        Extranodal extension: Present        Total number of lymph nodes examined: 2        Number of sentinel nodes examined: 1   Treatment effect in the breast-no known presurgical therapy   TNM classification (AJCC eighth edition)-  pT1c N1a MX     Bone scan, CT chest/abdomen/pelvis 02/26/2021 negative for metastatic disease    Re-excision lumpectomy of inferior and medial margins on 03/09/2021  A. Right breast, new inferior margin, excision: Fat necrosis, foreign body-type giant cell reaction, chronic inflammation, and fibrosis consistent with previous procedure   No evidence of residual carcinoma   Final inferior margin negative for carcinoma     B. Right breast, new medial margin, excision: Residual high-grade ductal carcinoma in situ   Ductal carcinoma in situ present less than 1 mm from red/superior margin and green/anterior margin   Fat necrosis, foreign body-type giant cell reaction, chronic   inflammation, and fibrosis consistent with previous procedure   Fibrocystic change   Microcalcifications associated with benign breast tissue and DCIS   Comment:Residual ductal carcinoma in situ is present in 5 out of 13 tissue blocks of part B    Recommended adjuvant chemotherapy (TCHP) for 6 cycles followed by adjuvant RT followed lastly by endocrine therapy. Side effects TCHP reviewed with patient. She agreed to proceed. 2d-echo 02/17/2021 noted EF 60-65%  Cycle # 1 adjuvant TCHP was on 04/08/2021. Cycle # 2 adjuvant TCHP was on 04/29/2021. Cycle # 3 adjuvant TCHP was on 05/20/2021. Cycle # 4 adjuvant TCHP was on 06/10/2021. 2D-ECHO 06/29/2021 noted EF 60-65%  Cycle # 5 adjuvant TCHP was on 07/22/2021. Cycle # 6 adjuvant TCHP was on 08/12/2021. Radiation Oncology consult appreciated.  CT sim 09/14/2021  Herceptin/Perjeta today 09/02/2021. Labs reviewed ok to proceed. RTC 3 weeks for Herceptin/Perjeta.      Danielle Carreon MD   5/0/6905  Board Certified Medical Oncologist

## 2021-09-02 NOTE — TELEPHONE ENCOUNTER
Met with pt in conjunction with chemo re: housing needs. Pt is 59-year-old female being treated for breast CA.  Pt's mood appeared euthymic with full affect, she appeared A&Ox4, and she was willing/able to participate in session.  She appeared appropriately dressed/groomed and was seated in infusion chair. Pt indicated that she has appointment scheduled with a [facility] facility on 9/14/2021. Stated that she continues to have issues with current living situation and would like to find alternative housing as soon as possible. Pt thanked SW for assistance with accessing resources and stated that she will notify this provider if additional needs arise.     Lali Carpenter, MSW, TERESEW-S  Oncology Social Worker

## 2021-09-02 NOTE — TELEPHONE ENCOUNTER
This Rad/Onc RN called [facility] to inform her of her simulation appointment on 9/15/21 @ 1030 AM, no answer I left her a VM.

## 2021-09-02 NOTE — PROGRESS NOTES
Patient asking for refill on limotol but has not been taking imodium d/t issues with purchasing it which according to provider is the first line of defense against diarrhea. The patient was given a $20 [employer] gift card to help purchase her Imodium and was instructed to bring back her receipt when she comes in for her next visit and she that is only permitted to purchase the imodium with the gift card. Attempt to contact Wes Mary via [employer] chat and by phone with no answer either time, messages left.

## 2021-09-02 NOTE — PROGRESS NOTES
Radiation Oncology          -course and treatment reviewed  -chart reviewed  -PATH reviewed (TYRESE+), see previous consultation letter  -pt finishes CHEMO this week  -sched SIM 3 weeks pending wound care consultation      We recommend adjuvant external beam radiation therapy. With a breast conserving surgery, combined with fractionated external beam radiation therapy, there is no difference in overall survival compared to mastectomy: ipsilateral breast tumor recurrence rates drop from over 30 % (35 % in the 12 year analysis of NSABP B-06) to 10% [20 year IBRT ; 39% lumpectomy alone vs 14% lumpectomy + RT /// Kwabena Solorzano al. Sabino [facility] J Med. 2002 Oct 17;347(16):3646-98]. There is no excess risk of contralateral breast cancer recurrence per [facility] data from a similar era. Multiple other trials have demonstrated a reduction in risk of ipsilateral breast tumor recurrence by 2/3 (whole breast radiation), with further possible benefit of Tamoxifen / Aromasin therapy (per Medical Oncology). The recent EBCTCG meta analysis shows an overall local recurrence rate of less the 10 % for node negative patients. Whole breast radiation to 50 Gy in standard fractions may be combined with a boost based on specific disease and patient characteristics to further improve local control [Tram H, J Clin Oncol. 2007 Aug 1;25(22):3588-65] and was discussed in detail with the patient as applicable [place] / hypofractionation will also be considered based on the ACR / TRUNG guidelines) as well as regional LN irradiation based on clinical and pathology characteristic (a thorough discussion of the potential benefits and risks on this specific aspect of treatment was performed PRN). The risks (detailed discussion), benefits, alternatives, process and logistics of external beam radiation were reviewed.  Specifically, we discussed the possible chronic radiation toxicity which may include but is not limited to lymphedema, pneumonitis, skin/cartilage necrosis (rare), rib fracture (rare) , joint pain, brachioplexopathy and cosmetic changes. We answered all of the patient's questions to the best of our ability who verbalized understanding and seemed satisfied. Radiation planning will commence within 21 days; the next step in management being the simulation scan, with external beam radiation to commence in a timely fashion thereafter. For left sided breast cancer and select right sided cases, utilization of the Active Breathing Coordinator and / or surface guided fractionated external beam radiation therapy (with Vision-RT / 4D) will be considered to reduce radiation dose to the heart (and lung in certain situations) [Omer et al. PRO. 2015 /// Loree et al. Ed Keller. 2011 /// Vinay et al. Mifflinvillegonzález [facility]. 2012  /// Alma Mejía. 2017]. Neo Garcia.  Cipriano Ibarra MD [place] [address] Oncology  Cell: [phone]    [facility]:  [phone]   FAX: [phone] [address] [facility]:  [address] [place] [address] Avenue:    [phone]  [facility]:  [address] [place] [address] Road:  [phone]

## 2021-09-03 ENCOUNTER — TELEPHONE (OUTPATIENT)
Dept: RADIATION ONCOLOGY | Age: 56
End: 2021-09-03

## 2021-09-22 ENCOUNTER — HOSPITAL ENCOUNTER (OUTPATIENT)
Dept: INFUSION THERAPY | Age: 56
Discharge: HOME | End: 2021-09-22
Payer: MEDICAID

## 2021-09-22 DIAGNOSIS — Z17.0 MALIGNANT NEOPLASM OF CENTRAL PORTION OF RIGHT BREAST IN FEMALE, ESTROGEN RECEPTOR POSITIVE (HCC): ICD-10-CM

## 2021-09-22 DIAGNOSIS — C50.111 MALIGNANT NEOPLASM OF CENTRAL PORTION OF RIGHT BREAST IN FEMALE, ESTROGEN RECEPTOR POSITIVE (HCC): ICD-10-CM

## 2021-09-22 DIAGNOSIS — Z85.3 PERSONAL HISTORY OF BREAST CANCER: Primary | ICD-10-CM

## 2021-09-22 LAB
ALBUMIN SERPL-MCNC: 3.5 G/DL (ref 3.5–5.2)
ALP BLD-CCNC: 200 U/L (ref 35–104)
ALT SERPL-CCNC: 73 U/L (ref 0–32)
ANION GAP SERPL CALCULATED.3IONS-SCNC: 7 MMOL/L (ref 7–16)
AST SERPL-CCNC: 50 U/L (ref 0–31)
BASOPHILS ABSOLUTE: 0.06 E9/L (ref 0–0.2)
BASOPHILS RELATIVE PERCENT: 0.6 % (ref 0–2)
BILIRUB SERPL-MCNC: <0.2 MG/DL (ref 0–1.2)
BUN BLDV-MCNC: 7 MG/DL (ref 6–20)
CALCIUM SERPL-MCNC: 9.1 MG/DL (ref 8.6–10.2)
CHLORIDE BLD-SCNC: 104 MMOL/L (ref 98–107)
CO2: 26 MMOL/L (ref 22–29)
CREAT SERPL-MCNC: 0.8 MG/DL (ref 0.5–1)
EOSINOPHILS ABSOLUTE: 0.06 E9/L (ref 0.05–0.5)
EOSINOPHILS RELATIVE PERCENT: 0.6 % (ref 0–6)
GFR AFRICAN AMERICAN: >60
GFR NON-AFRICAN AMERICAN: >60 ML/MIN/1.73
GLUCOSE BLD-MCNC: 149 MG/DL (ref 74–99)
HCT VFR BLD CALC: 33.6 % (ref 34–48)
HEMOGLOBIN: 10.9 G/DL (ref 11.5–15.5)
IMMATURE GRANULOCYTES #: 0.04 E9/L
IMMATURE GRANULOCYTES %: 0.4 % (ref 0–5)
LYMPHOCYTES ABSOLUTE: 3.18 E9/L (ref 1.5–4)
LYMPHOCYTES RELATIVE PERCENT: 31.7 % (ref 20–42)
MAGNESIUM: 1.8 MG/DL (ref 1.6–2.6)
MCH RBC QN AUTO: 33.9 PG (ref 26–35)
MCHC RBC AUTO-ENTMCNC: 32.4 % (ref 32–34.5)
MCV RBC AUTO: 104.3 FL (ref 80–99.9)
MONOCYTES ABSOLUTE: 0.94 E9/L (ref 0.1–0.95)
MONOCYTES RELATIVE PERCENT: 9.4 % (ref 2–12)
NEUTROPHILS ABSOLUTE: 5.75 E9/L (ref 1.8–7.3)
NEUTROPHILS RELATIVE PERCENT: 57.3 % (ref 43–80)
PDW BLD-RTO: 14.1 FL (ref 11.5–15)
PLATELET # BLD: 266 E9/L (ref 130–450)
PMV BLD AUTO: 9.3 FL (ref 7–12)
POTASSIUM SERPL-SCNC: 4.9 MMOL/L (ref 3.5–5)
RBC # BLD: 3.22 E12/L (ref 3.5–5.5)
SODIUM BLD-SCNC: 137 MMOL/L (ref 132–146)
TOTAL PROTEIN: 5.8 G/DL (ref 6.4–8.3)
WBC # BLD: 10 E9/L (ref 4.5–11.5)

## 2021-09-22 PROCEDURE — 80053 COMPREHEN METABOLIC PANEL: CPT

## 2021-09-22 PROCEDURE — 2580000003 HC RX 258: Performed by: INTERNAL MEDICINE

## 2021-09-22 PROCEDURE — 85025 COMPLETE CBC W/AUTO DIFF WBC: CPT

## 2021-09-22 PROCEDURE — 83735 ASSAY OF MAGNESIUM: CPT

## 2021-09-22 PROCEDURE — 6360000002 HC RX W HCPCS: Performed by: INTERNAL MEDICINE

## 2021-09-22 PROCEDURE — 36591 DRAW BLOOD OFF VENOUS DEVICE: CPT

## 2021-09-22 RX ORDER — SODIUM CHLORIDE 0.9 % (FLUSH) 0.9 %
10 SYRINGE (ML) INJECTION PRN
Status: DISCONTINUED | OUTPATIENT
Start: 2021-09-22 | End: 2021-09-23 | Stop reason: HOSPADM

## 2021-09-22 RX ORDER — HEPARIN SODIUM (PORCINE) LOCK FLUSH IV SOLN 100 UNIT/ML 100 UNIT/ML
500 SOLUTION INTRAVENOUS PRN
Status: DISCONTINUED | OUTPATIENT
Start: 2021-09-22 | End: 2021-09-23 | Stop reason: HOSPADM

## 2021-09-22 RX ORDER — SODIUM CHLORIDE 0.9 % (FLUSH) 0.9 %
10 SYRINGE (ML) INJECTION PRN
Status: CANCELLED | OUTPATIENT
Start: 2021-09-22

## 2021-09-22 RX ORDER — HEPARIN SODIUM (PORCINE) LOCK FLUSH IV SOLN 100 UNIT/ML 100 UNIT/ML
500 SOLUTION INTRAVENOUS PRN
Status: CANCELLED | OUTPATIENT
Start: 2021-09-22

## 2021-09-22 RX ADMIN — SODIUM CHLORIDE, PRESERVATIVE FREE 10 ML: 5 INJECTION INTRAVENOUS at 13:34

## 2021-09-22 RX ADMIN — HEPARIN 500 UNITS: 100 SYRINGE at 13:34

## 2021-09-23 ENCOUNTER — HOSPITAL ENCOUNTER (OUTPATIENT)
Dept: INFUSION THERAPY | Age: 56
Discharge: HOME | End: 2021-09-23
Payer: MEDICAID

## 2021-09-23 ENCOUNTER — TELEPHONE (OUTPATIENT)
Dept: ONCOLOGY | Age: 56
End: 2021-09-23

## 2021-09-23 ENCOUNTER — OFFICE VISIT (OUTPATIENT)
Dept: ONCOLOGY | Age: 56
End: 2021-09-23
Payer: MEDICAID

## 2021-09-23 VITALS
DIASTOLIC BLOOD PRESSURE: 74 MMHG | SYSTOLIC BLOOD PRESSURE: 117 MMHG | RESPIRATION RATE: 14 BRPM | WEIGHT: 150 LBS | HEIGHT: 72 IN | TEMPERATURE: 96 F | HEART RATE: 68 BPM | OXYGEN SATURATION: 100 % | BODY MASS INDEX: 20.32 KG/M2

## 2021-09-23 DIAGNOSIS — C50.111 MALIGNANT NEOPLASM OF CENTRAL PORTION OF RIGHT BREAST IN FEMALE, ESTROGEN RECEPTOR POSITIVE (HCC): Primary | ICD-10-CM

## 2021-09-23 DIAGNOSIS — E86.0 DEHYDRATION: ICD-10-CM

## 2021-09-23 DIAGNOSIS — Z17.0 MALIGNANT NEOPLASM OF CENTRAL PORTION OF RIGHT BREAST IN FEMALE, ESTROGEN RECEPTOR POSITIVE (HCC): ICD-10-CM

## 2021-09-23 DIAGNOSIS — Z17.0 MALIGNANT NEOPLASM OF CENTRAL PORTION OF RIGHT BREAST IN FEMALE, ESTROGEN RECEPTOR POSITIVE (HCC): Primary | ICD-10-CM

## 2021-09-23 DIAGNOSIS — C50.111 MALIGNANT NEOPLASM OF CENTRAL PORTION OF RIGHT BREAST IN FEMALE, ESTROGEN RECEPTOR POSITIVE (HCC): ICD-10-CM

## 2021-09-23 DIAGNOSIS — E86.0 DEHYDRATION: Primary | ICD-10-CM

## 2021-09-23 PROCEDURE — G9899 SCRN MAM PERF RSLTS DOC: HCPCS | Performed by: INTERNAL MEDICINE

## 2021-09-23 PROCEDURE — 2580000003 HC RX 258

## 2021-09-23 PROCEDURE — 6360000002 HC RX W HCPCS: Performed by: INTERNAL MEDICINE

## 2021-09-23 PROCEDURE — 96360 HYDRATION IV INFUSION INIT: CPT

## 2021-09-23 PROCEDURE — G8420 CALC BMI NORM PARAMETERS: HCPCS | Performed by: INTERNAL MEDICINE

## 2021-09-23 PROCEDURE — 2580000003 HC RX 258: Performed by: INTERNAL MEDICINE

## 2021-09-23 PROCEDURE — 3017F COLORECTAL CA SCREEN DOC REV: CPT | Performed by: INTERNAL MEDICINE

## 2021-09-23 PROCEDURE — 1036F TOBACCO NON-USER: CPT | Performed by: INTERNAL MEDICINE

## 2021-09-23 PROCEDURE — 96549 UNLISTED CHEMOTHERAPY PX: CPT

## 2021-09-23 PROCEDURE — 99214 OFFICE O/P EST MOD 30 MIN: CPT | Performed by: INTERNAL MEDICINE

## 2021-09-23 PROCEDURE — G8427 DOCREV CUR MEDS BY ELIG CLIN: HCPCS | Performed by: INTERNAL MEDICINE

## 2021-09-23 PROCEDURE — 96413 CHEMO IV INFUSION 1 HR: CPT

## 2021-09-23 RX ORDER — 0.9 % SODIUM CHLORIDE 0.9 %
1000 INTRAVENOUS SOLUTION INTRAVENOUS ONCE
Status: COMPLETED | OUTPATIENT
Start: 2021-09-23 | End: 2021-09-23

## 2021-09-23 RX ORDER — EPINEPHRINE 1 MG/ML
0.3 INJECTION, SOLUTION, CONCENTRATE INTRAVENOUS PRN
Status: CANCELLED | OUTPATIENT
Start: 2021-09-23

## 2021-09-23 RX ORDER — FLUOXETINE 10 MG/1
20 TABLET ORAL DAILY
Status: ON HOLD | COMMUNITY
End: 2022-02-06

## 2021-09-23 RX ORDER — SODIUM CHLORIDE 9 MG/ML
20 INJECTION, SOLUTION INTRAVENOUS ONCE
Status: COMPLETED | OUTPATIENT
Start: 2021-09-23 | End: 2021-09-23

## 2021-09-23 RX ORDER — HEPARIN SODIUM (PORCINE) LOCK FLUSH IV SOLN 100 UNIT/ML 100 UNIT/ML
500 SOLUTION INTRAVENOUS PRN
Status: DISCONTINUED | OUTPATIENT
Start: 2021-09-23 | End: 2021-09-24 | Stop reason: HOSPADM

## 2021-09-23 RX ORDER — HEPARIN SODIUM (PORCINE) LOCK FLUSH IV SOLN 100 UNIT/ML 100 UNIT/ML
500 SOLUTION INTRAVENOUS PRN
OUTPATIENT
Start: 2021-09-23

## 2021-09-23 RX ORDER — MAGNESIUM OXIDE 400 MG/1
1 TABLET ORAL 2 TIMES DAILY
COMMUNITY
Start: 2021-08-26 | End: 2022-08-17 | Stop reason: ALTCHOICE

## 2021-09-23 RX ORDER — LIDOCAINE AND PRILOCAINE 25; 25 MG/G; MG/G
CREAM TOPICAL
Qty: 30 G | Refills: 2 | Status: SHIPPED
Start: 2021-09-23 | End: 2022-08-17 | Stop reason: ALTCHOICE

## 2021-09-23 RX ORDER — METHYLPREDNISOLONE SODIUM SUCCINATE 125 MG/2ML
125 INJECTION, POWDER, LYOPHILIZED, FOR SOLUTION INTRAMUSCULAR; INTRAVENOUS ONCE
Status: CANCELLED | OUTPATIENT
Start: 2021-09-23 | End: 2021-09-23

## 2021-09-23 RX ORDER — OXYCODONE HYDROCHLORIDE AND ACETAMINOPHEN 5; 325 MG/1; MG/1
TABLET ORAL
COMMUNITY
End: 2021-09-29

## 2021-09-23 RX ORDER — 0.9 % SODIUM CHLORIDE 0.9 %
1000 INTRAVENOUS SOLUTION INTRAVENOUS ONCE
Status: CANCELLED | OUTPATIENT
Start: 2021-09-23 | End: 2021-09-23

## 2021-09-23 RX ORDER — DIPHENHYDRAMINE HYDROCHLORIDE 50 MG/ML
50 INJECTION INTRAMUSCULAR; INTRAVENOUS ONCE
Status: CANCELLED | OUTPATIENT
Start: 2021-09-23 | End: 2021-09-23

## 2021-09-23 RX ORDER — DIPHENOXYLATE HYDROCHLORIDE AND ATROPINE SULFATE 2.5; .025 MG/1; MG/1
TABLET ORAL
Status: CANCELLED | OUTPATIENT
Start: 2021-09-23

## 2021-09-23 RX ORDER — SODIUM CHLORIDE 0.9 % (FLUSH) 0.9 %
10 SYRINGE (ML) INJECTION PRN
Status: DISCONTINUED | OUTPATIENT
Start: 2021-09-23 | End: 2021-09-24 | Stop reason: HOSPADM

## 2021-09-23 RX ORDER — SODIUM CHLORIDE 9 MG/ML
INJECTION, SOLUTION INTRAVENOUS
Status: COMPLETED
Start: 2021-09-23 | End: 2021-09-23

## 2021-09-23 RX ORDER — HEPARIN SODIUM (PORCINE) LOCK FLUSH IV SOLN 100 UNIT/ML 100 UNIT/ML
500 SOLUTION INTRAVENOUS PRN
Status: CANCELLED | OUTPATIENT
Start: 2021-09-23

## 2021-09-23 RX ORDER — SODIUM CHLORIDE 0.9 % (FLUSH) 0.9 %
5 SYRINGE (ML) INJECTION PRN
Status: CANCELLED | OUTPATIENT
Start: 2021-09-23

## 2021-09-23 RX ORDER — MEPERIDINE HYDROCHLORIDE 25 MG/ML
12.5 INJECTION INTRAMUSCULAR; INTRAVENOUS; SUBCUTANEOUS ONCE
Status: CANCELLED | OUTPATIENT
Start: 2021-09-23 | End: 2021-09-23

## 2021-09-23 RX ORDER — SODIUM CHLORIDE 9 MG/ML
20 INJECTION, SOLUTION INTRAVENOUS ONCE
Status: CANCELLED | OUTPATIENT
Start: 2021-09-23 | End: 2021-09-23

## 2021-09-23 RX ORDER — DIPHENOXYLATE HYDROCHLORIDE AND ATROPINE SULFATE 2.5; .025 MG/1; MG/1
TABLET ORAL
COMMUNITY
End: 2021-09-29 | Stop reason: ALTCHOICE

## 2021-09-23 RX ORDER — SODIUM CHLORIDE 0.9 % (FLUSH) 0.9 %
10 SYRINGE (ML) INJECTION PRN
Status: CANCELLED | OUTPATIENT
Start: 2021-09-23

## 2021-09-23 RX ORDER — SODIUM CHLORIDE 9 MG/ML
25 INJECTION, SOLUTION INTRAVENOUS PRN
OUTPATIENT
Start: 2021-09-23

## 2021-09-23 RX ORDER — SODIUM CHLORIDE 9 MG/ML
INJECTION, SOLUTION INTRAVENOUS CONTINUOUS
Status: CANCELLED | OUTPATIENT
Start: 2021-09-23

## 2021-09-23 RX ORDER — SODIUM CHLORIDE 9 MG/ML
INJECTION, SOLUTION INTRAVENOUS
Status: DISCONTINUED
Start: 2021-09-23 | End: 2021-09-24 | Stop reason: HOSPADM

## 2021-09-23 RX ADMIN — SODIUM CHLORIDE 1000 ML: 9 INJECTION, SOLUTION INTRAVENOUS at 12:02

## 2021-09-23 RX ADMIN — SODIUM CHLORIDE 20 ML/HR: 9 INJECTION, SOLUTION INTRAVENOUS at 10:35

## 2021-09-23 RX ADMIN — Medication 1000 ML: at 12:02

## 2021-09-23 RX ADMIN — PERTUZUMAB 420 MG: 30 INJECTION, SOLUTION, CONCENTRATE INTRAVENOUS at 11:21

## 2021-09-23 RX ADMIN — HEPARIN 500 UNITS: 100 SYRINGE at 13:02

## 2021-09-23 RX ADMIN — TRASTUZUMAB-ANNS 420 MG: 420 INJECTION, POWDER, LYOPHILIZED, FOR SOLUTION INTRAVENOUS at 11:21

## 2021-09-23 RX ADMIN — SODIUM CHLORIDE, PRESERVATIVE FREE 10 ML: 5 INJECTION INTRAVENOUS at 13:02

## 2021-09-23 NOTE — PROGRESS NOTES
Department of [facility] Oncology  Attending Clinic Note    Reason for Visit: Follow-up on a patient with Right Breast Cancer    PCP:  Norma Amado DO    History of Present Illness: The mass was located in the 6 o'clock position of right breast    Breast cancer risk factors include infrequent SMEs and age and gender    Bilateral Screening Mammogram 10/06/2020: There are suspicious calcifications in the right breast at the 6 o'clock position which appear pleomorphic. These calcifications are in a segmental distribution. These clustered calcifications are suspicious for malignancy. Right breast, calcifications at the 6:00, core biopsy on 10/30/2020:    - Small focus of invasive ductal carcinoma, grade 3 (3+3+2 = 8)    - Ductal carcinoma in situ, high nuclear grade, comedo/cribriform/solid types;    - Microcalcifications in DCIS    - Breast receptor and biomarkers (per outside report without looking the   slides:     ER: Positive, 89.8%, strong intensity     FL: Positive, 52.2%, moderate intensity     HER-2: Positive (score 3+)     Ki-67: High proliferation, 26.2%     P53: Negative, 0.2%     Comment:The invasive carcinoma is intermingled with DCIS and measures   3.0 x 2.0 mm in greatest dimension on the slides. CXR PA/Lateral 12/11/2020:  No acute process. Right Axillary U/S on 12/11/2020: There is no axillary LN.     MRI Bilateral Breast 01/05/2021:  Focal, heterogeneous non mass enhancement identified in the right breast 6 o'clock which measures approximately 1.9 x 1.4 x 1.7 cm (image 19 of the axial T1 post contrast series).    No additional foci of disease identified on the right  There is no lymphadenopathy    Needle localized Right lumpectomy with SLNBx with mediport placement on 02/05/2021  A.  Right axillary sentinel lymph node #1, excision: 1 lymph node negative for malignancy     B.  Right axillary contents, regional resection: 1 lymph node positive for metastatic, poorly differentiated ductal carcinoma (grade 3)   Extranodal extension present     C.  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;   positive for extensive intraductal component   Skin-uninvolved by malignancy   Invasive carcinoma margins-uninvolved by invasive carcinoma        Distance from closest margin: 4 mm from anterior margin   DCIS margins-inferior and medial margins involved by DCIS   Regional lymph nodes-involved by tumor cells        Number of lymph nodes with macrometastasis: 1        Number of lymph nodes with micrometastasis: 0        Number of lymph nodes with isolated tumor cells: 0        Size of largest metastatic deposit: 1.5 cm        Extranodal extension: Present        Total number of lymph nodes examined: 2        Number of sentinel nodes examined: 1   Treatment effect in the breast-no known presurgical therapy   TNM classification (AJCC eighth edition)-  pT1c N1a MX     Bone scan, CT chest/abdomen/pelvis 02/26/2021 negative for metastatic disease    Re-excision lumpectomy of inferior and medial margins on 03/09/2021  A. Right breast, new inferior margin, excision: Fat necrosis, foreign body-type giant cell reaction, chronic inflammation, and fibrosis consistent with previous procedure   No evidence of residual carcinoma   Final inferior margin negative for carcinoma     B.  Right breast, new medial margin, excision: Residual high-grade ductal carcinoma in situ   Ductal carcinoma in situ present less than 1 mm from red/superior margin and green/anterior margin   Fat necrosis, foreign body-type giant cell reaction, chronic   inflammation, and fibrosis consistent with previous procedure   Fibrocystic change   Microcalcifications associated with benign breast tissue and DCIS   Comment:Residual ductal carcinoma in situ is present in 5 out of 13 tissue blocks of part B    Recommended adjuvant chemotherapy (TCHP) for 6 cycles followed by adjuvant RT followed lastly by endocrine therapy. Side effects TCHP reviewed with patient. She agreed to proceed. 2d-echo 02/17/2021 noted EF 60-65%  Cycle # 1 adjuvant TCHP was on 04/08/2021. Cycle # 2 adjuvant TCHP was on 04/29/2021. Cycle # 3 adjuvant TCHP was on 05/20/2021. Cycle # 4 adjuvant TCHP was on 06/10/2021. Cycle # 5 adjuvant TCHP was on 07/22/2021. Cycle # 6 adjuvant TCHP was on 08/12/2021. Radiation Oncology consult appreciated. CT sim 09/14/2021  Herceptin/Perjeta today 09/23/2021. Today 09/23/2021. No fever, chills. Fair appetite and energy level. No N/V. Review of Systems;  CONSTITUTIONAL: No fever, chills. Fair appetite and energy level. ENMT: Eyes: No diplopia; Nose: No epistaxis. Mouth: No sore throat. RESPIRATORY: No hemoptysis, shortness of breath, cough. CARDIOVASCULAR: No chest pain, palpitations. GASTROINTESTINAL: No nausea/vomiting, abdominal pain. GENITOURINARY: No dysuria, urinary frequency, hematuria. NEURO: No syncope, presyncope, headache.   Remainder: ROS NEGATIVE    Past Medical History:      Diagnosis Date    Anxiety     Arthritis     Cancer ([facility] [address] 2021    breast    Cervical radiculopathy     Chronic back pain     Depression     Diabetes mellitus type 2, uncontrolled ([address] 2/12/2017    GERD (gastroesophageal reflux disease)     History of blood transfusion 01/2018    back surgery, lumbar, dr Barfield Roll    Hypertension     Right leg pain     seeing doctor currently problems with hardware Medications:  Reviewed and reconciled. Allergies: Allergies   Allergen Reactions    Ceftriaxone Shortness Of Breath     Physical Exam:  /74   Pulse 68   Temp 96 °F (35.6 °C)   Resp 14   Ht 6' 1\" (1.854 m)   Wt 150 lb (68 kg)   LMP 08/13/2013   SpO2 100%   BMI 19.79 kg/m²   GENERAL: Alert, oriented x 3, not in acute distress. HEENT: PERRLA; EOMI. Oropharynx clear. NECK: Supple. Without lymphadenopathy. LUNGS: Good air entry bilaterally. No wheezing, crackles or ronchi. CARDIOVASCULAR: Regular rate. No murmurs, rubs or gallops. ABDOMEN: Soft. Non-tender, non-distended. Positive bowel sounds. EXTREMITIES: Without clubbing, cyanosis, or edema. NEUROLOGIC: No focal deficits. ECOG PS 1    Lab Results   Component Value Date    WBC 10.0 09/22/2021    HGB 10.9 (L) 09/22/2021    HCT 33.6 (L) 09/22/2021    .3 (H) 09/22/2021     09/22/2021     Lab Results   Component Value Date     09/22/2021    K 4.9 09/22/2021     09/22/2021    CO2 26 09/22/2021    BUN 7 09/22/2021    CREATININE 0.8 09/22/2021    GLUCOSE 149 (H) 09/22/2021    CALCIUM 9.1 09/22/2021    PROT 5.8 (L) 09/22/2021    LABALBU 3.5 09/22/2021    BILITOT <0.2 09/22/2021    ALKPHOS 200 (H) 09/22/2021    AST 50 (H) 09/22/2021    ALT 73 (H) 09/22/2021    LABGLOM >60 09/22/2021    GFRAA >60 09/22/2021     Impression/Plan:  62 y/o female with Right Breast Cancer    Bilateral Screening Mammogram 10/06/2020: There are suspicious calcifications in the right breast at the 6 o'clock position which appear pleomorphic. These calcifications are in a segmental distribution. These clustered calcifications are suspicious for malignancy.     Right breast, calcifications at the 6:00, core biopsy on 10/30/2020:    - Small focus of invasive ductal carcinoma, grade 3 (3+3+2 = 8)    - Ductal carcinoma in situ, high nuclear grade, comedo/cribriform/solid types;    - Microcalcifications in DCIS    - Breast receptor and biomarkers (per outside report without looking the   slides:     ER: Positive, 89.8%, strong intensity     NE: Positive, 52.2%, moderate intensity     HER-2: Positive (score 3+)     Ki-67: High proliferation, 26.2%     P53: Negative, 0.2%     Comment:The invasive carcinoma is intermingled with DCIS and measures   3.0 x 2.0 mm in greatest dimension on the slides. CXR PA/Lateral 12/11/2020:  No acute process. Right Axillary U/S on 12/11/2020: There is no axillary LN.     MRI Bilateral Breast 01/05/2021:  Focal, heterogeneous non mass enhancement identified in the right breast 6 o'clock which measures approximately 1.9 x 1.4 x 1.7 cm (image 19 of the axial T1 post contrast series).    No additional foci of disease identified on the right  There is no lymphadenopathy    Needle localized Right lumpectomy with SLNBx with mediport placement on 02/05/2021  A.  Right axillary sentinel lymph node #1, excision: 1 lymph node negative for malignancy     B.  Right axillary contents, regional resection: 1 lymph node positive for metastatic, poorly differentiated ductal carcinoma (grade 3)   Extranodal extension present     C.  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;   positive for extensive intraductal component   Skin-uninvolved by malignancy   Invasive carcinoma margins-uninvolved by invasive carcinoma        Distance from closest margin: 4 mm from anterior margin   DCIS margins-inferior and medial margins involved by DCIS   Regional lymph nodes-involved by tumor cells        Number of lymph nodes with macrometastasis: 1        Number of lymph nodes with micrometastasis: 0        Number of lymph nodes with isolated tumor cells: 0        Size of largest metastatic deposit: 1.5 cm        Extranodal extension: Present        Total number of lymph nodes examined: 2        Number of sentinel nodes examined: 1   Treatment effect in the breast-no known presurgical therapy   TNM classification (AJCC eighth edition)-  pT1c N1a MX     Bone scan, CT chest/abdomen/pelvis 02/26/2021 negative for metastatic disease    Re-excision lumpectomy of inferior and medial margins on 03/09/2021  A. Right breast, new inferior margin, excision: Fat necrosis, foreign body-type giant cell reaction, chronic inflammation, and fibrosis consistent with previous procedure   No evidence of residual carcinoma   Final inferior margin negative for carcinoma     B. Right breast, new medial margin, excision: Residual high-grade ductal carcinoma in situ   Ductal carcinoma in situ present less than 1 mm from red/superior margin and green/anterior margin   Fat necrosis, foreign body-type giant cell reaction, chronic   inflammation, and fibrosis consistent with previous procedure   Fibrocystic change   Microcalcifications associated with benign breast tissue and DCIS   Comment:Residual ductal carcinoma in situ is present in 5 out of 13 tissue blocks of part B    Recommended adjuvant chemotherapy (TCHP) for 6 cycles followed by adjuvant RT followed lastly by endocrine therapy. Side effects TCHP reviewed with patient. She agreed to proceed. 2d-echo 02/17/2021 noted EF 60-65%  Cycle # 1 adjuvant TCHP was on 04/08/2021. Cycle # 2 adjuvant TCHP was on 04/29/2021. Cycle # 3 adjuvant TCHP was on 05/20/2021. Cycle # 4 adjuvant TCHP was on 06/10/2021. 2D-ECHO 06/29/2021 noted EF 60-65%  Cycle # 5 adjuvant TCHP was on 07/22/2021.    Cycle # 6 adjuvant TCHP was on 08/12/2021. Radiation Oncology consult appreciated. CT sim 09/14/2021  Herceptin/Perjeta today 09/23/2021. Labs reviewed ok to proceed. RTC 3 weeks for Herceptin/Perjeta.  2D-echo in the interim    Anitha Avila MD   [id number]  Board Certified Medical Oncologist

## 2021-09-23 NOTE — TELEPHONE ENCOUNTER
Briefly met with pt in conjunction with medical oncology visit. Pt is 60-year-old female being treated for breast CA.  Pt's mood appeared euthymic with full affect, she appeared A&Ox4, and she was willing/able to participate in session.  She appeared appropriately dressed/groomed and was seated on power scooter. Pt reported that she continues to reside with her sister. Noted that she has been in contact with apartment  and was supposed to hear about availability by the end of this week. Encouraged pt to follow up on this and to notify this provider if additional needs arise.     Rosanne Bauer MSW, LISW-S  Oncology Social Worker

## 2021-09-24 ENCOUNTER — TELEPHONE (OUTPATIENT)
Dept: ONCOLOGY | Age: 56
End: 2021-09-24

## 2021-09-24 NOTE — TELEPHONE ENCOUNTER
Spoke with Ant from [facility], there is no auth required. Ref # [id number].  Called Arnulfo Downing and left vm for her to know that her Echo is scheduled in [facility] on 10/12/21 and to arrive in registration at the [facility] hospital @ 7:30 am.

## 2021-09-29 ENCOUNTER — HOSPITAL ENCOUNTER (OUTPATIENT)
Dept: WOUND CARE | Age: 56
Discharge: HOME | End: 2021-09-29
Payer: MEDICAID

## 2021-09-29 VITALS
DIASTOLIC BLOOD PRESSURE: 70 MMHG | HEART RATE: 77 BPM | BODY MASS INDEX: 20.32 KG/M2 | TEMPERATURE: 96 F | HEIGHT: 72 IN | WEIGHT: 150 LBS | SYSTOLIC BLOOD PRESSURE: 118 MMHG | RESPIRATION RATE: 16 BRPM

## 2021-09-29 DIAGNOSIS — C50.111 MALIGNANT NEOPLASM OF CENTRAL PORTION OF RIGHT BREAST IN FEMALE, ESTROGEN RECEPTOR POSITIVE (HCC): ICD-10-CM

## 2021-09-29 DIAGNOSIS — Z17.0 MALIGNANT NEOPLASM OF CENTRAL PORTION OF RIGHT BREAST IN FEMALE, ESTROGEN RECEPTOR POSITIVE (HCC): ICD-10-CM

## 2021-09-29 DIAGNOSIS — S21.009A INCISIONAL BREAST WOUND: Primary | ICD-10-CM

## 2021-09-29 PROCEDURE — 11042 DBRDMT SUBQ TIS 1ST 20SQCM/<: CPT

## 2021-09-29 PROCEDURE — 11042 DBRDMT SUBQ TIS 1ST 20SQCM/<: CPT | Performed by: SURGERY

## 2021-09-29 PROCEDURE — 99213 OFFICE O/P EST LOW 20 MIN: CPT

## 2021-09-29 PROCEDURE — 99213 OFFICE O/P EST LOW 20 MIN: CPT | Performed by: SURGERY

## 2021-09-29 RX ORDER — LIDOCAINE HYDROCHLORIDE 40 MG/ML
SOLUTION TOPICAL ONCE
Status: CANCELLED | OUTPATIENT
Start: 2021-09-29 | End: 2021-09-29

## 2021-09-29 RX ORDER — BETAMETHASONE DIPROPIONATE 0.05 %
OINTMENT (GRAM) TOPICAL ONCE
Status: CANCELLED | OUTPATIENT
Start: 2021-09-29 | End: 2021-09-29

## 2021-09-29 RX ORDER — GENTAMICIN SULFATE 1 MG/G
OINTMENT TOPICAL ONCE
Status: CANCELLED | OUTPATIENT
Start: 2021-09-29 | End: 2021-09-29

## 2021-09-29 RX ORDER — CLOBETASOL PROPIONATE 0.5 MG/G
OINTMENT TOPICAL ONCE
Status: CANCELLED | OUTPATIENT
Start: 2021-09-29 | End: 2021-09-29

## 2021-09-29 RX ORDER — GINSENG 100 MG
CAPSULE ORAL ONCE
Status: CANCELLED | OUTPATIENT
Start: 2021-09-29 | End: 2021-09-29

## 2021-09-29 RX ORDER — LIDOCAINE HYDROCHLORIDE 20 MG/ML
JELLY TOPICAL PRN
Status: DISCONTINUED | OUTPATIENT
Start: 2021-09-29 | End: 2021-09-30 | Stop reason: HOSPADM

## 2021-09-29 RX ORDER — LIDOCAINE HYDROCHLORIDE 20 MG/ML
JELLY TOPICAL ONCE
Status: CANCELLED | OUTPATIENT
Start: 2021-09-29 | End: 2021-09-29

## 2021-09-29 RX ORDER — BACITRACIN ZINC AND POLYMYXIN B SULFATE 500; 1000 [USP'U]/G; [USP'U]/G
OINTMENT TOPICAL ONCE
Status: CANCELLED | OUTPATIENT
Start: 2021-09-29 | End: 2021-09-29

## 2021-09-29 RX ORDER — LIDOCAINE 40 MG/G
CREAM TOPICAL ONCE
Status: CANCELLED | OUTPATIENT
Start: 2021-09-29 | End: 2021-09-29

## 2021-09-29 RX ORDER — ACETAMINOPHEN AND CODEINE PHOSPHATE 300; 30 MG/1; MG/1
1 TABLET ORAL EVERY 4 HOURS PRN
COMMUNITY

## 2021-09-29 RX ORDER — LIDOCAINE 50 MG/G
OINTMENT TOPICAL ONCE
Status: CANCELLED | OUTPATIENT
Start: 2021-09-29 | End: 2021-09-29

## 2021-09-29 RX ORDER — BACITRACIN, NEOMYCIN, POLYMYXIN B 400; 3.5; 5 [USP'U]/G; MG/G; [USP'U]/G
OINTMENT TOPICAL ONCE
Status: CANCELLED | OUTPATIENT
Start: 2021-09-29 | End: 2021-09-29

## 2021-09-29 NOTE — PROGRESS NOTES
Wound Healing Center /Hyperbarics   History and Physical/Consultation  General Surgery/Medicine    Referring Physician : Iva Cowart DO  [id number] [facility] RECORD NUMBER:  [id number]  AGE: 63 y.o. GENDER: female  : 1965  EPISODE DATE:  2021  Subjective:     Chief Complaint   Patient presents with    Wound Check     RIGHT CHEST ULCER         HISTORY of PRESENT ILLNESS HPI     Caren Edwards is a 63 y.o. female who presents today for wound/ulcer evaluation. History of Wound Context:  The patient has had a wound of right breast which was first noted approximately 8 months ago, following breast surgery for cancer. This has been treated at home. On their initial visit to the wound healing center,  the patient has noted that the wound has not been improving. The patient has not had similar previous wounds in the past.      Pt is not on abx at time of initial visit.       Wound/Ulcer Pain Timing/Severity: intermittent  Quality of pain: dull  Severity:  4 / 10   Modifying Factors: None  Associated Signs/Symptoms: none    Ulcer Identification:  Ulcer Type: non-healing surgical  Contributing Factors: decreased tissue oxygenation      PAST MEDICAL HISTORY      Diagnosis Date    Anxiety     Arthritis     Cancer ([address]     breast    Cervical radiculopathy     Chronic back pain     Dehydration 2021    Dehydration 2021    Depression     Diabetes mellitus type 2, uncontrolled ([address] 2017    GERD (gastroesophageal reflux disease)     History of blood transfusion 2018    back surgery, lumbar, dr Judit Esquivel    Hypertension     Right leg pain     seeing doctor currently problems with hardware     Past Surgical History:   Procedure Laterality Date    BACK SURGERY  2018    PLIF L2-L5    BREAST BIOPSY Right 2021    RIGHT BREAST LUMPECTOMY WITH  sentinel LYMPHNODE biopsy performed by Julia Grace MD at [facility] Right 3/9/2021    RIGHT BREAST RE-EXCISION LUMPECTOMY OF INFERIOR AND MEDIAL MARGINS performed by Funmi Humphrey MD at [facility] Right 2017    Dr. 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 WITH REMOVAL OF HARDWARE & O.R. I. F ------BOP performed by Michelle Champion MD at [address] [place] [address] Right 2018    REPAIR NON-UNION RIGHT TIBIA AND FIBULA WITH REMOVAL OF HARDWARE AND BONE GRAFT performed by Michelle Champion MD at [address] [place] [address] Right 1/10/2019    RIGHT TIBIA REMOVAL HARDWARE, RIGHT TIBIA OPEN  TREATMENT OF NON UNION performed by Michelle Champion MD at [place] [address]    WISDOM TOOTH EXTRACTION       Family History   Problem Relation Age of Onset    Diabetes Mother      Social History     Tobacco Use    Smoking status: Former Smoker     Packs/day: 0.50     Years: 10.00     Pack years: 5.00     Types: Cigarettes     Quit date: 3/15/2015     Years since quittin.5    Smokeless tobacco: Never Used    Tobacco comment: stop    Vaping Use    Vaping Use: Never used   Substance Use Topics    Alcohol use: No    Drug use: No     Allergies   Allergen Reactions    Ceftriaxone Shortness Of Breath     Current Outpatient Medications on File Prior to Encounter   Medication Sig Dispense Refill    acetaminophen-codeine (TYLENOL/CODEINE #3) 300-30 MG per tablet Take 1 tablet by mouth every 4 hours as needed for Pain.  magnesium oxide (MAG-OX) 400 MG tablet Take 1 tablet by mouth 2 times daily       FLUoxetine HCl, PMDD, 10 MG TABS Take 20 mg by mouth daily      lidocaine-prilocaine (EMLA) 2.5-2.5 % cream Apply topically to port 30 minutes prior to chemotherapy. 30 g 2    prochlorperazine (COMPAZINE) 10 MG tablet Take 1 tablet by mouth every 6 hours as needed (nausea) 60 tablet 1    ondansetron (ZOFRAN) 4 MG tablet Take 1 tablet by mouth every 8 hours as needed for Nausea or Vomiting 60 tablet 1    cholestyramine (QUESTRAN) 4 g packet Take 1 packet by mouth 2 times daily 90 packet 3    psyllium (KONSYL) 28.3 % PACK Take 1 packet by mouth daily 30 each 3    magnesium oxide (MAG-OX) 400 (240 Mg) MG tablet Take 1 tablet by mouth 2 times daily 60 tablet 0    diclofenac sodium (VOLTAREN) 1 % GEL Apply 1 g topically 2 times daily       MELATONIN PO Take by mouth nightly      Multiple Vitamins-Minerals (HAIR SKIN AND NAILS FORMULA) TABS Take by mouth STOP PREOP MED      mirtazapine (REMERON) 15 MG tablet Take 15 mg by mouth nightly      atenolol (TENORMIN) 25 MG tablet every evening       ALPRAZolam (XANAX) 0.5 MG tablet Take 0.5 mg by mouth nightly.  pregabalin (LYRICA) 75 MG capsule Take 75 mg by mouth daily.        famotidine (PEPCID) 20 MG tablet Take 20 mg by mouth daily      amitriptyline (ELAVIL) 50 MG tablet Take 50 mg by mouth nightly       LORATADINE-D 24HR  MG per extended release tablet Take 1 tablet by mouth daily       LANTUS SOLOSTAR 100 UNIT/ML injection pen Inject 30 Units into the skin nightly       metFORMIN (GLUCOPHAGE) 500 MG tablet Take 500 mg by mouth 2 times daily       albuterol sulfate  (90 BASE) MCG/ACT inhaler Inhale 2 puffs into the lungs every 6 hours as needed for Wheezing No current facility-administered medications on file prior to encounter.        REVIEW OF SYSTEMS   ROS : All others Negative if blank [], Positive if [x]  General Urinary   [] Fevers [] Hematuria   [] Chills [] Dysuria   [] Weight Loss Neurolgoic   Skin [] Stroke/TIA   [] Tissue Loss [] Focal weakness   Eyes [] Slurred Speech   [] Wears Glasses/Contacts ENT   [] Vision Changes [] Difficulty swallowing   Respiratory Endocrine    [] Shortness of breath [] Increased Thirst   Cardiovascular Gastrointestinal   [] Chest Pain [] Abdominal Pain   [] Shortness of breath with exertion [] Melena    [] Hematochezia     Objective:    /70   Pulse 77   Temp 96 °F (35.6 °C) (Temporal)   Resp 16   Ht 6' 1\" (1.854 m)   Wt 150 lb (68 kg)   LMP 08/13/2013   BMI 19.79 kg/m²   Wt Readings from Last 3 Encounters:   09/29/21 150 lb (68 kg)   09/23/21 150 lb (68 kg)   09/02/21 150 lb 4.8 oz (68.2 kg)       PHYSICAL EXAM  CONSTITUTIONAL:   Awake, alert, cooperative   PSYCHIATRIC :  Oriented to time, place and person        normal insight to disease process  ENT:  External ears and nose without lesions      Hearing deficit is not noted  NECK: Supple, symmetrical, trachea midline      Thyroid goiter not appreciated     LUNGS:  No increased work of breathing                    Clear to auscultation bilaterally     CARDIOVASCULAR:  regular rate and rhythm     ABDOMEN:  soft, non-distended, non-tender      Hernias is not noted  Lymphatics : Cervical lymphadenopathy is not noted     Femoral lymphadenopathy is not noted  SKIN:   Skin color is normal   Texture and turgor is  normal   Induration is not noted  EXTREMITIES:   R UE Edema is not noted  L UE Edema is not noted  R LE Edema is not noted  L LE Edema is not noted  Assessment:     Problem List Items Addressed This Visit     None          Pre Debridement Measurements:  Are located in the [facility]  Documentation Flow Sheet  Post Debridement Measurements:  Wound/Ulcer Descriptions are Pre Debridement except measurements:     Incision 01/08/18 Back Lower;Mid (Active)   Number of days: 8741       Wound 09/29/21 Chest Lower;Right #1 CA surgery 3-9-21 (Active)   Wound Image   09/29/21 1427   Wound Length (cm) 0.7 cm 09/29/21 1427   Wound Width (cm) 1.8 cm 09/29/21 1427   Wound Depth (cm) 0.2 cm 09/29/21 1427   Wound Surface Area (cm^2) 1.26 cm^2 09/29/21 1427   Wound Volume (cm^3) 0.252 cm^3 09/29/21 1427   Post-Procedure Length (cm) 0.7 cm 09/29/21 1514   Post-Procedure Width (cm) 1.8 cm 09/29/21 1514   Post-Procedure Depth (cm) 0.2 cm 09/29/21 1514   Post-Procedure Surface Area (cm^2) 1.26 cm^2 09/29/21 1514   Post-Procedure Volume (cm^3) 0.252 cm^3 09/29/21 1514   Wound Assessment Pale granulation tissue;Fibrin 09/29/21 1427   Drainage Amount Small 09/29/21 1427   Drainage Description Yellow 09/29/21 1427   Odor None 09/29/21 1427   Maria Esther-wound Assessment Intact 09/29/21 1427   Number of days: 0          Procedure Note  Indications:  Based on my examination of this patient's wound(s)/ulcer(s) today, debridement is required to promote healing and evaluate the wound base. Performed by: Dimitry Kim MD    Consent obtained:  Yes    Time out taken:  Yes    Pain Control: Anesthetic  Anesthetic: 2% Lidocaine Gel Topical     Debridement:Excisional Debridement    Using curette the wound(s)/ulcer(s) was/were sharply debrided down through and including the removal of subcutaneous tissue. Devitalized Tissue Debrided:  biofilm, slough and necrotic/eschar to stimulate bleeding to promote healing, post debridement good bleeding base and wound edges noted    Wound/Ulcer #: 1    Percent of Wound/Ulcer Debrided: 100%    Total Surface Area Debrided:  <20 sq cm     Estimated Blood Loss:  Minimal  Hemostasis Achieved:  not needed    Procedural Pain:  2  / 10   Post Procedural Pain:  2 / 10     Response to treatment:  Well tolerated by patient. A culture was not done.       Plan:     Pt is not a smoker       In my professional opinion and based off the information that is available at this time this patient has appropriate indication for HBO Therapy: No    Treatment Note please see attached Discharge Instructions    Written patient dismissal instructions given to patient and signed by patient or POA. Discharge Instructions       Visit Discharge/Physician Orders    Discharge condition: Stable    Assessment of pain at discharge:  mild    Anesthetic used: 2% lidocaine gel    Discharge to: Home    Left via:public transportation    Accompanied by: accompanied by self    ECF/HHA:   No home care    Dressing Orders: cleanse wound right breast daily with normal saline solution. Begin to apply plain calcium alginate, cover with dry dressing , secure into place. Change daily    Treatment Orders:    [facility] followup visit ___________1 week__________________  (Please note your next appointment above and if you are unable to keep, kindly give a 24 hour notice. Thank you.)    Physician signature:__________________________      If you experience any of the following, please call the [facility] during business hours:    * Increase in Pain  * Temperature over 101  * Increase in drainage from your wound  * Drainage with a foul odor  * Bleeding  * Increase in swelling  * Need for compression bandage changes due to slippage, breakthrough drainage. If you need medical attention outside of the business hours of the [facility] please contact your PCP or go to the nearest emergency room.         Electronically signed by Madhav Talbot MD on 9/29/2021 at 3:30 PM

## 2021-10-06 ENCOUNTER — HOSPITAL ENCOUNTER (OUTPATIENT)
Dept: WOUND CARE | Age: 56
Discharge: HOME | End: 2021-10-06
Payer: MEDICAID

## 2021-10-06 VITALS
RESPIRATION RATE: 18 BRPM | SYSTOLIC BLOOD PRESSURE: 102 MMHG | HEART RATE: 85 BPM | DIASTOLIC BLOOD PRESSURE: 58 MMHG | TEMPERATURE: 96.4 F

## 2021-10-06 DIAGNOSIS — Z17.0 MALIGNANT NEOPLASM OF CENTRAL PORTION OF RIGHT BREAST IN FEMALE, ESTROGEN RECEPTOR POSITIVE (HCC): ICD-10-CM

## 2021-10-06 DIAGNOSIS — C50.111 MALIGNANT NEOPLASM OF CENTRAL PORTION OF RIGHT BREAST IN FEMALE, ESTROGEN RECEPTOR POSITIVE (HCC): ICD-10-CM

## 2021-10-06 DIAGNOSIS — S21.009A INCISIONAL BREAST WOUND: Primary | ICD-10-CM

## 2021-10-06 PROCEDURE — 6370000000 HC RX 637 (ALT 250 FOR IP): Performed by: SURGERY

## 2021-10-06 PROCEDURE — 11042 DBRDMT SUBQ TIS 1ST 20SQCM/<: CPT | Performed by: SURGERY

## 2021-10-06 PROCEDURE — 11042 DBRDMT SUBQ TIS 1ST 20SQCM/<: CPT

## 2021-10-06 RX ORDER — LIDOCAINE HYDROCHLORIDE 20 MG/ML
JELLY TOPICAL ONCE
Status: CANCELLED | OUTPATIENT
Start: 2021-10-06 | End: 2021-10-06

## 2021-10-06 RX ORDER — BETAMETHASONE DIPROPIONATE 0.05 %
OINTMENT (GRAM) TOPICAL ONCE
Status: CANCELLED | OUTPATIENT
Start: 2021-10-06 | End: 2021-10-06

## 2021-10-06 RX ORDER — LIDOCAINE 40 MG/G
CREAM TOPICAL ONCE
Status: CANCELLED | OUTPATIENT
Start: 2021-10-06 | End: 2021-10-06

## 2021-10-06 RX ORDER — GINSENG 100 MG
CAPSULE ORAL ONCE
Status: CANCELLED | OUTPATIENT
Start: 2021-10-06 | End: 2021-10-06

## 2021-10-06 RX ORDER — LIDOCAINE 50 MG/G
OINTMENT TOPICAL ONCE
Status: CANCELLED | OUTPATIENT
Start: 2021-10-06 | End: 2021-10-06

## 2021-10-06 RX ORDER — GENTAMICIN SULFATE 1 MG/G
OINTMENT TOPICAL ONCE
Status: CANCELLED | OUTPATIENT
Start: 2021-10-06 | End: 2021-10-06

## 2021-10-06 RX ORDER — LIDOCAINE HYDROCHLORIDE 40 MG/ML
SOLUTION TOPICAL ONCE
Status: CANCELLED | OUTPATIENT
Start: 2021-10-06 | End: 2021-10-06

## 2021-10-06 RX ORDER — BACITRACIN, NEOMYCIN, POLYMYXIN B 400; 3.5; 5 [USP'U]/G; MG/G; [USP'U]/G
OINTMENT TOPICAL ONCE
Status: CANCELLED | OUTPATIENT
Start: 2021-10-06 | End: 2021-10-06

## 2021-10-06 RX ORDER — CLOBETASOL PROPIONATE 0.5 MG/G
OINTMENT TOPICAL ONCE
Status: CANCELLED | OUTPATIENT
Start: 2021-10-06 | End: 2021-10-06

## 2021-10-06 RX ORDER — LIDOCAINE HYDROCHLORIDE 40 MG/ML
SOLUTION TOPICAL ONCE
Status: COMPLETED | OUTPATIENT
Start: 2021-10-06 | End: 2021-10-06

## 2021-10-06 RX ORDER — BACITRACIN ZINC AND POLYMYXIN B SULFATE 500; 1000 [USP'U]/G; [USP'U]/G
OINTMENT TOPICAL ONCE
Status: CANCELLED | OUTPATIENT
Start: 2021-10-06 | End: 2021-10-06

## 2021-10-06 RX ADMIN — LIDOCAINE HYDROCHLORIDE: 40 SOLUTION TOPICAL at 14:44

## 2021-10-06 ASSESSMENT — PAIN - FUNCTIONAL ASSESSMENT: PAIN_FUNCTIONAL_ASSESSMENT: PREVENTS OR INTERFERES SOME ACTIVE ACTIVITIES AND ADLS

## 2021-10-06 ASSESSMENT — PAIN DESCRIPTION - LOCATION: LOCATION: CHEST

## 2021-10-06 ASSESSMENT — PAIN SCALES - GENERAL: PAINLEVEL_OUTOF10: 7

## 2021-10-06 ASSESSMENT — PAIN DESCRIPTION - FREQUENCY: FREQUENCY: INTERMITTENT

## 2021-10-06 ASSESSMENT — PAIN DESCRIPTION - DESCRIPTORS: DESCRIPTORS: SHARP;STABBING;ACHING

## 2021-10-06 ASSESSMENT — PAIN DESCRIPTION - PAIN TYPE: TYPE: ACUTE PAIN

## 2021-10-06 ASSESSMENT — PAIN DESCRIPTION - PROGRESSION: CLINICAL_PROGRESSION: NOT CHANGED

## 2021-10-06 ASSESSMENT — PAIN DESCRIPTION - ONSET: ONSET: ON-GOING

## 2021-10-06 NOTE — PROGRESS NOTES
Wound Care Center Follow-Up Progress Note    Esau Lewis  AGE: 63 y.o. GENDER: female  : 1965    TODAY'S DATE:  10/6/2021    Subjective:    Esau Lewis is a 63 y.o. female who presents today for follow-up examination and treatment of a delayed-healing wound(s). The patient denies any problems since last visit. Objective:    BP (!) 102/58   Pulse 85   Temp 96.4 °F (35.8 °C) (Temporal)   Resp 18   LMP 2013     (Wound Reference Date is when first assessed.   Measurements shown are from today's visit.)    Incision 18 Back Lower;Mid (Active)   Number of days: 5843       Wound 21 Chest Lower;Right #1 CA surgery 3-9-21 (Active)   Wound Image   21 142   Dressing Status New dressing applied 21 162   Wound Cleansed Cleansed with saline 21   Dressing/Treatment Alginate;Dry dressing 21 162   Wound Length (cm) 0.4 cm 10/06/21 1443   Wound Width (cm) 1.8 cm 10/06/21 1443   Wound Depth (cm) 0.1 cm 10/06/21 1443   Wound Surface Area (cm^2) 0.72 cm^2 10/06/21 1443   Change in Wound Size % (l*w) 42.86 10/06/21 1443   Wound Volume (cm^3) 0.072 cm^3 10/06/21 1443   Wound Healing % 71 10/06/21 1443   Post-Procedure Length (cm) 0.4 cm 10/06/21 153   Post-Procedure Width (cm) 1.8 cm 10/06/21 153   Post-Procedure Depth (cm) 0.2 cm 10/06/21 153   Post-Procedure Surface Area (cm^2) 0.72 cm^2 10/06/21 153   Post-Procedure Volume (cm^3) 0.144 cm^3 10/06/21 153   Wound Assessment Pale granulation tissue;Fibrin 10/06/21 1443   Drainage Amount Small 10/06/21 1443   Drainage Description Yellow 10/06/21 1443   Odor None 10/06/21 1443   Maria Esther-wound Assessment Intact 10/06/21 1443   Number of days: 7        Other physical exam findings:        Assessment:     Patient Active Problem List   Diagnosis Code    Diabetes mellitus type 2, uncontrolled ([facility] E11.65    HTN (hypertension), benign I10    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 G61.500R    Delayed union of tibial shaft fracture, right, closed S82.201G    Tibia/fibula fracture, right, closed, with nonunion, subsequent encounter S82.201K, S82.401K    Failed orthopedic implant [facility]) T84.498A    Fracture of tibia and fibula, right, closed, with nonunion, subsequent encounter S82.201K, S82.401K    Malignant neoplasm of central portion of right breast in female, estrogen receptor positive ([facility] C50.111, Z17.0    Hypokalemia E87.6    Nausea R11.0    Hyponatremia E87.1    AUSTIN (acute kidney injury) ([facility] N17.9    Diarrhea R19.7    Incisional breast wound S21.009A    Dehydration E86.0    Open wound of right breast S21.001A       Overall Wound Assessment  Wound has improved. Size has decreased. Appearance has improved. (Please refer to nursing measurements and assessment regarding wound measurements.) Wound check - Care provided includes removal of existing dressing and visual inspection. Plan:       1. 100%Debridement today. See Procedure Note below. 2. Discussed appropriate home care of this wound. Dispensed dressing supplies and instructions on their use. Wound redressed. 3. Patient instructions were given. Dressing: calcium alginate Offloading. 4. Follow up: 1 week. Ryan Wright. Main Procedure Note    Sulaiman Geronimo  AGE: 63 y.o. GENDER: female  : 1965    TODAY'S DATE:  10/6/2021    The patient was identified and the procedure was confirmed. Lidocaine gel soaked gauze was applied at beginning of wound evaluation. The left breast wound was excisionally debrided sharply of fibrotic, necrotic, and hyperkeratotic tissue, including a layer of surrounding healthy tissue using curette for a total surface area of < 20 cm2. The wound(s) was debrided down through and including full thickness tissue. 100% of the wound was debrided. There was minimal bleeding that was controlled with pressure.   Patient tolerated procedure well and was given

## 2021-10-11 ENCOUNTER — TELEPHONE (OUTPATIENT)
Dept: NON INVASIVE DIAGNOSTICS | Age: 56
End: 2021-10-11

## 2021-10-12 ENCOUNTER — HOSPITAL ENCOUNTER (OUTPATIENT)
Dept: NON INVASIVE DIAGNOSTICS | Age: 56
Discharge: HOME | End: 2021-10-12
Payer: MEDICAID

## 2021-10-12 ENCOUNTER — HOSPITAL ENCOUNTER (OUTPATIENT)
Dept: RADIATION ONCOLOGY | Age: 56
Discharge: HOME | End: 2021-10-12
Attending: RADIOLOGY
Payer: MEDICAID

## 2021-10-12 ENCOUNTER — TELEPHONE (OUTPATIENT)
Dept: CASE MANAGEMENT | Age: 56
End: 2021-10-12

## 2021-10-12 ENCOUNTER — HOSPITAL ENCOUNTER (OUTPATIENT)
Dept: INFUSION THERAPY | Age: 56
Discharge: HOME | End: 2021-10-12
Payer: MEDICAID

## 2021-10-12 DIAGNOSIS — Z85.3 PERSONAL HISTORY OF BREAST CANCER: ICD-10-CM

## 2021-10-12 DIAGNOSIS — Z17.0 MALIGNANT NEOPLASM OF CENTRAL PORTION OF RIGHT BREAST IN FEMALE, ESTROGEN RECEPTOR POSITIVE (HCC): Primary | ICD-10-CM

## 2021-10-12 DIAGNOSIS — C50.111 MALIGNANT NEOPLASM OF CENTRAL PORTION OF RIGHT BREAST IN FEMALE, ESTROGEN RECEPTOR POSITIVE (HCC): Primary | ICD-10-CM

## 2021-10-12 DIAGNOSIS — C50.111 MALIGNANT NEOPLASM OF CENTRAL PORTION OF RIGHT BREAST IN FEMALE, ESTROGEN RECEPTOR POSITIVE (HCC): ICD-10-CM

## 2021-10-12 DIAGNOSIS — Z17.0 MALIGNANT NEOPLASM OF CENTRAL PORTION OF RIGHT BREAST IN FEMALE, ESTROGEN RECEPTOR POSITIVE (HCC): ICD-10-CM

## 2021-10-12 LAB
ALBUMIN SERPL-MCNC: 3.6 G/DL (ref 3.5–5.2)
ALP BLD-CCNC: 207 U/L (ref 35–104)
ALT SERPL-CCNC: 52 U/L (ref 0–32)
ANION GAP SERPL CALCULATED.3IONS-SCNC: 9 MMOL/L (ref 7–16)
AST SERPL-CCNC: 37 U/L (ref 0–31)
BASOPHILS ABSOLUTE: 0.06 E9/L (ref 0–0.2)
BASOPHILS RELATIVE PERCENT: 0.7 % (ref 0–2)
BILIRUB SERPL-MCNC: 0.2 MG/DL (ref 0–1.2)
BUN BLDV-MCNC: 10 MG/DL (ref 6–20)
CALCIUM SERPL-MCNC: 9.4 MG/DL (ref 8.6–10.2)
CHLORIDE BLD-SCNC: 103 MMOL/L (ref 98–107)
CO2: 27 MMOL/L (ref 22–29)
CREAT SERPL-MCNC: 0.7 MG/DL (ref 0.5–1)
EOSINOPHILS ABSOLUTE: 0.08 E9/L (ref 0.05–0.5)
EOSINOPHILS RELATIVE PERCENT: 1 % (ref 0–6)
GFR AFRICAN AMERICAN: >60
GFR NON-AFRICAN AMERICAN: >60 ML/MIN/1.73
GLUCOSE BLD-MCNC: 110 MG/DL (ref 74–99)
HCT VFR BLD CALC: 34.4 % (ref 34–48)
HEMOGLOBIN: 11.2 G/DL (ref 11.5–15.5)
IMMATURE GRANULOCYTES #: 0.03 E9/L
IMMATURE GRANULOCYTES %: 0.4 % (ref 0–5)
LYMPHOCYTES ABSOLUTE: 2.8 E9/L (ref 1.5–4)
LYMPHOCYTES RELATIVE PERCENT: 33.4 % (ref 20–42)
MAGNESIUM: 1.7 MG/DL (ref 1.6–2.6)
MCH RBC QN AUTO: 33.6 PG (ref 26–35)
MCHC RBC AUTO-ENTMCNC: 32.6 % (ref 32–34.5)
MCV RBC AUTO: 103.3 FL (ref 80–99.9)
MONOCYTES ABSOLUTE: 0.91 E9/L (ref 0.1–0.95)
MONOCYTES RELATIVE PERCENT: 10.8 % (ref 2–12)
NEUTROPHILS ABSOLUTE: 4.51 E9/L (ref 1.8–7.3)
NEUTROPHILS RELATIVE PERCENT: 53.7 % (ref 43–80)
PDW BLD-RTO: 13.2 FL (ref 11.5–15)
PLATELET # BLD: 235 E9/L (ref 130–450)
PMV BLD AUTO: 9.4 FL (ref 7–12)
POTASSIUM SERPL-SCNC: 4.4 MMOL/L (ref 3.5–5)
RBC # BLD: 3.33 E12/L (ref 3.5–5.5)
SODIUM BLD-SCNC: 139 MMOL/L (ref 132–146)
TOTAL PROTEIN: 6.1 G/DL (ref 6.4–8.3)
WBC # BLD: 8.4 E9/L (ref 4.5–11.5)

## 2021-10-12 PROCEDURE — 6360000002 HC RX W HCPCS: Performed by: INTERNAL MEDICINE

## 2021-10-12 PROCEDURE — 77263 THER RADIOLOGY TX PLNG CPLX: CPT | Performed by: RADIOLOGY

## 2021-10-12 PROCEDURE — 77334 RADIATION TREATMENT AID(S): CPT | Performed by: RADIOLOGY

## 2021-10-12 PROCEDURE — 93308 TTE F-UP OR LMTD: CPT

## 2021-10-12 PROCEDURE — 83735 ASSAY OF MAGNESIUM: CPT

## 2021-10-12 PROCEDURE — 85025 COMPLETE CBC W/AUTO DIFF WBC: CPT

## 2021-10-12 PROCEDURE — 77290 THER RAD SIMULAJ FIELD CPLX: CPT | Performed by: RADIOLOGY

## 2021-10-12 PROCEDURE — 80053 COMPREHEN METABOLIC PANEL: CPT

## 2021-10-12 PROCEDURE — 36591 DRAW BLOOD OFF VENOUS DEVICE: CPT

## 2021-10-12 PROCEDURE — 2580000003 HC RX 258: Performed by: INTERNAL MEDICINE

## 2021-10-12 PROCEDURE — 93356 MYOCRD STRAIN IMG SPCKL TRCK: CPT

## 2021-10-12 RX ORDER — SODIUM CHLORIDE 0.9 % (FLUSH) 0.9 %
10 SYRINGE (ML) INJECTION PRN
Status: DISCONTINUED | OUTPATIENT
Start: 2021-10-12 | End: 2021-10-13 | Stop reason: HOSPADM

## 2021-10-12 RX ORDER — HEPARIN SODIUM (PORCINE) LOCK FLUSH IV SOLN 100 UNIT/ML 100 UNIT/ML
500 SOLUTION INTRAVENOUS PRN
Status: DISCONTINUED | OUTPATIENT
Start: 2021-10-12 | End: 2021-10-13 | Stop reason: HOSPADM

## 2021-10-12 RX ORDER — HEPARIN SODIUM (PORCINE) LOCK FLUSH IV SOLN 100 UNIT/ML 100 UNIT/ML
500 SOLUTION INTRAVENOUS PRN
Status: CANCELLED | OUTPATIENT
Start: 2021-10-12

## 2021-10-12 RX ORDER — SODIUM CHLORIDE 0.9 % (FLUSH) 0.9 %
10 SYRINGE (ML) INJECTION PRN
Status: CANCELLED | OUTPATIENT
Start: 2021-10-12

## 2021-10-12 RX ORDER — ONDANSETRON 4 MG/1
TABLET, FILM COATED ORAL
Qty: 60 TABLET | Refills: 1 | Status: SHIPPED
Start: 2021-10-12 | End: 2022-01-07 | Stop reason: SDUPTHER

## 2021-10-12 RX ADMIN — SODIUM CHLORIDE, PRESERVATIVE FREE 10 ML: 5 INJECTION INTRAVENOUS at 11:25

## 2021-10-12 RX ADMIN — SODIUM CHLORIDE, PRESERVATIVE FREE 10 ML: 5 INJECTION INTRAVENOUS at 11:26

## 2021-10-12 RX ADMIN — HEPARIN 500 UNITS: 100 SYRINGE at 11:26

## 2021-10-12 NOTE — TELEPHONE ENCOUNTER
Called patient re: follow up on where patient would like to receive [place], [place] or [place], as patient lives in [facility]. Left patient message with my contact information for patient to return my call.

## 2021-10-12 NOTE — TELEPHONE ENCOUNTER
Last Appointment:  9/23/2021  Future Appointments   Date Time Provider Joya Bonds   10/12/2021  1:15 PM [facility] MED ONC FAST TRACK 3 [facility] Med Onc [facility]   10/14/2021 10:15 AM Ofelia Augustin MD MED ONC [facility]   10/14/2021 11:00 AM [facility] MED ONC FAST TRACK 1 [facility]Z Med Onc [facility]   10/20/2021  1:30 PM W[facility] Heredia - CNP [facility]   10/27/2021  2:30 [facility]

## 2021-10-14 ENCOUNTER — HOSPITAL ENCOUNTER (OUTPATIENT)
Dept: INFUSION THERAPY | Age: 56
Discharge: HOME | End: 2021-10-14
Payer: MEDICAID

## 2021-10-14 ENCOUNTER — OFFICE VISIT (OUTPATIENT)
Dept: ONCOLOGY | Age: 56
End: 2021-10-14
Payer: MEDICAID

## 2021-10-14 VITALS
RESPIRATION RATE: 16 BRPM | WEIGHT: 150.2 LBS | DIASTOLIC BLOOD PRESSURE: 79 MMHG | BODY MASS INDEX: 19.82 KG/M2 | HEART RATE: 68 BPM | TEMPERATURE: 97.5 F | SYSTOLIC BLOOD PRESSURE: 119 MMHG | OXYGEN SATURATION: 100 %

## 2021-10-14 VITALS
HEART RATE: 66 BPM | SYSTOLIC BLOOD PRESSURE: 120 MMHG | RESPIRATION RATE: 16 BRPM | OXYGEN SATURATION: 99 % | HEIGHT: 72 IN | WEIGHT: 150.2 LBS | TEMPERATURE: 98.7 F | BODY MASS INDEX: 20.34 KG/M2 | DIASTOLIC BLOOD PRESSURE: 75 MMHG

## 2021-10-14 DIAGNOSIS — Z85.3 PERSONAL HISTORY OF BREAST CANCER: Primary | ICD-10-CM

## 2021-10-14 DIAGNOSIS — Z17.0 MALIGNANT NEOPLASM OF CENTRAL PORTION OF RIGHT BREAST IN FEMALE, ESTROGEN RECEPTOR POSITIVE (HCC): Primary | ICD-10-CM

## 2021-10-14 DIAGNOSIS — C50.111 MALIGNANT NEOPLASM OF CENTRAL PORTION OF RIGHT BREAST IN FEMALE, ESTROGEN RECEPTOR POSITIVE (HCC): Primary | ICD-10-CM

## 2021-10-14 PROCEDURE — 99214 OFFICE O/P EST MOD 30 MIN: CPT | Performed by: INTERNAL MEDICINE

## 2021-10-14 PROCEDURE — 2580000003 HC RX 258: Performed by: INTERNAL MEDICINE

## 2021-10-14 PROCEDURE — G8484 FLU IMMUNIZE NO ADMIN: HCPCS | Performed by: INTERNAL MEDICINE

## 2021-10-14 PROCEDURE — 96413 CHEMO IV INFUSION 1 HR: CPT

## 2021-10-14 PROCEDURE — 96361 HYDRATE IV INFUSION ADD-ON: CPT

## 2021-10-14 PROCEDURE — 96368 THER/DIAG CONCURRENT INF: CPT

## 2021-10-14 PROCEDURE — G9899 SCRN MAM PERF RSLTS DOC: HCPCS | Performed by: INTERNAL MEDICINE

## 2021-10-14 PROCEDURE — 3017F COLORECTAL CA SCREEN DOC REV: CPT | Performed by: INTERNAL MEDICINE

## 2021-10-14 PROCEDURE — G8427 DOCREV CUR MEDS BY ELIG CLIN: HCPCS | Performed by: INTERNAL MEDICINE

## 2021-10-14 PROCEDURE — 6360000002 HC RX W HCPCS: Performed by: INTERNAL MEDICINE

## 2021-10-14 PROCEDURE — 1036F TOBACCO NON-USER: CPT | Performed by: INTERNAL MEDICINE

## 2021-10-14 PROCEDURE — G8420 CALC BMI NORM PARAMETERS: HCPCS | Performed by: INTERNAL MEDICINE

## 2021-10-14 RX ORDER — SODIUM CHLORIDE 0.9 % (FLUSH) 0.9 %
10 SYRINGE (ML) INJECTION PRN
Status: CANCELLED | OUTPATIENT
Start: 2021-10-14

## 2021-10-14 RX ORDER — SODIUM CHLORIDE 9 MG/ML
INJECTION, SOLUTION INTRAVENOUS CONTINUOUS
Status: CANCELLED | OUTPATIENT
Start: 2021-10-14

## 2021-10-14 RX ORDER — HEPARIN SODIUM (PORCINE) LOCK FLUSH IV SOLN 100 UNIT/ML 100 UNIT/ML
500 SOLUTION INTRAVENOUS PRN
Status: CANCELLED | OUTPATIENT
Start: 2021-10-14

## 2021-10-14 RX ORDER — METHYLPREDNISOLONE SODIUM SUCCINATE 125 MG/2ML
125 INJECTION, POWDER, LYOPHILIZED, FOR SOLUTION INTRAMUSCULAR; INTRAVENOUS ONCE
Status: CANCELLED | OUTPATIENT
Start: 2021-10-14 | End: 2021-10-14

## 2021-10-14 RX ORDER — MEPERIDINE HYDROCHLORIDE 25 MG/ML
12.5 INJECTION INTRAMUSCULAR; INTRAVENOUS; SUBCUTANEOUS ONCE
Status: CANCELLED | OUTPATIENT
Start: 2021-10-14 | End: 2021-10-14

## 2021-10-14 RX ORDER — SODIUM CHLORIDE 0.9 % (FLUSH) 0.9 %
10 SYRINGE (ML) INJECTION PRN
Status: DISCONTINUED | OUTPATIENT
Start: 2021-10-14 | End: 2021-10-15 | Stop reason: HOSPADM

## 2021-10-14 RX ORDER — 0.9 % SODIUM CHLORIDE 0.9 %
1000 INTRAVENOUS SOLUTION INTRAVENOUS ONCE
Status: COMPLETED | OUTPATIENT
Start: 2021-10-14 | End: 2021-10-14

## 2021-10-14 RX ORDER — SODIUM CHLORIDE 0.9 % (FLUSH) 0.9 %
5 SYRINGE (ML) INJECTION PRN
Status: CANCELLED | OUTPATIENT
Start: 2021-10-14

## 2021-10-14 RX ORDER — SODIUM CHLORIDE 9 MG/ML
20 INJECTION, SOLUTION INTRAVENOUS ONCE
Status: DISCONTINUED | OUTPATIENT
Start: 2021-10-14 | End: 2021-10-15 | Stop reason: HOSPADM

## 2021-10-14 RX ORDER — SODIUM CHLORIDE 9 MG/ML
20 INJECTION, SOLUTION INTRAVENOUS ONCE
Status: CANCELLED | OUTPATIENT
Start: 2021-10-14 | End: 2021-10-14

## 2021-10-14 RX ORDER — EPINEPHRINE 1 MG/ML
0.3 INJECTION, SOLUTION, CONCENTRATE INTRAVENOUS PRN
Status: CANCELLED | OUTPATIENT
Start: 2021-10-14

## 2021-10-14 RX ORDER — DIPHENHYDRAMINE HYDROCHLORIDE 50 MG/ML
50 INJECTION INTRAMUSCULAR; INTRAVENOUS ONCE
Status: CANCELLED | OUTPATIENT
Start: 2021-10-14 | End: 2021-10-14

## 2021-10-14 RX ORDER — HEPARIN SODIUM (PORCINE) LOCK FLUSH IV SOLN 100 UNIT/ML 100 UNIT/ML
500 SOLUTION INTRAVENOUS PRN
Status: DISCONTINUED | OUTPATIENT
Start: 2021-10-14 | End: 2021-10-15 | Stop reason: HOSPADM

## 2021-10-14 RX ADMIN — SODIUM CHLORIDE, PRESERVATIVE FREE 10 ML: 5 INJECTION INTRAVENOUS at 11:01

## 2021-10-14 RX ADMIN — SODIUM CHLORIDE 20 ML/HR: 9 INJECTION, SOLUTION INTRAVENOUS at 11:07

## 2021-10-14 RX ADMIN — HEPARIN 500 UNITS: 100 SYRINGE at 13:08

## 2021-10-14 RX ADMIN — TRASTUZUMAB-ANNS 399 MG: 420 INJECTION, POWDER, LYOPHILIZED, FOR SOLUTION INTRAVENOUS at 11:20

## 2021-10-14 RX ADMIN — SODIUM CHLORIDE 1000 ML: 9 INJECTION, SOLUTION INTRAVENOUS at 12:01

## 2021-10-14 RX ADMIN — PERTUZUMAB 420 MG: 30 INJECTION, SOLUTION, CONCENTRATE INTRAVENOUS at 11:20

## 2021-10-14 RX ADMIN — SODIUM CHLORIDE, PRESERVATIVE FREE 10 ML: 5 INJECTION INTRAVENOUS at 13:08

## 2021-10-14 NOTE — PROGRESS NOTES
Department of [facility] Oncology  Attending Clinic Note    Reason for Visit: Follow-up on a patient with Right Breast Cancer    PCP:  Cindi Beltran DO    History of Present Illness: The mass was located in the 6 o'clock position of right breast    Breast cancer risk factors include infrequent SMEs and age and gender    Bilateral Screening Mammogram 10/06/2020: There are suspicious calcifications in the right breast at the 6 o'clock position which appear pleomorphic. These calcifications are in a segmental distribution. These clustered calcifications are suspicious for malignancy. Right breast, calcifications at the 6:00, core biopsy on 10/30/2020:    - Small focus of invasive ductal carcinoma, grade 3 (3+3+2 = 8)    - Ductal carcinoma in situ, high nuclear grade, comedo/cribriform/solid types;    - Microcalcifications in DCIS    - Breast receptor and biomarkers (per outside report without looking the   slides:     ER: Positive, 89.8%, strong intensity     WI: Positive, 52.2%, moderate intensity     HER-2: Positive (score 3+)     Ki-67: High proliferation, 26.2%     P53: Negative, 0.2%     Comment:The invasive carcinoma is intermingled with DCIS and measures   3.0 x 2.0 mm in greatest dimension on the slides. CXR PA/Lateral 12/11/2020:  No acute process. Right Axillary U/S on 12/11/2020: There is no axillary LN.     MRI Bilateral Breast 01/05/2021:  Focal, heterogeneous non mass enhancement identified in the right breast 6 o'clock which measures approximately 1.9 x 1.4 x 1.7 cm (image 19 of the axial T1 post contrast series).    No additional foci of disease identified on the right  There is no lymphadenopathy    Needle localized Right lumpectomy with SLNBx with mediport placement on 02/05/2021  A.  Right axillary sentinel lymph node #1, excision: 1 lymph node negative for malignancy     B.  Right axillary contents, regional resection: 1 lymph node positive for metastatic, poorly differentiated ductal carcinoma (grade 3)   Extranodal extension present     C.  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;   positive for extensive intraductal component   Skin-uninvolved by malignancy   Invasive carcinoma margins-uninvolved by invasive carcinoma        Distance from closest margin: 4 mm from anterior margin   DCIS margins-inferior and medial margins involved by DCIS   Regional lymph nodes-involved by tumor cells        Number of lymph nodes with macrometastasis: 1        Number of lymph nodes with micrometastasis: 0        Number of lymph nodes with isolated tumor cells: 0        Size of largest metastatic deposit: 1.5 cm        Extranodal extension: Present        Total number of lymph nodes examined: 2        Number of sentinel nodes examined: 1   Treatment effect in the breast-no known presurgical therapy   TNM classification (AJCC eighth edition)-  pT1c N1a MX     Bone scan, CT chest/abdomen/pelvis 02/26/2021 negative for metastatic disease    Re-excision lumpectomy of inferior and medial margins on 03/09/2021  A. Right breast, new inferior margin, excision: Fat necrosis, foreign body-type giant cell reaction, chronic inflammation, and fibrosis consistent with previous procedure   No evidence of residual carcinoma   Final inferior margin negative for carcinoma     B.  Right breast, new medial margin, excision: Residual high-grade ductal carcinoma in situ   Ductal carcinoma in situ present less than 1 mm from red/superior margin and green/anterior margin   Fat necrosis, foreign body-type giant cell reaction, chronic   inflammation, and fibrosis consistent with previous procedure   Fibrocystic change   Microcalcifications associated with benign breast tissue and DCIS   Comment:Residual ductal carcinoma in situ is present in 5 out of 13 tissue blocks of part B    Recommended adjuvant chemotherapy (TCHP) for 6 cycles followed by adjuvant RT followed lastly by endocrine therapy. Side effects TCHP reviewed with patient. She agreed to proceed. 2d-echo 02/17/2021 noted EF 60-65%  Cycle # 1 adjuvant TCHP was on 04/08/2021. Cycle # 2 adjuvant TCHP was on 04/29/2021. Cycle # 3 adjuvant TCHP was on 05/20/2021. Cycle # 4 adjuvant TCHP was on 06/10/2021. Cycle # 5 adjuvant TCHP was on 07/22/2021. Cycle # 6 adjuvant TCHP was on 08/12/2021. Radiation Oncology consult appreciated. 2d-echo 10/12/2021 noted EF 55 to 60%  Herceptin/Perjeta today 10/14/2021. Today 10/14/2021. No fever, chills. Fair appetite and energy level. No N/V. Review of Systems;  CONSTITUTIONAL: No fever, chills. Fair appetite and energy level. ENMT: Eyes: No diplopia; Nose: No epistaxis. Mouth: No sore throat. RESPIRATORY: No hemoptysis, shortness of breath, cough. CARDIOVASCULAR: No chest pain, palpitations. GASTROINTESTINAL: No nausea/vomiting, abdominal pain. GENITOURINARY: No dysuria, urinary frequency, hematuria. NEURO: No syncope, presyncope, headache.   Remainder: ROS NEGATIVE    Past Medical History:      Diagnosis Date    Anxiety     Arthritis     Cancer ([address] 2021    breast    Cervical radiculopathy     Chronic back pain     Dehydration 9/23/2021    Dehydration 9/23/2021    Depression     Diabetes mellitus type 2, uncontrolled ([address] 2/12/2017    GERD (gastroesophageal reflux disease)     History of blood transfusion 01/2018    back surgery, lumbar, dr Marlyn Jeter    Hypertension     Right leg pain seeing doctor currently problems with hardware     Medications:  Reviewed and reconciled. Allergies: Allergies   Allergen Reactions    Ceftriaxone Shortness Of Breath     Physical Exam:  /79   Pulse 68   Temp 97.5 °F (36.4 °C)   Resp 16   Wt 150 lb 3.2 oz (68.1 kg)   LMP 08/13/2013   SpO2 100%   BMI 19.82 kg/m²   GENERAL: Alert, oriented x 3, not in acute distress. HEENT: PERRLA; EOMI. Oropharynx clear. NECK: Supple. Without lymphadenopathy. LUNGS: Good air entry bilaterally. No wheezing, crackles or ronchi. CARDIOVASCULAR: Regular rate. No murmurs, rubs or gallops. ABDOMEN: Soft. Non-tender, non-distended. Positive bowel sounds. EXTREMITIES: Without clubbing, cyanosis, or edema. NEUROLOGIC: No focal deficits. ECOG PS 1    Lab Results   Component Value Date    WBC 8.4 10/12/2021    HGB 11.2 (L) 10/12/2021    HCT 34.4 10/12/2021    .3 (H) 10/12/2021     10/12/2021     Lab Results   Component Value Date     10/12/2021    K 4.4 10/12/2021     10/12/2021    CO2 27 10/12/2021    BUN 10 10/12/2021    CREATININE 0.7 10/12/2021    GLUCOSE 110 (H) 10/12/2021    CALCIUM 9.4 10/12/2021    PROT 6.1 (L) 10/12/2021    LABALBU 3.6 10/12/2021    BILITOT 0.2 10/12/2021    ALKPHOS 207 (H) 10/12/2021    AST 37 (H) 10/12/2021    ALT 52 (H) 10/12/2021    LABGLOM >60 10/12/2021    GFRAA >60 10/12/2021     Impression/Plan:  64 y/o female with Right Breast Cancer    Bilateral Screening Mammogram 10/06/2020: There are suspicious calcifications in the right breast at the 6 o'clock position which appear pleomorphic. These calcifications are in a segmental distribution. These clustered calcifications are suspicious for malignancy.     Right breast, calcifications at the 6:00, core biopsy on 10/30/2020:    - Small focus of invasive ductal carcinoma, grade 3 (3+3+2 = 8)    - Ductal carcinoma in situ, high nuclear grade, comedo/cribriform/solid types;    - Microcalcifications in DCIS  - Breast receptor and biomarkers (per outside report without looking the   slides:     ER: Positive, 89.8%, strong intensity     IL: Positive, 52.2%, moderate intensity     HER-2: Positive (score 3+)     Ki-67: High proliferation, 26.2%     P53: Negative, 0.2%     Comment:The invasive carcinoma is intermingled with DCIS and measures   3.0 x 2.0 mm in greatest dimension on the slides. CXR PA/Lateral 12/11/2020:  No acute process. Right Axillary U/S on 12/11/2020: There is no axillary LN.     MRI Bilateral Breast 01/05/2021:  Focal, heterogeneous non mass enhancement identified in the right breast 6 o'clock which measures approximately 1.9 x 1.4 x 1.7 cm (image 19 of the axial T1 post contrast series).    No additional foci of disease identified on the right  There is no lymphadenopathy    Needle localized Right lumpectomy with SLNBx with mediport placement on 02/05/2021  A.  Right axillary sentinel lymph node #1, excision: 1 lymph node negative for malignancy     B.  Right axillary contents, regional resection: 1 lymph node positive for metastatic, poorly differentiated ductal carcinoma (grade 3)   Extranodal extension present     C.  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;   positive for extensive intraductal component   Skin-uninvolved by malignancy   Invasive carcinoma margins-uninvolved by invasive carcinoma        Distance from closest margin: 4 mm from anterior margin DCIS margins-inferior and medial margins involved by DCIS   Regional lymph nodes-involved by tumor cells        Number of lymph nodes with macrometastasis: 1        Number of lymph nodes with micrometastasis: 0        Number of lymph nodes with isolated tumor cells: 0        Size of largest metastatic deposit: 1.5 cm        Extranodal extension: Present        Total number of lymph nodes examined: 2        Number of sentinel nodes examined: 1   Treatment effect in the breast-no known presurgical therapy   TNM classification (AJCC eighth edition)-  pT1c N1a MX     Bone scan, CT chest/abdomen/pelvis 02/26/2021 negative for metastatic disease    Re-excision lumpectomy of inferior and medial margins on 03/09/2021  A. Right breast, new inferior margin, excision: Fat necrosis, foreign body-type giant cell reaction, chronic inflammation, and fibrosis consistent with previous procedure   No evidence of residual carcinoma   Final inferior margin negative for carcinoma     B. Right breast, new medial margin, excision: Residual high-grade ductal carcinoma in situ   Ductal carcinoma in situ present less than 1 mm from red/superior margin and green/anterior margin   Fat necrosis, foreign body-type giant cell reaction, chronic   inflammation, and fibrosis consistent with previous procedure   Fibrocystic change   Microcalcifications associated with benign breast tissue and DCIS   Comment:Residual ductal carcinoma in situ is present in 5 out of 13 tissue blocks of part B    Recommended adjuvant chemotherapy (TCHP) for 6 cycles followed by adjuvant RT followed lastly by endocrine therapy. Side effects TCHP reviewed with patient. She agreed to proceed. 2d-echo 02/17/2021 noted EF 60-65%  Cycle # 1 adjuvant TCHP was on 04/08/2021. Cycle # 2 adjuvant TCHP was on 04/29/2021. Cycle # 3 adjuvant TCHP was on 05/20/2021. Cycle # 4 adjuvant TCHP was on 06/10/2021.     2D-ECHO 06/29/2021 noted EF 60-65%  Cycle # 5 adjuvant TCHP was on 07/22/2021. Cycle # 6 adjuvant TCHP was on 08/12/2021. Radiation Oncology consult appreciated  2d-echo 10/12/2021 noted EF 55 to 60%  Herceptin/Perjeta today 10/14/2021. Labs reviewed ok to proceed. RTC 3 weeks for Herceptin/Perjeta.      Van Drake MD   15/33/8731  Board Certified Medical Oncologist

## 2021-10-21 ENCOUNTER — HOSPITAL ENCOUNTER (OUTPATIENT)
Dept: RADIATION ONCOLOGY | Age: 56
Discharge: HOME | End: 2021-10-21
Attending: RADIOLOGY
Payer: MEDICAID

## 2021-10-21 PROCEDURE — 77300 RADIATION THERAPY DOSE PLAN: CPT | Performed by: RADIOLOGY

## 2021-10-21 PROCEDURE — 77338 DESIGN MLC DEVICE FOR IMRT: CPT | Performed by: RADIOLOGY

## 2021-10-21 PROCEDURE — 77301 RADIOTHERAPY DOSE PLAN IMRT: CPT | Performed by: RADIOLOGY

## 2021-10-25 ENCOUNTER — APPOINTMENT (OUTPATIENT)
Dept: RADIATION ONCOLOGY | Age: 56
End: 2021-10-25
Attending: RADIOLOGY
Payer: MEDICAID

## 2021-10-26 ENCOUNTER — APPOINTMENT (OUTPATIENT)
Dept: RADIATION ONCOLOGY | Age: 56
End: 2021-10-26
Attending: RADIOLOGY
Payer: MEDICAID

## 2021-10-27 ENCOUNTER — HOSPITAL ENCOUNTER (OUTPATIENT)
Dept: RADIATION ONCOLOGY | Age: 56
Discharge: HOME | End: 2021-10-27
Attending: RADIOLOGY
Payer: MEDICAID

## 2021-10-27 ENCOUNTER — HOSPITAL ENCOUNTER (OUTPATIENT)
Dept: WOUND CARE | Age: 56
Discharge: HOME | End: 2021-10-27
Payer: MEDICAID

## 2021-10-27 VITALS
DIASTOLIC BLOOD PRESSURE: 64 MMHG | WEIGHT: 147.4 LBS | SYSTOLIC BLOOD PRESSURE: 128 MMHG | RESPIRATION RATE: 18 BRPM | OXYGEN SATURATION: 98 % | HEART RATE: 64 BPM | TEMPERATURE: 97.1 F | BODY MASS INDEX: 19.45 KG/M2

## 2021-10-27 DIAGNOSIS — C50.919 MALIGNANT NEOPLASM OF FEMALE BREAST, UNSPECIFIED ESTROGEN RECEPTOR STATUS, UNSPECIFIED LATERALITY, UNSPECIFIED SITE OF BREAST (HCC): Primary | ICD-10-CM

## 2021-10-27 DIAGNOSIS — C50.111 MALIGNANT NEOPLASM OF CENTRAL PORTION OF RIGHT BREAST IN FEMALE, ESTROGEN RECEPTOR POSITIVE (HCC): ICD-10-CM

## 2021-10-27 DIAGNOSIS — S21.009A INCISIONAL BREAST WOUND: Primary | ICD-10-CM

## 2021-10-27 DIAGNOSIS — Z17.0 MALIGNANT NEOPLASM OF CENTRAL PORTION OF RIGHT BREAST IN FEMALE, ESTROGEN RECEPTOR POSITIVE (HCC): ICD-10-CM

## 2021-10-27 PROCEDURE — 99999 PR OFFICE/OUTPT VISIT,PROCEDURE ONLY: CPT | Performed by: RADIOLOGY

## 2021-10-27 PROCEDURE — 77014 PR CT GUIDANCE PLACEMENT RAD THERAPY FIELDS: CPT | Performed by: RADIOLOGY

## 2021-10-27 PROCEDURE — 11042 DBRDMT SUBQ TIS 1ST 20SQCM/<: CPT

## 2021-10-27 PROCEDURE — 11042 DBRDMT SUBQ TIS 1ST 20SQCM/<: CPT | Performed by: SURGERY

## 2021-10-27 PROCEDURE — 77385 HC NTSTY MODUL RAD TX DLVR SMPL: CPT | Performed by: RADIOLOGY

## 2021-10-27 RX ORDER — BACITRACIN ZINC AND POLYMYXIN B SULFATE 500; 1000 [USP'U]/G; [USP'U]/G
OINTMENT TOPICAL ONCE
Status: CANCELLED | OUTPATIENT
Start: 2021-10-27 | End: 2021-10-27

## 2021-10-27 RX ORDER — GINSENG 100 MG
CAPSULE ORAL ONCE
Status: CANCELLED | OUTPATIENT
Start: 2021-10-27 | End: 2021-10-27

## 2021-10-27 RX ORDER — LIDOCAINE 50 MG/G
OINTMENT TOPICAL ONCE
Status: CANCELLED | OUTPATIENT
Start: 2021-10-27 | End: 2021-10-27

## 2021-10-27 RX ORDER — LIDOCAINE HYDROCHLORIDE 40 MG/ML
SOLUTION TOPICAL ONCE
Status: CANCELLED | OUTPATIENT
Start: 2021-10-27 | End: 2021-10-27

## 2021-10-27 RX ORDER — LIDOCAINE 40 MG/G
CREAM TOPICAL ONCE
Status: CANCELLED | OUTPATIENT
Start: 2021-10-27 | End: 2021-10-27

## 2021-10-27 RX ORDER — CLOBETASOL PROPIONATE 0.5 MG/G
OINTMENT TOPICAL ONCE
Status: CANCELLED | OUTPATIENT
Start: 2021-10-27 | End: 2021-10-27

## 2021-10-27 RX ORDER — LIDOCAINE HYDROCHLORIDE 40 MG/ML
SOLUTION TOPICAL ONCE
Status: DISCONTINUED | OUTPATIENT
Start: 2021-10-27 | End: 2021-10-28 | Stop reason: HOSPADM

## 2021-10-27 RX ORDER — BETAMETHASONE DIPROPIONATE 0.05 %
OINTMENT (GRAM) TOPICAL ONCE
Status: CANCELLED | OUTPATIENT
Start: 2021-10-27 | End: 2021-10-27

## 2021-10-27 RX ORDER — LIDOCAINE HYDROCHLORIDE 20 MG/ML
JELLY TOPICAL ONCE
Status: CANCELLED | OUTPATIENT
Start: 2021-10-27 | End: 2021-10-27

## 2021-10-27 RX ORDER — GENTAMICIN SULFATE 1 MG/G
OINTMENT TOPICAL ONCE
Status: CANCELLED | OUTPATIENT
Start: 2021-10-27 | End: 2021-10-27

## 2021-10-27 RX ORDER — BACITRACIN, NEOMYCIN, POLYMYXIN B 400; 3.5; 5 [USP'U]/G; MG/G; [USP'U]/G
OINTMENT TOPICAL ONCE
Status: CANCELLED | OUTPATIENT
Start: 2021-10-27 | End: 2021-10-27

## 2021-10-27 NOTE — PROGRESS NOTES
Wound Care Center Follow-Up Progress Note    Deloris Buenrostro  AGE: 61 y.o. GENDER: female  : 1965    TODAY'S DATE:  10/27/2021    Subjective:    Deloris Buenrostro is a 61 y.o. female who presents today for follow-up examination and treatment of a delayed-healing wound(s). The patient denies any problems since last visit. Objective:    LMP 2013     (Wound Reference Date is when first assessed. Measurements shown are from today's visit.)    Incision 18 Back Lower;Mid (Active)   Number of days: 1659       Wound 21 Chest Lower;Right #1 CA surgery 3-9-21 (Active)   Wound Image   10/27/21 1502   Dressing Status New dressing applied 10/06/21 154   Wound Cleansed Cleansed with saline 10/06/21 1547   Dressing/Treatment ABD; Alginate 10/06/21 1547   Wound Length (cm) 0.4 cm 10/27/21 1502   Wound Width (cm) 1 cm 10/27/21 1502   Wound Depth (cm) 0.1 cm 10/27/21 1502   Wound Surface Area (cm^2) 0.4 cm^2 10/27/21 1502   Change in Wound Size % (l*w) 68.25 10/27/21 1502   Wound Volume (cm^3) 0.04 cm^3 10/27/21 1502   Wound Healing % 84 10/27/21 1502   Post-Procedure Length (cm) 0.5 cm 10/27/21 1513   Post-Procedure Width (cm) 1 cm 10/27/21 1513   Post-Procedure Depth (cm) 0.1 cm 10/27/21 1513   Post-Procedure Surface Area (cm^2) 0.5 cm^2 10/27/21 1513   Post-Procedure Volume (cm^3) 0.05 cm^3 10/27/21 1513   Wound Assessment Pale granulation tissue 10/27/21 1502   Drainage Amount Small 10/27/21 1502   Drainage Description Yellow 10/27/21 1502   Odor None 10/27/21 1502   Maria Esther-wound Assessment Intact 10/27/21 1502   Number of days: 28        Other physical exam findings:        Assessment:     Patient Active Problem List   Diagnosis Code    Diabetes mellitus type 2, uncontrolled ([address] E11.65    HTN (hypertension), benign I10    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 S82.251K    Delayed union of tibial shaft fracture, right, closed S82.201G    Tibia/fibula fracture, right, closed, with nonunion, subsequent encounter S82.201K, S82.401K    Failed orthopedic implant [facility]) T84.498A    Fracture of tibia and fibula, right, closed, with nonunion, subsequent encounter S82.201K, S82.401K    Malignant neoplasm of central portion of right breast in female, estrogen receptor positive ([facility] C50.111, Z17.0    Hypokalemia E87.6    Nausea R11.0    Hyponatremia E87.1    AUSTIN (acute kidney injury) ([facility] N17.9    Diarrhea R19.7    Incisional breast wound S21.009A    Dehydration E86.0    Open wound of right breast S21.001A       Overall Wound Assessment  Wound has improved. Size has decreased. Appearance has improved. (Please refer to nursing measurements and assessment regarding wound measurements.) Wound check - Care provided includes removal of existing dressing and visual inspection. Plan:       1. 10%Debridement today. See Procedure Note below. 2. Discussed appropriate home care of this wound. Dispensed dressing supplies and instructions on their use. Wound redressed. 3. Patient instructions were given. Dressing: collagen Offloading. 4. Follow up: 1 week. Ryan Taylor. Main Procedure Note    Jc Hodge  AGE: 61 y.o. GENDER: female  : 1965    TODAY'S DATE:  10/27/2021    The patient was identified and the procedure was confirmed. Lidocaine gel soaked gauze was applied at beginning of wound evaluation. The left breast wound was excisionally debrided sharply of fibrotic, necrotic, and hyperkeratotic tissue, including a layer of surrounding healthy tissue using curette for a total surface area of < 20 cm2. The wound(s) was debrided down through and including full thickness tissue. 100% of the wound was debrided. There was minimal bleeding that was controlled with pressure. Patient tolerated procedure well and was given proper instruction.       Rosalina Khan MD

## 2021-10-27 NOTE — DISCHARGE INSTR - COC
Continuity of Care Form    Patient Name: Ajay Hinkle   :  1965  MRN:  [id number]    Admit date:  10/27/2021  Discharge date:  ***    Code Status Order: Prior   Advance Directives:     Admitting Physician:  No admitting provider for patient encounter. PCP: Eliazar Carlos DO    Discharging Nurse: [facility] Unit/Room#: No information available for this encounter. Discharging Unit Phone Number: ***    Emergency Contact:   Extended Emergency Contact Information  Primary Emergency Contact: Lb Kyle  [address] Phone: [phone]  Mobile Phone: [phone]  Relation: Child  Preferred language: English   needed? No    Past Surgical History:  Past Surgical History:   Procedure Laterality Date    BACK SURGERY  2018    PLIF L2-L5    BREAST BIOPSY Right 2021    RIGHT BREAST LUMPECTOMY WITH  sentinel LYMPHNODE biopsy performed by Martha Daniel MD at [facility] Right 3/9/2021    RIGHT BREAST RE-EXCISION LUMPECTOMY OF INFERIOR AND MEDIAL MARGINS performed by Martha Daniel MD at [address] [place] [address] Right 2017    Dr. Allyn White      dr hu anterior    CERVICAL FUSION      dr Yamila Soto anterior    CERVICAL FUSION  16    ANTERIOR C4-C5, C6-C7 PLATES AND SCREWS    CHOLECYSTECTOMY      COLONOSCOPY      patient did cologuard in 2018   [address] [place] [address]      dr Stefanie Chan    heel spurs bilateral    HYSTERECTOMY      dr Cal Luque partial    OTHER SURGICAL HISTORY Right 2018    removal of hardware repair nonunion right tibia/fibula    PORT SURGERY Left 2021    LEFT MEDI PORT INSERTION performed by Martha Daniel MD at [facility] OFFICE/OUTPT VISIT,PROCEDURE ONLY Right 2018    REPAIR NON UNION FAILED FIXATION RIGHT DISTAL TIB / FIB PILON FX. WITH REMOVAL OF HARDWARE & O.R. I. F ------BOP performed by Mita Concepcion Matt Eldridge MD at [facility][note 3] Right 5/14/2018    REPAIR NON-UNION RIGHT TIBIA AND FIBULA WITH REMOVAL OF HARDWARE AND BONE GRAFT performed by Belkys Xavier MD at [address] [facility] Right 1/10/2019    RIGHT TIBIA REMOVAL HARDWARE, RIGHT TIBIA OPEN  TREATMENT OF NON UNION [facility]         Immunization History:   Immunization History   Administered Date(s) Administered    Influenza, Quadv, IM, (6 mo and older Fluzone, Flulaval, Fluarix and 3 yrs and older Afluria) 09/09/2020    Influenza, Quadv, IM, PF (6 mo and older Fluzone, Flulaval, Fluarix, and 3 yrs and older Afluria) 09/28/2018       Active Problems:  Patient Active Problem List   Diagnosis Code    Diabetes mellitus type 2, uncontrolled ([address] E11.65    HTN (hypertension), benign I10    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9    Diarrhea R19.7    Incisional breast wound S21.009A    Dehydration E86.0    Open wound of right breast S21.001A       Isolation/Infection:   Isolation          No Isolation        Patient Infection Status     Infection Onset Added Last Indicated Last Indicated By Review Planned Expiration Resolved Resolved By    None active    Resolved    C-diff Rule Out 06/21/21 06/21/21 06/22/21 CLOSTRIDIUM DIFFICILE EIA (Ordered)   06/22/21 Rule-Out Test Resulted    COVID-19 Rule Out 03/05/21 03/05/21 03/05/21 Covid-19 Ambulatory (Ordered)   03/07/21 Rule-Out Test Resulted    COVID-19 Rule Out 02/01/21 02/01/21 02/01/21 Covid-19 Ambulatory (Ordered)   02/02/21 Rule-Out Test Resulted          Nurse Assessment:  Last Vital Signs: LMP 08/13/2013     Last documented pain score (0-10 scale):    Last Weight:   Wt Readings from Last 1 Encounters:   10/27/21 147 lb 6.4 oz (66.9 kg)     Mental Status:  {IP PT MENTAL STATUS:20030}    IV Access:  { EUFEMIA IV ACCESS:493701260}    Nursing Mobility/ADLs:  Walking   {CHP DME TLUV:844389070}  Transfer  {CHP DME RRMH:670770988}  Bathing  {CHP DME EUFB:455859415}  Dressing  {CHP DME XAGT:764423128}  Toileting  {CHP DME HRKJ:043966832}  Feeding  {CHP DME QOLQ:077208487}  Med Admin  {CHP DME QDJO:093477364}  Med Delivery   { EUFEMIA MED Delivery:632634750}    Wound Care Documentation and Therapy:  Incision 01/08/18 Back Lower;Mid (Active)   Number of days: 7794       Wound 09/29/21 Chest Lower;Right #1 CA surgery 3-9-21 (Active)   Wound Image   10/27/21 1502   Dressing Status New dressing applied 10/06/21 1547   Wound Cleansed Cleansed with saline 10/06/21 1547   Dressing/Treatment ABD; Alginate 10/06/21 1547   Wound Length (cm) 0.4 cm 10/27/21 1502   Wound Width (cm) 1 cm 10/27/21 1502   Wound Depth (cm) 0.1 cm 10/27/21 1502   Wound Surface Area (cm^2) 0.4 cm^2 10/27/21 1502   Change in Wound Size % (l*w) 68.25 10/27/21 1502   Wound Volume (cm^3) 0.04 cm^3 10/27/21 1502   Wound Healing % 84 10/27/21 1502   Post-Procedure Length (cm) 0.5 cm 10/27/21 1513   Post-Procedure Width (cm) 1 cm 10/27/21 1513   Post-Procedure Depth (cm) 0.1 cm 10/27/21 1513   Post-Procedure Surface Area (cm^2) 0.5 cm^2 10/27/21 1513   Post-Procedure Volume (cm^3) 0.05 cm^3 10/27/21 1513   Wound Assessment Pale granulation tissue 10/27/21 1502   Drainage Amount Small 10/27/21 1502   Drainage Description Yellow 10/27/21 1502   Odor None 10/27/21 1502   Maria Esther-wound Assessment Intact 10/27/21 1502   Number of days: 28        Elimination:  Continence:   · Bowel: {YES / S}  · Bladder: {YES / GJ:71478}  Urinary Catheter: {Urinary Catheter:609890451}   Colostomy/Ileostomy/Ileal Conduit: {YES / MF:19653}       Date of Last BM: ***  No intake or output data in the 24 hours ending 10/27/21 1516  No intake/output data recorded.     Safety Concerns:     [address] Safety Concerns:530209917}    Impairments/Disabilities:      [address] Impairments/Disabilities:844714577}    Nutrition Therapy:  Current Nutrition Therapy:   [address] Diet List:619989598}    Routes of Feeding: {CHP DME Other Feedings:080764336}  Liquids: {Slp liquid thickness:47324}  Daily Fluid Restriction: {CHP DME Yes amt example:662524054}  Last Modified Barium Swallow with Video (Video Swallowing Test): {Done Not Done UXEH:016809117}    Treatments at the Time of Hospital Discharge:   Respiratory Treatments: ***  Oxygen Therapy:  {Therapy; copd oxygen:75812}  Ventilator:    { CC Vent QSHK:202160107}    Rehab Therapies: {THERAPEUTIC INTERVENTION:3958256320}  Weight Bearing Status/Restrictions: [address] [employer] Weight Bearin}  Other Medical Equipment (for information only, NOT a DME order):  {EQUIPMENT:050914899}  Other Treatments: ***    Patient's personal belongings (please select all that are sent with patient):  {CHP DME Belongings:809827261}    RN SIGNATURE:  {Esignature:742847026}    CASE MANAGEMENT/SOCIAL WORK SECTION    Inpatient Status Date: ***    Readmission Risk Assessment Score:  Readmission Risk              Risk of Unplanned Readmission:  0           Discharging to Facility/ Agency   · Name:   · Address:  · Phone:  · Fax:    Dialysis Facility (if applicable)   · Name:  · Address:  · Dialysis Schedule:  · Phone:  · Fax:    / signature: {Esignature:947842893}    PHYSICIAN SECTION    Prognosis: {Prognosis:4527676891}    Condition at

## 2021-10-27 NOTE — PROGRESS NOTES
DEPARTMENT OF RADIATION ONCOLOGY ON TREATMENT VISIT         10/27/2021      NAME:  Aldo Agustin    YOB: 1965    Diagnosis: breast cancer    SUBJECTIVE:   Aldo Agustin has now received fractionated external beam radiation therapy - ongoing. Past medical, surgical, social and family histories reviewed and updated as indicated. Pain: controlled    ALLERGIES:  Ceftriaxone         Current Outpatient Medications   Medication Sig Dispense Refill    ondansetron (ZOFRAN) 4 MG tablet take 1 tablet by mouth every 8 hours if needed for nausea and vomiting 60 tablet 1    acetaminophen-codeine (TYLENOL/CODEINE #3) 300-30 MG per tablet Take 1 tablet by mouth every 4 hours as needed for Pain.  magnesium oxide (MAG-OX) 400 MG tablet Take 1 tablet by mouth 2 times daily       FLUoxetine HCl, PMDD, 10 MG TABS Take 20 mg by mouth daily      lidocaine-prilocaine (EMLA) 2.5-2.5 % cream Apply topically to port 30 minutes prior to chemotherapy. 30 g 2    prochlorperazine (COMPAZINE) 10 MG tablet Take 1 tablet by mouth every 6 hours as needed (nausea) 60 tablet 1    cholestyramine (QUESTRAN) 4 g packet Take 1 packet by mouth 2 times daily 90 packet 3    psyllium (KONSYL) 28.3 % PACK Take 1 packet by mouth daily 30 each 3    magnesium oxide (MAG-OX) 400 (240 Mg) MG tablet Take 1 tablet by mouth 2 times daily 60 tablet 0    diclofenac sodium (VOLTAREN) 1 % GEL Apply 1 g topically 2 times daily       MELATONIN PO Take by mouth nightly      Multiple Vitamins-Minerals (HAIR SKIN AND NAILS FORMULA) TABS Take by mouth STOP PREOP MED      mirtazapine (REMERON) 15 MG tablet Take 15 mg by mouth nightly      atenolol (TENORMIN) 25 MG tablet every evening       ALPRAZolam (XANAX) 0.5 MG tablet Take 0.5 mg by mouth nightly.  pregabalin (LYRICA) 75 MG capsule Take 75 mg by mouth daily.        famotidine (PEPCID) 20 MG tablet Take 20 mg by mouth daily      amitriptyline (ELAVIL) 50 MG tablet Take 50 mg by mouth nightly       LORATADINE-D 24HR  MG per extended release tablet Take 1 tablet by mouth daily       LANTUS SOLOSTAR 100 UNIT/ML injection pen Inject 30 Units into the skin nightly       metFORMIN (GLUCOPHAGE) 500 MG tablet Take 500 mg by mouth 2 times daily       albuterol sulfate  (90 BASE) MCG/ACT inhaler Inhale 2 puffs into the lungs every 6 hours as needed for Wheezing       No current facility-administered medications for this encounter. OBJECTIVE:  Alert and fully ambulatory. Pleasant and conversant. Physical Examination: General appearance - alert, well appearing, and in no distress. Wt Readings from Last 3 Encounters:   10/27/21 147 lb 6.4 oz (66.9 kg)   10/14/21 150 lb 3.2 oz (68.1 kg)   10/14/21 150 lb 3.2 oz (68.1 kg)         ASSESSMENT/PLAN:     Patient is tolerating treatments well with expected toxicities. RBA were reviewed prior to first fraction and PRN. Current and planned dose reviewed. Goals of treatment and potential side effects were reviewed with the patient PRN. Treatment imaging has been personally reviewed for accuracy and precision. Questions answered to apparent satisfaction. Treatments will continue as planned. Jose Elias Pina.  Yazmin Shah MD MS DABR  Radiation Oncologist        [facility] ([address] [place] [address]): [phone] /// FAX: [phone]  [facility]): [phone] /// FAX: [phone]  [address] [place] [address]): [phone] /// FAX: [phone]

## 2021-10-27 NOTE — PATIENT INSTRUCTIONS
Continue daily fractionated radiation therapy as scheduled. Please see weekly OTV note and intial consultation letter in [facility] for clinical details. Benitez Singletary MD MS [facility]:  [phone]   FAX: [phone] [address] [facility]:  [phone]   FAX:    [phone] [address]:  [phone]   FAX:  [phone]  Email: [email or web address]

## 2021-10-27 NOTE — PROGRESS NOTES
Harshal Webb  10/27/2021  Wt Readings from Last 3 Encounters:   10/27/21 147 lb 6.4 oz (66.9 kg)   10/14/21 150 lb 3.2 oz (68.1 kg)   10/14/21 150 lb 3.2 oz (68.1 kg)     Body mass index is 19.45 kg/m². Treatment Area:CTV right breat/apex/sclav    Patient was seen today for weekly visit. Comfort Alteration  KPS:70%  Fatigue: Mild    Nutritional Alteration  Anorexia: No   Nausea: No   Vomiting: No     Skin Alteration   Sensation:good and use cream from wound center    Radiation Dermatitis:  na    Mucous Membrane Alteration  Drainage: No  Lymphedema: No    Emotional  Coping: effective    Sexuality Alteration  na    Injury, potential bleeding or infection: na        Lab Results   Component Value Date    WBC 8.4 10/12/2021     10/12/2021         /64   Pulse 64   Temp 97.1 °F (36.2 °C)   Resp 18   Wt 147 lb 6.4 oz (66.9 kg)   LMP 08/13/2013   SpO2 98%   BMI 19.45 kg/m²   BP within normal range?  yes           Assessment/Plan: 1/30fx   200/6000cGY and doing ok    Hoang Kenia, RN

## 2021-10-28 ENCOUNTER — HOSPITAL ENCOUNTER (OUTPATIENT)
Dept: RADIATION ONCOLOGY | Age: 56
Discharge: HOME | End: 2021-10-28
Attending: RADIOLOGY
Payer: MEDICAID

## 2021-10-28 PROCEDURE — 77385 HC NTSTY MODUL RAD TX DLVR SMPL: CPT | Performed by: RADIOLOGY

## 2021-10-29 ENCOUNTER — TELEPHONE (OUTPATIENT)
Dept: CASE MANAGEMENT | Age: 56
End: 2021-10-29

## 2021-10-29 ENCOUNTER — HOSPITAL ENCOUNTER (OUTPATIENT)
Dept: RADIATION ONCOLOGY | Age: 56
Discharge: HOME | End: 2021-10-29
Payer: MEDICAID

## 2021-10-29 ENCOUNTER — HOSPITAL ENCOUNTER (OUTPATIENT)
Dept: RADIATION ONCOLOGY | Age: 56
Discharge: HOME | End: 2021-10-29
Attending: RADIOLOGY
Payer: MEDICAID

## 2021-10-29 ENCOUNTER — APPOINTMENT (OUTPATIENT)
Dept: RADIATION ONCOLOGY | Age: 56
End: 2021-10-29
Attending: RADIOLOGY
Payer: MEDICAID

## 2021-10-29 PROCEDURE — 77014 PR CT GUIDANCE PLACEMENT RAD THERAPY FIELDS: CPT | Performed by: RADIOLOGY

## 2021-10-29 PROCEDURE — 77385 HC NTSTY MODUL RAD TX DLVR SMPL: CPT | Performed by: RADIOLOGY

## 2021-10-29 NOTE — TELEPHONE ENCOUNTER
encouraged patient to talk with Dr. Judi Stoner about this during her 11/4/21 follow up appointment and understanding was verbalized.

## 2021-11-01 ENCOUNTER — HOSPITAL ENCOUNTER (OUTPATIENT)
Dept: RADIATION ONCOLOGY | Age: 56
Discharge: HOME | End: 2021-11-01
Attending: RADIOLOGY
Payer: MEDICAID

## 2021-11-01 PROCEDURE — 77385 HC NTSTY MODUL RAD TX DLVR SMPL: CPT | Performed by: RADIOLOGY

## 2021-11-02 ENCOUNTER — HOSPITAL ENCOUNTER (OUTPATIENT)
Dept: RADIATION ONCOLOGY | Age: 56
Discharge: HOME | End: 2021-11-02
Attending: RADIOLOGY
Payer: MEDICAID

## 2021-11-02 VITALS
DIASTOLIC BLOOD PRESSURE: 80 MMHG | OXYGEN SATURATION: 97 % | WEIGHT: 149.25 LBS | RESPIRATION RATE: 18 BRPM | SYSTOLIC BLOOD PRESSURE: 120 MMHG | TEMPERATURE: 97 F | HEART RATE: 84 BPM | BODY MASS INDEX: 19.69 KG/M2

## 2021-11-02 PROCEDURE — 77427 RADIATION TX MANAGEMENT X5: CPT | Performed by: RADIOLOGY

## 2021-11-02 PROCEDURE — 77385 HC NTSTY MODUL RAD TX DLVR SMPL: CPT | Performed by: RADIOLOGY

## 2021-11-02 PROCEDURE — 77336 RADIATION PHYSICS CONSULT: CPT | Performed by: RADIOLOGY

## 2021-11-02 NOTE — PROGRESS NOTES
DEPARTMENT OF RADIATION ONCOLOGY   ON TREATMENT VISIT       11/2/2021      NAME:  Cordell Schilder    YOB: 1965    DIAGNOSIS:     Carcinoma of the right breast with metastatic disease to the lymph billie area. SUBJECTIVE:       Cordell Schilder has now received 1000 cGy in 5  fractions directed to the right breast    and  900 cGy in 5 fractions to the regional nodes     Past medical, surgical, social and family histories reviewed and updated as indicated. PAIN: Asymptomatic      ALLERGIES:  Ceftriaxone         Current Outpatient Medications   Medication Sig Dispense Refill    ondansetron (ZOFRAN) 4 MG tablet take 1 tablet by mouth every 8 hours if needed for nausea and vomiting 60 tablet 1    acetaminophen-codeine (TYLENOL/CODEINE #3) 300-30 MG per tablet Take 1 tablet by mouth every 4 hours as needed for Pain.  magnesium oxide (MAG-OX) 400 MG tablet Take 1 tablet by mouth 2 times daily       FLUoxetine HCl, PMDD, 10 MG TABS Take 20 mg by mouth daily      lidocaine-prilocaine (EMLA) 2.5-2.5 % cream Apply topically to port 30 minutes prior to chemotherapy. 30 g 2    prochlorperazine (COMPAZINE) 10 MG tablet Take 1 tablet by mouth every 6 hours as needed (nausea) 60 tablet 1    cholestyramine (QUESTRAN) 4 g packet Take 1 packet by mouth 2 times daily 90 packet 3    psyllium (KONSYL) 28.3 % PACK Take 1 packet by mouth daily 30 each 3    magnesium oxide (MAG-OX) 400 (240 Mg) MG tablet Take 1 tablet by mouth 2 times daily 60 tablet 0    diclofenac sodium (VOLTAREN) 1 % GEL Apply 1 g topically 2 times daily       MELATONIN PO Take by mouth nightly      Multiple Vitamins-Minerals (HAIR SKIN AND NAILS FORMULA) TABS Take by mouth STOP PREOP MED      mirtazapine (REMERON) 15 MG tablet Take 15 mg by mouth nightly      atenolol (TENORMIN) 25 MG tablet every evening       ALPRAZolam (XANAX) 0.5 MG tablet Take 0.5 mg by mouth nightly.       pregabalin (LYRICA) 75 MG capsule Take 75 mg by 1210  [address] of Radiation Oncology    [facility]) [facility]: [phone] (POTTER: [phone])  [address] [place] [address]) [facility]: [phone] ([facility]: [phone])  [facility]:  [phone] (TZW:  [phone])

## 2021-11-02 NOTE — PROGRESS NOTES
Chanell Butcher  11/2/2021  Wt Readings from Last 3 Encounters:   11/02/21 149 lb 4 oz (67.7 kg)   10/27/21 147 lb 6.4 oz (66.9 kg)   10/14/21 150 lb 3.2 oz (68.1 kg)     Body mass index is 19.69 kg/m². Treatment Area:Right breast    Patient was seen today for weekly visit. Comfort Alteration  KPS:90%  Fatigue: Severe    Nutritional Alteration  Anorexia: food doesn't taste good   Nausea: No   Vomiting: No     Skin Alteration   Sensation:na    Radiation Dermatitis:  None, educated to moisturize skin at least 2 times daily, verbalizes understanding  I gave her samples of Aquaphor  Mucous Membrane Alteration  Drainage: No  Lymphedema: No    Emotional  Coping: effective    Sexuality Alteration  na    Injury, potential bleeding or infection: na        Lab Results   Component Value Date    WBC 8.4 10/12/2021     10/12/2021         /80   Pulse 84   Temp 97 °F (36.1 °C) (Temporal)   Resp 18   Wt 149 lb 4 oz (67.7 kg)   LMP 08/13/2013   SpO2 97%   BMI 19.69 kg/m²   BP within normal range?  Yes    Assessment/Plan:  Completed 5/30 fractions; 1000/6000 cGy , no complaints updated Dr Ilsa Saeed, RN

## 2021-11-03 ENCOUNTER — HOSPITAL ENCOUNTER (OUTPATIENT)
Dept: INFUSION THERAPY | Age: 56
Discharge: HOME | End: 2021-11-03
Payer: MEDICAID

## 2021-11-03 ENCOUNTER — HOSPITAL ENCOUNTER (OUTPATIENT)
Dept: WOUND CARE | Age: 56
Discharge: HOME | End: 2021-11-03
Payer: MEDICAID

## 2021-11-03 ENCOUNTER — HOSPITAL ENCOUNTER (OUTPATIENT)
Dept: RADIATION ONCOLOGY | Age: 56
Discharge: HOME | End: 2021-11-03
Attending: RADIOLOGY
Payer: MEDICAID

## 2021-11-03 DIAGNOSIS — Z17.0 MALIGNANT NEOPLASM OF CENTRAL PORTION OF RIGHT BREAST IN FEMALE, ESTROGEN RECEPTOR POSITIVE (HCC): Primary | ICD-10-CM

## 2021-11-03 DIAGNOSIS — Z85.3 PERSONAL HISTORY OF BREAST CANCER: ICD-10-CM

## 2021-11-03 DIAGNOSIS — C50.111 MALIGNANT NEOPLASM OF CENTRAL PORTION OF RIGHT BREAST IN FEMALE, ESTROGEN RECEPTOR POSITIVE (HCC): Primary | ICD-10-CM

## 2021-11-03 LAB
ALBUMIN SERPL-MCNC: 4 G/DL (ref 3.5–5.2)
ALP BLD-CCNC: 185 U/L (ref 35–104)
ALT SERPL-CCNC: 29 U/L (ref 0–32)
ANION GAP SERPL CALCULATED.3IONS-SCNC: 12 MMOL/L (ref 7–16)
AST SERPL-CCNC: 22 U/L (ref 0–31)
BASOPHILS ABSOLUTE: 0.05 E9/L (ref 0–0.2)
BASOPHILS RELATIVE PERCENT: 0.7 % (ref 0–2)
BILIRUB SERPL-MCNC: 0.2 MG/DL (ref 0–1.2)
BUN BLDV-MCNC: 8 MG/DL (ref 6–20)
CALCIUM SERPL-MCNC: 9.2 MG/DL (ref 8.6–10.2)
CHLORIDE BLD-SCNC: 99 MMOL/L (ref 98–107)
CO2: 23 MMOL/L (ref 22–29)
CREAT SERPL-MCNC: 0.7 MG/DL (ref 0.5–1)
EOSINOPHILS ABSOLUTE: 0.08 E9/L (ref 0.05–0.5)
EOSINOPHILS RELATIVE PERCENT: 1 % (ref 0–6)
GFR AFRICAN AMERICAN: >60
GFR NON-AFRICAN AMERICAN: >60 ML/MIN/1.73
GLUCOSE BLD-MCNC: 196 MG/DL (ref 74–99)
HCT VFR BLD CALC: 36.5 % (ref 34–48)
HEMOGLOBIN: 11.9 G/DL (ref 11.5–15.5)
IMMATURE GRANULOCYTES #: 0.05 E9/L
IMMATURE GRANULOCYTES %: 0.7 % (ref 0–5)
LYMPHOCYTES ABSOLUTE: 1.58 E9/L (ref 1.5–4)
LYMPHOCYTES RELATIVE PERCENT: 20.7 % (ref 20–42)
MAGNESIUM: 1.6 MG/DL (ref 1.6–2.6)
MCH RBC QN AUTO: 33 PG (ref 26–35)
MCHC RBC AUTO-ENTMCNC: 32.6 % (ref 32–34.5)
MCV RBC AUTO: 101.1 FL (ref 80–99.9)
MONOCYTES ABSOLUTE: 0.66 E9/L (ref 0.1–0.95)
MONOCYTES RELATIVE PERCENT: 8.6 % (ref 2–12)
NEUTROPHILS ABSOLUTE: 5.23 E9/L (ref 1.8–7.3)
NEUTROPHILS RELATIVE PERCENT: 68.3 % (ref 43–80)
PDW BLD-RTO: 12.7 FL (ref 11.5–15)
PLATELET # BLD: 222 E9/L (ref 130–450)
PMV BLD AUTO: 9.2 FL (ref 7–12)
POTASSIUM SERPL-SCNC: 4.1 MMOL/L (ref 3.5–5)
RBC # BLD: 3.61 E12/L (ref 3.5–5.5)
SODIUM BLD-SCNC: 134 MMOL/L (ref 132–146)
TOTAL PROTEIN: 5.8 G/DL (ref 6.4–8.3)
WBC # BLD: 7.7 E9/L (ref 4.5–11.5)

## 2021-11-03 PROCEDURE — 85025 COMPLETE CBC W/AUTO DIFF WBC: CPT

## 2021-11-03 PROCEDURE — 36591 DRAW BLOOD OFF VENOUS DEVICE: CPT

## 2021-11-03 PROCEDURE — 2580000003 HC RX 258: Performed by: INTERNAL MEDICINE

## 2021-11-03 PROCEDURE — 77385 HC NTSTY MODUL RAD TX DLVR SMPL: CPT | Performed by: RADIOLOGY

## 2021-11-03 PROCEDURE — 83735 ASSAY OF MAGNESIUM: CPT

## 2021-11-03 PROCEDURE — 80053 COMPREHEN METABOLIC PANEL: CPT

## 2021-11-03 PROCEDURE — 77014 PR CT GUIDANCE PLACEMENT RAD THERAPY FIELDS: CPT | Performed by: RADIOLOGY

## 2021-11-03 PROCEDURE — 6360000002 HC RX W HCPCS: Performed by: INTERNAL MEDICINE

## 2021-11-03 RX ORDER — SODIUM CHLORIDE 0.9 % (FLUSH) 0.9 %
10 SYRINGE (ML) INJECTION PRN
Status: CANCELLED | OUTPATIENT
Start: 2021-11-03

## 2021-11-03 RX ORDER — SODIUM CHLORIDE 0.9 % (FLUSH) 0.9 %
10 SYRINGE (ML) INJECTION PRN
Status: DISCONTINUED | OUTPATIENT
Start: 2021-11-03 | End: 2021-11-04 | Stop reason: HOSPADM

## 2021-11-03 RX ORDER — HEPARIN SODIUM (PORCINE) LOCK FLUSH IV SOLN 100 UNIT/ML 100 UNIT/ML
500 SOLUTION INTRAVENOUS PRN
Status: DISCONTINUED | OUTPATIENT
Start: 2021-11-03 | End: 2021-11-04 | Stop reason: HOSPADM

## 2021-11-03 RX ORDER — HEPARIN SODIUM (PORCINE) LOCK FLUSH IV SOLN 100 UNIT/ML 100 UNIT/ML
500 SOLUTION INTRAVENOUS PRN
Status: CANCELLED | OUTPATIENT
Start: 2021-11-03

## 2021-11-03 RX ADMIN — Medication 500 UNITS: at 13:17

## 2021-11-03 RX ADMIN — SODIUM CHLORIDE, PRESERVATIVE FREE 10 ML: 5 INJECTION INTRAVENOUS at 13:17

## 2021-11-04 ENCOUNTER — OFFICE VISIT (OUTPATIENT)
Dept: ONCOLOGY | Age: 56
End: 2021-11-04
Payer: MEDICAID

## 2021-11-04 ENCOUNTER — HOSPITAL ENCOUNTER (OUTPATIENT)
Dept: RADIATION ONCOLOGY | Age: 56
Discharge: HOME | End: 2021-11-04
Attending: RADIOLOGY
Payer: MEDICAID

## 2021-11-04 ENCOUNTER — HOSPITAL ENCOUNTER (OUTPATIENT)
Dept: INFUSION THERAPY | Age: 56
Discharge: HOME | End: 2021-11-04
Payer: MEDICAID

## 2021-11-04 VITALS
HEART RATE: 73 BPM | TEMPERATURE: 97.8 F | SYSTOLIC BLOOD PRESSURE: 109 MMHG | RESPIRATION RATE: 14 BRPM | DIASTOLIC BLOOD PRESSURE: 60 MMHG

## 2021-11-04 VITALS
SYSTOLIC BLOOD PRESSURE: 103 MMHG | WEIGHT: 147 LBS | RESPIRATION RATE: 12 BRPM | HEART RATE: 77 BPM | DIASTOLIC BLOOD PRESSURE: 54 MMHG | HEIGHT: 72 IN | TEMPERATURE: 97.9 F | BODY MASS INDEX: 19.91 KG/M2

## 2021-11-04 DIAGNOSIS — C50.111 MALIGNANT NEOPLASM OF CENTRAL PORTION OF RIGHT BREAST IN FEMALE, ESTROGEN RECEPTOR POSITIVE (HCC): Primary | ICD-10-CM

## 2021-11-04 DIAGNOSIS — Z17.0 MALIGNANT NEOPLASM OF CENTRAL PORTION OF RIGHT BREAST IN FEMALE, ESTROGEN RECEPTOR POSITIVE (HCC): Primary | ICD-10-CM

## 2021-11-04 DIAGNOSIS — Z85.3 PERSONAL HISTORY OF BREAST CANCER: Primary | ICD-10-CM

## 2021-11-04 PROCEDURE — 99214 OFFICE O/P EST MOD 30 MIN: CPT | Performed by: INTERNAL MEDICINE

## 2021-11-04 PROCEDURE — G9899 SCRN MAM PERF RSLTS DOC: HCPCS | Performed by: INTERNAL MEDICINE

## 2021-11-04 PROCEDURE — G8428 CUR MEDS NOT DOCUMENT: HCPCS | Performed by: INTERNAL MEDICINE

## 2021-11-04 PROCEDURE — G8420 CALC BMI NORM PARAMETERS: HCPCS | Performed by: INTERNAL MEDICINE

## 2021-11-04 PROCEDURE — 1036F TOBACCO NON-USER: CPT | Performed by: INTERNAL MEDICINE

## 2021-11-04 PROCEDURE — 96413 CHEMO IV INFUSION 1 HR: CPT

## 2021-11-04 PROCEDURE — 3017F COLORECTAL CA SCREEN DOC REV: CPT | Performed by: INTERNAL MEDICINE

## 2021-11-04 PROCEDURE — 2580000003 HC RX 258: Performed by: INTERNAL MEDICINE

## 2021-11-04 PROCEDURE — 96549 UNLISTED CHEMOTHERAPY PX: CPT

## 2021-11-04 PROCEDURE — G8484 FLU IMMUNIZE NO ADMIN: HCPCS | Performed by: INTERNAL MEDICINE

## 2021-11-04 PROCEDURE — 96361 HYDRATE IV INFUSION ADD-ON: CPT

## 2021-11-04 PROCEDURE — 77385 HC NTSTY MODUL RAD TX DLVR SMPL: CPT | Performed by: RADIOLOGY

## 2021-11-04 PROCEDURE — 6360000002 HC RX W HCPCS: Performed by: INTERNAL MEDICINE

## 2021-11-04 RX ORDER — SODIUM CHLORIDE 0.9 % (FLUSH) 0.9 %
10 SYRINGE (ML) INJECTION PRN
Status: CANCELLED | OUTPATIENT
Start: 2021-11-04

## 2021-11-04 RX ORDER — SODIUM CHLORIDE 9 MG/ML
20 INJECTION, SOLUTION INTRAVENOUS ONCE
Status: COMPLETED | OUTPATIENT
Start: 2021-11-04 | End: 2021-11-04

## 2021-11-04 RX ORDER — DIPHENHYDRAMINE HYDROCHLORIDE 50 MG/ML
50 INJECTION INTRAMUSCULAR; INTRAVENOUS ONCE
Status: CANCELLED | OUTPATIENT
Start: 2021-11-04 | End: 2021-11-04

## 2021-11-04 RX ORDER — SODIUM CHLORIDE 0.9 % (FLUSH) 0.9 %
10 SYRINGE (ML) INJECTION PRN
Status: DISCONTINUED | OUTPATIENT
Start: 2021-11-04 | End: 2021-11-05 | Stop reason: HOSPADM

## 2021-11-04 RX ORDER — SODIUM CHLORIDE 9 MG/ML
20 INJECTION, SOLUTION INTRAVENOUS ONCE
Status: CANCELLED | OUTPATIENT
Start: 2021-11-04 | End: 2021-11-04

## 2021-11-04 RX ORDER — SODIUM CHLORIDE 9 MG/ML
INJECTION, SOLUTION INTRAVENOUS CONTINUOUS
Status: CANCELLED | OUTPATIENT
Start: 2021-11-04

## 2021-11-04 RX ORDER — METHYLPREDNISOLONE SODIUM SUCCINATE 125 MG/2ML
125 INJECTION, POWDER, LYOPHILIZED, FOR SOLUTION INTRAMUSCULAR; INTRAVENOUS ONCE
Status: CANCELLED | OUTPATIENT
Start: 2021-11-04 | End: 2021-11-04

## 2021-11-04 RX ORDER — EPINEPHRINE 1 MG/ML
0.3 INJECTION, SOLUTION, CONCENTRATE INTRAVENOUS PRN
Status: CANCELLED | OUTPATIENT
Start: 2021-11-04

## 2021-11-04 RX ORDER — MEPERIDINE HYDROCHLORIDE 25 MG/ML
12.5 INJECTION INTRAMUSCULAR; INTRAVENOUS; SUBCUTANEOUS ONCE
Status: CANCELLED | OUTPATIENT
Start: 2021-11-04 | End: 2021-11-04

## 2021-11-04 RX ORDER — 0.9 % SODIUM CHLORIDE 0.9 %
1000 INTRAVENOUS SOLUTION INTRAVENOUS ONCE
Status: COMPLETED | OUTPATIENT
Start: 2021-11-04 | End: 2021-11-04

## 2021-11-04 RX ORDER — HEPARIN SODIUM (PORCINE) LOCK FLUSH IV SOLN 100 UNIT/ML 100 UNIT/ML
500 SOLUTION INTRAVENOUS PRN
Status: CANCELLED | OUTPATIENT
Start: 2021-11-04

## 2021-11-04 RX ORDER — HEPARIN SODIUM (PORCINE) LOCK FLUSH IV SOLN 100 UNIT/ML 100 UNIT/ML
500 SOLUTION INTRAVENOUS PRN
Status: DISCONTINUED | OUTPATIENT
Start: 2021-11-04 | End: 2021-11-05 | Stop reason: HOSPADM

## 2021-11-04 RX ORDER — SODIUM CHLORIDE 0.9 % (FLUSH) 0.9 %
5 SYRINGE (ML) INJECTION PRN
Status: CANCELLED | OUTPATIENT
Start: 2021-11-04

## 2021-11-04 RX ADMIN — SODIUM CHLORIDE 1000 ML: 9 INJECTION, SOLUTION INTRAVENOUS at 12:14

## 2021-11-04 RX ADMIN — SODIUM CHLORIDE, PRESERVATIVE FREE 10 ML: 5 INJECTION INTRAVENOUS at 13:18

## 2021-11-04 RX ADMIN — TRASTUZUMAB-ANNS 399 MG: 420 INJECTION, POWDER, LYOPHILIZED, FOR SOLUTION INTRAVENOUS at 11:35

## 2021-11-04 RX ADMIN — Medication 500 UNITS: at 13:18

## 2021-11-04 RX ADMIN — PERTUZUMAB 420 MG: 30 INJECTION, SOLUTION, CONCENTRATE INTRAVENOUS at 11:35

## 2021-11-04 RX ADMIN — SODIUM CHLORIDE 20 ML/HR: 9 INJECTION, SOLUTION INTRAVENOUS at 11:22

## 2021-11-04 NOTE — PROGRESS NOTES
Department of [facility] Oncology  Attending Clinic Note    Reason for Visit: Follow-up on a patient with Right Breast Cancer    PCP:  Damon Vera DO    History of Present Illness: The mass was located in the 6 o'clock position of right breast    Breast cancer risk factors include infrequent SMEs and age and gender    Bilateral Screening Mammogram 10/06/2020: There are suspicious calcifications in the right breast at the 6 o'clock position which appear pleomorphic. These calcifications are in a segmental distribution. These clustered calcifications are suspicious for malignancy. Right breast, calcifications at the 6:00, core biopsy on 10/30/2020:    - Small focus of invasive ductal carcinoma, grade 3 (3+3+2 = 8)    - Ductal carcinoma in situ, high nuclear grade, comedo/cribriform/solid types;    - Microcalcifications in DCIS    - Breast receptor and biomarkers (per outside report without looking the   slides:     ER: Positive, 89.8%, strong intensity     RI: Positive, 52.2%, moderate intensity     HER-2: Positive (score 3+)     Ki-67: High proliferation, 26.2%     P53: Negative, 0.2%     Comment:The invasive carcinoma is intermingled with DCIS and measures   3.0 x 2.0 mm in greatest dimension on the slides. CXR PA/Lateral 12/11/2020:  No acute process. Right Axillary U/S on 12/11/2020: There is no axillary LN.     MRI Bilateral Breast 01/05/2021:  Focal, heterogeneous non mass enhancement identified in the right breast 6 o'clock which measures approximately 1.9 x 1.4 x 1.7 cm (image 19 of the axial T1 post contrast series).    No additional foci of disease identified on the right  There is no lymphadenopathy    Needle localized Right lumpectomy with SLNBx with mediport placement on 02/05/2021  A.  Right axillary sentinel lymph node #1, excision: 1 lymph node negative for malignancy     B.  Right axillary contents, regional resection: 1 lymph node positive for metastatic, poorly differentiated ductal carcinoma (grade 3)   Extranodal extension present     C.  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;   positive for extensive intraductal component   Skin-uninvolved by malignancy   Invasive carcinoma margins-uninvolved by invasive carcinoma        Distance from closest margin: 4 mm from anterior margin   DCIS margins-inferior and medial margins involved by DCIS   Regional lymph nodes-involved by tumor cells        Number of lymph nodes with macrometastasis: 1        Number of lymph nodes with micrometastasis: 0        Number of lymph nodes with isolated tumor cells: 0        Size of largest metastatic deposit: 1.5 cm        Extranodal extension: Present        Total number of lymph nodes examined: 2        Number of sentinel nodes examined: 1   Treatment effect in the breast-no known presurgical therapy   TNM classification (AJCC eighth edition)-  pT1c N1a MX     Bone scan, CT chest/abdomen/pelvis 02/26/2021 negative for metastatic disease    Re-excision lumpectomy of inferior and medial margins on 03/09/2021  A. Right breast, new inferior margin, excision: Fat necrosis, foreign body-type giant cell reaction, chronic inflammation, and fibrosis consistent with previous procedure   No evidence of residual carcinoma   Final inferior margin negative for carcinoma     B.  Right breast, new medial margin, excision: Residual high-grade ductal carcinoma in situ   Ductal carcinoma in situ present less than 1 mm from red/superior margin and green/anterior margin   Fat necrosis, foreign body-type giant cell reaction, chronic   inflammation, and fibrosis consistent with previous procedure   Fibrocystic change   Microcalcifications associated with benign breast tissue and DCIS   Comment:Residual ductal carcinoma in situ is present in 5 out of 13 tissue blocks of part B    Recommended adjuvant chemotherapy (TCHP) for 6 cycles followed by adjuvant RT followed lastly by endocrine therapy. Side effects TCHP reviewed with patient. She agreed to proceed. 2d-echo 02/17/2021 noted EF 60-65%  Cycle # 1 adjuvant TCHP was on 04/08/2021. Cycle # 2 adjuvant TCHP was on 04/29/2021. Cycle # 3 adjuvant TCHP was on 05/20/2021. Cycle # 4 adjuvant TCHP was on 06/10/2021. Cycle # 5 adjuvant TCHP was on 07/22/2021. Cycle # 6 adjuvant TCHP was on 08/12/2021. Radiation Oncology consult appreciated. 2d-echo 10/12/2021 noted EF 55 to 60%  Herceptin/Perjeta today 10/14/2021. Today 11/04/2021. No fever, chills. Fatigue. No N/V. RT was started on 11/01/2021    Review of Systems;  CONSTITUTIONAL: No fever, chills. Fair appetite. Fatigue  ENMT: Eyes: No diplopia; Nose: No epistaxis. Mouth: No sore throat. RESPIRATORY: No hemoptysis, shortness of breath, cough. CARDIOVASCULAR: No chest pain, palpitations. GASTROINTESTINAL: No nausea/vomiting, abdominal pain. GENITOURINARY: No dysuria, urinary frequency, hematuria. NEURO: No syncope, presyncope, headache.   Remainder: ROS NEGATIVE    Past Medical History:      Diagnosis Date    Anxiety     Arthritis     Cancer ([address] 2021    breast    Cervical radiculopathy     Chronic back pain     Dehydration 9/23/2021    Dehydration 9/23/2021    Depression     Diabetes mellitus type 2, uncontrolled ([address] 2/12/2017    GERD (gastroesophageal reflux disease)     History of blood transfusion 01/2018    back surgery, lumbar, dr Oscar Snowball    Hypertension     Right leg pain seeing doctor currently problems with hardware     Medications:  Reviewed and reconciled. Allergies: Allergies   Allergen Reactions    Ceftriaxone Shortness Of Breath     Physical Exam:  BP (!) 103/54   Pulse 77   Temp 97.9 °F (36.6 °C) (Infrared)   Resp 12   Ht 6' 1\" (1.854 m)   Wt 147 lb (66.7 kg)   LMP 08/13/2013   BMI 19.39 kg/m²   GENERAL: Alert, oriented x 3, not in acute distress. HEENT: PERRLA; EOMI. Oropharynx clear. NECK: Supple. Without lymphadenopathy. LUNGS: Good air entry bilaterally. No wheezing, crackles or ronchi. CARDIOVASCULAR: Regular rate. No murmurs, rubs or gallops. ABDOMEN: Soft. Non-tender, non-distended. Positive bowel sounds. EXTREMITIES: Without clubbing, cyanosis, or edema. NEUROLOGIC: No focal deficits. ECOG PS 1    Lab Results   Component Value Date    WBC 7.7 11/03/2021    HGB 11.9 11/03/2021    HCT 36.5 11/03/2021    .1 (H) 11/03/2021     11/03/2021     Lab Results   Component Value Date     11/03/2021    K 4.1 11/03/2021    CL 99 11/03/2021    CO2 23 11/03/2021    BUN 8 11/03/2021    CREATININE 0.7 11/03/2021    GLUCOSE 196 (H) 11/03/2021    CALCIUM 9.2 11/03/2021    PROT 5.8 (L) 11/03/2021    LABALBU 4.0 11/03/2021    BILITOT 0.2 11/03/2021    ALKPHOS 185 (H) 11/03/2021    AST 22 11/03/2021    ALT 29 11/03/2021    LABGLOM >60 11/03/2021    GFRAA >60 11/03/2021     Impression/Plan:  64 y/o female with Right Breast Cancer    Bilateral Screening Mammogram 10/06/2020: There are suspicious calcifications in the right breast at the 6 o'clock position which appear pleomorphic. These calcifications are in a segmental distribution. These clustered calcifications are suspicious for malignancy.     Right breast, calcifications at the 6:00, core biopsy on 10/30/2020:    - Small focus of invasive ductal carcinoma, grade 3 (3+3+2 = 8)    - Ductal carcinoma in situ, high nuclear grade, comedo/cribriform/solid types;    - Microcalcifications in DCIS DCIS margins-inferior and medial margins involved by DCIS   Regional lymph nodes-involved by tumor cells        Number of lymph nodes with macrometastasis: 1        Number of lymph nodes with micrometastasis: 0        Number of lymph nodes with isolated tumor cells: 0        Size of largest metastatic deposit: 1.5 cm        Extranodal extension: Present        Total number of lymph nodes examined: 2        Number of sentinel nodes examined: 1   Treatment effect in the breast-no known presurgical therapy   TNM classification (AJCC eighth edition)-  pT1c N1a MX     Bone scan, CT chest/abdomen/pelvis 02/26/2021 negative for metastatic disease    Re-excision lumpectomy of inferior and medial margins on 03/09/2021  A. Right breast, new inferior margin, excision: Fat necrosis, foreign body-type giant cell reaction, chronic inflammation, and fibrosis consistent with previous procedure   No evidence of residual carcinoma   Final inferior margin negative for carcinoma     B. Right breast, new medial margin, excision: Residual high-grade ductal carcinoma in situ   Ductal carcinoma in situ present less than 1 mm from red/superior margin and green/anterior margin   Fat necrosis, foreign body-type giant cell reaction, chronic   inflammation, and fibrosis consistent with previous procedure   Fibrocystic change   Microcalcifications associated with benign breast tissue and DCIS   Comment:Residual ductal carcinoma in situ is present in 5 out of 13 tissue blocks of part B    Recommended adjuvant chemotherapy (TCHP) for 6 cycles followed by adjuvant RT followed lastly by endocrine therapy. Side effects TCHP reviewed with patient. She agreed to proceed. 2d-echo 02/17/2021 noted EF 60-65%  Cycle # 1 adjuvant TCHP was on 04/08/2021. Cycle # 2 adjuvant TCHP was on 04/29/2021. Cycle # 3 adjuvant TCHP was on 05/20/2021. Cycle # 4 adjuvant TCHP was on 06/10/2021.     2D-ECHO 06/29/2021 noted EF 60-65%  Cycle # 5 adjuvant TCHP was on 07/22/2021. Cycle # 6 adjuvant TCHP was on 08/12/2021. 2d-echo 10/12/2021 noted EF 55 to 60%  Herceptin/Perjeta today 11/04/2021. Labs reviewed ok to proceed. RT was started on 11/01/2021. RTC 3 weeks for Herceptin/Perjeta.      Adis Gould MD   79/9/1491  Board Certified Medical Oncologist

## 2021-11-05 ENCOUNTER — HOSPITAL ENCOUNTER (OUTPATIENT)
Dept: RADIATION ONCOLOGY | Age: 56
Discharge: HOME | End: 2021-11-05
Attending: RADIOLOGY
Payer: MEDICAID

## 2021-11-05 ENCOUNTER — TELEPHONE (OUTPATIENT)
Dept: CASE MANAGEMENT | Age: 56
End: 2021-11-05

## 2021-11-05 PROCEDURE — 77385 HC NTSTY MODUL RAD TX DLVR SMPL: CPT | Performed by: RADIOLOGY

## 2021-11-05 NOTE — TELEPHONE ENCOUNTER
Per Tiara Parents, patient mentioned that she is still needing assistance in finding living arrangements. I relayed message to May DIA who is actively working with patient. Per Nam Silva, patient is currently living in her sister's basement.

## 2021-11-08 ENCOUNTER — HOSPITAL ENCOUNTER (OUTPATIENT)
Dept: RADIATION ONCOLOGY | Age: 56
Discharge: HOME | End: 2021-11-08
Attending: RADIOLOGY
Payer: MEDICAID

## 2021-11-08 PROCEDURE — 77385 HC NTSTY MODUL RAD TX DLVR SMPL: CPT | Performed by: RADIOLOGY

## 2021-11-09 ENCOUNTER — TELEPHONE (OUTPATIENT)
Dept: ONCOLOGY | Age: 56
End: 2021-11-09

## 2021-11-09 ENCOUNTER — HOSPITAL ENCOUNTER (OUTPATIENT)
Dept: RADIATION ONCOLOGY | Age: 56
Discharge: HOME | End: 2021-11-09
Attending: RADIOLOGY
Payer: MEDICAID

## 2021-11-09 VITALS
BODY MASS INDEX: 19.39 KG/M2 | WEIGHT: 147 LBS | RESPIRATION RATE: 18 BRPM | TEMPERATURE: 96.8 F | DIASTOLIC BLOOD PRESSURE: 76 MMHG | SYSTOLIC BLOOD PRESSURE: 114 MMHG | HEART RATE: 78 BPM | OXYGEN SATURATION: 96 %

## 2021-11-09 PROCEDURE — 77336 RADIATION PHYSICS CONSULT: CPT | Performed by: RADIOLOGY

## 2021-11-09 PROCEDURE — 77385 HC NTSTY MODUL RAD TX DLVR SMPL: CPT | Performed by: RADIOLOGY

## 2021-11-09 PROCEDURE — 77427 RADIATION TX MANAGEMENT X5: CPT | Performed by: RADIOLOGY

## 2021-11-09 NOTE — PROGRESS NOTES
DEPARTMENT OF RADIATION ONCOLOGY   ON TREATMENT VISIT       11/9/2021      NAME:  Chanell Butcher    YOB: 1965    DIAGNOSIS: Carcinoma of the right breast with regional lymph billie metastasis. SUBJECTIVE:   Chanell Butcher has now received 2000 cGy in 10 fractions directed to the right breast and regional nodes have received an average 1800 cGy during the same setting. Past medical, surgical, social and family histories reviewed and updated as indicated. Patient is having some social issues. She lives with her sister  Not able to take a good shower. We will try to help her out through the . PAIN: Asymptomatic    ALLERGIES:  Ceftriaxone         Current Outpatient Medications   Medication Sig Dispense Refill    ondansetron (ZOFRAN) 4 MG tablet take 1 tablet by mouth every 8 hours if needed for nausea and vomiting 60 tablet 1    acetaminophen-codeine (TYLENOL/CODEINE #3) 300-30 MG per tablet Take 1 tablet by mouth every 4 hours as needed for Pain.  magnesium oxide (MAG-OX) 400 MG tablet Take 1 tablet by mouth 2 times daily       FLUoxetine HCl, PMDD, 10 MG TABS Take 20 mg by mouth daily      lidocaine-prilocaine (EMLA) 2.5-2.5 % cream Apply topically to port 30 minutes prior to chemotherapy.  30 g 2    prochlorperazine (COMPAZINE) 10 MG tablet Take 1 tablet by mouth every 6 hours as needed (nausea) 60 tablet 1    cholestyramine (QUESTRAN) 4 g packet Take 1 packet by mouth 2 times daily 90 packet 3    psyllium (KONSYL) 28.3 % PACK Take 1 packet by mouth daily 30 each 3    magnesium oxide (MAG-OX) 400 (240 Mg) MG tablet Take 1 tablet by mouth 2 times daily 60 tablet 0    diclofenac sodium (VOLTAREN) 1 % GEL Apply 1 g topically 2 times daily       MELATONIN PO Take by mouth nightly      Multiple Vitamins-Minerals (HAIR SKIN AND NAILS FORMULA) TABS Take by mouth STOP PREOP MED      mirtazapine (REMERON) 15 MG tablet Take 15 mg by mouth nightly      atenolol (TENORMIN) 25 MG tablet every evening       ALPRAZolam (XANAX) 0.5 MG tablet Take 0.5 mg by mouth nightly.  pregabalin (LYRICA) 75 MG capsule Take 75 mg by mouth daily.  famotidine (PEPCID) 20 MG tablet Take 20 mg by mouth daily      amitriptyline (ELAVIL) 50 MG tablet Take 50 mg by mouth nightly       LORATADINE-D 24HR  MG per extended release tablet Take 1 tablet by mouth daily       LANTUS SOLOSTAR 100 UNIT/ML injection pen Inject 30 Units into the skin nightly       metFORMIN (GLUCOPHAGE) 500 MG tablet Take 500 mg by mouth 2 times daily       albuterol sulfate  (90 BASE) MCG/ACT inhaler Inhale 2 puffs into the lungs every 6 hours as needed for Wheezing       No current facility-administered medications for this encounter. OBJECTIVE:  Alert and fully ambulatory. Pleasant and conversant. Physical Examination:   Extremely anxious because of her living conditions. Constitutional: A well developed, well nourished 61 y.o. female who is alert, oriented, cooperative and in no apparent distress. No skin changes in the treatment port. No palpable lymph nodes in the lymph billie area. Vitals:    11/09/21 1403   BP: 114/76   Pulse: 78   Resp: 18   Temp: 96.8 °F (36 °C)   TempSrc: Temporal   SpO2: 96%   Weight: 147 lb (66.7 kg)       Wt Readings from Last 3 Encounters:   11/09/21 147 lb (66.7 kg)   11/04/21 147 lb (66.7 kg)   11/02/21 149 lb 4 oz (67.7 kg)       ASSESSMENT/PLAN:   Aeration of the skin folds was encouraged. We will reach out to  and see if he can help her living conditions. Patient is tolerating treatments well with expected toxicities. Current and planned dose reviewed. Goals of treatment and potential side effects were reviewed with the patient. Treatment imaging has been personally reviewed for accuracy and precision. Questions answered to apparent satisfaction. Treatments will continue as planned.     Thank you for the opportunity to participate in multidisciplinary management of this remarkable and pleasant patient.       Dore Dandy, MD [facility]    Department of Radiation Oncology    [facility]) [facility]: [phone])  [address] [facility]: [phone] ([facility]: [phone])  [address] [facility]:  [phone] (MUD:  [phone])

## 2021-11-09 NOTE — PROGRESS NOTES
Diamond Hanson  11/9/2021  Wt Readings from Last 3 Encounters:   11/09/21 147 lb (66.7 kg)   11/04/21 147 lb (66.7 kg)   11/02/21 149 lb 4 oz (67.7 kg)     Body mass index is 19.39 kg/m². Treatment Area:right breast    Patient was seen today for weekly visit. Comfort Alteration  KPS:90%  Fatigue: Severe    Nutritional Alteration  Anorexia: No   Nausea: No   Vomiting: No     Skin Alteration   Sensation:burning sensation    Radiation Dermatitis:  Moisturizing skin at least 2 times daily, I gave her more samples of Aquaphor, seh appreciated it    Mucous Membrane Alteration  Drainage: No  Lymphedema: No    Emotional  Coping: effective    Sexuality Alteration  na    Injury, potential bleeding or infection: na        Lab Results   Component Value Date    WBC 7.7 11/03/2021     11/03/2021         /76   Pulse 78   Temp 96.8 °F (36 °C) (Temporal)   Resp 18   Wt 147 lb (66.7 kg)   LMP 08/13/2013   SpO2 96%   BMI 19.39 kg/m²   BP within normal range? yes     Assessment/Plan:  Completed 10/30 fractions; 2000/6000 cGy, no other complaints, updated Dr Nelsy Keith. Pt requesting to talk to , I checked her chart Kelly Stearns left her a message this morning. Pt states she left her a message that she is getting radiation at 2:00 PM everyday.     Mary Lund RN

## 2021-11-09 NOTE — TELEPHONE ENCOUNTER
Attempted to return call to pt. Message left requesting callback.     JULIO Perez, SE  Oncology Social Worker

## 2021-11-10 ENCOUNTER — HOSPITAL ENCOUNTER (OUTPATIENT)
Dept: WOUND CARE | Age: 56
Discharge: HOME | End: 2021-11-10
Payer: MEDICAID

## 2021-11-10 ENCOUNTER — HOSPITAL ENCOUNTER (OUTPATIENT)
Dept: RADIATION ONCOLOGY | Age: 56
Discharge: HOME | End: 2021-11-10
Attending: RADIOLOGY
Payer: MEDICAID

## 2021-11-10 VITALS
HEIGHT: 72 IN | TEMPERATURE: 97.8 F | WEIGHT: 142 LBS | BODY MASS INDEX: 19.23 KG/M2 | SYSTOLIC BLOOD PRESSURE: 112 MMHG | RESPIRATION RATE: 16 BRPM | HEART RATE: 78 BPM | DIASTOLIC BLOOD PRESSURE: 70 MMHG

## 2021-11-10 DIAGNOSIS — Z17.0 MALIGNANT NEOPLASM OF CENTRAL PORTION OF RIGHT BREAST IN FEMALE, ESTROGEN RECEPTOR POSITIVE (HCC): ICD-10-CM

## 2021-11-10 DIAGNOSIS — C50.111 MALIGNANT NEOPLASM OF CENTRAL PORTION OF RIGHT BREAST IN FEMALE, ESTROGEN RECEPTOR POSITIVE (HCC): ICD-10-CM

## 2021-11-10 DIAGNOSIS — S21.009A INCISIONAL BREAST WOUND: ICD-10-CM

## 2021-11-10 PROCEDURE — 99212 OFFICE O/P EST SF 10 MIN: CPT

## 2021-11-10 PROCEDURE — 77014 PR CT GUIDANCE PLACEMENT RAD THERAPY FIELDS: CPT | Performed by: RADIOLOGY

## 2021-11-10 PROCEDURE — 77385 HC NTSTY MODUL RAD TX DLVR SMPL: CPT | Performed by: RADIOLOGY

## 2021-11-11 ENCOUNTER — TELEPHONE (OUTPATIENT)
Dept: ONCOLOGY | Age: 56
End: 2021-11-11

## 2021-11-11 ENCOUNTER — HOSPITAL ENCOUNTER (OUTPATIENT)
Dept: RADIATION ONCOLOGY | Age: 56
Discharge: HOME | End: 2021-11-11
Attending: RADIOLOGY
Payer: MEDICAID

## 2021-11-11 PROCEDURE — 77385 HC NTSTY MODUL RAD TX DLVR SMPL: CPT | Performed by: RADIOLOGY

## 2021-11-11 NOTE — TELEPHONE ENCOUNTER
Attempted to return call to pt re: housing concerns. Message left requesting callback.     JULIO Walton, SE  Oncology Social Worker

## 2021-11-12 ENCOUNTER — TELEPHONE (OUTPATIENT)
Dept: ONCOLOGY | Age: 56
End: 2021-11-12

## 2021-11-12 ENCOUNTER — HOSPITAL ENCOUNTER (OUTPATIENT)
Dept: RADIATION ONCOLOGY | Age: 56
Discharge: HOME | End: 2021-11-12
Attending: RADIOLOGY
Payer: MEDICAID

## 2021-11-12 PROCEDURE — 77385 HC NTSTY MODUL RAD TX DLVR SMPL: CPT | Performed by: RADIOLOGY

## 2021-11-12 NOTE — TELEPHONE ENCOUNTER
Met with pt in conjunction with radiation tx re: ongoing housing concerns. Pt continued to express frustration that [facility] \"won't help me. \"  Advised that due to her declining two apartments that had been on the list of properties, this may have impacted her eligibility; encouraged her to contact [facility] in order determine if she would be eligible or needs to reapply. Provided list of local stud apartments. Pt reported that her income is approximately $750/month and indicated that she does not know if she can afford an apartment if it is not subsidized. Stated that she continues to reside with her step sister but that she wants her out. Pt again stated that she does not believe that she will be able to continue CA tx if she gets kicked out of step sister's house. Advised that we will help her in any way that we can to ensure that she is able to continue to receive her care but noted that we are not able to change requirements for housing programs and she must comply with requirements in order to obtaining housing. No additional needs identified at this time. Reviewed role of oncology SW and encouraged pt to notify this provider if additional needs arise.     Daniel Rojas, MSW, TERESEW-S  Oncology Social Worker

## 2021-11-15 ENCOUNTER — HOSPITAL ENCOUNTER (OUTPATIENT)
Dept: RADIATION ONCOLOGY | Age: 56
Discharge: HOME | End: 2021-11-15
Attending: RADIOLOGY
Payer: MEDICAID

## 2021-11-15 PROCEDURE — 77385 HC NTSTY MODUL RAD TX DLVR SMPL: CPT | Performed by: RADIOLOGY

## 2021-11-16 ENCOUNTER — HOSPITAL ENCOUNTER (OUTPATIENT)
Dept: RADIATION ONCOLOGY | Age: 56
Discharge: HOME | End: 2021-11-16
Attending: RADIOLOGY
Payer: MEDICAID

## 2021-11-16 VITALS — BODY MASS INDEX: 19.16 KG/M2 | WEIGHT: 145.25 LBS

## 2021-11-16 PROCEDURE — 77385 HC NTSTY MODUL RAD TX DLVR SMPL: CPT | Performed by: RADIOLOGY

## 2021-11-16 PROCEDURE — 77336 RADIATION PHYSICS CONSULT: CPT | Performed by: RADIOLOGY

## 2021-11-16 PROCEDURE — 77427 RADIATION TX MANAGEMENT X5: CPT | Performed by: RADIOLOGY

## 2021-11-16 NOTE — PROGRESS NOTES
DEPARTMENT OF RADIATION ONCOLOGY   ON TREATMENT VISIT       11/16/2021      NAME:  Moi Raza    YOB: 1965    DIAGNOSIS: Carcinoma of the right breast.    SUBJECTIVE:   Moi Raza has now received 3000 cGy in 15  fractions directed to the right breast    Past medical, surgical, social and family histories reviewed and updated as indicated. PAIN:   Denies any pain  ALLERGIES:  Ceftriaxone         Current Outpatient Medications   Medication Sig Dispense Refill    ondansetron (ZOFRAN) 4 MG tablet take 1 tablet by mouth every 8 hours if needed for nausea and vomiting 60 tablet 1    acetaminophen-codeine (TYLENOL/CODEINE #3) 300-30 MG per tablet Take 1 tablet by mouth every 4 hours as needed for Pain.  magnesium oxide (MAG-OX) 400 MG tablet Take 1 tablet by mouth 2 times daily       FLUoxetine HCl, PMDD, 10 MG TABS Take 20 mg by mouth daily      lidocaine-prilocaine (EMLA) 2.5-2.5 % cream Apply topically to port 30 minutes prior to chemotherapy. 30 g 2    prochlorperazine (COMPAZINE) 10 MG tablet Take 1 tablet by mouth every 6 hours as needed (nausea) 60 tablet 1    cholestyramine (QUESTRAN) 4 g packet Take 1 packet by mouth 2 times daily 90 packet 3    psyllium (KONSYL) 28.3 % PACK Take 1 packet by mouth daily 30 each 3    magnesium oxide (MAG-OX) 400 (240 Mg) MG tablet Take 1 tablet by mouth 2 times daily 60 tablet 0    diclofenac sodium (VOLTAREN) 1 % GEL Apply 1 g topically 2 times daily       MELATONIN PO Take by mouth nightly      Multiple Vitamins-Minerals (HAIR SKIN AND NAILS FORMULA) TABS Take by mouth STOP PREOP MED      mirtazapine (REMERON) 15 MG tablet Take 15 mg by mouth nightly      atenolol (TENORMIN) 25 MG tablet every evening       ALPRAZolam (XANAX) 0.5 MG tablet Take 0.5 mg by mouth nightly.  pregabalin (LYRICA) 75 MG capsule Take 75 mg by mouth daily.        famotidine (PEPCID) 20 MG tablet Take 20 mg by mouth daily      amitriptyline (ELAVIL) 50 MG tablet Take 50 mg by mouth nightly       LORATADINE-D 24HR  MG per extended release tablet Take 1 tablet by mouth daily       LANTUS SOLOSTAR 100 UNIT/ML injection pen Inject 30 Units into the skin nightly       metFORMIN (GLUCOPHAGE) 500 MG tablet Take 500 mg by mouth 2 times daily       albuterol sulfate  (90 BASE) MCG/ACT inhaler Inhale 2 puffs into the lungs every 6 hours as needed for Wheezing       No current facility-administered medications for this encounter. OBJECTIVE:  Alert and fully ambulatory. Pleasant and conversant. Physical Examination:   Constitutional: A well developed, well nourished 61 y.o. female who is alert, oriented, cooperative and in no apparent distress. Dry desquamation was noted. No significant erythema. Vitals:    11/16/21 1411   Weight: 145 lb 4 oz (65.9 kg)       Wt Readings from Last 3 Encounters:   11/16/21 145 lb 4 oz (65.9 kg)   11/10/21 142 lb (64.4 kg)   11/09/21 147 lb (66.7 kg)       ASSESSMENT/PLAN:     Patient is tolerating treatments well . Patient does not seem to be using the moisturizing cream.  She was encouraged to use the moisturizing cream.  Current and planned dose reviewed. Goals of treatment and potential side effects were reviewed with the patient. Treatment imaging has been personally reviewed for accuracy and precision. Questions answered to apparent satisfaction. Treatments will continue as planned. Thank you for the opportunity to participate in multidisciplinary management of this remarkable and pleasant patient.       Liudmila Desouza MD [facility]    Department of Radiation Oncology    [facility] [place]) [facility]: [phone] ([facility]: [phone])  [address] [facility] [place]) [facility]: [phone] ([facility]: [phone])  [address] [facility]) [facility]:  [phone] ([facility]:  [phone])

## 2021-11-16 NOTE — PROGRESS NOTES
Alex Islas  11/16/2021  Wt Readings from Last 3 Encounters:   11/16/21 145 lb 4 oz (65.9 kg)   11/10/21 142 lb (64.4 kg)   11/09/21 147 lb (66.7 kg)     Body mass index is 19.16 kg/m². Treatment Area:Right breast    Patient was seen today for weekly visit. Comfort Alteration  KPS:90%  Fatigue: Severe    Nutritional Alteration  Anorexia: No   Nausea: No   Vomiting: No     Skin Alteration   Sensation:burning    Radiation Dermatitis:  Pink to nipple area, educated to increase moisturizing to 3 times daily, verbalizes understanding, Dr Jessica Sanchez updated    Mucous Membrane Alteration  Drainage: No  Lymphedema: No    Emotional  Coping: effective    Sexuality Alteration  na    Injury, potential bleeding or infection: na        Lab Results   Component Value Date    WBC 7.7 11/03/2021     11/03/2021         Wt 145 lb 4 oz (65.9 kg)   LMP 08/13/2013   BMI 19.16 kg/m²   BP within normal range?  Yes     Assessment/Plan:  Completed 15/30 fractions; 3000/6000 cGy    Ginette West RN

## 2021-11-17 ENCOUNTER — HOSPITAL ENCOUNTER (OUTPATIENT)
Dept: RADIATION ONCOLOGY | Age: 56
Discharge: HOME | End: 2021-11-17
Attending: RADIOLOGY
Payer: MEDICAID

## 2021-11-17 PROCEDURE — 77385 HC NTSTY MODUL RAD TX DLVR SMPL: CPT | Performed by: RADIOLOGY

## 2021-11-17 PROCEDURE — 77014 PR CT GUIDANCE PLACEMENT RAD THERAPY FIELDS: CPT | Performed by: RADIOLOGY

## 2021-11-18 ENCOUNTER — HOSPITAL ENCOUNTER (OUTPATIENT)
Dept: RADIATION ONCOLOGY | Age: 56
Discharge: HOME | End: 2021-11-18
Attending: RADIOLOGY
Payer: MEDICAID

## 2021-11-18 PROCEDURE — 77334 RADIATION TREATMENT AID(S): CPT | Performed by: RADIOLOGY

## 2021-11-18 PROCEDURE — 77300 RADIATION THERAPY DOSE PLAN: CPT | Performed by: RADIOLOGY

## 2021-11-18 PROCEDURE — 77385 HC NTSTY MODUL RAD TX DLVR SMPL: CPT | Performed by: RADIOLOGY

## 2021-11-19 ENCOUNTER — TELEPHONE (OUTPATIENT)
Dept: BREAST CENTER | Age: 56
End: 2021-11-19

## 2021-11-19 ENCOUNTER — HOSPITAL ENCOUNTER (OUTPATIENT)
Dept: RADIATION ONCOLOGY | Age: 56
Discharge: HOME | End: 2021-11-19
Attending: RADIOLOGY
Payer: MEDICAID

## 2021-11-19 PROCEDURE — 77385 HC NTSTY MODUL RAD TX DLVR SMPL: CPT | Performed by: RADIOLOGY

## 2021-11-19 NOTE — TELEPHONE ENCOUNTER
Patient called stating she would like to cancel her upcoming appointment . she is still going through her radiation.  She will call back in December when is finished with her treatments

## 2021-11-21 ENCOUNTER — HOSPITAL ENCOUNTER (OUTPATIENT)
Dept: RADIATION ONCOLOGY | Age: 56
Discharge: HOME | End: 2021-11-21
Attending: RADIOLOGY
Payer: MEDICAID

## 2021-11-21 PROCEDURE — 77412 RADIATION TX DELIVERY LVL 3: CPT | Performed by: RADIOLOGY

## 2021-11-21 NOTE — PROGRESS NOTES
Saw pt after treatment, states she noticed drainage to below right breast wound and states \" I can smell it\". I assesed the are, did not noticed any drainage, tried to press on it, no drainage noted. She was under the care of wound care which she states she was released about a week ago, because the wound was healed. I instructed pt to see contact them and schedule a follow up with them tomorrow. I updated Dr Kelly Damon, pt lyubov left, she will see her on OTV day.

## 2021-11-22 ENCOUNTER — HOSPITAL ENCOUNTER (OUTPATIENT)
Dept: RADIATION ONCOLOGY | Age: 56
Discharge: HOME | End: 2021-11-22
Attending: RADIOLOGY
Payer: MEDICAID

## 2021-11-22 PROCEDURE — 77385 HC NTSTY MODUL RAD TX DLVR SMPL: CPT | Performed by: RADIOLOGY

## 2021-11-22 PROCEDURE — 77336 RADIATION PHYSICS CONSULT: CPT | Performed by: RADIOLOGY

## 2021-11-23 ENCOUNTER — HOSPITAL ENCOUNTER (OUTPATIENT)
Dept: RADIATION ONCOLOGY | Age: 56
Discharge: HOME | End: 2021-11-23
Attending: RADIOLOGY
Payer: MEDICAID

## 2021-11-23 VITALS
RESPIRATION RATE: 18 BRPM | DIASTOLIC BLOOD PRESSURE: 70 MMHG | HEART RATE: 85 BPM | TEMPERATURE: 97 F | WEIGHT: 148 LBS | OXYGEN SATURATION: 97 % | SYSTOLIC BLOOD PRESSURE: 106 MMHG | BODY MASS INDEX: 19.53 KG/M2

## 2021-11-23 PROCEDURE — 77014 PR CT GUIDANCE PLACEMENT RAD THERAPY FIELDS: CPT | Performed by: RADIOLOGY

## 2021-11-23 PROCEDURE — 77385 HC NTSTY MODUL RAD TX DLVR SMPL: CPT | Performed by: RADIOLOGY

## 2021-11-23 NOTE — PROGRESS NOTES
Corwin Lover  11/23/2021  Wt Readings from Last 3 Encounters:   11/23/21 148 lb (67.1 kg)   11/16/21 145 lb 4 oz (65.9 kg)   11/10/21 142 lb (64.4 kg)     Body mass index is 19.53 kg/m². Treatment Area:right breast    Patient was seen today for weekly visit. Comfort Alteration  KPS:90%  Fatigue: Severe    Nutritional Alteration  Anorexia: No   Nausea: No   Vomiting: No     Skin Alteration   Sensation: burning sensation    Radiation Dermatitis:  Pink, moisturizing skin 4 times daily, states no more foul odor that she states she noticed last Sunday. I encouraged her to follow up with wound care, she verbalizes understanidng. She states she wants to do it after the holiday  Mucous Membrane Alteration  Drainage: No  Lymphedema: No    Emotional  Coping: effective    Sexuality Alteration  na    Injury, potential bleeding or infection: na        Lab Results   Component Value Date    WBC 7.7 11/03/2021     11/03/2021         /70   Pulse 85   Temp 97 °F (36.1 °C) (Temporal)   Resp 18   Wt 148 lb (67.1 kg)   LMP 08/13/2013   SpO2 97%   BMI 19.53 kg/m²   BP within normal range?  yes       Assessment/Plan:  Completed 21/30 fractions; 4200/6000 cGy, no other complaints Dr Neo Tucker updated    Marcelo Cifuentes RN
famotidine (PEPCID) 20 MG tablet Take 20 mg by mouth daily      amitriptyline (ELAVIL) 50 MG tablet Take 50 mg by mouth nightly       LORATADINE-D 24HR  MG per extended release tablet Take 1 tablet by mouth daily       LANTUS SOLOSTAR 100 UNIT/ML injection pen Inject 30 Units into the skin nightly       metFORMIN (GLUCOPHAGE) 500 MG tablet Take 500 mg by mouth 2 times daily       albuterol sulfate  (90 BASE) MCG/ACT inhaler Inhale 2 puffs into the lungs every 6 hours as needed for Wheezing       No current facility-administered medications for this encounter. OBJECTIVE:  Alert and fully ambulatory. Pleasant and conversant. Physical Examination:   Constitutional: A well developed, well nourished 61 y.o. female who is alert, oriented, cooperative and in no apparent distress. Chest : Right breast shows radiation changes in addition to the surgical changes. There are no signs of skin breakdown or oozing. Vitals:    11/23/21 1417   BP: 106/70   Pulse: 85   Resp: 18   Temp: 97 °F (36.1 °C)   TempSrc: Temporal   SpO2: 97%   Weight: 148 lb (67.1 kg)       Wt Readings from Last 3 Encounters:   11/23/21 148 lb (67.1 kg)   11/16/21 145 lb 4 oz (65.9 kg)   11/10/21 142 lb (64.4 kg)       ASSESSMENT/PLAN:     Patient is tolerating treatments well with expected side effects. Continue the same skin care. Current and planned dose reviewed. Goals of treatment and potential side effects were reviewed with the patient. Questions answered to apparent satisfaction. Treatments will continue as planned. Thank you for the opportunity to participate in multidisciplinary management of this remarkable and pleasant patient.       Kiara Purcell MD [facility]    Department of Radiation Oncology    [address]) [facility]: [phone] ([facility]: [phone])  [address] [place] [address]: [phone] (U: [phone])  [facility]:  [phone] ([facility]:  [phone])

## 2021-11-24 ENCOUNTER — HOSPITAL ENCOUNTER (OUTPATIENT)
Dept: RADIATION ONCOLOGY | Age: 56
Discharge: HOME | End: 2021-11-24
Attending: RADIOLOGY
Payer: MEDICAID

## 2021-11-24 PROCEDURE — 77385 HC NTSTY MODUL RAD TX DLVR SMPL: CPT | Performed by: RADIOLOGY

## 2021-11-29 ENCOUNTER — HOSPITAL ENCOUNTER (OUTPATIENT)
Dept: RADIATION ONCOLOGY | Age: 56
Discharge: HOME | End: 2021-11-29
Attending: RADIOLOGY
Payer: MEDICAID

## 2021-11-29 PROCEDURE — 77385 HC NTSTY MODUL RAD TX DLVR SMPL: CPT | Performed by: RADIOLOGY

## 2021-11-30 ENCOUNTER — HOSPITAL ENCOUNTER (OUTPATIENT)
Dept: RADIATION ONCOLOGY | Age: 56
Discharge: HOME | End: 2021-11-30
Attending: RADIOLOGY
Payer: MEDICAID

## 2021-11-30 VITALS
WEIGHT: 153.13 LBS | DIASTOLIC BLOOD PRESSURE: 74 MMHG | BODY MASS INDEX: 20.2 KG/M2 | HEART RATE: 82 BPM | SYSTOLIC BLOOD PRESSURE: 112 MMHG | TEMPERATURE: 97.3 F | RESPIRATION RATE: 18 BRPM | OXYGEN SATURATION: 99 %

## 2021-11-30 DIAGNOSIS — C50.111 MALIGNANT NEOPLASM OF CENTRAL PORTION OF RIGHT BREAST IN FEMALE, ESTROGEN RECEPTOR POSITIVE (HCC): Primary | ICD-10-CM

## 2021-11-30 DIAGNOSIS — Z17.0 MALIGNANT NEOPLASM OF CENTRAL PORTION OF RIGHT BREAST IN FEMALE, ESTROGEN RECEPTOR POSITIVE (HCC): Primary | ICD-10-CM

## 2021-11-30 PROCEDURE — 77385 HC NTSTY MODUL RAD TX DLVR SMPL: CPT | Performed by: RADIOLOGY

## 2021-11-30 NOTE — PROGRESS NOTES
DEPARTMENT OF RADIATION ONCOLOGY   ON TREATMENT VISIT       11/30/2021      NAME:  Ori Lindsay    YOB: 1965    DIAGNOSIS: right breast cancer    SUBJECTIVE:   Ori Lindsay has now received 4800 cGy in 24 fractions directed to the R breast and nodes      Past medical, surgical, social and family histories reviewed and updated as indicated. PAIN: 0/10    ALLERGIES:  Ceftriaxone         Current Outpatient Medications   Medication Sig Dispense Refill    ondansetron (ZOFRAN) 4 MG tablet take 1 tablet by mouth every 8 hours if needed for nausea and vomiting 60 tablet 1    acetaminophen-codeine (TYLENOL/CODEINE #3) 300-30 MG per tablet Take 1 tablet by mouth every 4 hours as needed for Pain.  magnesium oxide (MAG-OX) 400 MG tablet Take 1 tablet by mouth 2 times daily       FLUoxetine HCl, PMDD, 10 MG TABS Take 20 mg by mouth daily      lidocaine-prilocaine (EMLA) 2.5-2.5 % cream Apply topically to port 30 minutes prior to chemotherapy. 30 g 2    prochlorperazine (COMPAZINE) 10 MG tablet Take 1 tablet by mouth every 6 hours as needed (nausea) 60 tablet 1    cholestyramine (QUESTRAN) 4 g packet Take 1 packet by mouth 2 times daily 90 packet 3    psyllium (KONSYL) 28.3 % PACK Take 1 packet by mouth daily 30 each 3    magnesium oxide (MAG-OX) 400 (240 Mg) MG tablet Take 1 tablet by mouth 2 times daily 60 tablet 0    diclofenac sodium (VOLTAREN) 1 % GEL Apply 1 g topically 2 times daily       MELATONIN PO Take by mouth nightly      Multiple Vitamins-Minerals (HAIR SKIN AND NAILS FORMULA) TABS Take by mouth STOP PREOP MED      mirtazapine (REMERON) 15 MG tablet Take 15 mg by mouth nightly      atenolol (TENORMIN) 25 MG tablet every evening       ALPRAZolam (XANAX) 0.5 MG tablet Take 0.5 mg by mouth nightly.  pregabalin (LYRICA) 75 MG capsule Take 75 mg by mouth daily.        famotidine (PEPCID) 20 MG tablet Take 20 mg by mouth daily      amitriptyline (ELAVIL) 50 MG tablet Take 50 mg by mouth nightly       LORATADINE-D 24HR  MG per extended release tablet Take 1 tablet by mouth daily       LANTUS SOLOSTAR 100 UNIT/ML injection pen Inject 30 Units into the skin nightly       metFORMIN (GLUCOPHAGE) 500 MG tablet Take 500 mg by mouth 2 times daily       albuterol sulfate  (90 BASE) MCG/ACT inhaler Inhale 2 puffs into the lungs every 6 hours as needed for Wheezing       No current facility-administered medications for this encounter. OBJECTIVE:  Alert and fully ambulatory. Pleasant and conversant. Physical Examination:General appearance - alert, well appearing, and in no distress:  Constitutional: A well developed, well nourished 61 y.o. female who is alert, oriented, cooperative and in no apparent distress. HEENT:   Skin:  Warm and dry. No obvious rashes. Vitals:    11/30/21 1417   BP: 112/74   Pulse: 82   Resp: 18   Temp: 97.3 °F (36.3 °C)   TempSrc: Temporal   SpO2: 99%   Weight: 153 lb 2 oz (69.5 kg)       Wt Readings from Last 3 Encounters:   11/30/21 153 lb 2 oz (69.5 kg)   11/23/21 148 lb (67.1 kg)   11/16/21 145 lb 4 oz (65.9 kg)       ASSESSMENT/PLAN:     Patient is tolerating treatments well with expected toxicities. Current and planned dose reviewed. Goals of treatment and potential side effects were reviewed with the patient. Treatment imaging has been personally reviewed for accuracy and precision. Questions answered to apparent satisfaction. Treatments will continue as planned. Thank you for the opportunity to participate in multidisciplinary management of this remarkable and pleasant patient.       Albert Gandhi MD    Department of Radiation Oncology  PHYSICIANS [facility]) [facility]: [phone] ([facility]: [phone])  [address] [facility]) [facility]: [phone] ([facility]: [phone])  [address] [facility]) [facility]:  [phone] ([facility]:  [phone])

## 2021-12-01 ENCOUNTER — HOSPITAL ENCOUNTER (OUTPATIENT)
Dept: INFUSION THERAPY | Age: 56
Discharge: HOME | End: 2021-12-01
Payer: MEDICAID

## 2021-12-01 ENCOUNTER — HOSPITAL ENCOUNTER (OUTPATIENT)
Dept: RADIATION ONCOLOGY | Age: 56
Discharge: HOME | End: 2021-12-01
Attending: RADIOLOGY
Payer: MEDICAID

## 2021-12-01 DIAGNOSIS — C50.111 MALIGNANT NEOPLASM OF CENTRAL PORTION OF RIGHT BREAST IN FEMALE, ESTROGEN RECEPTOR POSITIVE (HCC): Primary | ICD-10-CM

## 2021-12-01 DIAGNOSIS — Z17.0 MALIGNANT NEOPLASM OF CENTRAL PORTION OF RIGHT BREAST IN FEMALE, ESTROGEN RECEPTOR POSITIVE (HCC): Primary | ICD-10-CM

## 2021-12-01 DIAGNOSIS — Z85.3 PERSONAL HISTORY OF BREAST CANCER: ICD-10-CM

## 2021-12-01 LAB
ALBUMIN SERPL-MCNC: 4 G/DL (ref 3.5–5.2)
ALP BLD-CCNC: 185 U/L (ref 35–104)
ALT SERPL-CCNC: 29 U/L (ref 0–32)
ANION GAP SERPL CALCULATED.3IONS-SCNC: 10 MMOL/L (ref 7–16)
AST SERPL-CCNC: 24 U/L (ref 0–31)
BASOPHILS ABSOLUTE: 0.02 E9/L (ref 0–0.2)
BASOPHILS RELATIVE PERCENT: 0.3 % (ref 0–2)
BILIRUB SERPL-MCNC: 0.3 MG/DL (ref 0–1.2)
BUN BLDV-MCNC: 8 MG/DL (ref 6–20)
CALCIUM SERPL-MCNC: 9.8 MG/DL (ref 8.6–10.2)
CHLORIDE BLD-SCNC: 102 MMOL/L (ref 98–107)
CO2: 26 MMOL/L (ref 22–29)
CREAT SERPL-MCNC: 0.6 MG/DL (ref 0.5–1)
EOSINOPHILS ABSOLUTE: 0.05 E9/L (ref 0.05–0.5)
EOSINOPHILS RELATIVE PERCENT: 0.8 % (ref 0–6)
GFR AFRICAN AMERICAN: >60
GFR NON-AFRICAN AMERICAN: >60 ML/MIN/1.73
GLUCOSE BLD-MCNC: 144 MG/DL (ref 74–99)
HCT VFR BLD CALC: 38.1 % (ref 34–48)
HEMOGLOBIN: 12.4 G/DL (ref 11.5–15.5)
IMMATURE GRANULOCYTES #: 0.02 E9/L
IMMATURE GRANULOCYTES %: 0.3 % (ref 0–5)
LYMPHOCYTES ABSOLUTE: 0.95 E9/L (ref 1.5–4)
LYMPHOCYTES RELATIVE PERCENT: 15.5 % (ref 20–42)
MAGNESIUM: 1.5 MG/DL (ref 1.6–2.6)
MCH RBC QN AUTO: 33.1 PG (ref 26–35)
MCHC RBC AUTO-ENTMCNC: 32.5 % (ref 32–34.5)
MCV RBC AUTO: 101.6 FL (ref 80–99.9)
MONOCYTES ABSOLUTE: 0.66 E9/L (ref 0.1–0.95)
MONOCYTES RELATIVE PERCENT: 10.8 % (ref 2–12)
NEUTROPHILS ABSOLUTE: 4.42 E9/L (ref 1.8–7.3)
NEUTROPHILS RELATIVE PERCENT: 72.3 % (ref 43–80)
PDW BLD-RTO: 12.6 FL (ref 11.5–15)
PLATELET # BLD: 214 E9/L (ref 130–450)
PMV BLD AUTO: 10.3 FL (ref 7–12)
POTASSIUM SERPL-SCNC: 3.9 MMOL/L (ref 3.5–5)
RBC # BLD: 3.75 E12/L (ref 3.5–5.5)
SODIUM BLD-SCNC: 138 MMOL/L (ref 132–146)
TOTAL PROTEIN: 6.4 G/DL (ref 6.4–8.3)
WBC # BLD: 6.1 E9/L (ref 4.5–11.5)

## 2021-12-01 PROCEDURE — 77427 RADIATION TX MANAGEMENT X5: CPT | Performed by: RADIOLOGY

## 2021-12-01 PROCEDURE — 36591 DRAW BLOOD OFF VENOUS DEVICE: CPT

## 2021-12-01 PROCEDURE — 2580000003 HC RX 258: Performed by: INTERNAL MEDICINE

## 2021-12-01 PROCEDURE — 77336 RADIATION PHYSICS CONSULT: CPT | Performed by: RADIOLOGY

## 2021-12-01 PROCEDURE — 85025 COMPLETE CBC W/AUTO DIFF WBC: CPT

## 2021-12-01 PROCEDURE — 77385 HC NTSTY MODUL RAD TX DLVR SMPL: CPT | Performed by: RADIOLOGY

## 2021-12-01 PROCEDURE — 6360000002 HC RX W HCPCS: Performed by: INTERNAL MEDICINE

## 2021-12-01 PROCEDURE — 83735 ASSAY OF MAGNESIUM: CPT

## 2021-12-01 PROCEDURE — 80053 COMPREHEN METABOLIC PANEL: CPT

## 2021-12-01 RX ORDER — SODIUM CHLORIDE 0.9 % (FLUSH) 0.9 %
10 SYRINGE (ML) INJECTION PRN
Status: CANCELLED | OUTPATIENT
Start: 2021-12-01

## 2021-12-01 RX ORDER — HEPARIN SODIUM (PORCINE) LOCK FLUSH IV SOLN 100 UNIT/ML 100 UNIT/ML
500 SOLUTION INTRAVENOUS PRN
Status: DISCONTINUED | OUTPATIENT
Start: 2021-12-01 | End: 2021-12-02 | Stop reason: HOSPADM

## 2021-12-01 RX ORDER — HEPARIN SODIUM (PORCINE) LOCK FLUSH IV SOLN 100 UNIT/ML 100 UNIT/ML
500 SOLUTION INTRAVENOUS PRN
Status: CANCELLED | OUTPATIENT
Start: 2021-12-01

## 2021-12-01 RX ORDER — SODIUM CHLORIDE 0.9 % (FLUSH) 0.9 %
10 SYRINGE (ML) INJECTION PRN
Status: DISCONTINUED | OUTPATIENT
Start: 2021-12-01 | End: 2021-12-02 | Stop reason: HOSPADM

## 2021-12-01 RX ADMIN — SODIUM CHLORIDE, PRESERVATIVE FREE 10 ML: 5 INJECTION INTRAVENOUS at 13:41

## 2021-12-01 RX ADMIN — HEPARIN 500 UNITS: 100 SYRINGE at 13:42

## 2021-12-02 ENCOUNTER — HOSPITAL ENCOUNTER (OUTPATIENT)
Dept: INFUSION THERAPY | Age: 56
Discharge: HOME | End: 2021-12-02
Payer: MEDICAID

## 2021-12-02 ENCOUNTER — HOSPITAL ENCOUNTER (OUTPATIENT)
Dept: RADIATION ONCOLOGY | Age: 56
Discharge: HOME | End: 2021-12-02
Attending: RADIOLOGY
Payer: MEDICAID

## 2021-12-02 ENCOUNTER — OFFICE VISIT (OUTPATIENT)
Dept: ONCOLOGY | Age: 56
End: 2021-12-02
Payer: MEDICAID

## 2021-12-02 VITALS — DIASTOLIC BLOOD PRESSURE: 61 MMHG | HEART RATE: 93 BPM | TEMPERATURE: 97.5 F | SYSTOLIC BLOOD PRESSURE: 102 MMHG

## 2021-12-02 VITALS
WEIGHT: 149.4 LBS | BODY MASS INDEX: 20.24 KG/M2 | TEMPERATURE: 97.3 F | DIASTOLIC BLOOD PRESSURE: 73 MMHG | HEART RATE: 95 BPM | SYSTOLIC BLOOD PRESSURE: 116 MMHG | OXYGEN SATURATION: 100 % | HEIGHT: 72 IN

## 2021-12-02 DIAGNOSIS — Z17.0 MALIGNANT NEOPLASM OF CENTRAL PORTION OF RIGHT BREAST IN FEMALE, ESTROGEN RECEPTOR POSITIVE (HCC): Primary | ICD-10-CM

## 2021-12-02 DIAGNOSIS — Z79.811 USE OF AROMATASE INHIBITORS: Primary | ICD-10-CM

## 2021-12-02 DIAGNOSIS — C50.111 MALIGNANT NEOPLASM OF CENTRAL PORTION OF RIGHT BREAST IN FEMALE, ESTROGEN RECEPTOR POSITIVE (HCC): Primary | ICD-10-CM

## 2021-12-02 PROCEDURE — G8484 FLU IMMUNIZE NO ADMIN: HCPCS | Performed by: INTERNAL MEDICINE

## 2021-12-02 PROCEDURE — G9899 SCRN MAM PERF RSLTS DOC: HCPCS | Performed by: INTERNAL MEDICINE

## 2021-12-02 PROCEDURE — 6360000002 HC RX W HCPCS: Performed by: INTERNAL MEDICINE

## 2021-12-02 PROCEDURE — 3017F COLORECTAL CA SCREEN DOC REV: CPT | Performed by: INTERNAL MEDICINE

## 2021-12-02 PROCEDURE — 96413 CHEMO IV INFUSION 1 HR: CPT

## 2021-12-02 PROCEDURE — 99214 OFFICE O/P EST MOD 30 MIN: CPT | Performed by: INTERNAL MEDICINE

## 2021-12-02 PROCEDURE — G8427 DOCREV CUR MEDS BY ELIG CLIN: HCPCS | Performed by: INTERNAL MEDICINE

## 2021-12-02 PROCEDURE — 77417 THER RADIOLOGY PORT IMAGE(S): CPT | Performed by: RADIOLOGY

## 2021-12-02 PROCEDURE — G8420 CALC BMI NORM PARAMETERS: HCPCS | Performed by: INTERNAL MEDICINE

## 2021-12-02 PROCEDURE — 1036F TOBACCO NON-USER: CPT | Performed by: INTERNAL MEDICINE

## 2021-12-02 PROCEDURE — 2580000003 HC RX 258: Performed by: INTERNAL MEDICINE

## 2021-12-02 PROCEDURE — 96549 UNLISTED CHEMOTHERAPY PX: CPT

## 2021-12-02 PROCEDURE — 77412 RADIATION TX DELIVERY LVL 3: CPT | Performed by: RADIOLOGY

## 2021-12-02 RX ORDER — SODIUM CHLORIDE 0.9 % (FLUSH) 0.9 %
5 SYRINGE (ML) INJECTION PRN
Status: CANCELLED | OUTPATIENT
Start: 2021-12-02

## 2021-12-02 RX ORDER — SODIUM CHLORIDE 9 MG/ML
INJECTION, SOLUTION INTRAVENOUS CONTINUOUS
Status: CANCELLED | OUTPATIENT
Start: 2021-12-02

## 2021-12-02 RX ORDER — METHYLPREDNISOLONE SODIUM SUCCINATE 125 MG/2ML
125 INJECTION, POWDER, LYOPHILIZED, FOR SOLUTION INTRAMUSCULAR; INTRAVENOUS ONCE
Status: CANCELLED | OUTPATIENT
Start: 2021-12-02 | End: 2021-12-02

## 2021-12-02 RX ORDER — MEPERIDINE HYDROCHLORIDE 25 MG/ML
12.5 INJECTION INTRAMUSCULAR; INTRAVENOUS; SUBCUTANEOUS ONCE
Status: CANCELLED | OUTPATIENT
Start: 2021-12-02 | End: 2021-12-02

## 2021-12-02 RX ORDER — SODIUM CHLORIDE 0.9 % (FLUSH) 0.9 %
10 SYRINGE (ML) INJECTION PRN
Status: CANCELLED | OUTPATIENT
Start: 2021-12-02

## 2021-12-02 RX ORDER — DIPHENHYDRAMINE HYDROCHLORIDE 50 MG/ML
50 INJECTION INTRAMUSCULAR; INTRAVENOUS ONCE
Status: CANCELLED | OUTPATIENT
Start: 2021-12-02 | End: 2021-12-02

## 2021-12-02 RX ORDER — HEPARIN SODIUM (PORCINE) LOCK FLUSH IV SOLN 100 UNIT/ML 100 UNIT/ML
500 SOLUTION INTRAVENOUS PRN
Status: CANCELLED | OUTPATIENT
Start: 2021-12-02

## 2021-12-02 RX ORDER — SODIUM CHLORIDE 9 MG/ML
20 INJECTION, SOLUTION INTRAVENOUS ONCE
Status: CANCELLED | OUTPATIENT
Start: 2021-12-02 | End: 2021-12-02

## 2021-12-02 RX ORDER — SODIUM CHLORIDE 0.9 % (FLUSH) 0.9 %
10 SYRINGE (ML) INJECTION PRN
Status: DISCONTINUED | OUTPATIENT
Start: 2021-12-02 | End: 2021-12-03 | Stop reason: HOSPADM

## 2021-12-02 RX ORDER — ANASTROZOLE 1 MG/1
1 TABLET ORAL DAILY
Qty: 30 TABLET | Refills: 0 | Status: SHIPPED
Start: 2021-12-11 | End: 2022-01-05 | Stop reason: SDUPTHER

## 2021-12-02 RX ORDER — SODIUM CHLORIDE 9 MG/ML
20 INJECTION, SOLUTION INTRAVENOUS ONCE
Status: DISCONTINUED | OUTPATIENT
Start: 2021-12-02 | End: 2021-12-03 | Stop reason: HOSPADM

## 2021-12-02 RX ORDER — EPINEPHRINE 1 MG/ML
0.3 INJECTION, SOLUTION, CONCENTRATE INTRAVENOUS PRN
Status: CANCELLED | OUTPATIENT
Start: 2021-12-02

## 2021-12-02 RX ORDER — HEPARIN SODIUM (PORCINE) LOCK FLUSH IV SOLN 100 UNIT/ML 100 UNIT/ML
500 SOLUTION INTRAVENOUS PRN
Status: DISCONTINUED | OUTPATIENT
Start: 2021-12-02 | End: 2021-12-03 | Stop reason: HOSPADM

## 2021-12-02 RX ADMIN — TRASTUZUMAB-ANNS 399 MG: 420 INJECTION, POWDER, LYOPHILIZED, FOR SOLUTION INTRAVENOUS at 12:25

## 2021-12-02 RX ADMIN — SODIUM CHLORIDE 20 ML/HR: 9 INJECTION, SOLUTION INTRAVENOUS at 11:56

## 2021-12-02 RX ADMIN — HEPARIN 500 UNITS: 100 SYRINGE at 13:10

## 2021-12-02 RX ADMIN — SODIUM CHLORIDE, PRESERVATIVE FREE 10 ML: 5 INJECTION INTRAVENOUS at 13:10

## 2021-12-02 RX ADMIN — PERTUZUMAB 420 MG: 30 INJECTION, SOLUTION, CONCENTRATE INTRAVENOUS at 12:26

## 2021-12-02 NOTE — PROGRESS NOTES
Department of [facility] Oncology  Attending Clinic Note    Reason for Visit: Follow-up on a patient with Right Breast Cancer    PCP:  Abdias Hahn DO    History of Present Illness: The mass was located in the 6 o'clock position of right breast    Breast cancer risk factors include infrequent SMEs and age and gender    Bilateral Screening Mammogram 10/06/2020: There are suspicious calcifications in the right breast at the 6 o'clock position which appear pleomorphic. These calcifications are in a segmental distribution. These clustered calcifications are suspicious for malignancy. Right breast, calcifications at the 6:00, core biopsy on 10/30/2020:    - Small focus of invasive ductal carcinoma, grade 3 (3+3+2 = 8)    - Ductal carcinoma in situ, high nuclear grade, comedo/cribriform/solid types;    - Microcalcifications in DCIS    - Breast receptor and biomarkers (per outside report without looking the   slides:     ER: Positive, 89.8%, strong intensity     CT: Positive, 52.2%, moderate intensity     HER-2: Positive (score 3+)     Ki-67: High proliferation, 26.2%     P53: Negative, 0.2%     Comment:The invasive carcinoma is intermingled with DCIS and measures   3.0 x 2.0 mm in greatest dimension on the slides. CXR PA/Lateral 12/11/2020:  No acute process. Right Axillary U/S on 12/11/2020: There is no axillary LN.     MRI Bilateral Breast 01/05/2021:  Focal, heterogeneous non mass enhancement identified in the right breast 6 o'clock which measures approximately 1.9 x 1.4 x 1.7 cm (image 19 of the axial T1 post contrast series).    No additional foci of disease identified on the right  There is no lymphadenopathy    Needle localized Right lumpectomy with SLNBx with mediport placement on 02/05/2021  A.  Right axillary sentinel lymph node #1, excision: 1 lymph node negative for malignancy     B.  Right axillary contents, regional resection: 1 lymph node positive for metastatic, poorly differentiated ductal carcinoma (grade 3)   Extranodal extension present     C.  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;   positive for extensive intraductal component   Skin-uninvolved by malignancy   Invasive carcinoma margins-uninvolved by invasive carcinoma        Distance from closest margin: 4 mm from anterior margin   DCIS margins-inferior and medial margins involved by DCIS   Regional lymph nodes-involved by tumor cells        Number of lymph nodes with macrometastasis: 1        Number of lymph nodes with micrometastasis: 0        Number of lymph nodes with isolated tumor cells: 0        Size of largest metastatic deposit: 1.5 cm        Extranodal extension: Present        Total number of lymph nodes examined: 2        Number of sentinel nodes examined: 1   Treatment effect in the breast-no known presurgical therapy   TNM classification (AJCC eighth edition)-  pT1c N1a MX     Bone scan, CT chest/abdomen/pelvis 02/26/2021 negative for metastatic disease    Re-excision lumpectomy of inferior and medial margins on 03/09/2021  A. Right breast, new inferior margin, excision: Fat necrosis, foreign body-type giant cell reaction, chronic inflammation, and fibrosis consistent with previous procedure   No evidence of residual carcinoma   Final inferior margin negative for carcinoma     B.  Right breast, new medial margin, excision: Residual high-grade ductal carcinoma in situ   Ductal carcinoma in situ present less than 1 mm from red/superior margin and green/anterior margin   Fat necrosis, foreign body-type giant cell reaction, chronic   inflammation, and fibrosis consistent with previous procedure   Fibrocystic change   Microcalcifications associated with benign breast tissue and DCIS   Comment:Residual ductal carcinoma in situ is present in 5 out of 13 tissue blocks of part B    Recommended adjuvant chemotherapy (TCHP) for 6 cycles followed by adjuvant RT followed lastly by endocrine therapy. Side effects TCHP reviewed with patient. She agreed to proceed. 2d-echo 02/17/2021 noted EF 60-65%  Cycle # 1 adjuvant TCHP was on 04/08/2021. Cycle # 2 adjuvant TCHP was on 04/29/2021. Cycle # 3 adjuvant TCHP was on 05/20/2021. Cycle # 4 adjuvant TCHP was on 06/10/2021. Cycle # 5 adjuvant TCHP was on 07/22/2021. Cycle # 6 adjuvant TCHP was on 08/12/2021. Radiation Oncology consult appreciated. 2d-echo 10/12/2021 noted EF 55 to 60%  RT was started on 11/01/2021  Herceptin/Perjeta today 12/02/2021. Today 12/02/2021. No fever, chills. Fatigue. No N/V. Review of Systems;  CONSTITUTIONAL: No fever, chills. Fair appetite. Fatigue  ENMT: Eyes: No diplopia; Nose: No epistaxis. Mouth: No sore throat. RESPIRATORY: No hemoptysis, shortness of breath, cough. CARDIOVASCULAR: No chest pain, palpitations. GASTROINTESTINAL: No nausea/vomiting, abdominal pain. GENITOURINARY: No dysuria, urinary frequency, hematuria. NEURO: No syncope, presyncope, headache.   Remainder: ROS NEGATIVE    Past Medical History:      Diagnosis Date    Anxiety     Arthritis     Cancer ([address] 2021    breast    Cervical radiculopathy     Chronic back pain     Dehydration 9/23/2021    Dehydration 9/23/2021    Depression     Diabetes mellitus type 2, uncontrolled ([address] 2/12/2017    GERD (gastroesophageal reflux disease)     History of blood transfusion 01/2018    back surgery, lumbar, dr Bebe Madsen    Hypertension     Right leg pain seeing doctor currently problems with hardware     Medications:  Reviewed and reconciled. Allergies: Allergies   Allergen Reactions    Ceftriaxone Shortness Of Breath     Physical Exam:  /73   Pulse 95   Temp 97.3 °F (36.3 °C)   Ht 6' 1\" (1.854 m)   Wt 149 lb 6.4 oz (67.8 kg)   LMP 08/13/2013   SpO2 100%   BMI 19.71 kg/m²   GENERAL: Alert, oriented x 3, not in acute distress. HEENT: PERRLA; EOMI. Oropharynx clear. NECK: Supple. Without lymphadenopathy. LUNGS: Good air entry bilaterally. No wheezing, crackles or ronchi. CARDIOVASCULAR: Regular rate. No murmurs, rubs or gallops. ABDOMEN: Soft. Non-tender, non-distended. Positive bowel sounds. EXTREMITIES: Without clubbing, cyanosis, or edema. NEUROLOGIC: No focal deficits. ECOG PS 1    Lab Results   Component Value Date    WBC 6.1 12/01/2021    HGB 12.4 12/01/2021    HCT 38.1 12/01/2021    .6 (H) 12/01/2021     12/01/2021     Lab Results   Component Value Date     12/01/2021    K 3.9 12/01/2021     12/01/2021    CO2 26 12/01/2021    BUN 8 12/01/2021    CREATININE 0.6 12/01/2021    GLUCOSE 144 (H) 12/01/2021    CALCIUM 9.8 12/01/2021    PROT 6.4 12/01/2021    LABALBU 4.0 12/01/2021    BILITOT 0.3 12/01/2021    ALKPHOS 185 (H) 12/01/2021    AST 24 12/01/2021    ALT 29 12/01/2021    LABGLOM >60 12/01/2021    GFRAA >60 12/01/2021     Impression/Plan:  60 y/o female with Right Breast Cancer    Bilateral Screening Mammogram 10/06/2020: There are suspicious calcifications in the right breast at the 6 o'clock position which appear pleomorphic. These calcifications are in a segmental distribution. These clustered calcifications are suspicious for malignancy.     Right breast, calcifications at the 6:00, core biopsy on 10/30/2020:    - Small focus of invasive ductal carcinoma, grade 3 (3+3+2 = 8)    - Ductal carcinoma in situ, high nuclear grade, comedo/cribriform/solid types;    - Microcalcifications in DCIS    - Breast receptor and biomarkers (per outside report without looking the   slides:     ER: Positive, 89.8%, strong intensity     LA: Positive, 52.2%, moderate intensity     HER-2: Positive (score 3+)     Ki-67: High proliferation, 26.2%     P53: Negative, 0.2%     Comment:The invasive carcinoma is intermingled with DCIS and measures   3.0 x 2.0 mm in greatest dimension on the slides. CXR PA/Lateral 12/11/2020:  No acute process. Right Axillary U/S on 12/11/2020: There is no axillary LN.     MRI Bilateral Breast 01/05/2021:  Focal, heterogeneous non mass enhancement identified in the right breast 6 o'clock which measures approximately 1.9 x 1.4 x 1.7 cm (image 19 of the axial T1 post contrast series).    No additional foci of disease identified on the right  There is no lymphadenopathy    Needle localized Right lumpectomy with SLNBx with mediport placement on 02/05/2021  A.  Right axillary sentinel lymph node #1, excision: 1 lymph node negative for malignancy     B.  Right axillary contents, regional resection: 1 lymph node positive for metastatic, poorly differentiated ductal carcinoma (grade 3)   Extranodal extension present     C.  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;   positive for extensive intraductal component   Skin-uninvolved by malignancy   Invasive carcinoma margins-uninvolved by invasive carcinoma        Distance from closest margin: 4 mm from anterior margin   DCIS margins-inferior and medial margins involved by DCIS   Regional lymph nodes-involved by tumor cells        Number of lymph nodes with macrometastasis: 1        Number of lymph nodes with micrometastasis: 0        Number of lymph nodes with isolated tumor cells: 0        Size of largest metastatic deposit: 1.5 cm        Extranodal extension: Present        Total number of lymph nodes examined: 2        Number of sentinel nodes examined: 1   Treatment effect in the breast-no known presurgical therapy   TNM classification (AJCC eighth edition)-  pT1c N1a MX     Bone scan, CT chest/abdomen/pelvis 02/26/2021 negative for metastatic disease    Re-excision lumpectomy of inferior and medial margins on 03/09/2021  A. Right breast, new inferior margin, excision: Fat necrosis, foreign body-type giant cell reaction, chronic inflammation, and fibrosis consistent with previous procedure   No evidence of residual carcinoma   Final inferior margin negative for carcinoma     B. Right breast, new medial margin, excision: Residual high-grade ductal carcinoma in situ   Ductal carcinoma in situ present less than 1 mm from red/superior margin and green/anterior margin   Fat necrosis, foreign body-type giant cell reaction, chronic   inflammation, and fibrosis consistent with previous procedure   Fibrocystic change   Microcalcifications associated with benign breast tissue and DCIS   Comment:Residual ductal carcinoma in situ is present in 5 out of 13 tissue blocks of part B    Recommended adjuvant chemotherapy (TCHP) for 6 cycles followed by adjuvant RT followed lastly by endocrine therapy. Side effects TCHP reviewed with patient. She agreed to proceed. 2d-echo 02/17/2021 noted EF 60-65%  Cycle # 1 adjuvant TCHP was on 04/08/2021. Cycle # 2 adjuvant TCHP was on 04/29/2021. Cycle # 3 adjuvant TCHP was on 05/20/2021. Cycle # 4 adjuvant TCHP was on 06/10/2021.     2D-ECHO 06/29/2021 noted EF 60-65%  Cycle # 5 adjuvant TCHP was on 07/22/2021. Cycle # 6 adjuvant TCHP was on 08/12/2021. 2d-echo 10/12/2021 noted EF 55 to 60%  RT was started on 11/01/2021. Herceptin/Perjeta today 12/02/2021. Labs reviewed ok to proceed. RT will be completed on 12/10/2021. Arimidex 1 mg po daily will be started on 12/11/2021. Side effects reviewed she agreed to proceed. Ca,VitD while on Arimidex. DEXA scan ordered. RTC 3 weeks for Herceptin/Perjeta.      Aleksandr Walker MD   75/[id number]/7074  Board Certified Medical Oncologist

## 2021-12-03 ENCOUNTER — HOSPITAL ENCOUNTER (OUTPATIENT)
Dept: RADIATION ONCOLOGY | Age: 56
Discharge: HOME | End: 2021-12-03
Attending: RADIOLOGY
Payer: MEDICAID

## 2021-12-03 PROCEDURE — 77412 RADIATION TX DELIVERY LVL 3: CPT | Performed by: RADIOLOGY

## 2021-12-06 ENCOUNTER — HOSPITAL ENCOUNTER (OUTPATIENT)
Dept: RADIATION ONCOLOGY | Age: 56
Discharge: HOME | End: 2021-12-06
Attending: RADIOLOGY
Payer: MEDICAID

## 2021-12-06 VITALS
DIASTOLIC BLOOD PRESSURE: 78 MMHG | BODY MASS INDEX: 19.59 KG/M2 | TEMPERATURE: 97.3 F | SYSTOLIC BLOOD PRESSURE: 104 MMHG | OXYGEN SATURATION: 96 % | WEIGHT: 148.5 LBS | HEART RATE: 83 BPM

## 2021-12-06 DIAGNOSIS — C50.111 MALIGNANT NEOPLASM OF CENTRAL PORTION OF RIGHT BREAST IN FEMALE, ESTROGEN RECEPTOR POSITIVE (HCC): Primary | ICD-10-CM

## 2021-12-06 DIAGNOSIS — Z17.0 MALIGNANT NEOPLASM OF CENTRAL PORTION OF RIGHT BREAST IN FEMALE, ESTROGEN RECEPTOR POSITIVE (HCC): Primary | ICD-10-CM

## 2021-12-06 PROCEDURE — 77412 RADIATION TX DELIVERY LVL 3: CPT | Performed by: RADIOLOGY

## 2021-12-06 ASSESSMENT — PAIN SCALES - GENERAL: PAINLEVEL_OUTOF10: 6

## 2021-12-06 ASSESSMENT — PAIN DESCRIPTION - ORIENTATION: ORIENTATION: LEFT

## 2021-12-06 ASSESSMENT — PAIN DESCRIPTION - LOCATION: LOCATION: GROIN;OTHER (COMMENT)

## 2021-12-06 ASSESSMENT — PAIN DESCRIPTION - FREQUENCY: FREQUENCY: INTERMITTENT

## 2021-12-06 ASSESSMENT — PAIN DESCRIPTION - DESCRIPTORS: DESCRIPTORS: ACHING

## 2021-12-06 NOTE — PROGRESS NOTES
DEPARTMENT OF RADIATION ONCOLOGY   ON TREATMENT VISIT       12/6/2021      NAME:  Alex Islas    YOB: 1965    DIAGNOSIS: Locally advanced right breast cancer with billie metastasis    SUBJECTIVE:   Alex Islas has now received 5000 cGy in 25 fractions directed to the breast and regional lymph nodes, 26 Jesus to the right lumpectomy site. Past medical, surgical, social and family histories reviewed and updated as indicated. PAIN: 0/10    ALLERGIES:  Ceftriaxone         Current Outpatient Medications   Medication Sig Dispense Refill    [START ON 12/11/2021] anastrozole (ARIMIDEX) 1 MG tablet Take 1 tablet by mouth daily 30 tablet 0    ondansetron (ZOFRAN) 4 MG tablet take 1 tablet by mouth every 8 hours if needed for nausea and vomiting 60 tablet 1    acetaminophen-codeine (TYLENOL/CODEINE #3) 300-30 MG per tablet Take 1 tablet by mouth every 4 hours as needed for Pain.  magnesium oxide (MAG-OX) 400 MG tablet Take 1 tablet by mouth 2 times daily       FLUoxetine HCl, PMDD, 10 MG TABS Take 20 mg by mouth daily      lidocaine-prilocaine (EMLA) 2.5-2.5 % cream Apply topically to port 30 minutes prior to chemotherapy.  30 g 2    prochlorperazine (COMPAZINE) 10 MG tablet Take 1 tablet by mouth every 6 hours as needed (nausea) 60 tablet 1    cholestyramine (QUESTRAN) 4 g packet Take 1 packet by mouth 2 times daily 90 packet 3    psyllium (KONSYL) 28.3 % PACK Take 1 packet by mouth daily 30 each 3    magnesium oxide (MAG-OX) 400 (240 Mg) MG tablet Take 1 tablet by mouth 2 times daily 60 tablet 0    diclofenac sodium (VOLTAREN) 1 % GEL Apply 1 g topically 2 times daily       MELATONIN PO Take by mouth nightly      Multiple Vitamins-Minerals (HAIR SKIN AND NAILS FORMULA) TABS Take by mouth STOP PREOP MED      mirtazapine (REMERON) 15 MG tablet Take 15 mg by mouth nightly      atenolol (TENORMIN) 25 MG tablet every evening       ALPRAZolam (XANAX) 0.5 MG tablet Take 0.5 mg by mouth nightly.  pregabalin (LYRICA) 75 MG capsule Take 75 mg by mouth daily.  famotidine (PEPCID) 20 MG tablet Take 20 mg by mouth daily      amitriptyline (ELAVIL) 50 MG tablet Take 50 mg by mouth nightly       LORATADINE-D 24HR  MG per extended release tablet Take 1 tablet by mouth daily       LANTUS SOLOSTAR 100 UNIT/ML injection pen Inject 30 Units into the skin nightly       metFORMIN (GLUCOPHAGE) 500 MG tablet Take 500 mg by mouth 2 times daily       albuterol sulfate  (90 BASE) MCG/ACT inhaler Inhale 2 puffs into the lungs every 6 hours as needed for Wheezing       No current facility-administered medications for this encounter. OBJECTIVE:  Alert and fully ambulatory. Pleasant and conversant. Physical Examination:General appearance - alert, well appearing, and in no distress:  Constitutional: A well developed, well nourished 61 y.o. female who is alert, oriented, cooperative and in no apparent distress. HEENT:   Skin:  Warm and dry. No obvious rashes. Vitals:    12/06/21 1347   BP: 104/78   Pulse: 83   Temp: 97.3 °F (36.3 °C)   TempSrc: Temporal   SpO2: 96%   Weight: 148 lb 8 oz (67.4 kg)       Wt Readings from Last 3 Encounters:   12/06/21 148 lb 8 oz (67.4 kg)   12/02/21 149 lb 6.4 oz (67.8 kg)   11/30/21 153 lb 2 oz (69.5 kg)       ASSESSMENT/PLAN:     Patient is tolerating treatments well with expected toxicities. She claims that she is still losing weight. She was started on Arimidex recently. She has only two more fractions to go. Current and planned dose reviewed. Goals of treatment and potential side effects were reviewed with the patient. Treatment imaging has been personally reviewed for accuracy and precision. Questions answered to apparent satisfaction. Treatments will continue as planned. Thank you for the opportunity to participate in multidisciplinary management of this remarkable and pleasant patient.       Anny Streeter MD    Department of Radiation Oncology  PHYSICIANS [facility]) [facility]: [phone] ([facility]: [phone])  [facility]: [phone] ([facility]: [phone])  [facility]:  [phone] ([facility]:  [phone])

## 2021-12-06 NOTE — PROGRESS NOTES
Aleksandr Landis  12/6/2021  10:37 AM          Current Outpatient Medications   Medication Sig Dispense Refill    [START ON 12/11/2021] anastrozole (ARIMIDEX) 1 MG tablet Take 1 tablet by mouth daily 30 tablet 0    ondansetron (ZOFRAN) 4 MG tablet take 1 tablet by mouth every 8 hours if needed for nausea and vomiting 60 tablet 1    acetaminophen-codeine (TYLENOL/CODEINE #3) 300-30 MG per tablet Take 1 tablet by mouth every 4 hours as needed for Pain.  magnesium oxide (MAG-OX) 400 MG tablet Take 1 tablet by mouth 2 times daily       FLUoxetine HCl, PMDD, 10 MG TABS Take 20 mg by mouth daily      lidocaine-prilocaine (EMLA) 2.5-2.5 % cream Apply topically to port 30 minutes prior to chemotherapy. 30 g 2    prochlorperazine (COMPAZINE) 10 MG tablet Take 1 tablet by mouth every 6 hours as needed (nausea) 60 tablet 1    cholestyramine (QUESTRAN) 4 g packet Take 1 packet by mouth 2 times daily 90 packet 3    psyllium (KONSYL) 28.3 % PACK Take 1 packet by mouth daily 30 each 3    magnesium oxide (MAG-OX) 400 (240 Mg) MG tablet Take 1 tablet by mouth 2 times daily 60 tablet 0    diclofenac sodium (VOLTAREN) 1 % GEL Apply 1 g topically 2 times daily       MELATONIN PO Take by mouth nightly      Multiple Vitamins-Minerals (HAIR SKIN AND NAILS FORMULA) TABS Take by mouth STOP PREOP MED      mirtazapine (REMERON) 15 MG tablet Take 15 mg by mouth nightly      atenolol (TENORMIN) 25 MG tablet every evening       ALPRAZolam (XANAX) 0.5 MG tablet Take 0.5 mg by mouth nightly.  pregabalin (LYRICA) 75 MG capsule Take 75 mg by mouth daily.        famotidine (PEPCID) 20 MG tablet Take 20 mg by mouth daily      amitriptyline (ELAVIL) 50 MG tablet Take 50 mg by mouth nightly       LORATADINE-D 24HR  MG per extended release tablet Take 1 tablet by mouth daily       LANTUS SOLOSTAR 100 UNIT/ML injection pen Inject 30 Units into the skin nightly       metFORMIN (GLUCOPHAGE) 500 MG tablet Take 500 mg by mouth 2 times daily       albuterol sulfate  (90 BASE) MCG/ACT inhaler Inhale 2 puffs into the lungs every 6 hours as needed for Wheezing       No current facility-administered medications for this encounter. This is an up-to-date medication list.    Please take this list to your next care provider, and discard any previous medication lists. 2 weeks

## 2021-12-06 NOTE — PROGRESS NOTES
Aleksandr Landis  12/6/2021  Wt Readings from Last 3 Encounters:   12/02/21 149 lb 6.4 oz (67.8 kg)   11/30/21 153 lb 2 oz (69.5 kg)   11/23/21 148 lb (67.1 kg)     There is no height or weight on file to calculate BMI. Treatment Area:Right breast    Patient was seen today for weekly visit. Comfort Alteration  KPS:90%  Fatigue: Severe    Nutritional Alteration  Anorexia: No   Nausea: No   Vomiting: No     Skin Alteration   Sensation:burning sensation to right axilla area    Radiation Dermatitis:  Pink, reminded to moisturize 2-3 times daily, she verbalizes understanding, she has a big jar of aquaphor    Mucous Membrane Alteration  Drainage: No  Lymphedema: No    Emotional  Coping: effective    Sexuality Alteration  na    Injury, potential bleeding or infection: potential        Lab Results   Component Value Date    WBC 6.1 12/01/2021     12/01/2021         Pulse 83   Temp 97.3 °F (36.3 °C) (Temporal)   LMP 08/13/2013   SpO2 96%   BP within normal range? yes       Assessment/Plan:   Completed 28/30 fractions; 5600/6000 cGy. She states she started her Anastrozole 12/02/21 per her Dr's instructions. Dr Salgado Newer updated.     Toshia Duckworth RN

## 2021-12-07 ENCOUNTER — HOSPITAL ENCOUNTER (OUTPATIENT)
Dept: RADIATION ONCOLOGY | Age: 56
Discharge: HOME | End: 2021-12-07
Attending: RADIOLOGY
Payer: MEDICAID

## 2021-12-07 PROCEDURE — 77412 RADIATION TX DELIVERY LVL 3: CPT | Performed by: RADIOLOGY

## 2021-12-08 ENCOUNTER — HOSPITAL ENCOUNTER (OUTPATIENT)
Dept: RADIATION ONCOLOGY | Age: 56
Discharge: HOME | End: 2021-12-08
Attending: RADIOLOGY
Payer: MEDICAID

## 2021-12-08 ENCOUNTER — TELEPHONE (OUTPATIENT)
Dept: CASE MANAGEMENT | Age: 56
End: 2021-12-08

## 2021-12-08 PROCEDURE — 77427 RADIATION TX MANAGEMENT X5: CPT | Performed by: RADIOLOGY

## 2021-12-08 PROCEDURE — 77336 RADIATION PHYSICS CONSULT: CPT | Performed by: RADIOLOGY

## 2021-12-08 PROCEDURE — 77412 RADIATION TX DELIVERY LVL 3: CPT | Performed by: RADIOLOGY

## 2021-12-08 NOTE — TELEPHONE ENCOUNTER
Met with patient prior to her last breast radiation treatment to provide and explain literature of Nutrition Following Cancer Treatment. Patient states that she started the Arimidex on 12/02/21 and is tolerating that fine thus far. She is scheduled at [facility] on 12/10/21 for her dexa scan. Her next follow up appointment with Dr. Hernan Ho (with labs and treatment) at [facility] on 01/07/22 at 1:15 pm. Patient was encouraged to call me in the interim with any questions or concerns. Understanding was verbalized of all.

## 2021-12-09 ENCOUNTER — APPOINTMENT (OUTPATIENT)
Dept: RADIATION ONCOLOGY | Age: 56
End: 2021-12-09
Attending: RADIOLOGY
Payer: MEDICAID

## 2021-12-10 ENCOUNTER — HOSPITAL ENCOUNTER (OUTPATIENT)
Dept: MAMMOGRAPHY | Age: 56
Discharge: HOME | End: 2021-12-12
Payer: MEDICAID

## 2021-12-10 DIAGNOSIS — Z79.811 USE OF AROMATASE INHIBITORS: ICD-10-CM

## 2021-12-10 PROCEDURE — 77080 DXA BONE DENSITY AXIAL: CPT

## 2022-01-03 ENCOUNTER — HOSPITAL ENCOUNTER (OUTPATIENT)
Dept: RADIATION ONCOLOGY | Age: 57
Discharge: HOME | End: 2022-01-03
Attending: RADIOLOGY

## 2022-01-03 VITALS
RESPIRATION RATE: 18 BRPM | DIASTOLIC BLOOD PRESSURE: 72 MMHG | SYSTOLIC BLOOD PRESSURE: 110 MMHG | WEIGHT: 152 LBS | TEMPERATURE: 97.3 F | HEART RATE: 69 BPM | BODY MASS INDEX: 20.05 KG/M2 | OXYGEN SATURATION: 97 %

## 2022-01-03 DIAGNOSIS — Z17.0 MALIGNANT NEOPLASM OF CENTRAL PORTION OF RIGHT BREAST IN FEMALE, ESTROGEN RECEPTOR POSITIVE (HCC): Primary | ICD-10-CM

## 2022-01-03 DIAGNOSIS — C50.111 MALIGNANT NEOPLASM OF CENTRAL PORTION OF RIGHT BREAST IN FEMALE, ESTROGEN RECEPTOR POSITIVE (HCC): Primary | ICD-10-CM

## 2022-01-03 ASSESSMENT — PAIN DESCRIPTION - ORIENTATION: ORIENTATION: RIGHT

## 2022-01-03 ASSESSMENT — PAIN DESCRIPTION - FREQUENCY: FREQUENCY: CONTINUOUS

## 2022-01-03 ASSESSMENT — PAIN DESCRIPTION - LOCATION: LOCATION: BREAST

## 2022-01-03 ASSESSMENT — PAIN DESCRIPTION - DESCRIPTORS: DESCRIPTORS: ACHING

## 2022-01-03 ASSESSMENT — PAIN SCALES - GENERAL: PAINLEVEL_OUTOF10: 7

## 2022-01-03 NOTE — PROGRESS NOTES
DEPARTMENT OF RADIATION ONCOLOGY   Follow up visit        1/3/2022    NAME:  Italo Mayo    :  1965 61 y.o. female     PCP: Gil Angelo DO    DIAGNOSIS:  Locally advanced right breast cancer SP partial mastectomy and adjuvant radiation therapy    STAGING: Cancer Staging  Malignant neoplasm of central portion of right breast in female, estrogen receptor positive ([facility] [address]  Staging form: Breast, AJCC 8th Edition  - Clinical stage from 2021: Stage IA (cT1c, cN0, cM0, G3, ER+, SD+, HER2+) - Signed by Og De Luna MD on 2021  - Pathologic stage from 2021: Stage IA (pT1c, pN1(sn), cM0, G3, ER+, SD+, HER2+) - Signed by Og De Luna MD on 2021      RECENT HISTORY: Italo Mayo is now 1 months out from completion of RT to the right breast and ipsilateral local regional lymph nodes. The patient is here for a skin check. The skin looks completely healed, with some hyperpigmentation. The partial mastectomy scar at the level of the inframammary sulcus is retracted and moderately tender to palpation. Past medical, surgical, social and family histories reviewed and updated as indicated. ALLERGIES:  Ceftriaxone       MEDICATIONS:   Current Outpatient Medications:     anastrozole (ARIMIDEX) 1 MG tablet, Take 1 tablet by mouth daily, Disp: 30 tablet, Rfl: 0    ondansetron (ZOFRAN) 4 MG tablet, take 1 tablet by mouth every 8 hours if needed for nausea and vomiting, Disp: 60 tablet, Rfl: 1    acetaminophen-codeine (TYLENOL/CODEINE #3) 300-30 MG per tablet, Take 1 tablet by mouth every 4 hours as needed for Pain., Disp: , Rfl:     magnesium oxide (MAG-OX) 400 MG tablet, Take 1 tablet by mouth 2 times daily , Disp: , Rfl:     FLUoxetine HCl, PMDD, 10 MG TABS, Take 20 mg by mouth daily, Disp: , Rfl:     lidocaine-prilocaine (EMLA) 2.5-2.5 % cream, Apply topically to port 30 minutes prior to chemotherapy. , Disp: 30 g, Rfl: 2    prochlorperazine (COMPAZINE) 10 MG tablet, Take 1 tablet by mouth every 6 hours as needed (nausea), Disp: 60 tablet, Rfl: 1    cholestyramine (QUESTRAN) 4 g packet, Take 1 packet by mouth 2 times daily, Disp: 90 packet, Rfl: 3    psyllium (KONSYL) 28.3 % PACK, Take 1 packet by mouth daily, Disp: 30 each, Rfl: 3    magnesium oxide (MAG-OX) 400 (240 Mg) MG tablet, Take 1 tablet by mouth 2 times daily, Disp: 60 tablet, Rfl: 0    diclofenac sodium (VOLTAREN) 1 % GEL, Apply 1 g topically 2 times daily , Disp: , Rfl:     MELATONIN PO, Take by mouth nightly, Disp: , Rfl:     Multiple Vitamins-Minerals (HAIR SKIN AND NAILS FORMULA) TABS, Take by mouth STOP PREOP MED, Disp: , Rfl:     mirtazapine (REMERON) 15 MG tablet, Take 15 mg by mouth nightly, Disp: , Rfl:     atenolol (TENORMIN) 25 MG tablet, every evening , Disp: , Rfl:     ALPRAZolam (XANAX) 0.5 MG tablet, Take 0.5 mg by mouth nightly., Disp: , Rfl:     pregabalin (LYRICA) 75 MG capsule, Take 75 mg by mouth daily. , Disp: , Rfl:     famotidine (PEPCID) 20 MG tablet, Take 20 mg by mouth daily, Disp: , Rfl:     amitriptyline (ELAVIL) 50 MG tablet, Take 50 mg by mouth nightly , Disp: , Rfl:     LORATADINE-D 24HR  MG per extended release tablet, Take 1 tablet by mouth daily , Disp: , Rfl:     LANTUS SOLOSTAR 100 UNIT/ML injection pen, Inject 30 Units into the skin nightly , Disp: , Rfl:     metFORMIN (GLUCOPHAGE) 500 MG tablet, Take 500 mg by mouth 2 times daily , Disp: , Rfl:     albuterol sulfate  (90 BASE) MCG/ACT inhaler, Inhale 2 puffs into the lungs every 6 hours as needed for Wheezing, Disp: , Rfl:     REVIEW OF SYSTEMS: Obtained from the patient, chart review and nursing assessment. 10-point ROS reviewed and negative except as per above.     PHYSICAL EXAMINATION:    Vitals:    01/03/22 1349   BP: 110/72   Pulse: 69   Resp: 18   Temp: 97.3 °F (36.3 °C)   TempSrc: Temporal   SpO2: 97%   Weight: 152 lb (68.9 kg)       Wt Readings from Last 3 Encounters:   01/03/22 152 lb (68.9 kg) 12/06/21 148 lb 8 oz (67.4 kg)   12/02/21 149 lb 6.4 oz (67.8 kg)       General: Well developed, no acute distress. HEENT: Normocephalic and atraumatic. Moist mucosae. Neck: No thyromegaly. Trachea at midline. No lymphadenopathy. Cardiorespiratory: Unlabored breathing. No edema. Abdomen: Nontender and nondistended. No hepatosplenomegaly appreciated. Back: No tenderness to palpation. Neuro: CNs grossly intact. Spontaneous movement with all limbs. Psych: Normal affect. Alert & oriented x3. Skin: No abnormal lesions throughout. ASSESSMENT/PLAN:      The patient with a locally advanced breast cancer SP breast conserving surgery and adjuvant radiation therapy is doing well at the level of the skin with only moderate tenderness to palpation at the level of the partial mastectomy scar. I have recommended the use of cocoa butter to massage the area. The patient has an appointment with Dr. Lisa Rao for further systemic treatment. She will come back here in 6 months for routine follow-up.     Lori Ospina MD    Department of Radiation Oncology  [facility]) [facility]: [phone] ([place]UL: [phone])  [address] [place] [address]: [phone] ([place]: [phone])  [facility]:  [phone] ([place]:  [phone])

## 2022-01-03 NOTE — PROGRESS NOTES
Sandie Espitia  1/3/2022  1:54 PM      Vitals:    01/03/22 1349   BP: 110/72   Pulse: 69   Resp: 18   Temp: 97.3 °F (36.3 °C)   SpO2: 97%    : Wt Readings from Last 3 Encounters:   01/03/22 152 lb (68.9 kg)   12/06/21 148 lb 8 oz (67.4 kg)   12/02/21 149 lb 6.4 oz (67.8 kg)                Current Outpatient Medications:     anastrozole (ARIMIDEX) 1 MG tablet, Take 1 tablet by mouth daily, Disp: 30 tablet, Rfl: 0    ondansetron (ZOFRAN) 4 MG tablet, take 1 tablet by mouth every 8 hours if needed for nausea and vomiting, Disp: 60 tablet, Rfl: 1    acetaminophen-codeine (TYLENOL/CODEINE #3) 300-30 MG per tablet, Take 1 tablet by mouth every 4 hours as needed for Pain., Disp: , Rfl:     magnesium oxide (MAG-OX) 400 MG tablet, Take 1 tablet by mouth 2 times daily , Disp: , Rfl:     FLUoxetine HCl, PMDD, 10 MG TABS, Take 20 mg by mouth daily, Disp: , Rfl:     lidocaine-prilocaine (EMLA) 2.5-2.5 % cream, Apply topically to port 30 minutes prior to chemotherapy. , Disp: 30 g, Rfl: 2    prochlorperazine (COMPAZINE) 10 MG tablet, Take 1 tablet by mouth every 6 hours as needed (nausea), Disp: 60 tablet, Rfl: 1    cholestyramine (QUESTRAN) 4 g packet, Take 1 packet by mouth 2 times daily, Disp: 90 packet, Rfl: 3    psyllium (KONSYL) 28.3 % PACK, Take 1 packet by mouth daily, Disp: 30 each, Rfl: 3    magnesium oxide (MAG-OX) 400 (240 Mg) MG tablet, Take 1 tablet by mouth 2 times daily, Disp: 60 tablet, Rfl: 0    diclofenac sodium (VOLTAREN) 1 % GEL, Apply 1 g topically 2 times daily , Disp: , Rfl:     MELATONIN PO, Take by mouth nightly, Disp: , Rfl:     Multiple Vitamins-Minerals (HAIR SKIN AND NAILS FORMULA) TABS, Take by mouth STOP PREOP MED, Disp: , Rfl:     mirtazapine (REMERON) 15 MG tablet, Take 15 mg by mouth nightly, Disp: , Rfl:     atenolol (TENORMIN) 25 MG tablet, every evening , Disp: , Rfl:     ALPRAZolam (XANAX) 0.5 MG tablet, Take 0.5 mg by mouth nightly., Disp: , Rfl:     pregabalin (LYRICA) 75 MG capsule, Take 75 mg by mouth daily. , Disp: , Rfl:     famotidine (PEPCID) 20 MG tablet, Take 20 mg by mouth daily, Disp: , Rfl:     amitriptyline (ELAVIL) 50 MG tablet, Take 50 mg by mouth nightly , Disp: , Rfl:     LORATADINE-D 24HR  MG per extended release tablet, Take 1 tablet by mouth daily , Disp: , Rfl:     LANTUS SOLOSTAR 100 UNIT/ML injection pen, Inject 30 Units into the skin nightly , Disp: , Rfl:     metFORMIN (GLUCOPHAGE) 500 MG tablet, Take 500 mg by mouth 2 times daily , Disp: , Rfl:     albuterol sulfate  (90 BASE) MCG/ACT inhaler, Inhale 2 puffs into the lungs every 6 hours as needed for Wheezing, Disp: , Rfl:       Patient is seen today in follow up for left breast    Orvis Rang is here today for a follow up, she was suppose to see Froylan Mathis, [address] 01/13/22, she had her date mixed up so Dr Linda Shaffer is seeing her today for follow up after completed radiation to right breast 30 fractions,; 6000 cGy completed 1/08/21. She is alert and oriented x 4, ambulatory without any assistance. Her skin to RT site healing well, discoloration noted, no redness or any open skin, Dr. Linda Shaffer updated. FALLS RISK SCREENING ASSESSMENT    Instructions:  Assess the patient and enter the appropriate indicators that are present for fall risk identification. Total the numbers entered and assign a fall risk score from Table 2.  Reassess patient at a minimum every 12 weeks or with status change. Assessment   Date  1/3/2022     1. Mental Ability: confusion/cognitively impaired No - 0       2. Elimination Issues: incontinence, frequency No - 0       3. Ambulatory: use of assistive devices (walker, cane, off-loading devices), attached to equipment (IV pole, oxygen) No - 0     4. Sensory Limitations: dizziness, vertigo, impaired vision No - 0       5. Age Less than 65 years - 0       6. Medication: diuretics, strong analgesics, hypnotics, sedatives, antihypertensive agents   Yes - 3   7. Falls:  recent history of falls within the last 3 months (not to include slipping or tripping)   No - 0   TOTAL 3    If score of 4 or greater was education given? No       TABLE 2   Risk Score Risk Level Plan of Care   0-3 Little or  No Risk 1. Provide assistance as indicated for ambulation activities  2. Reorient confused/cognitively impaired patient  3. Call-light/bell within patient's reach  4. Chair/bed in low position, stretcher/bed with siderails up except when performing patient care activities  5. Educate patient/family/caregiver on falls prevention  6.  Reassess in 12 weeks or with any noted change in patient condition which places them at a risk for a fall   4-6 Moderate Risk 1. Provide assistance as indicated for ambulation activities  2. Reorient confused/cognitively impaired patient  3. Call-light/bell within patient's reach  4. Chair/bed in low position, stretcher/bed with siderails up except when performing patient care activities  5. Educate patient/family/caregiver on falls prevention  6. Falls risk precaution (Yellow sticker Level II) placed on patient chart   7 or   Higher High Risk 1. Place patient in easily observable treatment room  2. Patient attended at all times by family member or staff  3. Provide assistance as indicated for ambulation activities  4. Reorient confused/cognitively impaired patient  5. Call-light/bell within patient's reach  6. Chair/bed in low position, stretcher/bed with siderails up except when performing patient care activities  7. Educate patient/family/caregiver on falls prevention  8. Falls risk precaution (Yellow sticker Level III) placed on patient chart           MALNUTRITION RISK SCREENING ASSESSMENT    1/3/2022   Patient:  Italo Maria Esther  Sex:  female    Instructions:  Assess the patient and enter the appropriate indicators that are present for nutrition risk identification. Total the numbers entered and assign a risk score.  Follow the appropriate action for total score listed below. Assessment   Date  1/3/2022     1. Have you lost weight without trying? 0- No     2. Have you been eating poorly because of a decreased appetite? 0- No   3. Do you have a diagnosis of head and neck cancer?       0- No                                                                                    TOTAL 0          Score of 0-1: No action  Score 2 or greater:  · For Non-Diabetic Patient: Recommend adding Ensure Complete 2 x daily and provide patient with Ensure wellness bag with coupons  · For Diabetic Patient: Recommend adding Glucerna Shake 2 x daily and provide patient with Glucerna Wellness bag with coupons  · Route to the dietitian via Mihai Silverman RN

## 2022-01-05 RX ORDER — ANASTROZOLE 1 MG/1
1 TABLET ORAL DAILY
Qty: 30 TABLET | Refills: 0 | Status: ON HOLD | OUTPATIENT
Start: 2022-01-05 | End: 2022-02-24 | Stop reason: HOSPADM

## 2022-01-07 ENCOUNTER — HOSPITAL ENCOUNTER (OUTPATIENT)
Age: 57
Discharge: HOME | End: 2022-01-07
Payer: MEDICAID

## 2022-01-07 ENCOUNTER — HOSPITAL ENCOUNTER (OUTPATIENT)
Dept: INFUSION THERAPY | Age: 57
Discharge: HOME | End: 2022-01-07
Payer: MEDICAID

## 2022-01-07 ENCOUNTER — OFFICE VISIT (OUTPATIENT)
Dept: ONCOLOGY | Age: 57
End: 2022-01-07
Payer: MEDICAID

## 2022-01-07 VITALS
OXYGEN SATURATION: 100 % | SYSTOLIC BLOOD PRESSURE: 122 MMHG | DIASTOLIC BLOOD PRESSURE: 58 MMHG | HEART RATE: 80 BPM | RESPIRATION RATE: 18 BRPM | TEMPERATURE: 97.7 F

## 2022-01-07 VITALS
RESPIRATION RATE: 18 BRPM | WEIGHT: 148.2 LBS | HEIGHT: 72 IN | BODY MASS INDEX: 20.07 KG/M2 | HEART RATE: 82 BPM | TEMPERATURE: 98.3 F | OXYGEN SATURATION: 100 % | DIASTOLIC BLOOD PRESSURE: 52 MMHG | SYSTOLIC BLOOD PRESSURE: 112 MMHG

## 2022-01-07 DIAGNOSIS — Z17.0 MALIGNANT NEOPLASM OF CENTRAL PORTION OF RIGHT BREAST IN FEMALE, ESTROGEN RECEPTOR POSITIVE (HCC): ICD-10-CM

## 2022-01-07 DIAGNOSIS — Z85.3 PERSONAL HISTORY OF BREAST CANCER: Primary | ICD-10-CM

## 2022-01-07 DIAGNOSIS — Z17.0 MALIGNANT NEOPLASM OF CENTRAL PORTION OF RIGHT BREAST IN FEMALE, ESTROGEN RECEPTOR POSITIVE (HCC): Primary | ICD-10-CM

## 2022-01-07 DIAGNOSIS — C50.111 MALIGNANT NEOPLASM OF CENTRAL PORTION OF RIGHT BREAST IN FEMALE, ESTROGEN RECEPTOR POSITIVE (HCC): Primary | ICD-10-CM

## 2022-01-07 DIAGNOSIS — C50.111 MALIGNANT NEOPLASM OF CENTRAL PORTION OF RIGHT BREAST IN FEMALE, ESTROGEN RECEPTOR POSITIVE (HCC): ICD-10-CM

## 2022-01-07 LAB
ALBUMIN SERPL-MCNC: 4 G/DL (ref 3.5–5.2)
ALP BLD-CCNC: 177 U/L (ref 35–104)
ALT SERPL-CCNC: 31 U/L (ref 0–32)
ANION GAP SERPL CALCULATED.3IONS-SCNC: 8 MMOL/L (ref 7–16)
AST SERPL-CCNC: 30 U/L (ref 0–31)
BASOPHILS ABSOLUTE: 0.04 E9/L (ref 0–0.2)
BASOPHILS RELATIVE PERCENT: 0.6 % (ref 0–2)
BILIRUB SERPL-MCNC: 0.3 MG/DL (ref 0–1.2)
BUN BLDV-MCNC: 13 MG/DL (ref 6–20)
CALCIUM SERPL-MCNC: 9.6 MG/DL (ref 8.6–10.2)
CHLORIDE BLD-SCNC: 105 MMOL/L (ref 98–107)
CO2: 22 MMOL/L (ref 22–29)
CREAT SERPL-MCNC: 0.8 MG/DL (ref 0.5–1)
EOSINOPHILS ABSOLUTE: 0.06 E9/L (ref 0.05–0.5)
EOSINOPHILS RELATIVE PERCENT: 0.9 % (ref 0–6)
GFR AFRICAN AMERICAN: >60
GFR NON-AFRICAN AMERICAN: >60 ML/MIN/1.73
GLUCOSE BLD-MCNC: 154 MG/DL (ref 74–99)
HCT VFR BLD CALC: 38.3 % (ref 34–48)
HEMOGLOBIN: 12.5 G/DL (ref 11.5–15.5)
IMMATURE GRANULOCYTES #: 0.03 E9/L
IMMATURE GRANULOCYTES %: 0.4 % (ref 0–5)
LYMPHOCYTES ABSOLUTE: 1.13 E9/L (ref 1.5–4)
LYMPHOCYTES RELATIVE PERCENT: 16.7 % (ref 20–42)
MAGNESIUM: 1.8 MG/DL (ref 1.6–2.6)
MCH RBC QN AUTO: 32.7 PG (ref 26–35)
MCHC RBC AUTO-ENTMCNC: 32.6 % (ref 32–34.5)
MCV RBC AUTO: 100.3 FL (ref 80–99.9)
MONOCYTES ABSOLUTE: 0.63 E9/L (ref 0.1–0.95)
MONOCYTES RELATIVE PERCENT: 9.3 % (ref 2–12)
NEUTROPHILS ABSOLUTE: 4.87 E9/L (ref 1.8–7.3)
NEUTROPHILS RELATIVE PERCENT: 72.1 % (ref 43–80)
PDW BLD-RTO: 13.2 FL (ref 11.5–15)
PLATELET # BLD: 239 E9/L (ref 130–450)
PMV BLD AUTO: 9.5 FL (ref 7–12)
POTASSIUM SERPL-SCNC: 4.8 MMOL/L (ref 3.5–5)
RBC # BLD: 3.82 E12/L (ref 3.5–5.5)
SODIUM BLD-SCNC: 135 MMOL/L (ref 132–146)
TOTAL PROTEIN: 6.5 G/DL (ref 6.4–8.3)
WBC # BLD: 6.8 E9/L (ref 4.5–11.5)

## 2022-01-07 PROCEDURE — G8420 CALC BMI NORM PARAMETERS: HCPCS | Performed by: INTERNAL MEDICINE

## 2022-01-07 PROCEDURE — 85025 COMPLETE CBC W/AUTO DIFF WBC: CPT

## 2022-01-07 PROCEDURE — 1036F TOBACCO NON-USER: CPT | Performed by: INTERNAL MEDICINE

## 2022-01-07 PROCEDURE — 96549 UNLISTED CHEMOTHERAPY PX: CPT

## 2022-01-07 PROCEDURE — 36415 COLL VENOUS BLD VENIPUNCTURE: CPT

## 2022-01-07 PROCEDURE — 83735 ASSAY OF MAGNESIUM: CPT

## 2022-01-07 PROCEDURE — G8484 FLU IMMUNIZE NO ADMIN: HCPCS | Performed by: INTERNAL MEDICINE

## 2022-01-07 PROCEDURE — 99214 OFFICE O/P EST MOD 30 MIN: CPT | Performed by: INTERNAL MEDICINE

## 2022-01-07 PROCEDURE — 80053 COMPREHEN METABOLIC PANEL: CPT

## 2022-01-07 PROCEDURE — 96413 CHEMO IV INFUSION 1 HR: CPT

## 2022-01-07 PROCEDURE — 2580000003 HC RX 258: Performed by: INTERNAL MEDICINE

## 2022-01-07 PROCEDURE — 6360000002 HC RX W HCPCS: Performed by: INTERNAL MEDICINE

## 2022-01-07 PROCEDURE — G8427 DOCREV CUR MEDS BY ELIG CLIN: HCPCS | Performed by: INTERNAL MEDICINE

## 2022-01-07 PROCEDURE — 3017F COLORECTAL CA SCREEN DOC REV: CPT | Performed by: INTERNAL MEDICINE

## 2022-01-07 PROCEDURE — G9899 SCRN MAM PERF RSLTS DOC: HCPCS | Performed by: INTERNAL MEDICINE

## 2022-01-07 RX ORDER — DIPHENHYDRAMINE HYDROCHLORIDE 50 MG/ML
50 INJECTION INTRAMUSCULAR; INTRAVENOUS ONCE
Status: CANCELLED | OUTPATIENT
Start: 2022-01-07 | End: 2022-01-07

## 2022-01-07 RX ORDER — SODIUM CHLORIDE 0.9 % (FLUSH) 0.9 %
10 SYRINGE (ML) INJECTION PRN
Status: CANCELLED | OUTPATIENT
Start: 2022-01-07

## 2022-01-07 RX ORDER — ONDANSETRON 4 MG/1
TABLET, FILM COATED ORAL
Qty: 60 TABLET | Refills: 1 | Status: SHIPPED
Start: 2022-01-07 | End: 2022-04-08 | Stop reason: SDUPTHER

## 2022-01-07 RX ORDER — SODIUM CHLORIDE 9 MG/ML
20 INJECTION, SOLUTION INTRAVENOUS ONCE
Status: COMPLETED | OUTPATIENT
Start: 2022-01-07 | End: 2022-01-07

## 2022-01-07 RX ORDER — SODIUM CHLORIDE 0.9 % (FLUSH) 0.9 %
10 SYRINGE (ML) INJECTION PRN
Status: DISCONTINUED | OUTPATIENT
Start: 2022-01-07 | End: 2022-01-08 | Stop reason: HOSPADM

## 2022-01-07 RX ORDER — METHYLPREDNISOLONE SODIUM SUCCINATE 125 MG/2ML
125 INJECTION, POWDER, LYOPHILIZED, FOR SOLUTION INTRAMUSCULAR; INTRAVENOUS ONCE
Status: CANCELLED | OUTPATIENT
Start: 2022-01-07 | End: 2022-01-07

## 2022-01-07 RX ORDER — SODIUM CHLORIDE 9 MG/ML
20 INJECTION, SOLUTION INTRAVENOUS ONCE
Status: CANCELLED | OUTPATIENT
Start: 2022-01-07 | End: 2022-01-07

## 2022-01-07 RX ORDER — HEPARIN SODIUM (PORCINE) LOCK FLUSH IV SOLN 100 UNIT/ML 100 UNIT/ML
500 SOLUTION INTRAVENOUS PRN
Status: DISCONTINUED | OUTPATIENT
Start: 2022-01-07 | End: 2022-01-08 | Stop reason: HOSPADM

## 2022-01-07 RX ORDER — SODIUM CHLORIDE 9 MG/ML
INJECTION, SOLUTION INTRAVENOUS CONTINUOUS
Status: CANCELLED | OUTPATIENT
Start: 2022-01-07

## 2022-01-07 RX ORDER — PROCHLORPERAZINE MALEATE 10 MG
10 TABLET ORAL EVERY 6 HOURS PRN
Qty: 120 TABLET | Refills: 0 | Status: ON HOLD
Start: 2022-01-07 | End: 2022-02-24 | Stop reason: HOSPADM

## 2022-01-07 RX ORDER — EPINEPHRINE 1 MG/ML
0.3 INJECTION, SOLUTION, CONCENTRATE INTRAVENOUS PRN
Status: CANCELLED | OUTPATIENT
Start: 2022-01-07

## 2022-01-07 RX ORDER — HEPARIN SODIUM (PORCINE) LOCK FLUSH IV SOLN 100 UNIT/ML 100 UNIT/ML
500 SOLUTION INTRAVENOUS PRN
Status: CANCELLED | OUTPATIENT
Start: 2022-01-07

## 2022-01-07 RX ORDER — MEPERIDINE HYDROCHLORIDE 50 MG/ML
12.5 INJECTION INTRAMUSCULAR; INTRAVENOUS; SUBCUTANEOUS ONCE
Status: CANCELLED | OUTPATIENT
Start: 2022-01-07 | End: 2022-01-07

## 2022-01-07 RX ORDER — SODIUM CHLORIDE 0.9 % (FLUSH) 0.9 %
5 SYRINGE (ML) INJECTION PRN
Status: CANCELLED | OUTPATIENT
Start: 2022-01-07

## 2022-01-07 RX ADMIN — TRASTUZUMAB-ANNS 399 MG: 420 INJECTION, POWDER, LYOPHILIZED, FOR SOLUTION INTRAVENOUS at 14:22

## 2022-01-07 RX ADMIN — SODIUM CHLORIDE 20 ML/HR: 9 INJECTION, SOLUTION INTRAVENOUS at 14:21

## 2022-01-07 RX ADMIN — HEPARIN 500 UNITS: 100 SYRINGE at 15:16

## 2022-01-07 RX ADMIN — SODIUM CHLORIDE, PRESERVATIVE FREE 10 ML: 5 INJECTION INTRAVENOUS at 14:21

## 2022-01-07 RX ADMIN — SODIUM CHLORIDE, PRESERVATIVE FREE 10 ML: 5 INJECTION INTRAVENOUS at 15:15

## 2022-01-07 RX ADMIN — PERTUZUMAB 420 MG: 30 INJECTION, SOLUTION, CONCENTRATE INTRAVENOUS at 14:22

## 2022-01-07 NOTE — PROGRESS NOTES
Department of [facility] Med Oncology  Attending Clinic Note    Reason for Visit: Follow-up on a patient with Right Breast Cancer    PCP:  Leigh Mendoza DO    History of Present Illness: The mass was located in the 6 o'clock position of right breast    Breast cancer risk factors include infrequent SMEs and age and gender    Bilateral Screening Mammogram 10/06/2020: There are suspicious calcifications in the right breast at the 6 o'clock position which appear pleomorphic. These calcifications are in a segmental distribution. These clustered calcifications are suspicious for malignancy. Right breast, calcifications at the 6:00, core biopsy on 10/30/2020:    - Small focus of invasive ductal carcinoma, grade 3 (3+3+2 = 8)    - Ductal carcinoma in situ, high nuclear grade, comedo/cribriform/solid types;    - Microcalcifications in DCIS    - Breast receptor and biomarkers (per outside report without looking the   slides:     ER: Positive, 89.8%, strong intensity     WV: Positive, 52.2%, moderate intensity     HER-2: Positive (score 3+)     Ki-67: High proliferation, 26.2%     P53: Negative, 0.2%     Comment:The invasive carcinoma is intermingled with DCIS and measures   3.0 x 2.0 mm in greatest dimension on the slides. CXR PA/Lateral 12/11/2020:  No acute process. Right Axillary U/S on 12/11/2020: There is no axillary LN.     MRI Bilateral Breast 01/05/2021:  Focal, heterogeneous non mass enhancement identified in the right breast 6 o'clock which measures approximately 1.9 x 1.4 x 1.7 cm (image 19 of the axial T1 post contrast series).    No additional foci of disease identified on the right  There is no lymphadenopathy    Needle localized Right lumpectomy with SLNBx with mediport placement on 02/05/2021  A.  Right axillary sentinel lymph node #1, excision: 1 lymph node negative for malignancy     B.  Right axillary contents, regional resection: 1 lymph node positive for metastatic, poorly differentiated ductal carcinoma (grade 3)   Extranodal extension present     C.  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;   positive for extensive intraductal component   Skin-uninvolved by malignancy   Invasive carcinoma margins-uninvolved by invasive carcinoma        Distance from closest margin: 4 mm from anterior margin   DCIS margins-inferior and medial margins involved by DCIS   Regional lymph nodes-involved by tumor cells        Number of lymph nodes with macrometastasis: 1        Number of lymph nodes with micrometastasis: 0        Number of lymph nodes with isolated tumor cells: 0        Size of largest metastatic deposit: 1.5 cm        Extranodal extension: Present        Total number of lymph nodes examined: 2        Number of sentinel nodes examined: 1   Treatment effect in the breast-no known presurgical therapy   TNM classification (AJCC eighth edition)-  pT1c N1a MX     Bone scan, CT chest/abdomen/pelvis 02/26/2021 negative for metastatic disease    Re-excision lumpectomy of inferior and medial margins on 03/09/2021  A. Right breast, new inferior margin, excision: Fat necrosis, foreign body-type giant cell reaction, chronic inflammation, and fibrosis consistent with previous procedure   No evidence of residual carcinoma   Final inferior margin negative for carcinoma     B.  Right breast, new medial margin, excision: Residual high-grade ductal carcinoma in situ   Ductal carcinoma in situ present less than 1 mm from red/superior margin and green/anterior margin   Fat necrosis, foreign body-type giant cell reaction, chronic   inflammation, and fibrosis consistent with previous procedure   Fibrocystic change   Microcalcifications associated with benign breast tissue and DCIS   Comment:Residual ductal carcinoma in situ is present in 5 out of 13 tissue blocks of part B    Recommended adjuvant chemotherapy (TCHP) for 6 cycles followed by adjuvant RT followed lastly by endocrine therapy. Side effects TCHP reviewed with patient. She agreed to proceed. 2d-echo 02/17/2021 noted EF 60-65%  Cycle # 1 adjuvant TCHP was on 04/08/2021. Cycle # 2 adjuvant TCHP was on 04/29/2021. Cycle # 3 adjuvant TCHP was on 05/20/2021. Cycle # 4 adjuvant TCHP was on 06/10/2021. Cycle # 5 adjuvant TCHP was on 07/22/2021. Cycle # 6 adjuvant TCHP was on 08/12/2021. Radiation Oncology consult appreciated. 2d-echo 10/12/2021 noted EF 55 to 60%  RT was started on 11/01/2021 and completed on 12/10/2021. DEXA scan 12/10/2021 osteopenia by WHO criteria  Arimidex 1 mg po daily was started on 12/11/2021 with fair tolerance so far. Herceptin/Perjeta today 01/07/2022. Today 01/07/2022. No fever, chills. Fatigue. No N/V. No diarrhea    Review of Systems;  CONSTITUTIONAL: No fever, chills. Fair appetite. Fatigue  ENMT: Eyes: No diplopia; Nose: No epistaxis. Mouth: No sore throat. RESPIRATORY: No hemoptysis, shortness of breath, cough. CARDIOVASCULAR: No chest pain, palpitations. GASTROINTESTINAL: No nausea/vomiting, abdominal pain. GENITOURINARY: No dysuria, urinary frequency, hematuria. NEURO: No syncope, presyncope, headache.   Remainder: ROS NEGATIVE    Past Medical History:      Diagnosis Date    Anxiety     Arthritis     Cancer ([address] 2021    breast    Cervical radiculopathy     Chronic back pain     Dehydration 9/23/2021    Dehydration 9/23/2021    Depression     Diabetes mellitus type 2, uncontrolled ([address] 2/12/2017    GERD (gastroesophageal reflux disease)     History of blood transfusion 01/2018    back surgery, lumbar, dr Dornia Larsen Hypertension     Right leg pain     seeing doctor currently problems with hardware     Medications:  Reviewed and reconciled. Allergies: Allergies   Allergen Reactions    Ceftriaxone Shortness Of Breath     Physical Exam:  BP (!) 112/52   Pulse 82   Temp 98.3 °F (36.8 °C) (Oral)   Resp 18   Ht 6' 1\" (1.854 m)   Wt 148 lb 3.2 oz (67.2 kg)   LMP 08/13/2013   SpO2 100%   BMI 19.55 kg/m²   GENERAL: Alert, oriented x 3, not in acute distress. HEENT: PERRLA; EOMI. Oropharynx clear. NECK: Supple. Without lymphadenopathy. LUNGS: Good air entry bilaterally. No wheezing, crackles or ronchi. CARDIOVASCULAR: Regular rate. No murmurs, rubs or gallops. ABDOMEN: Soft. Non-tender, non-distended. Positive bowel sounds. EXTREMITIES: Without clubbing, cyanosis, or edema. NEUROLOGIC: No focal deficits. ECOG PS 1    Impression/Plan:  62 y/o female with Right Breast Cancer    Bilateral Screening Mammogram 10/06/2020: There are suspicious calcifications in the right breast at the 6 o'clock position which appear pleomorphic. These calcifications are in a segmental distribution. These clustered calcifications are suspicious for malignancy. Right breast, calcifications at the 6:00, core biopsy on 10/30/2020:    - Small focus of invasive ductal carcinoma, grade 3 (3+3+2 = 8)    - Ductal carcinoma in situ, high nuclear grade, comedo/cribriform/solid types;    - Microcalcifications in DCIS    - Breast receptor and biomarkers (per outside report without looking the   slides:     ER: Positive, 89.8%, strong intensity     TX: Positive, 52.2%, moderate intensity     HER-2: Positive (score 3+)     Ki-67: High proliferation, 26.2%     P53: Negative, 0.2%     Comment:The invasive carcinoma is intermingled with DCIS and measures   3.0 x 2.0 mm in greatest dimension on the slides.      CXR PA/Lateral 12/11/2020:  No acute process. Right Axillary U/S on 12/11/2020: There is no axillary LN.     MRI Bilateral Breast 01/05/2021:  Focal, heterogeneous non mass enhancement identified in the right breast 6 o'clock which measures approximately 1.9 x 1.4 x 1.7 cm (image 19 of the axial T1 post contrast series).    No additional foci of disease identified on the right  There is no lymphadenopathy    Needle localized Right lumpectomy with SLNBx with mediport placement on 02/05/2021  A.  Right axillary sentinel lymph node #1, excision: 1 lymph node negative for malignancy     B.  Right axillary contents, regional resection: 1 lymph node positive for metastatic, poorly differentiated ductal carcinoma (grade 3)   Extranodal extension present     C.  Right breast, lumpectomy: Invasive, poorly differentiated ductal carcinoma (grade 3) and high-grade ductal carcinoma in situ   High-grade ductal carcinoma in situ involves inferior and medial surgical margins     CANCER CASE SUMMARY   Procedure-excision   Specimen laterality-right   Tumor size-1.1 x 0.8 x 0.5 cm   Histologic type-invasive carcinoma of no special type (ductal)   Meigs histologic score-3 (tubule formation) +3 (nuclear   pleomorphism) +2 (mitotic count) = 8   Overall grade-grade 3   Ductal carcinoma in situ-high-grade DCIS with comedonecrosis is present;   positive for extensive intraductal component   Skin-uninvolved by malignancy   Invasive carcinoma margins-uninvolved by invasive carcinoma        Distance from closest margin: 4 mm from anterior margin   DCIS margins-inferior and medial margins involved by DCIS   Regional lymph nodes-involved by tumor cells        Number of lymph nodes with macrometastasis: 1        Number of lymph nodes with micrometastasis: 0        Number of lymph nodes with isolated tumor cells: 0        Size of largest metastatic deposit: 1.5 cm        Extranodal extension: Present        Total number of lymph nodes examined: 2        Number of sentinel nodes examined: 1   Treatment effect in the breast-no known presurgical therapy   TNM classification (AJCC eighth edition)-  pT1c N1a MX     Bone scan, CT chest/abdomen/pelvis 02/26/2021 negative for metastatic disease    Re-excision lumpectomy of inferior and medial margins on 03/09/2021  A. Right breast, new inferior margin, excision: Fat necrosis, foreign body-type giant cell reaction, chronic inflammation, and fibrosis consistent with previous procedure   No evidence of residual carcinoma   Final inferior margin negative for carcinoma     B. Right breast, new medial margin, excision: Residual high-grade ductal carcinoma in situ   Ductal carcinoma in situ present less than 1 mm from red/superior margin and green/anterior margin   Fat necrosis, foreign body-type giant cell reaction, chronic   inflammation, and fibrosis consistent with previous procedure   Fibrocystic change   Microcalcifications associated with benign breast tissue and DCIS   Comment:Residual ductal carcinoma in situ is present in 5 out of 13 tissue blocks of part B    Recommended adjuvant chemotherapy (TCHP) for 6 cycles followed by adjuvant RT followed lastly by endocrine therapy. Side effects TCHP reviewed with patient. She agreed to proceed. 2d-echo 02/17/2021 noted EF 60-65%  Cycle # 1 adjuvant TCHP was on 04/08/2021. Cycle # 2 adjuvant TCHP was on 04/29/2021. Cycle # 3 adjuvant TCHP was on 05/20/2021. Cycle # 4 adjuvant TCHP was on 06/10/2021. 2D-ECHO 06/29/2021 noted EF 60-65%  Cycle # 5 adjuvant TCHP was on 07/22/2021. Cycle # 6 adjuvant TCHP was on 08/12/2021. 2d-echo 10/12/2021 noted EF 55 to 60%  RT was started on 11/01/2021 and completed on 12/10/2021. DEXA scan 12/10/2021 osteopenia by WHO criteria  Arimidex 1 mg po daily was started on 12/11/2021 with fair tolerance so far. Herceptin/Perjeta today 01/07/2022. Labs reviewed ok to proceed. Continue Arimidex, Ca. VitD    RTC 3 weeks for Herceptin/Perjeta.  2d-echo in the interim    Evans Montelongo MD   5/8/1624  Board Certified Medical Oncologist

## 2022-01-07 NOTE — DISCHARGE INSTR - COC
Continuity of Care Form    Patient Name: Raquel Cespedes   :  1965  MRN:  [id number]    Admit date:  2022  Discharge date:  ***    Code Status Order: Prior   Advance Directives:      Admitting Physician:  No admitting provider for patient encounter. PCP: Nirav Law DO    Discharging Nurse: [facility] Unit/Room#: No information available for this encounter. Discharging Unit Phone Number: ***    Emergency Contact:   Extended Emergency Contact Information  Primary Emergency Contact: Pablo Velasco  [address] Phone: [phone]  Mobile Phone: [phone]  Relation: Child  Preferred language: English   needed? No    Past Surgical History:  Past Surgical History:   Procedure Laterality Date    BACK SURGERY  2018    PLIF L2-L5    BREAST BIOPSY Right 2021    RIGHT BREAST LUMPECTOMY WITH  sentinel LYMPHNODE biopsy performed by Obie Lazcano MD at [address] [facility] Right 3/9/2021    RIGHT BREAST RE-EXCISION LUMPECTOMY OF INFERIOR AND MEDIAL MARGINS performed by Obie Lazcano MD at [address] [employer] Right 2017    Dr. Peyman Barlow      dr hu anterior    CERVICAL FUSION      dr Michael Phillips anterior    CERVICAL FUSION  16    ANTERIOR C4-C5, C6-C7 PLATES AND SCREWS    CHOLECYSTECTOMY  2000    COLONOSCOPY      patient did cologuard in 2018    [facility]      dr Nicholas Castañeda    heel spurs bilateral    HYSTERECTOMY      dr Darwin Sanderson partial    OTHER SURGICAL HISTORY Right 2018    removal of hardware repair nonunion right tibia/fibula    PORT SURGERY Left 2021    LEFT MEDI PORT INSERTION performed by Obie Lazcano MD at [facility]  OFFICE/OUTPT VISIT,PROCEDURE ONLY Right 2018    REPAIR NON UNION FAILED FIXATION RIGHT DISTAL TIB / FIB JORGE FX. WITH REMOVAL OF HARDWARE & O.R. I. F ------BOP performed by Ehsan Ramos MD at [facility]    NH OPEN RX TIBIA SHAFT FX,SCREWS Right 5/14/2018    REPAIR NON-UNION RIGHT TIBIA AND FIBULA WITH REMOVAL OF HARDWARE AND BONE GRAFT performed by Mendez John MD at [facility] Right 1/10/2019    RIGHT TIBIA REMOVAL HARDWARE, RIGHT TIBIA OPEN  TREATMENT OF NON UNION performed by Mendez John MD at [facility] [place] [place]         Immunization History:   Immunization History   Administered Date(s) Administered    Influenza, Quadv, IM, (6 mo and older Fluzone, Flulaval, Fluarix and 3 yrs and older Afluria) 09/09/2020    Influenza, Quadv, IM, PF (6 mo and older Fluzone, Flulaval, Fluarix, and 3 yrs and older Afluria) 09/28/2018       Active Problems:  Patient Active Problem List   Diagnosis Code    Diabetes mellitus type 2, uncontrolled ([facility] [address] E11.65    HTN (hypertension), benign I10    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 S82.251K    Delayed union of tibial shaft fracture, right, closed S82.201G    Tibia/fibula fracture, right, closed, with nonunion, subsequent encounter S82.201K, S82.401K    Failed orthopedic implant ([facility] [address] T84.498A    Fracture of tibia and fibula, right, closed, with nonunion, subsequent encounter S82.201K, S82.401K    Malignant neoplasm of central portion of right breast in female, estrogen receptor positive ([address] C50.111, Z17.0    Hypokalemia E87.6    Nausea R11.0    Hyponatremia E87.1    AUSTIN (acute kidney injury) ([facility] [address] N17.9    Diarrhea R19.7    Incisional breast wound S21.009A    Dehydration E86.0    Open wound of right breast S21.001A       Isolation/Infection:   Isolation            No Isolation          Patient Infection Status       Infection Onset Added Last Indicated Last Indicated By Review Planned Expiration Resolved Resolved By    None active    Resolved    C-diff Rule Out 06/21/21 06/21/21 06/22/21 CLOSTRIDIUM DIFFICILE EIA (Ordered)   06/22/21 Rule-Out Test Resulted    COVID-19 (Rule Out) 21 Covid-19 Ambulatory (Ordered)   21 Rule-Out Test Resulted    COVID-19 (Rule Out) 21 Covid-19 Ambulatory (Ordered)   21 Rule-Out Test Resulted            Nurse Assessment:  Last Vital Signs: BP (!) 122/58   Pulse 80   Temp 97.7 °F (36.5 °C) (Oral)   Resp 18   LMP 2013   SpO2 100%     Last documented pain score (0-10 scale):    Last Weight:   Wt Readings from Last 1 Encounters:   22 148 lb 3.2 oz (67.2 kg)     Mental Status:  {IP PT MENTAL STATUS:}    IV Access:  { EUFEMIA IV ACCESS:605151638}    Nursing Mobility/ADLs:  Walking   {P DME DMEA:625289059}  Transfer  {[facility] DME DWLJ:693754474}  Bathing  {P DME NHPN:406197690}  Dressing  {P DME ETQD:427104802}  Toileting  {P DME BFVQ:337320536}  Feeding  {[facility] DME HNJM:167408879}  Med Admin  {[facility] DME KHXT:716616048}  Med Delivery   { EUFEMIA MED Delivery:984210058}    Wound Care Documentation and Therapy:  Incision 18 Back Lower;Mid (Active)   Number of days: 4977       Wound 21 Chest Lower;Right #1 CA surgery 3-9-21 (Active)   Number of days: 100        Elimination:  Continence: Bowel: {YES / QF:27656}  Bladder: {YES / W}  Urinary Catheter: {Urinary Catheter:969239583}   Colostomy/Ileostomy/Ileal Conduit: {YES / EN:93749}       Date of Last BM: ***  No intake or output data in the 24 hours ending 22 1518  No intake/output data recorded.     Safety Concerns:     [address] Safety Concerns:154781913}    Impairments/Disabilities:      [address] Impairments/Disabilities:738225436}    Nutrition Therapy:  Current Nutrition Therapy:   [address] Diet List:334093522}    Routes of Feeding: {CHP DME Other Feedings:483272690}  Liquids: {Slp liquid thickness:65226}  Daily Fluid Restriction: {CHP DME Yes amt example:924627156}  Last Modified Barium Swallow with Video (Video Swallowing Test): {Done Not Done BXNW:047041154}    Treatments at the Time of Hospital Discharge:   Respiratory Treatments: ***  Oxygen Therapy:  {Therapy; copd oxygen:57361}  Ventilator:    { CC Vent OMZZ:777207510}    Rehab Therapies: {THERAPEUTIC INTERVENTION:6840618086}  Weight Bearing Status/Restrictions: Aly COBOS Weight Bearin}  Other Medical Equipment (for information only, NOT a DME order):  {EQUIPMENT:177211159}  Other Treatments: ***    Patient's personal belongings (please select all that are sent with patient):  {P DME Belongings:628828334}    RN SIGNATURE:  {Esignature:536687948}    CASE MANAGEMENT/SOCIAL WORK SECTION    Inpatient Status Date: ***    Readmission Risk Assessment Score:  Readmission Risk              Risk of Unplanned Readmission:  0           Discharging to Facility/ Agency   Name:   Address:  Phone:  Fax:    Dialysis Facility (if applicable)   Name:  Address:  Dialysis Schedule:  Phone:  Fax:    / signature: {Esignature:620710254}    PHYSICIAN SECTION    Prognosis: {Prognosis:5479777244}    Condition at Discharge: 50Kevin Landa Patient Condition:328018381}    Rehab Potential (if transferring to Rehab): {Prognosis:9992688974}    Recommended Labs or Other Treatments After Discharge: ***    Physician Certification: I certify the above information and transfer of Gifford Cogan  is necessary for the continuing treatment of the diagnosis listed and that she requires {Admit to Appropriate Level of Care:65484} for {GREATER/LESS:150344677} 30 days.      Update Admission H&P: {CHP DME Changes in XYPRO:359178148}    PHYSICIAN SIGNATURE:  {Esignature:038714909}

## 2022-01-12 ENCOUNTER — TELEPHONE (OUTPATIENT)
Dept: CASE MANAGEMENT | Age: 57
End: 2022-01-12

## 2022-01-12 ENCOUNTER — TELEPHONE (OUTPATIENT)
Dept: CARDIOLOGY | Age: 57
End: 2022-01-12

## 2022-01-12 NOTE — TELEPHONE ENCOUNTER
Called [facility] to schedule for ECHO ordered per Dr Aurora Glover, no availability until March. [address] cardiology and spoke with Marjorie Herndon who stated they have availability the last week in January and will call patient to coordinate an appointment. Garima Garcia.  Yoni Cabrera, VANESSAN, RN - Oncology Nurse Navigator

## 2022-01-25 ENCOUNTER — HOSPITAL ENCOUNTER (OUTPATIENT)
Dept: CARDIOLOGY | Age: 57
Discharge: HOME | End: 2022-01-25
Payer: MEDICAID

## 2022-01-25 DIAGNOSIS — Z85.3 PERSONAL HISTORY OF BREAST CANCER: ICD-10-CM

## 2022-01-25 PROCEDURE — 93356 MYOCRD STRAIN IMG SPCKL TRCK: CPT | Performed by: PSYCHIATRY & NEUROLOGY

## 2022-01-25 PROCEDURE — 93306 TTE W/DOPPLER COMPLETE: CPT | Performed by: PSYCHIATRY & NEUROLOGY

## 2022-01-25 PROCEDURE — 93308 TTE F-UP OR LMTD: CPT | Performed by: PSYCHIATRY & NEUROLOGY

## 2022-01-26 ENCOUNTER — TELEPHONE (OUTPATIENT)
Dept: BREAST CENTER | Age: 57
End: 2022-01-26

## 2022-01-27 ENCOUNTER — TELEPHONE (OUTPATIENT)
Dept: CASE MANAGEMENT | Age: 57
End: 2022-01-27

## 2022-01-27 NOTE — TELEPHONE ENCOUNTER
Prepared patient's Survivorship Care Plan and Treatment Summary for her follow up appointment with Dr. Zora Silva at [facility] on 1/28/2022 at 1 pm. Folder prepared with additional written literature. Jodi Krueger.  KARIS Pate, RN - Oncology Nurse Navigator

## 2022-01-28 ENCOUNTER — TELEPHONE (OUTPATIENT)
Dept: CASE MANAGEMENT | Age: 57
End: 2022-01-28

## 2022-01-28 ENCOUNTER — HOSPITAL ENCOUNTER (OUTPATIENT)
Dept: INFUSION THERAPY | Age: 57
Discharge: HOME | End: 2022-01-28
Payer: MEDICAID

## 2022-01-28 ENCOUNTER — OFFICE VISIT (OUTPATIENT)
Dept: ONCOLOGY | Age: 57
End: 2022-01-28
Payer: MEDICAID

## 2022-01-28 ENCOUNTER — HOSPITAL ENCOUNTER (OUTPATIENT)
Age: 57
Discharge: HOME | End: 2022-01-28
Payer: MEDICAID

## 2022-01-28 VITALS
BODY MASS INDEX: 20.72 KG/M2 | TEMPERATURE: 98.2 F | HEIGHT: 72 IN | WEIGHT: 153 LBS | DIASTOLIC BLOOD PRESSURE: 65 MMHG | SYSTOLIC BLOOD PRESSURE: 103 MMHG | HEART RATE: 70 BPM | OXYGEN SATURATION: 96 %

## 2022-01-28 VITALS — HEART RATE: 63 BPM | DIASTOLIC BLOOD PRESSURE: 57 MMHG | SYSTOLIC BLOOD PRESSURE: 104 MMHG | RESPIRATION RATE: 18 BRPM

## 2022-01-28 DIAGNOSIS — Z85.3 PERSONAL HISTORY OF BREAST CANCER: Primary | ICD-10-CM

## 2022-01-28 DIAGNOSIS — Z85.3 PERSONAL HISTORY OF BREAST CANCER: ICD-10-CM

## 2022-01-28 DIAGNOSIS — C50.111 MALIGNANT NEOPLASM OF CENTRAL PORTION OF RIGHT BREAST IN FEMALE, ESTROGEN RECEPTOR POSITIVE (HCC): ICD-10-CM

## 2022-01-28 DIAGNOSIS — Z17.0 MALIGNANT NEOPLASM OF CENTRAL PORTION OF RIGHT BREAST IN FEMALE, ESTROGEN RECEPTOR POSITIVE (HCC): ICD-10-CM

## 2022-01-28 LAB
ALBUMIN SERPL-MCNC: 3.8 G/DL (ref 3.5–5.2)
ALP BLD-CCNC: 181 U/L (ref 35–104)
ALT SERPL-CCNC: 32 U/L (ref 0–32)
ANION GAP SERPL CALCULATED.3IONS-SCNC: 11 MMOL/L (ref 7–16)
AST SERPL-CCNC: 29 U/L (ref 0–31)
BASOPHILS ABSOLUTE: 0.04 E9/L (ref 0–0.2)
BASOPHILS RELATIVE PERCENT: 0.8 % (ref 0–2)
BILIRUB SERPL-MCNC: 0.3 MG/DL (ref 0–1.2)
BUN BLDV-MCNC: 10 MG/DL (ref 6–20)
CALCIUM SERPL-MCNC: 9 MG/DL (ref 8.6–10.2)
CHLORIDE BLD-SCNC: 101 MMOL/L (ref 98–107)
CO2: 25 MMOL/L (ref 22–29)
CREAT SERPL-MCNC: 0.8 MG/DL (ref 0.5–1)
EOSINOPHILS ABSOLUTE: 0.04 E9/L (ref 0.05–0.5)
EOSINOPHILS RELATIVE PERCENT: 0.8 % (ref 0–6)
GFR AFRICAN AMERICAN: >60
GFR NON-AFRICAN AMERICAN: >60 ML/MIN/1.73
GLUCOSE BLD-MCNC: 172 MG/DL (ref 74–99)
HCT VFR BLD CALC: 36.8 % (ref 34–48)
HEMOGLOBIN: 11.8 G/DL (ref 11.5–15.5)
IMMATURE GRANULOCYTES #: 0.03 E9/L
IMMATURE GRANULOCYTES %: 0.6 % (ref 0–5)
LYMPHOCYTES ABSOLUTE: 0.89 E9/L (ref 1.5–4)
LYMPHOCYTES RELATIVE PERCENT: 16.7 % (ref 20–42)
MAGNESIUM: 1.6 MG/DL (ref 1.6–2.6)
MCH RBC QN AUTO: 32.3 PG (ref 26–35)
MCHC RBC AUTO-ENTMCNC: 32.1 % (ref 32–34.5)
MCV RBC AUTO: 100.8 FL (ref 80–99.9)
MONOCYTES ABSOLUTE: 0.57 E9/L (ref 0.1–0.95)
MONOCYTES RELATIVE PERCENT: 10.7 % (ref 2–12)
NEUTROPHILS ABSOLUTE: 3.76 E9/L (ref 1.8–7.3)
NEUTROPHILS RELATIVE PERCENT: 70.4 % (ref 43–80)
PDW BLD-RTO: 12.9 FL (ref 11.5–15)
PLATELET # BLD: 213 E9/L (ref 130–450)
PMV BLD AUTO: 9.5 FL (ref 7–12)
POTASSIUM SERPL-SCNC: 4.3 MMOL/L (ref 3.5–5)
RBC # BLD: 3.65 E12/L (ref 3.5–5.5)
SODIUM BLD-SCNC: 137 MMOL/L (ref 132–146)
TOTAL PROTEIN: 6 G/DL (ref 6.4–8.3)
WBC # BLD: 5.3 E9/L (ref 4.5–11.5)

## 2022-01-28 PROCEDURE — G8484 FLU IMMUNIZE NO ADMIN: HCPCS | Performed by: INTERNAL MEDICINE

## 2022-01-28 PROCEDURE — 3017F COLORECTAL CA SCREEN DOC REV: CPT | Performed by: INTERNAL MEDICINE

## 2022-01-28 PROCEDURE — 36415 COLL VENOUS BLD VENIPUNCTURE: CPT

## 2022-01-28 PROCEDURE — 83735 ASSAY OF MAGNESIUM: CPT

## 2022-01-28 PROCEDURE — 96549 UNLISTED CHEMOTHERAPY PX: CPT

## 2022-01-28 PROCEDURE — 96417 CHEMO IV INFUS EACH ADDL SEQ: CPT

## 2022-01-28 PROCEDURE — 85025 COMPLETE CBC W/AUTO DIFF WBC: CPT

## 2022-01-28 PROCEDURE — 99214 OFFICE O/P EST MOD 30 MIN: CPT | Performed by: INTERNAL MEDICINE

## 2022-01-28 PROCEDURE — 6360000002 HC RX W HCPCS: Performed by: INTERNAL MEDICINE

## 2022-01-28 PROCEDURE — 1036F TOBACCO NON-USER: CPT | Performed by: INTERNAL MEDICINE

## 2022-01-28 PROCEDURE — 2580000003 HC RX 258: Performed by: INTERNAL MEDICINE

## 2022-01-28 PROCEDURE — 96413 CHEMO IV INFUSION 1 HR: CPT

## 2022-01-28 PROCEDURE — 80053 COMPREHEN METABOLIC PANEL: CPT

## 2022-01-28 PROCEDURE — G8420 CALC BMI NORM PARAMETERS: HCPCS | Performed by: INTERNAL MEDICINE

## 2022-01-28 PROCEDURE — G9899 SCRN MAM PERF RSLTS DOC: HCPCS | Performed by: INTERNAL MEDICINE

## 2022-01-28 PROCEDURE — G8427 DOCREV CUR MEDS BY ELIG CLIN: HCPCS | Performed by: INTERNAL MEDICINE

## 2022-01-28 RX ORDER — MEPERIDINE HYDROCHLORIDE 50 MG/ML
12.5 INJECTION INTRAMUSCULAR; INTRAVENOUS; SUBCUTANEOUS ONCE
Status: CANCELLED | OUTPATIENT
Start: 2022-01-28 | End: 2022-01-28

## 2022-01-28 RX ORDER — HEPARIN SODIUM (PORCINE) LOCK FLUSH IV SOLN 100 UNIT/ML 100 UNIT/ML
500 SOLUTION INTRAVENOUS PRN
Status: CANCELLED | OUTPATIENT
Start: 2022-01-28

## 2022-01-28 RX ORDER — SODIUM CHLORIDE 0.9 % (FLUSH) 0.9 %
10 SYRINGE (ML) INJECTION PRN
Status: CANCELLED | OUTPATIENT
Start: 2022-01-28

## 2022-01-28 RX ORDER — SODIUM CHLORIDE 9 MG/ML
20 INJECTION, SOLUTION INTRAVENOUS ONCE
Status: DISCONTINUED | OUTPATIENT
Start: 2022-01-28 | End: 2022-01-29 | Stop reason: HOSPADM

## 2022-01-28 RX ORDER — SODIUM CHLORIDE 9 MG/ML
INJECTION, SOLUTION INTRAVENOUS ONCE
Status: COMPLETED | OUTPATIENT
Start: 2022-01-28 | End: 2022-01-28

## 2022-01-28 RX ORDER — DIPHENHYDRAMINE HYDROCHLORIDE 50 MG/ML
50 INJECTION INTRAMUSCULAR; INTRAVENOUS ONCE
Status: CANCELLED | OUTPATIENT
Start: 2022-01-28 | End: 2022-01-28

## 2022-01-28 RX ORDER — EPINEPHRINE 1 MG/ML
0.3 INJECTION, SOLUTION, CONCENTRATE INTRAVENOUS PRN
Status: CANCELLED | OUTPATIENT
Start: 2022-01-28

## 2022-01-28 RX ORDER — HEPARIN SODIUM (PORCINE) LOCK FLUSH IV SOLN 100 UNIT/ML 100 UNIT/ML
500 SOLUTION INTRAVENOUS PRN
Status: DISCONTINUED | OUTPATIENT
Start: 2022-01-28 | End: 2022-01-29 | Stop reason: HOSPADM

## 2022-01-28 RX ORDER — SODIUM CHLORIDE 0.9 % (FLUSH) 0.9 %
5 SYRINGE (ML) INJECTION PRN
Status: CANCELLED | OUTPATIENT
Start: 2022-01-28

## 2022-01-28 RX ORDER — METHYLPREDNISOLONE SODIUM SUCCINATE 125 MG/2ML
125 INJECTION, POWDER, LYOPHILIZED, FOR SOLUTION INTRAMUSCULAR; INTRAVENOUS ONCE
Status: CANCELLED | OUTPATIENT
Start: 2022-01-28 | End: 2022-01-28

## 2022-01-28 RX ORDER — SODIUM CHLORIDE 9 MG/ML
INJECTION, SOLUTION INTRAVENOUS CONTINUOUS
Status: CANCELLED | OUTPATIENT
Start: 2022-01-28

## 2022-01-28 RX ORDER — SODIUM CHLORIDE 9 MG/ML
20 INJECTION, SOLUTION INTRAVENOUS ONCE
Status: CANCELLED | OUTPATIENT
Start: 2022-01-28 | End: 2022-01-28

## 2022-01-28 RX ORDER — SODIUM CHLORIDE 0.9 % (FLUSH) 0.9 %
10 SYRINGE (ML) INJECTION PRN
Status: DISCONTINUED | OUTPATIENT
Start: 2022-01-28 | End: 2022-01-29 | Stop reason: HOSPADM

## 2022-01-28 RX ADMIN — PERTUZUMAB 420 MG: 30 INJECTION, SOLUTION, CONCENTRATE INTRAVENOUS at 13:59

## 2022-01-28 RX ADMIN — SODIUM CHLORIDE, PRESERVATIVE FREE 10 ML: 5 INJECTION INTRAVENOUS at 15:14

## 2022-01-28 RX ADMIN — TRASTUZUMAB-ANNS 399 MG: 420 INJECTION, POWDER, LYOPHILIZED, FOR SOLUTION INTRAVENOUS at 13:59

## 2022-01-28 RX ADMIN — HEPARIN 500 UNITS: 100 SYRINGE at 15:15

## 2022-01-28 RX ADMIN — SODIUM CHLORIDE: 9 INJECTION, SOLUTION INTRAVENOUS at 13:32

## 2022-01-28 NOTE — TELEPHONE ENCOUNTER
Met with patient during her follow up appointment with Dr.Francis marin. Patient completed her active treatment in 2021 for her breast cancer. States that she is doing well besides some mild fatigue. Reports that her appetite is good and she is sleeping well. Upon inquiring, states that she is doing well with the Arimidex medication daily. Denies any hot flashes or joint pain side effects. Reviewed daily Calcium and Vitamin D supplement recommendations and NCCN DEXA Scan guidelines while on hormone therapy for breast cancer. Patient continuing maintenance Perjta and Kathy Mir, reports no problems. Provided support and encouragement. Instructed patient in detail on her Cancer Treatment Summary and Survivorship Care Plan. Provided with a written copy. Aware that a copy will be sent to her PCP, Dr Jose Guevara, as well. Provided written copies of Cancer Care Cancer Survivorship, Nutrition Following Cancer Treatment: Oncology Nutrition Guide, and Thriving and Surviving Post-Cancer Treatment: Nutritional and Emotional Support Services packet, ACS Life After Treatment, [facility]) self breast exam guide, [facility] Livestron handout, [facility] Sister support group meeting dates, lymphedema handout, and nutritional &  handout. Instructed patient to call with any questions or concerns. Verbally agreed. Patient appreciative of visit and information. [place].  VANESSA JoelN, RN - Oncology Nurse Navigator

## 2022-01-28 NOTE — PROGRESS NOTES
Department of [facility] Oncology  Attending Clinic Note    Reason for Visit: Follow-up on a patient with Right Breast Cancer    PCP:  Leigh Mendoza DO    History of Present Illness: The mass was located in the 6 o'clock position of right breast    Breast cancer risk factors include infrequent SMEs and age and gender    Bilateral Screening Mammogram 10/06/2020: There are suspicious calcifications in the right breast at the 6 o'clock position which appear pleomorphic. These calcifications are in a segmental distribution. These clustered calcifications are suspicious for malignancy. Right breast, calcifications at the 6:00, core biopsy on 10/30/2020:    - Small focus of invasive ductal carcinoma, grade 3 (3+3+2 = 8)    - Ductal carcinoma in situ, high nuclear grade, comedo/cribriform/solid types;    - Microcalcifications in DCIS    - Breast receptor and biomarkers (per outside report without looking the   slides:     ER: Positive, 89.8%, strong intensity     VA: Positive, 52.2%, moderate intensity     HER-2: Positive (score 3+)     Ki-67: High proliferation, 26.2%     P53: Negative, 0.2%     Comment:The invasive carcinoma is intermingled with DCIS and measures   3.0 x 2.0 mm in greatest dimension on the slides. CXR PA/Lateral 12/11/2020:  No acute process. Right Axillary U/S on 12/11/2020: There is no axillary LN.     MRI Bilateral Breast 01/05/2021:  Focal, heterogeneous non mass enhancement identified in the right breast 6 o'clock which measures approximately 1.9 x 1.4 x 1.7 cm (image 19 of the axial T1 post contrast series).    No additional foci of disease identified on the right  There is no lymphadenopathy    Needle localized Right lumpectomy with SLNBx with mediport placement on 02/05/2021  A.  Right axillary sentinel lymph node #1, excision: 1 lymph node negative for malignancy     B.  Right axillary contents, regional resection: 1 lymph node positive for metastatic, poorly differentiated ductal carcinoma (grade 3)   Extranodal extension present     C.  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;   positive for extensive intraductal component   Skin-uninvolved by malignancy   Invasive carcinoma margins-uninvolved by invasive carcinoma        Distance from closest margin: 4 mm from anterior margin   DCIS margins-inferior and medial margins involved by DCIS   Regional lymph nodes-involved by tumor cells        Number of lymph nodes with macrometastasis: 1        Number of lymph nodes with micrometastasis: 0        Number of lymph nodes with isolated tumor cells: 0        Size of largest metastatic deposit: 1.5 cm        Extranodal extension: Present        Total number of lymph nodes examined: 2        Number of sentinel nodes examined: 1   Treatment effect in the breast-no known presurgical therapy   TNM classification (AJCC eighth edition)-  pT1c N1a MX     Bone scan, CT chest/abdomen/pelvis 02/26/2021 negative for metastatic disease    Re-excision lumpectomy of inferior and medial margins on 03/09/2021  A. Right breast, new inferior margin, excision: Fat necrosis, foreign body-type giant cell reaction, chronic inflammation, and fibrosis consistent with previous procedure   No evidence of residual carcinoma   Final inferior margin negative for carcinoma     B.  Right breast, new medial margin, excision: Residual high-grade ductal carcinoma in situ   Ductal carcinoma in situ present less than 1 mm from red/superior margin and green/anterior margin   Fat necrosis, foreign body-type giant cell reaction, chronic   inflammation, and fibrosis consistent with previous procedure   Fibrocystic change   Microcalcifications associated with benign breast tissue and DCIS   Comment:Residual ductal carcinoma in situ is present in 5 out of 13 tissue blocks of part B    Recommended adjuvant chemotherapy (TCHP) for 6 cycles followed by adjuvant RT followed lastly by endocrine therapy. Side effects TCHP reviewed with patient. She agreed to proceed. 2d-echo 02/17/2021 noted EF 60-65%  Cycle # 1 adjuvant TCHP was on 04/08/2021. Cycle # 2 adjuvant TCHP was on 04/29/2021. Cycle # 3 adjuvant TCHP was on 05/20/2021. Cycle # 4 adjuvant TCHP was on 06/10/2021. Cycle # 5 adjuvant TCHP was on 07/22/2021. Cycle # 6 adjuvant TCHP was on 08/12/2021. Radiation Oncology consult appreciated. 2d-echo 10/12/2021 noted EF 55 to 60%  RT was started on 11/01/2021 and completed on 12/10/2021. DEXA scan 12/10/2021 osteopenia by WHO criteria  Arimidex 1 mg po daily was started on 12/11/2021 with fair tolerance so far. 2D-echo 01/25/2022 noted EF 60%  Herceptin/Perjeta today 01/28/2022. Today 01/28/2022. Fatigue. Recent URI sx completed Abx 2 days ago    Review of Systems;  CONSTITUTIONAL: No fever, chills. Fatigue  ENMT: Eyes: No diplopia; Nose: No epistaxis. Mouth: No sore throat. RESPIRATORY: URI sx completed Abx 2 days ago  CARDIOVASCULAR: No chest pain, palpitations. GASTROINTESTINAL: No nausea/vomiting, abdominal pain. GENITOURINARY: No dysuria, urinary frequency, hematuria. NEURO: No syncope, presyncope, headache.   Remainder: ROS NEGATIVE    Past Medical History:      Diagnosis Date    Anxiety     Arthritis     Cancer ([facility] [address] 2021    breast    Cervical radiculopathy     Chronic back pain     Dehydration 9/23/2021    Dehydration 9/23/2021    Depression     Diabetes mellitus type 2, uncontrolled ([facility] 2/12/2017    GERD (gastroesophageal reflux disease)     History of blood transfusion 01/2018    back surgery, lumbar, dr Shayla Finnegan Hypertension     Right leg pain     seeing doctor currently problems with hardware     Medications:  Reviewed and reconciled. Allergies: Allergies   Allergen Reactions    Ceftriaxone Shortness Of Breath     Physical Exam:  /65   Pulse 70   Temp 98.2 °F (36.8 °C)   Ht 6' 1\" (1.854 m)   Wt 153 lb (69.4 kg)   LMP 08/13/2013   SpO2 96%   BMI 20.19 kg/m²   GENERAL: Alert, oriented x 3, not in acute distress. HEENT: PERRLA; EOMI. Oropharynx clear. NECK: Supple. Without lymphadenopathy. LUNGS: Good air entry bilaterally. No wheezing, crackles or ronchi. CARDIOVASCULAR: Regular rate. No murmurs, rubs or gallops. ABDOMEN: Soft. Non-tender, non-distended. Positive bowel sounds. EXTREMITIES: Without clubbing, cyanosis, or edema. NEUROLOGIC: No focal deficits. ECOG PS 1    Lab Results   Component Value Date    WBC 5.3 01/28/2022    HGB 11.8 01/28/2022    HCT 36.8 01/28/2022    .8 (H) 01/28/2022     01/28/2022     Lab Results   Component Value Date     01/28/2022    K 4.3 01/28/2022     01/28/2022    CO2 25 01/28/2022    BUN 10 01/28/2022    CREATININE 0.8 01/28/2022    GLUCOSE 172 (H) 01/28/2022    CALCIUM 9.0 01/28/2022    PROT 6.0 (L) 01/28/2022    LABALBU 3.8 01/28/2022    BILITOT 0.3 01/28/2022    ALKPHOS 181 (H) 01/28/2022    AST 29 01/28/2022    ALT 32 01/28/2022    LABGLOM >60 01/28/2022    GFRAA >60 01/28/2022     Impression/Plan:  62 y/o female with Right Breast Cancer    Bilateral Screening Mammogram 10/06/2020: There are suspicious calcifications in the right breast at the 6 o'clock position which appear pleomorphic. These calcifications are in a segmental distribution. These clustered calcifications are suspicious for malignancy.     Right breast, calcifications at the 6:00, core biopsy on 10/30/2020:    - Small focus of invasive ductal carcinoma, grade 3 (3+3+2 = 8)    - Ductal carcinoma in situ, high nuclear grade, comedo/cribriform/solid types;    - Microcalcifications in DCIS    - Breast receptor and biomarkers (per outside report without looking the   slides:     ER: Positive, 89.8%, strong intensity     MS: Positive, 52.2%, moderate intensity     HER-2: Positive (score 3+)     Ki-67: High proliferation, 26.2%     P53: Negative, 0.2%     Comment:The invasive carcinoma is intermingled with DCIS and measures   3.0 x 2.0 mm in greatest dimension on the slides. CXR PA/Lateral 12/11/2020:  No acute process. Right Axillary U/S on 12/11/2020: There is no axillary LN.     MRI Bilateral Breast 01/05/2021:  Focal, heterogeneous non mass enhancement identified in the right breast 6 o'clock which measures approximately 1.9 x 1.4 x 1.7 cm (image 19 of the axial T1 post contrast series).    No additional foci of disease identified on the right  There is no lymphadenopathy    Needle localized Right lumpectomy with SLNBx with mediport placement on 02/05/2021  A.  Right axillary sentinel lymph node #1, excision: 1 lymph node negative for malignancy     B.  Right axillary contents, regional resection: 1 lymph node positive for metastatic, poorly differentiated ductal carcinoma (grade 3)   Extranodal extension present     C.  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;   positive for extensive intraductal component   Skin-uninvolved by malignancy   Invasive carcinoma margins-uninvolved by invasive carcinoma        Distance from closest margin: 4 mm from anterior margin   DCIS margins-inferior and medial margins involved by DCIS   Regional lymph nodes-involved by tumor cells        Number of lymph nodes with macrometastasis: 1        Number of lymph nodes with micrometastasis: 0        Number of lymph nodes with isolated tumor cells: 0        Size of largest metastatic deposit: 1.5 cm        Extranodal extension: Present        Total number of lymph nodes examined: 2        Number of sentinel nodes examined: 1   Treatment effect in the breast-no known presurgical therapy   TNM classification (AJCC eighth edition)-  pT1c N1a MX     Bone scan, CT chest/abdomen/pelvis 02/26/2021 negative for metastatic disease    Re-excision lumpectomy of inferior and medial margins on 03/09/2021  A. Right breast, new inferior margin, excision: Fat necrosis, foreign body-type giant cell reaction, chronic inflammation, and fibrosis consistent with previous procedure   No evidence of residual carcinoma   Final inferior margin negative for carcinoma     B. Right breast, new medial margin, excision: Residual high-grade ductal carcinoma in situ   Ductal carcinoma in situ present less than 1 mm from red/superior margin and green/anterior margin   Fat necrosis, foreign body-type giant cell reaction, chronic   inflammation, and fibrosis consistent with previous procedure   Fibrocystic change   Microcalcifications associated with benign breast tissue and DCIS   Comment:Residual ductal carcinoma in situ is present in 5 out of 13 tissue blocks of part B    Recommended adjuvant chemotherapy (TCHP) for 6 cycles followed by adjuvant RT followed lastly by endocrine therapy. Side effects TCHP reviewed with patient. She agreed to proceed. 2d-echo 02/17/2021 noted EF 60-65%  Cycle # 1 adjuvant TCHP was on 04/08/2021.   Cycle # 2 adjuvant TCHP was on 04/29/2021. Cycle # 3 adjuvant TCHP was on 05/20/2021. Cycle # 4 adjuvant TCHP was on 06/10/2021. 2D-ECHO 06/29/2021 noted EF 60-65%  Cycle # 5 adjuvant TCHP was on 07/22/2021. Cycle # 6 adjuvant TCHP was on 08/12/2021. 2d-echo 10/12/2021 noted EF 55 to 60%  RT was started on 11/01/2021 and completed on 12/10/2021. DEXA scan 12/10/2021 osteopenia by WHO criteria  Arimidex 1 mg po daily was started on 12/11/2021 with fair tolerance so far. 2D-echo 01/25/2022 noted EF 60%  Herceptin/Perjeta today 01/28/2022. Labs reviewed ok to proceed. 1LNS0.9% over one hour today  URI sx completed Abx 2 days ago follow up with PCP  Continue Arimidex, Ca. VitD    RTC 3 weeks for Herceptin/Perjeta.      Jordyn Dorsey MD   1/13/4406  Board Certified Medical Oncologist

## 2022-02-06 ENCOUNTER — APPOINTMENT (OUTPATIENT)
Dept: CT IMAGING | Age: 57
DRG: 139 | End: 2022-02-06
Payer: MEDICAID

## 2022-02-06 ENCOUNTER — HOSPITAL ENCOUNTER (INPATIENT)
Age: 57
LOS: 3 days | Discharge: HOME | DRG: 139 | End: 2022-02-09
Attending: STUDENT IN AN ORGANIZED HEALTH CARE EDUCATION/TRAINING PROGRAM | Admitting: INTERNAL MEDICINE
Payer: MEDICAID

## 2022-02-06 DIAGNOSIS — J18.9 PNEUMONIA OF RIGHT LUNG DUE TO INFECTIOUS ORGANISM, UNSPECIFIED PART OF LUNG: Primary | ICD-10-CM

## 2022-02-06 DIAGNOSIS — R42 DIZZINESS: ICD-10-CM

## 2022-02-06 LAB
ADENOVIRUS BY PCR: NOT DETECTED
ALBUMIN SERPL-MCNC: 3.8 G/DL (ref 3.5–5.2)
ALP BLD-CCNC: 182 U/L (ref 35–104)
ALT SERPL-CCNC: 32 U/L (ref 0–32)
ANION GAP SERPL CALCULATED.3IONS-SCNC: 10 MMOL/L (ref 7–16)
AST SERPL-CCNC: 30 U/L (ref 0–31)
ATYPICAL LYMPHOCYTE RELATIVE PERCENT: 6.1 % (ref 0–4)
BASOPHILS ABSOLUTE: 0.05 E9/L (ref 0–0.2)
BASOPHILS RELATIVE PERCENT: 0.9 % (ref 0–2)
BILIRUB SERPL-MCNC: 0.3 MG/DL (ref 0–1.2)
BORDETELLA PARAPERTUSSIS BY PCR: NOT DETECTED
BORDETELLA PERTUSSIS BY PCR: NOT DETECTED
BUN BLDV-MCNC: 10 MG/DL (ref 6–20)
CALCIUM SERPL-MCNC: 9.4 MG/DL (ref 8.6–10.2)
CHLAMYDOPHILIA PNEUMONIAE BY PCR: NOT DETECTED
CHLORIDE BLD-SCNC: 99 MMOL/L (ref 98–107)
CO2: 26 MMOL/L (ref 22–29)
CORONAVIRUS 229E BY PCR: NOT DETECTED
CORONAVIRUS HKU1 BY PCR: NOT DETECTED
CORONAVIRUS NL63 BY PCR: NOT DETECTED
CORONAVIRUS OC43 BY PCR: NOT DETECTED
CREAT SERPL-MCNC: 0.8 MG/DL (ref 0.5–1)
EKG ATRIAL RATE: 67 BPM
EKG P AXIS: 58 DEGREES
EKG P-R INTERVAL: 164 MS
EKG Q-T INTERVAL: 386 MS
EKG QRS DURATION: 80 MS
EKG QTC CALCULATION (BAZETT): 407 MS
EKG R AXIS: -15 DEGREES
EKG T AXIS: 19 DEGREES
EKG VENTRICULAR RATE: 67 BPM
EOSINOPHILS ABSOLUTE: 0.05 E9/L (ref 0.05–0.5)
EOSINOPHILS RELATIVE PERCENT: 0.8 % (ref 0–6)
GFR AFRICAN AMERICAN: >60
GFR NON-AFRICAN AMERICAN: >60 ML/MIN/1.73
GLUCOSE BLD-MCNC: 110 MG/DL (ref 74–99)
HCT VFR BLD CALC: 36.8 % (ref 34–48)
HEMOGLOBIN: 11.8 G/DL (ref 11.5–15.5)
HUMAN METAPNEUMOVIRUS BY PCR: NOT DETECTED
HUMAN RHINOVIRUS/ENTEROVIRUS BY PCR: NOT DETECTED
INFLUENZA A BY PCR: NOT DETECTED
INFLUENZA B BY PCR: NOT DETECTED
LYMPHOCYTES ABSOLUTE: 1.2 E9/L (ref 1.5–4)
LYMPHOCYTES RELATIVE PERCENT: 14.8 % (ref 20–42)
MCH RBC QN AUTO: 32.7 PG (ref 26–35)
MCHC RBC AUTO-ENTMCNC: 32.1 % (ref 32–34.5)
MCV RBC AUTO: 101.9 FL (ref 80–99.9)
METER GLUCOSE: 160 MG/DL (ref 74–99)
MONOCYTES ABSOLUTE: 0.51 E9/L (ref 0.1–0.95)
MONOCYTES RELATIVE PERCENT: 8.7 % (ref 2–12)
MYCOPLASMA PNEUMONIAE BY PCR: NOT DETECTED
NEUTROPHILS ABSOLUTE: 3.93 E9/L (ref 1.8–7.3)
NEUTROPHILS RELATIVE PERCENT: 68.7 % (ref 43–80)
NUCLEATED RED BLOOD CELLS: 0 /100 WBC
OVALOCYTES: ABNORMAL
PARAINFLUENZA VIRUS 1 BY PCR: NOT DETECTED
PARAINFLUENZA VIRUS 2 BY PCR: NOT DETECTED
PARAINFLUENZA VIRUS 3 BY PCR: NOT DETECTED
PARAINFLUENZA VIRUS 4 BY PCR: NOT DETECTED
PDW BLD-RTO: 13 FL (ref 11.5–15)
PLATELET # BLD: 238 E9/L (ref 130–450)
PMV BLD AUTO: 9.4 FL (ref 7–12)
POIKILOCYTES: ABNORMAL
POLYCHROMASIA: ABNORMAL
POTASSIUM REFLEX MAGNESIUM: 4.2 MMOL/L (ref 3.5–5)
PRO-BNP: 25 PG/ML (ref 0–125)
RBC # BLD: 3.61 E12/L (ref 3.5–5.5)
RESPIRATORY SYNCYTIAL VIRUS BY PCR: NOT DETECTED
SARS-COV-2, PCR: NOT DETECTED
SODIUM BLD-SCNC: 135 MMOL/L (ref 132–146)
TOTAL PROTEIN: 6.2 G/DL (ref 6.4–8.3)
TROPONIN, HIGH SENSITIVITY: 12 NG/L (ref 0–9)
TROPONIN, HIGH SENSITIVITY: 12 NG/L (ref 0–9)
TROPONIN, HIGH SENSITIVITY: 13 NG/L (ref 0–9)
WBC # BLD: 5.7 E9/L (ref 4.5–11.5)

## 2022-02-06 PROCEDURE — 1200000000 HC SEMI PRIVATE

## 2022-02-06 PROCEDURE — 2580000003 HC RX 258: Performed by: STUDENT IN AN ORGANIZED HEALTH CARE EDUCATION/TRAINING PROGRAM

## 2022-02-06 PROCEDURE — 82962 GLUCOSE BLOOD TEST: CPT

## 2022-02-06 PROCEDURE — 70450 CT HEAD/BRAIN W/O DYE: CPT

## 2022-02-06 PROCEDURE — 83880 ASSAY OF NATRIURETIC PEPTIDE: CPT

## 2022-02-06 PROCEDURE — 71275 CT ANGIOGRAPHY CHEST: CPT

## 2022-02-06 PROCEDURE — 85025 COMPLETE CBC W/AUTO DIFF WBC: CPT

## 2022-02-06 PROCEDURE — 80053 COMPREHEN METABOLIC PANEL: CPT

## 2022-02-06 PROCEDURE — 84484 ASSAY OF TROPONIN QUANT: CPT

## 2022-02-06 PROCEDURE — 93010 ELECTROCARDIOGRAM REPORT: CPT | Performed by: INTERNAL MEDICINE

## 2022-02-06 PROCEDURE — 2580000003 HC RX 258: Performed by: NURSE PRACTITIONER

## 2022-02-06 PROCEDURE — 93005 ELECTROCARDIOGRAM TRACING: CPT | Performed by: STUDENT IN AN ORGANIZED HEALTH CARE EDUCATION/TRAINING PROGRAM

## 2022-02-06 PROCEDURE — 6360000004 HC RX CONTRAST MEDICATION: Performed by: RADIOLOGY

## 2022-02-06 PROCEDURE — 99285 EMERGENCY DEPT VISIT HI MDM: CPT

## 2022-02-06 PROCEDURE — 0202U NFCT DS 22 TRGT SARS-COV-2: CPT

## 2022-02-06 PROCEDURE — 6370000000 HC RX 637 (ALT 250 FOR IP): Performed by: STUDENT IN AN ORGANIZED HEALTH CARE EDUCATION/TRAINING PROGRAM

## 2022-02-06 PROCEDURE — 6370000000 HC RX 637 (ALT 250 FOR IP): Performed by: NURSE PRACTITIONER

## 2022-02-06 PROCEDURE — 87449 NOS EACH ORGANISM AG IA: CPT

## 2022-02-06 RX ORDER — INSULIN GLARGINE 100 [IU]/ML
30 INJECTION, SOLUTION SUBCUTANEOUS NIGHTLY
Status: DISCONTINUED | OUTPATIENT
Start: 2022-02-06 | End: 2022-02-09 | Stop reason: HOSPADM

## 2022-02-06 RX ORDER — DEXTROSE MONOHYDRATE 50 MG/ML
100 INJECTION, SOLUTION INTRAVENOUS PRN
Status: DISCONTINUED | OUTPATIENT
Start: 2022-02-06 | End: 2022-02-09 | Stop reason: HOSPADM

## 2022-02-06 RX ORDER — SODIUM CHLORIDE 0.9 % (FLUSH) 0.9 %
5-40 SYRINGE (ML) INJECTION EVERY 12 HOURS SCHEDULED
Status: DISCONTINUED | OUTPATIENT
Start: 2022-02-06 | End: 2022-02-09 | Stop reason: HOSPADM

## 2022-02-06 RX ORDER — ATENOLOL 25 MG/1
25 TABLET ORAL EVERY EVENING
Status: DISCONTINUED | OUTPATIENT
Start: 2022-02-06 | End: 2022-02-09 | Stop reason: HOSPADM

## 2022-02-06 RX ORDER — ACETAMINOPHEN 650 MG/1
650 SUPPOSITORY RECTAL EVERY 6 HOURS PRN
Status: DISCONTINUED | OUTPATIENT
Start: 2022-02-06 | End: 2022-02-09 | Stop reason: HOSPADM

## 2022-02-06 RX ORDER — LEVOFLOXACIN 750 MG/1
750 TABLET ORAL DAILY
Qty: 7 TABLET | Refills: 0 | Status: SHIPPED | OUTPATIENT
Start: 2022-02-06 | End: 2022-02-06

## 2022-02-06 RX ORDER — ONDANSETRON 4 MG/1
4 TABLET, ORALLY DISINTEGRATING ORAL EVERY 8 HOURS PRN
Status: DISCONTINUED | OUTPATIENT
Start: 2022-02-06 | End: 2022-02-09 | Stop reason: HOSPADM

## 2022-02-06 RX ORDER — DOXYCYCLINE HYCLATE 100 MG
100 TABLET ORAL 2 TIMES DAILY
Qty: 14 TABLET | Refills: 0 | Status: SHIPPED | OUTPATIENT
Start: 2022-02-06 | End: 2022-02-09 | Stop reason: HOSPADM

## 2022-02-06 RX ORDER — IPRATROPIUM BROMIDE AND ALBUTEROL SULFATE 2.5; .5 MG/3ML; MG/3ML
1 SOLUTION RESPIRATORY (INHALATION)
Status: DISCONTINUED | OUTPATIENT
Start: 2022-02-07 | End: 2022-02-09 | Stop reason: HOSPADM

## 2022-02-06 RX ORDER — AMITRIPTYLINE HYDROCHLORIDE 25 MG/1
50 TABLET, FILM COATED ORAL NIGHTLY
Status: DISCONTINUED | OUTPATIENT
Start: 2022-02-06 | End: 2022-02-09 | Stop reason: HOSPADM

## 2022-02-06 RX ORDER — CEFDINIR 300 MG/1
300 CAPSULE ORAL 2 TIMES DAILY
Qty: 14 CAPSULE | Refills: 0 | Status: SHIPPED | OUTPATIENT
Start: 2022-02-06 | End: 2022-02-09 | Stop reason: HOSPADM

## 2022-02-06 RX ORDER — ERGOCALCIFEROL 1.25 MG/1
50000 CAPSULE ORAL WEEKLY
COMMUNITY

## 2022-02-06 RX ORDER — DOXYCYCLINE HYCLATE 100 MG/1
100 CAPSULE ORAL ONCE
Status: COMPLETED | OUTPATIENT
Start: 2022-02-06 | End: 2022-02-06

## 2022-02-06 RX ORDER — ACETAMINOPHEN 325 MG/1
650 TABLET ORAL EVERY 6 HOURS PRN
Status: DISCONTINUED | OUTPATIENT
Start: 2022-02-06 | End: 2022-02-09 | Stop reason: HOSPADM

## 2022-02-06 RX ORDER — CHOLESTYRAMINE 4 G/9G
1 POWDER, FOR SUSPENSION ORAL 2 TIMES DAILY
Status: DISCONTINUED | OUTPATIENT
Start: 2022-02-06 | End: 2022-02-09 | Stop reason: HOSPADM

## 2022-02-06 RX ORDER — MIRTAZAPINE 15 MG/1
15 TABLET, FILM COATED ORAL NIGHTLY
Status: DISCONTINUED | OUTPATIENT
Start: 2022-02-06 | End: 2022-02-09 | Stop reason: HOSPADM

## 2022-02-06 RX ORDER — 0.9 % SODIUM CHLORIDE 0.9 %
1000 INTRAVENOUS SOLUTION INTRAVENOUS ONCE
Status: COMPLETED | OUTPATIENT
Start: 2022-02-06 | End: 2022-02-06

## 2022-02-06 RX ORDER — DEXTROSE MONOHYDRATE 25 G/50ML
12.5 INJECTION, SOLUTION INTRAVENOUS PRN
Status: DISCONTINUED | OUTPATIENT
Start: 2022-02-06 | End: 2022-02-06 | Stop reason: RX

## 2022-02-06 RX ORDER — LEVOFLOXACIN 5 MG/ML
750 INJECTION, SOLUTION INTRAVENOUS EVERY 24 HOURS
Status: DISCONTINUED | OUTPATIENT
Start: 2022-02-06 | End: 2022-02-09 | Stop reason: SDUPTHER

## 2022-02-06 RX ORDER — SODIUM CHLORIDE 0.9 % (FLUSH) 0.9 %
10 SYRINGE (ML) INJECTION PRN
Status: DISCONTINUED | OUTPATIENT
Start: 2022-02-06 | End: 2022-02-09 | Stop reason: HOSPADM

## 2022-02-06 RX ORDER — SODIUM CHLORIDE 9 MG/ML
25 INJECTION, SOLUTION INTRAVENOUS PRN
Status: DISCONTINUED | OUTPATIENT
Start: 2022-02-06 | End: 2022-02-09 | Stop reason: HOSPADM

## 2022-02-06 RX ORDER — ALBUTEROL SULFATE 2.5 MG/3ML
2.5 SOLUTION RESPIRATORY (INHALATION) EVERY 6 HOURS PRN
Status: DISCONTINUED | OUTPATIENT
Start: 2022-02-06 | End: 2022-02-09 | Stop reason: HOSPADM

## 2022-02-06 RX ORDER — ONDANSETRON 2 MG/ML
4 INJECTION INTRAMUSCULAR; INTRAVENOUS EVERY 6 HOURS PRN
Status: DISCONTINUED | OUTPATIENT
Start: 2022-02-06 | End: 2022-02-09 | Stop reason: HOSPADM

## 2022-02-06 RX ORDER — ALBUTEROL SULFATE 90 UG/1
2 AEROSOL, METERED RESPIRATORY (INHALATION) EVERY 6 HOURS PRN
Status: DISCONTINUED | OUTPATIENT
Start: 2022-02-06 | End: 2022-02-06 | Stop reason: CLARIF

## 2022-02-06 RX ORDER — LIDOCAINE AND PRILOCAINE 25; 25 MG/G; MG/G
CREAM TOPICAL
Status: DISPENSED
Start: 2022-02-06 | End: 2022-02-07

## 2022-02-06 RX ORDER — CEFDINIR 300 MG/1
300 CAPSULE ORAL ONCE
Status: COMPLETED | OUTPATIENT
Start: 2022-02-06 | End: 2022-02-06

## 2022-02-06 RX ORDER — ALPRAZOLAM 0.25 MG/1
0.5 TABLET ORAL NIGHTLY
Status: DISCONTINUED | OUTPATIENT
Start: 2022-02-06 | End: 2022-02-09 | Stop reason: HOSPADM

## 2022-02-06 RX ORDER — NICOTINE POLACRILEX 4 MG
15 LOZENGE BUCCAL PRN
Status: DISCONTINUED | OUTPATIENT
Start: 2022-02-06 | End: 2022-02-09 | Stop reason: HOSPADM

## 2022-02-06 RX ORDER — PREGABALIN 75 MG/1
75 CAPSULE ORAL DAILY
Status: DISCONTINUED | OUTPATIENT
Start: 2022-02-07 | End: 2022-02-09 | Stop reason: HOSPADM

## 2022-02-06 RX ORDER — MULTIVIT WITH MINERALS/LUTEIN
250 TABLET ORAL DAILY
COMMUNITY

## 2022-02-06 RX ORDER — FAMOTIDINE 20 MG/1
20 TABLET, FILM COATED ORAL DAILY
Status: DISCONTINUED | OUTPATIENT
Start: 2022-02-07 | End: 2022-02-09 | Stop reason: HOSPADM

## 2022-02-06 RX ORDER — CYANOCOBALAMIN (VITAMIN B-12) 500 MCG
TABLET ORAL
Status: ON HOLD | COMMUNITY
End: 2022-02-09 | Stop reason: HOSPADM

## 2022-02-06 RX ADMIN — ATENOLOL 25 MG: 25 TABLET ORAL at 22:50

## 2022-02-06 RX ADMIN — SODIUM CHLORIDE 1000 ML: 9 INJECTION, SOLUTION INTRAVENOUS at 13:45

## 2022-02-06 RX ADMIN — INSULIN GLARGINE 30 UNITS: 100 INJECTION, SOLUTION SUBCUTANEOUS at 22:49

## 2022-02-06 RX ADMIN — CEFDINIR 300 MG: 300 CAPSULE ORAL at 19:04

## 2022-02-06 RX ADMIN — CHOLESTYRAMINE 4 G: 4 POWDER, FOR SUSPENSION ORAL at 22:50

## 2022-02-06 RX ADMIN — MIRTAZAPINE 15 MG: 15 TABLET, FILM COATED ORAL at 22:50

## 2022-02-06 RX ADMIN — DOXYCYCLINE HYCLATE 100 MG: 100 CAPSULE ORAL at 19:04

## 2022-02-06 RX ADMIN — SODIUM CHLORIDE, PRESERVATIVE FREE 10 ML: 5 INJECTION INTRAVENOUS at 22:50

## 2022-02-06 RX ADMIN — ALPRAZOLAM 0.5 MG: 0.25 TABLET ORAL at 22:50

## 2022-02-06 RX ADMIN — Medication 400 MG: at 22:50

## 2022-02-06 RX ADMIN — SODIUM CHLORIDE 1000 ML: 9 INJECTION, SOLUTION INTRAVENOUS at 18:08

## 2022-02-06 RX ADMIN — INSULIN LISPRO 1 UNITS: 100 INJECTION, SOLUTION INTRAVENOUS; SUBCUTANEOUS at 22:49

## 2022-02-06 RX ADMIN — IOPAMIDOL 75 ML: 755 INJECTION, SOLUTION INTRAVENOUS at 15:08

## 2022-02-06 ASSESSMENT — ENCOUNTER SYMPTOMS
EYE PAIN: 0
SINUS PRESSURE: 0
SORE THROAT: 0
SHORTNESS OF BREATH: 1
EYE DISCHARGE: 0
EYE REDNESS: 0
NAUSEA: 0
WHEEZING: 0
ABDOMINAL DISTENTION: 0
BACK PAIN: 0
VOMITING: 0
COUGH: 1
DIARRHEA: 0

## 2022-02-06 ASSESSMENT — PAIN SCALES - GENERAL
PAINLEVEL_OUTOF10: 0
PAINLEVEL_OUTOF10: 0
PAINLEVEL_OUTOF10: 8

## 2022-02-06 NOTE — ED NOTES
Bed: 03  Expected date:   Expected time:   Means of arrival:   Comments:  EMS, sob     Asher Yap RN  02/06/22 3207

## 2022-02-06 NOTE — ED PROVIDER NOTES
Chief Complaint   Patient presents with    Shortness of Breath     SOB x 2 weeks- hx breast CA       Patient is a 58-year-old female with a history of breast cancer who underwent chemotherapy treatment last week who presents today for chest pain shortness of breath as well as a cough. She has had these symptoms for 3 weeks. Symptoms are worsened with deep inspiration. She does have productive yellow sputum. She states she has been seen in multiple urgent cares and has had x-rays and Covid test that have all been negative. Symptoms have been persistent and moderate in severity. She is not tried a medication for the symptoms. She denies fevers or chills. Denies lightheadedness or dizziness. Denies recent travel or surgery. No history of DVT or PE. She is not on blood thinning medications. The history is provided by the patient. No  was used. Review of Systems   Constitutional: Negative for chills and fever. HENT: Negative for ear pain, sinus pressure and sore throat. Eyes: Negative for pain, discharge and redness. Respiratory: Positive for cough and shortness of breath. Negative for wheezing. Cardiovascular: Positive for chest pain. Negative for leg swelling. Gastrointestinal: Negative for abdominal distention, diarrhea, nausea and vomiting. Genitourinary: Negative for dysuria and frequency. Musculoskeletal: Negative for arthralgias and back pain. Skin: Negative for rash and wound. Neurological: Negative for weakness and headaches. Hematological: Negative for adenopathy. All other systems reviewed and are negative. Physical Exam  Vitals and nursing note reviewed. Constitutional:       General: She is not in acute distress. Appearance: Normal appearance. She is well-developed. She is not ill-appearing. HENT:      Head: Normocephalic and atraumatic. Eyes:      Pupils: Pupils are equal, round, and reactive to light.    Cardiovascular: Rate and Rhythm: Normal rate and regular rhythm. Pulses: Normal pulses. Heart sounds: Normal heart sounds. Comments: Port to left upper chest  Pulmonary:      Effort: Pulmonary effort is normal. No respiratory distress. Breath sounds: Normal breath sounds. No wheezing or rales. Abdominal:      General: Bowel sounds are normal.      Palpations: Abdomen is soft. Tenderness: There is no abdominal tenderness. There is no guarding or rebound. Musculoskeletal:         General: No tenderness. Cervical back: Normal range of motion and neck supple. No rigidity or tenderness. Right lower leg: No edema. Left lower leg: No edema. Skin:     General: Skin is warm and dry. Capillary Refill: Capillary refill takes less than 2 seconds. Neurological:      Mental Status: She is alert and oriented to person, place, and time. Cranial Nerves: No cranial nerve deficit.       Coordination: Coordination normal.          Procedures     Labs Reviewed   CBC WITH AUTO DIFFERENTIAL - Abnormal; Notable for the following components:       Result Value    .9 (*)     Lymphocytes % 14.8 (*)     Lymphocytes Absolute 1.20 (*)     Atypical Lymphocytes Relative 6.1 (*)     All other components within normal limits   COMPREHENSIVE METABOLIC PANEL W/ REFLEX TO MG FOR LOW K - Abnormal; Notable for the following components:    Glucose 110 (*)     Total Protein 6.2 (*)     Alkaline Phosphatase 182 (*)     All other components within normal limits   TROPONIN - Abnormal; Notable for the following components:    Troponin, High Sensitivity 12 (*)     All other components within normal limits   TROPONIN - Abnormal; Notable for the following components:    Troponin, High Sensitivity 13 (*)     All other components within normal limits   TROPONIN - Abnormal; Notable for the following components:    Troponin, High Sensitivity 12 (*)     All other components within normal limits   BASIC METABOLIC PANEL W/ REFLEX TO MG FOR LOW K - Abnormal; Notable for the following components:    Glucose 182 (*)     All other components within normal limits   CBC - Abnormal; Notable for the following components:    RBC 3.15 (*)     Hemoglobin 10.4 (*)     Hematocrit 32.2 (*)     .2 (*)     All other components within normal limits   HEMOGLOBIN A1C - Abnormal; Notable for the following components:    Hemoglobin A1C 6.2 (*)     All other components within normal limits   HEMOGLOBIN A1C - Abnormal; Notable for the following components:    Hemoglobin A1C 6.3 (*)     All other components within normal limits   POCT GLUCOSE - Abnormal; Notable for the following components:    Meter Glucose 160 (*)     All other components within normal limits   POCT GLUCOSE - Abnormal; Notable for the following components:    Meter Glucose 129 (*)     All other components within normal limits   RESPIRATORY PANEL, MOLECULAR, WITH COVID-19   GRAM STAIN   LEGIONELLA ANTIGEN, URINE   STREP PNEUMONIAE ANTIGEN   BRAIN NATRIURETIC PEPTIDE   PROCALCITONIN   POCT GLUCOSE   POCT GLUCOSE   POCT GLUCOSE   POCT GLUCOSE     CT Head WO Contrast   Final Result   No acute intracranial abnormality. RECOMMENDATIONS:   Unavailable         CTA PULMONARY W CONTRAST   Final Result   1. There is no pulmonary embolus   2. Small patchy bilateral ground-glass infiltrates more prominent within the   lateral aspect of the base of the right upper lobe and superior aspect of the   right middle lobe. The findings are consistent with pneumonia and have the   appearance of early COVID pneumonia. EKG #1:   I personally interpreted this EKG  Time:  1328    Rate: 67  Rhythm: Sinus. Interpretation: Normal sinus rhythm, no ST elevation or T wave inversion.       MDM  Number of Diagnoses or Management Options  Pneumonia of right lung due to infectious organism, unspecified part of lung  Diagnosis management comments: Patient is a 79-year-old female with a history of cancer on chemo who presents today for shortness of breath. On arrival vitals are stable, she is not hypoxic, heart lungs clear to auscultation. Labs and imaging were obtained. Troponin Of 9, 13, 12, EKG shows no acute ischemic changes. CT was obtained as patient was complaining of Shortness of breath and has cancer history. CTA shows no evidence of pulmonary embolism, they do note new infiltrate concerning for pneumonia. Patient does have a productive cough, white count was elevated. Low curb 65 score, patient will be discharged home with antibiotics for pneumonia and instructed to follow-up with PCP. She is remained hemodynamically stable. Per chart review she was listed as having allergy to Rocephin, however she states she felt like she was short of breath, however did not have any facial swelling, or difficulty breathing or rash. After patient was given medication, she states that throughout her entire hospitalization she has felt lightheaded and dizzy and off-balance. We attempted to ambulate the patient, however she states she does not feel steady on her feet. This time CT head was obtained was negative. Patient will be admitted for pneumonia and dizziness. No cardiac abnormalities noted. Amount and/or Complexity of Data Reviewed  Clinical lab tests: reviewed  Tests in the radiology section of CPT®: reviewed  Tests in the medicine section of CPT®: reviewed         ED Course as of 02/07/22 1001   Sun Feb 06, 2022   [address] [place] [address]:     I have personally performed and/or participated in the history, exam, medical decision making, and procedures and agree with all pertinent clinical information unless otherwise noted. I have also reviewed and agree with the past medical, family and social history unless otherwise noted. I have discussed this patient in detail with the resident, and provided the instruction and education regarding the patient. My findings/plan:    This is a 58-year-old female with history of cancer undergoing chemo every 3 weeks who is presenting for evaluation of shortness of breath. Patient notes that over the past 3 weeks she has been having cough and congestion with worsening shortness of breath. She was seen at outside hospital about 2 weeks ago, was diagnosed with a URI and sent home. She notes her last 2 days she is having worsening symptoms. She notes body aches, muscle aches, cough, congestion, chest pain or shortness of breath. Plan for labs, imaging, supportive care. [BB]   2018 [facility] will admit []      ED Course User Index  [BB] Sonja Arita DO  [] Erick Bahena, DO       --------------------------------------------- PAST HISTORY ---------------------------------------------  Past Medical History:  has a past medical history of Anxiety, Arthritis, Cancer ([facility] [address] Cervical radiculopathy, Chronic back pain, Dehydration, Dehydration, Depression, Diabetes mellitus type 2, uncontrolled ([facility] [address] GERD (gastroesophageal reflux disease), History of blood transfusion, Hypertension, Neuropathy, and Right leg pain. Past Surgical History:  has a past surgical history that includes Cholecystectomy (2000); Foot surgery (1996); ERCP; Tubal ligation (1991); cervical fusion (2008); cervical fusion (2015); Colonoscopy; [place] tooth extraction; Dilation and curettage of uterus (2013); Hysterectomy (2013); cervical fusion (04/25/16); Carpal tunnel release (Right, 04/09/2017); back surgery (01/08/2018); Spine surgery; other surgical history (Right, 05/14/2018); pr open rx tibia shaft fx,screws (Right, 5/14/2018); pr office/outpt visit,procedure only (Right, 7/5/2018); Tibia fracture surgery (Right, 1/10/2019); Port Surgery (Left, 2/5/2021); Breast biopsy (Right, 2/5/2021); and Breast surgery (Right, 3/9/2021). Social History:  reports that she quit smoking about 6 years ago. Her smoking use included cigarettes. She has a 5.00 pack-year smoking history.  She has never used smokeless tobacco. She reports that she does not drink alcohol and does not use drugs. Family History: family history includes Diabetes in her mother. The patients home medications have been reviewed.     Allergies: Ceftriaxone    -------------------------------------------------- RESULTS -------------------------------------------------    LABS:  Results for orders placed or performed during the hospital encounter of 02/06/22   Respiratory Panel, Molecular, with COVID-19 (Restricted: peds pts or suitable admitted adults)    Specimen: Nasopharyngeal   Result Value Ref Range    Adenovirus by PCR Not Detected Not Detected    Bordetella parapertussis by PCR Not Detected Not Detected    Bordetella pertussis by PCR Not Detected Not Detected    Chlamydophilia pneumoniae by PCR Not Detected Not Detected    Coronavirus 229E by PCR Not Detected Not Detected    Coronavirus HKU1 by PCR Not Detected Not Detected    Coronavirus NL63 by PCR Not Detected Not Detected    Coronavirus OC43 by PCR Not Detected Not Detected    SARS-CoV-2, PCR Not Detected Not Detected    Human Metapneumovirus by PCR Not Detected Not Detected    Human Rhinovirus/Enterovirus by PCR Not Detected Not Detected    Influenza A by PCR Not Detected Not Detected    Influenza B by PCR Not Detected Not Detected    Mycoplasma pneumoniae by PCR Not Detected Not Detected    Parainfluenza Virus 1 by PCR Not Detected Not Detected    Parainfluenza Virus 2 by PCR Not Detected Not Detected    Parainfluenza Virus 3 by PCR Not Detected Not Detected    Parainfluenza Virus 4 by PCR Not Detected Not Detected    Respiratory Syncytial Virus by PCR Not Detected Not Detected   CBC Auto Differential   Result Value Ref Range    WBC 5.7 4.5 - 11.5 E9/L    RBC 3.61 3.50 - 5.50 E12/L    Hemoglobin 11.8 11.5 - 15.5 g/dL    Hematocrit 36.8 34.0 - 48.0 %    .9 (H) 80.0 - 99.9 fL    MCH 32.7 26.0 - 35.0 pg    MCHC 32.1 32.0 - 34.5 %    RDW 13.0 11.5 - 15.0 fL Platelets 785 037 - 356 E9/L    MPV 9.4 7.0 - 12.0 fL    Neutrophils % 68.7 43.0 - 80.0 %    Lymphocytes % 14.8 (L) 20.0 - 42.0 %    Monocytes % 8.7 2.0 - 12.0 %    Eosinophils % 0.8 0.0 - 6.0 %    Basophils % 0.9 0.0 - 2.0 %    Neutrophils Absolute 3.93 1.80 - 7.30 E9/L    Lymphocytes Absolute 1.20 (L) 1.50 - 4.00 E9/L    Monocytes Absolute 0.51 0.10 - 0.95 E9/L    Eosinophils Absolute 0.05 0.05 - 0.50 E9/L    Basophils Absolute 0.05 0.00 - 0.20 E9/L    Atypical Lymphocytes Relative 6.1 (H) 0.0 - 4.0 %    nRBC 0.0 /100 WBC    Polychromasia 1+     Poikilocytes 1+     Ovalocytes 1+    Comprehensive Metabolic Panel w/ Reflex to MG   Result Value Ref Range    Sodium 135 132 - 146 mmol/L    Potassium reflex Magnesium 4.2 3.5 - 5.0 mmol/L    Chloride 99 98 - 107 mmol/L    CO2 26 22 - 29 mmol/L    Anion Gap 10 7 - 16 mmol/L    Glucose 110 (H) 74 - 99 mg/dL    BUN 10 6 - 20 mg/dL    CREATININE 0.8 0.5 - 1.0 mg/dL    GFR Non-African American >60 >=60 mL/min/1.73    GFR African American >60     Calcium 9.4 8.6 - 10.2 mg/dL    Total Protein 6.2 (L) 6.4 - 8.3 g/dL    Albumin 3.8 3.5 - 5.2 g/dL    Total Bilirubin 0.3 0.0 - 1.2 mg/dL    Alkaline Phosphatase 182 (H) 35 - 104 U/L    ALT 32 0 - 32 U/L    AST 30 0 - 31 U/L   Troponin   Result Value Ref Range    Troponin, High Sensitivity 12 (H) 0 - 9 ng/L   Brain Natriuretic Peptide   Result Value Ref Range    Pro-BNP 25 0 - 125 pg/mL   Troponin   Result Value Ref Range    Troponin, High Sensitivity 13 (H) 0 - 9 ng/L   Troponin   Result Value Ref Range    Troponin, High Sensitivity 12 (H) 0 - 9 ng/L   Basic Metabolic Panel w/ Reflex to MG   Result Value Ref Range    Sodium 136 132 - 146 mmol/L    Potassium reflex Magnesium 4.1 3.5 - 5.0 mmol/L    Chloride 102 98 - 107 mmol/L    CO2 26 22 - 29 mmol/L    Anion Gap 8 7 - 16 mmol/L    Glucose 182 (H) 74 - 99 mg/dL    BUN 10 6 - 20 mg/dL    CREATININE 0.9 0.5 - 1.0 mg/dL    GFR Non-African American >60 >=60 mL/min/1.73    GFR African American >60     Calcium 9.1 8.6 - 10.2 mg/dL   Procalcitonin   Result Value Ref Range    Procalcitonin 0.05 0.00 - 0.08 ng/mL   CBC   Result Value Ref Range    WBC 5.4 4.5 - 11.5 E9/L    RBC 3.15 (L) 3.50 - 5.50 E12/L    Hemoglobin 10.4 (L) 11.5 - 15.5 g/dL    Hematocrit 32.2 (L) 34.0 - 48.0 %    .2 (H) 80.0 - 99.9 fL    MCH 33.0 26.0 - 35.0 pg    MCHC 32.3 32.0 - 34.5 %    RDW 13.2 11.5 - 15.0 fL    Platelets 169 170 - 364 E9/L    MPV 9.9 7.0 - 12.0 fL   Hemoglobin A1c   Result Value Ref Range    Hemoglobin A1C 6.2 (H) 4.0 - 5.6 %   Hemoglobin A1C   Result Value Ref Range    Hemoglobin A1C 6.3 (H) 4.0 - 5.6 %   POCT Glucose   Result Value Ref Range    Meter Glucose 160 (H) 74 - 99 mg/dL   POCT Glucose   Result Value Ref Range    Meter Glucose 129 (H) 74 - 99 mg/dL   EKG 12 Lead   Result Value Ref Range    Ventricular Rate 67 BPM    Atrial Rate 67 BPM    P-R Interval 164 ms    QRS Duration 80 ms    Q-T Interval 386 ms    QTc Calculation (Bazett) 407 ms    P Axis 58 degrees    R Axis -15 degrees    T Axis 19 degrees       RADIOLOGY:  CT Head WO Contrast   Final Result   No acute intracranial abnormality. RECOMMENDATIONS:   Unavailable         CTA PULMONARY W CONTRAST   Final Result   1. There is no pulmonary embolus   2. Small patchy bilateral ground-glass infiltrates more prominent within the   lateral aspect of the base of the right upper lobe and superior aspect of the   right middle lobe. The findings are consistent with pneumonia and have the   appearance of early COVID pneumonia.                 ------------------------- NURSING NOTES AND VITALS REVIEWED ---------------------------  Date / Time Roomed:  2/6/2022  1:02 PM  ED Bed Assignment:  [id number]    The nursing notes within the ED encounter and vital signs as below have been reviewed.      Patient Vitals for the past 24 hrs:   BP Temp Temp src Pulse Resp SpO2 Height Weight   02/07/22 0824 99/60 98.1 °F (36.7 °C) Oral 70 18 92 % -- -- 02/06/22 2315 -- -- -- -- -- -- -- 149 lb (67.6 kg)   02/06/22 2232 107/63 97.8 °F (36.6 °C) Oral 75 18 95 % -- --   02/06/22 2117 95/64 97.8 °F (36.6 °C) Oral 74 18 95 % -- --   02/06/22 2021 117/70 -- -- 92 16 96 % -- --   02/06/22 1809 103/60 -- -- 74 15 95 % -- --   02/06/22 1544 96/66 -- -- 70 14 98 % -- --   02/06/22 1323 114/78 98.5 °F (36.9 °C) Oral -- -- -- -- --   02/06/22 1313 -- -- -- 75 18 96 % 6' 1\" (1.854 m) 156 lb (70.8 kg)       Oxygen Saturation Interpretation: Normal    ------------------------------------------ PROGRESS NOTES ------------------------------------------    Counseling:  I have spoken with the patient and discussed todays results, in addition to providing specific details for the plan of care and counseling regarding the diagnosis and prognosis. Their questions are answered at this time and they are agreeable with the plan of admission.    --------------------------------- ADDITIONAL PROVIDER NOTES ---------------------------------  Consultations:  Spoke with [facility]. Discussed case. They will admit the patient. This patient's ED course included: a personal history and physicial examination    This patient has remained hemodynamically stable during their ED course.       Medications   lidocaine-prilocaine (EMLA) 2.5-2.5 % cream (has no administration in time range)   ALPRAZolam (XANAX) tablet 0.5 mg (0.5 mg Oral Given 2/6/22 2250)   amitriptyline (ELAVIL) tablet 50 mg (50 mg Oral Given 2/7/22 0023)   atenolol (TENORMIN) tablet 25 mg (25 mg Oral Given 2/6/22 2250)   cholestyramine (QUESTRAN) packet 4 g (4 g Oral Given 2/7/22 0844)   famotidine (PEPCID) tablet 20 mg (20 mg Oral Given 2/7/22 0844)   insulin glargine (LANTUS) injection vial 30 Units (30 Units SubCUTAneous Given 2/6/22 2249)   magnesium oxide (MAG-OX) tablet 400 mg (400 mg Oral Given 2/7/22 0844)   mirtazapine (REMERON) tablet 15 mg (15 mg Oral Given 2/6/22 2250)   pregabalin (LYRICA) capsule 75 mg (75 mg Oral Given 2/7/22 0844)   sodium chloride flush 0.9 % injection 5-40 mL (10 mLs IntraVENous Given 2/7/22 0845)   sodium chloride flush 0.9 % injection 10 mL (10 mLs IntraVENous Given 2/6/22 2250)   0.9 % sodium chloride infusion (has no administration in time range)   enoxaparin (LOVENOX) injection 40 mg (40 mg SubCUTAneous Given 2/7/22 0844)   ondansetron (ZOFRAN-ODT) disintegrating tablet 4 mg (has no administration in time range)     Or   ondansetron (ZOFRAN) injection 4 mg (has no administration in time range)   magnesium hydroxide (MILK OF MAGNESIA) 400 MG/5ML suspension 30 mL (has no administration in time range)   acetaminophen (TYLENOL) tablet 650 mg (has no administration in time range)     Or   acetaminophen (TYLENOL) suppository 650 mg (has no administration in time range)   ipratropium-albuterol (DUONEB) nebulizer solution 1 ampule (1 ampule Inhalation Given 2/7/22 0811)   glucose (GLUTOSE) 40 % oral gel 15 g (has no administration in time range)   glucagon (rDNA) injection 1 mg (has no administration in time range)   dextrose 5 % solution (has no administration in time range)   levoFLOXacin (LEVAQUIN) 750 MG/150ML infusion 750 mg (0 mg IntraVENous Stopped 2/7/22 0230)   insulin lispro (HUMALOG) injection vial 0-12 Units (0 Units SubCUTAneous Not Given 2/7/22 0600)   insulin lispro (HUMALOG) injection vial 0-6 Units (1 Units SubCUTAneous Given 2/6/22 2249)   dextrose bolus (hypoglycemia) 10% 125 mL (has no administration in time range)     Or   dextrose bolus (hypoglycemia) 10% 250 mL (has no administration in time range)   albuterol (PROVENTIL) nebulizer solution 2.5 mg (has no administration in time range)   0.9 % sodium chloride bolus (0 mLs IntraVENous Stopped 2/6/22 1525)   iopamidol (ISOVUE-370) 76 % injection 75 mL (75 mLs IntraVENous Given 2/6/22 1508)   0.9 % sodium chloride bolus (0 mLs IntraVENous Stopped 2/6/22 2016)   cefdinir (OMNICEF) capsule 300 mg (300 mg Oral Given 2/6/22 1904)   doxycycline hyclate (VIBRAMYCIN) capsule 100 mg (100 mg Oral Given 2/6/22 1904)         Diagnosis:  1. Pneumonia of right lung due to infectious organism, unspecified part of lung    2. Dizziness        Disposition:  Patient's disposition: Admit to telemetry  Patient's condition is stable.           Sreekanth Higgins DO  Resident  02/07/22 1006

## 2022-02-07 LAB
ANION GAP SERPL CALCULATED.3IONS-SCNC: 8 MMOL/L (ref 7–16)
BUN BLDV-MCNC: 10 MG/DL (ref 6–20)
CALCIUM SERPL-MCNC: 9.1 MG/DL (ref 8.6–10.2)
CHLORIDE BLD-SCNC: 102 MMOL/L (ref 98–107)
CO2: 26 MMOL/L (ref 22–29)
CREAT SERPL-MCNC: 0.9 MG/DL (ref 0.5–1)
GFR AFRICAN AMERICAN: >60
GFR NON-AFRICAN AMERICAN: >60 ML/MIN/1.73
GLUCOSE BLD-MCNC: 182 MG/DL (ref 74–99)
HBA1C MFR BLD: 6.2 % (ref 4–5.6)
HBA1C MFR BLD: 6.3 % (ref 4–5.6)
HCT VFR BLD CALC: 32.2 % (ref 34–48)
HEMOGLOBIN: 10.4 G/DL (ref 11.5–15.5)
L. PNEUMOPHILA SEROGP 1 UR AG: NORMAL
MCH RBC QN AUTO: 33 PG (ref 26–35)
MCHC RBC AUTO-ENTMCNC: 32.3 % (ref 32–34.5)
MCV RBC AUTO: 102.2 FL (ref 80–99.9)
METER GLUCOSE: 129 MG/DL (ref 74–99)
METER GLUCOSE: 131 MG/DL (ref 74–99)
METER GLUCOSE: 142 MG/DL (ref 74–99)
METER GLUCOSE: 145 MG/DL (ref 74–99)
PDW BLD-RTO: 13.2 FL (ref 11.5–15)
PLATELET # BLD: 210 E9/L (ref 130–450)
PMV BLD AUTO: 9.9 FL (ref 7–12)
POTASSIUM REFLEX MAGNESIUM: 4.1 MMOL/L (ref 3.5–5)
PROCALCITONIN: 0.05 NG/ML (ref 0–0.08)
RBC # BLD: 3.15 E12/L (ref 3.5–5.5)
SODIUM BLD-SCNC: 136 MMOL/L (ref 132–146)
STREP PNEUMONIAE ANTIGEN, URINE: NORMAL
WBC # BLD: 5.4 E9/L (ref 4.5–11.5)

## 2022-02-07 PROCEDURE — 85027 COMPLETE CBC AUTOMATED: CPT

## 2022-02-07 PROCEDURE — 1200000000 HC SEMI PRIVATE

## 2022-02-07 PROCEDURE — 97530 THERAPEUTIC ACTIVITIES: CPT

## 2022-02-07 PROCEDURE — 36415 COLL VENOUS BLD VENIPUNCTURE: CPT

## 2022-02-07 PROCEDURE — 87040 BLOOD CULTURE FOR BACTERIA: CPT

## 2022-02-07 PROCEDURE — 2580000003 HC RX 258: Performed by: INTERNAL MEDICINE

## 2022-02-07 PROCEDURE — 94640 AIRWAY INHALATION TREATMENT: CPT

## 2022-02-07 PROCEDURE — 84145 PROCALCITONIN (PCT): CPT

## 2022-02-07 PROCEDURE — 94664 DEMO&/EVAL PT USE INHALER: CPT

## 2022-02-07 PROCEDURE — 97161 PT EVAL LOW COMPLEX 20 MIN: CPT

## 2022-02-07 PROCEDURE — 80048 BASIC METABOLIC PNL TOTAL CA: CPT

## 2022-02-07 PROCEDURE — 36591 DRAW BLOOD OFF VENOUS DEVICE: CPT

## 2022-02-07 PROCEDURE — 6360000002 HC RX W HCPCS: Performed by: NURSE PRACTITIONER

## 2022-02-07 PROCEDURE — 83036 HEMOGLOBIN GLYCOSYLATED A1C: CPT

## 2022-02-07 PROCEDURE — 92610 EVALUATE SWALLOWING FUNCTION: CPT | Performed by: SPEECH-LANGUAGE PATHOLOGIST

## 2022-02-07 PROCEDURE — 2580000003 HC RX 258: Performed by: NURSE PRACTITIONER

## 2022-02-07 PROCEDURE — 6370000000 HC RX 637 (ALT 250 FOR IP): Performed by: NURSE PRACTITIONER

## 2022-02-07 PROCEDURE — 6370000000 HC RX 637 (ALT 250 FOR IP): Performed by: FAMILY MEDICINE

## 2022-02-07 PROCEDURE — 92526 ORAL FUNCTION THERAPY: CPT | Performed by: SPEECH-LANGUAGE PATHOLOGIST

## 2022-02-07 PROCEDURE — 82962 GLUCOSE BLOOD TEST: CPT

## 2022-02-07 RX ORDER — 0.9 % SODIUM CHLORIDE 0.9 %
500 INTRAVENOUS SOLUTION INTRAVENOUS ONCE
Status: COMPLETED | OUTPATIENT
Start: 2022-02-07 | End: 2022-02-07

## 2022-02-07 RX ORDER — NICOTINE 21 MG/24HR
1 PATCH, TRANSDERMAL 24 HOURS TRANSDERMAL DAILY
Status: DISCONTINUED | OUTPATIENT
Start: 2022-02-07 | End: 2022-02-09 | Stop reason: HOSPADM

## 2022-02-07 RX ADMIN — CHOLESTYRAMINE 4 G: 4 POWDER, FOR SUSPENSION ORAL at 08:44

## 2022-02-07 RX ADMIN — IPRATROPIUM BROMIDE AND ALBUTEROL SULFATE 1 AMPULE: 2.5; .5 SOLUTION RESPIRATORY (INHALATION) at 12:52

## 2022-02-07 RX ADMIN — AMITRIPTYLINE HYDROCHLORIDE 50 MG: 25 TABLET, FILM COATED ORAL at 00:23

## 2022-02-07 RX ADMIN — INSULIN GLARGINE 30 UNITS: 100 INJECTION, SOLUTION SUBCUTANEOUS at 20:15

## 2022-02-07 RX ADMIN — INSULIN LISPRO 2 UNITS: 100 INJECTION, SOLUTION INTRAVENOUS; SUBCUTANEOUS at 16:31

## 2022-02-07 RX ADMIN — INSULIN LISPRO 2 UNITS: 100 INJECTION, SOLUTION INTRAVENOUS; SUBCUTANEOUS at 11:46

## 2022-02-07 RX ADMIN — CHOLESTYRAMINE 4 G: 4 POWDER, FOR SUSPENSION ORAL at 20:17

## 2022-02-07 RX ADMIN — MIRTAZAPINE 15 MG: 15 TABLET, FILM COATED ORAL at 20:16

## 2022-02-07 RX ADMIN — Medication 400 MG: at 08:44

## 2022-02-07 RX ADMIN — LEVOFLOXACIN 750 MG: 5 INJECTION, SOLUTION INTRAVENOUS at 23:00

## 2022-02-07 RX ADMIN — ALPRAZOLAM 0.5 MG: 0.25 TABLET ORAL at 20:16

## 2022-02-07 RX ADMIN — AMITRIPTYLINE HYDROCHLORIDE 50 MG: 25 TABLET, FILM COATED ORAL at 20:17

## 2022-02-07 RX ADMIN — Medication 10 ML: at 08:45

## 2022-02-07 RX ADMIN — Medication 10 ML: at 20:25

## 2022-02-07 RX ADMIN — IPRATROPIUM BROMIDE AND ALBUTEROL SULFATE 1 AMPULE: 2.5; .5 SOLUTION RESPIRATORY (INHALATION) at 19:44

## 2022-02-07 RX ADMIN — Medication 400 MG: at 20:16

## 2022-02-07 RX ADMIN — ACETAMINOPHEN 650 MG: 325 TABLET ORAL at 17:43

## 2022-02-07 RX ADMIN — SODIUM CHLORIDE 500 ML: 9 INJECTION, SOLUTION INTRAVENOUS at 19:00

## 2022-02-07 RX ADMIN — PREGABALIN 75 MG: 75 CAPSULE ORAL at 08:44

## 2022-02-07 RX ADMIN — LEVOFLOXACIN 750 MG: 5 INJECTION, SOLUTION INTRAVENOUS at 00:23

## 2022-02-07 RX ADMIN — IPRATROPIUM BROMIDE AND ALBUTEROL SULFATE 1 AMPULE: 2.5; .5 SOLUTION RESPIRATORY (INHALATION) at 08:11

## 2022-02-07 RX ADMIN — ENOXAPARIN SODIUM 40 MG: 100 INJECTION SUBCUTANEOUS at 08:44

## 2022-02-07 RX ADMIN — FAMOTIDINE 20 MG: 20 TABLET ORAL at 08:44

## 2022-02-07 RX ADMIN — IPRATROPIUM BROMIDE AND ALBUTEROL SULFATE 1 AMPULE: 2.5; .5 SOLUTION RESPIRATORY (INHALATION) at 16:40

## 2022-02-07 ASSESSMENT — PAIN SCALES - GENERAL
PAINLEVEL_OUTOF10: 1
PAINLEVEL_OUTOF10: 8

## 2022-02-07 NOTE — PROGRESS NOTES
SPEECH/LANGUAGE PATHOLOGY  CLINICAL ASSESSMENT OF SWALLOWING FUNCTION   and PLAN OF CARE    PATIENT NAME:  Dai Winkler  (female)     MRN:  [id number]    :  1965  (61 y.o.)  STATUS:  Inpatient: Room 0502/0502-A    TODAY'S DATE:  2022  SPECIFIC PROVIDER ORDER:   22 2245  Speech Language Pathology clinical swallow screening  Start:  22,   End:  22,   ONE TIME,   Standing Count:  1 Occurrences,   R         Basilio Lezama APRN - CNP   REASON FOR REFERRAL: assess for dysphagia   EVALUATING THERAPIST: Pierre Jain SLP Graduate Clinician  Haylie Retana. LEATHA. CCC/SLP [id number]  Speech-Language Pathologist                RESULTS:    DYSPHAGIA DIAGNOSIS:   Clinical indicators of pharyngeal phase dysphagia. Inconsistent cough throughout bedside evaluation. Throat clear was present after intake of thin liquid from cup and straw. Patient reports solids getting \"stuck\" in her throat when eating. Patient with history with multiple surgeries of the neck. 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 DIET RECOMMENDATIONS: continue current diet until MBSS is completed. FEEDING RECOMMENDATIONS:     Assistance level:  No assistance needed      Compensatory strategies recommended: Small bites/sips and Alternate solids and liquids      Discussed recommendations with nursing and/or faxed report to referring provider: Yes    SPEECH THERAPY  PLAN OF CARE   The dysphagia POC is established based on physician order, dysphagia diagnosis and results of clinical assessment     Will establish POC once MBSS is completed. Conditions Requiring Skilled Therapeutic Intervention for dysphagia:    throat clearing during PO intake   Inconsistent coughing during PO intake      Specific dysphagia interventions to include:     Compensatory strategy training. MBSS recommended to decide if further interventions are warranted.     Specific instructions for next treatment:  MBSS to be completed    Patient Treatment Goals:    Short Term Goals:  Pt will participate in MBSS to fully assess oropharyngeal swallow function and to assist in determining the least restrictive PO diet to maintain adequate nutrition/hydration     Long Term Goals: A Video Swallow Study (MBSS) is recommended and requires a physician order      Patient/family Goal:    Did not state. Will further assess during treatment. Plan of care discussed with Patient   The Patient understand(s) the diagnosis, prognosis and plan of care     Rehabilitation Potential/Prognosis: good                    ADMITTING DIAGNOSIS: Pneumonia [J18.9]  Pneumonia of right lung due to infectious organism, unspecified part of lung [J18.9]    VISIT DIAGNOSIS:   Visit Diagnoses       Codes    Pneumonia of right lung due to infectious organism, unspecified part of lung    -  Primary J18.9    Dizziness     R42           PATIENT REPORT/COMPLAINT: food sticking in the throat  occasional cough  poor appetite  RN cleared patient for participation in assessment     yes     PRIOR LEVEL OF SWALLOW FUNCTION:    PAST HISTORY OF DYSPHAGIA?: yes    Home diet: Regular consistency solids (IDDSI level 7) with thin liquids (IDDSI level 0)  Current Diet Order:  ADULT DIET; Regular; 4 carb choices (60 gm/meal)    PROCEDURE:  Consistencies Administered During the Evaluation   Liquids: thin liquid   Solids:  pureed foods and solid foods      Method of Intake:   cup, straw, spoon  Self fed      Position:   Seated, upright    CLINICAL ASSESSMENT:  Oral Stage: The oral stage of swallowing was within functional limits for consistencies administered      Pharyngeal Stage:    Throat clearing present after presentation of thin liquid from cup and straw  Inconsistent cough noted throughout the evaluation    After intake of solid food, patient reporting that \"it feels like it's stuck\" while pointing to neck.     Cognition:   Within functional limits for this exam    Oral Peripheral Examination   Adequate lingual/labial strength     Current Respiratory Status    room air     Parameters of Speech Production  Respiration:  Adequate for speech production  Quality:   Hoarse. Patient has history of vocal cord damage  Intensity: Within functional limits    Volitional Swallow: did not test    Volitional Cough:  did not test     Pain: No pain reported. EDUCATION:   The Speech Language Pathologist (SLP) completed education regarding results of evaluation and that intervention is warranted at this time. Learner: Patient  Education: Reviewed results and recommendations of this evaluation  Evaluation of Education:  Krystal Batres understanding    This plan may be re-evaluated and revised as warranted. Evaluation Time includes thorough review of current medical information, gathering information on past medical history/social history and prior level of function, completion of standardized testing/informal observation of tasks, assessment of data and education on plan of care and goals. INTERVENTION    Pt/family educated on above results and plan of care. Pt/family trained on compensatory strategies for safe swallow and signs and symptoms of aspiration (throat clearing, coughing/choking, wet vocal quality. , etc.) and encouraged to notify staff if observed. Handouts provided as warranted. Pt/family encouraged to engage in question/answer session. All questions answered and pt/family verbalized understanding of above. [x]The admitting diagnosis and active problem list, have been reviewed prior to initiation of this evaluation.      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    Delayed union of tibial shaft fracture, right, closed    Tibia/fibula fracture, right, closed, with nonunion, subsequent encounter    Failed [facility]opedic [facility] [address]    Fracture of tibia and fibula, right, closed, with nonunion, subsequent encounter    Malignant neoplasm of central portion of right breast in female, estrogen receptor positive ([address]    Hypokalemia    Nausea    Hyponatremia    AUSTIN (acute kidney injury) ([address]    Diarrhea    Incisional breast wound    Dehydration    Open wound of right breast    Pneumonia         CPT code:  62627  bedside swallow eval 89475 dysphagia therapy    Edwige Toure, SLP Graduate Clinician    Supervising SLP was present for evaluation and reviewed results and recommendations with graduate clinician. Michael CLARKE CCC/SLP [id number]  Speech-Language Pathologist

## 2022-02-07 NOTE — H&P
[id number]  228[facility]  History and Physical      CHIEF COMPLAINT:    Chief Complaint   Patient presents with    Shortness of Breath     SOB x 2 weeks- hx breast CA        Patient of Eusebia Bonner DO presents with:  Pneumonia    History of Present Illness:   Patient is a 61year old female with a past medical history of anxiety, breast CA, depression, DM, GERG, HTN, and neuropathy. Patient is an active chemo patient and her last treatment was 1/28/2022. Patient presented to the Ed with complaints of shortness of breath and cough. Patient admits that has been ongoing for approximately 3 weeks. She states she has been fatigued and her sob is worsened with exertion and mildly relieved with rest.  Patient admits there are no aggravating factors or relieving factors. Patient just keeps repeating that she is just miserable. Patient is alert and oriented on exam.  Patient denies any chest pain, fever, chills, headache, dizziness, blurred vision, and abdominal pain. ER work up reveals slightly elevated troponin at 12, and CTA chest reveals pneumonia, early appearance for Covid pneumonia. Patient is not vaccinated for pneumonia or flu. REVIEW OF SYSTEMS:  Pertinent negatives are above in HPI. 10 point ROS otherwise negative.       Past Medical History:   Diagnosis Date    Anxiety     Arthritis     Cancer ([facility] [address] 2021    breast    Cervical radiculopathy     Chronic back pain     Dehydration 9/23/2021    Dehydration 9/23/2021    Depression     Diabetes mellitus type 2, uncontrolled ([address] 2/12/2017    GERD (gastroesophageal reflux disease)     History of blood transfusion 01/2018    back surgery, lumbar, dr Fabián Miller    Hypertension     Neuropathy     Right leg pain     seeing doctor currently problems with hardware         Past Surgical History:   Procedure Laterality Date    BACK SURGERY  01/08/2018    PLIF L2-L5    BREAST BIOPSY Right 2/5/2021    RIGHT BREAST LUMPECTOMY WITH  sentinel LYMPHNODE biopsy performed by Sheri Samuels MD at [facility] Right 3/9/2021    RIGHT BREAST RE-EXCISION LUMPECTOMY OF INFERIOR AND MEDIAL MARGINS performed by Sheri Samuels MD at [address] [place] [address] Right 04/09/2017    Dr. Vanessa Adair  2008    dr hu anterior    CERVICAL FUSION  2015    dr Cholo Yi anterior    CERVICAL FUSION  04/25/16    ANTERIOR C4-C5, C6-C7 PLATES AND SCREWS    CHOLECYSTECTOMY  2000    COLONOSCOPY      patient did cologuard in 2018   [facility]  2013    dr Vanessa Royal    heel spurs bilateral    HYSTERECTOMY  2013    dr Catrachita Eubanks partial    OTHER SURGICAL HISTORY Right 05/14/2018    removal of hardware repair nonunion right tibia/fibula    PORT SURGERY Left 2/5/2021    LEFT MEDI PORT INSERTION performed by Sheri Samuels MD at [place] [address] OFFICE/OUTPT VISIT,PROCEDURE ONLY Right 7/5/2018    REPAIR NON UNION FAILED FIXATION RIGHT DISTAL TIB / FIB PILON FX. WITH REMOVAL OF HARDWARE & O.R. I. F ------BOP performed by Monica Tobar MD at [facility] Right 5/14/2018    REPAIR NON-UNION RIGHT TIBIA AND FIBULA WITH REMOVAL OF HARDWARE AND BONE GRAFT performed by Monica Tobar MD at [address] [place] [address] Right 1/10/2019    RIGHT TIBIA REMOVAL HARDWARE, RIGHT TIBIA OPEN  TREATMENT OF NON UNION performed by Monica Tobar MD at [address] [place] [address]         Medications Prior to Admission:    Medications Prior to Admission: Ascorbic Acid (VITAMIN C) 250 MG tablet, Take 250 mg by mouth daily  vitamin D (ERGOCALCIFEROL) 1.25 MG (03725 UT) CAPS capsule, Take 50,000 Units by mouth once a week  Cyanocobalamin (VITAMIN B 12) 500 MCG TABS, Take by mouth  ondansetron (ZOFRAN) 4 MG tablet, take 1 tablet by mouth every 8 hours if needed for nausea and vomiting  prochlorperazine (COMPAZINE) 10 MG tablet, Take 1 tablet by mouth every 6 hours as needed (NAUSEA)  anastrozole (ARIMIDEX) 1 MG tablet, Take 1 tablet by mouth daily  acetaminophen-codeine (TYLENOL/CODEINE #3) 300-30 MG per tablet, Take 1 tablet by mouth every 4 hours as needed for Pain.  magnesium oxide (MAG-OX) 400 MG tablet, Take 1 tablet by mouth 2 times daily   lidocaine-prilocaine (EMLA) 2.5-2.5 % cream, Apply topically to port 30 minutes prior to chemotherapy. [DISCONTINUED] FLUoxetine HCl, PMDD, 10 MG TABS, Take 20 mg by mouth daily  prochlorperazine (COMPAZINE) 10 MG tablet, Take 1 tablet by mouth every 6 hours as needed (nausea)  cholestyramine (QUESTRAN) 4 g packet, Take 1 packet by mouth 2 times daily  psyllium (KONSYL) 28.3 % PACK, Take 1 packet by mouth daily  magnesium oxide (MAG-OX) 400 (240 Mg) MG tablet, Take 1 tablet by mouth 2 times daily  [DISCONTINUED] diclofenac sodium (VOLTAREN) 1 % GEL, Apply 1 g topically 2 times daily   MELATONIN PO, Take 3 mg by mouth nightly   Multiple Vitamins-Minerals (HAIR SKIN AND NAILS FORMULA) TABS, Take by mouth STOP PREOP MED  mirtazapine (REMERON) 15 MG tablet, Take 15 mg by mouth nightly  atenolol (TENORMIN) 25 MG tablet, every evening   ALPRAZolam (XANAX) 0.5 MG tablet, Take 0.5 mg by mouth nightly. pregabalin (LYRICA) 75 MG capsule, Take 75 mg by mouth daily.    famotidine (PEPCID) 20 MG tablet, Take 20 mg by mouth daily  amitriptyline (ELAVIL) 50 MG tablet, Take 50 mg by mouth nightly   LORATADINE-D 24HR  MG per extended release tablet, Take 1 tablet by mouth daily   LANTUS SOLOSTAR 100 UNIT/ML injection pen, Inject 30 Units into the skin nightly   metFORMIN (GLUCOPHAGE) 500 MG tablet, Take 500 mg by mouth 2 times daily   albuterol sulfate  (90 BASE) MCG/ACT inhaler, Inhale 2 puffs into the lungs every 6 hours as needed for Wheezing    Note that the patient's home medications were reviewed and the above list is accurate to the best of my knowledge at the time of the exam.    Allergies:    Ceftriaxone    Social History:    reports that she quit smoking about 6 years ago. Her smoking use included cigarettes. She has a 5.00 pack-year smoking history. She has never used smokeless tobacco. She reports that she does not drink alcohol and does not use drugs. Family History:   family history includes Diabetes in her mother. PHYSICAL EXAM:    Vitals:  /63   Pulse 75   Temp 97.8 °F (36.6 °C) (Oral)   Resp 18   Ht 6' 1\" (1.854 m)   Wt 149 lb (67.6 kg)   LMP 08/13/2013   SpO2 95%   BMI 19.66 kg/m²       General appearance: NAD, conversant, ill appearing  Eyes: Sclerae anicteric, PERRLA  HEENT: AT/NC, MMM  Neck: FROM, supple, no thyromegaly  Lymph: No cervical / supraclavicular lymphadenopathy  Lungs:   CV: RRR, no MRGs, no lower extremity edema, mediport left chest  Abdomen: Soft, non-tender; no masses or HSM, +BS  Extremities: FROM without synovitis. No clubbing or cyanosis of the hands. Skin: no rash, induration, lesions, or ulcers  Psych: Calm and cooperative. Normal judgement and insight. Normal mood and affect. Neuro: Alert and interactive, face symmetric, speech fluent. LABS:  All labs reviewed.   Of note:  CBC with Differential:    Lab Results   Component Value Date    WBC 5.4 02/07/2022    RBC 3.15 02/07/2022    HGB 10.4 02/07/2022    HCT 32.2 02/07/2022     02/07/2022    .2 02/07/2022    MCH 33.0 02/07/2022    MCHC 32.3 02/07/2022    RDW 13.2 02/07/2022    NRBC 0.0 02/06/2022    SEGSPCT 57 11/08/2013    METASPCT 0.9 04/16/2021    LYMPHOPCT 14.8 02/06/2022    MONOPCT 8.7 02/06/2022    MYELOPCT 1.7 06/22/2021    BASOPCT 0.9 02/06/2022    MONOSABS 0.51 02/06/2022    LYMPHSABS 1.20 02/06/2022    EOSABS 0.05 02/06/2022    BASOSABS 0.05 02/06/2022     CMP:    Lab Results   Component Value Date     02/07/2022    K 4.1 02/07/2022     02/07/2022    CO2 26 02/07/2022    BUN 10 02/07/2022    CREATININE 0.9 02/07/2022    GFRAA >60 02/07/2022    LABGLOM >60 02/07/2022    GLUCOSE 182 02/07/2022    GLUCOSE 96 05/24/2012    PROT 6.2 02/06/2022    LABALBU 3.8 02/06/2022    LABALBU 4.5 05/24/2012    CALCIUM 9.1 02/07/2022    BILITOT 0.3 02/06/2022    ALKPHOS 182 02/06/2022    AST 30 02/06/2022    ALT 32 02/06/2022       Imaging:  CTA Pulmonary:  There is no PE. Small patchy bilateral ground-glass infiltrates more prominent within the lateral aspect of the base of the right upper lobe and superior aspect of the right middle lobe. The findings are consistent with pneumonia and have the appearance of early Covid pneumonia. CT Head:  No acute intracranial abnormality. EKG:  NSR    Telemetry:  NSR    ASSESSMENT/PLAN:  Principal Problem:    Pneumonia  Active Problems:    Diabetes mellitus type 2, uncontrolled ([address]  Resolved Problems:    * No resolved hospital problems. *    57 year old female with a past medical history of breast CA, DM, and HTN admitted to med surg unit     Pneumonia, rule out bacteremia in immunocompromised host, profound weakness  -Await blood culture results  -Supplement O2 demands keeping oxygen saturation greater than 92%. -Levaquin 750 mg daily, negative procalcitonin, no white count, afebrile  -DuoNebs prn  -Monitor labs hemoglobin. Monitor electrolytes place as necessary  -Strep/Legionella -negative  -Swallow screen    Diabetes Mellitus  -Monitor labs  -ISS glucose control  -Hypoglycemia protocol initiated  -[id number]    Hematology oncology consultation as patient known to them  The patient indicates that she gets infusions for weakness-unable to tell me what     Medication for other comorbidities continue as appropriate dose adjustment as necessary. DVT prophylaxis  PT OT  Discharge planning  Case discussed with attending and agreed upon plan of care.        Code status: Full  Requires inpatient level of care  WES Castellon - CNP    6:36 AM  2/7/2022     Above note edited to reflect my thoughts     I personally saw, examined and provided care for the patient. Radiographs, labs and medication list were reviewed by me independently. The case was discussed in detail and plans for care were established. Review of Adry HARVEY- CNP, documentation was conducted and revisions were made as appropriate directly by me. I agree with the above documented exam, problem list, and plan of care.      Amparo Hurt MD  6:11 PM  2/7/2022

## 2022-02-07 NOTE — CARE COORDINATION
Social Work discharge planning   Sw attempted to meet with pt, but she asked SW to return later today so she can rest.   Electronically signed by TERRI Xavier on 2/7/2022 at 10:55 AM

## 2022-02-07 NOTE — PROGRESS NOTES
P Quality Flow/Interdisciplinary Rounds Progress Note        Quality Flow Rounds held on February 7, 2022    Disciplines Attending:  Bedside Nurse, ,  and Nursing Unit Leadership    Julián Morrell was admitted on 2/6/2022  1:02 PM    Anticipated Discharge Date:  Expected Discharge Date: 02/09/22    Disposition:    Ryan Score:  Ryan Scale Score: 20    Readmission Risk              Risk of Unplanned Readmission:  23           Discussed patient goal for the day, patient clinical progression, and barriers to discharge.   The following Goal(s) of the Day/Commitment(s) have been identified:  Diagnostics - Report Results and Labs - Report Results      Cuauhtemoc Peña RN  February 7, 2022

## 2022-02-07 NOTE — H&P
[id number]  228[facility]  History and Physical      CHIEF COMPLAINT:    Chief Complaint   Patient presents with    Shortness of Breath     SOB x 2 weeks- hx breast CA        Patient of Timi Grande DO presents with:  Pneumonia    History of Present Illness:   Patient is a 61year old female with a past medical history of anxiety, arthritis, breast ca, active chemo last chem was on 1/28/2022, depression, DM, GERD, and hypertension. Patient presented to the ER with complaints of shortness of breath and cough that has been ongoing for approximately 3 weeks. Patient admits that her shortness of breath is worsened with exertion and mildly relieved with rest.  Patient also complained of generalized weakness. Patient is not vaccinated for Covid due to her chemo treatments. Patient is alert and oriented on exam.  Patient denies any chest pain, fever, chills, headache, dizziness, blurred vision, and abdominal pain. ER work up revealed slightly elevated troponin 12, WBC 5.7 and CTA chest pneumonia that may have appearance of early Covid. Patient is admitted to a med surg unit for further testing and treatment. REVIEW OF SYSTEMS:  Pertinent negatives are above in HPI. 10 point ROS otherwise negative.       Past Medical History:   Diagnosis Date    Anxiety     Arthritis     Cancer ([facility] 2021    breast    Cervical radiculopathy     Chronic back pain     Dehydration 9/23/2021    Dehydration 9/23/2021    Depression     Diabetes mellitus type 2, uncontrolled ([address] 2/12/2017    GERD (gastroesophageal reflux disease)     History of blood transfusion 01/2018    back surgery, lumbar, dr Fink Alpha    Hypertension     Neuropathy     Right leg pain     seeing doctor currently problems with hardware         Past Surgical History:   Procedure Laterality Date    BACK SURGERY  01/08/2018    PLIF L2-L5    BREAST BIOPSY Right 2/5/2021    RIGHT BREAST LUMPECTOMY WITH  sentinel LYMPHNODE biopsy performed by Apurva Oconnor MD at [place] [id number] Right 3/9/2021    RIGHT BREAST RE-EXCISION LUMPECTOMY OF INFERIOR AND MEDIAL MARGINS performed by Apurva Oconnor MD at [address] [place] [address] Right 04/09/2017    Dr. 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 WITH REMOVAL OF HARDWARE & O.R. I. F ------BOP performed by [place] Kerns MD at [address] [place] [address] Right 5/14/2018    REPAIR NON-UNION RIGHT TIBIA AND FIBULA WITH REMOVAL OF HARDWARE AND BONE GRAFT performed by West Kerns MD at [address] [place] [address] Right 1/10/2019    RIGHT TIBIA REMOVAL HARDWARE, RIGHT TIBIA OPEN  TREATMENT OF NON UNION performed by West Kerns MD at [address] [place] [address]         Medications Prior to Admission:    Medications Prior to Admission: Ascorbic Acid (VITAMIN C) 250 MG tablet, Take 250 mg by mouth daily  vitamin D (ERGOCALCIFEROL) 1.25 MG (26609 UT) CAPS capsule, Take 50,000 Units by mouth once a week  Cyanocobalamin (VITAMIN B 12) 500 MCG TABS, Take by mouth  ondansetron (ZOFRAN) 4 MG tablet, take 1 tablet by mouth every 8 hours if needed for nausea and vomiting  prochlorperazine (COMPAZINE) 10 MG tablet, Take 1 tablet by mouth every 6 hours as needed (NAUSEA)  anastrozole (ARIMIDEX) 1 MG tablet, Take 1 tablet by mouth daily  acetaminophen-codeine (TYLENOL/CODEINE #3) 300-30 MG per tablet, Take 1 tablet by mouth every 4 hours as needed for Pain.  magnesium oxide (MAG-OX) 400 MG tablet, Take 1 tablet by mouth 2 times daily   lidocaine-prilocaine (EMLA) 2.5-2.5 % cream, Apply topically to port 30 minutes prior to chemotherapy. [DISCONTINUED] FLUoxetine HCl, PMDD, 10 MG TABS, Take 20 mg by mouth daily  prochlorperazine (COMPAZINE) 10 MG tablet, Take 1 tablet by mouth every 6 hours as needed (nausea)  cholestyramine (QUESTRAN) 4 g packet, Take 1 packet by mouth 2 times daily  psyllium (KONSYL) 28.3 % PACK, Take 1 packet by mouth daily  magnesium oxide (MAG-OX) 400 (240 Mg) MG tablet, Take 1 tablet by mouth 2 times daily  [DISCONTINUED] diclofenac sodium (VOLTAREN) 1 % GEL, Apply 1 g topically 2 times daily   MELATONIN PO, Take 3 mg by mouth nightly   Multiple Vitamins-Minerals (HAIR SKIN AND NAILS FORMULA) TABS, Take by mouth STOP PREOP MED  mirtazapine (REMERON) 15 MG tablet, Take 15 mg by mouth nightly  atenolol (TENORMIN) 25 MG tablet, every evening   ALPRAZolam (XANAX) 0.5 MG tablet, Take 0.5 mg by mouth nightly. pregabalin (LYRICA) 75 MG capsule, Take 75 mg by mouth daily.    famotidine (PEPCID) 20 MG tablet, Take 20 mg by mouth daily  amitriptyline (ELAVIL) 50 MG tablet, Take 50 mg by mouth nightly   LORATADINE-D 24HR  MG per extended release tablet, Take 1 tablet by mouth daily   LANTUS SOLOSTAR 100 UNIT/ML injection pen, Inject 30 Units into the skin nightly   metFORMIN (GLUCOPHAGE) 500 MG tablet, Take 500 mg by mouth 2 times daily   albuterol sulfate  (90 BASE) MCG/ACT inhaler, Inhale 2 puffs into the lungs every 6 hours as needed for Wheezing    Note that the patient's home medications were reviewed and the above list is accurate to the best of my knowledge at the time of the exam.    Allergies: Ceftriaxone    Social History:    reports that she quit smoking about 6 years ago. Her smoking use included cigarettes. She has a 5.00 pack-year smoking history. She has never used smokeless tobacco. She reports that she does not drink alcohol and does not use drugs. Family History:   family history includes Diabetes in her mother. PHYSICAL EXAM:    Vitals:  BP (!) 89/53   Pulse 76   Temp 98.6 °F (37 °C) (Oral)   Resp 18   Ht 6' 1\" (1.854 m)   Wt 149 lb (67.6 kg)   LMP 08/13/2013   SpO2 95%   BMI 19.66 kg/m²       General appearance: NAD, conversant, ill appearing  Eyes: Sclerae anicteric, PERRLA  HEENT: AT/NC, MMM  Neck: FROM, supple, no thyromegaly  Lymph: No cervical / supraclavicular lymphadenopathy  Lungs: Diminished in the bases, left chest mediport  CV: RRR, no MRGs, no lower extremity edema  Abdomen: Soft, non-tender; no masses or HSM, +BS  Extremities: FROM without synovitis. No clubbing or cyanosis of the hands. Skin: no rash, induration, lesions, or ulcers  Psych: Calm and cooperative. Normal judgement and insight. Normal mood and affect. Neuro: Alert and interactive, face symmetric, speech fluent. LABS:  All labs reviewed.   Of note:  CBC with Differential:    Lab Results   Component Value Date    WBC 5.4 02/07/2022    RBC 3.15 02/07/2022    HGB 10.4 02/07/2022    HCT 32.2 02/07/2022     02/07/2022    .2 02/07/2022    MCH 33.0 02/07/2022    MCHC 32.3 02/07/2022    RDW 13.2 02/07/2022    NRBC 0.0 02/06/2022    SEGSPCT 57 11/08/2013    METASPCT 0.9 04/16/2021    LYMPHOPCT 14.8 02/06/2022    MONOPCT 8.7 02/06/2022    MYELOPCT 1.7 06/22/2021    BASOPCT 0.9 02/06/2022    MONOSABS 0.51 02/06/2022    LYMPHSABS 1.20 02/06/2022    EOSABS 0.05 02/06/2022    BASOSABS 0.05 02/06/2022     CMP:    Lab Results   Component Value Date     02/07/2022    K 4.1 02/07/2022     02/07/2022    CO2 26 02/07/2022    BUN 10 02/07/2022    CREATININE 0.9 02/07/2022    GFRAA >60 02/07/2022    LABGLOM >60 02/07/2022    GLUCOSE 182 02/07/2022    GLUCOSE 96 05/24/2012    PROT 6.2 02/06/2022    LABALBU 3.8 02/06/2022    LABALBU 4.5 05/24/2012    CALCIUM 9.1 02/07/2022    BILITOT 0.3 02/06/2022    ALKPHOS 182 02/06/2022    AST 30 02/06/2022    ALT 32 02/06/2022       Imaging:  CTA Pulmonary:  There is no pulmonary embolus. Small patchy bilateral ground-glass infiltrates more prominent within the lateral aspect of the base of the right upper lobe and superior aspect of the right middle lobe. The findings are consistent with pneumonia and have the appearance of early Covid pneumonia. CT Head:  No acute intracranial abnormality    EKG:  NSR    ASSESSMENT/PLAN:  Principal Problem:    Pneumonia  Active Problems:    Diabetes mellitus type 2, uncontrolled ([address]  Resolved Problems:    * No resolved hospital problems. *    57 year old female active chemo admitted to med surg unit with     Pneumonia  -Supplement O2 demands keeping oxygen saturation greater than 92%. Currently on room air  -IV Atb. Levaquin  -DuoNebs PRN  -Monitor labs hemoglobin. Monitor electrolytes place as necessary  -Swallow study questionable aspiration pneumonia  -Strep/Legionella  -Smoking cessation - Nicoderm 21mg  -Add tessalon perles for cough    Diabetes Mellitus  -Monitor labs  -ISS glucose control - medium scale  -Hypoglycemia protocol initiated  -HgbA1C - 6.3  -Carb controlled diet    Breast CA  -Consult oncology      Medication for other comorbidities continue as appropriate dose adjustment as necessary. DVT prophylaxis  PT OT  Discharge planning  Case discussed with attending and agreed upon plan of care.     Code status: Full  Requires inpatient level of care  WES Magallanes - CNP    6:41 PM  2/7/2022

## 2022-02-07 NOTE — ED NOTES
Ambulating pulse ox :    Start: 96 % heart rate -97. End 92% heart rate 112. Pt is short of breath with exertion.      Samantha Renteria RN  02/06/22 2021

## 2022-02-07 NOTE — PROGRESS NOTES
Physical Therapy    Facility/Department: [facility] SURG  Initial Assessment    NAME: Yee Salinas  : 1965  MRN: [id number]    Date of Service: 2022      Attending Provider:  Judy Chavez MD    Evaluating PT:  Isac Torres. Tasha Astorga, P.T. Room #:  0502/0502-A  Diagnosis:  Pneumonia [J18.9]  Pneumonia of right lung due to infectious organism, unspecified part of lung [J18.9]  Pertinent PMHx/PSHx:  Breast CA   Precautions:  falls    SUBJECTIVE:    Pt is currently living with a friend in a 1 story home with 4-5 stairs and 1 rail to enter. She reports she is in the process of moving into an apt. Pt ambulated with a R ankle brace and walked with a cane or ww and at times uses a WC or Hoveround. OBJECTIVE:   Initial Evaluation  Date: 21 Treatment Short Term/ Long Term   Goals   Was pt agreeable to Eval/treatment? yes     Does pt have pain? No c/o pain     Bed Mobility  Rolling: Independent  Supine to sit: Independent  Sit to supine: Independent  Scooting: Independent  Independent   Transfers Sit to stand: supervision  Stand to sit: supervision  Stand pivot: supervision with ww  Independent   Ambulation   80 feet with ww supervision and 150 feet with ww and R ankle brace supervision  350 feet with ww and R ankle brace Independent    Stair negotiation: ascended and descended 5 steps with 1 rail supervision  5 steps with 1 rail Independent    AM-PAC 6 Clicks 86/50       BLE ROM is WFL.    BLE strength is grossly 4+/5, except R ankle is 3-/5  Sensation:  Pt c/o neuropathy to B feet  Edema:  None noted  Balance: sitting is Independent and standing with ww is supervision  Endurance: fair+    ASSESSMENT:    Conditions Requiring Skilled Therapeutic Intervention:    [x]Decreased strength     []Decreased ROM  [x]Decreased functional mobility  []Decreased balance   [x]Decreased endurance   []Decreased posture  []Decreased sensation  []Decreased coordination   []Decreased vision  []Decreased safety awareness []Increased pain     Comments:  Pt was in bed on RA and sitting EOB O2 sat was 96%. She walked in the osullivan and was favoring RLE and after 40 feet remembered she forgot to scarlet her R ankle brace. She walked back to her room and sat in chair and donned her ankle brace herself. She then stood and walked in the osullivan with ww and R ankle brace and gait was improved as pt was no longer favoring RLE. She was able to ascend and descend stairs without LOB and walked back to her room due to c/o fatigue, but had no LOB. Pt reported need to use BR and walked into BR and transferred on/off commode Independently and performed self hygiene care. She stood at sink and washed her hands and then walked back to bed with ww and laid down. Pt's O2 sat after activity was 97%. Treatment:  Patient practiced and was instructed in the following treatment:     Bed mobility, transfers, ADLs, gait with ww, and stairs to improve functional strength and endurance. Pt was left supine in bed with call light left by patient. Chair/bed alarm: bed alarm was re-activated. Pt's/ family goals   1. To go home. Patient and or family understand(s) diagnosis, prognosis, and plan of care. PHYSICAL THERAPY PLAN OF CARE:    PT POC is established based on physician order and patient diagnosis     Referring provider/PT Order:  PT eval and treat  Diagnosis:  Pneumonia [J18.9]  Pneumonia of right lung due to infectious organism, unspecified part of lung [J18.9]  Specific instructions for next treatment:  Increase amb distance.      Current Treatment Recommendations:     [x] Strengthening to improve independence with functional mobility   [] ROM to improve ROM and decrease spasm and pain which will help promote independence with functional mobility   [x] Balance Training to improve static/dynamic balance and to reduce fall risk  [x] Endurance Training to improve activity tolerance during functional mobility   [x] Transfer Training to improve safety and independence with all functional transfers   [x] Gait Training to improve gait mechanics, endurance and assess need for appropriate assistive device  [x] Stair Training in preparation for safe discharge home and/or into the community   [] Positioning to prevent skin breakdown and contractures  [] Safety and Education Training   [x] Patient/Caregiver Education   [] HEP  [] Other     PT long term treatment goals are located in above grid    Frequency of treatments: 2-5x/week x 1-2 weeks. Time in  08:25  Time out  08:50    Total Treatment Time  12 minutes     Evaluation Time includes thorough review of current medical information, gathering information on past medical history/social history and prior level of function, completion of standardized testing/informal observation of tasks, assessment of data and education on plan of care and goals. CPT codes:  [x] Low Complexity PT evaluation 36376  [] Moderate Complexity PT evaluation 90587  [] High Complexity PT evaluation 56733  [] PT Re-evaluation 29738  [] Gait training 77576 ** minutes  [] Manual therapy 63866 ** minutes  [] Therapeutic activities 52569 12 minutes  [x] Therapeutic exercises 56080 ** minutes  [] Neuromuscular reeducation 64660 ** minutes     Morgan Aguillon., P.T.   License Number: [id number]

## 2022-02-07 NOTE — ED NOTES
Report given to ZACH RAPP [facility], pt care relinquished.       Estrella Bishop RN  02/06/22 6251

## 2022-02-08 ENCOUNTER — APPOINTMENT (OUTPATIENT)
Dept: GENERAL RADIOLOGY | Age: 57
DRG: 139 | End: 2022-02-08
Payer: MEDICAID

## 2022-02-08 LAB
ANION GAP SERPL CALCULATED.3IONS-SCNC: 8 MMOL/L (ref 7–16)
BASOPHILS ABSOLUTE: 0.04 E9/L (ref 0–0.2)
BASOPHILS RELATIVE PERCENT: 0.9 % (ref 0–2)
BUN BLDV-MCNC: 12 MG/DL (ref 6–20)
CALCIUM SERPL-MCNC: 9.1 MG/DL (ref 8.6–10.2)
CHLORIDE BLD-SCNC: 109 MMOL/L (ref 98–107)
CO2: 24 MMOL/L (ref 22–29)
CREAT SERPL-MCNC: 0.8 MG/DL (ref 0.5–1)
EOSINOPHILS ABSOLUTE: 0.04 E9/L (ref 0.05–0.5)
EOSINOPHILS RELATIVE PERCENT: 0.9 % (ref 0–6)
GFR AFRICAN AMERICAN: >60
GFR NON-AFRICAN AMERICAN: >60 ML/MIN/1.73
GLUCOSE BLD-MCNC: 117 MG/DL (ref 74–99)
HCT VFR BLD CALC: 32 % (ref 34–48)
HEMOGLOBIN: 10.2 G/DL (ref 11.5–15.5)
LYMPHOCYTES ABSOLUTE: 0.99 E9/L (ref 1.5–4)
LYMPHOCYTES RELATIVE PERCENT: 22.6 % (ref 20–42)
MCH RBC QN AUTO: 33 PG (ref 26–35)
MCHC RBC AUTO-ENTMCNC: 31.9 % (ref 32–34.5)
MCV RBC AUTO: 103.6 FL (ref 80–99.9)
METAMYELOCYTES RELATIVE PERCENT: 0.9 % (ref 0–1)
METER GLUCOSE: 101 MG/DL (ref 74–99)
METER GLUCOSE: 126 MG/DL (ref 74–99)
METER GLUCOSE: 144 MG/DL (ref 74–99)
METER GLUCOSE: 175 MG/DL (ref 74–99)
MONOCYTES ABSOLUTE: 0.56 E9/L (ref 0.1–0.95)
MONOCYTES RELATIVE PERCENT: 13 % (ref 2–12)
NEUTROPHILS ABSOLUTE: 2.71 E9/L (ref 1.8–7.3)
NEUTROPHILS RELATIVE PERCENT: 61.7 % (ref 43–80)
NUCLEATED RED BLOOD CELLS: 0 /100 WBC
PDW BLD-RTO: 13.2 FL (ref 11.5–15)
PLATELET # BLD: 186 E9/L (ref 130–450)
PMV BLD AUTO: 9.4 FL (ref 7–12)
POTASSIUM SERPL-SCNC: 4.1 MMOL/L (ref 3.5–5)
RBC # BLD: 3.09 E12/L (ref 3.5–5.5)
RBC # BLD: NORMAL 10*6/UL
SODIUM BLD-SCNC: 141 MMOL/L (ref 132–146)
WBC # BLD: 4.3 E9/L (ref 4.5–11.5)

## 2022-02-08 PROCEDURE — 85025 COMPLETE CBC W/AUTO DIFF WBC: CPT

## 2022-02-08 PROCEDURE — 1200000000 HC SEMI PRIVATE

## 2022-02-08 PROCEDURE — 36415 COLL VENOUS BLD VENIPUNCTURE: CPT

## 2022-02-08 PROCEDURE — 6360000002 HC RX W HCPCS: Performed by: NURSE PRACTITIONER

## 2022-02-08 PROCEDURE — 2580000003 HC RX 258: Performed by: NURSE PRACTITIONER

## 2022-02-08 PROCEDURE — 82962 GLUCOSE BLOOD TEST: CPT

## 2022-02-08 PROCEDURE — 99255 IP/OBS CONSLTJ NEW/EST HI 80: CPT | Performed by: INTERNAL MEDICINE

## 2022-02-08 PROCEDURE — 92611 MOTION FLUOROSCOPY/SWALLOW: CPT | Performed by: SPEECH-LANGUAGE PATHOLOGIST

## 2022-02-08 PROCEDURE — 80048 BASIC METABOLIC PNL TOTAL CA: CPT

## 2022-02-08 PROCEDURE — 6370000000 HC RX 637 (ALT 250 FOR IP): Performed by: NURSE PRACTITIONER

## 2022-02-08 PROCEDURE — 74230 X-RAY XM SWLNG FUNCJ C+: CPT

## 2022-02-08 PROCEDURE — 92526 ORAL FUNCTION THERAPY: CPT | Performed by: SPEECH-LANGUAGE PATHOLOGIST

## 2022-02-08 PROCEDURE — 97165 OT EVAL LOW COMPLEX 30 MIN: CPT

## 2022-02-08 PROCEDURE — 94640 AIRWAY INHALATION TREATMENT: CPT

## 2022-02-08 PROCEDURE — 2580000003 HC RX 258: Performed by: INTERNAL MEDICINE

## 2022-02-08 PROCEDURE — 6370000000 HC RX 637 (ALT 250 FOR IP): Performed by: FAMILY MEDICINE

## 2022-02-08 RX ORDER — ANASTROZOLE 1 MG/1
1 TABLET ORAL DAILY
Qty: 90 TABLET | Refills: 1 | Status: SHIPPED
Start: 2022-02-08 | End: 2022-02-25

## 2022-02-08 RX ORDER — SODIUM CHLORIDE 9 MG/ML
INJECTION, SOLUTION INTRAVENOUS CONTINUOUS
Status: DISCONTINUED | OUTPATIENT
Start: 2022-02-08 | End: 2022-02-09 | Stop reason: HOSPADM

## 2022-02-08 RX ADMIN — IPRATROPIUM BROMIDE AND ALBUTEROL SULFATE 1 AMPULE: 2.5; .5 SOLUTION RESPIRATORY (INHALATION) at 07:51

## 2022-02-08 RX ADMIN — INSULIN GLARGINE 30 UNITS: 100 INJECTION, SOLUTION SUBCUTANEOUS at 19:32

## 2022-02-08 RX ADMIN — Medication 10 ML: at 08:04

## 2022-02-08 RX ADMIN — Medication 400 MG: at 20:30

## 2022-02-08 RX ADMIN — CHOLESTYRAMINE 4 G: 4 POWDER, FOR SUSPENSION ORAL at 20:30

## 2022-02-08 RX ADMIN — ENOXAPARIN SODIUM 40 MG: 100 INJECTION SUBCUTANEOUS at 08:02

## 2022-02-08 RX ADMIN — AMITRIPTYLINE HYDROCHLORIDE 50 MG: 25 TABLET, FILM COATED ORAL at 19:30

## 2022-02-08 RX ADMIN — Medication 400 MG: at 08:01

## 2022-02-08 RX ADMIN — INSULIN LISPRO 2 UNITS: 100 INJECTION, SOLUTION INTRAVENOUS; SUBCUTANEOUS at 11:28

## 2022-02-08 RX ADMIN — INSULIN LISPRO 1 UNITS: 100 INJECTION, SOLUTION INTRAVENOUS; SUBCUTANEOUS at 19:32

## 2022-02-08 RX ADMIN — Medication 10 ML: at 19:31

## 2022-02-08 RX ADMIN — PREGABALIN 75 MG: 75 CAPSULE ORAL at 08:01

## 2022-02-08 RX ADMIN — IPRATROPIUM BROMIDE AND ALBUTEROL SULFATE 1 AMPULE: 2.5; .5 SOLUTION RESPIRATORY (INHALATION) at 12:56

## 2022-02-08 RX ADMIN — FAMOTIDINE 20 MG: 20 TABLET ORAL at 08:01

## 2022-02-08 RX ADMIN — CHOLESTYRAMINE 4 G: 4 POWDER, FOR SUSPENSION ORAL at 08:01

## 2022-02-08 RX ADMIN — MIRTAZAPINE 15 MG: 15 TABLET, FILM COATED ORAL at 20:30

## 2022-02-08 RX ADMIN — IPRATROPIUM BROMIDE AND ALBUTEROL SULFATE 1 AMPULE: 2.5; .5 SOLUTION RESPIRATORY (INHALATION) at 15:59

## 2022-02-08 RX ADMIN — LEVOFLOXACIN 750 MG: 5 INJECTION, SOLUTION INTRAVENOUS at 22:30

## 2022-02-08 RX ADMIN — ALPRAZOLAM 0.5 MG: 0.25 TABLET ORAL at 19:30

## 2022-02-08 RX ADMIN — ACETAMINOPHEN 650 MG: 325 TABLET ORAL at 15:49

## 2022-02-08 RX ADMIN — SODIUM CHLORIDE: 9 INJECTION, SOLUTION INTRAVENOUS at 18:00

## 2022-02-08 RX ADMIN — IPRATROPIUM BROMIDE AND ALBUTEROL SULFATE 1 AMPULE: 2.5; .5 SOLUTION RESPIRATORY (INHALATION) at 19:39

## 2022-02-08 ASSESSMENT — PAIN SCALES - GENERAL: PAINLEVEL_OUTOF10: 8

## 2022-02-08 NOTE — PROGRESS NOTES
Occupational Therapy  OCCUPATIONAL THERAPY INITIAL EVALUATION     [address] [facility], [address]                                                  Patient Name: Pedro Jara    MRN: [id number]    : 1965    Room: [address] [place] [address]      Evaluating OT: Checo Beauchamp OTR/L [id number]      Referring Provider: WES Holguin CNP    Specific Provider Orders/Date: OT eval and treat 22      Diagnosis:  Pneumonia [J18.9]  Pneumonia of right lung due to infectious organism, unspecified part of lung [J18.9]     Pertinent Medical History: anxiety, arthritis, breast CA, chronic back pain, depression, DM type 2, HTN, neuropathy       Precautions:  Fall Risk, R ankle brace     Assessment of current deficits    [x] Functional mobility  [x]ADLs  [x] Strength               []Cognition    [x] Functional transfers   [x] IADLs         [x] Safety Awareness   [x]Endurance    [] Fine Coordination              [x] Balance      [] Vision/perception   [x]Sensation     []Gross Motor Coordination  [] ROM  [] Delirium                   [] Motor Control     OT PLAN OF CARE   OT POC based on physician orders, patient diagnosis and results of clinical assessment    Frequency/Duration 1-3 days/wk for 2 weeks PRN   Specific OT Treatment Interventions to include:   * Instruction/training on adapted ADL techniques and AE recommendations to increase functional independence within precautions       * Training on energy conservation strategies, correct breathing pattern and techniques to improve independence/tolerance for self-care routine  * Functional transfer/mobility training/DME recommendations for increased independence, safety, and fall prevention  * Patient/Family education to increase follow through with safety techniques and functional independence  * Recommendation of environmental modifications for increased safety with functional transfers/mobility and ADLs  * Therapeutic exercise to improve motor endurance, ROM, and functional strength for ADLs/functional transfers  * Therapeutic activities to facilitate/challenge dynamic balance, stand tolerance for increased safety and independence with ADLs    Recommended Adaptive Equipment:  TBD     Home Living: Pt is currently in the process of moving and is staying with a friend in 1 story house with 4-5 KAEL and 1 hand rail. Bathroom setup: walk in shower, standard height commode   Equipment owned: cane, wheeled walker, w/c, hoveround    Prior Level of Function: independent with ADLs , assist with IADLs; functional mobility: cane or wheeled walker    Pain Level: Pt reported pain in R breast (breast cancer) and a headache, but did not rate; pt agreeable to therapy  Cognition: A&O: 4/4; WFL command follow demonstrated. Pt pleasant during session.    Memory:  WFL   Sequencing:  WFL   Problem solving:  WFL   Judgement/safety:  WFL     Functional Assessment:  AM-PAC Daily Activity Raw Score: 19/24   Initial Eval Status  Date: 2/8/22 Treatment Status  Date: STGs = LTGs  Time frame: 10-14 days   Feeding I     Grooming Sup for hand hygiene and oral hygiene standing at sink  Mod I/I   UB Dressing Sup  Mod I/I   LB Dressing Sup to adjust socks and don ankle brace  Sup for simulated pants   Mod I/I-with use of AD as appropriate/needed   Bathing Sup  Mod I/I -with use of AD as appropriate/needed   Toileting Sup for clothing management  I   Bed Mobility  Supine to sit: I   Sit to supine: I      Functional Transfers Sup with wheeled walker  Sit<>stand from EOB  Sit<>stand from commode  Cues for hand placement  I    Functional Mobility Sup with wheeled walker  To and from bathroom  I -with device as needed to maximize independence with ADLs and functional task completion   Balance Sitting:     Static:  I    Dynamic:Sup  Standing: Sup with wheeled walker  I for dynamic sitting balance to maximize independence with ADLs and functional task completion    I for standing balance to maximize independence with ADLs and functional task completion   Activity Tolerance Fair with light activity. Pt reported some dizziness upon standing. Waited for it to subside before functional mobility. Some shakiness noted. Good with ADL activity. Pt will demonstrate good understanding of education provided on EC/WS techniques    Visual/  Perceptual Glasses: none at bedside                Additional long-term goal: Pt will increase functional independence to PLOF to allow pt to live in least restrictive environment. Hand Dominance R   AROM (PROM) Strength Additional Info:    RUE  Grossly WFL Grossly WFL good  and wfl FMC/dexterity noted during ADL tasks       LUE Grossly WFL Grossly WFL good  and wfl FMC/dexterity noted during ADL tasks       Hearing: WFL   Sensation:  Neuropathy in B feet  Tone: WFL   Edema: none noted    Comments: Upon arrival patient lying in bed. At end of session, patient returned to bed with call light and phone within reach, all lines and tubes intact. Overall patient demonstrated decreased independence and safety during completion of ADL/functional transfer/mobility tasks. Pt would benefit from continued skilled OT to increase safety and independence with completion of ADL/IADL tasks for functional independence and quality of life. Rehab Potential: Good for established goals     Patient / Family Goal: none stated    Patient and/or family were instructed on functional diagnosis, prognosis/goals and OT plan of care. Demonstrated good understanding.      Eval Complexity: Low    Time In: 1507  Time Out: 1521    Min Units   OT Eval Low 97165  x  1   OT Eval Medium 81034      OT Eval High 94455      OT Re-Eval [id number]       Therapeutic Ex 41432       Therapeutic Activities 43562       ADL/Self Care 45881       Orthotic Management 96027       Manual 99692     Neuro Re-Ed 88761       Non-Billable Time Evaluation Time additionally includes thorough review of current medical information, gathering information on past medical history/social history and prior level of function, interpretation of standardized testing/informal observation of tasks, assessment of data and development of plan of care and goals.             Rochelle Brooks, OTR/L, [id number]

## 2022-02-08 NOTE — CONSULTS
Inpatient Medical Oncology Consult Note    Reason for Visit:  Consultation on a patient with Breast Cancer    Referring Physician: Georgie Peabody, MD    PCP:  Felicia Somers DO    History of Present Illness:  60 y/o female who underwent Needle localized Right lumpectomy with SLNBx with mediport placement on 02/05/2021  A.  Right axillary sentinel lymph node #1, excision: 1 lymph node negative for malignancy   B.  Right axillary contents, regional resection: 1 lymph node positive for metastatic, poorly differentiated ductal carcinoma (grade 3) Extranodal extension present   C.  Right breast, lumpectomy: Invasive, poorly differentiated ductal carcinoma (grade 3) and high-grade ductal carcinoma in situ   High-grade ductal carcinoma in situ involves inferior and medial surgical margins   pT1c N1a MX      Bone scan, CT chest/abdomen/pelvis 02/26/2021 negative for metastatic disease     Re-excision lumpectomy of inferior and medial margins on 03/09/2021  A. Right breast, new inferior margin, excision: Fat necrosis, foreign body-type giant cell reaction, chronic inflammation, and fibrosis consistent with previous procedure   No evidence of residual carcinoma   Final inferior margin negative for carcinoma     B. Right breast, new medial margin, excision: Residual high-grade ductal carcinoma in situ   Ductal carcinoma in situ present less than 1 mm from red/superior margin and green/anterior margin   Fat necrosis, foreign body-type giant cell reaction, chronic   inflammation, and fibrosis consistent with previous procedure   Fibrocystic change   Microcalcifications associated with benign breast tissue and DCIS   Comment:Residual ductal carcinoma in situ is present in 5 out of 13 tissue blocks of part B     Recommended adjuvant chemotherapy (TCHP) for 6 cycles followed by adjuvant RT followed lastly by endocrine therapy. Cycle # 1 adjuvant TCHP was on 04/08/2021. Cycle # 6 adjuvant TCHP was on 08/12/2021.     RT was started on 11/01/2021 and completed on 12/10/2021. Arimidex 1 mg po daily was started on 12/11/2021 with fair tolerance so far. 2D-echo 01/25/2022 noted EF 60%  Herceptin/Perjeta last given on 01/28/2022    She presented to the ED with complaints of shortness of breath and cough. CTA chest 02/06/2022 no PE. Small patchy bilateral ground-glass infiltrates more prominent within the lateral aspect of the base of the right upper lobe and superior aspect of the right middle lobe. Swallow evaluation 02/08/2022 Swallowing mechanism grossly within normal limits without evidence of aspiration    Review of Systems;  CONSTITUTIONAL: No fever, chills. Fair appetite. Fatigue   ENMT: Eyes: No diplopia; Nose: No epistaxis. Mouth: No sore throat. RESPIRATORY: No hemoptysis. + shortness of breath, cough. CARDIOVASCULAR: No chest pain, palpitations. GASTROINTESTINAL: No nausea/vomiting, abdominal pain  GENITOURINARY: No dysuria, urinary frequency, hematuria. NEURO: No syncope, presyncope, headache.   Remainder:  ROS NEGATIVE    Past Medical History:      Diagnosis Date    Anxiety     Arthritis     Cancer ([facility] 2021    breast    Cervical radiculopathy     Chronic back pain     Dehydration 9/23/2021    Dehydration 9/23/2021    Depression     Diabetes mellitus type 2, uncontrolled ([address] 2/12/2017    GERD (gastroesophageal reflux disease)     History of blood transfusion 01/2018    back surgery, lumbar, dr Marcie Yost    Hypertension     Neuropathy     Right leg pain     seeing doctor currently problems with hardware     Past Surgical History:      Procedure Laterality Date    BACK SURGERY  01/08/2018    PLIF L2-L5    BREAST BIOPSY Right 2/5/2021    RIGHT BREAST LUMPECTOMY WITH  sentinel LYMPHNODE biopsy performed by Og De Luna MD at [facility] Right 3/9/2021    RIGHT BREAST RE-EXCISION LUMPECTOMY OF INFERIOR AND MEDIAL MARGINS performed by Og De Luna MD at [place] [address] Right 2017    Dr. Rupa Ivey      dr hu anterior    CERVICAL FUSION      dr Wong Arteaga anterior    CERVICAL FUSION  16    ANTERIOR C4-C5, C6-C7 PLATES AND SCREWS    CHOLECYSTECTOMY  2000    COLONOSCOPY      patient did cologuard in 2018   [facility]      dr Nir Reza    heel spurs bilateral    HYSTERECTOMY      dr Rasta Larsen partial    OTHER SURGICAL HISTORY Right 2018    removal of hardware repair nonunion right tibia/fibula    PORT SURGERY Left 2021    LEFT MEDI PORT INSERTION performed by Torsten Tellez MD at [place] [address] OFFICE/OUTPT VISIT,PROCEDURE ONLY Right 2018    REPAIR NON UNION FAILED FIXATION RIGHT DISTAL TIB / FIB PILON FX. WITH REMOVAL OF HARDWARE & O.R. I. F ------BOP performed by Shukri Ponce MD at [address] [place] [address] Right 2018    REPAIR NON-UNION RIGHT TIBIA AND FIBULA WITH REMOVAL OF HARDWARE AND BONE GRAFT performed by Shukri Ponce MD at [address] [place] [address] Right 1/10/2019    RIGHT TIBIA REMOVAL HARDWARE, RIGHT TIBIA OPEN  TREATMENT OF NON UNION performed by Shukri Ponce MD at [place] [address]    WISDOM TOOTH EXTRACTION       Family History:  Family History   Problem Relation Age of Onset    Diabetes Mother      Medications:  Reviewed and reconciled.     Social History:  Social History     Socioeconomic History    Marital status:      Spouse name: Not on file    Number of children: Not on file    Years of education: Not on file    Highest education level: Not on file   Occupational History    Not on file   Tobacco Use    Smoking status: Former Smoker     Packs/day: 0.50     Years: 10.00     Pack years: 5.00     Types: Cigarettes     Quit date: 3/15/2015     Years since quittin.9    Smokeless tobacco: Never Used    Tobacco comment: stop    Vaping Use  Vaping Use: Never used   Substance and Sexual Activity    Alcohol use: No    Drug use: No    Sexual activity: Not Currently   Other Topics Concern    Not on file   Social History Narrative    Not on file     Social Determinants of Health     Financial Resource Strain:     Difficulty of Paying Living Expenses: Not on file   Food Insecurity:     Worried About Running Out of Food in the Last Year: Not on file    [place] of Food in the Last Year: Not on file   Transportation Needs:     Lack of Transportation (Medical): Not on file    Lack of Transportation (Non-Medical): Not on file   Physical Activity:     Days of Exercise per Week: Not on file    Minutes of Exercise per Session: Not on file   Stress:     Feeling of Stress : Not on file   Social Connections:     Frequency of Communication with Friends and Family: Not on file    Frequency of Social Gatherings with Friends and Family: Not on file    Attends Yarsani Services: Not on file    Active Member of [address] [place] [address] or Organizations: Not on file    Attends Club or Organization Meetings: Not on file    Marital Status: Not on file   Intimate Partner Violence:     Fear of Current or Ex-Partner: Not on file    Emotionally Abused: Not on file    Physically Abused: Not on file    Sexually Abused: Not on file   Housing Stability:     Unable to Pay for Housing in the Last Year: Not on file    Number of [place] in the Last Year: Not on file    Unstable Housing in the Last Year: Not on file     Allergies: Allergies   Allergen Reactions    Ceftriaxone Shortness Of Breath     Physical Exam:  BP (!) 100/56   Pulse 72   Temp 97.8 °F (36.6 °C) (Oral)   Resp 16   Ht 6' 1\" (1.854 m)   Wt 156 lb (70.8 kg)   LMP 08/13/2013   SpO2 97%   BMI 20.58 kg/m²   GENERAL: Alert, oriented x 3, not in acute distress. HEENT: PERRLA; EOMI. Oropharynx clear. NECK: Supple. Without lymphadenopathy. LUNGS: [address] air entry bilaterally.     CARDIOVASCULAR: Regular rate. No murmurs, rubs or gallops. ABDOMEN: Soft. Non-tender, non-distended. EXTREMITIES: Without clubbing, cyanosis, or edema. NEUROLOGIC: No focal deficits. ECOG PS 1    Lab Results   Component Value Date    WBC 4.3 (L) 02/08/2022    HGB 10.2 (L) 02/08/2022    HCT 32.0 (L) 02/08/2022    .6 (H) 02/08/2022     02/08/2022     Lab Results   Component Value Date     02/08/2022    K 4.1 02/08/2022     (H) 02/08/2022    CO2 24 02/08/2022    BUN 12 02/08/2022    CREATININE 0.8 02/08/2022    GLUCOSE 117 (H) 02/08/2022    CALCIUM 9.1 02/08/2022    PROT 6.2 (L) 02/06/2022    LABALBU 3.8 02/06/2022    BILITOT 0.3 02/06/2022    ALKPHOS 182 (H) 02/06/2022    AST 30 02/06/2022    ALT 32 02/06/2022    LABGLOM >60 02/08/2022    GFRAA >60 02/08/2022     Impression/Plan:  62 y/o female who underwent Needle localized Right lumpectomy with SLNBx with mediport placement on 02/05/2021  A.  Right axillary sentinel lymph node #1, excision: 1 lymph node negative for malignancy   B.  Right axillary contents, regional resection: 1 lymph node positive for metastatic, poorly differentiated ductal carcinoma (grade 3) Extranodal extension present   C.  Right breast, lumpectomy: Invasive, poorly differentiated ductal carcinoma (grade 3) and high-grade ductal carcinoma in situ   High-grade ductal carcinoma in situ involves inferior and medial surgical margins   pT1c N1a MX      Bone scan, CT chest/abdomen/pelvis 02/26/2021 negative for metastatic disease     Re-excision lumpectomy of inferior and medial margins on 03/09/2021  A. Right breast, new inferior margin, excision: Fat necrosis, foreign body-type giant cell reaction, chronic inflammation, and fibrosis consistent with previous procedure   No evidence of residual carcinoma   Final inferior margin negative for carcinoma     B.  Right breast, new medial margin, excision: Residual high-grade ductal carcinoma in situ   Ductal carcinoma in situ present less than 1 mm from red/superior margin and green/anterior margin   Fat necrosis, foreign body-type giant cell reaction, chronic   inflammation, and fibrosis consistent with previous procedure   Fibrocystic change   Microcalcifications associated with benign breast tissue and DCIS   Comment:Residual ductal carcinoma in situ is present in 5 out of 13 tissue blocks of part B     Recommended adjuvant chemotherapy (TCHP) for 6 cycles followed by adjuvant RT followed lastly by endocrine therapy. Cycle # 1 adjuvant TCHP was on 04/08/2021. Cycle # 6 adjuvant TCHP was on 08/12/2021. RT was started on 11/01/2021 and completed on 12/10/2021. Arimidex 1 mg po daily was started on 12/11/2021 with fair tolerance so far. 2D-echo 01/25/2022 noted EF 60%  Herceptin/Perjeta last given on 01/28/2022    She presented to the ED with complaints of shortness of breath and cough. CTA chest 02/06/2022 no PE. Small patchy bilateral ground-glass infiltrates more prominent within the lateral aspect of the base of the right upper lobe and superior aspect of the right middle lobe.   Swallow evaluation 02/08/2022 Swallowing mechanism grossly within normal limits without evidence of aspiration  Speech therapy on board  On Levaquin 750 mg daily negative procalcitonin, no leukocytosis, afebrile  DuoNebs prn  Will continue to follow    Thank you for allowing us to participate in the care of  Chelly Quesada MD   2/8/2022

## 2022-02-08 NOTE — CARE COORDINATION
Social Work discharge planning   Attempted to meet pt but she is off unit. Electronically signed by Dari Scanlon on 2/8/2022 at 10:30 AM     Addendum-    PT AMPAC yesterday 20 and OT today 19. Anticipate discharge to home at discharge. Pt has PCP, Dr Lori Batista for follow up.   Electronically signed by Dari Scanlon on 2/8/2022 at 3:59 PM

## 2022-02-08 NOTE — PROGRESS NOTES
SPEECH/LANGUAGE PATHOLOGY  VIDEOFLUOROSCOPIC STUDY OF SWALLOWING (MBS)   and PLAN OF CARE    PATIENT NAME:  Beryl Lobo  (female)     MRN:  [id number]    :  1965  (61 y.o.)  STATUS:  Inpatient: Room 050502-A    TODAY'S DATE:  2022  REFERRING PROVIDER:   Ermelinda Hoffman MD   SPECIFIC PROVIDER ORDER: [place] modified barium swallow with video  Date of order:  2022  REASON FOR REFERRAL: further assess for aspiration   EVALUATING THERAPIST: SHAAN Nugent      RESULTS:      DYSPHAGIA DIAGNOSIS:  mild  pharyngeal phase dysphagia    Neither penetration nor aspiration observed during this study     DIET RECOMMENDATIONS:  Regular consistency solids (IDDSI level 7) with thin liquids (IDDSI level 0) using compensatory strategies    FEEDING RECOMMENDATIONS:    Assistance level:  No assistance needed     Compensatory strategies recommended: Double swallow and Alternate solids and liquids     Discussed recommendations with nursing and/or faxed report to referring provider: Yes    Laryngeal Penetration and Aspiration:  Neither penetration nor aspiration was observed in today's study     [address]   The dysphagia POC is established based on physician order and dysphagia diagnosis    Skilled SLP intervention for dysphagia management on acute care 3-5 x per week until goals met, pt plateaus in function and/or discharged from hospital      Conditions Requiring Skilled Therapeutic Intervention for dysphagia:    Reduced pharyngeal clearing of the bolus    SPECIFIC DYSPHAGIA INTERVENTIONS TO INCLUDE:     Compensatory strategy training   Therapeutic exercises    Specific instructions for next treatment:  initiate instruction of therapeutic exercises  and initiate instruction of compensatory strategies  Treatment Goals:    Short Term Goals:  Pt will implement identified compensatory swallowing strategies on 90% of opportunities or greater to improve airway protection and swallow function.   Pt will complete BOTR strength/ ROM exercises to reduce pharyngeal residuals and improve epiglottic inversion minimal verbal prompts    Long Term Goals:   Pt will maintain adequate nutrition/hydration via PO intake of the least restrictive oral diet with implementation of safe swallow/ compensatory strategies and decrease signs/symptoms of aspiration to less than 1 x/day. Pt will improve oropharyngeal swallow function to ensure airway protection during PO intake to maintain adequate nutrition/hydration and decrease signs/symptoms of aspiration to less than 1 x/day. Patient/family Goal:    To consume solid foods without feeling like they get stuck    Plan of care discussed with Patient   The Patient understand(s) the diagnosis, prognosis and plan of care     Rehabilitation Potential/Prognosis: good                      ADMITTING DIAGNOSIS: Pneumonia [J18.9]  Pneumonia of right lung due to infectious organism, unspecified part of lung [J18.9]     VISIT DIAGNOSIS:   Visit Diagnoses       Codes    Pneumonia of right lung due to infectious organism, unspecified part of lung    -  Primary J18.9    Dizziness     R42              PATIENT REPORT/COMPLAINT: food sticking in the throat    PRIOR LEVEL OF SWALLOW FUNCTION:    Past History of Dysphagia?:  none reported    Home diet: Regular consistency solids (IDDSI level 7) with  thin liquids (IDDSI level 0)  Current Diet Order:  ADULT DIET;  Regular; 4 carb choices (60 gm/meal)    PROCEDURE:  Consistencies Administered During the Evaluation   Liquids: thin liquid and nectar thick liquid   Solids:  pureed foods and solid foods      Method of Intake:   cup, straw, spoon  Self fed      Position:   Seated, upright, Lateral plane    INSTRUMENTAL ASSESSMENT:    ORAL PREP/ ORAL PHASE:    The oral stage of swallowing was within functional limits for consistencies administered     PHARYNGEAL PHASE:     ONSET TIME       Onset time of the pharyngeal swallow was adequate       PHARYNGEAL RESIDUALS        Vallecula/Pharyngeal Wall           Reduced tongue base retraction resulting in residuals in vallecula and/or posterior pharyngeal wall for pureed foods which cleared with spontaneous double swallow       Pyriform Sinuses      No significant residuals were noted in the pyriform sinuses     LARYNGEAL PENETRATION   Laryngeal penetration was not present during this evaluation    ASPIRATION  Aspiration was not present during this evaluation    PENETRATION-ASPIRATION SCALE (PAS):  THIN 1 = Material does not enter the airway  MILDLY THICK 1 = Material does not enter the airway  MODERATELY THICK item not administered  PUREE 1 = Material does not enter the airway  HARD SOLID 1 = Material does not enter the airway       COMPENSATORY STRATEGIES    Compensatory strategies that were beneficial included Double swallow      STRUCTURAL/FUNCTIONAL ANOMALIES   No structural/functional anomalies were noted    CERVICAL ESOPHAGEAL STAGE :     The cervical esophagus appeared adequate          ___________    Cognition:   Within functional limits for this exam    Oral Peripheral Examination   Adequate lingual/labial strength     Current Respiratory Status   room air     Parameters of Speech Production  Respiration:  Adequate for speech production  Quality:   Within functional limits  Intensity: Within functional limits    Pain: No pain reported. EDUCATION:   The Speech Language Pathologist (SLP) completed education regarding results of evaluation and that intervention is warranted at this time. Learner: Patient  Education: Reviewed results and recommendations of this evaluation  Evaluation of Education:  Merary Sarkar understanding    This plan may be re-evaluated and revised as warranted.         Evaluation Time includes thorough review of current medical information, gathering information on past medical history/social history and prior level of function, completion of standardized testing/informal observation of tasks, assessment of data and education on plan of care and goals. INTERVENTION    Pt/family educated on above results and plan of care. Pt/family trained on compensatory strategies for safe swallow and signs and symptoms of aspiration (throat clearing, coughing/choking, wet vocal quality. , etc.) and encouraged to notify staff if observed. Handouts provided as warranted. Pt/family encouraged to engage in question/answer session. All questions answered and pt/family verbalized understanding of above. [x]The admitting diagnosis and active problem list, have been reviewed prior to initiation of this evaluation. CPT Code: 66017  dysphagia study, 32684 dysphagia therapy    ACTIVE PROBLEM LIST:   Patient Active Problem List   Diagnosis    Diabetes mellitus type 2, uncontrolled ([address]    HTN (hypertension), benign    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    Delayed union of tibial shaft fracture, right, closed    Tibia/fibula fracture, right, closed, with nonunion, subsequent encounter    Failed orthopedic implant ([address]    Fracture of tibia and fibula, right, closed, with nonunion, subsequent encounter    Malignant neoplasm of central portion of right breast in female, estrogen receptor positive ([address]    Hypokalemia    Nausea    Hyponatremia    [place] (acute kidney injury) ([address]    Diarrhea    Incisional breast wound    Dehydration    Open wound of right breast    Pneumonia       Shazia CLARKE CCC/SLP [id number]  Speech-Language Pathologist

## 2022-02-08 NOTE — PATIENT CARE CONFERENCE
P Quality Flow/Interdisciplinary Rounds Progress Note        Quality Flow Rounds held on February 8, 2022    Disciplines Attending:  Bedside Nurse, ,  and Nursing Unit Leadership    Alcira Ochoa was admitted on 2/6/2022  1:02 PM    Anticipated Discharge Date:  Expected Discharge Date: 02/09/22    Disposition:    Ryan Score:  Ryan Scale Score: 21    Readmission Risk              Risk of Unplanned Readmission:  23           Discussed patient goal for the day, patient clinical progression, and barriers to discharge.   The following Goal(s) of the Day/Commitment(s) have been identified:  Labs - Report Results      Pako Rausch RN  February 8, 2022

## 2022-02-08 NOTE — PROGRESS NOTES
Subjective: The patient is lying in bed without any complaints. She just states that she feels ill. No acute events overnight. Denies chest pain, angina, SOB     Objective:    BP (!) 100/56   Pulse 72   Temp 97.8 °F (36.6 °C) (Oral)   Resp 16   Ht 6' 1\" (1.854 m)   Wt 156 lb (70.8 kg)   LMP 08/13/2013   SpO2 97%   BMI 20.58 kg/m²     In: 130 [P.O.:120; I.V.:10]  Out: 1900   In: 130   Out: 1900 [Urine:1900]    General appearance: NAD, conversant, ill appearing  HEENT: AT/NC, MMM  Neck: FROM, supple  Lungs: Diminished in bases, not requiring O2  CV: RRR, no MRGs  Vasc: Radial pulses 2+  Abdomen: Soft, non-tender; no masses or HSM  Extremities: No peripheral edema or digital cyanosis  Skin: no rash, lesions or ulcers  Psych: Alert and oriented to person, place and time  Neuro: Alert and interactive     Recent Labs     02/06/22  1335 02/07/22  0330 02/08/22  0515   WBC 5.7 5.4 4.3*   HGB 11.8 10.4* 10.2*   HCT 36.8 32.2* 32.0*    210 186       Recent Labs     02/06/22  1335 02/07/22  0330 02/08/22  0515    136 141   K 4.2 4.1 4.1   CL 99 102 109*   CO2 26 26 24   BUN 10 10 12   CREATININE 0.8 0.9 0.8   CALCIUM 9.4 9.1 9.1       Assessment:    Principal Problem:    Pneumonia  Active Problems:    Diabetes mellitus type 2, uncontrolled (HCC)  Resolved Problems:    * No resolved hospital problems. *      Plan:  61year old female with a past medical history of breast CA, DM, and HTN admitted to med surg unit      Pneumonia, rule out bacteremia in immunocompromised host, profound weakness  -Await blood culture results - no growth to date  -Supplement O2 demands keeping oxygen saturation greater than 92%. -Levaquin 750 mg daily, negative procalcitonin, no white count, afebrile  -DuoNebs prn  -Monitor labs hemoglobin.   Monitor electrolytes place as necessary  -Strep/Legionella -negative  -Swallow screen - passed   -IV hydration - NS @ 60     Diabetes Mellitus  -Monitor labs  -ISS glucose control  -Hypoglycemia protocol initiated  -EicM4D-5.2     Hematology oncology consulted - appreciate input    DVT Prophylaxis - lovenox  PT/OT  Discharge planning     WES Short CNP  6:38 PM  2/8/2022     Above note edited to reflect my thoughts   Patient only complains of profound fatigue and weakness  From a medicine standpoint she should be able to be discharged tomorrow if no further changes in her blood work  Patient continues to ask for some infusion that she gets with her chemotherapy that helps with her fatigue-will defer to oncology team    I personally saw, examined and provided care for the patient. Radiographs, labs and medication list were reviewed by me independently. The case was discussed in detail and plans for care were established. Review of Adry POLLARD CNP, documentation was conducted and revisions were made as appropriate directly by me. I agree with the above documented exam, problem list, and plan of care.      Amita Gracia MD  8:18 PM  2/8/2022

## 2022-02-09 VITALS
DIASTOLIC BLOOD PRESSURE: 58 MMHG | OXYGEN SATURATION: 98 % | HEART RATE: 82 BPM | HEIGHT: 72 IN | TEMPERATURE: 98.2 F | SYSTOLIC BLOOD PRESSURE: 121 MMHG | BODY MASS INDEX: 21.13 KG/M2 | RESPIRATION RATE: 14 BRPM | WEIGHT: 156 LBS

## 2022-02-09 LAB
METER GLUCOSE: 108 MG/DL (ref 74–99)
METER GLUCOSE: 154 MG/DL (ref 74–99)
METER GLUCOSE: 89 MG/DL (ref 74–99)

## 2022-02-09 PROCEDURE — 6370000000 HC RX 637 (ALT 250 FOR IP): Performed by: NURSE PRACTITIONER

## 2022-02-09 PROCEDURE — 6360000002 HC RX W HCPCS: Performed by: NURSE PRACTITIONER

## 2022-02-09 PROCEDURE — 82962 GLUCOSE BLOOD TEST: CPT

## 2022-02-09 PROCEDURE — 99233 SBSQ HOSP IP/OBS HIGH 50: CPT | Performed by: INTERNAL MEDICINE

## 2022-02-09 PROCEDURE — 94640 AIRWAY INHALATION TREATMENT: CPT

## 2022-02-09 PROCEDURE — 87205 SMEAR GRAM STAIN: CPT

## 2022-02-09 PROCEDURE — 97530 THERAPEUTIC ACTIVITIES: CPT

## 2022-02-09 PROCEDURE — 6370000000 HC RX 637 (ALT 250 FOR IP): Performed by: FAMILY MEDICINE

## 2022-02-09 PROCEDURE — 92526 ORAL FUNCTION THERAPY: CPT

## 2022-02-09 PROCEDURE — 2580000003 HC RX 258: Performed by: INTERNAL MEDICINE

## 2022-02-09 RX ORDER — LEVOFLOXACIN 750 MG/1
750 TABLET ORAL DAILY
Qty: 4 TABLET | Refills: 0 | Status: SHIPPED | OUTPATIENT
Start: 2022-02-09 | End: 2022-02-13

## 2022-02-09 RX ORDER — LEVOFLOXACIN 750 MG/1
750 TABLET ORAL DAILY
Qty: 4 TABLET | Refills: 0 | Status: CANCELLED | OUTPATIENT
Start: 2022-02-09 | End: 2022-02-13

## 2022-02-09 RX ORDER — NICOTINE 21 MG/24HR
1 PATCH, TRANSDERMAL 24 HOURS TRANSDERMAL DAILY
Qty: 30 PATCH | Refills: 3 | Status: SHIPPED | OUTPATIENT
Start: 2022-02-10

## 2022-02-09 RX ADMIN — IPRATROPIUM BROMIDE AND ALBUTEROL SULFATE 1 AMPULE: 2.5; .5 SOLUTION RESPIRATORY (INHALATION) at 15:19

## 2022-02-09 RX ADMIN — SODIUM CHLORIDE: 9 INJECTION, SOLUTION INTRAVENOUS at 08:44

## 2022-02-09 RX ADMIN — PREGABALIN 75 MG: 75 CAPSULE ORAL at 08:44

## 2022-02-09 RX ADMIN — ENOXAPARIN SODIUM 40 MG: 100 INJECTION SUBCUTANEOUS at 08:45

## 2022-02-09 RX ADMIN — FAMOTIDINE 20 MG: 20 TABLET ORAL at 08:44

## 2022-02-09 RX ADMIN — IPRATROPIUM BROMIDE AND ALBUTEROL SULFATE 1 AMPULE: 2.5; .5 SOLUTION RESPIRATORY (INHALATION) at 07:59

## 2022-02-09 RX ADMIN — CHOLESTYRAMINE 4 G: 4 POWDER, FOR SUSPENSION ORAL at 08:45

## 2022-02-09 RX ADMIN — IPRATROPIUM BROMIDE AND ALBUTEROL SULFATE 1 AMPULE: 2.5; .5 SOLUTION RESPIRATORY (INHALATION) at 11:58

## 2022-02-09 RX ADMIN — Medication 400 MG: at 08:44

## 2022-02-09 ASSESSMENT — PAIN SCALES - GENERAL: PAINLEVEL_OUTOF10: 7

## 2022-02-09 NOTE — PROGRESS NOTES
CLINICAL PHARMACY NOTE: MEDS TO BEDS    Total # of Prescriptions Filled: 1   The following medications were delivered to the patient:  · Nicotine 21 mg    Additional Documentation:

## 2022-02-09 NOTE — PATIENT CARE CONFERENCE
P Quality Flow/Interdisciplinary Rounds Progress Note        Quality Flow Rounds held on February 9, 2022    Disciplines Attending:  Bedside Nurse, ,  and Nursing Unit Leadership    Lester Cohen was admitted on 2/6/2022  1:02 PM    Anticipated Discharge Date:  Expected Discharge Date: 02/09/22    Disposition:    Ryan Score:  Ryan Scale Score: 21    Readmission Risk              Risk of Unplanned Readmission:  24           Discussed patient goal for the day, patient clinical progression, and barriers to discharge.   The following Goal(s) of the Day/Commitment(s) have been identified:  Discharge - Obtain Order      Ginna Em RN  February 9, 2022

## 2022-02-09 NOTE — PROGRESS NOTES
Physical Therapy    Facility/Department: [address] MED SURG  Treatment Note    NAME: Matthew Forrest  : 1965  MRN: [id number]    Date of Service: 2022      Attending Provider:  Amparo Hurt MD    Evaluating PT:  Phillip Palacios, P.T. Room #:  0502/0502-A  Diagnosis:  Pneumonia [J18.9]  Pneumonia of right lung due to infectious organism, unspecified part of lung [J18.9]  Pertinent PMHx/PSHx:  Breast CA   Precautions:  falls    SUBJECTIVE:    Pt is currently living with a friend in a 1 story home with 4-5 stairs and 1 rail to enter. She reports she is in the process of moving into an apt. Pt ambulated with a R ankle brace and walked with a cane or ww and at times uses a WC or [place]. OBJECTIVE:   Initial Evaluation  Date: 21 Treatment Short Term/ Long Term   Goals   Was pt agreeable to Eval/treatment? yes yes    Does pt have pain? No c/o pain No c/o pain    Bed Mobility  Rolling: Independent  Supine to sit: Independent  Sit to supine: Independent  Scooting: Independent Rolling: Independent  Supine to sit: Independent  Sit to supine: Independent  Scooting: Independent Independent   Transfers Sit to stand: supervision  Stand to sit: supervision  Stand pivot: supervision with ww Sit to stand: Independent  Stand to sit: Independent  Stand pivot: Independent with ww Independent   Ambulation   80 feet with ww supervision and 150 feet with ww and R ankle brace supervision 250 feet with ww and R ankle brace Independent  350 feet with ww and R ankle brace Independent    Stair negotiation: ascended and descended 5 steps with 1 rail supervision 5 steps with 1 rail Independent  5 steps with 1 rail Independent    AM-PAC 6 Clicks 76/35 87/01      BLE ROM is WFL.    BLE strength is grossly 4+/5, except R ankle is 3-/5  Sensation:  Pt c/o numbness B hands  Balance: sitting is Independent and standing with ww is Independent   Endurance: fair+    ASSESSMENT:    Comments:  Pt walked with steady gait with ww and was stable on the stairs demonstrating [place] and met her PT goals. Treatment:  Patient practiced and was instructed in the following treatment:     Bed mobility, transfers, gait with ww, and stairs to improve functional strength and endurance. Pt was left in bed with call light left by patient. PLAN:    Pt has met established Physical Therapy goals. Will discharge pt from physical therapy current plan of care. Time in  12:30  Time out  12:40    Total Treatment Time  10 minutes     Evaluation Time includes thorough review of current medical information, gathering information on past medical history/social history and prior level of function, completion of standardized testing/informal observation of tasks, assessment of data and education on plan of care and goals. CPT codes:  [] Low Complexity PT evaluation 35776  [] Moderate Complexity PT evaluation 96549  [] High Complexity PT evaluation 10806  [] PT Re-evaluation 54141  [] Gait training 36277 ** minutes  [] Manual therapy 27288 ** minutes  [x] Therapeutic activities 99689 10 minutes  [] Therapeutic exercises 74044 ** minutes  [] Neuromuscular reeducation 55998 ** minutes     Morgan Mayberry., P.T.   License Number: [id number]

## 2022-02-09 NOTE — PROGRESS NOTES
Hem/Onc Inpatient F/up    Reason for Visit:  Breast Cancer    Referring Physician: Twany Bangura MD    PCP:  Jacque Garcia DO    Subjective: The patient is feeling much better, the shortness of breath had improved. Physical Exam:  BP (!) 121/58   Pulse 82   Temp 98.2 °F (36.8 °C) (Oral)   Resp 14   Ht 6' 1\" (1.854 m)   Wt 156 lb (70.8 kg)   LMP 08/13/2013   SpO2 98%   BMI 20.58 kg/m²   GENERAL: Alert, oriented x 3, not in acute distress. HEENT: PERRLA; EOMI. Oropharynx clear. NECK: Supple. Without lymphadenopathy. LUNGS: [place] air entry bilaterally. CARDIOVASCULAR: Regular rate. No murmurs, rubs or gallops. ABDOMEN: Soft. Non-tender, non-distended. EXTREMITIES: Without clubbing, cyanosis, or edema. NEUROLOGIC: No focal deficits. ECOG PS 1    Lab Results   Component Value Date    WBC 4.3 (L) 02/08/2022    HGB 10.2 (L) 02/08/2022    HCT 32.0 (L) 02/08/2022    .6 (H) 02/08/2022     02/08/2022     Lab Results   Component Value Date     02/08/2022    K 4.1 02/08/2022     (H) 02/08/2022    CO2 24 02/08/2022    BUN 12 02/08/2022    CREATININE 0.8 02/08/2022    GLUCOSE 117 (H) 02/08/2022    CALCIUM 9.1 02/08/2022    PROT 6.2 (L) 02/06/2022    LABALBU 3.8 02/06/2022    BILITOT 0.3 02/06/2022    ALKPHOS 182 (H) 02/06/2022    AST 30 02/06/2022    ALT 32 02/06/2022    LABGLOM >60 02/08/2022    GFRAA >60 02/08/2022     Impression/Plan:  60 y/o female who underwent Needle localized Right lumpectomy with SLNBx with mediport placement on 02/05/2021  A. Right axillary sentinel lymph node #1, excision: 1 lymph node negative for malignancy   B. Right axillary contents, regional resection: 1 lymph node positive for metastatic, poorly differentiated ductal carcinoma (grade 3) Extranodal extension present   C.   Right breast, lumpectomy: Invasive, poorly differentiated ductal carcinoma (grade 3) and high-grade ductal carcinoma in situ   High-grade ductal carcinoma in situ involves inferior and medial surgical margins   pT1c N1a MX      Bone scan, CT chest/abdomen/pelvis 02/26/2021 negative for metastatic disease     Re-excision lumpectomy of inferior and medial margins on 03/09/2021  A. Right breast, new inferior margin, excision: Fat necrosis, foreign body-type giant cell reaction, chronic inflammation, and fibrosis consistent with previous procedure   No evidence of residual carcinoma   Final inferior margin negative for carcinoma     B. Right breast, new medial margin, excision: Residual high-grade ductal carcinoma in situ   Ductal carcinoma in situ present less than 1 mm from red/superior margin and green/anterior margin   Fat necrosis, foreign body-type giant cell reaction, chronic   inflammation, and fibrosis consistent with previous procedure   Fibrocystic change   Microcalcifications associated with benign breast tissue and DCIS   Comment:Residual ductal carcinoma in situ is present in 5 out of 13 tissue blocks of part B     Recommended adjuvant chemotherapy (TCHP) for 6 cycles followed by adjuvant RT followed lastly by endocrine therapy. Cycle # 1 adjuvant TCHP was on 04/08/2021. Cycle # 6 adjuvant TCHP was on 08/12/2021. RT was started on 11/01/2021 and completed on 12/10/2021. Arimidex 1 mg po daily was started on 12/11/2021 with fair tolerance so far. 2D-echo 01/25/2022 noted EF 60%  Herceptin/Perjeta last given on 01/28/2022    She presented to the ED with complaints of shortness of breath and cough. -CTA chest 02/06/2022 no PE. Small patchy bilateral ground-glass infiltrates more prominent within the lateral aspect of the base of the right upper lobe and superior aspect of the right middle lobe.   - Swallow evaluation 02/08/2022 Swallowing mechanism grossly within normal limits without evidence of aspiration  -On duo nebs, which has been helping.  -Continue surveillance, the patient is on Levaquin 750 mg daily.  -Doing better clinically, OK for DC from heme-onc POV, with outpatient follow-up as scheduled.     Thank you for allowing us to participate in the care of Mrs. Alondra Franco MD   HEMATOLOGY/MEDICAL [address] MEDICAL ONCOLOGY  [address] [address]  Dept: [id number] Eduar Cordell: [phone]

## 2022-02-09 NOTE — PROGRESS NOTES
SPEECH LANGUAGE PATHOLOGY  DAILY PROGRESS NOTE        PATIENT NAME:  Yee Salinas      :  1965          TODAY'S DATE:  2022 ROOM:  [facility]-A        SWALLOWING  Patient resting in bed. Patient reported good toleration of solids and liquids during meals. Re-educated patient on MBSS results and compensatory strategies including double swallow. Introduced [employer] this date including demonstration and explanation of benefits. Patient attempted exercises x3 with good outcome. Encouraged patient to self complete as able. DYSPHAGIA DIAGNOSIS:  mild  pharyngeal phase dysphagia     Neither penetration nor aspiration observed during this study      DIET RECOMMENDATIONS:  Regular consistency solids (IDDSI level 7) with thin liquids (IDDSI level 0) using compensatory strategies     FEEDING RECOMMENDATIONS:               Assistance level:  No assistance needed                 Compensatory strategies recommended: Double swallow and Alternate solids and liquids        Oral- adequate labial seal and A-P transfer, no oral residue      Pharyngeal- No overt s/s of aspiration, no multiple swallows      Education- Pt educated on results and POC. Pt trained on compensatory strategies for safe swallow with good outcome. Questions answered. Will continue SP intervention as per previously established POC. Elham CLARKE  [id number] [facility] [id number]  Speech Language Pathologist          CPT code(s) 61234  dysphagia tx  Total minutes :  15 minutes

## 2022-02-09 NOTE — CARE COORDINATION
Social Work discharge planning   Sw spoke to pt, who said she has friends that can bring her home at discharge. She said her dtr and son in law in [place] are going to try to come to [place] to help her get her furniture out of storage and in to her new apt is [facility]. Pt said [facility] helped her get new apartment. Pt said her car is at her friend's home, and she plans to stay with friend or at her new apartment with her air mattress and some furniture that was given to her. Pt denied need for hhc now. Sw encouraged her to call her PCP office for hhc if she changes her mind once moved in to her new apt.   Electronically signed by Lluvia Barlow on 2/9/2022 at 10:14 AM

## 2022-02-10 LAB — GRAM STAIN ORDERABLE: NORMAL

## 2022-02-12 LAB
BLOOD CULTURE, ROUTINE: NORMAL
CULTURE, BLOOD 2: NORMAL

## 2022-02-13 NOTE — DISCHARGE SUMMARY
Physician Discharge Summary     Patient ID:  Gwendolyn Henderson  [id number]  61 y.o.  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    Delayed union of tibial shaft fracture, right, closed    Tibia/fibula fracture, right, closed, with nonunion, subsequent encounter    Failed orthopedic implant ([facility]    Fracture of tibia and fibula, right, closed, with nonunion, subsequent encounter    Malignant neoplasm of central portion of right breast in female, estrogen receptor positive ([facility]    Hypokalemia    Nausea    Hyponatremia    AUSTIN (acute kidney injury) ([facility]    Diarrhea    Incisional breast wound    Dehydration    Open wound of right breast    Pneumonia       Discharge Diagnoses: Pneumonia    Consults: hematology/oncology    Procedures: None    Hospital Course: The patient is a 61 y.o. female of Lolita Gonzalez DO with a past medical history of anxiety, breast CA, depression, DM, GERG, HTN, and neuropathy. Patient is an active chemo patient and her last treatment was 2022. Patient presented to the Ed with complaints of shortness of breath and cough. Patient admits that has been ongoing for approximately 3 weeks. Pneumonia, rule out bacteremia in immunocompromised host, profound weakness  -Await blood culture results - no growth to date  -Supplement O2 demands keeping oxygen saturation greater than 92%.   -Levaquin 750 mg daily, negative procalcitonin, no white count, afebrile  -DuoNebs prn  -Monitor labs hemoglobin.  Monitor electrolytes place as necessary  -Strep/Legionella -negative  -Swallow screen - passed   -IV hydration - NS @ 60     Diabetes Mellitus  -Monitor labs  -ISS glucose control  -Hypoglycemia protocol initiated  -ThgL0O-8.2     Hematology oncology consulted - appreciate input    Patient discharged home in stable conditions with medications listed below. Patient instructed to follow up with all consultants and PCP within 3 weeks of discharge. No results for input(s): WBC, HGB, HCT, PLT in the last 72 hours. No results for input(s): NA, K, CL, CO2, BUN, CREATININE, GLU, CALCIUM in the last 72 hours. CT Head WO Contrast    Result Date: 2/6/2022  EXAMINATION: CT OF THE HEAD WITHOUT CONTRAST  2/6/2022 8:07 pm TECHNIQUE: CT of the head was performed without the administration of intravenous contrast. Dose modulation, iterative reconstruction, and/or weight based adjustment of the mA/kV was utilized to reduce the radiation dose to as low as reasonably achievable. COMPARISON: 02/12/2017 HISTORY: ORDERING SYSTEM PROVIDED HISTORY: dizziness TECHNOLOGIST PROVIDED HISTORY: Reason for exam:->dizziness Has a \"code stroke\" or \"stroke alert\" been called? ->No Decision Support Exception - unselect if not a suspected or confirmed emergency medical condition->Emergency Medical Condition (MA) FINDINGS: BRAIN/VENTRICLES: There is no acute intracranial hemorrhage, mass effect or midline shift. No abnormal extra-axial fluid collection. The gray-white differentiation is maintained without evidence of an acute infarct. There is no evidence of hydrocephalus. There are nonspecific hypoattenuating foci in the subcortical and periventricular white matter that most likely represent chronic microangiopathic ischemic changes in a patient of this age. ORBITS: The visualized portion of the orbits demonstrate no acute abnormality. SINUSES: The visualized paranasal sinuses and mastoid air cells demonstrate no acute abnormality. SOFT TISSUES/SKULL:  No acute abnormality of the visualized skull or soft tissues. No acute intracranial abnormality.  RECOMMENDATIONS: Unavailable     CTA PULMONARY W CONTRAST    Result Date: 2/6/2022  EXAMINATION: CTA OF THE CHEST 2/6/2022 3:03 pm TECHNIQUE: CTA of the chest was performed after the administration of intravenous contrast.  Multiplanar reformatted images are provided for review. MIP images are provided for review. Dose modulation, iterative reconstruction, and/or weight based adjustment of the mA/kV was utilized to reduce the radiation dose to as low as reasonably achievable. COMPARISON: None. HISTORY: ORDERING SYSTEM PROVIDED HISTORY: CP, SHOB, breast cancer TECHNOLOGIST PROVIDED HISTORY: Reason for exam:->CP, SHOB, breast cancer Decision Support Exception - unselect if not a suspected or confirmed emergency medical condition->Emergency Medical Condition (MA) FINDINGS: Pulmonary Arteries: Pulmonary arteries are adequately opacified for evaluation. No evidence of intraluminal filling defect to suggest pulmonary embolism. Main pulmonary artery is normal in caliber. Mediastinum: No evidence of mediastinal lymphadenopathy. The heart and pericardium demonstrate no acute abnormality. There is no acute abnormality of the thoracic aorta. Lungs/pleura: There are patchy peripheral infiltrate seen within the right upper lobe, right middle lobe and to a lesser extent within the left upper lobe. There is no pleural effusion or pleural thickening. There is no pneumothorax. Upper Abdomen: Limited images of the upper abdomen are unremarkable. Soft Tissues/Bones: No acute bone or soft tissue abnormality. 1. There is no pulmonary embolus 2. Small patchy bilateral ground-glass infiltrates more prominent within the lateral aspect of the base of the right upper lobe and superior aspect of the right middle lobe. The findings are consistent with pneumonia and have the appearance of early COVID pneumonia. Discharge Exam:    HEENT: NCAT,  PERRLA, No JVD  Heart:  RRR, no murmurs, gallops, or rubs.   Lungs:  CTA bilaterally, no wheeze, rales or rhonchi  Abd: bowel sounds present, nontender, nondistended, no masses  Extrem:  No clubbing, cyanosis, or edema    Disposition: home     Patient Condition at Discharge: Stable    Patient Instructions:      Medication List      START taking these medications    levoFLOXacin 750 MG tablet  Commonly known as: LEVAQUIN  Take 1 tablet by mouth daily for 4 doses     nicotine 21 MG/24HR  Commonly known as: NICODERM CQ  Place 1 patch onto the skin daily        CHANGE how you take these medications    * anastrozole 1 MG tablet  Commonly known as: Arimidex  Take 1 tablet by mouth daily  What changed: Another medication with the same name was added. Make sure you understand how and when to take each. * anastrozole 1 MG tablet  Commonly known as: Arimidex  Take 1 tablet by mouth daily  What changed: You were already taking a medication with the same name, and this prescription was added. Make sure you understand how and when to take each. * This list has 2 medication(s) that are the same as other medications prescribed for you. Read the directions carefully, and ask your doctor or other care provider to review them with you. CONTINUE taking these medications    albuterol sulfate  (90 Base) MCG/ACT inhaler     ALPRAZolam 0.5 MG tablet  Commonly known as: XANAX     amitriptyline 50 MG tablet  Commonly known as: ELAVIL     cholestyramine 4 g packet  Commonly known as: QUESTRAN  Take 1 packet by mouth 2 times daily     famotidine 20 MG tablet  Commonly known as: PEPCID     Hair Skin and Nails Formula Tabs     Lantus SoloStar 100 UNIT/ML injection pen  Generic drug: insulin glargine     lidocaine-prilocaine 2.5-2.5 % cream  Commonly known as: EMLA  Apply topically to port 30 minutes prior to chemotherapy.      Loratadine-D 24HR  MG per extended release tablet  Generic drug: loratadine-pseudoephedrine     magnesium oxide 400 (240 Mg) MG tablet  Commonly known as: MAG-OX  Take 1 tablet by mouth 2 times daily     magnesium oxide 400 MG tablet  Commonly known as: MAG-OX     MELATONIN PO     metFORMIN 500 MG tablet  Commonly known as: GLUCOPHAGE     mirtazapine 15 MG tablet  Commonly known as: REMERON     ondansetron 4 MG tablet  Commonly known as: ZOFRAN  take 1 tablet by mouth every 8 hours if needed for nausea and vomiting     pregabalin 75 MG capsule  Commonly known as: LYRICA     * prochlorperazine 10 MG tablet  Commonly known as: COMPAZINE  Take 1 tablet by mouth every 6 hours as needed (nausea)     * prochlorperazine 10 MG tablet  Commonly known as: COMPAZINE  Take 1 tablet by mouth every 6 hours as needed (NAUSEA)     psyllium 28.3 % Pack  Commonly known as: KONSYL  Take 1 packet by mouth daily     TYLENOL/CODEINE #3 300-30 MG per tablet  Generic drug: acetaminophen-codeine     vitamin C 250 MG tablet     vitamin D 1.25 MG (57524 UT) Caps capsule  Commonly known as: ERGOCALCIFEROL         * This list has 2 medication(s) that are the same as other medications prescribed for you. Read the directions carefully, and ask your doctor or other care provider to review them with you. STOP taking these medications    atenolol 25 MG tablet  Commonly known as: TENORMIN     Vitamin B 12 500 MCG Tabs           Where to Get Your Medications      These medications were sent to [facility], [address] [place] [address]    Phone: [phone]   · nicotine 21 MG/24HR     These medications were sent to [facility] [phone]  [address] [place] [address]    Phone: [phone]   · anastrozole 1 MG tablet  · levoFLOXacin 750 MG tablet       Activity: activity as tolerated  Diet: diabetic diet    Pt has been advised to: Follow-up with Shelly García DO in 1 week.   Follow-up with consultants as recommended by them    Note that over 30 minutes was spent in preparing discharge papers, discussing discharge with patient, medication review, etc.    Signed:  Sheila Sosa MD  2/13/2022  1:15 PM

## 2022-02-14 ENCOUNTER — TELEPHONE (OUTPATIENT)
Dept: OTHER | Facility: CLINIC | Age: 57
End: 2022-02-14

## 2022-02-14 ENCOUNTER — HOSPITAL ENCOUNTER (INPATIENT)
Age: 57
LOS: 4 days | Discharge: ANOTHER HOSPITAL | DRG: 817 | End: 2022-02-18
Attending: EMERGENCY MEDICINE | Admitting: INTERNAL MEDICINE
Payer: MEDICAID

## 2022-02-14 ENCOUNTER — APPOINTMENT (OUTPATIENT)
Dept: GENERAL RADIOLOGY | Age: 57
DRG: 817 | End: 2022-02-14
Payer: MEDICAID

## 2022-02-14 DIAGNOSIS — C50.919 METASTATIC BREAST CANCER (HCC): ICD-10-CM

## 2022-02-14 DIAGNOSIS — T42.4X2A INTENTIONAL BENZODIAZEPINE OVERDOSE, INITIAL ENCOUNTER (HCC): ICD-10-CM

## 2022-02-14 DIAGNOSIS — T52.8X2A ETHYLENE GLYCOL POISONING, INTENTIONAL SELF-HARM, INITIAL ENCOUNTER (HCC): ICD-10-CM

## 2022-02-14 DIAGNOSIS — T40.602A NARCOTIC OVERDOSE, INTENTIONAL SELF-HARM, INITIAL ENCOUNTER (HCC): ICD-10-CM

## 2022-02-14 DIAGNOSIS — T39.1X2A INTENTIONAL ACETAMINOPHEN OVERDOSE, INITIAL ENCOUNTER (HCC): Primary | ICD-10-CM

## 2022-02-14 PROBLEM — T52.8X1A ETHYLENE GLYCOL POISONING: Status: ACTIVE | Noted: 2022-02-14

## 2022-02-14 LAB
ACETAMINOPHEN LEVEL: 50.2 MCG/ML (ref 10–30)
ACETAMINOPHEN LEVEL: 55.9 MCG/ML (ref 10–30)
ALBUMIN SERPL-MCNC: 3.5 G/DL (ref 3.5–5.2)
ALP BLD-CCNC: 160 U/L (ref 35–104)
ALT SERPL-CCNC: 19 U/L (ref 0–32)
ANION GAP SERPL CALCULATED.3IONS-SCNC: 12 MMOL/L (ref 7–16)
AST SERPL-CCNC: 16 U/L (ref 0–31)
B.E.: -3 MMOL/L (ref -3–3)
BASOPHILS ABSOLUTE: 0.03 E9/L (ref 0–0.2)
BASOPHILS RELATIVE PERCENT: 0.5 % (ref 0–2)
BILIRUB SERPL-MCNC: 0.2 MG/DL (ref 0–1.2)
BUN BLDV-MCNC: 6 MG/DL (ref 6–20)
CALCIUM SERPL-MCNC: 8.8 MG/DL (ref 8.6–10.2)
CHLORIDE BLD-SCNC: 98 MMOL/L (ref 98–107)
CO2: 22 MMOL/L (ref 22–29)
CREAT SERPL-MCNC: 0.7 MG/DL (ref 0.5–1)
DELIVERY SYSTEMS: ABNORMAL
DEVICE: ABNORMAL
EOSINOPHILS ABSOLUTE: 0.03 E9/L (ref 0.05–0.5)
EOSINOPHILS RELATIVE PERCENT: 0.5 % (ref 0–6)
ETHANOL: <10 MG/DL (ref 0–0.08)
FIO2 ARTERIAL: 2
GFR AFRICAN AMERICAN: >60
GFR NON-AFRICAN AMERICAN: >60 ML/MIN/1.73
GLUCOSE BLD-MCNC: 115 MG/DL (ref 74–99)
HCO3 ARTERIAL: 21.5 MMOL/L (ref 22–26)
HCT VFR BLD CALC: 32.8 % (ref 34–48)
HEMOGLOBIN: 10.5 G/DL (ref 11.5–15.5)
IMMATURE GRANULOCYTES #: 0.04 E9/L
IMMATURE GRANULOCYTES %: 0.6 % (ref 0–5)
LACTIC ACID, SEPSIS: 4.3 MMOL/L (ref 0.5–1.9)
LYMPHOCYTES ABSOLUTE: 1.35 E9/L (ref 1.5–4)
LYMPHOCYTES RELATIVE PERCENT: 21.7 % (ref 20–42)
MCH RBC QN AUTO: 32.7 PG (ref 26–35)
MCHC RBC AUTO-ENTMCNC: 32 % (ref 32–34.5)
MCV RBC AUTO: 102.2 FL (ref 80–99.9)
METER GLUCOSE: 91 MG/DL (ref 74–99)
MONOCYTES ABSOLUTE: 0.74 E9/L (ref 0.1–0.95)
MONOCYTES RELATIVE PERCENT: 11.9 % (ref 2–12)
NEUTROPHILS ABSOLUTE: 4.03 E9/L (ref 1.8–7.3)
NEUTROPHILS RELATIVE PERCENT: 64.8 % (ref 43–80)
O2 SATURATION: 99.4 % (ref 92–98.5)
OPERATOR ID: 420
PATIENT TEMP: 37
PCO2 (TEMP CORRECTED): 34.9 MMHG (ref 35–45)
PDW BLD-RTO: 12.7 FL (ref 11.5–15)
PH (TEMPERATURE CORRECTED): 7.4 (ref 7.35–7.45)
PLATELET # BLD: 198 E9/L (ref 130–450)
PMV BLD AUTO: 9.3 FL (ref 7–12)
PO2 (TEMP CORRECTED): 154.2 MMHG (ref 60–100)
POTASSIUM REFLEX MAGNESIUM: 4.1 MMOL/L (ref 3.5–5)
PRO-BNP: 21 PG/ML (ref 0–125)
RBC # BLD: 3.21 E12/L (ref 3.5–5.5)
SALICYLATE, SERUM: <0.3 MG/DL (ref 0–30)
SODIUM BLD-SCNC: 132 MMOL/L (ref 132–146)
SOURCE, BLOOD GAS: ABNORMAL
TOTAL PROTEIN: 5.6 G/DL (ref 6.4–8.3)
TRICYCLIC ANTIDEPRESSANTS SCREEN SERUM: NEGATIVE NG/ML
TROPONIN, HIGH SENSITIVITY: 11 NG/L (ref 0–9)
WBC # BLD: 6.2 E9/L (ref 4.5–11.5)

## 2022-02-14 PROCEDURE — 6360000002 HC RX W HCPCS: Performed by: GENERAL PRACTICE

## 2022-02-14 PROCEDURE — 6360000002 HC RX W HCPCS

## 2022-02-14 PROCEDURE — 2000000000 HC ICU R&B

## 2022-02-14 PROCEDURE — 96375 TX/PRO/DX INJ NEW DRUG ADDON: CPT

## 2022-02-14 PROCEDURE — 84484 ASSAY OF TROPONIN QUANT: CPT

## 2022-02-14 PROCEDURE — 82962 GLUCOSE BLOOD TEST: CPT

## 2022-02-14 PROCEDURE — 36415 COLL VENOUS BLD VENIPUNCTURE: CPT

## 2022-02-14 PROCEDURE — 83880 ASSAY OF NATRIURETIC PEPTIDE: CPT

## 2022-02-14 PROCEDURE — 2580000003 HC RX 258: Performed by: PHYSICIAN ASSISTANT

## 2022-02-14 PROCEDURE — 6360000002 HC RX W HCPCS: Performed by: EMERGENCY MEDICINE

## 2022-02-14 PROCEDURE — 2580000003 HC RX 258: Performed by: GENERAL PRACTICE

## 2022-02-14 PROCEDURE — 87081 CULTURE SCREEN ONLY: CPT

## 2022-02-14 PROCEDURE — 82077 ASSAY SPEC XCP UR&BREATH IA: CPT

## 2022-02-14 PROCEDURE — 96365 THER/PROPH/DIAG IV INF INIT: CPT

## 2022-02-14 PROCEDURE — 80179 DRUG ASSAY SALICYLATE: CPT

## 2022-02-14 PROCEDURE — 80307 DRUG TEST PRSMV CHEM ANLYZR: CPT

## 2022-02-14 PROCEDURE — 82803 BLOOD GASES ANY COMBINATION: CPT

## 2022-02-14 PROCEDURE — 83605 ASSAY OF LACTIC ACID: CPT

## 2022-02-14 PROCEDURE — 51702 INSERT TEMP BLADDER CATH: CPT

## 2022-02-14 PROCEDURE — 82693 ASSAY OF ETHYLENE GLYCOL: CPT

## 2022-02-14 PROCEDURE — 93005 ELECTROCARDIOGRAM TRACING: CPT | Performed by: GENERAL PRACTICE

## 2022-02-14 PROCEDURE — 85025 COMPLETE CBC W/AUTO DIFF WBC: CPT

## 2022-02-14 PROCEDURE — 80143 DRUG ASSAY ACETAMINOPHEN: CPT

## 2022-02-14 PROCEDURE — 71045 X-RAY EXAM CHEST 1 VIEW: CPT

## 2022-02-14 PROCEDURE — 80053 COMPREHEN METABOLIC PANEL: CPT

## 2022-02-14 PROCEDURE — 99284 EMERGENCY DEPT VISIT MOD MDM: CPT

## 2022-02-14 PROCEDURE — 2580000003 HC RX 258: Performed by: EMERGENCY MEDICINE

## 2022-02-14 RX ORDER — ALBUTEROL SULFATE 90 UG/1
2 AEROSOL, METERED RESPIRATORY (INHALATION) EVERY 6 HOURS PRN
Status: DISCONTINUED | OUTPATIENT
Start: 2022-02-14 | End: 2022-02-14 | Stop reason: CLARIF

## 2022-02-14 RX ORDER — POTASSIUM CHLORIDE 7.45 MG/ML
10 INJECTION INTRAVENOUS PRN
Status: DISCONTINUED | OUTPATIENT
Start: 2022-02-14 | End: 2022-02-18 | Stop reason: HOSPADM

## 2022-02-14 RX ORDER — NALOXONE HYDROCHLORIDE 0.4 MG/ML
2 INJECTION, SOLUTION INTRAMUSCULAR; INTRAVENOUS; SUBCUTANEOUS PRN
Status: DISCONTINUED | OUTPATIENT
Start: 2022-02-14 | End: 2022-02-18 | Stop reason: HOSPADM

## 2022-02-14 RX ORDER — CHOLESTYRAMINE 4 G/9G
1 POWDER, FOR SUSPENSION ORAL 2 TIMES DAILY
Status: DISCONTINUED | OUTPATIENT
Start: 2022-02-15 | End: 2022-02-18 | Stop reason: HOSPADM

## 2022-02-14 RX ORDER — POTASSIUM CHLORIDE 20 MEQ/1
40 TABLET, EXTENDED RELEASE ORAL PRN
Status: DISCONTINUED | OUTPATIENT
Start: 2022-02-14 | End: 2022-02-18 | Stop reason: HOSPADM

## 2022-02-14 RX ORDER — SODIUM CHLORIDE 9 MG/ML
INJECTION, SOLUTION INTRAVENOUS CONTINUOUS
Status: DISCONTINUED | OUTPATIENT
Start: 2022-02-14 | End: 2022-02-17

## 2022-02-14 RX ORDER — ONDANSETRON 2 MG/ML
4 INJECTION INTRAMUSCULAR; INTRAVENOUS EVERY 6 HOURS PRN
Status: DISCONTINUED | OUTPATIENT
Start: 2022-02-14 | End: 2022-02-18 | Stop reason: HOSPADM

## 2022-02-14 RX ORDER — DEXTROSE MONOHYDRATE 25 G/50ML
12.5 INJECTION, SOLUTION INTRAVENOUS PRN
Status: DISCONTINUED | OUTPATIENT
Start: 2022-02-14 | End: 2022-02-18 | Stop reason: HOSPADM

## 2022-02-14 RX ORDER — SODIUM CHLORIDE 0.9 % (FLUSH) 0.9 %
10 SYRINGE (ML) INJECTION PRN
Status: DISCONTINUED | OUTPATIENT
Start: 2022-02-14 | End: 2022-02-18 | Stop reason: HOSPADM

## 2022-02-14 RX ORDER — DEXTROSE MONOHYDRATE 50 MG/ML
100 INJECTION, SOLUTION INTRAVENOUS PRN
Status: DISCONTINUED | OUTPATIENT
Start: 2022-02-14 | End: 2022-02-18 | Stop reason: HOSPADM

## 2022-02-14 RX ORDER — NALOXONE HYDROCHLORIDE 0.4 MG/ML
INJECTION, SOLUTION INTRAMUSCULAR; INTRAVENOUS; SUBCUTANEOUS
Status: COMPLETED
Start: 2022-02-14 | End: 2022-02-14

## 2022-02-14 RX ORDER — THIAMINE HYDROCHLORIDE 100 MG/ML
100 INJECTION, SOLUTION INTRAMUSCULAR; INTRAVENOUS 2 TIMES DAILY
Status: DISCONTINUED | OUTPATIENT
Start: 2022-02-14 | End: 2022-02-18 | Stop reason: HOSPADM

## 2022-02-14 RX ORDER — 0.9 % SODIUM CHLORIDE 0.9 %
1000 INTRAVENOUS SOLUTION INTRAVENOUS ONCE
Status: COMPLETED | OUTPATIENT
Start: 2022-02-14 | End: 2022-02-14

## 2022-02-14 RX ORDER — ACETAMINOPHEN 650 MG/1
650 SUPPOSITORY RECTAL EVERY 6 HOURS PRN
Status: DISCONTINUED | OUTPATIENT
Start: 2022-02-14 | End: 2022-02-16

## 2022-02-14 RX ORDER — FAMOTIDINE 20 MG/1
20 TABLET, FILM COATED ORAL DAILY
Status: DISCONTINUED | OUTPATIENT
Start: 2022-02-15 | End: 2022-02-18 | Stop reason: HOSPADM

## 2022-02-14 RX ORDER — NICOTINE POLACRILEX 4 MG
15 LOZENGE BUCCAL PRN
Status: DISCONTINUED | OUTPATIENT
Start: 2022-02-14 | End: 2022-02-18 | Stop reason: HOSPADM

## 2022-02-14 RX ORDER — ACETAMINOPHEN 325 MG/1
650 TABLET ORAL EVERY 6 HOURS PRN
Status: DISCONTINUED | OUTPATIENT
Start: 2022-02-14 | End: 2022-02-16

## 2022-02-14 RX ORDER — SODIUM CHLORIDE 0.9 % (FLUSH) 0.9 %
10 SYRINGE (ML) INJECTION EVERY 12 HOURS SCHEDULED
Status: DISCONTINUED | OUTPATIENT
Start: 2022-02-14 | End: 2022-02-18 | Stop reason: HOSPADM

## 2022-02-14 RX ORDER — ALBUTEROL SULFATE 2.5 MG/3ML
2.5 SOLUTION RESPIRATORY (INHALATION) EVERY 6 HOURS PRN
Status: DISCONTINUED | OUTPATIENT
Start: 2022-02-14 | End: 2022-02-18 | Stop reason: HOSPADM

## 2022-02-14 RX ORDER — ONDANSETRON 4 MG/1
4 TABLET, ORALLY DISINTEGRATING ORAL EVERY 8 HOURS PRN
Status: DISCONTINUED | OUTPATIENT
Start: 2022-02-14 | End: 2022-02-18 | Stop reason: HOSPADM

## 2022-02-14 RX ORDER — SODIUM CHLORIDE 9 MG/ML
25 INJECTION, SOLUTION INTRAVENOUS PRN
Status: DISCONTINUED | OUTPATIENT
Start: 2022-02-14 | End: 2022-02-18 | Stop reason: HOSPADM

## 2022-02-14 RX ORDER — ANASTROZOLE 1 MG/1
1 TABLET ORAL DAILY
Status: DISCONTINUED | OUTPATIENT
Start: 2022-02-15 | End: 2022-02-18 | Stop reason: HOSPADM

## 2022-02-14 RX ADMIN — NALOXONE HYDROCHLORIDE 2 MG: 0.4 INJECTION, SOLUTION INTRAMUSCULAR; INTRAVENOUS; SUBCUTANEOUS at 14:48

## 2022-02-14 RX ADMIN — Medication 10 ML: at 21:45

## 2022-02-14 RX ADMIN — SODIUM CHLORIDE 1000 ML: 9 INJECTION, SOLUTION INTRAVENOUS at 14:37

## 2022-02-14 RX ADMIN — ACETYLCYSTEINE 10620 MG: 200 INJECTION, SOLUTION INTRAVENOUS at 21:11

## 2022-02-14 RX ADMIN — FOMEPIZOLE 1060 MG: 1 INJECTION, SOLUTION INTRAVENOUS at 14:54

## 2022-02-14 RX ADMIN — SODIUM CHLORIDE: 9 INJECTION, SOLUTION INTRAVENOUS at 22:23

## 2022-02-14 RX ADMIN — THIAMINE HYDROCHLORIDE 100 MG: 100 INJECTION, SOLUTION INTRAMUSCULAR; INTRAVENOUS at 22:27

## 2022-02-14 RX ADMIN — PYRIDOXINE HYDROCHLORIDE 50 MG: 100 INJECTION, SOLUTION INTRAMUSCULAR; INTRAVENOUS at 21:20

## 2022-02-14 ASSESSMENT — PAIN SCALES - GENERAL: PAINLEVEL_OUTOF10: 0

## 2022-02-14 NOTE — ED NOTES
Bed: 12  Expected date:   Expected time:   Means of arrival:   Comments:  ems     Shahana Santos RN  02/14/22 [id number]

## 2022-02-14 NOTE — ED PROVIDER NOTES
ED  Provider Note  Admit Date/RoomTime: 2/14/2022  2:02 PM  ED Room: 12/12     HPI:   Robert Moctezuma is a 61 y.o. female presenting to the ED for overdose, beginning an hour ago. History comes primarily from EMS. 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 The complaint has been constant, moderate in severity, improved by narcan and worsened by nothing. Associated symptoms include none. Ronaldo Parr has a history of malignant metastatic breast cancer, currently on chemotherapy with a port placed. Today she attempted to commit suicide by consuming an unknown quantity of Xanax as well as Tylenol for. The maximum dose available to her for the Xanax is 15 mg and the maximum dose available to her for the Tylenol for is 3.6 g of codeine and 18 g of Tylenol. She then chased this with [facility] that was mixed with antifreeze. This occurred about an hour prior to arrival at 1300 today. EMS administered Narcan in route which resulted in some transient improvement of the patient's mental status. On arrival, the patient was somnolent but responsive. On arrival, the patient was assessed with history, physical exam, imaging studies, laboratory studies and ekg, vital signs.   Vital signs were notable on arrival for a blood pressure of 83/61 and the patient was afebrile. Review of Systems   Unable to perform ROS: Mental status change        Physical Exam  Vitals and nursing note reviewed. Constitutional:       Appearance: She is well-developed. She is ill-appearing. HENT:      Head: Normocephalic and atraumatic. Eyes:      Pupils: Pupils are equal, round, and reactive to light. Cardiovascular:      Rate and Rhythm: Normal rate and regular rhythm. Pulmonary:      Effort: Pulmonary effort is normal. No respiratory distress. Breath sounds: Normal breath sounds. No wheezing or rales. Comments: Respirations are shallow  Abdominal:      General: Bowel sounds are normal.      Palpations: Abdomen is soft. Tenderness: There is no abdominal tenderness. There is no guarding or rebound. Musculoskeletal:      Cervical back: Normal range of motion and neck supple. Skin:     General: Skin is warm and dry. Neurological:      Mental Status: She is alert. She is disoriented. Cranial Nerves: No cranial nerve deficit. Coordination: Coordination normal.      Comments: Somnolent but arousable to verbal stimulation and protecting her airway          Procedures     MDM  Number of Diagnoses or Management Options  Ethylene glycol poisoning, intentional self-harm, initial encounter ([facility]  Intentional acetaminophen overdose, initial encounter ([facility]  Intentional benzodiazepine overdose, initial encounter [facility])  Narcotic overdose, intentional self-harm, initial encounter [facility])  Diagnosis management comments: Emergency department evaluation was notable for intentional overdose with numerous substances requiring close observation in the inpatient setting of multiple IV medications. Patient is at high risk for further decompensation and therefore would benefit from inpatient management. This information was relayed to the patient who understood this plan of care and was amenable to the plan.   Patient was discussed with the admitting service (Dr. Fatuma Potter) who concurred with the decision for admission, and have agreed to admit the patient to the ICU. Dr. Dillan Braun accepted the patient for management in the ICU as a consultant. ED Course as of 02/15/22 0650   Mon Feb 14, 2022   1418 Discussed patient with poison control given the nature of her overdose. Guidelines are as follows: Fomepizole 50 mg/kg loading dose for the ethylene glycolFollow with 10 mg/kg IV every 12 hours for 4 dosesNarcan as needed for somnolence for the codeine overdoseMonitor for somnolence for the Ativan overdose, ensure airway protection4-hour level for Tylenol, treat with N-acetylcysteine should level be greater than 150 mg/dLRecheck at 6 hours if not elevated above that thresholdRecheck at 8 hours if not elevated above that thresholdIf not elevated above that threshold at 8 hours, can discontinue rechecks [RK]   1822 MAP of 72, patient still somewhat somulent but does arouse [BP]   Tue Feb 15, 2022   0648 Calculated Osmol level: 283.17  Measured Osmol level: Not resulted [RK]      ED Course User Index  [BP] Delaney No, DO  [RK] [address]hi [address] DO     ED Course as of 02/15/22 0650   Mon Feb 14, 2022 1418 Discussed patient with poison control given the nature of her overdose. Guidelines are as follows: Fomepizole 50 mg/kg loading dose for the ethylene glycolFollow with 10 mg/kg IV every 12 hours for 4 dosesNarcan as needed for somnolence for the codeine overdoseMonitor for somnolence for the Ativan overdose, ensure airway protection4-hour level for Tylenol, treat with N-acetylcysteine should level be greater than 150 mg/dLRecheck at 6 hours if not elevated above that thresholdRecheck at 8 hours if not elevated above that thresholdIf not elevated above that threshold at 8 hours, can discontinue rechecks [RK]   1822 MAP of 72, patient still somewhat somulent but does arouse [BP]   Tue Feb 15, 2022   0648 Calculated Osmol level: 283.17  Measured Osmol level: Not resulted [RK]      ED Course User Index  [BP] Bucky Palacios,   [RK] Ibrahima Mckinney, DO       --------------------------------------------- PAST HISTORY ---------------------------------------------  Past Medical History:  has a past medical history of Anxiety, Arthritis, Cancer ([facility] Cervical radiculopathy, Chronic back pain, Dehydration, Dehydration, Depression, Diabetes mellitus type 2, uncontrolled ([facility] [place] [facility] GERD (gastroesophageal reflux disease), History of blood transfusion, Hypertension, Neuropathy, and Right leg pain. Past Surgical History:  has a past surgical history that includes Cholecystectomy (2000); Foot surgery (1996); ERCP; Tubal ligation (1991); cervical fusion (2008); cervical fusion (2015); Colonoscopy; [place] tooth extraction; Dilation and curettage of uterus (2013); Hysterectomy (2013); cervical fusion (04/25/16); Carpal tunnel release (Right, 04/09/2017); back surgery (01/08/2018); Spine surgery; other surgical history (Right, 05/14/2018); pr open rx tibia shaft fx,screws (Right, 5/14/2018); pr office/outpt visit,procedure only (Right, 7/5/2018); Tibia fracture surgery (Right, 1/10/2019); Port Surgery (Left, 2/5/2021); Breast biopsy (Right, 2/5/2021); and Breast surgery (Right, 3/9/2021). Social History:  reports that she quit smoking about 6 years ago. Her smoking use included cigarettes. She has a 5.00 pack-year smoking history. She has never used smokeless tobacco. She reports that she does not drink alcohol and does not use drugs. Family History: family history includes Diabetes in her mother. The patients home medications have been reviewed.     Allergies: Ceftriaxone    -------------------------------------------------- RESULTS -------------------------------------------------    LABS:  Results for orders placed or performed during the hospital encounter of 02/14/22   CBC Auto Differential   Result Value Ref Range    WBC 6.2 4.5 - 11.5 E9/L    RBC 3.21 (L) 3.50 - 5.50 E12/L    Hemoglobin 10.5 (L) 11.5 - 15.5 g/dL    Hematocrit 32.8 (L) 34.0 - 48.0 %    .2 (H) 80.0 - 99.9 fL    MCH 32.7 26.0 - 35.0 pg    MCHC 32.0 32.0 - 34.5 %    RDW 12.7 11.5 - 15.0 fL    Platelets 042 114 - 322 E9/L    MPV 9.3 7.0 - 12.0 fL    Neutrophils % 64.8 43.0 - 80.0 %    Immature Granulocytes % 0.6 0.0 - 5.0 %    Lymphocytes % 21.7 20.0 - 42.0 %    Monocytes % 11.9 2.0 - 12.0 %    Eosinophils % 0.5 0.0 - 6.0 %    Basophils % 0.5 0.0 - 2.0 %    Neutrophils Absolute 4.03 1.80 - 7.30 E9/L    Immature Granulocytes # 0.04 E9/L    Lymphocytes Absolute 1.35 (L) 1.50 - 4.00 E9/L    Monocytes Absolute 0.74 0.10 - 0.95 E9/L    Eosinophils Absolute 0.03 (L) 0.05 - 0.50 E9/L    Basophils Absolute 0.03 0.00 - 0.20 E9/L   Comprehensive Metabolic Panel w/ Reflex to MG   Result Value Ref Range    Sodium 132 132 - 146 mmol/L    Potassium reflex Magnesium 4.1 3.5 - 5.0 mmol/L    Chloride 98 98 - 107 mmol/L    CO2 22 22 - 29 mmol/L    Anion Gap 12 7 - 16 mmol/L    Glucose 115 (H) 74 - 99 mg/dL    BUN 6 6 - 20 mg/dL    CREATININE 0.7 0.5 - 1.0 mg/dL    GFR Non-African American >60 >=60 mL/min/1.73    GFR African American >60     Calcium 8.8 8.6 - 10.2 mg/dL    Total Protein 5.6 (L) 6.4 - 8.3 g/dL    Albumin 3.5 3.5 - 5.2 g/dL    Total Bilirubin 0.2 0.0 - 1.2 mg/dL    Alkaline Phosphatase 160 (H) 35 - 104 U/L    ALT 19 0 - 32 U/L    AST 16 0 - 31 U/L   Lactate, Sepsis   Result Value Ref Range    Lactic Acid, Sepsis 4.3 (HH) 0.5 - 1.9 mmol/L   Troponin   Result Value Ref Range    Troponin, High Sensitivity 11 (H) 0 - 9 ng/L   Brain Natriuretic Peptide   Result Value Ref Range    Pro-BNP 21 0 - 125 pg/mL   Serum Drug Screen   Result Value Ref Range    Ethanol Lvl <10 mg/dL    Acetaminophen Level 55.9 (H) 10.0 - 57.3 mcg/mL    Salicylate, Serum <5.2 0.0 - 30.0 mg/dL    TCA Scrn NEGATIVE Cutoff:300 ng/mL   ETHYLENE GLYCOL   Result Value Ref Range    Report 18.8    Acetaminophen level Result Value Ref Range    Acetaminophen Level 50.2 (H) 10.0 - 30.0 mcg/mL   Arterial Blood Gas, Respiratory Only   Result Value Ref Range    Source: Arterial     FIO2 Arterial 2.0     Pt Temp 37.0     PH (TEMPERATURE CORRECTED) 7.397 7.350 - 7.450    PCO2 (TEMP CORRECTED) 34.9 (L) 35.0 - 45.0 mmHg    PO2 (TEMP CORRECTED) 154.2 (H) 60.0 - 100.0 mmHg    HCO3, Arterial 21.5 (L) 22.0 - 26.0 mmol/L    B.E. -3.0 -3.0 - 3.0 mmol/L    O2 Sat 99.4 (H) 92.0 - 98.5 %          DEVICE 15,065,[id number],820     [employer] Cannula    Basic Metabolic Panel w/ Reflex to MG   Result Value Ref Range    Sodium 138 132 - 146 mmol/L    Potassium reflex Magnesium 4.0 3.5 - 5.0 mmol/L    Chloride 105 98 - 107 mmol/L    CO2 23 22 - 29 mmol/L    Anion Gap 10 7 - 16 mmol/L    Glucose 105 (H) 74 - 99 mg/dL    BUN 4 (L) 6 - 20 mg/dL    CREATININE 0.7 0.5 - 1.0 mg/dL    GFR Non-African American >60 >=60 mL/min/1.73    GFR African American >60     Calcium 9.1 8.6 - 10.2 mg/dL   CBC auto differential   Result Value Ref Range    WBC 5.5 4.5 - 11.5 E9/L    RBC 3.21 (L) 3.50 - 5.50 E12/L    Hemoglobin 10.5 (L) 11.5 - 15.5 g/dL    Hematocrit 33.2 (L) 34.0 - 48.0 %    .4 (H) 80.0 - 99.9 fL    MCH 32.7 26.0 - 35.0 pg    MCHC 31.6 (L) 32.0 - 34.5 %    RDW 12.6 11.5 - 15.0 fL    Platelets 797 804 - 789 E9/L    MPV 9.5 7.0 - 12.0 fL    Neutrophils % 62.3 43.0 - 80.0 %    Immature Granulocytes % 0.4 0.0 - 5.0 %    Lymphocytes % 22.0 20.0 - 42.0 %    Monocytes % 10.2 2.0 - 12.0 %    Eosinophils % 4.4 0.0 - 6.0 %    Basophils % 0.7 0.0 - 2.0 %    Neutrophils Absolute 3.42 1.80 - 7.30 E9/L    Immature Granulocytes # 0.02 E9/L    Lymphocytes Absolute 1.21 (L) 1.50 - 4.00 E9/L    Monocytes Absolute 0.56 0.10 - 0.95 E9/L    Eosinophils Absolute 0.24 0.05 - 0.50 E9/L    Basophils Absolute 0.04 0.00 - 0.20 E9/L   LACTIC ACID, PLASMA   Result Value Ref Range    Lactic Acid 0.5 0.5 - 2.2 mmol/L   Magnesium   Result Value Ref Range Magnesium 1.5 (L) 1.6 - 2.6 mg/dL   Phosphorus   Result Value Ref Range    Phosphorus 3.6 2.5 - 4.5 mg/dL   Basic metabolic panel   Result Value Ref Range    Sodium 138 132 - 146 mmol/L    Potassium 3.8 3.5 - 5.0 mmol/L    Chloride 106 98 - 107 mmol/L    CO2 23 22 - 29 mmol/L    Anion Gap 9 7 - 16 mmol/L    Glucose 110 (H) 74 - 99 mg/dL    BUN 4 (L) 6 - 20 mg/dL    CREATININE 0.7 0.5 - 1.0 mg/dL    GFR Non-African American >60 >=60 mL/min/1.73    GFR African American >60     Calcium 8.7 8.6 - 10.2 mg/dL   Magnesium   Result Value Ref Range    Magnesium 1.4 (L) 1.6 - 2.6 mg/dL   LACTIC ACID, PLASMA   Result Value Ref Range    Lactic Acid 0.6 0.5 - 2.2 mmol/L   POCT Glucose   Result Value Ref Range    Meter Glucose 91 74 - 99 mg/dL   POCT Glucose   Result Value Ref Range    Meter Glucose 132 (H) 74 - 99 mg/dL   EKG 12 Lead   Result Value Ref Range    Ventricular Rate 82 BPM    Atrial Rate 82 BPM    P-R Interval 164 ms    QRS Duration 86 ms    Q-T Interval 416 ms    QTc Calculation (Bazett) 486 ms    P Axis 50 degrees    R Axis -22 degrees    T Axis 13 degrees       RADIOLOGY:  XR CHEST PORTABLE   Final Result   Mild asymmetric lower lobe peripheral hazy parenchymal opacities compatible   with pneumonia.             ------------------------- NURSING NOTES AND VITALS REVIEWED ---------------------------  Date / Time Roomed:  2/14/2022  2:02 PM  ED Bed Assignment:  [id number]/[id number]    The nursing notes within the ED encounter and vital signs as below have been reviewed.      Patient Vitals for the past 24 hrs:   BP Temp Temp src Pulse Resp SpO2 Height Weight   02/15/22 0600 107/61 -- -- 70 10 -- -- --   02/15/22 0500 -- -- -- 76 11 -- -- --   02/15/22 0400 (!) 105/59 98.6 °F (37 °C) Axillary 91 25 100 % -- --   02/15/22 0300 -- -- -- 77 11 -- -- --   02/15/22 0200 97/70 -- -- 92 29 -- -- --   02/15/22 0100 -- -- -- 73 10 -- -- --   02/15/22 0000 (!) 87/56 98.2 °F (36.8 °C) Axillary 85 11 100 % -- --   02/14/22 2300 (!) 80/50 -- -- 67 9 -- -- --   02/14/22 2200 -- -- -- 70 20 -- -- --   02/14/22 2130 101/67 98.6 °F (37 °C) Axillary 70 15 100 % 6' 1\" (1.854 m) 146 lb 13.2 oz (66.6 kg)   02/14/22 1900 (!) 96/59 -- -- 77 20 100 % -- --   02/14/22 1815 90/68 -- -- 71 -- 100 % -- --   02/14/22 1700 (!) 83/51 -- -- 71 -- 100 % -- --   02/14/22 1630 82/64 -- -- 68 -- 100 % -- --   02/14/22 1600 (!) 86/56 -- -- 70 -- 100 % -- --   02/14/22 1530 (!) 96/59 -- -- 77 18 100 % -- --   02/14/22 1502 112/75 -- -- 75 -- 100 % -- --   02/14/22 1441 (!) 91/58 -- -- 96 -- 99 % -- --   02/14/22 1435 (!) 85/54 95.4 °F (35.2 °C) Axillary 89 16 100 % -- --   02/14/22 1415 83/61 -- -- 84 -- 100 % -- --       Oxygen Saturation Interpretation: Normal    ------------------------------------------ PROGRESS NOTES ------------------------------------------  Re-evaluation(s):    Patients symptoms show no change  Repeat physical examination is not changed    Counseling:  I have spoken with the patient and discussed todays results, in addition to providing specific details for the plan of care and counseling regarding the diagnosis and prognosis. Their questions are answered at this time and they are agreeable with the plan of admission.    --------------------------------- ADDITIONAL PROVIDER NOTES ---------------------------------  Consultations:  Dr. Jacque Horowitz. Spoke with Dr. Otoniel Carlson. Discussed case. They will admit the patient. This patient's ED course included: a personal history and physicial examination, re-evaluation prior to disposition, multiple bedside re-evaluations, cardiac monitoring and continuous pulse oximetry    This patient has remained hemodynamically stable during their ED course. Diagnosis:  1. Intentional acetaminophen overdose, initial encounter ([facility] [address]    2. Narcotic overdose, intentional self-harm, initial encounter ([email or web address]    3.  Intentional benzodiazepine overdose, initial encounter ([email or web address]    4. Ethylene glycol poisoning, intentional self-harm, initial encounter ([facility]    5. Metastatic breast cancer ([facility]        Disposition:  Patient's disposition: Admit to CCU/ICU  Patient's condition is serious. Please note that the withdrawal or failure to initiate urgent interventions for this patient would likely result in a life threatening deterioration or permanent disability. Accordingly this patient received 60 minutes of critical care time, excluding separately billable procedures.        Abhi [address]., DO  Resident  02/15/22 [id number]

## 2022-02-15 LAB
ACETAMINOPHEN LEVEL: <5 MCG/ML (ref 10–30)
ALBUMIN SERPL-MCNC: 3.1 G/DL (ref 3.5–5.2)
ALP BLD-CCNC: 133 U/L (ref 35–104)
ALT SERPL-CCNC: 16 U/L (ref 0–32)
ANION GAP SERPL CALCULATED.3IONS-SCNC: 10 MMOL/L (ref 7–16)
ANION GAP SERPL CALCULATED.3IONS-SCNC: 9 MMOL/L (ref 7–16)
AST SERPL-CCNC: 16 U/L (ref 0–31)
BASOPHILS ABSOLUTE: 0.04 E9/L (ref 0–0.2)
BASOPHILS RELATIVE PERCENT: 0.7 % (ref 0–2)
BILIRUB SERPL-MCNC: 0.4 MG/DL (ref 0–1.2)
BILIRUBIN DIRECT: <0.2 MG/DL (ref 0–0.3)
BILIRUBIN, INDIRECT: ABNORMAL MG/DL (ref 0–1)
BUN BLDV-MCNC: 4 MG/DL (ref 6–20)
BUN BLDV-MCNC: 4 MG/DL (ref 6–20)
CALCIUM SERPL-MCNC: 8.7 MG/DL (ref 8.6–10.2)
CALCIUM SERPL-MCNC: 9.1 MG/DL (ref 8.6–10.2)
CHLORIDE BLD-SCNC: 105 MMOL/L (ref 98–107)
CHLORIDE BLD-SCNC: 106 MMOL/L (ref 98–107)
CO2: 23 MMOL/L (ref 22–29)
CO2: 23 MMOL/L (ref 22–29)
CREAT SERPL-MCNC: 0.7 MG/DL (ref 0.5–1)
CREAT SERPL-MCNC: 0.7 MG/DL (ref 0.5–1)
EKG ATRIAL RATE: 82 BPM
EKG P AXIS: 50 DEGREES
EKG P-R INTERVAL: 164 MS
EKG Q-T INTERVAL: 416 MS
EKG QRS DURATION: 86 MS
EKG QTC CALCULATION (BAZETT): 486 MS
EKG R AXIS: -22 DEGREES
EKG T AXIS: 13 DEGREES
EKG VENTRICULAR RATE: 82 BPM
EOSINOPHILS ABSOLUTE: 0.24 E9/L (ref 0.05–0.5)
EOSINOPHILS RELATIVE PERCENT: 4.4 % (ref 0–6)
GFR AFRICAN AMERICAN: >60
GFR AFRICAN AMERICAN: >60
GFR NON-AFRICAN AMERICAN: >60 ML/MIN/1.73
GFR NON-AFRICAN AMERICAN: >60 ML/MIN/1.73
GLUCOSE BLD-MCNC: 105 MG/DL (ref 74–99)
GLUCOSE BLD-MCNC: 110 MG/DL (ref 74–99)
HCT VFR BLD CALC: 33.2 % (ref 34–48)
HEMOGLOBIN: 10.5 G/DL (ref 11.5–15.5)
IMMATURE GRANULOCYTES #: 0.02 E9/L
IMMATURE GRANULOCYTES %: 0.4 % (ref 0–5)
INR BLD: 1.3
LACTIC ACID: 0.5 MMOL/L (ref 0.5–2.2)
LACTIC ACID: 0.6 MMOL/L (ref 0.5–2.2)
LYMPHOCYTES ABSOLUTE: 1.21 E9/L (ref 1.5–4)
LYMPHOCYTES RELATIVE PERCENT: 22 % (ref 20–42)
MAGNESIUM: 1.4 MG/DL (ref 1.6–2.6)
MAGNESIUM: 1.5 MG/DL (ref 1.6–2.6)
MCH RBC QN AUTO: 32.7 PG (ref 26–35)
MCHC RBC AUTO-ENTMCNC: 31.6 % (ref 32–34.5)
MCV RBC AUTO: 103.4 FL (ref 80–99.9)
METER GLUCOSE: 132 MG/DL (ref 74–99)
METER GLUCOSE: 146 MG/DL (ref 74–99)
METER GLUCOSE: 156 MG/DL (ref 74–99)
METER GLUCOSE: 170 MG/DL (ref 74–99)
METER GLUCOSE: 84 MG/DL (ref 74–99)
MONOCYTES ABSOLUTE: 0.56 E9/L (ref 0.1–0.95)
MONOCYTES RELATIVE PERCENT: 10.2 % (ref 2–12)
NEUTROPHILS ABSOLUTE: 3.42 E9/L (ref 1.8–7.3)
NEUTROPHILS RELATIVE PERCENT: 62.3 % (ref 43–80)
PDW BLD-RTO: 12.6 FL (ref 11.5–15)
PHOSPHORUS: 3.6 MG/DL (ref 2.5–4.5)
PLATELET # BLD: 192 E9/L (ref 130–450)
PMV BLD AUTO: 9.5 FL (ref 7–12)
POTASSIUM REFLEX MAGNESIUM: 4 MMOL/L (ref 3.5–5)
POTASSIUM SERPL-SCNC: 3.8 MMOL/L (ref 3.5–5)
PROCALCITONIN: 0.06 NG/ML (ref 0–0.08)
PROTHROMBIN TIME: 14.9 SEC (ref 9.3–12.4)
RBC # BLD: 3.21 E12/L (ref 3.5–5.5)
REPORT: NORMAL
SODIUM BLD-SCNC: 138 MMOL/L (ref 132–146)
SODIUM BLD-SCNC: 138 MMOL/L (ref 132–146)
TOTAL PROTEIN: 4.7 G/DL (ref 6.4–8.3)
WBC # BLD: 5.5 E9/L (ref 4.5–11.5)

## 2022-02-15 PROCEDURE — 36591 DRAW BLOOD OFF VENOUS DEVICE: CPT

## 2022-02-15 PROCEDURE — 80048 BASIC METABOLIC PNL TOTAL CA: CPT

## 2022-02-15 PROCEDURE — 82962 GLUCOSE BLOOD TEST: CPT

## 2022-02-15 PROCEDURE — 84100 ASSAY OF PHOSPHORUS: CPT

## 2022-02-15 PROCEDURE — 6360000002 HC RX W HCPCS: Performed by: GENERAL PRACTICE

## 2022-02-15 PROCEDURE — 2580000003 HC RX 258: Performed by: PHYSICIAN ASSISTANT

## 2022-02-15 PROCEDURE — 1200000000 HC SEMI PRIVATE

## 2022-02-15 PROCEDURE — 36415 COLL VENOUS BLD VENIPUNCTURE: CPT

## 2022-02-15 PROCEDURE — 80076 HEPATIC FUNCTION PANEL: CPT

## 2022-02-15 PROCEDURE — 6370000000 HC RX 637 (ALT 250 FOR IP): Performed by: PHYSICIAN ASSISTANT

## 2022-02-15 PROCEDURE — 6360000002 HC RX W HCPCS: Performed by: PHYSICIAN ASSISTANT

## 2022-02-15 PROCEDURE — 85610 PROTHROMBIN TIME: CPT

## 2022-02-15 PROCEDURE — 2580000003 HC RX 258: Performed by: GENERAL PRACTICE

## 2022-02-15 PROCEDURE — 93010 ELECTROCARDIOGRAM REPORT: CPT | Performed by: INTERNAL MEDICINE

## 2022-02-15 PROCEDURE — 83605 ASSAY OF LACTIC ACID: CPT

## 2022-02-15 PROCEDURE — 6370000000 HC RX 637 (ALT 250 FOR IP)

## 2022-02-15 PROCEDURE — 6360000002 HC RX W HCPCS: Performed by: EMERGENCY MEDICINE

## 2022-02-15 PROCEDURE — 2580000003 HC RX 258: Performed by: EMERGENCY MEDICINE

## 2022-02-15 PROCEDURE — 84145 PROCALCITONIN (PCT): CPT

## 2022-02-15 PROCEDURE — 83735 ASSAY OF MAGNESIUM: CPT

## 2022-02-15 PROCEDURE — 85025 COMPLETE CBC W/AUTO DIFF WBC: CPT

## 2022-02-15 PROCEDURE — 99222 1ST HOSP IP/OBS MODERATE 55: CPT

## 2022-02-15 PROCEDURE — 6360000002 HC RX W HCPCS: Performed by: INTERNAL MEDICINE

## 2022-02-15 PROCEDURE — 80143 DRUG ASSAY ACETAMINOPHEN: CPT

## 2022-02-15 RX ORDER — CLONAZEPAM 0.5 MG/1
0.5 TABLET ORAL 2 TIMES DAILY PRN
Status: DISCONTINUED | OUTPATIENT
Start: 2022-02-15 | End: 2022-02-18 | Stop reason: HOSPADM

## 2022-02-15 RX ORDER — MIRTAZAPINE 15 MG/1
15 TABLET, ORALLY DISINTEGRATING ORAL NIGHTLY
Status: DISCONTINUED | OUTPATIENT
Start: 2022-02-15 | End: 2022-02-18 | Stop reason: HOSPADM

## 2022-02-15 RX ORDER — AMITRIPTYLINE HYDROCHLORIDE 25 MG/1
50 TABLET, FILM COATED ORAL NIGHTLY
Status: DISCONTINUED | OUTPATIENT
Start: 2022-02-15 | End: 2022-02-18 | Stop reason: HOSPADM

## 2022-02-15 RX ORDER — MAGNESIUM SULFATE IN WATER 40 MG/ML
2000 INJECTION, SOLUTION INTRAVENOUS
Status: COMPLETED | OUTPATIENT
Start: 2022-02-15 | End: 2022-02-15

## 2022-02-15 RX ADMIN — Medication 10 ML: at 21:23

## 2022-02-15 RX ADMIN — ENOXAPARIN SODIUM 40 MG: 100 INJECTION SUBCUTANEOUS at 08:42

## 2022-02-15 RX ADMIN — PYRIDOXINE HYDROCHLORIDE 50 MG: 100 INJECTION, SOLUTION INTRAMUSCULAR; INTRAVENOUS at 08:41

## 2022-02-15 RX ADMIN — THIAMINE HYDROCHLORIDE 100 MG: 100 INJECTION, SOLUTION INTRAMUSCULAR; INTRAVENOUS at 21:21

## 2022-02-15 RX ADMIN — PYRIDOXINE HYDROCHLORIDE 50 MG: 100 INJECTION, SOLUTION INTRAMUSCULAR; INTRAVENOUS at 21:15

## 2022-02-15 RX ADMIN — PYRIDOXINE HYDROCHLORIDE 50 MG: 100 INJECTION, SOLUTION INTRAMUSCULAR; INTRAVENOUS at 12:27

## 2022-02-15 RX ADMIN — CHOLESTYRAMINE 4 G: 4 POWDER, FOR SUSPENSION ORAL at 16:52

## 2022-02-15 RX ADMIN — INSULIN LISPRO 1 UNITS: 100 INJECTION, SOLUTION INTRAVENOUS; SUBCUTANEOUS at 16:41

## 2022-02-15 RX ADMIN — MIRTAZAPINE 15 MG: 15 TABLET, ORALLY DISINTEGRATING ORAL at 21:18

## 2022-02-15 RX ADMIN — INSULIN LISPRO 1 UNITS: 100 INJECTION, SOLUTION INTRAVENOUS; SUBCUTANEOUS at 21:24

## 2022-02-15 RX ADMIN — AMITRIPTYLINE HYDROCHLORIDE 50 MG: 25 TABLET, FILM COATED ORAL at 21:18

## 2022-02-15 RX ADMIN — ANASTROZOLE 1 MG: 1 TABLET ORAL at 08:41

## 2022-02-15 RX ADMIN — ACETYLCYSTEINE 7080 MG: 6 INJECTION, SOLUTION INTRAVENOUS at 01:38

## 2022-02-15 RX ADMIN — SODIUM CHLORIDE: 9 INJECTION, SOLUTION INTRAVENOUS at 23:34

## 2022-02-15 RX ADMIN — MAGNESIUM SULFATE HEPTAHYDRATE 2000 MG: 2 INJECTION, SOLUTION INTRAVENOUS at 05:14

## 2022-02-15 RX ADMIN — THIAMINE HYDROCHLORIDE 100 MG: 100 INJECTION, SOLUTION INTRAMUSCULAR; INTRAVENOUS at 08:41

## 2022-02-15 RX ADMIN — CHOLESTYRAMINE 4 G: 4 POWDER, FOR SUSPENSION ORAL at 08:41

## 2022-02-15 RX ADMIN — FOMEPIZOLE 700 MG: 1 INJECTION, SOLUTION INTRAVENOUS at 14:58

## 2022-02-15 RX ADMIN — MAGNESIUM SULFATE HEPTAHYDRATE 2000 MG: 2 INJECTION, SOLUTION INTRAVENOUS at 07:22

## 2022-02-15 RX ADMIN — Medication 10 ML: at 08:41

## 2022-02-15 RX ADMIN — FAMOTIDINE 20 MG: 20 TABLET ORAL at 08:41

## 2022-02-15 RX ADMIN — PYRIDOXINE HYDROCHLORIDE 50 MG: 100 INJECTION, SOLUTION INTRAMUSCULAR; INTRAVENOUS at 03:32

## 2022-02-15 ASSESSMENT — PAIN SCALES - GENERAL
PAINLEVEL_OUTOF10: 0

## 2022-02-15 NOTE — CONSULTS
Critical Care Admit/Consult Note         Patient - Pedro Jara   MRN -  [id number]   Acct # - [de-identified]   - 1965      Date of Admission -  2022  2:02 PM  Date of evaluation -  2/15/2022  [address] Day - 1            ADMIT/CONSULT DETAILS     Reason for Admit/Consult   Tylenol Overdose, Ethylene Glycol Overdose    Consulting Gonzalo Dhaliwal MD  Primary Care Physician - Shaneka Eng, DO         HPI   The patient is a 61 y.o. female with significant past medical history of HTN, breast cancer, and T2DM presents after a suicide attempt. Patient reportedly took a significant amount of tylenol and her xanax in an attempt to end her life. She also drank some antifreeze with mountain dew. Patient currently denies any attempt but does state she doesn't remember what happened. She states she did take a single tylenol while she was painting yesterday.          Past Medical History         Diagnosis Date    Anxiety     Arthritis     Cancer ([facility]     breast    Cervical radiculopathy     Chronic back pain     Dehydration 2021    Dehydration 2021    Depression     Diabetes mellitus type 2, uncontrolled ([address] 2017    GERD (gastroesophageal reflux disease)     History of blood transfusion 2018    back surgery, lumbar, dr Timbo Camargo    Hypertension     Neuropathy     Right leg pain     seeing doctor currently problems with hardware        Past Surgical History           Procedure Laterality Date    BACK SURGERY  2018    PLIF L2-L5    BREAST BIOPSY Right 2021    RIGHT BREAST LUMPECTOMY WITH  sentinel LYMPHNODE biopsy performed by Maia Haines MD at [facility] Right 3/9/2021    RIGHT BREAST RE-EXCISION LUMPECTOMY OF INFERIOR AND MEDIAL MARGINS performed by Maia Haines MD at [address] [place] [address] Right 2017    Dr. 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 WITH REMOVAL OF HARDWARE & O.R. I. F ------BOP performed by Bryan Fernandes MD at [facility] Right 5/14/2018    REPAIR NON-UNION RIGHT TIBIA AND FIBULA WITH REMOVAL OF HARDWARE AND BONE GRAFT performed by Bryan Fernandes MD at [address] Right 1/10/2019    RIGHT TIBIA REMOVAL HARDWARE, RIGHT TIBIA OPEN  TREATMENT OF NON UNION performed by Bryan Fernandes MD at [place] [address]    WISDOM TOOTH EXTRACTION         SOCIAL AND OCCUPATIONAL HEALTH:  The patient is a Current smoker      Current Medications   Current Medications    magnesium sulfate  2,000 mg IntraVENous Q2H    fomepizole (ANTIZOL) IVPB  700 mg IntraVENous Q12H    anastrozole  1 mg Oral Daily    cholestyramine  1 packet Oral BID    famotidine  20 mg Oral Daily    insulin lispro  0-6 Units SubCUTAneous TID WC    insulin lispro  0-3 Units SubCUTAneous Nightly    sodium chloride flush  10 mL IntraVENous 2 times per day    enoxaparin  40 mg SubCUTAneous Daily    thiamine  100 mg IntraVENous BID    acetylcysteine (ACETADOTE) infusion *second dose*  100 mg/kg IntraVENous Once    pyridoxine  50 mg IntraVENous Q6H     naloxone, glucose, dextrose, glucagon (rDNA), dextrose, sodium chloride flush, sodium chloride, potassium chloride **OR** potassium alternative oral replacement **OR** potassium chloride, ondansetron **OR** ondansetron, magnesium hydroxide, acetaminophen **OR** acetaminophen, albuterol  IV Drips/Infusions   dextrose      sodium chloride      sodium chloride 75 mL/hr at 02/14/22 2223     Home Medications  Medications Prior to Admission: nicotine (NICODERM CQ) 21 MG/24HR, Place 1 patch onto the skin daily  anastrozole (ARIMIDEX) 1 MG tablet, Take 1 tablet by mouth daily  Ascorbic Acid (VITAMIN C) 250 MG tablet, Take 250 mg by mouth daily  vitamin D (ERGOCALCIFEROL) 1.25 MG (98982 UT) CAPS capsule, Take 50,000 Units by mouth once a week  ondansetron (ZOFRAN) 4 MG tablet, take 1 tablet by mouth every 8 hours if needed for nausea and vomiting  prochlorperazine (COMPAZINE) 10 MG tablet, Take 1 tablet by mouth every 6 hours as needed (NAUSEA)  anastrozole (ARIMIDEX) 1 MG tablet, Take 1 tablet by mouth daily  acetaminophen-codeine (TYLENOL/CODEINE #3) 300-30 MG per tablet, Take 1 tablet by mouth every 4 hours as needed for Pain.  magnesium oxide (MAG-OX) 400 MG tablet, Take 1 tablet by mouth 2 times daily   lidocaine-prilocaine (EMLA) 2.5-2.5 % cream, Apply topically to port 30 minutes prior to chemotherapy. prochlorperazine (COMPAZINE) 10 MG tablet, Take 1 tablet by mouth every 6 hours as needed (nausea)  cholestyramine (QUESTRAN) 4 g packet, Take 1 packet by mouth 2 times daily  psyllium (KONSYL) 28.3 % PACK, Take 1 packet by mouth daily  magnesium oxide (MAG-OX) 400 (240 Mg) MG tablet, Take 1 tablet by mouth 2 times daily  MELATONIN PO, Take 3 mg by mouth nightly   Multiple Vitamins-Minerals (HAIR SKIN AND NAILS FORMULA) TABS, Take by mouth STOP PREOP MED  mirtazapine (REMERON) 15 MG tablet, Take 15 mg by mouth nightly  ALPRAZolam (XANAX) 0.5 MG tablet, Take 0.5 mg by mouth nightly. pregabalin (LYRICA) 75 MG capsule, Take 75 mg by mouth daily.    famotidine (PEPCID) 20 MG tablet, Take 20 mg by mouth daily  amitriptyline (ELAVIL) 50 MG tablet, Take 50 mg by mouth nightly   LORATADINE-D 24HR  MG per extended release tablet, Take 1 tablet by mouth daily   LANTUS SOLOSTAR 100 UNIT/ML injection pen, Inject 30 Units into the skin nightly   metFORMIN (GLUCOPHAGE) 500 MG tablet, Take 500 mg by mouth 2 times daily   albuterol sulfate  (90 BASE) MCG/ACT inhaler, Inhale 2 puffs into the lungs every 6 hours as needed for Wheezing    Diet/Nutrition   ADULT DIET; Regular; 4 carb choices (60 gm/meal)    Allergies   Ceftriaxone    Social History   Tobacco   reports that she quit smoking about 6 years ago. Her smoking use included cigarettes. She has a 5.00 pack-year smoking history. She has never used smokeless tobacco.    Alcohol     reports no history of alcohol use.     Occupational history :    Family History         Problem Relation Age of Onset    Diabetes Mother        ROS     REVIEW OF SYSTEMS:  CONSTITUTIONAL:  negative for  fevers and chills  EYES:  negative for  visual disturbance and irritation  HEENT:  negative for  tinnitus, earaches, epistaxis and sore throat  RESPIRATORY:  negative for  dyspnea, wheezing and hemoptysis  CARDIOVASCULAR:  negative for  chest pain, edema  GASTROINTESTINAL:  negative for nausea, vomiting and abdominal pain  GENITOURINARY:  negative for dysuria and hematuria  ENDOCRINE:  negative for heat intolerance and cold intolerance  MUSCULOSKELETAL:  negative for  myalgias and arthralgias  NEUROLOGICAL:  negative for weakness and numbness  BEHAVIOR/PSYCH:  negative for suicidal thoughts, homicidal thoughts, hallucinations    Lines and Devices    Venous Port L Chest  PIV L Wrist    Mechanical Ventilation Data   VENT SETTINGS (Comprehensive)  Vent Information  SpO2: 100 %  Additional Respiratory  Assessments  Pulse: 70  Resp: 10  SpO2: 100 %    ABG  Lab Results   Component Value Date    O2SAT 99.4 02/14/2022     No results found for: IFIO2, MODE, SETTIDVOL, [facility]        Vitals    height is 6' 1\" (1.854 m) and weight is 146 lb 13.2 oz (66.6 kg). Her axillary temperature is 98.6 °F (37 °C). Her blood pressure is 107/61 and her pulse is 70. Her respiration is 10 and oxygen saturation is 100%. Temperature Range: Temp: 98.6 °F (37 °C) Temp  Av.7 °F (36.5 °C)  Min: 95.4 °F (35.2 °C)  Max: 98.6 °F (37 °C)  BP Range:  Systolic (19KFZ), DLR:43 , Min:80 , WYN:465     Diastolic (42VSC), UEK:51, Min:50, Max:75    Pulse Range: Pulse  Av.5  Min: 67  Max: 96  Respiration Range: Resp  Avg: 15.8  Min: 9  Max: 29  Current Pulse Ox[de-identified]  SpO2: 100 %  24HR Pulse Ox Range:  SpO2  Av.9 %  Min: 99 %  Max: 100 %  Oxygen Amount and Delivery: O2 Flow Rate (L/min): 2 L/min      I/O (24 Hours)    Patient Vitals for the past 8 hrs:   BP Temp Temp src Pulse Resp SpO2   02/15/22 0600 107/61 -- -- 70 10 --   02/15/22 0500 -- -- -- 76 11 --   02/15/22 0400 (!) 105/59 98.6 °F (37 °C) Axillary 91 25 100 %   02/15/22 0300 -- -- -- 77 11 --   02/15/22 0200 97/70 -- -- 92 29 --   02/15/22 0100 -- -- -- 73 10 --       Intake/Output Summary (Last 24 hours) at 2/15/2022 0819  Last data filed at 2/15/2022 0600  Gross per 24 hour   Intake 916 ml   Output 2800 ml   Net -1884 ml     I/O last 3 completed shifts:   In: 916 [IV KORQGZOWB:070]  Out: 2800 [Urine:2800]   Date 02/15/22 0000 - 02/15/22 2359   Shift 4670-1201 5137-0192 3538-5685 24 Hour Total   INTAKE   IV Piggyback(mL/kg) 916(13.8)   916(13.8)   Shift Total(mL/kg) 410(58.1)   916(13.8)   OUTPUT   Urine(mL/kg/hr) 2800(5.3)   2800   Shift Total(mL/kg) 0485(29)   1975(47)   Weight (kg) 66.6 66.6 66.6 66.6     Patient Vitals for the past 96 hrs (Last 3 readings):   Weight   22 2130 146 lb 13.2 oz (66.6 kg)         Drains/Tubes Outputs  [place]  Exam     PHYSICAL EXAM:  CONSTITUTIONAL:  awake, alert, cooperative, no apparent distress, and appears stated age  EYES:  Lids and lashes normal, pupils equal, round and reactive to light, extra ocular muscles intact, sclera clear, conjunctiva normal  ENT:  Normocephalic, without obvious abnormality, atraumatic, external ears without lesions, oral pharynx with moist mucus membranes, tonsils without erythema or exudates  NECK:  Supple, symmetrical, trachea midline, no adenopathy, thyroid symmetric, not enlarged and no tenderness, skin normal  LUNGS:  No increased work of breathing, good air exchange, clear to auscultation bilaterally, no crackles or wheezing  CARDIOVASCULAR:  Normal apical impulse, regular rate and rhythm, and no murmur noted  ABDOMEN:  soft, non-distended, non-tender, no masses palpated, no hepatosplenomegally  MUSCULOSKELETAL:  There is no redness, warmth, or swelling of the joints. Tone is normal.  NEUROLOGIC:  Awake, alert, oriented to name, place and time.   SKIN:  normal skin color, texture, turgor    Data   Old records and images have been reviewed    Lab Results   CBC     Lab Results   Component Value Date    WBC 5.5 02/15/2022    RBC 3.21 02/15/2022    HGB 10.5 02/15/2022    HCT 33.2 02/15/2022     02/15/2022    .4 02/15/2022    MCH 32.7 02/15/2022    MCHC 31.6 02/15/2022    RDW 12.6 02/15/2022    NRBC 0.0 02/08/2022    SEGSPCT 57 11/08/2013    METASPCT 0.9 02/08/2022    LYMPHOPCT 22.0 02/15/2022    MONOPCT 10.2 02/15/2022    MYELOPCT 1.7 06/22/2021    BASOPCT 0.7 02/15/2022    MONOSABS 0.56 02/15/2022    LYMPHSABS 1.21 02/15/2022    EOSABS 0.24 02/15/2022    BASOSABS 0.04 02/15/2022       BMP   Lab Results   Component Value Date     02/15/2022    K 4.0 02/15/2022    K 3.8 02/15/2022     02/15/2022    CO2 23 02/15/2022    BUN 4 02/15/2022    CREATININE 0.7 02/15/2022    GLUCOSE 105 02/15/2022    GLUCOSE 96 05/24/2012    CALCIUM 9.1 02/15/2022       LFTS  Lab Results   Component Value Date    ALKPHOS 160 02/14/2022    ALT 19 02/14/2022    AST 16 02/14/2022    PROT 5.6 02/14/2022    BILITOT 0.2 02/14/2022    BILIDIR 0.2 10/01/2011    LABALBU 3.5 02/14/2022 LABALBU 4.5 05/24/2012       INR  No results for input(s): PROTIME, INR in the last 72 hours. APTT  No results for input(s): APTT in the last 72 hours. Lactic Acid  Lab Results   Component Value Date    LACTA 0.5 02/15/2022    LACTA 0.6 02/15/2022    LACTA 0.8 06/21/2021        BNP   No results for input(s): BNP in the last 72 hours. Cultures     No results for input(s): BC in the last 72 hours. No results for input(s): Lanis Ana in the last 72 hours. No results for input(s): LABURIN in the last 72 hours. Radiology   CXR  Impression   Mild asymmetric lower lobe peripheral hazy parenchymal opacities compatible   with pneumonia. SYSTEMS ASSESSMENT    Neuro   Alert and Oriented x3, continue to monitor    Respiratory   Appropriate on Room air, continue to monitor    Cardiovascular   Pressors appropriate, continue to monitor    Gastrointestinal   GI Prophylaxis, Pepcid 20mg Daily    Renal   Cr 0.7, continue to monitor     Infectious Disease   None,    Hematology/Oncology   Anemia, Hg 10.5, baseline, continue to monitor, transfuse if <7    Hx of Breast Cancer    Endocrine       Social/Spiritual/DNR/Other   Patient is a FULL Code    Acetaminophen Overdose, Tylenol levels were 55.9, 50.2, <5, NAC already given, resolved    Ethylene Glycol Overdose, 18.8, resolving    Suicide Attempt, Psychiatry consult, Sitter    Patient is medically cleared at this time. Patient is safe for downgrade to Psychiatry    [facility]    \"Thank you for asking us to see this complex patient. \"

## 2022-02-15 NOTE — CARE COORDINATION
Social Work / Discharge Planning:    Patient admitted to ICU for intentional acetaminophen overdose. Pulmonary and psychiatry are following. Patient is on 2 liters of oxygen and has sitter. Per psych, due to serious suicide attempt patient would benefit from inpatient psychiatry for further evaluation, treatment and safety. Will follow.  Electronically signed by MAIA Gregg on 2/15/22 at 3:02 PM EST

## 2022-02-15 NOTE — PLAN OF CARE
Problem: Falls - Risk of:  Goal: Absence of physical injury  Description: Absence of physical injury  Outcome: Met This Shift     Problem: Skin Integrity:  Goal: Absence of new skin breakdown  Description: Absence of new skin breakdown  Outcome: Met This Shift     Problem: Falls - Risk of:  Goal: Will remain free from falls  Description: Will remain free from falls  Outcome: Ongoing     Problem: Skin Integrity:  Goal: Will show no infection signs and symptoms  Description: Will show no infection signs and symptoms  Outcome: Ongoing

## 2022-02-15 NOTE — CONSULTS
Department of Internal Medicine  Division of Pulmonary, Critical Care and Sleep Medicine  ICU Attending Addendum    Attending Physician Statement  Yee Salinas was seen, examined and discussed with the multi-disciplinary ICU team during rounds. I have personally seen and examined the patient and the key elements of the encounter were performed by me. The medications & laboratory data was discussed and adjusted where necessary. The radiographic images were reviewed either as a group or with radiologist if felt dis-concordant with the exam or history. The above findings were corroborated, plans confirmed and changes made if needed. Family is updated at the bedside if available. Key issues of the case were discussed among consultants. Critical Care time is documented if appropriate. Changes to the notes are place in italicized print. Briefly,     Suicidal attempt, poly drugs: tylenol, benzo, narcotics  NAC  IVF  LFTs    Denies suicidal thoughts? Psych consult. Hope to d/c ICU later today.

## 2022-02-15 NOTE — ED NOTES
Report provided to [employer]. Updated on pt hx,  at poison control [facility] and all medications requested via them. They are awaiting ethylene glycol level that is send out test to [facility]. Please call with result. Any questions regarding medications please call poison control. Pt. Continues to be alert and oriented, easily awoken. Sitter at cartside.       Raymundo Carreon, ZITA  02/14/22 1215 Adam Edouard, ZITA  02/14/22 [id number]

## 2022-02-15 NOTE — CONSULTS
PSYCHIATRY ATTENDING CONSULT    REASON FOR CONSULT: Suicide attempt    REQUESTING PHYSICIAN: Dr. Swain Sessions: \"I have chemo treatment on Friday. \"    HISTORY OF PRESENT ILLNESS:  Raquel Cespedes  is a 61 y.o. female who was admitted on 2/14/22 due to intentional overdose took 5 acetaminophen, 5 Xanax and [address] with antifreeze. Patient is also having active chemotherapy  due to malignant metastatic  breast cancer. Chart reviewed. Note home psychotropics of Xanax 0.5 mg nightly, amitriptyline 50 mg nightly. Patient was pink slipped in the ED on 2-14 and has a sitter at bedside. On assessment patient resting in bed she admits to feeling alone, depressed and is trying to move to a new apartment with no help. States that she was homeless for 30 days but the county assisted her in getting an apartment. She rates her depression 2 out of 10 and anxiety a 3 out of 10 currently. She states she is no longer suicidal but she was feeling overwhelmed with her medical issues and trying to move all by herself. She states that she has been anxious and depressed on and off for years. She feels that her depression and anxiety is worse when she is overwhelmed with life. Reports taking Xanax \"for years. \" She reports that she was looking at her phone and found out how to intentionally overdose. Patient is downplaying the seriousness of her intentional overdose. She is very anxious for discharge due to her appointment for chemo on Friday. She also states she has a next Tuesday appointment at [facility] clinic for counseling and medication treatment for her mental health. No voiced delusions or hallucinations, denies suicidal homicidal ideations currently. States appetite is okay drinking and sleeping well. PAST PSYCHIATRIC HISTORY: Patient states that she has no psychiatric inpatient  admissions. States she has never attempted suicide before.  States that she has an appointment in [facility] clinic next Tuesday for counseling and medication management.     PAST MEDICAL HISTORY:       Diagnosis Date    Anxiety     Arthritis     Cancer ([facility] 2021    breast    Cervical radiculopathy     Chronic back pain     Dehydration 9/23/2021    Dehydration 9/23/2021    Depression     Diabetes mellitus type 2, uncontrolled ([facility] 2/12/2017    GERD (gastroesophageal reflux disease)     History of blood transfusion 01/2018    back surgery, lumbar, dr Paula Cuevas    Hypertension     Neuropathy     Right leg pain     seeing doctor currently problems with hardware       MEDICAL ROS: General - negative, neurological - negative, ENT - negative, CV - negative, pulmonary - negative, GI - negative, dermatologic - negative, rheumatologic - negative, musculoskeletal - negative,  - negative, hematologic - negative    PAST SURGICAL HISTORY:       Procedure Laterality Date    BACK SURGERY  01/08/2018    PLIF L2-L5    BREAST BIOPSY Right 2/5/2021    RIGHT BREAST LUMPECTOMY WITH  sentinel LYMPHNODE biopsy performed by Lisa Luis MD at [facility] Right 3/9/2021    RIGHT BREAST RE-EXCISION LUMPECTOMY OF INFERIOR AND MEDIAL MARGINS performed by Lisa Luis MD at [place] [address] Right 04/09/2017    Dr. 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 WITH REMOVAL OF HARDWARE & O.R. I. F ------BOP performed by Jacob Blake MD at [facility] Right 5/14/2018    REPAIR NON-UNION RIGHT TIBIA AND FIBULA WITH REMOVAL OF HARDWARE AND BONE GRAFT performed by Jacob Blake MD at [address] Right 1/10/2019    RIGHT TIBIA REMOVAL HARDWARE, RIGHT TIBIA OPEN  TREATMENT OF NON UNION performed by Jacob Blake MD at [facility]    WISDOM TOOTH EXTRACTION         MEDICATIONS: Current Facility-Administered Medications: naloxone (NARCAN) injection 2 mg, 2 mg, IntraVENous, PRN  fomepizole (ANTIZOL) 700 mg in sodium chloride 0.9 % 100 mL IVPB, 700 mg, IntraVENous, Q12H  anastrozole (ARIMIDEX) tablet 1 mg, 1 mg, Oral, Daily  cholestyramine (QUESTRAN) packet 4 g, 1 packet, Oral, BID  famotidine (PEPCID) tablet 20 mg, 20 mg, Oral, Daily  insulin lispro (HUMALOG) injection vial 0-6 Units, 0-6 Units, SubCUTAneous, TID WC  insulin lispro (HUMALOG) injection vial 0-3 Units, 0-3 Units, SubCUTAneous, Nightly  glucose (GLUTOSE) 40 % oral gel 15 g, 15 g, Oral, PRN  dextrose 50 % IV solution, 12.5 g, IntraVENous, PRN  glucagon (rDNA) injection 1 mg, 1 mg, IntraMUSCular, PRN  dextrose 5 % solution, 100 mL/hr, IntraVENous, PRN  sodium chloride flush 0.9 % injection 10 mL, 10 mL, IntraVENous, 2 times per day  sodium chloride flush 0.9 % injection 10 mL, 10 mL, IntraVENous, PRN  0.9 % sodium chloride infusion, 25 mL, IntraVENous, PRN  potassium chloride (KLOR-CON M) extended release tablet 40 mEq, 40 mEq, Oral, PRN **OR** potassium bicarb-citric acid (EFFER-K) effervescent tablet 40 mEq, 40 mEq, Oral, PRN **OR** potassium chloride 10 mEq/100 mL IVPB (Peripheral Line), 10 mEq, IntraVENous, PRN  enoxaparin (LOVENOX) injection 40 mg, 40 mg, SubCUTAneous, Daily  ondansetron (ZOFRAN-ODT) disintegrating tablet 4 mg, 4 mg, Oral, Q8H PRN **OR** ondansetron (ZOFRAN) injection 4 mg, 4 mg, IntraVENous, Q6H PRN  magnesium hydroxide (MILK OF MAGNESIA) 400 MG/5ML suspension 30 mL, 30 mL, Oral, Daily PRN  acetaminophen (TYLENOL) tablet 650 mg, 650 mg, Oral, Q6H PRN **OR** acetaminophen (TYLENOL) suppository 650 mg, 650 mg, Rectal, Q6H PRN  0.9 % sodium chloride infusion, , IntraVENous, Continuous  thiamine (B-1) injection 100 mg, 100 mg, IntraVENous, BID  [COMPLETED] acetylcysteine (ACETADOTE) 10,620 mg in dextrose 5 % 553.1 mL infusion, 150 mg/kg, IntraVENous, Once **FOLLOWED BY** acetylcysteine (ACETADOTE) 7,080 mg in dextrose 5 % 1,000 mL infusion, 100 mg/kg, IntraVENous, Once  pyridoxine (B-6) injection 50 mg, 50 mg, IntraVENous, Q6H  albuterol (PROVENTIL) nebulizer solution 2.5 mg, 2.5 mg, Nebulization, Q6H PRN    ALLERGIES:  Ceftriaxone    FAMILY PSYCHIATRIC HISTORY: No family psychiatric history. SOCIAL HISTORY: Born and raised in [place]. Was  for 10 years. . Has one daughter who lives in [place]. Worked for [employer] for 10 years. Currently on disability. Lives alone. SUBSTANCE ABUSE HISTORY:  reports that she quit smoking about 6 years ago. Her smoking use included cigarettes. She has a 5.00 pack-year smoking history. She has never used smokeless tobacco. She reports that she does not drink alcohol and does not use drugs.     VITALS:   Vitals:    02/15/22 0800   BP: (!) 105/58   Pulse: 88   Resp: 13   Temp: 98.6 °F (37 °C)   SpO2: 99%       LABS:CBC:  Recent Labs     02/14/22  1425 02/15/22  0520   WBC 6.2 5.5   RBC 3.21* 3.21*   HGB 10.5* 10.5*   HCT 32.8* 33.2*   .2* 103.4*   RDW 12.7 12.6    192   CHEMISTRIES:  Recent Labs     02/14/22  1425 02/15/22  0020 02/15/22  0520    138 138   K 4.1 3.8 4.0   CL 98 106 105   CO2 22 23 23   BUN 6 4* 4*   CREATININE 0.7 0.7 0.7   GLUCOSE 115* 110* 105*   PHOS  --   --  3.6   MG  --  1.4* 1.5*   PT/INR:No results for input(s): PROTIME, INR in the last 72 hours. APTT:No results for input(s): APTT in the last 72 hours. LIVER PROFILE:  Recent Labs     02/14/22  1425   AST 16   ALT 19   BILITOT 0.2   ALKPHOS 160*       MENTAL STATUS EXAMINATION  White female appears age. Pleasant, cooperative, generally forthcoming. Normal psychomotor activity, strength, tone, eye contact. Gait not assessed. Mood anxious. Affect flexible. Speech clear. Thoughts organized. Content future oriented. No active suicidal or homicidal ideations. No paranoia, delusions or hallucinations. Orientation, concentration, recent and remote memory are intact. Fund of knowledge fair. Language use fair. Insight and judgment poor. ASSESSMENT: Mood Disorder       Borderline    PLAN & RECOMMENDATIONS: Patient had an intentional multidrug drug overdose and had to be Narcaned. She  was pinked slipped in ED and has a sitter at bed side. Due to serious suicide attempt patient would benefit from inpatient psychiatry for further evaluation, treatment and safety. Due to her longstanding benzo use I will add Klonopin as needed. I will also start her home medication of Remeron 15 mg at at bedtime and amitriptyline 50 mg at at bedtime. Continue with constant observation. Please call the BAC at [facility] at [phone] for admission and bed availability.       WES Mcelroy CNP 2/15/2022 12:50 PM

## 2022-02-15 NOTE — ED NOTES
Bed available on 7 W, Charge Elvira Haq aware of pending referral, please call Elvira Haq at [phone] to give report, will be discussed with psych on-call to determine possible admission. Call to [facility] [phone], spoke with [facility], relayed info.      [facility], [facility], [facility]  02/15/22 8202

## 2022-02-15 NOTE — ED NOTES
Call from [facility] [phone], pt referred for transfer to in-patient psych. Psych assessment noted in chart by Karan Mcardle 2/15/2022, medical clearance for transfer to psych noted by Dr Palak Bateman 2/15/2022, call to [address] to advise of pending call re: possible transfer, advised charge Aye Earl is off the floor currently, SW will call back shortly. [address], MSW, [facility]  02/15/22 9545    Spoke with Sharlyn Peabody on 7 W, will review chart.       [address], MSW, [facility]  02/15/22 5512

## 2022-02-16 LAB
MAGNESIUM: 1.5 MG/DL (ref 1.6–2.6)
METER GLUCOSE: 143 MG/DL (ref 74–99)
METER GLUCOSE: 146 MG/DL (ref 74–99)
METER GLUCOSE: 152 MG/DL (ref 74–99)
METER GLUCOSE: 98 MG/DL (ref 74–99)
MRSA CULTURE ONLY: NORMAL
PHOSPHORUS: 3.1 MG/DL (ref 2.5–4.5)

## 2022-02-16 PROCEDURE — 6360000002 HC RX W HCPCS: Performed by: FAMILY MEDICINE

## 2022-02-16 PROCEDURE — 1200000000 HC SEMI PRIVATE

## 2022-02-16 PROCEDURE — 6360000002 HC RX W HCPCS: Performed by: PHYSICIAN ASSISTANT

## 2022-02-16 PROCEDURE — 6360000002 HC RX W HCPCS: Performed by: EMERGENCY MEDICINE

## 2022-02-16 PROCEDURE — 2580000003 HC RX 258: Performed by: PHYSICIAN ASSISTANT

## 2022-02-16 PROCEDURE — 36415 COLL VENOUS BLD VENIPUNCTURE: CPT

## 2022-02-16 PROCEDURE — 2580000003 HC RX 258: Performed by: GENERAL PRACTICE

## 2022-02-16 PROCEDURE — 6370000000 HC RX 637 (ALT 250 FOR IP)

## 2022-02-16 PROCEDURE — 6370000000 HC RX 637 (ALT 250 FOR IP): Performed by: PHYSICIAN ASSISTANT

## 2022-02-16 PROCEDURE — 83735 ASSAY OF MAGNESIUM: CPT

## 2022-02-16 PROCEDURE — 6360000002 HC RX W HCPCS: Performed by: GENERAL PRACTICE

## 2022-02-16 PROCEDURE — 84100 ASSAY OF PHOSPHORUS: CPT

## 2022-02-16 PROCEDURE — 82962 GLUCOSE BLOOD TEST: CPT

## 2022-02-16 RX ORDER — ACETAMINOPHEN 325 MG/1
650 TABLET ORAL ONCE
Status: COMPLETED | OUTPATIENT
Start: 2022-02-16 | End: 2022-02-17

## 2022-02-16 RX ORDER — MAGNESIUM SULFATE IN WATER 40 MG/ML
2000 INJECTION, SOLUTION INTRAVENOUS ONCE
Status: COMPLETED | OUTPATIENT
Start: 2022-02-16 | End: 2022-02-16

## 2022-02-16 RX ADMIN — INSULIN LISPRO 1 UNITS: 100 INJECTION, SOLUTION INTRAVENOUS; SUBCUTANEOUS at 21:38

## 2022-02-16 RX ADMIN — SODIUM CHLORIDE: 9 INJECTION, SOLUTION INTRAVENOUS at 10:15

## 2022-02-16 RX ADMIN — PYRIDOXINE HYDROCHLORIDE 50 MG: 100 INJECTION, SOLUTION INTRAMUSCULAR; INTRAVENOUS at 02:00

## 2022-02-16 RX ADMIN — FOMEPIZOLE 700 MG: 1 INJECTION, SOLUTION INTRAVENOUS at 08:24

## 2022-02-16 RX ADMIN — PYRIDOXINE HYDROCHLORIDE 50 MG: 100 INJECTION, SOLUTION INTRAMUSCULAR; INTRAVENOUS at 10:18

## 2022-02-16 RX ADMIN — FAMOTIDINE 20 MG: 20 TABLET ORAL at 08:17

## 2022-02-16 RX ADMIN — PYRIDOXINE HYDROCHLORIDE 50 MG: 100 INJECTION, SOLUTION INTRAMUSCULAR; INTRAVENOUS at 17:45

## 2022-02-16 RX ADMIN — ANASTROZOLE 1 MG: 1 TABLET ORAL at 08:17

## 2022-02-16 RX ADMIN — MAGNESIUM SULFATE HEPTAHYDRATE 2000 MG: 2 INJECTION, SOLUTION INTRAVENOUS at 12:23

## 2022-02-16 RX ADMIN — Medication 10 ML: at 21:52

## 2022-02-16 RX ADMIN — AMITRIPTYLINE HYDROCHLORIDE 50 MG: 25 TABLET, FILM COATED ORAL at 21:37

## 2022-02-16 RX ADMIN — MIRTAZAPINE 15 MG: 15 TABLET, ORALLY DISINTEGRATING ORAL at 21:37

## 2022-02-16 RX ADMIN — ENOXAPARIN SODIUM 40 MG: 100 INJECTION SUBCUTANEOUS at 08:17

## 2022-02-16 RX ADMIN — ONDANSETRON 4 MG: 4 TABLET, ORALLY DISINTEGRATING ORAL at 01:09

## 2022-02-16 RX ADMIN — THIAMINE HYDROCHLORIDE 100 MG: 100 INJECTION, SOLUTION INTRAMUSCULAR; INTRAVENOUS at 21:49

## 2022-02-16 RX ADMIN — INSULIN LISPRO 1 UNITS: 100 INJECTION, SOLUTION INTRAVENOUS; SUBCUTANEOUS at 08:20

## 2022-02-16 RX ADMIN — CHOLESTYRAMINE 4 G: 4 POWDER, FOR SUSPENSION ORAL at 08:17

## 2022-02-16 RX ADMIN — CHOLESTYRAMINE 4 G: 4 POWDER, FOR SUSPENSION ORAL at 17:45

## 2022-02-16 RX ADMIN — THIAMINE HYDROCHLORIDE 100 MG: 100 INJECTION, SOLUTION INTRAMUSCULAR; INTRAVENOUS at 08:18

## 2022-02-16 RX ADMIN — INSULIN LISPRO 1 UNITS: 100 INJECTION, SOLUTION INTRAVENOUS; SUBCUTANEOUS at 17:14

## 2022-02-16 RX ADMIN — Medication 10 ML: at 08:19

## 2022-02-16 NOTE — PROGRESS NOTES
Physical Therapy    PT orders received. RN reports pt is  Independent and is also transferring to main when bed available. Will discharge order. Please re-order if needed.

## 2022-02-16 NOTE — H&P
Theo Andre M.D. History and Physical      CHIEF COMPLAINT: Overdose on acetaminophen and benzodiazepines    Reason for Admission: As above    History Obtained From: EMR as patient is currently somnolent in ICU    HISTORY OF PRESENT ILLNESS:      The patient is a 61 y.o. female of [facility], DO with significant past medical history of recent discharge from hospital approximately 5 days ago at which time she was treated for shortness of breath diagnosed with pneumonia and treated with Levaquin and did well. She was discharged on 2/9/2022. She now presents with complaints of having taken an overdose of Tylenol, ethylene glycol and benzodiazepines in an attempt to end her life. She subsequently came into the emergency department for treatment. She was noted to have Tylenol level of 56 and received an acetylcysteine in the ER. Glycol level was 19. She was treated in ER and admitted to ICU for observation. On my evaluation she is covered up in blankets over her head and is not interested in answering questions. She indicates she feels fine. She is now awaiting bed for inpatient psych.           All labs personally reviewed   All imaging personally reviewed     Past Medical History:        Diagnosis Date    Anxiety     Arthritis     Cancer ([address] 2021    breast    Cervical radiculopathy     Chronic back pain     Dehydration 9/23/2021    Dehydration 9/23/2021    Depression     Diabetes mellitus type 2, uncontrolled ([address] 2/12/2017    GERD (gastroesophageal reflux disease)     History of blood transfusion 01/2018    back surgery, lumbar, dr Elias Chacon    Hypertension     Neuropathy     Right leg pain     seeing doctor currently problems with hardware     Past Surgical History:        Procedure Laterality Date    BACK SURGERY  01/08/2018    PLIF L2-L5    BREAST BIOPSY Right 2/5/2021    RIGHT BREAST LUMPECTOMY WITH  sentinel LYMPHNODE biopsy performed by Senia Camargo Aline Stanley MD at [facility] Right 3/9/2021    RIGHT BREAST RE-EXCISION LUMPECTOMY OF INFERIOR AND MEDIAL MARGINS performed by Michell Pollard MD at [address] [place] [address] Right 04/09/2017    Dr. 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 WITH REMOVAL OF HARDWARE & O.R. I. F ------BOP performed by Mendez John MD at [facility] Right 5/14/2018    REPAIR NON-UNION RIGHT TIBIA AND FIBULA WITH REMOVAL OF HARDWARE AND BONE GRAFT performed by Mendez John MD at [address] [place] [address] Right 1/10/2019    RIGHT TIBIA REMOVAL HARDWARE, RIGHT TIBIA OPEN  TREATMENT OF NON UNION performed by Mendez John MD at [address] [place] [address]           Medications Prior to Admission:    Medications Prior to Admission: nicotine (NICODERM CQ) 21 MG/24HR, Place 1 patch onto the skin daily  anastrozole (ARIMIDEX) 1 MG tablet, Take 1 tablet by mouth daily  Ascorbic Acid (VITAMIN C) 250 MG tablet, Take 250 mg by mouth daily  vitamin D (ERGOCALCIFEROL) 1.25 MG (01944 UT) CAPS capsule, Take 50,000 Units by mouth once a week  ondansetron (ZOFRAN) 4 MG tablet, take 1 tablet by mouth every 8 hours if needed for nausea and vomiting  prochlorperazine (COMPAZINE) 10 MG tablet, Take 1 tablet by mouth every 6 hours as needed (NAUSEA)  anastrozole (ARIMIDEX) 1 MG tablet, Take 1 tablet by mouth daily  acetaminophen-codeine (TYLENOL/CODEINE #3) 300-30 MG per tablet, Take 1 tablet by mouth every 4 hours as needed for Pain.  magnesium oxide (MAG-OX) 400 MG tablet, Take 1 tablet by mouth 2 times daily   lidocaine-prilocaine (EMLA) 2.5-2.5 % cream, Apply topically to port 30 minutes prior to chemotherapy. prochlorperazine (COMPAZINE) 10 MG tablet, Take 1 tablet by mouth every 6 hours as needed (nausea)  cholestyramine (QUESTRAN) 4 g packet, Take 1 packet by mouth 2 times daily  psyllium (KONSYL) 28.3 % PACK, Take 1 packet by mouth daily  magnesium oxide (MAG-OX) 400 (240 Mg) MG tablet, Take 1 tablet by mouth 2 times daily  MELATONIN PO, Take 3 mg by mouth nightly   Multiple Vitamins-Minerals (HAIR SKIN AND NAILS FORMULA) TABS, Take by mouth STOP PREOP MED  mirtazapine (REMERON) 15 MG tablet, Take 15 mg by mouth nightly  ALPRAZolam (XANAX) 0.5 MG tablet, Take 0.5 mg by mouth nightly. pregabalin (LYRICA) 75 MG capsule, Take 75 mg by mouth daily. famotidine (PEPCID) 20 MG tablet, Take 20 mg by mouth daily  amitriptyline (ELAVIL) 50 MG tablet, Take 50 mg by mouth nightly   LORATADINE-D 24HR  MG per extended release tablet, Take 1 tablet by mouth daily   LANTUS SOLOSTAR 100 UNIT/ML injection pen, Inject 30 Units into the skin nightly   metFORMIN (GLUCOPHAGE) 500 MG tablet, Take 500 mg by mouth 2 times daily   albuterol sulfate  (90 BASE) MCG/ACT inhaler, Inhale 2 puffs into the lungs every 6 hours as needed for Wheezing    Allergies:  Ceftriaxone    Social History:   TOBACCO:   reports that she quit smoking about 6 years ago. Her smoking use included cigarettes. She has a 5.00 pack-year smoking history. She has never used smokeless tobacco.  ETOH:   reports no history of alcohol use.   MARITAL STATUS:    OCCUPATION: Family History:       Problem Relation Age of Onset    Diabetes Mother        REVIEW OF SYSTEMS:    General ROS: negative-denies any acute    Hematological and Lymphatic ROS: negative  Endocrine ROS: negative  Respiratory ROS: no cough, shortness of breath, or wheezing  Cardiovascular ROS: no chest pain or dyspnea on exertion  Gastrointestinal ROS: no abdominal pain, change in bowel habits, or black or bloody stools  Genito-Urinary ROS: no dysuria, trouble voiding, or hematuria  Neurological ROS: no TIA or stroke symptoms  negative    Vitals:  BP (!) 109/55   Pulse 87   Temp 98.6 °F (37 °C) (Oral)   Resp 15   Ht 6' 1\" (1.854 m)   Wt 146 lb 13.2 oz (66.6 kg)   LMP 08/13/2013   SpO2 100%   BMI 19.37 kg/m²     PHYSICAL EXAM:  General:  Awake, alert, oriented X 3. Well developed, well nourished, well groomed. No apparent distress. HEENT:  Normocephalic, atraumatic. Pupils equal, round, reactive to light. No scleral icterus. No conjunctival injection. Normal lips, teeth, and gums. No nasal discharge. Neck:  Supple, FROM  Heart:  RRR, no murmurs, gallops, rubs, carotid upstroke normal, no carotid bruits  Lungs:  CTA bilaterally, bilat symmetrical expansion, no wheeze, rales, or rhonchi  Abdomen:   Bowel sounds present, soft, nontender, no masses, no organomegaly, no peritoneal signs  Extremities:  No clubbing, cyanosis, or edema  Skin:  Warm and dry, no open lesions or rash  Neuro:  Cranial nerves 2-12 intact, no focal deficits  Vascular: Radial and pedal Pulses 2+  Breast: deferred  Rectal: deferred  Genitalia:  deferred      DATA:     Recent Labs     02/14/22  1425 02/15/22  0520   WBC 6.2 5.5   HGB 10.5* 10.5*    192     Recent Labs     02/14/22  1425 02/15/22  0020 02/15/22  0520    138 138   K 4.1 3.8 4.0   BUN 6 4* 4*   CREATININE 0.7 0.7 0.7     Recent Labs     02/14/22  1425 02/15/22  1425   PROT 5.6* 4.7*   INR  --  1.3     Recent Labs     02/14/22  1425 02/15/22  1425   AST 16 16   ALT 19 16   ALKPHOS 160* 133*   BILIDIR  --  <0.2   BILITOT 0.2 0.4     No results for input(s): BNP in the last 72 hours. No results for input(s): CKTOTAL, CKMB, CKMBINDEX, TROPONINI in the last 72 hours. ASSESSMENT:      Principal Problem:    Intentional acetaminophen overdose ([facility]  Active Problems:    Diabetes mellitus type 2, uncontrolled ([facility]    Ethylene glycol poisoning  Resolved Problems:    * No resolved hospital problems.  *        PLAN:    Admit to ICU for observation for Tylenol and glycol overdose with questionable Xanax overdose as well  Status post Narcan, and acetylcysteine,  Continue IV fluid hydration  Okay to transfer to inpatient psychiatry for ongoing care secondary to suicide attempt  Monitor vital signs and blood work      DVT prophylaxis  PT OT  Discharge plan    Clem Donaldson MD  2/15/2022  8:14 PM

## 2022-02-16 NOTE — PROGRESS NOTES
Spoke to Lilia in [facility] AN [facility]FFIL[facility]ATE OF [facility]. There are no beds at this time. I did let her know that the patient is medically cleared.

## 2022-02-16 NOTE — PROGRESS NOTES
Call to Dr Daisy Love for pain management.  Per April Learn, tylenol ok to give, new orders to follow, see [facility]

## 2022-02-16 NOTE — ED NOTES
Per Nicky BANGURA, Dr Vega Borjas would like to have this pt put on 24 hour obs. Reports please contact [facility] again in 24 hours ([phone]) [facility].  Call to [facility] [phone], spoke with Regan Cifuentes, relayed info. Pt being moved tonight to Room 0707, [phone].      [address] [place] [address] Blvd, JUL[place], [place]  02/15/22 2052

## 2022-02-16 NOTE — PROGRESS NOTES
Pharmacy called to relay information about drug Antizol not being available until order comes in to Pharmacy today. Will send as soon it is available.

## 2022-02-16 NOTE — ED NOTES
At 2052 on 2/15/22 it's noted that Dr. Emilee Zendejas wants the pt observed for 24 hours. Please call the [facility] ([phone]) after 2052 today, if pt is medically cleared to proceed with reviewing for admission to psych.        Deborha Dandy, [facility]  02/16/22 9972

## 2022-02-16 NOTE — PROGRESS NOTES
Subjective: The patient is awake and alert. Lying in bed without complaints  Patient asking if she is going to be able to make it to chemo on Friday  No acute events overnight. Denies chest pain, angina, SOB     Objective:    BP 92/64   Pulse 86   Temp 98.1 °F (36.7 °C) (Oral)   Resp 14   Ht 6' 1\" (1.854 m)   Wt 146 lb 13.2 oz (66.6 kg)   LMP 08/13/2013   SpO2 95%   BMI 19.37 kg/m²     In: 641 [I.V.:641]  Out: 4550   In: 641   Out: 8660 [Urine:4550]    General appearance: NAD, conversant  HEENT: AT/NC, MMM  Neck: FROM, supple  Lungs: Clear to auscultation  CV: RRR, no MRGs  Vasc: Radial pulses 2+  Abdomen: Soft, non-tender; no masses or HSM  Extremities: No peripheral edema or digital cyanosis  Skin: no rash, lesions or ulcers  Psych: Alert and oriented to person, place and time  Neuro: Alert and interactive     Recent Labs     02/14/22  1425 02/15/22  0520   WBC 6.2 5.5   HGB 10.5* 10.5*   HCT 32.8* 33.2*    192       Recent Labs     02/14/22  1425 02/15/22  0020 02/15/22  0520    138 138   K 4.1 3.8 4.0   CL 98 106 105   CO2 22 23 23   BUN 6 4* 4*   CREATININE 0.7 0.7 0.7   CALCIUM 8.8 8.7 9.1       Assessment:    Principal Problem:    Intentional acetaminophen overdose (HCC)  Active Problems:    Diabetes mellitus type 2, uncontrolled (HCC)    Ethylene glycol poisoning  Resolved Problems:    * No resolved hospital problems. *      Plan:  Patient was admitted to ICU for Tylenol glycol overdose with questionable Xanax overdose as well.     IV hydration  Monitor acetaminophen levels  Discontinue Antizol monitor labs daily until transfer to psych   Sitter at bedside  Patient is pink slipped and denied suicidal ideation  Sup[plemnt mag    Patient is medically cleared to transfer to the psychiatric unit    DVT Prophylaxis   PT/OT,   Discharge [address], APRN - CNP  1:20 PM  2/16/2022     Above note edited to reflect my thoughts     I personally saw, examined and provided care for the patient. Radiographs, labs and medication list were reviewed by me independently. The case was discussed in detail and plans for care were established. Review of Adry HARVEY- CNP, documentation was conducted and revisions were made as appropriate directly by me. I agree with the above documented exam, problem list, and plan of care.      Taras Larios MD  9:30 PM  2/16/2022

## 2022-02-17 VITALS
SYSTOLIC BLOOD PRESSURE: 117 MMHG | RESPIRATION RATE: 16 BRPM | TEMPERATURE: 98.4 F | WEIGHT: 151.5 LBS | HEIGHT: 72 IN | HEART RATE: 93 BPM | BODY MASS INDEX: 20.52 KG/M2 | DIASTOLIC BLOOD PRESSURE: 75 MMHG | OXYGEN SATURATION: 97 %

## 2022-02-17 LAB
MAGNESIUM: 1.6 MG/DL (ref 1.6–2.6)
METER GLUCOSE: 100 MG/DL (ref 74–99)
METER GLUCOSE: 128 MG/DL (ref 74–99)
METER GLUCOSE: 129 MG/DL (ref 74–99)
METER GLUCOSE: 143 MG/DL (ref 74–99)
PHOSPHORUS: 3.6 MG/DL (ref 2.5–4.5)
SARS-COV-2, NAAT: NOT DETECTED

## 2022-02-17 PROCEDURE — 6360000002 HC RX W HCPCS: Performed by: PHYSICIAN ASSISTANT

## 2022-02-17 PROCEDURE — 1200000000 HC SEMI PRIVATE

## 2022-02-17 PROCEDURE — 82962 GLUCOSE BLOOD TEST: CPT

## 2022-02-17 PROCEDURE — 87635 SARS-COV-2 COVID-19 AMP PRB: CPT

## 2022-02-17 PROCEDURE — 6370000000 HC RX 637 (ALT 250 FOR IP): Performed by: NURSE PRACTITIONER

## 2022-02-17 PROCEDURE — 6370000000 HC RX 637 (ALT 250 FOR IP)

## 2022-02-17 PROCEDURE — 83735 ASSAY OF MAGNESIUM: CPT

## 2022-02-17 PROCEDURE — 94640 AIRWAY INHALATION TREATMENT: CPT

## 2022-02-17 PROCEDURE — 6370000000 HC RX 637 (ALT 250 FOR IP): Performed by: FAMILY MEDICINE

## 2022-02-17 PROCEDURE — 84100 ASSAY OF PHOSPHORUS: CPT

## 2022-02-17 PROCEDURE — 6360000002 HC RX W HCPCS: Performed by: EMERGENCY MEDICINE

## 2022-02-17 PROCEDURE — 6370000000 HC RX 637 (ALT 250 FOR IP): Performed by: PHYSICIAN ASSISTANT

## 2022-02-17 PROCEDURE — 36415 COLL VENOUS BLD VENIPUNCTURE: CPT

## 2022-02-17 RX ORDER — NICOTINE 21 MG/24HR
1 PATCH, TRANSDERMAL 24 HOURS TRANSDERMAL DAILY
Status: DISCONTINUED | OUTPATIENT
Start: 2022-02-17 | End: 2022-02-18 | Stop reason: HOSPADM

## 2022-02-17 RX ORDER — LANOLIN ALCOHOL/MO/W.PET/CERES
50 CREAM (GRAM) TOPICAL EVERY 6 HOURS
Status: DISCONTINUED | OUTPATIENT
Start: 2022-02-17 | End: 2022-02-18 | Stop reason: HOSPADM

## 2022-02-17 RX ADMIN — PYRIDOXINE HCL TAB 50 MG 50 MG: 50 TAB at 17:51

## 2022-02-17 RX ADMIN — PYRIDOXINE HYDROCHLORIDE 50 MG: 100 INJECTION, SOLUTION INTRAMUSCULAR; INTRAVENOUS at 00:19

## 2022-02-17 RX ADMIN — ALBUTEROL SULFATE 2.5 MG: 2.5 SOLUTION RESPIRATORY (INHALATION) at 13:47

## 2022-02-17 RX ADMIN — AMITRIPTYLINE HYDROCHLORIDE 50 MG: 25 TABLET, FILM COATED ORAL at 21:32

## 2022-02-17 RX ADMIN — PYRIDOXINE HYDROCHLORIDE 50 MG: 100 INJECTION, SOLUTION INTRAMUSCULAR; INTRAVENOUS at 05:58

## 2022-02-17 RX ADMIN — INSULIN LISPRO 1 UNITS: 100 INJECTION, SOLUTION INTRAVENOUS; SUBCUTANEOUS at 12:01

## 2022-02-17 RX ADMIN — ACETAMINOPHEN 650 MG: 325 TABLET ORAL at 19:00

## 2022-02-17 RX ADMIN — ENOXAPARIN SODIUM 40 MG: 100 INJECTION SUBCUTANEOUS at 09:51

## 2022-02-17 RX ADMIN — CHOLESTYRAMINE 4 G: 4 POWDER, FOR SUSPENSION ORAL at 17:51

## 2022-02-17 RX ADMIN — MIRTAZAPINE 15 MG: 15 TABLET, ORALLY DISINTEGRATING ORAL at 21:32

## 2022-02-17 RX ADMIN — CHOLESTYRAMINE 4 G: 4 POWDER, FOR SUSPENSION ORAL at 09:50

## 2022-02-17 RX ADMIN — THIAMINE HYDROCHLORIDE 100 MG: 100 INJECTION, SOLUTION INTRAMUSCULAR; INTRAVENOUS at 09:51

## 2022-02-17 RX ADMIN — ANASTROZOLE 1 MG: 1 TABLET ORAL at 09:50

## 2022-02-17 RX ADMIN — FAMOTIDINE 20 MG: 20 TABLET ORAL at 09:51

## 2022-02-17 ASSESSMENT — PAIN SCALES - GENERAL
PAINLEVEL_OUTOF10: 0
PAINLEVEL_OUTOF10: 4

## 2022-02-17 NOTE — ED NOTES
Charity Das at [place] [address] to provide report number to [facility] on 7W in order to present patient for possible transfer to [facility]. If patient accepted, patient room number would be 7310B.       Efraín Lockhart, MAIA  02/17/22 1418

## 2022-02-17 NOTE — CARE COORDINATION
Navigator received notification that patient signed voluntary. Notified to have staff fax to the [facility] AN [facility]FFILIATE OF [facility] [phone]. Updated [address], psych team, & [facility].     Electronically signed by JULIO Hernandez, SONALW on 2/17/2022 at 1:11 PM

## 2022-02-17 NOTE — ED NOTES
Pt accepted by Dr. Lowery Simpler to [address], bed 7310B    Glenis Yifan, [place] notified on [address] notified in admitting     [phone]         JULIO Menezes, [place]  02/17/22 [phone]

## 2022-02-17 NOTE — PROGRESS NOTES
Spoke to access center regarding transfer to main. States no transfer was yet initiated. Called psych unit downtown to touch base; states they will call access line to initiate transfer now. Nurse to nurse already completed by primary RN.

## 2022-02-17 NOTE — CARE COORDINATION
Navigator actively following patient case. Working with [facility] [employer] on appropriate course of action. Patient should be presented a voluntary application for admission, if patient declines, Navigator will coordinate with psych team regarding filing an affidavit for inpatient treatment. Updated NP Yadira Armstrong. Waiting on return call regarding if patient is agreeable.     Electronically signed by JULIO Coronado LSW on 2/17/2022 at 12:12 PM

## 2022-02-17 NOTE — PROGRESS NOTES
Updated Adolfo Mack NP on status of pink slip, aware. Page out to Dr. Wilbert Wakefield to update as well, awaiting response. Call to [facility] to touch base on admission status, states they will speak to social work regarding case and return call.

## 2022-02-17 NOTE — PROGRESS NOTES
Subjective: The patient is awake and alert. Lying in bed without complaints  Patient asking if she is going to be able to make it to chemo on Friday. Discussed her situation and that she is pink slipped and she is going to be transferred to [facility] psychiatric unit. No acute events overnight. Denies chest pain, angina, SOB     Objective:    /76   Pulse 85   Temp 97.8 °F (36.6 °C) (Oral)   Resp 16   Ht 6' 1\" (1.854 m)   Wt 146 lb 13.2 oz (66.6 kg)   LMP 08/13/2013   SpO2 95%   BMI 19.37 kg/m²     In: 2514.6 [I.V.:2314.9]  Out: 1800   In: 2514.6   Out: 1800 [Urine:1800]    General appearance: NAD, conversant  HEENT: AT/NC, MMM  Neck: FROM, supple  Lungs: Clear to auscultation  CV: RRR, no MRGs  Vasc: Radial pulses 2+  Abdomen: Soft, non-tender; no masses or HSM  Extremities: No peripheral edema or digital cyanosis  Skin: no rash, lesions or ulcers  Psych: Alert and oriented to person, place and time  Neuro: Alert and interactive     Recent Labs     02/14/22  1425 02/15/22  0520   WBC 6.2 5.5   HGB 10.5* 10.5*   HCT 32.8* 33.2*    192       Recent Labs     02/14/22  1425 02/15/22  0020 02/15/22  0520    138 138   K 4.1 3.8 4.0   CL 98 106 105   CO2 22 23 23   BUN 6 4* 4*   CREATININE 0.7 0.7 0.7   CALCIUM 8.8 8.7 9.1       Assessment:    Principal Problem:    Intentional acetaminophen overdose (HCC)  Active Problems:    Diabetes mellitus type 2, uncontrolled (HCC)    Ethylene glycol poisoning  Resolved Problems:    * No resolved hospital problems. *      Plan:  Patient was admitted to ICU for Tylenol glycol overdose with questionable Xanax overdose as well.     IV hydration - discontinue  Monitor acetaminophen levels - >5   Discontinue Antizol monitor labs daily until transfer to psych   Sitter at bedside  Patient is pink slipped and denied suicidal ideation  Supplemnt mag    Patient is medically cleared to transfer to the psychiatric unit    DVT Prophylaxis   PT/OT, Discharge planning     [address], APRN - CNP  9:13 AM  2/17/2022     Above note edited to reflect my thoughts   She is tolerating p.o. intake well on my evaluation  No acute complaints of nausea vomiting abdominal pain  Acetaminophen level within normal limit  She is at her baseline mentation  Medically cleared for discharge to inpatient psych    I personally saw, examined and provided care for the patient. Radiographs, labs and medication list were reviewed by me independently. The case was discussed in detail and plans for care were established. Review of Adry Harmon APRN- CNP, documentation was conducted and revisions were made as appropriate directly by me. I agree with the above documented exam, problem list, and plan of care.      Esperanza Simon MD  7:38 PM  2/17/2022

## 2022-02-17 NOTE — PROGRESS NOTES
Occupational Therapy      Occupational Therapy referral received.    No skilled OT needs at this time   Patient xferring to [facility] ([facility]) when bed available   OT order discontinued

## 2022-02-18 ENCOUNTER — HOSPITAL ENCOUNTER (OUTPATIENT)
Dept: INFUSION THERAPY | Age: 57
End: 2022-02-18

## 2022-02-18 ENCOUNTER — HOSPITAL ENCOUNTER (INPATIENT)
Age: 57
LOS: 6 days | Discharge: HOME | DRG: 751 | End: 2022-02-24
Attending: PSYCHIATRY & NEUROLOGY | Admitting: PSYCHIATRY & NEUROLOGY
Payer: MEDICAID

## 2022-02-18 PROBLEM — F39 MOOD DISORDER (HCC): Status: ACTIVE | Noted: 2022-02-18

## 2022-02-18 PROBLEM — F32.2 CURRENT SEVERE EPISODE OF MAJOR DEPRESSIVE DISORDER WITHOUT PSYCHOTIC FEATURES WITHOUT PRIOR EPISODE (HCC): Status: RESOLVED | Noted: 2022-02-18 | Resolved: 2022-02-18

## 2022-02-18 PROBLEM — F32.2 CURRENT SEVERE EPISODE OF MAJOR DEPRESSIVE DISORDER WITHOUT PSYCHOTIC FEATURES WITHOUT PRIOR EPISODE (HCC): Status: ACTIVE | Noted: 2022-02-18

## 2022-02-18 PROBLEM — F33.2 MAJOR DEPRESSIVE DISORDER, RECURRENT EPISODE, SEVERE WITH MIXED FEATURES (HCC): Status: ACTIVE | Noted: 2022-02-18

## 2022-02-18 PROBLEM — F32.A DEPRESSION: Status: ACTIVE | Noted: 2022-02-18

## 2022-02-18 LAB — METER GLUCOSE: 212 MG/DL (ref 74–99)

## 2022-02-18 PROCEDURE — 99255 IP/OBS CONSLTJ NEW/EST HI 80: CPT | Performed by: INTERNAL MEDICINE

## 2022-02-18 PROCEDURE — 6370000000 HC RX 637 (ALT 250 FOR IP): Performed by: PSYCHIATRY & NEUROLOGY

## 2022-02-18 PROCEDURE — S5553 INSULIN LONG ACTING 5 U: HCPCS | Performed by: NURSE PRACTITIONER

## 2022-02-18 PROCEDURE — 99222 1ST HOSP IP/OBS MODERATE 55: CPT | Performed by: NURSE PRACTITIONER

## 2022-02-18 PROCEDURE — 6370000000 HC RX 637 (ALT 250 FOR IP): Performed by: NURSE PRACTITIONER

## 2022-02-18 PROCEDURE — 1240000000 HC EMOTIONAL WELLNESS R&B

## 2022-02-18 PROCEDURE — 82962 GLUCOSE BLOOD TEST: CPT

## 2022-02-18 RX ORDER — FAMOTIDINE 20 MG/1
20 TABLET, FILM COATED ORAL DAILY
Status: DISCONTINUED | OUTPATIENT
Start: 2022-02-18 | End: 2022-02-24 | Stop reason: HOSPADM

## 2022-02-18 RX ORDER — ANASTROZOLE 1 MG/1
1 TABLET ORAL DAILY
Status: DISCONTINUED | OUTPATIENT
Start: 2022-02-18 | End: 2022-02-24

## 2022-02-18 RX ORDER — HALOPERIDOL 5 MG/ML
3 INJECTION INTRAMUSCULAR EVERY 6 HOURS PRN
Status: DISCONTINUED | OUTPATIENT
Start: 2022-02-18 | End: 2022-02-24 | Stop reason: HOSPADM

## 2022-02-18 RX ORDER — DIVALPROEX SODIUM 125 MG/1
250 CAPSULE, COATED PELLETS ORAL EVERY 12 HOURS SCHEDULED
Status: DISCONTINUED | OUTPATIENT
Start: 2022-02-18 | End: 2022-02-24 | Stop reason: HOSPADM

## 2022-02-18 RX ORDER — ACETAMINOPHEN 325 MG/1
650 TABLET ORAL EVERY 4 HOURS PRN
Status: DISCONTINUED | OUTPATIENT
Start: 2022-02-18 | End: 2022-02-24 | Stop reason: HOSPADM

## 2022-02-18 RX ORDER — LIDOCAINE AND PRILOCAINE 25; 25 MG/G; MG/G
CREAM TOPICAL PRN
Status: DISCONTINUED | OUTPATIENT
Start: 2022-02-18 | End: 2022-02-24 | Stop reason: HOSPADM

## 2022-02-18 RX ORDER — TRAZODONE HYDROCHLORIDE 50 MG/1
50 TABLET ORAL NIGHTLY PRN
Status: DISCONTINUED | OUTPATIENT
Start: 2022-02-18 | End: 2022-02-24 | Stop reason: HOSPADM

## 2022-02-18 RX ORDER — PROCHLORPERAZINE MALEATE 10 MG
10 TABLET ORAL EVERY 6 HOURS PRN
Status: DISCONTINUED | OUTPATIENT
Start: 2022-02-18 | End: 2022-02-24 | Stop reason: HOSPADM

## 2022-02-18 RX ORDER — HYDROXYZINE HYDROCHLORIDE 10 MG/1
50 TABLET, FILM COATED ORAL 3 TIMES DAILY PRN
Status: DISCONTINUED | OUTPATIENT
Start: 2022-02-18 | End: 2022-02-24 | Stop reason: HOSPADM

## 2022-02-18 RX ORDER — ATENOLOL 25 MG/1
25 TABLET ORAL DAILY
Status: DISCONTINUED | OUTPATIENT
Start: 2022-02-18 | End: 2022-02-24 | Stop reason: HOSPADM

## 2022-02-18 RX ORDER — HALOPERIDOL 2 MG/1
3 TABLET ORAL EVERY 6 HOURS PRN
Status: DISCONTINUED | OUTPATIENT
Start: 2022-02-18 | End: 2022-02-24 | Stop reason: HOSPADM

## 2022-02-18 RX ORDER — ATENOLOL 25 MG/1
25 TABLET ORAL DAILY
COMMUNITY

## 2022-02-18 RX ORDER — LANOLIN ALCOHOL/MO/W.PET/CERES
3 CREAM (GRAM) TOPICAL NIGHTLY
Status: DISCONTINUED | OUTPATIENT
Start: 2022-02-18 | End: 2022-02-24 | Stop reason: HOSPADM

## 2022-02-18 RX ORDER — PREGABALIN 75 MG/1
75 CAPSULE ORAL DAILY
Status: DISCONTINUED | OUTPATIENT
Start: 2022-02-18 | End: 2022-02-24 | Stop reason: HOSPADM

## 2022-02-18 RX ORDER — CHOLESTYRAMINE 4 G/9G
1 POWDER, FOR SUSPENSION ORAL 2 TIMES DAILY
Status: DISCONTINUED | OUTPATIENT
Start: 2022-02-18 | End: 2022-02-24 | Stop reason: HOSPADM

## 2022-02-18 RX ORDER — ALBUTEROL SULFATE 2.5 MG/3ML
2.5 SOLUTION RESPIRATORY (INHALATION) EVERY 4 HOURS PRN
Status: DISCONTINUED | OUTPATIENT
Start: 2022-02-18 | End: 2022-02-24 | Stop reason: HOSPADM

## 2022-02-18 RX ORDER — MAGNESIUM HYDROXIDE/ALUMINUM HYDROXICE/SIMETHICONE 120; 1200; 1200 MG/30ML; MG/30ML; MG/30ML
30 SUSPENSION ORAL PRN
Status: DISCONTINUED | OUTPATIENT
Start: 2022-02-18 | End: 2022-02-24 | Stop reason: HOSPADM

## 2022-02-18 RX ORDER — ASCORBIC ACID 500 MG
250 TABLET ORAL DAILY
Status: DISCONTINUED | OUTPATIENT
Start: 2022-02-18 | End: 2022-02-24 | Stop reason: HOSPADM

## 2022-02-18 RX ORDER — INSULIN GLARGINE-YFGN 100 [IU]/ML
30 INJECTION, SOLUTION SUBCUTANEOUS NIGHTLY
Status: DISCONTINUED | OUTPATIENT
Start: 2022-02-18 | End: 2022-02-24 | Stop reason: HOSPADM

## 2022-02-18 RX ORDER — ONDANSETRON 4 MG/1
4 TABLET, FILM COATED ORAL EVERY 8 HOURS PRN
Status: DISCONTINUED | OUTPATIENT
Start: 2022-02-18 | End: 2022-02-24 | Stop reason: HOSPADM

## 2022-02-18 RX ORDER — ESCITALOPRAM OXALATE 10 MG/1
5 TABLET ORAL DAILY
Status: DISCONTINUED | OUTPATIENT
Start: 2022-02-18 | End: 2022-02-24 | Stop reason: HOSPADM

## 2022-02-18 RX ADMIN — PREGABALIN 75 MG: 75 CAPSULE ORAL at 15:18

## 2022-02-18 RX ADMIN — INSULIN GLARGINE-YFGN 30 UNITS: 100 INJECTION, SOLUTION SUBCUTANEOUS at 21:04

## 2022-02-18 RX ADMIN — HYDROXYZINE HYDROCHLORIDE 50 MG: 10 TABLET ORAL at 16:50

## 2022-02-18 RX ADMIN — Medication 400 MG: at 21:02

## 2022-02-18 RX ADMIN — DIVALPROEX SODIUM 250 MG: 125 CAPSULE, COATED PELLETS ORAL at 21:02

## 2022-02-18 RX ADMIN — PSYLLIUM HUSK 1 PACKET: 3.4 POWDER ORAL at 15:21

## 2022-02-18 RX ADMIN — CHOLESTYRAMINE 4 G: 4 POWDER, FOR SUSPENSION ORAL at 15:22

## 2022-02-18 RX ADMIN — METFORMIN HYDROCHLORIDE 500 MG: 500 TABLET ORAL at 17:50

## 2022-02-18 RX ADMIN — ATENOLOL 25 MG: 25 TABLET ORAL at 15:20

## 2022-02-18 RX ADMIN — ESCITALOPRAM 5 MG: 10 TABLET, FILM COATED ORAL at 15:18

## 2022-02-18 RX ADMIN — FAMOTIDINE 20 MG: 20 TABLET ORAL at 15:21

## 2022-02-18 RX ADMIN — ACETAMINOPHEN 650 MG: 325 TABLET ORAL at 15:30

## 2022-02-18 RX ADMIN — OXYCODONE HYDROCHLORIDE AND ACETAMINOPHEN 250 MG: 500 TABLET ORAL at 15:19

## 2022-02-18 RX ADMIN — DIVALPROEX SODIUM 250 MG: 125 CAPSULE, COATED PELLETS ORAL at 12:06

## 2022-02-18 RX ADMIN — CHOLESTYRAMINE 4 G: 4 POWDER, FOR SUSPENSION ORAL at 21:02

## 2022-02-18 RX ADMIN — Medication 3 MG: at 21:02

## 2022-02-18 RX ADMIN — ANASTROZOLE 1 MG: 1 TABLET ORAL at 15:21

## 2022-02-18 RX ADMIN — Medication 400 MG: at 15:21

## 2022-02-18 ASSESSMENT — SLEEP AND FATIGUE QUESTIONNAIRES
AVERAGE NUMBER OF SLEEP HOURS: 6
RESTFUL SLEEP: NO
DIFFICULTY FALLING ASLEEP: YES
DO YOU USE A SLEEP AID: YES
DIFFICULTY ARISING: NO
DIFFICULTY ARISING: NO
DIFFICULTY FALLING ASLEEP: NO
DIFFICULTY STAYING ASLEEP: NO
DO YOU HAVE DIFFICULTY SLEEPING: NO
SLEEP PATTERN: NORMAL
RESTFUL SLEEP: YES
AVERAGE NUMBER OF SLEEP HOURS: 6
DIFFICULTY STAYING ASLEEP: YES
DO YOU HAVE DIFFICULTY SLEEPING: YES
SLEEP PATTERN: DIFFICULTY FALLING ASLEEP;DISTURBED/INTERRUPTED SLEEP;RESTLESSNESS
DO YOU USE A SLEEP AID: YES

## 2022-02-18 ASSESSMENT — PATIENT HEALTH QUESTIONNAIRE - PHQ9
SUM OF ALL RESPONSES TO PHQ QUESTIONS 1-9: 12
SUM OF ALL RESPONSES TO PHQ QUESTIONS 1-9: 1

## 2022-02-18 ASSESSMENT — LIFESTYLE VARIABLES
HISTORY_ALCOHOL_USE: NO
HISTORY_ALCOHOL_USE: NO

## 2022-02-18 ASSESSMENT — PAIN SCALES - GENERAL
PAINLEVEL_OUTOF10: 7
PAINLEVEL_OUTOF10: 0
PAINLEVEL_OUTOF10: 0

## 2022-02-18 NOTE — PROGRESS NOTES
[facility]  Admission Note     Patient white female 60 yo direct admit from [facility] voluntary after patient was involuntary for 3 days there for OD on Tylenol, xanax, and antifreeze. Patient was diagnosed approximately 1 year ago with breast ca. and is currently undergoing chemotherapy treatments her oncologist is Dr. Rina Milligan with treatment once q 3 weeks has an appointment scheduled for 2-18-22. Patient also has hx of DM, chronic back pain, HTN, Neuropathy and footdrop in which she uses a cane, walker or wheelchair at home and also has a splint for the R foot. Patient also has a Mediport on the left side of her chest that is not accessed. Patient has been seen as outpatient for counseling. Patient states that she is not suicidal or having suicidal ideations at this time and commits to safety while on the unit.       Admission Type:   Admission Type: Voluntary    Reason for admission:  Reason for Admission: depression SA    PATIENT STRENGTHS:  Strengths: Communication,Connection to output provider,Medication Compliance,Positive Support,Social Skills    Patient Strengths and Limitations:  Limitations: General negative or hopeless attitude about future/recovery,Hopeless about future    Addictive Behavior:   Addictive Behavior  In the past 3 months, have you felt or has someone told you that you have a problem with:  : None  Do you have a history of Chemical Use?: No  Do you have a history of Alcohol Use?: No  Do you have a history of Street Drug Abuse?: No  Histroy of Prescripton Drug Abuse?: No    Medical Problems:   Past Medical History:   Diagnosis Date    Anxiety     Arthritis     Cancer ([facility] 2021    breast    Cervical radiculopathy     Chronic back pain     Dehydration 9/23/2021    Dehydration 9/23/2021    Depression     [facility]    GERD (gastroesophageal reflux disease)     History of blood transfusion 01/2018    back surgery, lumbar,  dao    Hypertension     Neuropathy     Right leg pain     seeing doctor currently problems with hardware       Status EXAM:  Status and Exam  Facial Expression: Sad  Affect: Appropriate  Level of Consciousness: Alert  Mood:Normal: No  Mood: Depressed,Anxious,Sad  Motor Activity:Normal: Yes  Interview Behavior: Cooperative  Preception: [place] to Person,[place] to Time,[place] to Place,[place] to Situation  Attention:Normal: Yes  Thought Processes: Circumstantial  Thought Content:Normal: Yes  Hallucinations: None  Delusions: No  Memory:Normal: Yes  Insight and Judgment: No  Insight and Judgment: Poor Judgment,Poor Insight  Present Suicidal Ideation: No  Present Homicidal Ideation: No    Tobacco Screening:  Practical Counseling, on admission, rupert X, if applicable and completed (first 3 are required if patient doesn't refuse):             (x )  Recognizing danger situations (included triggers and roadblocks)                    (x )  Coping skills (new ways to manage stress, exercise, relaxation techniques, changing routine, distraction)                                                           (x )  Basic information about quitting (benefits of quitting, techniques in how to quit, available resources  ( ) Referral for counseling faxed to [facility]                                           (x ) Patient refused counseling  ( ) Patient has not smoked in the last 30 days    Metabolic Screening:    Lab Results   Component Value Date    LABA1C 6.2 (H) 02/07/2022    LABA1C 6.3 (H) 02/07/2022       Lab Results   Component Value Date    CHOL 168 03/20/2012    CHOL 152 09/29/2011    CHOL 181 08/08/2011     Lab Results   Component Value Date    TRIG 129 03/20/2012    TRIG 117 09/29/2011    TRIG 194 (H) 08/08/2011     Lab Results   Component Value Date    HDL 59.0 03/20/2012    HDL 59.5 09/29/2011    HDL 59.0 08/08/2011     No components found for: LDLCAL  No results found for: LABVLDL      Body mass index is 19.92 kg/m².    BP Readings from Last 2 Encounters:   02/18/22 139/84   02/17/22 117/75           Pt admitted with followings belongings:  Dental Appliances: None  Vision - Corrective Lenses: None  Hearing Aid: None  Jewelry: None  Body Piercings Removed: N/A  Clothing: Footwear,Pants,Socks (pr socks, jeans, 1 shoe)  Were All Patient Medications Collected?: Not Applicable  Other Valuables: Cell phone      Patient oriented to surroundings and program expectations and copy of patient rights given. Received admission packet:  yes. Consents reviewed, signed yes. Patient verbalize understanding:  yes.   Patient education on precautions: yes                   Darryle Glad, RN

## 2022-02-18 NOTE — PROGRESS NOTES
Call to police dispatch regarding pt's belongings collected in ER (valuables receipt in soft chart.) Dispatch states they will look further into this, but believe that belongings were immediately returned to pt in ER prior to admission. Awaiting call back to confirm.

## 2022-02-18 NOTE — PLAN OF CARE
Patient white female 62 [facility] after patient was involuntary for 3 days there for OD on Tylenol, xanax, and antifreeze. Patient was diagnosed approximately 1 year ago with breast ca. and is currently undergoing chemotherapy treatments her oncologist is Dr. Pravin Vrama with treatment once q 3 weeks has an appointment scheduled for 2-18-22. Patient also has hx of DM, chronic back pain, HTN, Neuropathy and footdrop in which she uses a cane, walker or wheelchair at home and also has a splint for the R foot. Patient also has a Mediport on the left side of her chest that is not accessed. Patient has been seen as outpatient for counseling. Patient states that she is not suicidal or having suicidal ideations at this time and commits to safety while on the unit. Will continue to monitor and assess q 15 min for safety throughout the shift.          Problem: Altered Mood, Depressive Behavior:  Goal: Able to verbalize acceptance of life and situations over which he or she has no control  Description: Able to verbalize acceptance of life and situations over which he or she has no control  Outcome: Ongoing  Goal: Able to verbalize and/or display a decrease in depressive symptoms  Description: Able to verbalize and/or display a decrease in depressive symptoms  Outcome: Ongoing  Goal: Ability to disclose and discuss suicidal ideas will improve  Description: Ability to disclose and discuss suicidal ideas will improve  Outcome: Ongoing  Goal: Able to verbalize support systems  Description: Able to verbalize support systems  Outcome: Ongoing  Goal: Absence of self-harm  Description: Absence of self-harm  Outcome: Ongoing  Goal: Patient specific goal  Description: Patient specific goal  Outcome: Ongoing  Goal: Participates in care planning  Description: Participates in care planning  Outcome: Ongoing     Problem: Depressive Behavior With or Without Suicide Precautions:  Goal: Able to verbalize acceptance of life and situations over which he or she has no control  Description: Able to verbalize acceptance of life and situations over which he or she has no control  Outcome: Ongoing  Goal: Able to verbalize and/or display a decrease in depressive symptoms  Description: Able to verbalize and/or display a decrease in depressive symptoms  Outcome: Ongoing  Goal: Ability to disclose and discuss suicidal ideas will improve  Description: Ability to disclose and discuss suicidal ideas will improve  Outcome: Ongoing  Goal: Able to verbalize support systems  Description: Able to verbalize support systems  Outcome: Ongoing  Goal: Absence of self-harm  Description: Absence of self-harm  Outcome: Ongoing  Goal: Patient specific goal  Description: Patient specific goal  Outcome: Ongoing  Goal: Participates in care planning  Description: Participates in care planning  Outcome: Ongoing

## 2022-02-18 NOTE — PROGRESS NOTES
[facility] arrived for transport of patient to [facility]. Unit notified.

## 2022-02-18 NOTE — H&P
Department of Psychiatry  History and Physical - Adult   I have personally assessed the patient this morning and agree with the below assessment, evaluation and recommendations by the medical student. I have completed a mental status examination as follows Mental Status exam reveals a 62 yo female laying in bed in hospital gown with normal hygiene and grooming who is cooperative with examination and pleasant. Psychomotor reveals no agitation, retardation any other abnormal or odd posture. Speech was coherent,  normal volume and fast rate somewhat pressured. Eye contact is intense. Mood is \"tired\", affect is bright, exaggerated energetic and congruent with stated mood. Thought process is illogical and concrete without flights of ideas or looseness of association. Thought contents are devoid of auditory or visual hallucinations, delusion delusions or any other perceptual abnormalities. She does not appear overtly or covertly psychotic or paranoid . She denies suicidal or homicidal ideation, intent or plan, that the patient had significant suicide attempt earlier this week, in which she overdosed on Xanax, Tylenol 3 and drank antifreeze mixed with [facility] requiring ICU level of care for stabilization. Cognitive function appears to be below baseline. She is unable to do serial 7's, 3s or spell WORLD backwards. Recall is 3/3 at 3 minutes. Memory is overall good from the conversation. Insight and judgement are poor. Impulse control is poor. Alert and oriented to person place and time, though her statements regarding circumstances of hospitalization greatly differ from what is reported in her medical record. She is significantly minimizing the circumstances surrounding her hospitalization.     CHIEF COMPLAINT:  \"I have a lot going on\"    Patient was seen after discussing with the treatment team and reviewing the chart    CIRCUMSTANCES OF ADMISSION:   Arcelia Valencia was taken to the [facility] ER via ambulance four days ago after her daughter became concerned about her following a phone call. Her daughter lives in [place], [place] so she called the ambulance to check in on her. She says she took 5 acetaminophen and 5x xanax 2mg tablets to help her go to sleep. She was also found to have elevated levels of ethylene glycol. ER documentation reports: \"The maximum dose available to her for the Xanax is 15 mg and the maximum dose available to her for the Tylenol for is 3.6 g of codeine and 18 g of Tylenol. She then chased this with [facility] that was mixed with antifreeze. This occurred about an hour prior to arrival at 1300 today. EMS administered Narcan in route which resulted in some transient improvement of the patient's mental status. On arrival, the patient was somnolent but responsive. \"  She was admitted medically to ICU for medical stabilization, psychiatry was consulted due to suicide attempt. Once medically stabilized she was transferred to [address] [place] [address] & [address] [facility] for psychiatric evaluation and stabilization. HISTORY OF PRESENT ILLNESS:      The patient is a 61 y.o. unmarried with one daughter, previously homeless female with no previous inpatient hospitalizations, outpatient counseling with [address] [place] [address] who has been prescribed xanax and amitriptyline for anxiety and depression with significant past history of metastatic breast cancer currently receiving chemotherapy q3 weeks, DM, chronic back pain, HTN, neuropathy and foot drop. She was medically admitted 2/9/22 and discharged 2/13 for pneumonia. Carly Lozoya was taken to the [facility] ER via ambulance four days ago after her daughter became concerned about her following a phone call. Her daughter lives in [place], [place] so she called the ambulance to check in on her. She says she took 5 acetaminophen and 5x xanax 2mg tablets to help her go to sleep. She was also found to have elevated levels of ethylene glycol. ER documentation reports:  \"The maximum dose available to her for the Xanax is 15 mg and the maximum dose available to her for the Tylenol for is 3.6 g of codeine and 18 g of Tylenol. She then chased this with [facility] that was mixed with antifreeze. This occurred about an hour prior to arrival at 1300 today. EMS administered Narcan in route which resulted in some transient improvement of the patient's mental status. On arrival, the patient was somnolent but responsive. \" She was treated in the ICU for acetaminophen and ethylene glycol overdose. She was medically cleared, transferred to [address] for evaluation stabilization. QTc 486. Patient had significant suicide attempt that was planned with high lethality. The patients risk of self harm at this time outweighs the risk of possible interruption of her oncological care. Her oncologist has been notified of her psychiatric admission, and are following the patient. On assessment today she is cooperative with examination, pleasant and forthcoming with information. She states that she is here because she \"has a lot going on\". She was recently evicted from her sister's basement and had to live at a homeless shelter. She found an apartment through the [facility] and was living there at the date of admission. She says that she was feeling depressed the night before the ambulance came and took the xanax to help her go to sleep. She denies ingesting anything that could have included ethylene glycol such as antifreeze. She only recalls ingesting 5x tylenol pills or 5x xanax. She vehemently states that she had no intent of suicide with taking these medications. She reports that she was told by Alondra Espinal that she could take up to 25 mg Xanax and that she only took 10mg. She states that she has never had active or passive suicidal ideation intent or plan. She states that she has too much to live for including wanting to be there for her granddaughter.  She states she wouldn't be undergoing chemotherapy if she didn't want to live. She denies HI. Depression screen was positive for depressed mood, decreased energy and decreased apetite, but denies all others. Regina ROS was negative for elevated mood, impulsivity, flight of ideas, agitation, change in sleep or being excessively talkative. She denies symptoms of psychosis including AVH, paranoia or receiving messages from the TV or radio. She denies Cluster B symptoms of impulsivity, feeling empty inside, easily abandoned, cutting, volatile relationships. She was cooperative with poor insight and judgement, memory appears to be at baseline, cognitive function is below baseline, she is oriented to person place and time. Past Psych History:  She has never had an inpatient hospitalization. She follow outpatient at [facility]. She has an appointment scheduled for Tuesday. She takes xanax and amitriptyline for anxiety and depression. Family Psych Hx:  No significant medical history of psychiatric illness including suicide, depression, bipolar schizophrenia. Legal Hx:  She denies any arrest, detention or [facility] time. Substance abuse Hx:  She denies alcohol or any other drug use. She smokes tobacco currently trying to quit via nicotine patch. 15 pack years smoking. Personal Family and Social Hx:  She grew up in [place] and was raised by her mother. She had a good childhood. She finished HS and after HS worked many different jobs. She is not  but has one 30yo daughter who lives in [facility]. She is currently on disability income. She denies access to guns and has no history of physical, sexual or emotional abuse.      Past Medical History:        Diagnosis Date    Anxiety     Arthritis     Cancer ([facility] 2021    breast    Cervical radiculopathy     Chronic back pain     Dehydration 9/23/2021    Dehydration 9/23/2021    Depression     Diabetes mellitus type 2, uncontrolled ([facility] 2/12/2017    GERD (gastroesophageal reflux disease)     History of blood transfusion 01/2018    back surgery, lumbar, dr Renetta Jovel    Hypertension     Neuropathy     Right leg pain     seeing doctor currently problems with hardware       Medications Prior to Admission:   Medications Prior to Admission: atenolol (TENORMIN) 25 MG tablet, Take 25 mg by mouth daily  anastrozole (ARIMIDEX) 1 MG tablet, Take 1 tablet by mouth daily  Ascorbic Acid (VITAMIN C) 250 MG tablet, Take 250 mg by mouth daily  vitamin D (ERGOCALCIFEROL) 1.25 MG (99765 UT) CAPS capsule, Take 50,000 Units by mouth once a week  ondansetron (ZOFRAN) 4 MG tablet, take 1 tablet by mouth every 8 hours if needed for nausea and vomiting  acetaminophen-codeine (TYLENOL/CODEINE #3) 300-30 MG per tablet, Take 1 tablet by mouth every 4 hours as needed for Pain.  magnesium oxide (MAG-OX) 400 MG tablet, Take 1 tablet by mouth 2 times daily   lidocaine-prilocaine (EMLA) 2.5-2.5 % cream, Apply topically to port 30 minutes prior to chemotherapy. cholestyramine (QUESTRAN) 4 g packet, Take 1 packet by mouth 2 times daily  MELATONIN PO, Take 3 mg by mouth nightly   Multiple Vitamins-Minerals (HAIR SKIN AND NAILS FORMULA) TABS, Take by mouth STOP PREOP MED  mirtazapine (REMERON) 15 MG tablet, Take 15 mg by mouth nightly  ALPRAZolam (XANAX) 0.5 MG tablet, Take 0.5 mg by mouth nightly. pregabalin (LYRICA) 75 MG capsule, Take 75 mg by mouth daily.    famotidine (PEPCID) 20 MG tablet, Take 20 mg by mouth daily  LORATADINE-D 24HR  MG per extended release tablet, Take 1 tablet by mouth daily   LANTUS SOLOSTAR 100 UNIT/ML injection pen, Inject 30 Units into the skin nightly   metFORMIN (GLUCOPHAGE) 500 MG tablet, Take 500 mg by mouth 2 times daily   nicotine (NICODERM CQ) 21 MG/24HR, Place 1 patch onto the skin daily  prochlorperazine (COMPAZINE) 10 MG tablet, Take 1 tablet by mouth every 6 hours as needed (NAUSEA)  anastrozole (ARIMIDEX) 1 MG tablet, Take 1 tablet by mouth daily  prochlorperazine (COMPAZINE) 10 MG tablet, Take 1 tablet by mouth every 6 hours as needed (nausea)  psyllium (KONSYL) 28.3 % PACK, Take 1 packet by mouth daily  magnesium oxide (MAG-OX) 400 (240 Mg) MG tablet, Take 1 tablet by mouth 2 times daily  amitriptyline (ELAVIL) 50 MG tablet, Take 50 mg by mouth nightly   albuterol sulfate  (90 BASE) MCG/ACT inhaler, Inhale 2 puffs into the lungs every 6 hours as needed for Wheezing    Past Surgical History:        Procedure Laterality Date    BACK SURGERY  01/08/2018    PLIF L2-L5    BREAST BIOPSY Right 2/5/2021    RIGHT BREAST LUMPECTOMY WITH  sentinel LYMPHNODE biopsy performed by Davi Lai MD at [facility] Right 3/9/2021    RIGHT BREAST RE-EXCISION LUMPECTOMY OF INFERIOR AND MEDIAL MARGINS performed by Davi Lai MD at [address] [place] [address] Right 04/09/2017    Dr. Princess Vang  2008    dr hu anterior    CERVICAL FUSION  2015    dr Corona Days anterior    CERVICAL FUSION  04/25/16    ANTERIOR C4-C5, C6-C7 PLATES AND SCREWS    CHOLECYSTECTOMY  2000    COLONOSCOPY      patient did cologuard in 2018   [facility]  2013    dr Yumiko Baxter    heel spurs bilateral    HYSTERECTOMY  2013    dr Hill Plate partial    OTHER SURGICAL HISTORY Right 05/14/2018    removal of hardware repair nonunion right tibia/fibula    PORT SURGERY Left 2/5/2021    LEFT MEDI PORT INSERTION performed by Davi Lai MD at [facility] OFFICE/OUTPT VISIT,PROCEDURE ONLY Right 7/5/2018    REPAIR NON UNION FAILED FIXATION RIGHT DISTAL TIB / FIB PILON FX. WITH REMOVAL OF HARDWARE & O.R. I. F ------BOP performed by Sally Euceda MD at [address] [place] [address] Right 5/14/2018    REPAIR NON-UNION RIGHT TIBIA AND FIBULA WITH REMOVAL OF HARDWARE AND BONE GRAFT performed by Sally Euceda MD at [address] [place] [address] Right 1/10/2019    RIGHT TIBIA REMOVAL HARDWARE, RIGHT TIBIA OPEN  TREATMENT OF NON UNION performed by Bob Cordon MD at [facility]    WISDOM TOOTH EXTRACTION         Allergies:   Ceftriaxone    Family History  Family History   Problem Relation Age of Onset    Diabetes Mother        EXAMINATION:    REVIEW OF SYSTEMS:    ROS:  [x] All negative/unchanged except if checked. Explain positive(checked items) below:  [] Constitutional  [] Eyes  [] Ear/Nose/Mouth/Throat  [] Respiratory  [] CV  [] GI  []   [] Musculoskeletal  [] Skin/Breast  [] Neurological  [] Endocrine  [] Heme/Lymph  [] Allergic/Immunologic    Explanation:     Vitals:  /84   Pulse 99   Temp 97.1 °F (36.2 °C) (Temporal)   Resp 18   Ht 6' 1\" (1.854 m)   Wt 151 lb (68.5 kg)   LMP 08/13/2013   SpO2 94%   BMI 19.92 kg/m²      Physical Examination:   Head: x  Atraumatic: x normocephalic  Skin and Mucosa        Moist x  Dry   Pale  x Normal   Neck:  Thyroid  Palpable   x  Not palpable   venus distention   adenopathy   Chest: x Clear   Rhonchi     Wheezing   CV:  xS1   xS2    xNo murmer   Abdomen:  x  Soft    Tender    Viceromegaly   Extremities:  x No Edema     Edema     Cranial Nerves Examination:   CN II:   xPupils are reactive to light  Pupils are non reactive to light  CN III, IV, VI:  xNo eye deviation    No diplopia or ptosis   CN V:    xFacial Sensation is intact     Facial Sensation is not intact   CN IIIV:   x Hearing is normal to rubbing fingers   CN IX, X:     xNormal gag reflex and phonation   CN XI:   xShoulder shrug and neck rotation is normal  CNXII:    xTongue is midline no deviation or atrophy    Mental Status Examination:      Mental Status exam reveals a 62 yo female laying in bed in hospital gown with normal hygiene and grooming who is cooperative with examination and pleasant. Psychomotor reveals no agitation, retardation any other abnormal or odd posture. Speech was coherent,  normal volume and fast rate somewhat pressured. Eye contact is intense. Mood is \"tired\", affect is bright, exaggerated energetic and congruent with stated mood. Thought process is illogical and concrete without flights of ideas or looseness of association. Thought contents are devoid of auditory or visual hallucinations, delusion delusions or any other perceptual abnormalities. She does not appear overtly or covertly psychotic or paranoid . She denies suicidal or homicidal ideation, intent or plan, that the patient had significant suicide attempt earlier this week, in which she overdosed on Xanax, Tylenol 3 and drank antifreeze mixed with [facility] requiring ICU level of care for stabilization. Cognitive function appears to be below baseline. She is unable to do serial 7's, 3s or spell WORLD backwards. Recall is 3/3 at 3 minutes. Memory is overall good from the conversation. Insight and judgement are poor. Impulse control is poor. Alert and oriented to person place and time, though her statements regarding circumstances of hospitalization greatly differ from what is reported in her medical record. She is significantly minimizing the circumstances surrounding her hospitalization. DIAGNOSIS:  Major depressive disorder, recurrent episode, severe with mixed features ([facility]    LABS: REVIEWED TODAY:  No results for input(s): WBC, HGB, PLT in the last 72 hours. No results for input(s): NA, K, CL, CO2, BUN, CREATININE, GLUCOSE in the last 72 hours.   Recent Labs     02/15/22  1425   BILITOT 0.4   ALKPHOS 133*   AST 16   ALT 16     Lab Results   Component Value Date    ETOH <10 02/14/2022     Lab Results   Component Value Date    TSH 1.470 01/10/2018     No results found for: LITHIUM  No results found for: VALPROATE, CBMZ  No results found for: LITHIUM, VALPROATE      Radiology ECHO Limited    Result Date: 1/26/2022  Transthoracic Echocardiography Report (TTE)  Demographics   Patient Name        Wiliam Freeman Gender               Female   Medical Record [id number]       Room Number  Number   Account #           [de-identified]      Procedure Date       01/25/2022   Corporate ID                       Ordering Physician   Denzel Trevizo   Accession Number    [id number]     Referring Physician  Yasmine Cannon   Date of Birth       1965     Sonographer          Shane Flynn RDJAIDEN   Age                 61 year(s)     Interpreting         Bayron Alcala DO                                     Physician                                      Any Other  Procedure Type of Study   TTE procedure:Echo Limited Study. Procedure Date Date: 01/25/2022 Start: 01:14 PM Study Location: Echo Lab Technical Quality: Adequate visualization Indications:During Chemotherapy. Patient Status: Routine Height: 72 inches Weight: 148 pounds BSA: 1.87 m^2 BMI: 20.07 kg/m^2 BP: 122/58 mmHg  Findings   Left Ventricle  Normal left ventricle size and systolic function. Indeterminate diastolic function. No wall motion abnormalities. Ejection fraction is visually estimated at 60%. Avg GLS -17.9   Right Ventricle  Normal right ventricle structure and function. Left Atrium  Normal sized left atrium. Right Atrium  Normal right atrium size. Conclusions   Summary  Normal left ventricle size and systolic function. Indeterminate diastolic function. No wall motion abnormalities. Ejection fraction is visually estimated at 60%.   Avg GLS -17.9   Signature   ----------------------------------------------------------------  Electronically signed by Bayron Alcala DO (Interpreting  physician) on 01/26/2022 03:31 PM  ----------------------------------------------------------------  M-Mode/2D Measurements & Calculations   LV Diastolic      LV Systolic Dimension: 3 cm        LA Dimension: 2.1 cm  Dimension: 4.7 cm LV Volume Diastolic: 79.9 ml  LV OK:60.0 %      LV Volume Systolic: 21 ml  LV PW Diastolic:  LV EDV/LV EDV Index: 67.4 ml/36  0.5 cm            ml/m^2LV ESV/LV ESV Index: 21  LV PW Systolic: 1 JZ/47GX/ m^2  cm                EF Calculated: 68.8 %  Septum Diastolic: LV Mass Index: 32 l/min*m^2        LA volume/Index: 32.3  0.4 cm            LV Length: 7.5 cm                  ml /86FG/Z^2  Septum Systolic:  1 cm   LV Mass: 60.33 g  Doppler Measurements & Calculations   MV Peak E-Wave: 0.66 m/s  MV Peak A-Wave: 0.8 m/s  MV E/A Ratio: 0.82                                                 TR Velocity:2.13 m/s  MV Deceleration Time: 257.9 msec              TR Gradient:18.08 mmHg  [email or web address] [id number]    CT Head WO Contrast    Result Date: 2/6/2022  EXAMINATION: CT OF THE HEAD WITHOUT CONTRAST  2/6/2022 8:07 pm TECHNIQUE: CT of the head was performed without the administration of intravenous contrast. Dose modulation, iterative reconstruction, and/or weight based adjustment of the mA/kV was utilized to reduce the radiation dose to as low as reasonably achievable. COMPARISON: 02/12/2017 HISTORY: ORDERING SYSTEM PROVIDED HISTORY: dizziness TECHNOLOGIST PROVIDED HISTORY: Reason for exam:->dizziness Has a \"code stroke\" or \"stroke alert\" been called? ->No Decision Support Exception - unselect if not a suspected or confirmed emergency medical condition->Emergency Medical Condition (MA) FINDINGS: BRAIN/VENTRICLES: There is no acute intracranial hemorrhage, mass effect or midline shift. No abnormal extra-axial fluid collection. The gray-white differentiation is maintained without evidence of an acute infarct. There is no evidence of hydrocephalus. There are nonspecific hypoattenuating foci in the subcortical and periventricular white matter that most likely represent chronic microangiopathic ischemic changes in a patient of this age. ORBITS: The visualized portion of the orbits demonstrate no acute abnormality. SINUSES: The visualized paranasal sinuses and mastoid air cells demonstrate no acute abnormality. SOFT TISSUES/SKULL:  No acute abnormality of the visualized skull or soft tissues. No acute intracranial abnormality. RECOMMENDATIONS: Unavailable     XR CHEST PORTABLE    Result Date: 2/14/2022  EXAMINATION: ONE XRAY VIEW OF THE CHEST 2/14/2022 2:20 pm COMPARISON: CT dated 02/06/2022, chest radiograph of 06/21/2021 HISTORY: ORDERING SYSTEM PROVIDED HISTORY: SOB TECHNOLOGIST PROVIDED HISTORY: Reason for exam:->SOB FINDINGS: Heart is normal in size. A diminished inspiratory effort is noted with mild hazy parenchymal opacities in the periphery of the right mid chest and left lower lobe. No pneumothorax or pleural fluid. Implanted central venous access port on the left has its tip in the SVC/right atrium region. No complications. Previous lower anterior cervical disc fusion. Osseous structures are stable. Slightly increased left lower lobe lobe opacity compared to  film from 02/06/2022. Mild asymmetric lower lobe peripheral hazy parenchymal opacities compatible with pneumonia. CTA PULMONARY W CONTRAST    Result Date: 2/6/2022  EXAMINATION: CTA OF THE CHEST 2/6/2022 3:03 pm TECHNIQUE: CTA of the chest was performed after the administration of intravenous contrast.  Multiplanar reformatted images are provided for review. MIP images are provided for review. Dose modulation, iterative reconstruction, and/or weight based adjustment of the mA/kV was utilized to reduce the radiation dose to as low as reasonably achievable. COMPARISON: None. HISTORY: ORDERING SYSTEM PROVIDED HISTORY: ARIEL BHATT, breast cancer TECHNOLOGIST PROVIDED HISTORY: Reason for exam:->CP, SHOB, breast cancer Decision Support Exception - unselect if not a suspected or confirmed emergency medical condition->Emergency Medical Condition (MA) FINDINGS: Pulmonary Arteries: Pulmonary arteries are adequately opacified for evaluation. No evidence of intraluminal filling defect to suggest pulmonary embolism.   Main pulmonary artery is normal in caliber. Mediastinum: No evidence of mediastinal lymphadenopathy. The heart and pericardium demonstrate no acute abnormality. There is no acute abnormality of the thoracic aorta. Lungs/pleura: There are patchy peripheral infiltrate seen within the right upper lobe, right middle lobe and to a lesser extent within the left upper lobe. There is no pleural effusion or pleural thickening. There is no pneumothorax. Upper Abdomen: Limited images of the upper abdomen are unremarkable. Soft Tissues/Bones: No acute bone or soft tissue abnormality. 1. There is no pulmonary embolus 2. Small patchy bilateral ground-glass infiltrates more prominent within the lateral aspect of the base of the right upper lobe and superior aspect of the right middle lobe. The findings are consistent with pneumonia and have the appearance of early COVID pneumonia. FL MODIFIED BARIUM SWALLOW W VIDEO    Result Date: 2/8/2022  EXAMINATION: MODIFIED BARIUM SWALLOW WAS PERFORMED IN CONJUNCTION WITH SPEECH PATHOLOGY SERVICES TECHNIQUE: Fluoroscopic evaluation of the swallowing mechanism was performed using cineradiography with multiple consistency of barium product in conjunction with speech pathology services. FLUOROSCOPY DOSE AND TYPE OR TIME AND EXPOSURES: Fluoro time: 0.7 minutes TD: 6.74 M Gy D AP: 45.018 Gy cm square Images: A set of 6 cine fluoro images obtained COMPARISON: None HISTORY: ORDERING SYSTEM PROVIDED HISTORY: trouble swallowing TECHNOLOGIST PROVIDED HISTORY: Reason for exam:->trouble swallowing FINDINGS: Oral phase of swallowing was grossly within normal limits. No evidence of laryngeal penetration or aspiration. Patient had previous anterior fusion of the cervical spine. Swallowing mechanism grossly within normal limits without evidence of aspiration. Please see separate speech pathology report for full discussion of findings and recommendations.  RECOMMENDATIONS: Unavailable         TREATMENT PLAN:  The patient's diagnosis, treatment plan, medication management were formulated after patient was seen directly by the attending physician and myself and all relevant documentation was reviewed. The patient was referred to outpatient/inpatient substance abuse rehabilitation programming. She was educated multiple times during the hospitalization that if she chooses to continue to use drugs or alcohol, she may act out impulsively, resulting in serious harm to self or others even though unintentional.  She was also educated that mental health treatment cannot be optimized with ongoing use of drugs or alcohol she demonstrated understanding and has the capacity to understand that. Risk, benefit, side effects, possible outcomes of the medication and alternatives discussed with the patient and the patient demonstrated understanding. The patient was also educated that the outcome of treatment will depend on the medication compliance as directed by the prescribers along with regular follow-up, compliance with the labs and other work-up, as clinically indicated. Risk Management: Based on the diagnosis and assessment biopsychosocial treatment model was presented to the patient and was given the opportunity to ask any question. The patient was agreeable to the plan and all the patient's questions were answered to the patient's satisfaction. I discussed with the patient the risk, benefit, alternative and common side effects for the proposed medication treatment. The patient is consenting to this treatment. Collateral Information:  Will obtain collateral information from the family or friends. Will obtain medical records as appropriate from out patient providers  Will consult the hospitalist for a physical exam to rule out any co-morbid physical condition. Home medication Reconciled   Reviewed continued as clinically indicated  Will consult hemo/onc for recommendations and follow up Appreciate their help.    New Medications started during this admission :    Depakote sprinkle 250 mg twice daily for mood stabilization  Lexapro 5 mg daily will increase to 10 mg daily  Melatonin 3 mg nightly for sleep    Prn Haldol 5mg and Vistaril 50mg q6hr for extreme agitation. Trazodone as ordered for insomnia  Vistaril as ordered for anxiety      Psychotherapy:   Encourage participation in milieu and group therapy  Individual therapy as needed    NOTE: This report was transcribed using voice recognition software. Every effort was made to ensure accuracy; however, inadvertent computerized transcription errors may be present. Behavioral Services  Medicare Certification Upon Admission    I certify that this patient's inpatient psychiatric hospital admission is medically necessary for:    [x] (1) Treatment which could reasonably be expected to improve this patient's condition,       [x] (2) Or for diagnostic study;     AND     [x](2) The inpatient psychiatric services are provided while the individual is under the care of a physician and are included in the individualized plan of care.     Estimated length of stay/service 5 - 7 days based on stability    Plan for post-hospital care follow with OP provider    Electronically signed by Marilee Kehr, APRN - CNP on 2/18/2022 at 1:58 PM        Electronically signed by Kelsey Fuller on 2/18/2022 at 10:11 AM

## 2022-02-18 NOTE — PLAN OF CARE
Patient is pleasant and cooperative. She reports she is in an [place] [facility] mood and is only \"a little bit\" anxious and depressed. She reports this is situational. Patient denies SI, HI or hallucinations. Patient is minimizing reason for admission.      Problem: Altered Mood, Depressive Behavior:  Goal: Able to verbalize acceptance of life and situations over which he or she has no control  Description: Able to verbalize acceptance of life and situations over which he or she has no control  2/18/2022 1430 by Albert Domínguez RN  Outcome: Met This Shift     Problem: Altered Mood, Depressive Behavior:  Goal: Able to verbalize and/or display a decrease in depressive symptoms  Description: Able to verbalize and/or display a decrease in depressive symptoms  2/18/2022 1430 by Albert Domínguez RN  Outcome: Met This Shift     Problem: Altered Mood, Depressive Behavior:  Goal: Absence of self-harm  Description: Absence of self-harm  2/18/2022 1430 by Albert Domínguez RN  Outcome: Met This Shift     Problem: Falls - Risk of:  Goal: Absence of physical injury  Description: Absence of physical injury  Outcome: Met This Shift

## 2022-02-18 NOTE — PROGRESS NOTES
Called police dispatch to double check if any other patient belongings were with police prior to patient leaving. The police dispatch stated they did not have any of the patient's belongings.

## 2022-02-18 NOTE — PLAN OF CARE
[facility]  Initial Interdisciplinary Treatment Plan NOTE    Review Date & Time: 2/18/22 0900    Patient was not in treatment team    Admission Type:   Admission Type: Voluntary    Reason for admission:  Reason for Admission: depression SA      Estimated Length of Stay Update:  3-5 days   Estimated Discharge Date Update: 2/23/22    EDUCATION:   Learner Progress Toward Treatment Goals: Reviewed results and recommendations of this team, Reviewed group plan and strategies and Reviewed goals and plan of care    Method: Individual    Outcome: Verbalized understanding    PATIENT GOALS: none    PLAN/TREATMENT RECOMMENDATIONS UPDATE:Patient is encouraged to continue to work towards discharge goal by complying with medications, attending groups and to seek staff if feelings are overwhelming. Staff will offer support and interventions as requested or required. Staff will monitor effects of medications and document patient's mood and behaviors.      GOALS UPDATE:   Time frame for Short-Term Goals: 1-3 days    Fritz Atkins RN

## 2022-02-18 NOTE — CARE COORDINATION
Biopsychosocial Assessment Note    Social work met with patient to complete the biopsychosocial assessment and CSSR-S. Mental Status Exam: pt alert&oriented x4. Pt cooperative, friendly. Mood sad, depressed, anxious, affect congruent. Pt eye contact good, speech clear. Pt thoughts clear, poor insight/judgement. Pt denied SI/HI/AVH. Chief Complaint: per ED Dr note, \" 58-year-old female with a history of breast cancer presented via EMS after she admitted to drinking antifreeze taking multiple Xanax as well as Tylenol 3. \"    Patient Report:  Pt reports she is here because of depression. Pt reports she has a lot going on right now. Pt stated she was homeless after her sister evicted her but a homeless shelter helped her get into an apartment. Pt reports she recently moved into that apartment and she has been trying to get it together. Pt reports being active with the [facility] counseling center and stated she has been taking her meds as prescribed. Pt denied any hx of psych admissions, SI, attempts, or self injurious behaviors. Pt denied any drug or alcohol use hx. Pt denied trauma hx, abuse hx, legal hx. Pt does have breast cancer. Pt reports good sleep most of the time, reports she has a hard time sleeping after chemo. Pt receives SSDI, food stamps, graduated from high school, and has support from her daughter and the counseling center.      Gender  [] Male [x] Female [] Transgender  [] Other    Sexual Orientation    [x] Heterosexual [] Homosexual [] Bisexual [] Other    Suicidal Ideation  [] Past [] Present [x] Denies     Homicidal Ideation  [] Past [] Present [x] Denies     Hallucinations/Delusions (Specify type)  [] Reports [x] Denies     Substance Use/Alcohol Use/Addiction  [] Reports [x] Denies     Tobacco Use (within the last 6 months)  [x] Reports [] Denies     Trauma History  [] Reports [x] Denies     Collateral Contact (MEGHAN signed) pt denied  Name:   Relationship:  Number:     Collateral Information:        Access to Weapons per Collateral Contact: [] Reports [] Denies       Follow up provider preference: [id number] for discharge  Location (where do they plan on discharging to?): home to her apartment where she lives alone    Transportation (who will pick them up at discharge?) a friend     Medications (will they have money for copays at discharge?): does not typically have copays, can pay for them if she does

## 2022-02-19 LAB — METER GLUCOSE: 212 MG/DL (ref 74–99)

## 2022-02-19 PROCEDURE — 1240000000 HC EMOTIONAL WELLNESS R&B

## 2022-02-19 PROCEDURE — 99232 SBSQ HOSP IP/OBS MODERATE 35: CPT | Performed by: NURSE PRACTITIONER

## 2022-02-19 PROCEDURE — 82962 GLUCOSE BLOOD TEST: CPT

## 2022-02-19 PROCEDURE — 94664 DEMO&/EVAL PT USE INHALER: CPT

## 2022-02-19 PROCEDURE — 6370000000 HC RX 637 (ALT 250 FOR IP): Performed by: PSYCHIATRY & NEUROLOGY

## 2022-02-19 PROCEDURE — 6370000000 HC RX 637 (ALT 250 FOR IP): Performed by: NURSE PRACTITIONER

## 2022-02-19 PROCEDURE — 6360000002 HC RX W HCPCS: Performed by: NURSE PRACTITIONER

## 2022-02-19 RX ORDER — NICOTINE 21 MG/24HR
1 PATCH, TRANSDERMAL 24 HOURS TRANSDERMAL DAILY
Status: DISCONTINUED | OUTPATIENT
Start: 2022-02-19 | End: 2022-02-24 | Stop reason: HOSPADM

## 2022-02-19 RX ADMIN — DIVALPROEX SODIUM 250 MG: 125 CAPSULE, COATED PELLETS ORAL at 20:58

## 2022-02-19 RX ADMIN — DIVALPROEX SODIUM 250 MG: 125 CAPSULE, COATED PELLETS ORAL at 10:46

## 2022-02-19 RX ADMIN — FAMOTIDINE 20 MG: 20 TABLET ORAL at 10:47

## 2022-02-19 RX ADMIN — Medication 400 MG: at 10:46

## 2022-02-19 RX ADMIN — OXYCODONE HYDROCHLORIDE AND ACETAMINOPHEN 250 MG: 500 TABLET ORAL at 10:47

## 2022-02-19 RX ADMIN — METFORMIN HYDROCHLORIDE 500 MG: 500 TABLET ORAL at 16:50

## 2022-02-19 RX ADMIN — Medication 400 MG: at 20:59

## 2022-02-19 RX ADMIN — PSYLLIUM HUSK 1 PACKET: 3.4 POWDER ORAL at 10:46

## 2022-02-19 RX ADMIN — ATENOLOL 25 MG: 25 TABLET ORAL at 10:47

## 2022-02-19 RX ADMIN — INSULIN GLARGINE-YFGN 30 UNITS: 100 INJECTION, SOLUTION SUBCUTANEOUS at 21:00

## 2022-02-19 RX ADMIN — PROCHLORPERAZINE MALEATE 10 MG: 10 TABLET ORAL at 20:58

## 2022-02-19 RX ADMIN — Medication 3 MG: at 20:58

## 2022-02-19 RX ADMIN — ACETAMINOPHEN 650 MG: 325 TABLET ORAL at 18:40

## 2022-02-19 RX ADMIN — CHOLESTYRAMINE 4 G: 4 POWDER, FOR SUSPENSION ORAL at 10:46

## 2022-02-19 RX ADMIN — ESCITALOPRAM 5 MG: 10 TABLET, FILM COATED ORAL at 10:47

## 2022-02-19 RX ADMIN — ALBUTEROL SULFATE 2.5 MG: 2.5 SOLUTION RESPIRATORY (INHALATION) at 13:35

## 2022-02-19 RX ADMIN — ANASTROZOLE 1 MG: 1 TABLET ORAL at 10:47

## 2022-02-19 RX ADMIN — METFORMIN HYDROCHLORIDE 500 MG: 500 TABLET ORAL at 10:46

## 2022-02-19 RX ADMIN — PREGABALIN 75 MG: 75 CAPSULE ORAL at 10:47

## 2022-02-19 RX ADMIN — CHOLESTYRAMINE 4 G: 4 POWDER, FOR SUSPENSION ORAL at 20:56

## 2022-02-19 ASSESSMENT — PAIN SCALES - GENERAL
PAINLEVEL_OUTOF10: 0
PAINLEVEL_OUTOF10: 8
PAINLEVEL_OUTOF10: 0

## 2022-02-19 NOTE — PLAN OF CARE
Pt denies si/hi and hallucinations. Pt minimizing the events that got her admitted to the hospital. Pt c/o anxiety and depression over her chronic health problems but states her anxiety and depression are improving with the medications. Pt out on the unit and is social with peers. Pt is brightened. Pt takes med's and attends groups.   Problem: Altered Mood, Depressive Behavior:  Goal: Able to verbalize acceptance of life and situations over which he or she has no control  Description: Able to verbalize acceptance of life and situations over which he or she has no control  Outcome: Ongoing  Goal: Able to verbalize and/or display a decrease in depressive symptoms  Description: Able to verbalize and/or display a decrease in depressive symptoms  Outcome: Ongoing

## 2022-02-19 NOTE — CARE COORDINATION
SOUTH met with pt that stated that upon DC she will be returning to her apartment in [facility]. Pt reported that she does not have any money to pay for transportation to get home, pt can use her insurance as an option for DC transportation home. Pt reported that she has an appointment with the [facility] on Tuesday and she wants to be DC before her appointments. Pt signed an MEGHAN for her friend Jami Spurling, but she stated that he has never been to her apartment before. SOUTH called [phone] that stated the pt does have an apartment and she is able to go there upon DC. Pt does not have access to any weapons and osmani would like to be informed of DC date because he should be able to pick the pt up. Pt stated that he has no concerns for the pt he just wants her to get better.

## 2022-02-19 NOTE — PLAN OF CARE
Patient up and about the until socializing with peers. Patient friendly and cheerful. Patient denies SI/HI AVH states her anxiety and depression is \"better\". Will continue to monitor and assess q 15 min for safety throughout the shift.      Problem: Altered Mood, Depressive Behavior:  Goal: Able to verbalize acceptance of life and situations over which he or she has no control  Description: Able to verbalize acceptance of life and situations over which he or she has no control  2/18/2022 2130 by Sharon Shaikh RN  Outcome: Ongoing     Problem: Altered Mood, Depressive Behavior:  Goal: Able to verbalize and/or display a decrease in depressive symptoms  Description: Able to verbalize and/or display a decrease in depressive symptoms  2/18/2022 2130 by Sharon Shaikh RN  Outcome: Ongoing     Problem: Altered Mood, Depressive Behavior:  Goal: Ability to disclose and discuss suicidal ideas will improve  Description: Ability to disclose and discuss suicidal ideas will improve  Outcome: Ongoing     Problem: Altered Mood, Depressive Behavior:  Goal: Absence of self-harm  Description: Absence of self-harm  2/18/2022 2130 by hSaron Shaikh RN  Outcome: Ongoing     Problem: Depressive Behavior With or Without Suicide Precautions:  Goal: Ability to disclose and discuss suicidal ideas will improve  Description: Ability to disclose and discuss suicidal ideas will improve  Outcome: Ongoing     Problem: Depressive Behavior With or Without Suicide Precautions:  Goal: Able to verbalize support systems  Description: Able to verbalize support systems  Outcome: Ongoing     Problem: Falls - Risk of:  Goal: Will remain free from falls  Description: Will remain free from falls  Outcome: Ongoing

## 2022-02-19 NOTE — GROUP NOTE
Group Therapy Note    Date: 2/19/2022    Group Start Time: 1100  Group End Time: 1150  Group Topic: Psychoeducation    [facility]    Dionne Love, CTRS        Group Therapy Note      Number of participants: 12  Type of group: Psychoeducation  Mode of intervention: Education, Support, Socialization, Exploration, Clarifying, Problem-solving, and Activity  Topic: Mental Health Maintenance Plan  Objective: Pt will develop and identify 1 way to implement maintenance plan post discharge. Notes:  Pt interactive during group developing and identifying 1 way to implement maintenance post discharge. Pt gave support and feedback to others. Status After Intervention:  Improved    Participation Level:  Active Listener and Interactive    Participation Quality: Appropriate, Attentive, Sharing and Supportive      Speech:  normal      Thought Process/Content: Logical      Affective Functioning: Congruent      Mood: euthymic      Level of consciousness:  Alert, Oriented x4 and Attentive      Response to Learning: Able to verbalize current knowledge/experience, Able to verbalize/acknowledge new learning, Able to retain information, Capable of insight, Able to change behavior and Progressing to goal      Endings: None Reported    Modes of Intervention: Education, Support, Socialization, Exploration, Clarifying, Problem-solving and Activity

## 2022-02-19 NOTE — PROGRESS NOTES
BEHAVIORAL HEALTH FOLLOW-UP NOTE     2022     Patient was seen and examined in person, Chart reviewed   Patient's case discussed with staff/team    Chief Complaint: \" I am doing good. \"  Interim History: Patient seen in her room she is resting not attending groups or socializing with peers. States she \"is doing okay. \"  Denies all symptoms minimizing circumstances or hospitalization need for treatment and states she needs to leave by Tuesday because she is a doctor's appointment with her outpatient psychiatrist.      Appetite:   [x] Normal/Unchanged  [] Increased  [] Decreased      Sleep:       [x] Normal/Unchanged  [] Fair       [] Poor              Energy:    [x] Normal/Unchanged  [] Increased  [] Decreased        SI [] Present  [x] Absent    HI  []Present  [x] Absent     Aggression:  [] yes  [x] no    Patient is [x] able  [] unable to CONTRACT FOR SAFETY     PAST MEDICAL/PSYCHIATRIC HISTORY:   Past Medical History:   Diagnosis Date    Anxiety     Arthritis     Cancer ([facility]     breast    Cervical radiculopathy     Chronic back pain     Dehydration 2021    Dehydration 2021    Depression     Diabetes mellitus type 2, uncontrolled ([facility] 2017    GERD (gastroesophageal reflux disease)     History of blood transfusion 2018    back surgery, lumbar, dr Larissa Montalvo    Hypertension     Neuropathy     Right leg pain     seeing doctor currently problems with hardware       FAMILY/SOCIAL HISTORY:  Family History   Problem Relation Age of Onset    Diabetes Mother      Social History     Socioeconomic History    Marital status:      Spouse name: Not on file    Number of children: Not on file    Years of education: Not on file    Highest education level: Not on file   Occupational History    Not on file   Tobacco Use    Smoking status: Former Smoker     Packs/day: 0.50     Years: 10.00     Pack years: 5.00     Types: Cigarettes     Quit date: 3/15/2015     Years since quittin.9  Smokeless tobacco: Never Used    Tobacco comment: stop 2015   Vaping Use    Vaping Use: Never used   Substance and Sexual Activity    Alcohol use: No    Drug use: No    Sexual activity: Not Currently   Other Topics Concern    Not on file   Social History Narrative    Not on file     Social Determinants of Health     Financial Resource Strain:     Difficulty of Paying Living Expenses: Not on file   Food Insecurity:     Worried About Running Out of Food in the Last Year: Not on file    [place] of Food in the Last Year: Not on file   Transportation Needs:     Lack of Transportation (Medical): Not on file    Lack of Transportation (Non-Medical): Not on file   Physical Activity:     Days of Exercise per Week: Not on file    Minutes of Exercise per Session: Not on file   Stress:     Feeling of Stress : Not on file   Social Connections:     Frequency of Communication with Friends and Family: Not on file    Frequency of Social Gatherings with Friends and Family: Not on file    Attends Hoahaoism Services: Not on file    Active Member of [address] [place] [address] or Organizations: Not on file    Attends Club or Organization Meetings: Not on file    Marital Status: Not on file   Intimate Partner Violence:     Fear of Current or Ex-Partner: Not on file    Emotionally Abused: Not on file    Physically Abused: Not on file    Sexually Abused: Not on file   Housing Stability:     Unable to Pay for Housing in the Last Year: Not on file    Number of [place] in the Last Year: Not on file    Unstable Housing in the Last Year: Not on file           ROS:  [x] All negative/unchanged except if checked.  Explain positive(checked items) below:  [] Constitutional  [] Eyes  [] Ear/Nose/Mouth/Throat  [] Respiratory  [] CV  [] GI  []   [] Musculoskeletal  [] Skin/Breast  [] Neurological  [] Endocrine  [] Heme/Lymph  [] Allergic/Immunologic    Explanation:     MEDICATIONS:    Current Facility-Administered Medications:    acetaminophen (TYLENOL) tablet 650 mg, 650 mg, Oral, Q4H PRN, Tyler Escalera MD, 650 mg at 02/18/22 1530    magnesium hydroxide (MILK OF MAGNESIA) 400 MG/5ML suspension 30 mL, 30 mL, Oral, Daily PRN, Tyler Escalera MD    aluminum & magnesium hydroxide-simethicone (MAALOX) 200-200-20 MG/5ML suspension 30 mL, 30 mL, Oral, PRN, Tyler Escalera MD    hydrOXYzine (ATARAX) tablet 50 mg, 50 mg, Oral, TID PRN, Tyler Escalera MD, 50 mg at 02/18/22 1650    haloperidol (HALDOL) tablet 3 mg, 3 mg, Oral, Q6H PRN **OR** haloperidol lactate (HALDOL) injection 3 mg, 3 mg, IntraMUSCular, Q6H PRN, Tyler Escalera MD    traZODone (DESYREL) tablet 50 mg, 50 mg, Oral, Nightly PRN, Tyler Escalera MD    divalproex (DEPAKOTE SPRINKLE) capsule 250 mg, 250 mg, Oral, 2 times per day, Matt Monae, APRN - CNP, 250 mg at 02/18/22 2102    escitalopram (LEXAPRO) tablet 5 mg, 5 mg, Oral, Daily, Matt Monae, APRN - CNP, 5 mg at 02/18/22 1518    melatonin tablet 3 mg, 3 mg, Oral, Nightly, Matt Monae, APRN - CNP, 3 mg at 02/18/22 2102    albuterol (PROVENTIL) nebulizer solution 2.5 mg, 2.5 mg, Nebulization, Q4H PRN, Matt Monae, APRN - CNP    ascorbic acid (VITAMIN C) tablet 250 mg, 250 mg, Oral, Daily, Matt Monae, APRN - CNP, 250 mg at 02/18/22 1519    atenolol (TENORMIN) tablet 25 mg, 25 mg, Oral, Daily, Matt Monae, APRN - CNP, 25 mg at 02/18/22 1520    anastrozole (ARIMIDEX) tablet 1 mg, 1 mg, Oral, Daily, Matt Monae APRN - CNP, 1 mg at 02/18/22 1521    insulin glargine-yfgn (SEMGLEE-YFGN) injection vial 30 Units, 30 Units, SubCUTAneous, Nightly, WES Ryan - CNP, 30 Units at 02/18/22 2104    famotidine (PEPCID) tablet 20 mg, 20 mg, Oral, Daily, WES Ryan - CNP, 20 mg at 02/18/22 1521    metFORMIN (GLUCOPHAGE) tablet 500 mg, 500 mg, Oral, BID, WES Ryan - CNP, 500 mg at 02/18/22 1750    ondansetron (ZOFRAN) tablet 4 mg, 4 mg, Oral, Q8H PRN, Matt Monae APRN - CNP    pregabalin (LYRICA) capsule 75 mg, 75 mg, Oral, Daily, Mayra Bradley, APRN - CNP, 75 mg at 02/18/22 1518    prochlorperazine (COMPAZINE) tablet 10 mg, 10 mg, Oral, Q6H PRN, Mayra Bradley, APRN - CNP    psyllium (METAMUCIL) 58.12 % packet 1 packet, 1 packet, Oral, Daily, [place] Bradley, APRN - CNP, 1 packet at 02/18/22 1521    magnesium oxide (MAG-OX) tablet 400 mg, 400 mg, Oral, BID, Mayra Bradley, APRN - CNP, 400 mg at 02/18/22 2102    lidocaine-prilocaine (EMLA) cream, , Topical, PRN, [place] Bradley, APRN - CNP    cholestyramine [place]) packet 4 g, 1 packet, Oral, BID, [place] Bradley, APRN - CNP, 4 g at 02/18/22 2102    white petrolatum ointment, , Topical, BID PRN, [place] Bradley, APRN - CNP      Examination:  BP (!) 112/59   Pulse 70   Temp 97.9 °F (36.6 °C) (Temporal)   Resp 18   Ht 6' 1\" (1.854 m)   Wt 151 lb (68.5 kg)   LMP 08/13/2013   SpO2 96%   BMI 19.92 kg/m²   Gait - steady  Medication side effects(SE): Denies    Mental Status Examination:    Level of consciousness:  within normal limits   Appearance:  fair grooming and fair hygiene  Behavior/Motor:  no abnormalities noted  Attitude toward examiner:  cooperative  Speech:  spontaneous, normal rate and normal volume   Mood: \" My mood is good. \"  Affect: Appropriate and pleasant  Thought processes: Linear without flight of ideas loose associations  Thought content: Devoid of any auditory visual hallucinations delusions or other perceptual abnormalities.   Denies SI/HI intent or plan  Cognition:  oriented to person, place, and time   Concentration intact  Insight poor   Judgement poor     ASSESSMENT:   Patient symptoms are:  [] Well controlled  [x] Improving  [] Worsening  [] No change      Diagnosis:   Principal Problem:    Major depressive disorder, recurrent episode, severe with mixed features ([facility]  Active Problems:    Personal history of breast cancer    Depression  Resolved Problems:    Current severe episode of major depressive disorder without psychotic features without prior episode ([facility]      LABS:    No results for input(s): WBC, HGB, PLT in the last 72 hours. No results for input(s): NA, K, CL, CO2, BUN, CREATININE, GLUCOSE in the last 72 hours. No results for input(s): BILITOT, ALKPHOS, AST, ALT in the last 72 hours. Lab Results   Component Value Date    ETOH <10 02/14/2022     Lab Results   Component Value Date    TSH 1.470 01/10/2018     No results found for: LITHIUM  No results found for: VALPROATE, CBMZ      Treatment Plan:  Reviewed current Medications with the patient. Risks, benefits, side effects, drug-to-drug interactions and alternatives to treatment were discussed. Collateral information:   CD evaluation  Encourage patient to attend group and other milieu activities.   Discharge planning discussed with the patient and treatment team.    Continue Lexapro 5 mg daily  Continue Depakote 250 mg twice daily    PSYCHOTHERAPY/COUNSELING:  [x] Therapeutic interview  [x] Supportive  [] CBT  [] Ongoing  [] Other    [x] Patient continues to need, on a daily basis, active treatment furnished directly by or requiring the supervision of inpatient psychiatric personnel      Anticipated Length of stay: 3 to 7 days based on stability            Electronically signed by WES Montoya CNP on 0/13/6558 at 8:26 AM

## 2022-02-20 LAB — METER GLUCOSE: 166 MG/DL (ref 74–99)

## 2022-02-20 PROCEDURE — 6370000000 HC RX 637 (ALT 250 FOR IP): Performed by: NURSE PRACTITIONER

## 2022-02-20 PROCEDURE — 99232 SBSQ HOSP IP/OBS MODERATE 35: CPT | Performed by: NURSE PRACTITIONER

## 2022-02-20 PROCEDURE — 82962 GLUCOSE BLOOD TEST: CPT

## 2022-02-20 PROCEDURE — 1240000000 HC EMOTIONAL WELLNESS R&B

## 2022-02-20 RX ADMIN — FAMOTIDINE 20 MG: 20 TABLET ORAL at 09:18

## 2022-02-20 RX ADMIN — CHOLESTYRAMINE 4 G: 4 POWDER, FOR SUSPENSION ORAL at 09:17

## 2022-02-20 RX ADMIN — DIVALPROEX SODIUM 250 MG: 125 CAPSULE, COATED PELLETS ORAL at 09:17

## 2022-02-20 RX ADMIN — PSYLLIUM HUSK 1 PACKET: 3.4 POWDER ORAL at 09:17

## 2022-02-20 RX ADMIN — OXYCODONE HYDROCHLORIDE AND ACETAMINOPHEN 250 MG: 500 TABLET ORAL at 09:18

## 2022-02-20 RX ADMIN — DIVALPROEX SODIUM 250 MG: 125 CAPSULE, COATED PELLETS ORAL at 20:43

## 2022-02-20 RX ADMIN — ESCITALOPRAM 5 MG: 10 TABLET, FILM COATED ORAL at 09:17

## 2022-02-20 RX ADMIN — CHOLESTYRAMINE 4 G: 4 POWDER, FOR SUSPENSION ORAL at 20:43

## 2022-02-20 RX ADMIN — METFORMIN HYDROCHLORIDE 500 MG: 500 TABLET ORAL at 09:17

## 2022-02-20 RX ADMIN — Medication 400 MG: at 09:18

## 2022-02-20 RX ADMIN — PREGABALIN 75 MG: 75 CAPSULE ORAL at 09:18

## 2022-02-20 RX ADMIN — ANASTROZOLE 1 MG: 1 TABLET ORAL at 09:18

## 2022-02-20 RX ADMIN — METFORMIN HYDROCHLORIDE 500 MG: 500 TABLET ORAL at 17:20

## 2022-02-20 RX ADMIN — INSULIN GLARGINE-YFGN 30 UNITS: 100 INJECTION, SOLUTION SUBCUTANEOUS at 20:45

## 2022-02-20 RX ADMIN — ATENOLOL 25 MG: 25 TABLET ORAL at 09:17

## 2022-02-20 RX ADMIN — Medication 3 MG: at 20:43

## 2022-02-20 RX ADMIN — Medication 400 MG: at 20:43

## 2022-02-20 ASSESSMENT — PAIN SCALES - GENERAL
PAINLEVEL_OUTOF10: 0
PAINLEVEL_OUTOF10: 0

## 2022-02-20 NOTE — PLAN OF CARE
Patient up and about the unit social with peers. Patient denies SI/HI AVH also denies depression and anxiety. Patient smiling and laughing with peers this evening. Patient compliant with medications and groups today. Will continue to monitor and assess q 15 min for safety throughout the shift.         Problem: Altered Mood, Depressive Behavior:  Goal: Able to verbalize acceptance of life and situations over which he or she has no control  Description: Able to verbalize acceptance of life and situations over which he or she has no control  2/19/2022 2217 by Florencio Baca RN  Outcome: Ongoing     Problem: Altered Mood, Depressive Behavior:  Goal: Able to verbalize and/or display a decrease in depressive symptoms  Description: Able to verbalize and/or display a decrease in depressive symptoms  2/19/2022 2217 by Florencio Baca RN  Outcome: Ongoing     Problem: Altered Mood, Depressive Behavior:  Goal: Ability to disclose and discuss suicidal ideas will improve  Description: Ability to disclose and discuss suicidal ideas will improve  Outcome: Ongoing     Problem: Altered Mood, Depressive Behavior:  Goal: Absence of self-harm  Description: Absence of self-harm  Outcome: Ongoing     Problem: Depressive Behavior With or Without Suicide Precautions:  Goal: Able to verbalize acceptance of life and situations over which he or she has no control  Description: Able to verbalize acceptance of life and situations over which he or she has no control  Outcome: Ongoing     Problem: Depressive Behavior With or Without Suicide Precautions:  Goal: Able to verbalize and/or display a decrease in depressive symptoms  Description: Able to verbalize and/or display a decrease in depressive symptoms  Outcome: Ongoing     Problem: Depressive Behavior With or Without Suicide Precautions:  Goal: Able to verbalize support systems  Description: Able to verbalize support systems  Outcome: Ongoing     Problem: Falls - Risk of:  Goal: Will remain free from falls  Description: Will remain free from falls  Outcome: Ongoing     Problem: Falls - Risk of:  Goal: Absence of physical injury  Description: Absence of physical injury  Outcome: Ongoing

## 2022-02-20 NOTE — GROUP NOTE
Group Therapy Note    Date: 2/20/2022    Group Start Time: 1400  Group End Time: 1435  Group Topic: Cognitive Skills    [facility]    MAIA Thornton        Group Therapy Note    Attendees: 12         Patient's Goal:  Pt will be able to discuss different types of boundaries and different ways to implement them into their lives. Notes: Pt was an active participant in group discussion. Status After Intervention:  Improved    Participation Level: Active Listener and Interactive    Participation Quality: Appropriate, Attentive, Sharing and Supportive      Speech:  normal      Thought Process/Content: Logical  Linear      Affective Functioning: Congruent      Mood: anxious      Level of consciousness:  Alert, Oriented x4 and Attentive      Response to Learning: Able to verbalize current knowledge/experience, Able to verbalize/acknowledge new learning, Able to retain information, Capable of insight and Progressing to goal      Endings: None Reported    Modes of Intervention: Education, Support, Socialization, Exploration, Clarifying and Problem-solving      Discipline Responsible: /Counselor      Signature:   Austin Thornton

## 2022-02-20 NOTE — PROGRESS NOTES
years: 5.00     Types: Cigarettes     Quit date: 3/15/2015     Years since quittin.9    Smokeless tobacco: Never Used    Tobacco comment: stop    Vaping Use    Vaping Use: Never used   Substance and Sexual Activity    Alcohol use: No    Drug use: No    Sexual activity: Not Currently   Other Topics Concern    Not on file   Social History Narrative    Not on file     Social Determinants of Health     Financial Resource Strain:     Difficulty of Paying Living Expenses: Not on file   Food Insecurity:     Worried About Running Out of Food in the Last Year: Not on file    [place] of Food in the Last Year: Not on file   Transportation Needs:     Lack of Transportation (Medical): Not on file    Lack of Transportation (Non-Medical): Not on file   Physical Activity:     Days of Exercise per Week: Not on file    Minutes of Exercise per Session: Not on file   Stress:     Feeling of Stress : Not on file   Social Connections:     Frequency of Communication with Friends and Family: Not on file    Frequency of Social Gatherings with Friends and Family: Not on file    Attends [employer] Services: Not on file    Active Member of [address] [place] [address] or Organizations: Not on file    Attends Club or Organization Meetings: Not on file    Marital Status: Not on file   Intimate Partner Violence:     Fear of Current or Ex-Partner: Not on file    Emotionally Abused: Not on file    Physically Abused: Not on file    Sexually Abused: Not on file   Housing Stability:     Unable to Pay for Housing in the Last Year: Not on file    Number of [place] in the Last Year: Not on file    Unstable Housing in the Last Year: Not on file           ROS:  [x] All negative/unchanged except if checked.  Explain positive(checked items) below:  [] Constitutional  [] Eyes  [] Ear/Nose/Mouth/Throat  [] Respiratory  [] CV  [] GI  []   [] Musculoskeletal  [] Skin/Breast  [] Neurological  [] Endocrine  [] Heme/Lymph  [] Allergic/Immunologic    Explanation:     MEDICATIONS:    Current Facility-Administered Medications:     nicotine (NICODERM CQ) 21 MG/24HR 1 patch, 1 patch, TransDERmal, Daily, WES Kim - CNP, 1 patch at 02/20/22 7768    acetaminophen (TYLENOL) tablet 650 mg, 650 mg, Oral, Q4H PRN, Cheryle Luck, MD, 650 mg at 02/19/22 1840    magnesium hydroxide (MILK OF MAGNESIA) 400 MG/5ML suspension 30 mL, 30 mL, Oral, Daily PRN, Cheryle Luck, MD    aluminum & magnesium hydroxide-simethicone (MAALOX) 200-200-20 MG/5ML suspension 30 mL, 30 mL, Oral, PRN, Cheryle Luck, MD    hydrOXYzine (ATARAX) tablet 50 mg, 50 mg, Oral, TID PRN, Cheryle Luck, MD, 50 mg at 02/18/22 1650    haloperidol (HALDOL) tablet 3 mg, 3 mg, Oral, Q6H PRN **OR** haloperidol lactate (HALDOL) injection 3 mg, 3 mg, IntraMUSCular, Q6H PRN, Cheryle Luck, MD    traZODone (DESYREL) tablet 50 mg, 50 mg, Oral, Nightly PRN, Cheryle Luck, MD    divalproex (DEPAKOTE SPRINKLE) capsule 250 mg, 250 mg, Oral, 2 times per day, Katelyn Medellin APRN - CNP, 250 mg at 02/20/22 2534    escitalopram (LEXAPRO) tablet 5 mg, 5 mg, Oral, Daily, Katelyn Medellin APRN - CNP, 5 mg at 02/20/22 4319    melatonin tablet 3 mg, 3 mg, Oral, Nightly, Katelyn Vignesh, APRN - CNP, 3 mg at 02/19/22 2058    albuterol (PROVENTIL) nebulizer solution 2.5 mg, 2.5 mg, Nebulization, Q4H PRN, Katelyn Medellin APRN - CNP, 2.5 mg at 02/19/22 1335    ascorbic acid (VITAMIN C) tablet 250 mg, 250 mg, Oral, Daily, Katelyn Vignesh, APRN - CNP, 250 mg at 02/20/22 5767    atenolol (TENORMIN) tablet 25 mg, 25 mg, Oral, Daily, Katelyn Medellin APRN - CNP, 25 mg at 02/20/22 7529    anastrozole (ARIMIDEX) tablet 1 mg, 1 mg, Oral, Daily, WES Wong - CNP, 1 mg at 02/20/22 0918    insulin glargine-yfgn (SEMGLEE-YFGN) injection vial 30 Units, 30 Units, SubCUTAneous, Nightly, WES Wong - CNP, 30 Units at 02/19/22 2100    famotidine (PEPCID) tablet 20 mg, 20 mg, Oral, Daily, Morro Cart, APRN - CNP, 20 mg at 02/20/22 6165    metFORMIN (GLUCOPHAGE) tablet 500 mg, 500 mg, Oral, BID, Morro Cart, APRN - CNP, 500 mg at 02/20/22 0917    ondansetron (ZOFRAN) tablet 4 mg, 4 mg, Oral, Q8H PRN, Morro Cart, APRN - CNP    pregabalin (LYRICA) capsule 75 mg, 75 mg, Oral, Daily, Morro Cart, APRN - CNP, 75 mg at 02/20/22 7163    prochlorperazine (COMPAZINE) tablet 10 mg, 10 mg, Oral, Q6H PRN, Morro Cart, APRN - CNP, 10 mg at 02/19/22 2058    psyllium (METAMUCIL) 58.12 % packet 1 packet, 1 packet, Oral, Daily, Morro Cart, APRN - CNP, 1 packet at 02/20/22 4760    magnesium oxide (MAG-OX) tablet 400 mg, 400 mg, Oral, BID, Morro Cart, APRN - CNP, 400 mg at 02/20/22 1237    lidocaine-prilocaine (EMLA) cream, , Topical, PRN, Morro Cart, APRN - CNP    cholestyramine Matthew Rancher) packet 4 g, 1 packet, Oral, BID, Morro Cart, APRN - CNP, 4 g at 02/20/22 6264    white petrolatum ointment, , Topical, BID PRN, Morro Buck, APRN - CNP      Examination:  BP (!) 113/53   Pulse 75   Temp 97.8 °F (36.6 °C) (Temporal)   Resp 18   Ht 6' 1\" (1.854 m)   Wt 151 lb (68.5 kg)   LMP 08/13/2013   SpO2 98%   BMI 19.92 kg/m²   Gait - steady  Medication side effects(SE): Denies    Mental Status Examination:    Level of consciousness:  within normal limits   Appearance:  fair grooming and fair hygiene  Behavior/Motor:  no abnormalities noted  Attitude toward examiner:  cooperative  Speech:  spontaneous, normal rate and normal volume   Mood: \" My mood is good. \"  Affect: Appropriate and pleasant  Thought processes: Linear without flight of ideas loose associations  Thought content: Devoid of any auditory visual hallucinations delusions or other perceptual abnormalities.   Denies SI/HI intent or plan  Cognition:  oriented to person, place, and time   Concentration intact  Insight poor   Judgement poor     ASSESSMENT:   Patient symptoms are:  [] Well controlled  [x] Improving  [] Worsening  [] No change      Diagnosis:   Principal Problem:    Major depressive disorder, recurrent episode, severe with mixed features (HCC)  Active Problems:    Personal history of breast cancer    Depression  Resolved Problems:    Current severe episode of major depressive disorder without psychotic features without prior episode ([facility] [address]      LABS:    No results for input(s): WBC, HGB, PLT in the last 72 hours. No results for input(s): NA, K, CL, CO2, BUN, CREATININE, GLUCOSE in the last 72 hours. No results for input(s): BILITOT, ALKPHOS, AST, ALT in the last 72 hours. Lab Results   Component Value Date    ETOH <10 02/14/2022     Lab Results   Component Value Date    TSH 1.470 01/10/2018     No results found for: LITHIUM  No results found for: VALPROATE, CBMZ      Treatment Plan:  Reviewed current Medications with the patient. Risks, benefits, side effects, drug-to-drug interactions and alternatives to treatment were discussed. Collateral information:   CD evaluation  Encourage patient to attend group and other milieu activities.   Discharge planning discussed with the patient and treatment team.    Continue Lexapro 5 mg daily  Continue Depakote 250 mg twice daily    PSYCHOTHERAPY/COUNSELING:  [x] Therapeutic interview  [x] Supportive  [] CBT  [] Ongoing  [] Other    [x] Patient continues to need, on a daily basis, active treatment furnished directly by or requiring the supervision of inpatient psychiatric personnel      Anticipated Length of stay: 3 to 7 days based on stability            Electronically signed by WES Tan CNP on 1/17/0105 at 12:16 PM

## 2022-02-20 NOTE — GROUP NOTE
Group Therapy Note    Date: 2/20/2022    Group Start Time: 1100  Group End Time: 1140  Group Topic: Psychoeducation    [facility]    Dionne Love, CTRS        Group Therapy Note      Number of participants: 8  Type of group: Psychoeducation  Mode of intervention: Education, Support, Socialization, Exploration, Clarifying, Problem-solving, and Activity  Topic: A Mindfulness Response to Thoughts: APPLE  Objective: Pt will identify 1 way to acknowledge, pause, pull back, let go, and explore (APPLE) in recovery. Patient's Goal:  \"Work on discharge plan for Monday\"     Notes:  Pt was interactive during group sharing 1 way to utilize the APPLE acronym in recovery. Pr gave support and feedback to others. Status After Intervention:  Improved    Participation Level:  Active Listener and Interactive    Participation Quality: Appropriate, Attentive, Sharing and Supportive      Speech:  normal      Thought Process/Content: Logical      Affective Functioning: Congruent      Mood: euthymic      Level of consciousness:  Alert, Oriented x4 and Attentive      Response to Learning: Able to verbalize current knowledge/experience, Able to verbalize/acknowledge new learning, Able to retain information, Capable of insight, Able to change behavior and Progressing to goal      Endings: None Reported    Modes of Intervention: Education, Support, Socialization, Exploration, Clarifying, Problem-solving and Activity

## 2022-02-20 NOTE — PLAN OF CARE
Pt denies si/hi and hallucinations. Pt out on the unit and is social with peers. Pt affect is brightened. Pt is calm and cooperative. pt states her depression and anxiety are improved from the med's. Pt takes med's and attends groups.    Problem: Altered Mood, Depressive Behavior:  Goal: Able to verbalize and/or display a decrease in depressive symptoms  Description: Able to verbalize and/or display a decrease in depressive symptoms  2/20/2022 0840 by Kaur Sadler RN  Outcome: Ongoing     Problem: Altered Mood, Depressive Behavior:  Goal: Ability to disclose and discuss suicidal ideas will improve  Description: Ability to disclose and discuss suicidal ideas will improve  2/20/2022 0840 by Nir Gale RN  Outcome: Ongoing

## 2022-02-21 LAB — METER GLUCOSE: 172 MG/DL (ref 74–99)

## 2022-02-21 PROCEDURE — 6370000000 HC RX 637 (ALT 250 FOR IP): Performed by: NURSE PRACTITIONER

## 2022-02-21 PROCEDURE — 99231 SBSQ HOSP IP/OBS SF/LOW 25: CPT | Performed by: NURSE PRACTITIONER

## 2022-02-21 PROCEDURE — 82962 GLUCOSE BLOOD TEST: CPT

## 2022-02-21 PROCEDURE — 1240000000 HC EMOTIONAL WELLNESS R&B

## 2022-02-21 RX ADMIN — CHOLESTYRAMINE 4 G: 4 POWDER, FOR SUSPENSION ORAL at 09:04

## 2022-02-21 RX ADMIN — OXYCODONE HYDROCHLORIDE AND ACETAMINOPHEN 250 MG: 500 TABLET ORAL at 09:02

## 2022-02-21 RX ADMIN — FAMOTIDINE 20 MG: 20 TABLET ORAL at 09:04

## 2022-02-21 RX ADMIN — INSULIN GLARGINE-YFGN 30 UNITS: 100 INJECTION, SOLUTION SUBCUTANEOUS at 20:21

## 2022-02-21 RX ADMIN — ESCITALOPRAM 5 MG: 10 TABLET, FILM COATED ORAL at 09:03

## 2022-02-21 RX ADMIN — DIVALPROEX SODIUM 250 MG: 125 CAPSULE, COATED PELLETS ORAL at 09:04

## 2022-02-21 RX ADMIN — PREGABALIN 75 MG: 75 CAPSULE ORAL at 09:04

## 2022-02-21 RX ADMIN — PSYLLIUM HUSK 1 PACKET: 3.4 POWDER ORAL at 09:03

## 2022-02-21 RX ADMIN — Medication 400 MG: at 20:24

## 2022-02-21 RX ADMIN — METFORMIN HYDROCHLORIDE 500 MG: 500 TABLET ORAL at 17:48

## 2022-02-21 RX ADMIN — Medication 400 MG: at 09:03

## 2022-02-21 RX ADMIN — CHOLESTYRAMINE 4 G: 4 POWDER, FOR SUSPENSION ORAL at 20:24

## 2022-02-21 RX ADMIN — Medication 3 MG: at 20:24

## 2022-02-21 RX ADMIN — METFORMIN HYDROCHLORIDE 500 MG: 500 TABLET ORAL at 09:03

## 2022-02-21 RX ADMIN — ANASTROZOLE 1 MG: 1 TABLET ORAL at 09:04

## 2022-02-21 RX ADMIN — ATENOLOL 25 MG: 25 TABLET ORAL at 09:03

## 2022-02-21 RX ADMIN — DIVALPROEX SODIUM 250 MG: 125 CAPSULE, COATED PELLETS ORAL at 20:24

## 2022-02-21 ASSESSMENT — PAIN SCALES - GENERAL: PAINLEVEL_OUTOF10: 0

## 2022-02-21 NOTE — PLAN OF CARE
[facility]  Day 3 Interdisciplinary Treatment Plan NOTE    Review Date & Time: 2/21/22 0900    Patient was not in treatment team    Estimated Length of Stay Update:  2-5 days  Estimated Discharge Date Update: 2/26/22    EDUCATION:   Learner Progress Toward Treatment Goals: Reviewed results and recommendations of this team, Reviewed group plan and strategies and Reviewed goals and plan of care    Method: Individual    Outcome: Verbalized understanding    PATIENT GOALS: \"work on my depression\"    PLAN/TREATMENT RECOMMENDATIONS UPDATE: Patient is encouraged to continue to work towards discharge goal by complying with medications, attending groups and to seek staff if feelings are overwhelming. Staff will offer support and interventions as requested or required. Staff will monitor effects of medications and document patient's mood and behaviors.      GOALS UPDATE:   Time frame for Short-Term Goals: 1-3 days      Gigi Rinne, RN

## 2022-02-21 NOTE — PROGRESS NOTES
Group Therapy Note  Patient attended goals group and stated daily goal as to work on my depression. Group Therapy Note    Date: 2/21/2022  Start Time: 10:00  End Time: 10:30  Number of Participants: 9    Type of Group: Psychoeducation    Wellness Binder Information  Module Name:  Wellness    Patient's Goal:  To id positive steps to wellbeing. Notes: Attended group and was able to participate. Status After Intervention:  Improved    Participation Level:  Active Listener    Participation Quality: Attentive      Speech:  hesitant      Thought Process/Content: Linear      Affective Functioning: Flat      Mood: anxious and depressed      Level of consciousness:  Alert      Response to Learning: Progressing to goal      Endings: None Reported    Modes of Intervention: Education and Support      Discipline Responsible: Psychoeducational Specialist      Signature:  MAIA Easley

## 2022-02-21 NOTE — PROGRESS NOTES
BEHAVIORAL HEALTH FOLLOW-UP NOTE     2/21/2022     Patient was seen and examined in person, Chart reviewed   Patient's case discussed with staff/team    Chief Complaint: \" I am ready to go home\"    Interim History: Patient is seen in the day room, enjoying her breakfast with the. .  She is able to discharge focused, has significantly minimizing circumstances surrounding her hospitalization. She has poor insight into need for treatment and stabilization. Affect is brighter somewhat exaggerated, she continues to vehemently deny SI/HI intent or plan denies any auditory visual hallucinations. States she plans to live with a friend on discharge. Per the chart she is attended some groups.     Appetite:   [x] Normal/Unchanged  [] Increased  [] Decreased      Sleep:       [x] Normal/Unchanged  [] Fair       [] Poor              Energy:    [x] Normal/Unchanged  [] Increased  [] Decreased        SI [] Present  [x] Absent    HI  []Present  [x] Absent     Aggression:  [] yes  [x] no    Patient is [x] able  [] unable to CONTRACT FOR SAFETY     PAST MEDICAL/PSYCHIATRIC HISTORY:   Past Medical History:   Diagnosis Date    Anxiety     Arthritis     Cancer ([facility] [address] 2021    breast    Cervical radiculopathy     Chronic back pain     Dehydration 9/23/2021    Dehydration 9/23/2021    Depression     Diabetes mellitus type 2, uncontrolled ([facility] [address] 2/12/2017    GERD (gastroesophageal reflux disease)     History of blood transfusion 01/2018    back surgery, lumbar, dr Fink Alpha    Hypertension     Neuropathy     Right leg pain     seeing doctor currently problems with hardware       FAMILY/SOCIAL HISTORY:  Family History   Problem Relation Age of Onset    Diabetes Mother      Social History     Socioeconomic History    Marital status:      Spouse name: Not on file    Number of children: Not on file    Years of education: Not on file    Highest education level: Not on file   Occupational History    Not on file   Tobacco Use    Smoking status: Former Smoker     Packs/day: 0.50     Years: 10.00     Pack years: 5.00     Types: Cigarettes     Quit date: 3/15/2015     Years since quittin.9    Smokeless tobacco: Never Used    Tobacco comment: stop    Vaping Use    Vaping Use: Never used   Substance and Sexual Activity    Alcohol use: No    Drug use: No    Sexual activity: Not Currently   Other Topics Concern    Not on file   Social History Narrative    Not on file     Social Determinants of Health     Financial Resource Strain:     Difficulty of Paying Living Expenses: Not on file   Food Insecurity:     Worried About Running Out of Food in the Last Year: Not on file    Shaquille of Food in the Last Year: Not on file   Transportation Needs:     Lack of Transportation (Medical): Not on file    Lack of Transportation (Non-Medical): Not on file   Physical Activity:     Days of Exercise per Week: Not on file    Minutes of Exercise per Session: Not on file   Stress:     Feeling of Stress : Not on file   Social Connections:     Frequency of Communication with Friends and Family: Not on file    Frequency of Social Gatherings with Friends and Family: Not on file    Attends Anabaptism Services: Not on file    Active Member of [address] [place] [address] [employer] or Organizations: Not on file    Attends Club or Organization Meetings: Not on file    Marital Status: Not on file   Intimate Partner Violence:     Fear of Current or Ex-Partner: Not on file    Emotionally Abused: Not on file    Physically Abused: Not on file    Sexually Abused: Not on file   Housing Stability:     Unable to Pay for Housing in the Last Year: Not on file    Number of [place] in the Last Year: Not on file    Unstable Housing in the Last Year: Not on file           ROS:  [x] All negative/unchanged except if checked.  Explain positive(checked items) below:  [] Constitutional  [] Eyes  [] Ear/Nose/Mouth/Throat  [] Respiratory  [] CV  [] GI  []   [] Musculoskeletal  [] Skin/Breast  [] Neurological  [] Endocrine  [] Heme/Lymph  [] Allergic/Immunologic    Explanation:     MEDICATIONS:    Current Facility-Administered Medications:     nicotine (NICODERM CQ) 21 MG/24HR 1 patch, 1 patch, TransDERmal, Daily, Coretta Oreilly, APRN - CNP, 1 patch at 02/21/22 0902    acetaminophen (TYLENOL) tablet 650 mg, 650 mg, Oral, Q4H PRN, Óscar Champion MD, 650 mg at 02/19/22 1840    magnesium hydroxide (MILK OF MAGNESIA) 400 MG/5ML suspension 30 mL, 30 mL, Oral, Daily PRN, Óscar Champion MD    aluminum & magnesium hydroxide-simethicone (MAALOX) 200-200-20 MG/5ML suspension 30 mL, 30 mL, Oral, PRN, Óscar Champion MD    hydrOXYzine (ATARAX) tablet 50 mg, 50 mg, Oral, TID PRN, Óscar Champion MD, 50 mg at 02/18/22 1650    haloperidol (HALDOL) tablet 3 mg, 3 mg, Oral, Q6H PRN **OR** haloperidol lactate (HALDOL) injection 3 mg, 3 mg, IntraMUSCular, Q6H PRN, Óscar Champion MD    traZODone (DESYREL) tablet 50 mg, 50 mg, Oral, Nightly PRN, Óscar Champion MD    divalproex (DEPAKOTE SPRINKLE) capsule 250 mg, 250 mg, Oral, 2 times per day, Gonzalo Setting, APRN - CNP, 250 mg at 02/21/22 0615    escitalopram (LEXAPRO) tablet 5 mg, 5 mg, Oral, Daily, Gonzalo Setting, APRN - CNP, 5 mg at 02/21/22 6352    melatonin tablet 3 mg, 3 mg, Oral, Nightly, Gonzalo Setting, APRN - CNP, 3 mg at 02/20/22 2043    albuterol (PROVENTIL) nebulizer solution 2.5 mg, 2.5 mg, Nebulization, Q4H PRN, Gonzalo Setting, APRN - CNP, 2.5 mg at 02/19/22 1335    ascorbic acid (VITAMIN C) tablet 250 mg, 250 mg, Oral, Daily, Gonzalo Setting, APRN - CNP, 250 mg at 02/21/22 0902    atenolol (TENORMIN) tablet 25 mg, 25 mg, Oral, Daily, Gonzalo Setting, APRN - CNP, 25 mg at 02/21/22 0626    anastrozole (ARIMIDEX) tablet 1 mg, 1 mg, Oral, Daily, Gonzalo Setting, APRN - CNP, 1 mg at 02/21/22 0904    insulin glargine-yfgn (SEMGLEE-YFGN) injection vial 30 Units, 30 Units, SubCUTAneous, Nightly, Gonzalo Setting, APRN - CNP, 30 Units at 02/20/22 2045    famotidine (PEPCID) tablet 20 mg, 20 mg, Oral, Daily, Geraldine Ruffing, APRN - CNP, 20 mg at 02/21/22 4066    metFORMIN (GLUCOPHAGE) tablet 500 mg, 500 mg, Oral, BID, Geraldine Ruffing, APRN - CNP, 500 mg at 02/21/22 0903    ondansetron (ZOFRAN) tablet 4 mg, 4 mg, Oral, Q8H PRN, Geraldine Ruffing, APRN - CNP    pregabalin (LYRICA) capsule 75 mg, 75 mg, Oral, Daily, Geraldine Ruffing, APRN - CNP, 75 mg at 02/21/22 0285    prochlorperazine (COMPAZINE) tablet 10 mg, 10 mg, Oral, Q6H PRN, Geraldine Ruffing, APRN - CNP, 10 mg at 02/19/22 2058    psyllium (METAMUCIL) 58.12 % packet 1 packet, 1 packet, Oral, Daily, Geraldine Ruffing, APRN - CNP, 1 packet at 02/21/22 9341    magnesium oxide (MAG-OX) tablet 400 mg, 400 mg, Oral, BID, Geraldine Ruffing, APRN - CNP, 400 mg at 02/21/22 8686    lidocaine-prilocaine (EMLA) cream, , Topical, PRN, Geraldine Ruffing, APRN - CNP    cholestyramine [place] Lob) packet 4 g, 1 packet, Oral, BID, Geraldine Ruffing, APRN - CNP, 4 g at 02/21/22 5318    white petrolatum ointment, , Topical, BID PRN, Geraldine Ruffing, APRN - CNP      Examination:  /60   Pulse 84   Temp 97.7 °F (36.5 °C) (Temporal)   Resp 16   Ht 6' 1\" (1.854 m)   Wt 151 lb (68.5 kg)   LMP 08/13/2013   SpO2 95%   BMI 19.92 kg/m²   Gait - steady  Medication side effects(SE): Denies    Mental Status Examination:    Level of consciousness:  within normal limits   Appearance:  fair grooming and fair hygiene  Behavior/Motor:  no abnormalities noted  Attitude toward examiner:  cooperative  Speech:  spontaneous, normal rate and normal volume   Mood: \" My mood is good. \"  Affect: Appropriate and pleasant  Thought processes: Linear without flight of ideas loose associations  Thought content: Devoid of any auditory visual hallucinations delusions or other perceptual abnormalities.   Denies SI/HI intent or plan  Cognition:  oriented to person, place, and time   Concentration intact  Insight poor   Judgement poor ASSESSMENT:   Patient symptoms are:  [] Well controlled  [x] Improving  [] Worsening  [] No change      Diagnosis:   Principal Problem:    Major depressive disorder, recurrent episode, severe with mixed features ([facility] [address]  Active Problems:    Personal history of breast cancer    Depression  Resolved Problems:    Current severe episode of major depressive disorder without psychotic features without [facility]      LABS:    No results for input(s): WBC, HGB, PLT in the last 72 hours. No results for input(s): NA, K, CL, CO2, BUN, CREATININE, GLUCOSE in the last 72 hours. No results for input(s): BILITOT, ALKPHOS, AST, ALT in the last 72 hours. Lab Results   Component Value Date    ETOH <10 02/14/2022     Lab Results   Component Value Date    TSH 1.470 01/10/2018     No results found for: LITHIUM  No results found for: VALPROATE, CBMZ      Treatment Plan:  The patient's diagnosis, treatment plan, medication management were formulated after patient was seen directly by the attending physician and myself and all relevant documentation was reviewed. The patient was referred to outpatient/inpatient substance abuse rehabilitation programming. She was educated multiple times during the hospitalization that if she chooses to continue to use drugs or alcohol, she may act out impulsively, resulting in serious harm to self or others even though unintentional.  She was also educated that mental health treatment cannot be optimized with ongoing use of drugs or alcohol she demonstrated understanding and has the capacity to understand that.        Risk, benefit, side effects, possible outcomes of the medication and alternatives discussed with the patient and the patient demonstrated understanding.   The patient was also educated that the outcome of treatment will depend on the medication compliance as directed by the prescribers along with regular follow-up, compliance with the labs and other work-up, as clinically indicated.     Risk Management: Based on the diagnosis and assessment biopsychosocial treatment model was presented to the patient and was given the opportunity to ask any question. The patient was agreeable to the plan and all the patient's questions were answered to the patient's satisfaction. I discussed with the patient the risk, benefit, alternative and common side effects for the proposed medication treatment. The patient is consenting to this treatment.      Collateral Information:  Will obtain collateral information from the family or friends. Will obtain medical records as appropriate from out patient providers  Will consult the hospitalist for a physical exam to rule out any co-morbid physical condition.     Home medication Reconciled   Reviewed continued as clinically indicated  Will consult hemo/onc for recommendations and follow up Appreciate their help. New Medications started during this admission :    Depakote sprinkle 250 mg twice daily for mood stabilization  Lexapro 5 mg daily will increase to 10 mg daily  Melatonin 3 mg nightly for sleep     Prn Haldol 5mg and Vistaril 50mg q6hr for extreme agitation. Trazodone as ordered for insomnia  Vistaril as ordered for anxiety       Collateral information: Social work  CD evaluation  Encourage patient to attend group and other milieu activities. Discharge planning discussed with the patient and treatment team.      PSYCHOTHERAPY/COUNSELING:  [x] Therapeutic interview  [x] Supportive  [] CBT  [] Ongoing  [] Other    [x] Patient continues to need, on a daily basis, active treatment furnished directly by or requiring the supervision of inpatient psychiatric personnel      Anticipated Length of stay: 3 to 7 days based on stability       NOTE: This report was transcribed using voice recognition software. Every effort was made to ensure accuracy; however, inadvertent computerized transcription errors may be present.   Electronically signed by Sunday Pereira Lobo Jordan CNP on 2/21/2022 at 10:01 AM

## 2022-02-21 NOTE — PLAN OF CARE
Patient up and about the unit in wheelchair smiling and laughing and social with peers. Patient denies SI/HI AVH depression and anxiety. Patient compliant with medications and groups. Will continue to monitor and assess q 15 min for safety throughout the shift.     Problem: Altered Mood, Depressive Behavior:  Goal: Able to verbalize acceptance of life and situations over which he or she has no control  Description: Able to verbalize acceptance of life and situations over which he or she has no control  Outcome: Ongoing     Problem: Altered Mood, Depressive Behavior:  Goal: Ability to disclose and discuss suicidal ideas will improve  Description: Ability to disclose and discuss suicidal ideas will improve  2/20/2022 2115 by Jadiel Gastelum RN  Outcome: Ongoing     Problem: Altered Mood, Depressive Behavior:  Goal: Absence of self-harm  Description: Absence of self-harm  Outcome: Ongoing     Problem: Depressive Behavior With or Without Suicide Precautions:  Goal: Able to verbalize acceptance of life and situations over which he or she has no control  Description: Able to verbalize acceptance of life and situations over which he or she has no control  Outcome: Ongoing     Problem: Depressive Behavior With or Without Suicide Precautions:  Goal: Ability to disclose and discuss suicidal ideas will improve  Description: Ability to disclose and discuss suicidal ideas will improve  Outcome: Ongoing     Problem: Falls - Risk of:  Goal: Will remain free from falls  Description: Will remain free from falls  Outcome: Ongoing

## 2022-02-21 NOTE — PLAN OF CARE
Patient has been on the unit most of the shift. She is brighten during conversations and had been hopeful of discharge. She was social with females who have since been discharged. After they left patient has been quiet  to self with a sad affect. Patient appears to have poor insight into the severity of her suicide attempt. She denies current SI, HI or hallucinations.     Problem: Altered Mood, Depressive Behavior:  Goal: Able to verbalize and/or display a decrease in depressive symptoms  Description: Able to verbalize and/or display a decrease in depressive symptoms  Outcome: Met This Shift  Goal: Absence of self-harm  Description: Absence of self-harm  Outcome: Met This Shift     Problem: Depressive Behavior With or Without Suicide Precautions:  Goal: Able to verbalize and/or display a decrease in depressive symptoms  Description: Able to verbalize and/or display a decrease in depressive symptoms  Outcome: Met This Shift     Problem: Falls - Risk of:  Goal: Absence of physical injury  Description: Absence of physical injury  Outcome: Met This Shift

## 2022-02-21 NOTE — GROUP NOTE
Group Therapy Note    Date: 2/21/2022    Group Start Time: 1105  Group End Time: 6439  Group Topic: Psychotherapy    [facility]    JULIO Hunter, [facility]        Group Therapy Note    Attendees: 7         Patient's Goal:  Pt stated that she would like to \"listen to others\" while in group today. Notes:  Pt participated in group and made connections with other group members. Status After Intervention:  Improved    Participation Level:  Active Listener and Interactive    Participation Quality: Appropriate, Attentive and Sharing      Speech:  normal      Thought Process/Content: Logical      Affective Functioning: Congruent      Mood: anxious      Level of consciousness:  Alert, Oriented x4 and Attentive      Response to Learning: Able to verbalize current knowledge/experience, Able to verbalize/acknowledge new learning, Able to retain information and Capable of insight      Endings: None Reported    Modes of Intervention: Education, Support, Socialization, Exploration, Clarifying and Problem-solving      Discipline Responsible: /Counselor      Signature:  JULIO Hunter, [facility]

## 2022-02-22 LAB — METER GLUCOSE: 186 MG/DL (ref 74–99)

## 2022-02-22 PROCEDURE — 6370000000 HC RX 637 (ALT 250 FOR IP): Performed by: NURSE PRACTITIONER

## 2022-02-22 PROCEDURE — 99231 SBSQ HOSP IP/OBS SF/LOW 25: CPT | Performed by: NURSE PRACTITIONER

## 2022-02-22 PROCEDURE — 82962 GLUCOSE BLOOD TEST: CPT

## 2022-02-22 PROCEDURE — 1240000000 HC EMOTIONAL WELLNESS R&B

## 2022-02-22 RX ADMIN — PREGABALIN 75 MG: 75 CAPSULE ORAL at 08:44

## 2022-02-22 RX ADMIN — OXYCODONE HYDROCHLORIDE AND ACETAMINOPHEN 250 MG: 500 TABLET ORAL at 08:45

## 2022-02-22 RX ADMIN — METFORMIN HYDROCHLORIDE 500 MG: 500 TABLET ORAL at 17:35

## 2022-02-22 RX ADMIN — FAMOTIDINE 20 MG: 20 TABLET ORAL at 08:44

## 2022-02-22 RX ADMIN — CHOLESTYRAMINE 4 G: 4 POWDER, FOR SUSPENSION ORAL at 08:43

## 2022-02-22 RX ADMIN — Medication 3 MG: at 20:42

## 2022-02-22 RX ADMIN — INSULIN GLARGINE-YFGN 30 UNITS: 100 INJECTION, SOLUTION SUBCUTANEOUS at 20:42

## 2022-02-22 RX ADMIN — ESCITALOPRAM 5 MG: 10 TABLET, FILM COATED ORAL at 08:44

## 2022-02-22 RX ADMIN — CHOLESTYRAMINE 4 G: 4 POWDER, FOR SUSPENSION ORAL at 20:41

## 2022-02-22 RX ADMIN — DIVALPROEX SODIUM 250 MG: 125 CAPSULE, COATED PELLETS ORAL at 20:42

## 2022-02-22 RX ADMIN — ANASTROZOLE 1 MG: 1 TABLET ORAL at 08:45

## 2022-02-22 RX ADMIN — METFORMIN HYDROCHLORIDE 500 MG: 500 TABLET ORAL at 08:45

## 2022-02-22 RX ADMIN — DIVALPROEX SODIUM 250 MG: 125 CAPSULE, COATED PELLETS ORAL at 08:44

## 2022-02-22 RX ADMIN — Medication 400 MG: at 20:42

## 2022-02-22 RX ADMIN — PSYLLIUM HUSK 1 PACKET: 3.4 POWDER ORAL at 08:47

## 2022-02-22 RX ADMIN — Medication 400 MG: at 08:43

## 2022-02-22 ASSESSMENT — PAIN SCALES - GENERAL
PAINLEVEL_OUTOF10: 0
PAINLEVEL_OUTOF10: 0

## 2022-02-22 NOTE — PLAN OF CARE
Problem: Altered Mood, Depressive Behavior:  Goal: Able to verbalize acceptance of life and situations over which he or she has no control  Description: Able to verbalize acceptance of life and situations over which he or she has no control  Outcome: Ongoing  Goal: Able to verbalize and/or display a decrease in depressive symptoms  Description: Able to verbalize and/or display a decrease in depressive symptoms  Outcome: Ongoing  Goal: Ability to disclose and discuss suicidal ideas will improve  Description: Ability to disclose and discuss suicidal ideas will improve  Outcome: Ongoing  Goal: Able to verbalize support systems  Description: Able to verbalize support systems  Outcome: Ongoing  Goal: Absence of self-harm  Description: Absence of self-harm  2/22/2022 1203 by Hakan Yusuf RN  Outcome: Ongoing  2/22/2022 0510 by Royal Shipley  Outcome: Met This Shift  Goal: Patient specific goal  Description: Patient specific goal  Outcome: Ongoing  Goal: Participates in care planning  Description: Participates in care planning  Outcome: Ongoing     Problem: Depressive Behavior With or Without Suicide Precautions:  Goal: Able to verbalize acceptance of life and situations over which he or she has no control  Description: Able to verbalize acceptance of life and situations over which he or she has no control  Outcome: Ongoing  Goal: Able to verbalize and/or display a decrease in depressive symptoms  Description: Able to verbalize and/or display a decrease in depressive symptoms  Outcome: Ongoing  Goal: Ability to disclose and discuss suicidal ideas will improve  Description: Ability to disclose and discuss suicidal ideas will improve  Outcome: Ongoing  Goal: Able to verbalize support systems  Description: Able to verbalize support systems  Outcome: Ongoing  Goal: Absence of self-harm  Description: Absence of self-harm  2/22/2022 1203 by Hakan Yusuf RN  Outcome: Ongoing  2/22/2022 0510 by Jarred Sim Marisol Melara  Outcome: Met This Shift  Goal: Patient specific goal  Description: Patient specific goal  Outcome: Ongoing  Goal: Participates in care planning  Description: Participates in care planning  Outcome: Ongoing     Problem: Falls - Risk of:  Goal: Will remain free from falls  Description: Will remain free from falls  Outcome: Ongoing  Goal: Absence of physical injury  Description: Absence of physical injury  Outcome: Ongoing   Pt ppleasant and cooperative. Social with peers and attending groups. Denies SI/HI or AVH.  Pt rates depression and anxiety 0/10, feels she is ready for discharge,

## 2022-02-22 NOTE — PROGRESS NOTES
BEHAVIORAL HEALTH FOLLOW-UP NOTE     2/22/2022     Patient was seen and examined in person, Chart reviewed   Patient's case discussed with staff/team    Chief Complaint: \" I want to know when I can go home, I have things to do\"    Interim History: Patient is seen in the day room, and assessed by myself and Dr Diana Crabtree today. She continues to minimize the circumstances of her admission. She remains discharge focused, stating that she has things to do. She has poor insight into need for treatment and stabilization. Affect is brighter somewhat exaggerated, she continues to vehemently deny SI/HI intent or plan denies any auditory visual hallucinations. She is denying that she drank antifreeze but is now admitting to ingesting pills. She states that her daughter must have told the ER that she drank antifreeze. States she plans to live with a friend on discharge. Per the chart she is attended some groups. She is taking her medications, but continues to demonstrate poor understanding of why she is psychiatrically hospitalized.      Appetite:   [x] Normal/Unchanged  [] Increased  [] Decreased      Sleep:       [x] Normal/Unchanged  [] Fair       [] Poor              Energy:    [x] Normal/Unchanged  [] Increased  [] Decreased        SI [] Present  [x] Absent    HI  []Present  [x] Absent     Aggression:  [] yes  [x] no    Patient is [x] able  [] unable to CONTRACT FOR SAFETY     PAST MEDICAL/PSYCHIATRIC HISTORY:   Past Medical History:   Diagnosis Date    Anxiety     Arthritis     [facility] 2021    breast    Cervical radiculopathy     Chronic back pain     Dehydration 9/23/2021    Dehydration 9/23/2021    Depression     Diabetes mellitus type 2, uncontrolled ([facility] 2/12/2017    GERD (gastroesophageal reflux disease)     History of blood transfusion 01/2018    back surgery, lumbar, dr Brandy Tanner    Hypertension     Neuropathy     Right leg pain     seeing doctor currently problems with hardware FAMILY/SOCIAL HISTORY:  Family History   Problem Relation Age of Onset    Diabetes Mother      Social History     Socioeconomic History    Marital status:      Spouse name: Not on file    Number of children: Not on file    Years of education: Not on file    Highest education level: Not on file   Occupational History    Not on file   Tobacco Use    Smoking status: Former Smoker     Packs/day: 0.50     Years: 10.00     Pack years: 5.00     Types: Cigarettes     Quit date: 3/15/2015     Years since quittin.9    Smokeless tobacco: Never Used    Tobacco comment: stop    Vaping Use    Vaping Use: Never used   Substance and Sexual Activity    Alcohol use: No    Drug use: No    Sexual activity: Not Currently   Other Topics Concern    Not on file   Social History Narrative    Not on file     Social Determinants of Health     Financial Resource Strain:     Difficulty of Paying Living Expenses: Not on file   Food Insecurity:     Worried About Running Out of Food in the Last Year: Not on file    Shaquille of Food in the Last Year: Not on file   Transportation Needs:     Lack of Transportation (Medical): Not on file    Lack of Transportation (Non-Medical):  Not on file   Physical Activity:     Days of Exercise per Week: Not on file    Minutes of Exercise per Session: Not on file   Stress:     Feeling of Stress : Not on file   Social Connections:     Frequency of Communication with Friends and Family: Not on file    Frequency of Social Gatherings with Friends and Family: Not on file    Attends Scientologist Services: Not on file    Active Member of Clubs or Organizations: Not on file    Attends Club or Organization Meetings: Not on file    Marital Status: Not on file   Intimate Partner Violence:     Fear of Current or Ex-Partner: Not on file    Emotionally Abused: Not on file    Physically Abused: Not on file    Sexually Abused: Not on file   Housing Stability:     Unable to Pay for Housing in the Last Year: Not on file    Number of Places Lived in the Last Year: Not on file    Unstable Housing in the Last Year: Not on file           ROS:  [x] All negative/unchanged except if checked.  Explain positive(checked items) below:  [] Constitutional  [] Eyes  [] Ear/Nose/Mouth/Throat  [] Respiratory  [] CV  [] GI  []   [] Musculoskeletal  [] Skin/Breast  [] Neurological  [] Endocrine  [] Heme/Lymph  [] Allergic/Immunologic    Explanation:     MEDICATIONS:    Current Facility-Administered Medications:     nicotine (NICODERM CQ) 21 MG/24HR 1 patch, 1 patch, TransDERmal, Daily, Kelle Bottom Dellick, APRN - CNP, 1 patch at 02/22/22 0846    acetaminophen (TYLENOL) tablet 650 mg, 650 mg, Oral, Q4H PRN, Aniceto aMrtinez MD, 650 mg at 02/19/22 1840    magnesium hydroxide (MILK OF MAGNESIA) 400 MG/5ML suspension 30 mL, 30 mL, Oral, Daily PRN, Aniceto Martinez MD    aluminum & magnesium hydroxide-simethicone (MAALOX) 200-200-20 MG/5ML suspension 30 mL, 30 mL, Oral, PRN, Aniceto Martinez MD    hydrOXYzine (ATARAX) tablet 50 mg, 50 mg, Oral, TID PRN, Aniceto Martinez MD, 50 mg at 02/18/22 1650    haloperidol (HALDOL) tablet 3 mg, 3 mg, Oral, Q6H PRN **OR** haloperidol lactate (HALDOL) injection 3 mg, 3 mg, IntraMUSCular, Q6H PRN, Aniceto Martinez MD    traZODone (DESYREL) tablet 50 mg, 50 mg, Oral, Nightly PRN, Aniceto Martinez MD    divalproex (DEPAKOTE SPRINKLE) capsule 250 mg, 250 mg, Oral, 2 times per day, WES Holt - CNP, 250 mg at 02/22/22 0844    escitalopram (LEXAPRO) tablet 5 mg, 5 mg, Oral, Daily, WES Holt - CNP, 5 mg at 02/22/22 0844    melatonin tablet 3 mg, 3 mg, Oral, Nightly, WES Holt - CNP, 3 mg at 02/21/22 2024    albuterol (PROVENTIL) nebulizer solution 2.5 mg, 2.5 mg, Nebulization, Q4H PRN, WES Holt - CNP, 2.5 mg at 02/19/22 1335    ascorbic acid (VITAMIN C) tablet 250 mg, 250 mg, Oral, Daily, WES Holt - CNP, 250 mg at 02/22/22 0845    atenolol (TENORMIN) tablet 25 mg, 25 mg, Oral, Daily, Stormy Tristan, APRN - CNP, 25 mg at 02/21/22 7344    anastrozole (ARIMIDEX) tablet 1 mg, 1 mg, Oral, Daily, Stormy Tristan, APRN - CNP, 1 mg at 02/22/22 0845    insulin glargine-yfgn (SEMGLEE-YFGN) injection vial 30 Units, 30 Units, SubCUTAneous, Nightly, Stormy Tristan, APRN - CNP, 30 Units at 02/21/22 2021    famotidine (PEPCID) tablet 20 mg, 20 mg, Oral, Daily, Stormy Tristan, APRN - CNP, 20 mg at 02/22/22 0844    metFORMIN (GLUCOPHAGE) tablet 500 mg, 500 mg, Oral, BID, Stormy Tristan, APRN - CNP, 500 mg at 02/22/22 0845    ondansetron (ZOFRAN) tablet 4 mg, 4 mg, Oral, Q8H PRN, Stormy Tristan, APRN - CNP    pregabalin (LYRICA) capsule 75 mg, 75 mg, Oral, Daily, Stormy Tristan, APRN - CNP, 75 mg at 02/22/22 0844    prochlorperazine (COMPAZINE) tablet 10 mg, 10 mg, Oral, Q6H PRN, Stormy Tristan, APRN - CNP, 10 mg at 02/19/22 2058    psyllium (METAMUCIL) 58.12 % packet 1 packet, 1 packet, Oral, Daily, Stormy Tristan, APRN - CNP, 1 packet at 02/22/22 0847    magnesium oxide (MAG-OX) tablet 400 mg, 400 mg, Oral, BID, Stormy Tristan, APRN - CNP, 400 mg at 02/22/22 7468    lidocaine-prilocaine (EMLA) cream, , Topical, PRN, Stormy Tristan, APRN - CNP    cholestyramine Rawesau Arauzard) packet 4 g, 1 packet, Oral, BID, Stormy Tristan, APRN - CNP, 4 g at 02/22/22 7350    white petrolatum ointment, , Topical, BID PRN, Stormy Tristan, APRN - CNP      Examination:  BP (!) 97/53   Pulse 74   Temp 97.9 °F (36.6 °C) (Temporal)   Resp 16   Ht 6' 1\" (1.854 m)   Wt 151 lb (68.5 kg)   LMP 08/13/2013   SpO2 97%   BMI 19.92 kg/m²   Gait - steady  Medication side effects(SE): Denies    Mental Status Examination:    Level of consciousness:  within normal limits   Appearance:  fair grooming and fair hygiene  Behavior/Motor:  no abnormalities noted  Attitude toward examiner:  cooperative  Speech:  spontaneous, normal rate and normal volume   Mood: \" My mood is good. \"  Affect: Appropriate and pleasant  Thought processes: Linear without flight of ideas loose associations  Thought content: Devoid of any auditory visual hallucinations delusions or other perceptual abnormalities. Denies SI/HI intent or plan  Cognition:  oriented to person, place, and time   Concentration intact  Insight poor   Judgement poor     ASSESSMENT:   Patient symptoms are:  [] Well controlled  [x] Improving  [] Worsening  [] No change      Diagnosis:   Principal Problem:    Major depressive disorder, recurrent episode, severe with mixed features ([facility]  Active Problems:    Personal history of breast cancer    Depression  Resolved Problems:    Current severe episode of major depressive disorder without psychotic features without prior episode ([facility]      LABS:    No results for input(s): WBC, HGB, PLT in the last 72 hours. No results for input(s): NA, K, CL, CO2, BUN, CREATININE, GLUCOSE in the last 72 hours. No results for input(s): BILITOT, ALKPHOS, AST, ALT in the last 72 hours. Lab Results   Component Value Date    ETOH <10 02/14/2022     Lab Results   Component Value Date    TSH 1.470 01/10/2018     No results found for: LITHIUM  No results found for: VALPROATE, CBMZ      Treatment Plan:  The patient's diagnosis, treatment plan, medication management were formulated after patient was seen directly by the attending physician and myself and all relevant documentation was reviewed. The patient was referred to outpatient/inpatient substance abuse rehabilitation programming.   She was educated multiple times during the hospitalization that if she chooses to continue to use drugs or alcohol, she may act out impulsively, resulting in serious harm to self or others even though unintentional.  She was also educated that mental health treatment cannot be optimized with ongoing use of drugs or alcohol she demonstrated understanding and has the capacity to understand that.        Risk, benefit, side effects, possible outcomes of the medication and alternatives discussed with the patient and the patient demonstrated understanding. The patient was also educated that the outcome of treatment will depend on the medication compliance as directed by the prescribers along with regular follow-up, compliance with the labs and other work-up, as clinically indicated.     Risk Management: Based on the diagnosis and assessment biopsychosocial treatment model was presented to the patient and was given the opportunity to ask any question. The patient was agreeable to the plan and all the patient's questions were answered to the patient's satisfaction. I discussed with the patient the risk, benefit, alternative and common side effects for the proposed medication treatment. The patient is consenting to this treatment.      Collateral Information:  Will obtain collateral information from the family or friends. Will obtain medical records as appropriate from out patient providers  Will consult the hospitalist for a physical exam to rule out any co-morbid physical condition.     Home medication Reconciled   Reviewed continued as clinically indicated  Will consult hemo/onc for recommendations and follow up Appreciate their help. New Medications started during this admission :    Depakote sprinkle 250 mg twice daily for mood stabilization  Lexapro 5 mg daily will increase to 10 mg daily  Melatonin 3 mg nightly for sleep     Prn Haldol 5mg and Vistaril 50mg q6hr for extreme agitation. Trazodone as ordered for insomnia  Vistaril as ordered for anxiety       Collateral information: Social work  CD evaluation  Encourage patient to attend group and other milieu activities.   Discharge planning discussed with the patient and treatment team.      PSYCHOTHERAPY/COUNSELING:  [x] Therapeutic interview  [x] Supportive  [] CBT  [] Ongoing  [] Other    [x] Patient continues to need, on a daily basis, active treatment furnished directly by or requiring the supervision of inpatient psychiatric personnel      Anticipated Length of stay: 3 to 7 days based on stability       NOTE: This report was transcribed using voice recognition software. Every effort was made to ensure accuracy; however, inadvertent computerized transcription errors may be present.   Electronically signed by WES Hernandez CNP on 2/22/2022 at 3:27 PM

## 2022-02-22 NOTE — GROUP NOTE
Group Therapy Note    Date: 2/22/2022    Group Start Time: 1000  Group End Time: 4526  Group Topic: Psychoeducation    [facility]  [place], [facility]        Group Therapy Note    Attendees: 10         Patient's Goal:  Continue to learn and get to my chemo appt. Notes: Active and engaged during discussion on maintaining motivation. Pleasant in sharing experiences with peers. Status After Intervention:  Improved    Participation Level:  Active Listener and Interactive    Participation Quality: Appropriate, Attentive and Sharing      Speech:  normal      Thought Process/Content: Logical      Affective Functioning: Congruent      Mood: euthymic      Level of consciousness:  Alert and Attentive      Response to Learning: Capable of insight, Able to change behavior and Progressing to goal      Endings: None Reported    Modes of Intervention: Education, Support, Socialization and Exploration      Discipline Responsible: Psychoeducational Specialist      Signature:  Bebeto Pearson

## 2022-02-22 NOTE — PLAN OF CARE
Pt denies SI, HI and hallucinations. Pt denies feeling depressed. Pt has focused on being discharged as voices being worried about making it to her treatment on Friday. Pt has been out on unit most of evening, social with select peers. Calm, pleasant and cooperative. Medication compliant.    Problem: Altered Mood, Depressive Behavior:  Goal: Able to verbalize and/or display a decrease in depressive symptoms  Description: Able to verbalize and/or display a decrease in depressive symptoms  2/21/2022 2149 by Cynthia Park  Outcome: Met This Shift     Problem: Altered Mood, Depressive Behavior:  Goal: Ability to disclose and discuss suicidal ideas will improve  Description: Ability to disclose and discuss suicidal ideas will improve  Outcome: Met This Shift     Problem: Altered Mood, Depressive Behavior:  Goal: Able to verbalize support systems  Description: Able to verbalize support systems  Outcome: Met This Shift     Problem: Altered Mood, Depressive Behavior:  Goal: Absence of self-harm  Description: Absence of self-harm  2/21/2022 2149 by Cynthia Park  Outcome: Met This Shift

## 2022-02-22 NOTE — GROUP NOTE
Group Therapy Note    Date: 2/22/2022    Group Start Time: 1100  Group End Time: 1150  Group Topic: Psychotherapy    [facility]    JULIO Allen LSW        Group Therapy Note    Attendees: 7         Patient's Goal:  Karolyn Foil patience with myself and others\"    Notes:  Patient attended whole group and participated well    Status After Intervention:  Improved    Participation Level:  Active Listener and Interactive    Participation Quality: Appropriate, Attentive, Sharing and Supportive      Speech:  normal and hesitant      Thought Process/Content: Logical  Linear      Affective Functioning: Congruent      Mood: anxious      Level of consciousness:  Alert and Oriented x4      Response to Learning: Able to verbalize current knowledge/experience      Endings: None Reported    Modes of Intervention: Education, Support, Socialization, Exploration, Clarifying and Problem-solving      Discipline Responsible: /Counselor      Signature:  JULIO Allen LSW

## 2022-02-22 NOTE — PLAN OF CARE
Problem: Depressive Behavior With or Without Suicide Precautions:  Goal: Absence of self-harm  Description: Absence of self-harm  Outcome: Met This Shift

## 2022-02-23 LAB — METER GLUCOSE: 193 MG/DL (ref 74–99)

## 2022-02-23 PROCEDURE — 82962 GLUCOSE BLOOD TEST: CPT

## 2022-02-23 PROCEDURE — 1240000000 HC EMOTIONAL WELLNESS R&B

## 2022-02-23 PROCEDURE — 6370000000 HC RX 637 (ALT 250 FOR IP): Performed by: NURSE PRACTITIONER

## 2022-02-23 RX ADMIN — PSYLLIUM HUSK 1 PACKET: 3.4 POWDER ORAL at 09:35

## 2022-02-23 RX ADMIN — ANASTROZOLE 1 MG: 1 TABLET ORAL at 09:36

## 2022-02-23 RX ADMIN — OXYCODONE HYDROCHLORIDE AND ACETAMINOPHEN 250 MG: 500 TABLET ORAL at 09:36

## 2022-02-23 RX ADMIN — DIVALPROEX SODIUM 250 MG: 125 CAPSULE, COATED PELLETS ORAL at 21:18

## 2022-02-23 RX ADMIN — ESCITALOPRAM 5 MG: 10 TABLET, FILM COATED ORAL at 09:35

## 2022-02-23 RX ADMIN — PREGABALIN 75 MG: 75 CAPSULE ORAL at 09:35

## 2022-02-23 RX ADMIN — DIVALPROEX SODIUM 250 MG: 125 CAPSULE, COATED PELLETS ORAL at 09:35

## 2022-02-23 RX ADMIN — INSULIN GLARGINE-YFGN 30 UNITS: 100 INJECTION, SOLUTION SUBCUTANEOUS at 21:17

## 2022-02-23 RX ADMIN — METFORMIN HYDROCHLORIDE 500 MG: 500 TABLET ORAL at 17:18

## 2022-02-23 RX ADMIN — FAMOTIDINE 20 MG: 20 TABLET ORAL at 09:36

## 2022-02-23 RX ADMIN — Medication 400 MG: at 09:35

## 2022-02-23 RX ADMIN — Medication 400 MG: at 21:18

## 2022-02-23 RX ADMIN — CHOLESTYRAMINE 4 G: 4 POWDER, FOR SUSPENSION ORAL at 21:18

## 2022-02-23 RX ADMIN — Medication 3 MG: at 21:18

## 2022-02-23 RX ADMIN — METFORMIN HYDROCHLORIDE 500 MG: 500 TABLET ORAL at 09:36

## 2022-02-23 RX ADMIN — CHOLESTYRAMINE 4 G: 4 POWDER, FOR SUSPENSION ORAL at 09:35

## 2022-02-23 NOTE — GROUP NOTE
Group Therapy Note    Date: 2/23/2022    Group Start Time: 1010  Group End Time: 1845  Group Topic: Psychoeducation    [facility]  [address] [address], [address]                                                                          Group Therapy Note    Date: 2/23/2022    Type of Group: Psychoeducation    Wellness Binder Information  Module Name: steps to destress  Patient's Goal: patient will be able to identify what one can do to decrease daily stressors. Notes:  pleasant and sharing when prompted. Willing to share daily stressors. Status After Intervention:  Improved    Participation Level:  Active Listener and Interactive    Participation Quality: Appropriate, Attentive, Sharing, and Supportive      Speech:normal       Thought Process/Content: Logical      Affective Functioning: Congruent      Mood: euthymic      Level of consciousness:  Alert, Oriented x4, and Attentive      Response to Learning: Able to verbalize/acknowledge new learning, Able to retain information, and Progressing to goal      Endings: None Reported    Modes of Intervention: Education, Support, Socialization, Exploration, and Problem-solving      Discipline Responsible: Psychoeducational Specialist      Signature:  Ana Hernandez

## 2022-02-23 NOTE — PROGRESS NOTES
Attended morning community meeting. Updated on staffing and daily expectations. Shared goal for the day as to continue to attend groups and learn.

## 2022-02-23 NOTE — PROGRESS NOTES
Spiritual Support Group Note    Number of Participants in Group:   7                     Time: 2pm    Goal: Relief from isolation and loneliness             Frannie Sharing             Self-understanding and gain insight              Acceptance and belonging            Recognize they are not alone                Socialization             Empowerment       Encouragement    Topic:  [x] Spiritual Wellness and Self Care                  [] Hope                     [] Connecting with Divine/Others        [] Thankfulness and Gratitude               []  Meaningfulness and Purpose               [x] Forgiveness               [] Peace               [] Connect to [employer]      [] Other    Participation Level:   [x] Active Listener   [] Minimal   [] Monopolizing   [] Interactive   [] No Participation   []  Other:     Attention:   [x] Alert   [] Distractible   [] Drowsy   [] Poor   [] Other:    Manner:   [x] Cooperative   [] Suspicious   [] Withdrawn   [] Guarded   [] Irritable   [] Inhospitable   [] Other:     Others Comments from Group:

## 2022-02-23 NOTE — PROGRESS NOTES
Attended afternoon meet and greet. Participated in logos trivia with others. Patient was 1 of 11 in attendance.

## 2022-02-23 NOTE — PLAN OF CARE
Patient calm, cooperative, pleasant. Affect bright, social with staff and peers. Denies SI/HI/AVH and depression/anxiety. Compliant with medications and in control of behaviors. Will continue to monitor and provide support.      Problem: Depressive Behavior With or Without Suicide Precautions:  Goal: Absence of self-harm  Description: Absence of self-harm  2/22/2022 2207 by Nery Sharif RN  Outcome: Met This Shift     Problem: Altered Mood, Depressive Behavior:  Goal: Able to verbalize acceptance of life and situations over which he or she has no control  Description: Able to verbalize acceptance of life and situations over which he or she has no control  2/22/2022 2207 by Nery Sharif RN  Outcome: Ongoing     Problem: Altered Mood, Depressive Behavior:  Goal: Able to verbalize and/or display a decrease in depressive symptoms  Description: Able to verbalize and/or display a decrease in depressive symptoms  2/22/2022 2207 by Nery Sharif RN  Outcome: Ongoing     Problem: Altered Mood, Depressive Behavior:  Goal: Ability to disclose and discuss suicidal ideas will improve  Description: Ability to disclose and discuss suicidal ideas will improve  2/22/2022 2207 by Nery Sharif RN  Outcome: Ongoing

## 2022-02-23 NOTE — CARE COORDINATION
Cherelle contacted pt friend CURTIS . James Bloom has talked with pt and feels she sounds good. He feels that pt is ready for discharge, expressed no concerns for her at this time. James Bloom reports he will be able to pick pt up at discharge.

## 2022-02-23 NOTE — PLAN OF CARE
Patient A&Ox4. Pt denies SI, HI, hallucinations, anxiety and depression. Patient is pleasant and cooperative with a bright affect. Pt is visible on the unit, attends group and is social with peers and staff. Pt is med. & meal compliant. Pt with even and unlabored breathing, no signs of acute physical distress noted. Will continue to monitor and offer support as needed.     Problem: Altered Mood, Depressive Behavior:  Goal: Ability to disclose and discuss suicidal ideas will improve  Description: Ability to disclose and discuss suicidal ideas will improve  Outcome: Met This Shift     Problem: Altered Mood, Depressive Behavior:  Goal: Absence of self-harm  Description: Absence of self-harm  Outcome: Met This Shift     Problem: Altered Mood, Depressive Behavior:  Goal: Participates in care planning  Description: Participates in care planning  Outcome: Met This Shift

## 2022-02-23 NOTE — PLAN OF CARE
1901 by Vickey Dougherty RN  Outcome: Ongoing  2/22/2022 1203 by Vickey Dougherty RN  Outcome: Ongoing  Goal: Able to verbalize and/or display a decrease in depressive symptoms  Description: Able to verbalize and/or display a decrease in depressive symptoms  2/22/2022 1901 by Vickey Dougherty RN  Outcome: Ongoing  2/22/2022 1203 by Vickey Dougherty RN  Outcome: Ongoing  Goal: Ability to disclose and discuss suicidal ideas will improve  Description: Ability to disclose and discuss suicidal ideas will improve  2/22/2022 1901 by Vickey Dougherty RN  Outcome: Ongoing  2/22/2022 1203 by Vickey Dougherty RN  Outcome: Ongoing  Goal: Able to verbalize support systems  Description: Able to verbalize support systems  2/22/2022 1901 by Vickey Dougherty RN  Outcome: Ongoing  2/22/2022 1203 by Vickey Dougherty RN  Outcome: Ongoing  Goal: Absence of self-harm  Description: Absence of self-harm  2/22/2022 1901 by Vickey Dougherty RN  Outcome: Ongoing  2/22/2022 1203 by Vickey Dougherty RN  Outcome: Ongoing  2/22/2022 0510 by Jammie Mitchell  Outcome: Met This Shift  Goal: Patient specific goal  Description: Patient specific goal  2/22/2022 1901 by Vickey Dougherty RN  Outcome: Ongoing  2/22/2022 1203 by Vickey Dougherty RN  Outcome: Ongoing  Goal: Participates in care planning  Description: Participates in care planning  2/22/2022 1901 by Vickey Dougherty RN  Outcome: Ongoing  2/22/2022 1203 by Vickey Dougherty RN  Outcome: Ongoing     Problem: Falls - Risk of:  Goal: Will remain free from falls  Description: Will remain free from falls  2/22/2022 1901 by Vickey Dougherty RN  Outcome: Ongoing  2/22/2022 1203 by Vickey Dougherty RN  Outcome: Ongoing  Goal: Absence of physical injury  Description: Absence of physical injury  2/22/2022 1901 by Vickey Dougherty RN  Outcome: Ongoing  2/22/2022 1203 by Vickey Dougherty RN  Outcome: Ongoing  Pt out in common area most of day, attending groups, denies SI/HI or AVH.  Discharged focused, minimizes events that took place prior to admission. Pt is med compliant. Rates depression and anxiety 0/10.

## 2022-02-24 VITALS
DIASTOLIC BLOOD PRESSURE: 64 MMHG | WEIGHT: 151 LBS | TEMPERATURE: 97.8 F | RESPIRATION RATE: 16 BRPM | BODY MASS INDEX: 20.45 KG/M2 | HEIGHT: 72 IN | OXYGEN SATURATION: 100 % | HEART RATE: 89 BPM | SYSTOLIC BLOOD PRESSURE: 112 MMHG

## 2022-02-24 PROCEDURE — 99233 SBSQ HOSP IP/OBS HIGH 50: CPT | Performed by: INTERNAL MEDICINE

## 2022-02-24 PROCEDURE — 6370000000 HC RX 637 (ALT 250 FOR IP): Performed by: NURSE PRACTITIONER

## 2022-02-24 RX ORDER — DIVALPROEX SODIUM 125 MG/1
250 CAPSULE, COATED PELLETS ORAL EVERY 12 HOURS SCHEDULED
Qty: 120 CAPSULE | Refills: 0 | Status: SHIPPED | OUTPATIENT
Start: 2022-02-24 | End: 2022-08-17 | Stop reason: ALTCHOICE

## 2022-02-24 RX ORDER — ESCITALOPRAM OXALATE 5 MG/1
5 TABLET ORAL DAILY
Qty: 30 TABLET | Refills: 0 | Status: SHIPPED | OUTPATIENT
Start: 2022-02-25 | End: 2022-08-17 | Stop reason: ALTCHOICE

## 2022-02-24 RX ADMIN — ESCITALOPRAM 5 MG: 10 TABLET, FILM COATED ORAL at 09:42

## 2022-02-24 RX ADMIN — FAMOTIDINE 20 MG: 20 TABLET ORAL at 09:42

## 2022-02-24 RX ADMIN — ANASTROZOLE 1 MG: 1 TABLET ORAL at 09:42

## 2022-02-24 RX ADMIN — CHOLESTYRAMINE 4 G: 4 POWDER, FOR SUSPENSION ORAL at 09:43

## 2022-02-24 RX ADMIN — PREGABALIN 75 MG: 75 CAPSULE ORAL at 09:42

## 2022-02-24 RX ADMIN — DIVALPROEX SODIUM 250 MG: 125 CAPSULE, COATED PELLETS ORAL at 09:42

## 2022-02-24 RX ADMIN — OXYCODONE HYDROCHLORIDE AND ACETAMINOPHEN 250 MG: 500 TABLET ORAL at 09:42

## 2022-02-24 RX ADMIN — Medication 400 MG: at 09:42

## 2022-02-24 RX ADMIN — PSYLLIUM HUSK 1 PACKET: 3.4 POWDER ORAL at 09:42

## 2022-02-24 RX ADMIN — METFORMIN HYDROCHLORIDE 500 MG: 500 TABLET ORAL at 09:42

## 2022-02-24 ASSESSMENT — PAIN SCALES - GENERAL
PAINLEVEL_OUTOF10: 0
PAINLEVEL_OUTOF10: 0

## 2022-02-24 NOTE — GROUP NOTE
Group Therapy Note    Date: 2/24/2022    Group Start Time: 1010  Group End Time: 4286  Group Topic: Psychoeducation    [facility] [address]        Group Therapy Note    Attendees: 11         Patient's Goal:  Make it to my next chemo appointment. Notes: Active and engaged during discussion on gratitude. Pleasant in sharing experiences with peers. Status After Intervention:  Improved    Participation Level:  Active Listener and Interactive    Participation Quality: Appropriate, Attentive, Sharing and Supportive      Speech:  normal      Thought Process/Content: Logical      Affective Functioning: Congruent      Mood: euthymic      Level of consciousness:  Alert, Oriented x4 and Attentive      Response to Learning: Capable of insight, Able to change behavior and Progressing to goal      Endings: None Reported    Modes of Intervention: Education, Support, Socialization and Exploration      Discipline Responsible: Psychoeducational Specialist      Signature:  Neno Caro, [address]

## 2022-02-24 NOTE — BH NOTE
[facility]  Discharge Note    Pt discharged with followings belongings:   Dental Appliances: None  Vision - Corrective Lenses: None  Hearing Aid: None  Jewelry: None  Body Piercings Removed: N/A  Clothing: Pants,Shirt  Were All Patient Medications Collected?: Not Applicable  Other Valuables: Cell phone   Valuables sent home with belong or returned to patient.  Patient education on aftercare instructions:Yes Information faxed to NA by NA  at 4:00 PM .Patient verbalize understanding of AVS: Yes   Status EXAM upon discharge:  Status and Exam  Normal: Yes  Facial Expression: Brightened  Affect: Congruent  Level of Consciousness: Alert  Mood:Normal: No  Mood: Anxious  Motor Activity:Normal: Yes  Interview Behavior: Cooperative  Preception: [place] to Person,[place] to Time,[place] to Place,[place] to Situation  Attention:Normal: Yes  Thought Processes: Circumstantial  Thought Content:Normal: Yes  Hallucinations: None  Delusions: No  Memory:Normal: Yes  Insight and Judgment: No  Insight and Judgment: Poor Insight,Poor Judgment  Present Suicidal Ideation: No  Present Homicidal Ideation: No      Metabolic Screening:    Lab Results   Component Value Date    LABA1C 6.2 (H) 02/07/2022    LABA1C 6.3 (H) 02/07/2022       Lab Results   Component Value Date    CHOL 168 03/20/2012    CHOL 152 09/29/2011    CHOL 181 08/08/2011     Lab Results   Component Value Date    TRIG 129 03/20/2012    TRIG 117 09/29/2011    TRIG 194 (H) 08/08/2011     Lab Results   Component Value Date    HDL 59.0 03/20/2012    HDL 59.5 09/29/2011    HDL 59.0 08/08/2011     No components found for: LDLCAL  No results found for: Sarabjit Carrera RN

## 2022-02-24 NOTE — CARE COORDINATION
SW met with patient to seek additional collateral. Patient states she only has her friend John Rodriguez as a support and does not want SW reaching out to her daughter. Patient states he daughter lives in [facility], is at work \"and does not want her number given out\".

## 2022-02-24 NOTE — PROGRESS NOTES
CLINICAL PHARMACY NOTE: MEDS TO BEDS    Total # of Prescriptions Filled: 2   The following medications were delivered to the patient:  · Depakote 125  · Escitalopram 5    Additional Documentation:

## 2022-02-24 NOTE — PROGRESS NOTES
Attended afternoon leisure activity. Pleasant and social with peers. Shared his desire to be more social after her chemo treatments are complete.

## 2022-02-24 NOTE — CARE COORDINATION
In order to ensure appropriate transition and discharge planning is in place, the following documents have been transmitted to [facility] center, as the new outpatient provider:     The d/c diagnosis was transmitted to the next care provider   The reason for hospitalization was transmitted to the next care provider   The d/c medications (dosage and indication) were transmitted to the next care provider    The continuing care plan was transmitted to the next care provider  '

## 2022-02-24 NOTE — PROGRESS NOTES
Inpatient Medical Oncology Progress Note    Subjective:  Feeling good this morning. Getting discharged today     Objective:  /64   Pulse 89   Temp 97.8 °F (36.6 °C) (Temporal)   Resp 16   Ht 6' 1\" (1.854 m)   Wt 151 lb (68.5 kg)   LMP 08/13/2013   SpO2 100%   BMI 19.92 kg/m²   GENERAL: Alert, oriented x 3, not in acute distress. HEENT: PERRLA; EOMI. Oropharynx clear. NECK: Supple. Without lymphadenopathy. LUNGS: [address] air entry bilaterally. CARDIOVASCULAR: Regular rate. No murmurs, rubs or gallops. ABDOMEN: Soft. Non-tender, non-distended. EXTREMITIES: Without clubbing, cyanosis, or edema. NEUROLOGIC: No focal deficits.    ECOG PS 1    Diagnostics:  Lab Results   Component Value Date    WBC 5.5 02/15/2022    HGB 10.5 (L) 02/15/2022    HCT 33.2 (L) 02/15/2022    .4 (H) 02/15/2022     02/15/2022     Lab Results   Component Value Date     02/15/2022    K 4.0 02/15/2022     02/15/2022    CO2 23 02/15/2022    BUN 4 (L) 02/15/2022    CREATININE 0.7 02/15/2022    GLUCOSE 105 (H) 02/15/2022    CALCIUM 9.1 02/15/2022    PROT 4.7 (L) 02/15/2022    LABALBU 3.1 (L) 02/15/2022    BILITOT 0.4 02/15/2022    ALKPHOS 133 (H) 02/15/2022    AST 16 02/15/2022    ALT 16 02/15/2022    LABGLOM >60 02/15/2022    GFRAA >60 02/15/2022     Impression/Plan:  60 y/o female who underwent Needle localized Right lumpectomy with SLNBx with mediport placement on 02/05/2021  A.  Right axillary sentinel lymph node #1, excision: 1 lymph node negative for malignancy   B.  Right axillary contents, regional resection: 1 lymph node positive for metastatic, poorly differentiated ductal carcinoma (grade 3) Extranodal extension present   C.  Right breast, lumpectomy: Invasive, poorly differentiated ductal carcinoma (grade 3) and high-grade ductal carcinoma in situ   High-grade ductal carcinoma in situ involves inferior and medial surgical margins   pT1c N1a MX      Bone scan, CT chest/abdomen/pelvis 02/26/2021 negative for metastatic disease     Re-excision lumpectomy of inferior and medial margins on 03/09/2021  A. Right breast, new inferior margin, excision: Fat necrosis, foreign body-type giant cell reaction, chronic inflammation, and fibrosis consistent with previous procedure   No evidence of residual carcinoma   Final inferior margin negative for carcinoma     B. Right breast, new medial margin, excision: Residual high-grade ductal carcinoma in situ   Ductal carcinoma in situ present less than 1 mm from red/superior margin and green/anterior margin   Fat necrosis, foreign body-type giant cell reaction, chronic   inflammation, and fibrosis consistent with previous procedure   Fibrocystic change   Microcalcifications associated with benign breast tissue and DCIS   Comment:Residual ductal carcinoma in situ is present in 5 out of 13 tissue blocks of part B     Recommended adjuvant chemotherapy (TCHP) for 6 cycles followed by adjuvant RT followed lastly by endocrine therapy. Cycle # 1 adjuvant TCHP was on 04/08/2021. Cycle # 6 adjuvant TCHP was on 08/12/2021.     RT was started on 11/01/2021 and completed on 12/10/2021.      Arimidex 1 mg po daily was started on 12/11/2021 with fair tolerance so far.     2D-echo 01/25/2022 noted EF 60%  Herceptin/Perjeta last given on 01/28/2022    Discharged 2/13/2022 for pneumonia which was treated with Levaquin.     She was taken to the [facility] [place] [place] [facility] ER 02/16/2022 via EMS d/t suicide attempt for ingestion of antifreeze as well as Xanax and Tyelnol. EMS administer Narcan. She received IV hydration and was admitted 02/17/2022 to [facility] psych unit for further observation  She is feeling good this morning 02.24.2022. Discussed with RN on the floor. D/C today 02/24/2022. Will D/C Arimidex at this time and monitor if any recurrent episodes or attempts. She will see us tomorrow at [facility] 02/25/2022 to resume Herceptin/Perjeta infusions.    Patient verbalized understanding and was very appreciative of the visit.      02/24/2022  Meenu Matos MD

## 2022-02-24 NOTE — GROUP NOTE
Group Therapy Note    Date: 2/24/2022    Group Start Time: 1100  Group End Time: 1150  Group Topic: Psychotherapy    [facility] 7W ACUTE BH 2    JULIO Anton, [place]; JULIO Workman, [facility]        Group Therapy Note    Attendees: 8         Patient's Goal:  To increase social interaction and improve relationships with others. Notes:  Pt was attentive in group and was able to identify her agenda \"focus on herself before helping others. \" She was able to verbalize relating to others within the group. Status After Intervention:  Improved    Participation Level:  Active Listener    Participation Quality: Appropriate and Attentive      Speech:  normal      Thought Process/Content: Logical      Affective Functioning: Congruent      Mood: depressed      Level of consciousness:  Alert, Oriented x4 and Attentive      Response to Learning: Able to verbalize current knowledge/experience, Able to verbalize/acknowledge new learning, Able to retain information and Capable of insight      Endings: None Reported    Modes of Intervention: Education, Support, Socialization, Exploration, Clarifying and Problem-solving      Discipline Responsible: /Counselor      Signature:  JULIO Anton, [place]

## 2022-02-25 ENCOUNTER — OFFICE VISIT (OUTPATIENT)
Dept: ONCOLOGY | Age: 57
End: 2022-02-25
Payer: MEDICAID

## 2022-02-25 ENCOUNTER — HOSPITAL ENCOUNTER (OUTPATIENT)
Dept: INFUSION THERAPY | Age: 57
Discharge: HOME | End: 2022-02-25
Payer: MEDICAID

## 2022-02-25 ENCOUNTER — HOSPITAL ENCOUNTER (OUTPATIENT)
Age: 57
Discharge: HOME | End: 2022-02-25
Payer: MEDICAID

## 2022-02-25 VITALS
HEIGHT: 72 IN | OXYGEN SATURATION: 100 % | BODY MASS INDEX: 20.32 KG/M2 | SYSTOLIC BLOOD PRESSURE: 108 MMHG | TEMPERATURE: 97.9 F | HEART RATE: 98 BPM | WEIGHT: 150 LBS | DIASTOLIC BLOOD PRESSURE: 74 MMHG

## 2022-02-25 VITALS — DIASTOLIC BLOOD PRESSURE: 67 MMHG | SYSTOLIC BLOOD PRESSURE: 104 MMHG | RESPIRATION RATE: 16 BRPM | HEART RATE: 85 BPM

## 2022-02-25 DIAGNOSIS — C50.111 MALIGNANT NEOPLASM OF CENTRAL PORTION OF RIGHT BREAST IN FEMALE, ESTROGEN RECEPTOR POSITIVE (HCC): ICD-10-CM

## 2022-02-25 DIAGNOSIS — Z85.3 PERSONAL HISTORY OF BREAST CANCER: Primary | ICD-10-CM

## 2022-02-25 DIAGNOSIS — Z17.0 MALIGNANT NEOPLASM OF CENTRAL PORTION OF RIGHT BREAST IN FEMALE, ESTROGEN RECEPTOR POSITIVE (HCC): ICD-10-CM

## 2022-02-25 LAB
ALBUMIN SERPL-MCNC: 3.9 G/DL (ref 3.5–5.2)
ALP BLD-CCNC: 187 U/L (ref 35–104)
ALT SERPL-CCNC: 25 U/L (ref 0–32)
ANION GAP SERPL CALCULATED.3IONS-SCNC: 15 MMOL/L (ref 7–16)
AST SERPL-CCNC: 24 U/L (ref 0–31)
BASOPHILS ABSOLUTE: 0.03 E9/L (ref 0–0.2)
BASOPHILS RELATIVE PERCENT: 0.3 % (ref 0–2)
BILIRUB SERPL-MCNC: 0.3 MG/DL (ref 0–1.2)
BUN BLDV-MCNC: 6 MG/DL (ref 6–20)
CALCIUM SERPL-MCNC: 9.7 MG/DL (ref 8.6–10.2)
CHLORIDE BLD-SCNC: 87 MMOL/L (ref 98–107)
CO2: 23 MMOL/L (ref 22–29)
CREAT SERPL-MCNC: 0.6 MG/DL (ref 0.5–1)
EOSINOPHILS ABSOLUTE: 0 E9/L (ref 0.05–0.5)
EOSINOPHILS RELATIVE PERCENT: 0 % (ref 0–6)
GFR AFRICAN AMERICAN: >60
GFR NON-AFRICAN AMERICAN: >60 ML/MIN/1.73
GLUCOSE BLD-MCNC: 173 MG/DL (ref 74–99)
HCT VFR BLD CALC: 35.5 % (ref 34–48)
HEMOGLOBIN: 11.7 G/DL (ref 11.5–15.5)
IMMATURE GRANULOCYTES #: 0.06 E9/L
IMMATURE GRANULOCYTES %: 0.7 % (ref 0–5)
LYMPHOCYTES ABSOLUTE: 0.63 E9/L (ref 1.5–4)
LYMPHOCYTES RELATIVE PERCENT: 6.9 % (ref 20–42)
MAGNESIUM: 1.7 MG/DL (ref 1.6–2.6)
MCH RBC QN AUTO: 32.4 PG (ref 26–35)
MCHC RBC AUTO-ENTMCNC: 33 % (ref 32–34.5)
MCV RBC AUTO: 98.3 FL (ref 80–99.9)
MONOCYTES ABSOLUTE: 0.86 E9/L (ref 0.1–0.95)
MONOCYTES RELATIVE PERCENT: 9.5 % (ref 2–12)
NEUTROPHILS ABSOLUTE: 7.49 E9/L (ref 1.8–7.3)
NEUTROPHILS RELATIVE PERCENT: 82.6 % (ref 43–80)
PDW BLD-RTO: 11.9 FL (ref 11.5–15)
PLATELET # BLD: 263 E9/L (ref 130–450)
PMV BLD AUTO: 9.7 FL (ref 7–12)
POTASSIUM SERPL-SCNC: 4.4 MMOL/L (ref 3.5–5)
RBC # BLD: 3.61 E12/L (ref 3.5–5.5)
SODIUM BLD-SCNC: 125 MMOL/L (ref 132–146)
TOTAL PROTEIN: 6.5 G/DL (ref 6.4–8.3)
WBC # BLD: 9.1 E9/L (ref 4.5–11.5)

## 2022-02-25 PROCEDURE — 83735 ASSAY OF MAGNESIUM: CPT

## 2022-02-25 PROCEDURE — 99214 OFFICE O/P EST MOD 30 MIN: CPT | Performed by: INTERNAL MEDICINE

## 2022-02-25 PROCEDURE — 96549 UNLISTED CHEMOTHERAPY PX: CPT

## 2022-02-25 PROCEDURE — G8484 FLU IMMUNIZE NO ADMIN: HCPCS | Performed by: INTERNAL MEDICINE

## 2022-02-25 PROCEDURE — 85025 COMPLETE CBC W/AUTO DIFF WBC: CPT

## 2022-02-25 PROCEDURE — 2580000003 HC RX 258: Performed by: INTERNAL MEDICINE

## 2022-02-25 PROCEDURE — 80053 COMPREHEN METABOLIC PANEL: CPT

## 2022-02-25 PROCEDURE — 36415 COLL VENOUS BLD VENIPUNCTURE: CPT

## 2022-02-25 PROCEDURE — 6360000002 HC RX W HCPCS: Performed by: INTERNAL MEDICINE

## 2022-02-25 PROCEDURE — G9899 SCRN MAM PERF RSLTS DOC: HCPCS | Performed by: INTERNAL MEDICINE

## 2022-02-25 PROCEDURE — 3017F COLORECTAL CA SCREEN DOC REV: CPT | Performed by: INTERNAL MEDICINE

## 2022-02-25 PROCEDURE — 96361 HYDRATE IV INFUSION ADD-ON: CPT

## 2022-02-25 PROCEDURE — 1036F TOBACCO NON-USER: CPT | Performed by: INTERNAL MEDICINE

## 2022-02-25 PROCEDURE — G8427 DOCREV CUR MEDS BY ELIG CLIN: HCPCS | Performed by: INTERNAL MEDICINE

## 2022-02-25 PROCEDURE — 96413 CHEMO IV INFUSION 1 HR: CPT

## 2022-02-25 PROCEDURE — G8420 CALC BMI NORM PARAMETERS: HCPCS | Performed by: INTERNAL MEDICINE

## 2022-02-25 PROCEDURE — 96360 HYDRATION IV INFUSION INIT: CPT

## 2022-02-25 PROCEDURE — 1111F DSCHRG MED/CURRENT MED MERGE: CPT | Performed by: INTERNAL MEDICINE

## 2022-02-25 RX ORDER — SODIUM CHLORIDE 9 MG/ML
INJECTION, SOLUTION INTRAVENOUS ONCE
Status: COMPLETED | OUTPATIENT
Start: 2022-02-25 | End: 2022-02-25

## 2022-02-25 RX ORDER — DIPHENHYDRAMINE HYDROCHLORIDE 50 MG/ML
50 INJECTION INTRAMUSCULAR; INTRAVENOUS ONCE
Status: CANCELLED | OUTPATIENT
Start: 2022-02-25 | End: 2022-02-25

## 2022-02-25 RX ORDER — SODIUM CHLORIDE 9 MG/ML
20 INJECTION, SOLUTION INTRAVENOUS ONCE
Status: COMPLETED | OUTPATIENT
Start: 2022-02-25 | End: 2022-02-25

## 2022-02-25 RX ORDER — EPINEPHRINE 1 MG/ML
0.3 INJECTION, SOLUTION, CONCENTRATE INTRAVENOUS PRN
Status: CANCELLED | OUTPATIENT
Start: 2022-02-25

## 2022-02-25 RX ORDER — SODIUM CHLORIDE 0.9 % (FLUSH) 0.9 %
10 SYRINGE (ML) INJECTION PRN
Status: DISCONTINUED | OUTPATIENT
Start: 2022-02-25 | End: 2022-02-26 | Stop reason: HOSPADM

## 2022-02-25 RX ORDER — METHYLPREDNISOLONE SODIUM SUCCINATE 125 MG/2ML
125 INJECTION, POWDER, LYOPHILIZED, FOR SOLUTION INTRAMUSCULAR; INTRAVENOUS ONCE
Status: CANCELLED | OUTPATIENT
Start: 2022-02-25 | End: 2022-02-25

## 2022-02-25 RX ORDER — HEPARIN SODIUM (PORCINE) LOCK FLUSH IV SOLN 100 UNIT/ML 100 UNIT/ML
500 SOLUTION INTRAVENOUS PRN
Status: DISCONTINUED | OUTPATIENT
Start: 2022-02-25 | End: 2022-02-26 | Stop reason: HOSPADM

## 2022-02-25 RX ORDER — SODIUM CHLORIDE 9 MG/ML
20 INJECTION, SOLUTION INTRAVENOUS ONCE
Status: CANCELLED | OUTPATIENT
Start: 2022-02-25 | End: 2022-02-25

## 2022-02-25 RX ORDER — SODIUM CHLORIDE 0.9 % (FLUSH) 0.9 %
5 SYRINGE (ML) INJECTION PRN
Status: CANCELLED | OUTPATIENT
Start: 2022-02-25

## 2022-02-25 RX ORDER — MEPERIDINE HYDROCHLORIDE 50 MG/ML
12.5 INJECTION INTRAMUSCULAR; INTRAVENOUS; SUBCUTANEOUS ONCE
Status: CANCELLED | OUTPATIENT
Start: 2022-02-25 | End: 2022-02-25

## 2022-02-25 RX ORDER — SODIUM CHLORIDE 9 MG/ML
INJECTION, SOLUTION INTRAVENOUS CONTINUOUS
Status: CANCELLED | OUTPATIENT
Start: 2022-02-25

## 2022-02-25 RX ORDER — SODIUM CHLORIDE 0.9 % (FLUSH) 0.9 %
10 SYRINGE (ML) INJECTION PRN
Status: CANCELLED | OUTPATIENT
Start: 2022-02-25

## 2022-02-25 RX ORDER — HEPARIN SODIUM (PORCINE) LOCK FLUSH IV SOLN 100 UNIT/ML 100 UNIT/ML
500 SOLUTION INTRAVENOUS PRN
Status: CANCELLED | OUTPATIENT
Start: 2022-02-25

## 2022-02-25 RX ADMIN — TRASTUZUMAB-ANNS 399 MG: 420 INJECTION, POWDER, LYOPHILIZED, FOR SOLUTION INTRAVENOUS at 13:59

## 2022-02-25 RX ADMIN — HEPARIN SODIUM (PORCINE) LOCK FLUSH IV SOLN 100 UNIT/ML 500 UNITS: 100 SOLUTION at 15:46

## 2022-02-25 RX ADMIN — SODIUM CHLORIDE, PRESERVATIVE FREE 10 ML: 5 INJECTION INTRAVENOUS at 15:46

## 2022-02-25 RX ADMIN — PERTUZUMAB 420 MG: 30 INJECTION, SOLUTION, CONCENTRATE INTRAVENOUS at 13:59

## 2022-02-25 RX ADMIN — SODIUM CHLORIDE 20 ML/HR: 9 INJECTION, SOLUTION INTRAVENOUS at 13:54

## 2022-02-25 RX ADMIN — SODIUM CHLORIDE: 9 INJECTION, SOLUTION INTRAVENOUS at 14:41

## 2022-02-25 NOTE — PROGRESS NOTES
Department of [facility] Med Oncology  Attending Clinic Note    Reason for Visit: Follow-up on a patient with Right Breast Cancer    PCP:  Addie Underwood DO    History of Present Illness: The mass was located in the 6 o'clock position of right breast    Breast cancer risk factors include infrequent SMEs and age and gender    Bilateral Screening Mammogram 10/06/2020: There are suspicious calcifications in the right breast at the 6 o'clock position which appear pleomorphic. These calcifications are in a segmental distribution. These clustered calcifications are suspicious for malignancy. Right breast, calcifications at the 6:00, core biopsy on 10/30/2020:    - Small focus of invasive ductal carcinoma, grade 3 (3+3+2 = 8)    - Ductal carcinoma in situ, high nuclear grade, comedo/cribriform/solid types;    - Microcalcifications in DCIS    - Breast receptor and biomarkers (per outside report without looking the   slides:     ER: Positive, 89.8%, strong intensity     TN: Positive, 52.2%, moderate intensity     HER-2: Positive (score 3+)     Ki-67: High proliferation, 26.2%     P53: Negative, 0.2%     Comment:The invasive carcinoma is intermingled with DCIS and measures   3.0 x 2.0 mm in greatest dimension on the slides. CXR PA/Lateral 12/11/2020:  No acute process. Right Axillary U/S on 12/11/2020: There is no axillary LN.     MRI Bilateral Breast 01/05/2021:  Focal, heterogeneous non mass enhancement identified in the right breast 6 o'clock which measures approximately 1.9 x 1.4 x 1.7 cm (image 19 of the axial T1 post contrast series).    No additional foci of disease identified on the right  There is no lymphadenopathy    Needle localized Right lumpectomy with SLNBx with mediport placement on 02/05/2021  A.  Right axillary sentinel lymph node #1, excision: 1 lymph node negative for malignancy     B.  Right axillary contents, regional resection: 1 lymph node positive for metastatic, poorly differentiated ductal carcinoma (grade 3)   Extranodal extension present     C.  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;   positive for extensive intraductal component   Skin-uninvolved by malignancy   Invasive carcinoma margins-uninvolved by invasive carcinoma        Distance from closest margin: 4 mm from anterior margin   DCIS margins-inferior and medial margins involved by DCIS   Regional lymph nodes-involved by tumor cells        Number of lymph nodes with macrometastasis: 1        Number of lymph nodes with micrometastasis: 0        Number of lymph nodes with isolated tumor cells: 0        Size of largest metastatic deposit: 1.5 cm        Extranodal extension: Present        Total number of lymph nodes examined: 2        Number of sentinel nodes examined: 1   Treatment effect in the breast-no known presurgical therapy   TNM classification (AJCC eighth edition)-  pT1c N1a MX     Bone scan, CT chest/abdomen/pelvis 02/26/2021 negative for metastatic disease    Re-excision lumpectomy of inferior and medial margins on 03/09/2021  A. Right breast, new inferior margin, excision: Fat necrosis, foreign body-type giant cell reaction, chronic inflammation, and fibrosis consistent with previous procedure   No evidence of residual carcinoma   Final inferior margin negative for carcinoma     B.  Right breast, new medial margin, excision: Residual high-grade ductal carcinoma in situ   Ductal carcinoma in situ present less than 1 mm from red/superior margin and green/anterior margin   Fat necrosis, foreign body-type giant cell reaction, chronic   inflammation, and fibrosis consistent with previous procedure   Fibrocystic change   Microcalcifications associated with benign breast tissue and DCIS   Comment:Residual ductal carcinoma in situ is present in 5 out of 13 tissue blocks of part B    Recommended adjuvant chemotherapy (TCHP) for 6 cycles followed by adjuvant RT followed lastly by endocrine therapy. Side effects TCHP reviewed with patient. She agreed to proceed. 2d-echo 02/17/2021 noted EF 60-65%  Cycle # 1 adjuvant TCHP was on 04/08/2021. Cycle # 2 adjuvant TCHP was on 04/29/2021. Cycle # 3 adjuvant TCHP was on 05/20/2021. Cycle # 4 adjuvant TCHP was on 06/10/2021. Cycle # 5 adjuvant TCHP was on 07/22/2021. Cycle # 6 adjuvant TCHP was on 08/12/2021. Radiation Oncology consult appreciated. 2d-echo 10/12/2021 noted EF 55 to 60%  RT was started on 11/01/2021 and completed on 12/10/2021. DEXA scan 12/10/2021 osteopenia by WHO criteria  Arimidex 1 mg po daily was started on 12/11/2021 with fair tolerance  2D-echo 01/25/2022 noted EF 60%  Herceptin/Perjeta was on 01/28/2022. Discharged from [facility] on 02/13/2022 for pneumonia which was treated with Levaquin. She was taken to the [facility] ER 02/16/2022 via EMS d/t suicide attempt for ingestion of Xanax and Tyelnol. EMS administered Narcan. She received IV hydration and was admitted 02/17/2022 to [address] [place] [address] psych unit for further observation  D/C 02/24/2022. D/C Arimidex at this time and monitor if any recurrent episodes or attempts. Herceptin/Perjeta today 02/25/2022. Today 02/25/2022. She is doing better today. Review of Systems;  CONSTITUTIONAL: No fever, chills. Fair appetite and energy level  ENMT: Eyes: No diplopia; Nose: No epistaxis. Mouth: No sore throat.   RESPIRATORY: No hemoptysis, SOB or coughing  CARDIOVASCULAR: No chest pain, palpitations. GASTROINTESTINAL: No nausea/vomiting, abdominal pain. GENITOURINARY: No dysuria, urinary frequency, hematuria. NEURO: No syncope, presyncope, headache. Remainder: ROS NEGATIVE    Past Medical History:      Diagnosis Date    Anxiety     Arthritis     Cancer ([facility] 2021    breast    Cervical radiculopathy     Chronic back pain     Dehydration 9/23/2021    Dehydration 9/23/2021    Depression     Diabetes mellitus type 2, uncontrolled ([facility] 2/12/2017    GERD (gastroesophageal reflux disease)     History of blood transfusion 01/2018    back surgery, lumbar, dr Paula Cuevas    Hypertension     Neuropathy     Right leg pain     seeing doctor currently problems with hardware     Medications:  Reviewed and reconciled. Allergies: Allergies   Allergen Reactions    Ceftriaxone Shortness Of Breath     Physical Exam:  /74   Pulse 98   Temp 97.9 °F (36.6 °C)   Ht 6' 1\" (1.854 m)   Wt 150 lb (68 kg)   LMP 08/13/2013   SpO2 100%   BMI 19.79 kg/m²   GENERAL: Alert, oriented x 3, not in acute distress. HEENT: PERRLA; EOMI. Oropharynx clear. NECK: Supple. Without lymphadenopathy. LUNGS: Good air entry bilaterally. No wheezing, crackles or ronchi. CARDIOVASCULAR: Regular rate. No murmurs, rubs or gallops. ABDOMEN: Soft. Non-tender, non-distended. Positive bowel sounds. EXTREMITIES: Without clubbing, cyanosis, or edema. NEUROLOGIC: No focal deficits.    ECOG PS 1    Lab Results   Component Value Date    WBC 9.1 02/25/2022    HGB 11.7 02/25/2022    HCT 35.5 02/25/2022    MCV 98.3 02/25/2022     02/25/2022     Lab Results   Component Value Date     (L) 02/25/2022    K 4.4 02/25/2022    CL 87 (L) 02/25/2022    CO2 23 02/25/2022    BUN 6 02/25/2022    CREATININE 0.6 02/25/2022    GLUCOSE 173 (H) 02/25/2022    CALCIUM 9.7 02/25/2022    PROT 6.5 02/25/2022    LABALBU 3.9 02/25/2022    BILITOT 0.3 02/25/2022    ALKPHOS 187 (H) 02/25/2022    AST 24 02/25/2022    ALT 25 02/25/2022 LABGLOM >60 02/25/2022    GFRAA >60 02/25/2022     Impression/Plan:  62 y/o female with Right Breast Cancer    Bilateral Screening Mammogram 10/06/2020: There are suspicious calcifications in the right breast at the 6 o'clock position which appear pleomorphic. These calcifications are in a segmental distribution. These clustered calcifications are suspicious for malignancy. Right breast, calcifications at the 6:00, core biopsy on 10/30/2020:    - Small focus of invasive ductal carcinoma, grade 3 (3+3+2 = 8)    - Ductal carcinoma in situ, high nuclear grade, comedo/cribriform/solid types;    - Microcalcifications in DCIS    - Breast receptor and biomarkers (per outside report without looking the   slides:     ER: Positive, 89.8%, strong intensity     NC: Positive, 52.2%, moderate intensity     HER-2: Positive (score 3+)     Ki-67: High proliferation, 26.2%     P53: Negative, 0.2%     Comment:The invasive carcinoma is intermingled with DCIS and measures   3.0 x 2.0 mm in greatest dimension on the slides. CXR PA/Lateral 12/11/2020:  No acute process. Right Axillary U/S on 12/11/2020: There is no axillary LN.     MRI Bilateral Breast 01/05/2021:  Focal, heterogeneous non mass enhancement identified in the right breast 6 o'clock which measures approximately 1.9 x 1.4 x 1.7 cm (image 19 of the axial T1 post contrast series).    No additional foci of disease identified on the right  There is no lymphadenopathy    Needle localized Right lumpectomy with SLNBx with mediport placement on 02/05/2021  A.  Right axillary sentinel lymph node #1, excision: 1 lymph node negative for malignancy     B.  Right axillary contents, regional resection: 1 lymph node positive for metastatic, poorly differentiated ductal carcinoma (grade 3)   Extranodal extension present     C.  Right breast, lumpectomy: Invasive, poorly differentiated ductal carcinoma (grade 3) and high-grade ductal carcinoma in situ   High-grade ductal carcinoma in situ involves inferior and medial surgical margins     CANCER CASE SUMMARY   Procedure-excision   Specimen laterality-right   Tumor size-1.1 x 0.8 x 0.5 cm   Histologic type-invasive carcinoma of no special type (ductal)   [place] histologic score-3 (tubule formation) +3 (nuclear   pleomorphism) +2 (mitotic count) = 8   Overall grade-grade 3   Ductal carcinoma in situ-high-grade DCIS with comedonecrosis is present;   positive for extensive intraductal component   Skin-uninvolved by malignancy   Invasive carcinoma margins-uninvolved by invasive carcinoma        Distance from closest margin: 4 mm from anterior margin   DCIS margins-inferior and medial margins involved by DCIS   Regional lymph nodes-involved by tumor cells        Number of lymph nodes with macrometastasis: 1        Number of lymph nodes with micrometastasis: 0        Number of lymph nodes with isolated tumor cells: 0        Size of largest metastatic deposit: 1.5 cm        Extranodal extension: Present        Total number of lymph nodes examined: 2        Number of sentinel nodes examined: 1   Treatment effect in the breast-no known presurgical therapy   TNM classification (AJCC eighth edition)-  pT1c N1a MX     Bone scan, CT chest/abdomen/pelvis 02/26/2021 negative for metastatic disease    Re-excision lumpectomy of inferior and medial margins on 03/09/2021  A. Right breast, new inferior margin, excision: Fat necrosis, foreign body-type giant cell reaction, chronic inflammation, and fibrosis consistent with previous procedure   No evidence of residual carcinoma   Final inferior margin negative for carcinoma     B.  Right breast, new medial margin, excision: Residual high-grade ductal carcinoma in situ   Ductal carcinoma in situ present less than 1 mm from red/superior margin and green/anterior margin   Fat necrosis, foreign body-type giant cell reaction, chronic   inflammation, and fibrosis consistent with previous procedure Fibrocystic change   Microcalcifications associated with benign breast tissue and DCIS   Comment:Residual ductal carcinoma in situ is present in 5 out of 13 tissue blocks of part B    Recommended adjuvant chemotherapy (TCHP) for 6 cycles followed by adjuvant RT followed lastly by endocrine therapy. Side effects TCHP reviewed with patient. She agreed to proceed. 2d-echo 02/17/2021 noted EF 60-65%  Cycle # 1 adjuvant TCHP was on 04/08/2021. Cycle # 2 adjuvant TCHP was on 04/29/2021. Cycle # 3 adjuvant TCHP was on 05/20/2021. Cycle # 4 adjuvant TCHP was on 06/10/2021. 2D-ECHO 06/29/2021 noted EF 60-65%  Cycle # 5 adjuvant TCHP was on 07/22/2021. Cycle # 6 adjuvant TCHP was on 08/12/2021. 2d-echo 10/12/2021 noted EF 55 to 60%  RT was started on 11/01/2021 and completed on 12/10/2021. DEXA scan 12/10/2021 osteopenia by WHO criteria  Arimidex 1 mg po daily was started on 12/11/2021 with fair tolerance   2D-echo 01/25/2022 noted EF 60%  Herceptin/Perjeta was on 01/28/2022. Discharged from [facility] on 02/13/2022 for pneumonia which was treated with Levaquin. She was taken to [facility] ER 02/16/2022 via EMS d/t suicide attempt for ingestion of Xanax and Tyelnol. EMS administered Narcan. She received IV hydration and was admitted 02/17/2022 to [facility] psych unit for further observation  D/C 02/24/2022. D/C Arimidex at this time and monitor if any recurrent episodes or attempts. Herceptin/Perjeta today 02/25/2022. Labs reviewed ok to proceed. 1L NS0.9% over one hour today. RTC 3 weeks for Herceptin/Perjeta.  Consider going back on AI if no recurrent episodes or attempts    West Biswas MD   1/03/2930  Board Certified Medical Oncologist

## 2022-03-06 NOTE — DISCHARGE SUMMARY
Physician Discharge Summary     Patient ID:  Cesia Roman  [id number]  61 y.o.  1965    Admit date: 2/14/2022    Discharge date and time: 2/18/2022    Admission Diagnoses:   Patient Active Problem List   Diagnosis    Diabetes mellitus type 2, uncontrolled ([address]    HTN (hypertension), benign    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    Delayed union of tibial shaft fracture, right, closed    Tibia/fibula fracture, right, closed, with nonunion, subsequent encounter    Failed orthopedic implant ([address]    Fracture of tibia and fibula, right, closed, with nonunion, subsequent encounter    Malignant neoplasm of central portion of right breast in female, estrogen receptor positive ([address]    Hypokalemia    Nausea    Hyponatremia    AUSTIN (acute kidney injury) ([address]    Diarrhea    Incisional breast wound    Dehydration    Open wound of right breast    Pneumonia    Intentional acetaminophen overdose ([address]    Ethylene glycol poisoning    Mood disorder ([address]    Major depressive disorder, recurrent episode, severe with mixed features ([address]    Personal history of breast cancer    Depression       Discharge Diagnoses: Suicidal Ideation    Consults: psychiatry and orthopedic surgery    Procedures: None    Hospital Course: The patient [facility] 61 [facility] past medical history of recent discharge from hospital approximately 5 days ago at which time she was treated for shortness of breath diagnosed with pneumonia and treated with Levaquin and did well. She was discharged on 2/9/2022. She now presents with complaints of having taken an overdose of Tylenol, ethylene glycol and benzodiazepines in an attempt to end her life. She subsequently came into the emergency department for treatment. She was noted to have Tylenol level of 56 and received an acetylcysteine in the ER. Glycol level was 19.      IV hydration - discontinue  Monitor acetaminophen levels - >5   Discontinue Antizol monitor labs daily until transfer to psych   Sitter at bedside  Patient is pink slipped and denied suicidal ideation  Supplemnt mag     Patient is medically cleared to transfer to the psychiatric unit    Patient was discharged to [address] [place] [address] psychiatry unit. No results for input(s): WBC, HGB, HCT, PLT in the last 72 hours. No results for input(s): NA, K, CL, CO2, BUN, CREATININE, GLU, CALCIUM in the last 72 hours. No results found. Discharge Exam:    HEENT: NCAT,  PERRLA, No JVD  Heart:  RRR, no murmurs, gallops, or rubs. Lungs:  CTA bilaterally, no wheeze, rales or rhonchi  Abd: bowel sounds present, nontender, nondistended, no masses  Extrem:  No clubbing, cyanosis, or edema    Disposition: [facility]     Patient Condition at Discharge: Stable    Patient Instructions:      Medication List      ASK your doctor about these medications    albuterol sulfate  (90 Base) MCG/ACT inhaler     ALPRAZolam 0.5 MG tablet  Commonly known as: XANAX     amitriptyline 50 MG tablet  Commonly known as: ELAVIL     anastrozole 1 MG tablet  Commonly known as: Arimidex  Take 1 tablet by mouth daily     cholestyramine 4 g packet  Commonly known as: QUESTRAN  Take 1 packet by mouth 2 times daily     famotidine 20 MG tablet  Commonly known as: PEPCID     Hair Skin and Nails Formula Tabs     Lantus SoloStar 100 UNIT/ML injection pen  Generic drug: insulin glargine     lidocaine-prilocaine 2.5-2.5 % cream  Commonly known as: EMLA  Apply topically to port 30 minutes prior to chemotherapy.      Loratadine-D 24HR  MG per extended release tablet  Generic drug: loratadine-pseudoephedrine     magnesium oxide 400 (240 Mg) MG tablet  Commonly known as: MAG-OX  Take 1 tablet by mouth 2 times daily     magnesium oxide 400 MG tablet  Commonly known as: MAG-OX     MELATONIN PO     metFORMIN 500 MG tablet  Commonly known as: GLUCOPHAGE     mirtazapine 15 MG tablet  Commonly known as: REMERON     nicotine 21 MG/24HR  Commonly known as: [address] [facility] 1 patch onto the skin daily     ondansetron 4 MG tablet  Commonly known as: ZOFRAN  take 1 tablet by mouth every 8 hours if needed for nausea and vomiting     pregabalin 75 MG capsule  Commonly known as: LYRICA     prochlorperazine 10 MG tablet  Commonly known as: COMPAZINE  Take 1 tablet by mouth every 6 hours as needed (nausea)     psyllium 28.3 % Pack  Commonly known as: KONSYL  Take 1 packet by mouth daily     TYLENOL/CODEINE #3 300-30 MG per tablet  Generic drug: acetaminophen-codeine     vitamin C 250 MG tablet     vitamin D 1.25 MG (76850 UT) Caps capsule  Commonly known as: ERGOCALCIFEROL          Activity: activity as tolerated  Diet: regular diet    Pt has been advised to: Follow-up with Hans Martínez DO in 1 week.   Follow-up with consultants as recommended by them    Note that over 30 minutes was spent in preparing discharge papers, discussing discharge with patient, medication review, etc.    Signed:  Jacqueline Valencia MD  3/5/2022  10:00 PM

## 2022-03-18 ENCOUNTER — HOSPITAL ENCOUNTER (OUTPATIENT)
Dept: INFUSION THERAPY | Age: 57
Discharge: HOME | End: 2022-03-18
Payer: MEDICAID

## 2022-03-18 ENCOUNTER — OFFICE VISIT (OUTPATIENT)
Dept: ONCOLOGY | Age: 57
End: 2022-03-18
Payer: MEDICAID

## 2022-03-18 ENCOUNTER — HOSPITAL ENCOUNTER (OUTPATIENT)
Age: 57
Discharge: HOME | End: 2022-03-18
Payer: MEDICAID

## 2022-03-18 VITALS
OXYGEN SATURATION: 100 % | HEART RATE: 76 BPM | WEIGHT: 151 LBS | TEMPERATURE: 97 F | DIASTOLIC BLOOD PRESSURE: 72 MMHG | HEIGHT: 72 IN | BODY MASS INDEX: 20.45 KG/M2 | SYSTOLIC BLOOD PRESSURE: 101 MMHG

## 2022-03-18 VITALS
RESPIRATION RATE: 16 BRPM | TEMPERATURE: 97.2 F | HEART RATE: 74 BPM | DIASTOLIC BLOOD PRESSURE: 61 MMHG | SYSTOLIC BLOOD PRESSURE: 98 MMHG

## 2022-03-18 DIAGNOSIS — C50.111 MALIGNANT NEOPLASM OF CENTRAL PORTION OF RIGHT BREAST IN FEMALE, ESTROGEN RECEPTOR POSITIVE (HCC): Primary | ICD-10-CM

## 2022-03-18 DIAGNOSIS — C50.111 MALIGNANT NEOPLASM OF CENTRAL PORTION OF RIGHT BREAST IN FEMALE, ESTROGEN RECEPTOR POSITIVE (HCC): ICD-10-CM

## 2022-03-18 DIAGNOSIS — Z17.0 MALIGNANT NEOPLASM OF CENTRAL PORTION OF RIGHT BREAST IN FEMALE, ESTROGEN RECEPTOR POSITIVE (HCC): Primary | ICD-10-CM

## 2022-03-18 DIAGNOSIS — E86.0 DEHYDRATION: ICD-10-CM

## 2022-03-18 DIAGNOSIS — Z85.3 PERSONAL HISTORY OF BREAST CANCER: ICD-10-CM

## 2022-03-18 DIAGNOSIS — Z17.0 MALIGNANT NEOPLASM OF CENTRAL PORTION OF RIGHT BREAST IN FEMALE, ESTROGEN RECEPTOR POSITIVE (HCC): ICD-10-CM

## 2022-03-18 LAB
ALBUMIN SERPL-MCNC: 3.8 G/DL (ref 3.5–5.2)
ALP BLD-CCNC: 238 U/L (ref 35–104)
ALT SERPL-CCNC: 50 U/L (ref 0–32)
ANION GAP SERPL CALCULATED.3IONS-SCNC: 11 MMOL/L (ref 7–16)
AST SERPL-CCNC: 28 U/L (ref 0–31)
BASOPHILS ABSOLUTE: 0.06 E9/L (ref 0–0.2)
BASOPHILS RELATIVE PERCENT: 0.7 % (ref 0–2)
BILIRUB SERPL-MCNC: <0.2 MG/DL (ref 0–1.2)
BUN BLDV-MCNC: 11 MG/DL (ref 6–20)
CALCIUM SERPL-MCNC: 9.1 MG/DL (ref 8.6–10.2)
CHLORIDE BLD-SCNC: 102 MMOL/L (ref 98–107)
CO2: 23 MMOL/L (ref 22–29)
CREAT SERPL-MCNC: 0.9 MG/DL (ref 0.5–1)
EOSINOPHILS ABSOLUTE: 0.05 E9/L (ref 0.05–0.5)
EOSINOPHILS RELATIVE PERCENT: 0.6 % (ref 0–6)
GFR AFRICAN AMERICAN: >60
GFR NON-AFRICAN AMERICAN: >60 ML/MIN/1.73
GLUCOSE BLD-MCNC: 226 MG/DL (ref 74–99)
HCT VFR BLD CALC: 38 % (ref 34–48)
HEMOGLOBIN: 11.9 G/DL (ref 11.5–15.5)
IMMATURE GRANULOCYTES #: 0.03 E9/L
IMMATURE GRANULOCYTES %: 0.4 % (ref 0–5)
LYMPHOCYTES ABSOLUTE: 1.4 E9/L (ref 1.5–4)
LYMPHOCYTES RELATIVE PERCENT: 17 % (ref 20–42)
MAGNESIUM: 1.7 MG/DL (ref 1.6–2.6)
MCH RBC QN AUTO: 31.5 PG (ref 26–35)
MCHC RBC AUTO-ENTMCNC: 31.3 % (ref 32–34.5)
MCV RBC AUTO: 100.5 FL (ref 80–99.9)
MONOCYTES ABSOLUTE: 0.79 E9/L (ref 0.1–0.95)
MONOCYTES RELATIVE PERCENT: 9.6 % (ref 2–12)
NEUTROPHILS ABSOLUTE: 5.92 E9/L (ref 1.8–7.3)
NEUTROPHILS RELATIVE PERCENT: 71.7 % (ref 43–80)
PDW BLD-RTO: 12.6 FL (ref 11.5–15)
PLATELET # BLD: 275 E9/L (ref 130–450)
PMV BLD AUTO: 9.4 FL (ref 7–12)
POTASSIUM SERPL-SCNC: 4.5 MMOL/L (ref 3.5–5)
RBC # BLD: 3.78 E12/L (ref 3.5–5.5)
SODIUM BLD-SCNC: 136 MMOL/L (ref 132–146)
TOTAL PROTEIN: 6.4 G/DL (ref 6.4–8.3)
WBC # BLD: 8.3 E9/L (ref 4.5–11.5)

## 2022-03-18 PROCEDURE — 99215 OFFICE O/P EST HI 40 MIN: CPT | Performed by: INTERNAL MEDICINE

## 2022-03-18 PROCEDURE — G9899 SCRN MAM PERF RSLTS DOC: HCPCS | Performed by: INTERNAL MEDICINE

## 2022-03-18 PROCEDURE — 83735 ASSAY OF MAGNESIUM: CPT

## 2022-03-18 PROCEDURE — G8484 FLU IMMUNIZE NO ADMIN: HCPCS | Performed by: INTERNAL MEDICINE

## 2022-03-18 PROCEDURE — 85025 COMPLETE CBC W/AUTO DIFF WBC: CPT

## 2022-03-18 PROCEDURE — 96549 UNLISTED CHEMOTHERAPY PX: CPT

## 2022-03-18 PROCEDURE — 96413 CHEMO IV INFUSION 1 HR: CPT

## 2022-03-18 PROCEDURE — 1036F TOBACCO NON-USER: CPT | Performed by: INTERNAL MEDICINE

## 2022-03-18 PROCEDURE — 1111F DSCHRG MED/CURRENT MED MERGE: CPT | Performed by: INTERNAL MEDICINE

## 2022-03-18 PROCEDURE — G8420 CALC BMI NORM PARAMETERS: HCPCS | Performed by: INTERNAL MEDICINE

## 2022-03-18 PROCEDURE — 36415 COLL VENOUS BLD VENIPUNCTURE: CPT

## 2022-03-18 PROCEDURE — 6360000002 HC RX W HCPCS: Performed by: INTERNAL MEDICINE

## 2022-03-18 PROCEDURE — 2580000003 HC RX 258: Performed by: INTERNAL MEDICINE

## 2022-03-18 PROCEDURE — 80053 COMPREHEN METABOLIC PANEL: CPT

## 2022-03-18 PROCEDURE — 3017F COLORECTAL CA SCREEN DOC REV: CPT | Performed by: INTERNAL MEDICINE

## 2022-03-18 PROCEDURE — G8427 DOCREV CUR MEDS BY ELIG CLIN: HCPCS | Performed by: INTERNAL MEDICINE

## 2022-03-18 PROCEDURE — 36593 DECLOT VASCULAR DEVICE: CPT

## 2022-03-18 PROCEDURE — 96361 HYDRATE IV INFUSION ADD-ON: CPT

## 2022-03-18 RX ORDER — METHYLPREDNISOLONE SODIUM SUCCINATE 125 MG/2ML
125 INJECTION, POWDER, LYOPHILIZED, FOR SOLUTION INTRAMUSCULAR; INTRAVENOUS ONCE
Status: CANCELLED | OUTPATIENT
Start: 2022-03-18 | End: 2022-03-18

## 2022-03-18 RX ORDER — SODIUM CHLORIDE 0.9 % (FLUSH) 0.9 %
10 SYRINGE (ML) INJECTION PRN
Status: CANCELLED | OUTPATIENT
Start: 2022-03-18

## 2022-03-18 RX ORDER — SODIUM CHLORIDE 9 MG/ML
INJECTION, SOLUTION INTRAVENOUS ONCE
Status: COMPLETED | OUTPATIENT
Start: 2022-03-18 | End: 2022-03-18

## 2022-03-18 RX ORDER — SODIUM CHLORIDE 9 MG/ML
INJECTION, SOLUTION INTRAVENOUS ONCE
Status: CANCELLED
Start: 2022-03-18 | End: 2022-03-18

## 2022-03-18 RX ORDER — HEPARIN SODIUM (PORCINE) LOCK FLUSH IV SOLN 100 UNIT/ML 100 UNIT/ML
500 SOLUTION INTRAVENOUS PRN
Status: DISCONTINUED | OUTPATIENT
Start: 2022-03-18 | End: 2022-03-19 | Stop reason: HOSPADM

## 2022-03-18 RX ORDER — DIPHENHYDRAMINE HYDROCHLORIDE 50 MG/ML
50 INJECTION INTRAMUSCULAR; INTRAVENOUS ONCE
Status: CANCELLED | OUTPATIENT
Start: 2022-03-18 | End: 2022-03-18

## 2022-03-18 RX ORDER — SODIUM CHLORIDE 9 MG/ML
25 INJECTION, SOLUTION INTRAVENOUS PRN
OUTPATIENT
Start: 2022-03-18

## 2022-03-18 RX ORDER — HEPARIN SODIUM (PORCINE) LOCK FLUSH IV SOLN 100 UNIT/ML 100 UNIT/ML
500 SOLUTION INTRAVENOUS PRN
OUTPATIENT
Start: 2022-03-18

## 2022-03-18 RX ORDER — SODIUM CHLORIDE 0.9 % (FLUSH) 0.9 %
10 SYRINGE (ML) INJECTION PRN
Status: DISCONTINUED | OUTPATIENT
Start: 2022-03-18 | End: 2022-03-19 | Stop reason: HOSPADM

## 2022-03-18 RX ORDER — HEPARIN SODIUM (PORCINE) LOCK FLUSH IV SOLN 100 UNIT/ML 100 UNIT/ML
500 SOLUTION INTRAVENOUS PRN
Status: CANCELLED | OUTPATIENT
Start: 2022-03-18

## 2022-03-18 RX ORDER — SODIUM CHLORIDE 9 MG/ML
20 INJECTION, SOLUTION INTRAVENOUS ONCE
Status: DISCONTINUED | OUTPATIENT
Start: 2022-03-18 | End: 2022-03-19 | Stop reason: HOSPADM

## 2022-03-18 RX ORDER — SODIUM CHLORIDE 9 MG/ML
25 INJECTION, SOLUTION INTRAVENOUS PRN
Status: CANCELLED | OUTPATIENT
Start: 2022-03-18

## 2022-03-18 RX ORDER — SODIUM CHLORIDE 0.9 % (FLUSH) 0.9 %
5-40 SYRINGE (ML) INJECTION PRN
Status: CANCELLED | OUTPATIENT
Start: 2022-03-18

## 2022-03-18 RX ORDER — SODIUM CHLORIDE 0.9 % (FLUSH) 0.9 %
5 SYRINGE (ML) INJECTION PRN
Status: CANCELLED | OUTPATIENT
Start: 2022-03-18

## 2022-03-18 RX ORDER — SODIUM CHLORIDE 9 MG/ML
20 INJECTION, SOLUTION INTRAVENOUS ONCE
Status: CANCELLED | OUTPATIENT
Start: 2022-03-18 | End: 2022-03-18

## 2022-03-18 RX ORDER — MEPERIDINE HYDROCHLORIDE 50 MG/ML
12.5 INJECTION INTRAMUSCULAR; INTRAVENOUS; SUBCUTANEOUS ONCE
Status: CANCELLED | OUTPATIENT
Start: 2022-03-18 | End: 2022-03-18

## 2022-03-18 RX ORDER — SODIUM CHLORIDE 9 MG/ML
INJECTION, SOLUTION INTRAVENOUS CONTINUOUS
Status: CANCELLED | OUTPATIENT
Start: 2022-03-18

## 2022-03-18 RX ORDER — EPINEPHRINE 1 MG/ML
0.3 INJECTION, SOLUTION, CONCENTRATE INTRAVENOUS PRN
Status: CANCELLED | OUTPATIENT
Start: 2022-03-18

## 2022-03-18 RX ADMIN — PERTUZUMAB 420 MG: 30 INJECTION, SOLUTION, CONCENTRATE INTRAVENOUS at 15:21

## 2022-03-18 RX ADMIN — SODIUM CHLORIDE, PRESERVATIVE FREE 10 ML: 5 INJECTION INTRAVENOUS at 14:05

## 2022-03-18 RX ADMIN — SODIUM CHLORIDE, PRESERVATIVE FREE 10 ML: 5 INJECTION INTRAVENOUS at 16:52

## 2022-03-18 RX ADMIN — SODIUM CHLORIDE: 9 INJECTION, SOLUTION INTRAVENOUS at 15:20

## 2022-03-18 RX ADMIN — Medication 500 UNITS: at 16:52

## 2022-03-18 RX ADMIN — SODIUM CHLORIDE, PRESERVATIVE FREE 10 ML: 5 INJECTION INTRAVENOUS at 14:00

## 2022-03-18 RX ADMIN — TRASTUZUMAB-ANNS 399 MG: 420 INJECTION, POWDER, LYOPHILIZED, FOR SOLUTION INTRAVENOUS at 15:21

## 2022-03-18 RX ADMIN — WATER 2.2 ML: 1 INJECTION INTRAMUSCULAR; INTRAVENOUS; SUBCUTANEOUS at 14:26

## 2022-03-18 RX ADMIN — SODIUM CHLORIDE, PRESERVATIVE FREE 10 ML: 5 INJECTION INTRAVENOUS at 14:02

## 2022-03-18 RX ADMIN — SODIUM CHLORIDE 20 ML/HR: 9 INJECTION, SOLUTION INTRAVENOUS at 15:18

## 2022-03-18 RX ADMIN — ALTEPLASE 2 MG: 2.2 INJECTION, POWDER, LYOPHILIZED, FOR SOLUTION INTRAVENOUS at 14:25

## 2022-03-18 NOTE — PROGRESS NOTES
(1515) Blood return obtained from port s/p cathflo 2 mg installation at 1425. 5cc blood aspirated from port and discarded. Port flushes easily with normal saline. Will proceed with chemo as ordered. Patient had previously refused peripheral IV insertion to initiate treatment during wait time for cathflo installation.

## 2022-03-18 NOTE — PROGRESS NOTES
Department of [facility] Med Oncology  Attending Clinic Note    Reason for Visit: Follow-up on a patient with Right Breast Cancer    PCP:  Woodrow Almanza DO    History of Present Illness: The mass was located in the 6 o'clock position of right breast    Breast cancer risk factors include infrequent SMEs and age and gender    Bilateral Screening Mammogram 10/06/2020: There are suspicious calcifications in the right breast at the 6 o'clock position which appear pleomorphic. These calcifications are in a segmental distribution. These clustered calcifications are suspicious for malignancy. Right breast, calcifications at the 6:00, core biopsy on 10/30/2020:    - Small focus of invasive ductal carcinoma, grade 3 (3+3+2 = 8)    - Ductal carcinoma in situ, high nuclear grade, comedo/cribriform/solid types;    - Microcalcifications in DCIS    - Breast receptor and biomarkers (per outside report without looking the   slides:     ER: Positive, 89.8%, strong intensity     GA: Positive, 52.2%, moderate intensity     HER-2: Positive (score 3+)     Ki-67: High proliferation, 26.2%     P53: Negative, 0.2%     Comment:The invasive carcinoma is intermingled with DCIS and measures   3.0 x 2.0 mm in greatest dimension on the slides. CXR PA/Lateral 12/11/2020:  No acute process. Right Axillary U/S on 12/11/2020: There is no axillary LN.     MRI Bilateral Breast 01/05/2021:  Focal, heterogeneous non mass enhancement identified in the right breast 6 o'clock which measures approximately 1.9 x 1.4 x 1.7 cm (image 19 of the axial T1 post contrast series).    No additional foci of disease identified on the right  There is no lymphadenopathy    Needle localized Right lumpectomy with SLNBx with mediport placement on 02/05/2021  A.  Right axillary sentinel lymph node #1, excision: 1 lymph node negative for malignancy     B.  Right axillary contents, regional resection: 1 lymph node positive for metastatic, poorly differentiated ductal carcinoma (grade 3)   Extranodal extension present     C.  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;   positive for extensive intraductal component   Skin-uninvolved by malignancy   Invasive carcinoma margins-uninvolved by invasive carcinoma        Distance from closest margin: 4 mm from anterior margin   DCIS margins-inferior and medial margins involved by DCIS   Regional lymph nodes-involved by tumor cells        Number of lymph nodes with macrometastasis: 1        Number of lymph nodes with micrometastasis: 0        Number of lymph nodes with isolated tumor cells: 0        Size of largest metastatic deposit: 1.5 cm        Extranodal extension: Present        Total number of lymph nodes examined: 2        Number of sentinel nodes examined: 1   Treatment effect in the breast-no known presurgical therapy   TNM classification (AJCC eighth edition)-  pT1c N1a MX     Bone scan, CT chest/abdomen/pelvis 02/26/2021 negative for metastatic disease    Re-excision lumpectomy of inferior and medial margins on 03/09/2021  A. Right breast, new inferior margin, excision: Fat necrosis, foreign body-type giant cell reaction, chronic inflammation, and fibrosis consistent with previous procedure   No evidence of residual carcinoma   Final inferior margin negative for carcinoma     B.  Right breast, new medial margin, excision: Residual high-grade ductal carcinoma in situ   Ductal carcinoma in situ present less than 1 mm from red/superior margin and green/anterior margin   Fat necrosis, foreign body-type giant cell reaction, chronic   inflammation, and fibrosis consistent with previous procedure   Fibrocystic change   Microcalcifications associated with benign breast tissue and DCIS   Comment:Residual ductal carcinoma in situ is present in 5 out of 13 tissue blocks of part B    Recommended adjuvant chemotherapy (TCHP) for 6 cycles followed by adjuvant RT followed lastly by endocrine therapy. Side effects TCHP reviewed with patient. She agreed to proceed. 2d-echo 02/17/2021 noted EF 60-65%  Cycle # 1 adjuvant TCHP was on 04/08/2021. Cycle # 2 adjuvant TCHP was on 04/29/2021. Cycle # 3 adjuvant TCHP was on 05/20/2021. Cycle # 4 adjuvant TCHP was on 06/10/2021. Cycle # 5 adjuvant TCHP was on 07/22/2021. Cycle # 6 adjuvant TCHP was on 08/12/2021. Radiation Oncology consult appreciated. 2d-echo 10/12/2021 noted EF 55 to 60%  RT was started on 11/01/2021 and completed on 12/10/2021. DEXA scan 12/10/2021 osteopenia by WHO criteria  Arimidex 1 mg po daily was started on 12/11/2021 with fair tolerance  2D-echo 01/25/2022 noted EF 60%  Herceptin/Perjeta was on 01/28/2022. Discharged from [facility] on 02/13/2022 for pneumonia which was treated with Levaquin. She was taken [facility] ER 02/16/2022 via EMS d/t suicide attempt for ingestion of Xanax and Tyelnol. EMS administered Narcan. She received IV hydration and was admitted 02/17/2022 to [facility] psych unit for further observation  D/C 02/24/2022. D/C Arimidex and monitor if any recurrent episodes or attempts. Herceptin/Perjeta today 03/18/2022. Today 03/18/2022. She is doing well. No ER visits or hospital admissions from the last visit. No recurrent depression suicide attempts. Review of Systems;  CONSTITUTIONAL: No fever, chills. Fair appetite and energy level  ENMT: Eyes: No diplopia; Nose: No epistaxis.  Mouth: No sore throat. RESPIRATORY: No hemoptysis, SOB or coughing  CARDIOVASCULAR: No chest pain, palpitations. GASTROINTESTINAL: No nausea/vomiting, abdominal pain. GENITOURINARY: No dysuria, urinary frequency, hematuria. NEURO: No syncope, presyncope, headache. Remainder: ROS NEGATIVE    Past Medical History:      Diagnosis Date    Anxiety     Arthritis     Cancer ([facility] 2021    breast    Cervical radiculopathy     Chronic back pain     Dehydration 9/23/2021    Dehydration 9/23/2021    Depression     Diabetes mellitus type 2, uncontrolled ([facility] 2/12/2017    GERD (gastroesophageal reflux disease)     History of blood transfusion 01/2018    back surgery, lumbar, dr Elias Chacon    Hypertension     Neuropathy     Right leg pain     seeing doctor currently problems with hardware     Medications:  Reviewed and reconciled. Allergies: Allergies   Allergen Reactions    Ceftriaxone Shortness Of Breath     Physical Exam:  /72   Pulse 76   Temp 97 °F (36.1 °C)   Ht 6' 1\" (1.854 m)   Wt 151 lb (68.5 kg)   LMP 08/13/2013   SpO2 100%   BMI 19.92 kg/m²   GENERAL: Alert, oriented x 3, not in acute distress. HEENT: PERRLA; EOMI. Oropharynx clear. NECK: Supple. Without lymphadenopathy. LUNGS: Good air entry bilaterally. No wheezing, crackles or ronchi. CARDIOVASCULAR: Regular rate. No murmurs, rubs or gallops. ABDOMEN: Soft. Non-tender, non-distended. EXTREMITIES: Without clubbing, cyanosis, or edema. NEUROLOGIC: No focal deficits.    ECOG PS 1    Lab Results   Component Value Date    WBC 8.3 03/18/2022    HGB 11.9 03/18/2022    HCT 38.0 03/18/2022    .5 (H) 03/18/2022     03/18/2022     Lab Results   Component Value Date     03/18/2022    K 4.5 03/18/2022     03/18/2022    CO2 23 03/18/2022    BUN 11 03/18/2022    CREATININE 0.9 03/18/2022    GLUCOSE 226 (H) 03/18/2022    CALCIUM 9.1 03/18/2022    PROT 6.4 03/18/2022    LABALBU 3.8 03/18/2022    BILITOT <0.2 03/18/2022 ALKPHOS 238 (H) 03/18/2022    AST 28 03/18/2022    ALT 50 (H) 03/18/2022    LABGLOM >60 03/18/2022    GFRAA >60 03/18/2022     Impression/Plan:  60 y/o female with Right Breast Cancer    Bilateral Screening Mammogram 10/06/2020: There are suspicious calcifications in the right breast at the 6 o'clock position which appear pleomorphic. These calcifications are in a segmental distribution. These clustered calcifications are suspicious for malignancy. Right breast, calcifications at the 6:00, core biopsy on 10/30/2020:    - Small focus of invasive ductal carcinoma, grade 3 (3+3+2 = 8)    - Ductal carcinoma in situ, high nuclear grade, comedo/cribriform/solid types;    - Microcalcifications in DCIS    - Breast receptor and biomarkers (per outside report without looking the   slides:     ER: Positive, 89.8%, strong intensity     [place]: Positive, 52.2%, moderate intensity     HER-2: Positive (score 3+)     Ki-67: High proliferation, 26.2%     P53: Negative, 0.2%     Comment:The invasive carcinoma is intermingled with DCIS and measures   3.0 x 2.0 mm in greatest dimension on the slides. CXR PA/Lateral 12/11/2020:  No acute process. Right Axillary U/S on 12/11/2020: There is no axillary LN.     MRI Bilateral Breast 01/05/2021:  Focal, heterogeneous non mass enhancement identified in the right breast 6 o'clock which measures approximately 1.9 x 1.4 x 1.7 cm (image 19 of the axial T1 post contrast series).    No additional foci of disease identified on the right  There is no lymphadenopathy    Needle localized Right lumpectomy with SLNBx with mediport placement on 02/05/2021  A.  Right axillary sentinel lymph node #1, excision: 1 lymph node negative for malignancy     B.  Right axillary contents, regional resection: 1 lymph node positive for metastatic, poorly differentiated ductal carcinoma (grade 3)   Extranodal extension present     C.  Right breast, lumpectomy: Invasive, poorly differentiated ductal carcinoma (grade 3) and high-grade ductal carcinoma in situ   High-grade ductal carcinoma in situ involves inferior and medial surgical margins     CANCER CASE SUMMARY   Procedure-excision   Specimen laterality-right   Tumor size-1.1 x 0.8 x 0.5 cm   Histologic type-invasive carcinoma of no special type (ductal)   [place] histologic score-3 (tubule formation) +3 (nuclear   pleomorphism) +2 (mitotic count) = 8   Overall grade-grade 3   Ductal carcinoma in situ-high-grade DCIS with comedonecrosis is present;   positive for extensive intraductal component   Skin-uninvolved by malignancy   Invasive carcinoma margins-uninvolved by invasive carcinoma        Distance from closest margin: 4 mm from anterior margin   DCIS margins-inferior and medial margins involved by DCIS   Regional lymph nodes-involved by tumor cells        Number of lymph nodes with macrometastasis: 1        Number of lymph nodes with micrometastasis: 0        Number of lymph nodes with isolated tumor cells: 0        Size of largest metastatic deposit: 1.5 cm        Extranodal extension: Present        Total number of lymph nodes examined: 2        Number of sentinel nodes examined: 1   Treatment effect in the breast-no known presurgical therapy   TNM classification (AJCC eighth edition)-  pT1c N1a MX     Bone scan, CT chest/abdomen/pelvis 02/26/2021 negative for metastatic disease    Re-excision lumpectomy of inferior and medial margins on 03/09/2021  A. Right breast, new inferior margin, excision: Fat necrosis, foreign body-type giant cell reaction, chronic inflammation, and fibrosis consistent with previous procedure   No evidence of residual carcinoma   Final inferior margin negative for carcinoma     B.  Right breast, new medial margin, excision: Residual high-grade ductal carcinoma in situ   Ductal carcinoma in situ present less than 1 mm from red/superior margin and green/anterior margin   Fat necrosis, foreign body-type giant cell reaction, chronic inflammation, and fibrosis consistent with previous procedure   Fibrocystic change   Microcalcifications associated with benign breast tissue and DCIS   Comment:Residual ductal carcinoma in situ is present in 5 out of 13 tissue blocks of part B    Recommended adjuvant chemotherapy (TCHP) for 6 cycles followed by adjuvant RT followed lastly by endocrine therapy. Side effects TCHP reviewed with patient. She agreed to proceed. 2d-echo 02/17/2021 noted EF 60-65%  Cycle # 1 adjuvant TCHP was on 04/08/2021. Cycle # 2 adjuvant TCHP was on 04/29/2021. Cycle # 3 adjuvant TCHP was on 05/20/2021. Cycle # 4 adjuvant TCHP was on 06/10/2021. 2D-ECHO 06/29/2021 noted EF 60-65%  Cycle # 5 adjuvant TCHP was on 07/22/2021. Cycle # 6 adjuvant TCHP was on 08/12/2021. 2d-echo 10/12/2021 noted EF 55 to 60%  RT was started on 11/01/2021 and completed on 12/10/2021. DEXA scan 12/10/2021 osteopenia by WHO criteria  Arimidex 1 mg po daily was started on 12/11/2021 with fair tolerance   2D-echo 01/25/2022 noted EF 60%  Herceptin/Perjeta was on 01/28/2022. Discharged from [facility] on 02/13/2022 for pneumonia which was treated with Levaquin. [facility] was [facility] 02/16/2022 via EMS d/t suicide attempt for ingestion of Xanax and Tyelnol. EMS administered Narcan. She received IV hydration and was admitted 02/17/2022 to [facility] psych unit for further observation  D/C 02/24/2022. D/C Arimidex and monitor if any recurrent episodes or attempts. Herceptin/Perjeta today 03/18/2022. Labs reviewed ok to proceed. 1L NS0.9% over one hour today 03/18/2022. She is doing well. No ER visits or hospital admissions from the last visit. No recurrent depression suicide attempts. Re-start Arimidex. Refilled compazine    RTC 3 weeks for Herceptin/Perjeta.     Dickson Reardon MD   6/45/[id number]  Board Certified Medical Oncologist

## 2022-03-25 RX ORDER — PROCHLORPERAZINE MALEATE 10 MG
10 TABLET ORAL EVERY 6 HOURS PRN
Qty: 50 TABLET | Refills: 1 | Status: SHIPPED
Start: 2022-03-25 | End: 2022-06-03 | Stop reason: SDUPTHER

## 2022-04-08 ENCOUNTER — HOSPITAL ENCOUNTER (OUTPATIENT)
Dept: INFUSION THERAPY | Age: 57
Discharge: HOME | End: 2022-04-08
Payer: MEDICAID

## 2022-04-08 ENCOUNTER — OFFICE VISIT (OUTPATIENT)
Dept: ONCOLOGY | Age: 57
End: 2022-04-08
Payer: MEDICAID

## 2022-04-08 ENCOUNTER — HOSPITAL ENCOUNTER (OUTPATIENT)
Age: 57
Discharge: HOME | End: 2022-04-08
Payer: MEDICAID

## 2022-04-08 VITALS — DIASTOLIC BLOOD PRESSURE: 63 MMHG | SYSTOLIC BLOOD PRESSURE: 105 MMHG | RESPIRATION RATE: 16 BRPM | HEART RATE: 73 BPM

## 2022-04-08 VITALS
TEMPERATURE: 97.6 F | HEART RATE: 67 BPM | BODY MASS INDEX: 20.72 KG/M2 | SYSTOLIC BLOOD PRESSURE: 95 MMHG | DIASTOLIC BLOOD PRESSURE: 72 MMHG | HEIGHT: 72 IN | WEIGHT: 153 LBS | OXYGEN SATURATION: 100 %

## 2022-04-08 DIAGNOSIS — C50.111 MALIGNANT NEOPLASM OF CENTRAL PORTION OF RIGHT BREAST IN FEMALE, ESTROGEN RECEPTOR POSITIVE (HCC): Primary | ICD-10-CM

## 2022-04-08 DIAGNOSIS — Z79.899 ENCOUNTER FOR MONITORING CARDIOTOXIC DRUG THERAPY: ICD-10-CM

## 2022-04-08 DIAGNOSIS — Z17.0 MALIGNANT NEOPLASM OF CENTRAL PORTION OF RIGHT BREAST IN FEMALE, ESTROGEN RECEPTOR POSITIVE (HCC): Primary | ICD-10-CM

## 2022-04-08 DIAGNOSIS — Z85.3 PERSONAL HISTORY OF BREAST CANCER: ICD-10-CM

## 2022-04-08 DIAGNOSIS — Z51.81 ENCOUNTER FOR MONITORING CARDIOTOXIC DRUG THERAPY: ICD-10-CM

## 2022-04-08 LAB
ALBUMIN SERPL-MCNC: 3.9 G/DL (ref 3.5–5.2)
ALP BLD-CCNC: 241 U/L (ref 35–104)
ALT SERPL-CCNC: 57 U/L (ref 0–32)
ANION GAP SERPL CALCULATED.3IONS-SCNC: 11 MMOL/L (ref 7–16)
AST SERPL-CCNC: 41 U/L (ref 0–31)
BASOPHILS ABSOLUTE: 0.05 E9/L (ref 0–0.2)
BASOPHILS RELATIVE PERCENT: 0.7 % (ref 0–2)
BILIRUB SERPL-MCNC: 0.2 MG/DL (ref 0–1.2)
BUN BLDV-MCNC: 7 MG/DL (ref 6–20)
CALCIUM SERPL-MCNC: 9.6 MG/DL (ref 8.6–10.2)
CHLORIDE BLD-SCNC: 100 MMOL/L (ref 98–107)
CO2: 27 MMOL/L (ref 22–29)
CREAT SERPL-MCNC: 0.7 MG/DL (ref 0.5–1)
EOSINOPHILS ABSOLUTE: 0.03 E9/L (ref 0.05–0.5)
EOSINOPHILS RELATIVE PERCENT: 0.4 % (ref 0–6)
GFR AFRICAN AMERICAN: >60
GFR NON-AFRICAN AMERICAN: >60 ML/MIN/1.73
GLUCOSE BLD-MCNC: 179 MG/DL (ref 74–99)
HCT VFR BLD CALC: 38.3 % (ref 34–48)
HEMOGLOBIN: 12.2 G/DL (ref 11.5–15.5)
IMMATURE GRANULOCYTES #: 0.03 E9/L
IMMATURE GRANULOCYTES %: 0.4 % (ref 0–5)
LYMPHOCYTES ABSOLUTE: 1.24 E9/L (ref 1.5–4)
LYMPHOCYTES RELATIVE PERCENT: 16.2 % (ref 20–42)
MAGNESIUM: 1.7 MG/DL (ref 1.6–2.6)
MCH RBC QN AUTO: 31.4 PG (ref 26–35)
MCHC RBC AUTO-ENTMCNC: 31.9 % (ref 32–34.5)
MCV RBC AUTO: 98.7 FL (ref 80–99.9)
MONOCYTES ABSOLUTE: 0.53 E9/L (ref 0.1–0.95)
MONOCYTES RELATIVE PERCENT: 6.9 % (ref 2–12)
NEUTROPHILS ABSOLUTE: 5.78 E9/L (ref 1.8–7.3)
NEUTROPHILS RELATIVE PERCENT: 75.4 % (ref 43–80)
PDW BLD-RTO: 12.7 FL (ref 11.5–15)
PLATELET # BLD: 283 E9/L (ref 130–450)
PMV BLD AUTO: 9.7 FL (ref 7–12)
POTASSIUM SERPL-SCNC: 4.4 MMOL/L (ref 3.5–5)
RBC # BLD: 3.88 E12/L (ref 3.5–5.5)
SODIUM BLD-SCNC: 138 MMOL/L (ref 132–146)
TOTAL PROTEIN: 6.6 G/DL (ref 6.4–8.3)
WBC # BLD: 7.7 E9/L (ref 4.5–11.5)

## 2022-04-08 PROCEDURE — G8427 DOCREV CUR MEDS BY ELIG CLIN: HCPCS | Performed by: INTERNAL MEDICINE

## 2022-04-08 PROCEDURE — 96413 CHEMO IV INFUSION 1 HR: CPT

## 2022-04-08 PROCEDURE — 2580000003 HC RX 258: Performed by: INTERNAL MEDICINE

## 2022-04-08 PROCEDURE — G9899 SCRN MAM PERF RSLTS DOC: HCPCS | Performed by: INTERNAL MEDICINE

## 2022-04-08 PROCEDURE — 1036F TOBACCO NON-USER: CPT | Performed by: INTERNAL MEDICINE

## 2022-04-08 PROCEDURE — 83735 ASSAY OF MAGNESIUM: CPT

## 2022-04-08 PROCEDURE — G8420 CALC BMI NORM PARAMETERS: HCPCS | Performed by: INTERNAL MEDICINE

## 2022-04-08 PROCEDURE — 6360000002 HC RX W HCPCS: Performed by: INTERNAL MEDICINE

## 2022-04-08 PROCEDURE — 80053 COMPREHEN METABOLIC PANEL: CPT

## 2022-04-08 PROCEDURE — 85025 COMPLETE CBC W/AUTO DIFF WBC: CPT

## 2022-04-08 PROCEDURE — 96549 UNLISTED CHEMOTHERAPY PX: CPT

## 2022-04-08 PROCEDURE — 36415 COLL VENOUS BLD VENIPUNCTURE: CPT

## 2022-04-08 PROCEDURE — 3017F COLORECTAL CA SCREEN DOC REV: CPT | Performed by: INTERNAL MEDICINE

## 2022-04-08 PROCEDURE — 99214 OFFICE O/P EST MOD 30 MIN: CPT | Performed by: INTERNAL MEDICINE

## 2022-04-08 PROCEDURE — 36593 DECLOT VASCULAR DEVICE: CPT

## 2022-04-08 RX ORDER — DIPHENHYDRAMINE HYDROCHLORIDE 50 MG/ML
50 INJECTION INTRAMUSCULAR; INTRAVENOUS ONCE
Status: CANCELLED | OUTPATIENT
Start: 2022-04-08 | End: 2022-04-08

## 2022-04-08 RX ORDER — SODIUM CHLORIDE 9 MG/ML
20 INJECTION, SOLUTION INTRAVENOUS ONCE
Status: CANCELLED | OUTPATIENT
Start: 2022-04-08 | End: 2022-04-08

## 2022-04-08 RX ORDER — HEPARIN SODIUM (PORCINE) LOCK FLUSH IV SOLN 100 UNIT/ML 100 UNIT/ML
500 SOLUTION INTRAVENOUS PRN
Status: CANCELLED | OUTPATIENT
Start: 2022-04-08

## 2022-04-08 RX ORDER — SODIUM CHLORIDE 0.9 % (FLUSH) 0.9 %
5-40 SYRINGE (ML) INJECTION PRN
Status: DISCONTINUED | OUTPATIENT
Start: 2022-04-08 | End: 2022-04-09 | Stop reason: HOSPADM

## 2022-04-08 RX ORDER — SODIUM CHLORIDE 9 MG/ML
25 INJECTION, SOLUTION INTRAVENOUS PRN
OUTPATIENT
Start: 2022-04-08

## 2022-04-08 RX ORDER — SODIUM CHLORIDE 9 MG/ML
INJECTION, SOLUTION INTRAVENOUS CONTINUOUS
Status: CANCELLED | OUTPATIENT
Start: 2022-04-08

## 2022-04-08 RX ORDER — HEPARIN SODIUM (PORCINE) LOCK FLUSH IV SOLN 100 UNIT/ML 100 UNIT/ML
500 SOLUTION INTRAVENOUS PRN
Status: DISCONTINUED | OUTPATIENT
Start: 2022-04-08 | End: 2022-04-09 | Stop reason: HOSPADM

## 2022-04-08 RX ORDER — MEPERIDINE HYDROCHLORIDE 50 MG/ML
12.5 INJECTION INTRAMUSCULAR; INTRAVENOUS; SUBCUTANEOUS ONCE
Status: CANCELLED | OUTPATIENT
Start: 2022-04-08 | End: 2022-04-08

## 2022-04-08 RX ORDER — HEPARIN SODIUM (PORCINE) LOCK FLUSH IV SOLN 100 UNIT/ML 100 UNIT/ML
500 SOLUTION INTRAVENOUS PRN
OUTPATIENT
Start: 2022-04-08

## 2022-04-08 RX ORDER — METHYLPREDNISOLONE SODIUM SUCCINATE 125 MG/2ML
125 INJECTION, POWDER, LYOPHILIZED, FOR SOLUTION INTRAMUSCULAR; INTRAVENOUS ONCE
Status: CANCELLED | OUTPATIENT
Start: 2022-04-08 | End: 2022-04-08

## 2022-04-08 RX ORDER — SODIUM CHLORIDE 0.9 % (FLUSH) 0.9 %
10 SYRINGE (ML) INJECTION PRN
Status: CANCELLED | OUTPATIENT
Start: 2022-04-08

## 2022-04-08 RX ORDER — SODIUM CHLORIDE 9 MG/ML
20 INJECTION, SOLUTION INTRAVENOUS ONCE
Status: COMPLETED | OUTPATIENT
Start: 2022-04-08 | End: 2022-04-08

## 2022-04-08 RX ORDER — ONDANSETRON 4 MG/1
TABLET, FILM COATED ORAL
Qty: 60 TABLET | Refills: 1 | Status: SHIPPED
Start: 2022-04-08 | End: 2022-07-01 | Stop reason: SDUPTHER

## 2022-04-08 RX ORDER — SODIUM CHLORIDE 0.9 % (FLUSH) 0.9 %
5-40 SYRINGE (ML) INJECTION PRN
OUTPATIENT
Start: 2022-04-08

## 2022-04-08 RX ORDER — SODIUM CHLORIDE 0.9 % (FLUSH) 0.9 %
5 SYRINGE (ML) INJECTION PRN
Status: CANCELLED | OUTPATIENT
Start: 2022-04-08

## 2022-04-08 RX ORDER — EPINEPHRINE 1 MG/ML
0.3 INJECTION, SOLUTION, CONCENTRATE INTRAVENOUS PRN
Status: CANCELLED | OUTPATIENT
Start: 2022-04-08

## 2022-04-08 RX ADMIN — SODIUM CHLORIDE 20 ML/HR: 9 INJECTION, SOLUTION INTRAVENOUS at 13:39

## 2022-04-08 RX ADMIN — SODIUM CHLORIDE, PRESERVATIVE FREE 10 ML: 5 INJECTION INTRAVENOUS at 13:26

## 2022-04-08 RX ADMIN — ALTEPLASE 4 MG: 2.2 INJECTION, POWDER, LYOPHILIZED, FOR SOLUTION INTRAVENOUS at 13:43

## 2022-04-08 RX ADMIN — TRASTUZUMAB-ANNS 399 MG: 420 INJECTION, POWDER, LYOPHILIZED, FOR SOLUTION INTRAVENOUS at 14:06

## 2022-04-08 RX ADMIN — SODIUM CHLORIDE, PRESERVATIVE FREE 10 ML: 5 INJECTION INTRAVENOUS at 15:01

## 2022-04-08 RX ADMIN — SODIUM CHLORIDE, PRESERVATIVE FREE 10 ML: 5 INJECTION INTRAVENOUS at 13:22

## 2022-04-08 RX ADMIN — SODIUM CHLORIDE, PRESERVATIVE FREE 10 ML: 5 INJECTION INTRAVENOUS at 15:00

## 2022-04-08 RX ADMIN — SODIUM CHLORIDE, PRESERVATIVE FREE 10 ML: 5 INJECTION INTRAVENOUS at 13:20

## 2022-04-08 RX ADMIN — WATER 4.4 ML: 1 INJECTION INTRAMUSCULAR; INTRAVENOUS; SUBCUTANEOUS at 13:42

## 2022-04-08 RX ADMIN — PERTUZUMAB 420 MG: 30 INJECTION, SOLUTION, CONCENTRATE INTRAVENOUS at 14:06

## 2022-04-08 RX ADMIN — HEPARIN 500 UNITS: 100 SYRINGE at 15:01

## 2022-04-08 NOTE — PROGRESS NOTES
Patient tolerated infusions well, discharge instruction, chemotherapy, reinforced, patient verbalized understanding. Discharged in electric wheelchair with no complaints, in no apparent distress.

## 2022-04-08 NOTE — PROGRESS NOTES
Department of [facility]  Attending Clinic Note    Reason for Visit: Follow-up on a patient with Right Breast Cancer    PCP:  Karla Castaneda DO    History of Present Illness: The mass was located in the 6 o'clock position of right breast    Breast cancer risk factors include infrequent SMEs and age and gender    Bilateral Screening Mammogram 10/06/2020: There are suspicious calcifications in the right breast at the 6 o'clock position which appear pleomorphic. These calcifications are in a segmental distribution. These clustered calcifications are suspicious for malignancy. Right breast, calcifications at the 6:00, core biopsy on 10/30/2020:    - Small focus of invasive ductal carcinoma, grade 3 (3+3+2 = 8)    - Ductal carcinoma in situ, high nuclear grade, comedo/cribriform/solid types;    - Microcalcifications in DCIS    - Breast receptor and biomarkers (per outside report without looking the   slides:     ER: Positive, 89.8%, strong intensity     [place]: Positive, 52.2%, moderate intensity     HER-2: Positive (score 3+)     Ki-67: High proliferation, 26.2%     P53: Negative, 0.2%     Comment:The invasive carcinoma is intermingled with DCIS and measures   3.0 x 2.0 mm in greatest dimension on the slides. CXR PA/Lateral 12/11/2020:  No acute process. Right Axillary U/S on 12/11/2020: There is no axillary LN.     MRI Bilateral Breast 01/05/2021:  Focal, heterogeneous non mass enhancement identified in the right breast 6 o'clock which measures approximately 1.9 x 1.4 x 1.7 cm (image 19 of the axial T1 post contrast series).    No additional foci of disease identified on the right  There is no lymphadenopathy    Needle localized Right lumpectomy with SLNBx with mediport placement on 02/05/2021  A.  Right axillary sentinel lymph node #1, excision: 1 lymph node negative for malignancy     B.  Right axillary contents, regional resection: 1 lymph node positive for metastatic, poorly differentiated ductal carcinoma (grade 3)   Extranodal extension present     C.  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;   positive for extensive intraductal component   Skin-uninvolved by malignancy   Invasive carcinoma margins-uninvolved by invasive carcinoma        Distance from closest margin: 4 mm from anterior margin   DCIS margins-inferior and medial margins involved by DCIS   Regional lymph nodes-involved by tumor cells        Number of lymph nodes with macrometastasis: 1        Number of lymph nodes with micrometastasis: 0        Number of lymph nodes with isolated tumor cells: 0        Size of largest metastatic deposit: 1.5 cm        Extranodal extension: Present        Total number of lymph nodes examined: 2        Number of sentinel nodes examined: 1   Treatment effect in the breast-no known presurgical therapy   TNM classification (AJCC eighth edition)-  pT1c N1a MX     Bone scan, CT chest/abdomen/pelvis 02/26/2021 negative for metastatic disease    Re-excision lumpectomy of inferior and medial margins on 03/09/2021  A. Right breast, new inferior margin, excision: Fat necrosis, foreign body-type giant cell reaction, chronic inflammation, and fibrosis consistent with previous procedure   No evidence of residual carcinoma   Final inferior margin negative for carcinoma     B.  Right breast, new medial margin, excision: Residual high-grade ductal carcinoma in situ   Ductal carcinoma in situ epistaxis. Mouth: No sore throat. RESPIRATORY: No hemoptysis, SOB or coughing  CARDIOVASCULAR: No chest pain, palpitations. GASTROINTESTINAL: No nausea/vomiting, abdominal pain. GENITOURINARY: No dysuria, urinary frequency, hematuria. NEURO: No syncope, presyncope, headache. Remainder: ROS NEGATIVE    Past Medical History:      Diagnosis Date    Anxiety     Arthritis     Cancer ([facility] 2021    breast    Cervical radiculopathy     Chronic back pain     Dehydration 9/23/2021    Dehydration 9/23/2021    Depression     Diabetes mellitus type 2, uncontrolled ([facility] 2/12/2017    GERD (gastroesophageal reflux disease)     History of blood transfusion 01/2018    back surgery, lumbar, dr Emerson Renee    Hypertension     Neuropathy     Right leg pain     seeing doctor currently problems with hardware     Medications:  Reviewed and reconciled. Allergies: Allergies   Allergen Reactions    Ceftriaxone Shortness Of Breath     Physical Exam:  BP 95/72   Pulse 67   Temp 97.6 °F (36.4 °C)   Ht 6' 1\" (1.854 m)   Wt 153 lb (69.4 kg)   LMP 08/13/2013   SpO2 100%   BMI 20.19 kg/m²   GENERAL: Alert, oriented x 3, not in acute distress. HEENT: PERRLA; EOMI. Oropharynx clear. NECK: Supple. Without lymphadenopathy. LUNGS: Good air entry bilaterally. No wheezing, crackles or ronchi. CARDIOVASCULAR: Regular rate. No murmurs, rubs or gallops. ABDOMEN: Soft. Non-tender, non-distended. EXTREMITIES: Without clubbing, cyanosis, or edema. NEUROLOGIC: No focal deficits. ECOG PS 1    Lab Results   Component Value Date    WBC 7.7 04/08/2022    HGB 12.2 04/08/2022    HCT 38.3 04/08/2022    MCV 98.7 04/08/2022     04/08/2022     Impression/Plan:  62 y/o female with Right Breast Cancer    Bilateral Screening Mammogram 10/06/2020: There are suspicious calcifications in the right breast at the 6 o'clock position which appear pleomorphic. These calcifications are in a segmental distribution.  These clustered calcifications are suspicious for malignancy. Right breast, calcifications at the 6:00, core biopsy on 10/30/2020:    - Small focus of invasive ductal carcinoma, grade 3 (3+3+2 = 8)    - Ductal carcinoma in situ, high nuclear grade, comedo/cribriform/solid types;    - Microcalcifications in DCIS    - Breast receptor and biomarkers (per outside report without looking the   slides:     ER: Positive, 89.8%, strong intensity     AR: Positive, 52.2%, moderate intensity     HER-2: Positive (score 3+)     Ki-67: High proliferation, 26.2%     P53: Negative, 0.2%     Comment:The invasive carcinoma is intermingled with DCIS and measures   3.0 x 2.0 mm in greatest dimension on the slides. CXR PA/Lateral 12/11/2020:  No acute process. Right Axillary U/S on 12/11/2020: There is no axillary LN.     MRI Bilateral Breast 01/05/2021:  Focal, heterogeneous non mass enhancement identified in the right breast 6 o'clock which measures approximately 1.9 x 1.4 x 1.7 cm (image 19 of the axial T1 post contrast series).    No additional foci of disease identified on the right  There is no lymphadenopathy    Needle localized Right lumpectomy with SLNBx with mediport placement on 02/05/2021  A.  Right axillary sentinel lymph node #1, excision: 1 lymph node negative for malignancy     B.  Right axillary contents, regional resection: 1 lymph node positive for metastatic, poorly differentiated ductal carcinoma (grade 3)   Extranodal extension present     C.  Right breast, lumpectomy: Invasive, poorly differentiated ductal carcinoma (grade 3) and high-grade ductal carcinoma in situ   High-grade ductal carcinoma in situ involves inferior and medial surgical margins     CANCER CASE SUMMARY   Procedure-excision   Specimen laterality-right   Tumor size-1.1 x 0.8 x 0.5 cm   Histologic type-invasive carcinoma of no special type (ductal)   [place] histologic score-3 (tubule formation) +3 (nuclear   pleomorphism) +2 (mitotic count) = 8 Overall grade-grade 3   Ductal carcinoma in situ-high-grade DCIS with comedonecrosis is present;   positive for extensive intraductal component   Skin-uninvolved by malignancy   Invasive carcinoma margins-uninvolved by invasive carcinoma        Distance from closest margin: 4 mm from anterior margin   DCIS margins-inferior and medial margins involved by DCIS   Regional lymph nodes-involved by tumor cells        Number of lymph nodes with macrometastasis: 1        Number of lymph nodes with micrometastasis: 0        Number of lymph nodes with isolated tumor cells: 0        Size of largest metastatic deposit: 1.5 cm        Extranodal extension: Present        Total number of lymph nodes examined: 2        Number of sentinel nodes examined: 1   Treatment effect in the breast-no known presurgical therapy   TNM classification (AJCC eighth edition)-  pT1c N1a MX     Bone scan, CT chest/abdomen/pelvis 02/26/2021 negative for metastatic disease    Re-excision lumpectomy of inferior and medial margins on 03/09/2021  A. Right breast, new inferior margin, excision: Fat necrosis, foreign body-type giant cell reaction, chronic inflammation, and fibrosis consistent with previous procedure   No evidence of residual carcinoma   Final inferior margin negative for carcinoma     B. Right breast, new medial margin, excision: Residual high-grade ductal carcinoma in situ   Ductal carcinoma in situ present less than 1 mm from red/superior margin and green/anterior margin   Fat necrosis, foreign body-type giant cell reaction, chronic   inflammation, and fibrosis consistent with previous procedure   Fibrocystic change   Microcalcifications associated with benign breast tissue and DCIS   Comment:Residual ductal carcinoma in situ is present in 5 out of 13 tissue blocks of part B    Recommended adjuvant chemotherapy (TCHP) for 6 cycles followed by adjuvant RT followed lastly by endocrine therapy. Side effects TCHP reviewed with patient.  She agreed to proceed. 2d-echo 02/17/2021 noted EF 60-65%  Cycle # 1 adjuvant TCHP was on 04/08/2021. Cycle # 2 adjuvant TCHP was on 04/29/2021. Cycle # 3 adjuvant TCHP was on 05/20/2021. Cycle # 4 adjuvant TCHP was on 06/10/2021. 2D-ECHO 06/29/2021 noted EF 60-65%  Cycle # 5 adjuvant TCHP was on 07/22/2021. Cycle # 6 adjuvant TCHP was on 08/12/2021. 2d-echo 10/12/2021 noted EF 55 to 60%  RT was started on 11/01/2021 and completed on 12/10/2021. DEXA scan 12/10/2021 osteopenia by WHO criteria  Arimidex 1 mg po daily was started on 12/11/2021 with fair tolerance   2D-echo 01/25/2022 noted EF 60%  Herceptin/Perjeta was on 01/28/2022. Discharged from [facility] on 02/13/2022 for pneumonia which was treated with Levaquin. She was taken to the [facility] ER 02/16/2022 via EMS d/t suicide attempt for ingestion of Xanax and Tyelnol. EMS administered Narcan. She received IV hydration and was admitted 02/17/2022 to [facility] psych unit for further observation  D/C 02/24/2022. D/C Arimidex and monitored if any recurrent episodes or attempts. No recurrent ER visits or suicide attempts. Re-started Arimidex 03/18/2022 with good tolerance. Herceptin/Perjeta today 04/08/2022. Labs reviewed ok to proceed. 1LNS0.9% over one hour today 04/08/2022. Continue Arimidex, Ca. VitD    RTC 3 weeks for Herceptin/Perjeta.  2d-echo in the interim    Rebekah Caicedo MD   7/5/3079  Board Certified Medical Oncologist

## 2022-04-11 ENCOUNTER — TELEPHONE (OUTPATIENT)
Dept: ONCOLOGY | Age: 57
End: 2022-04-11

## 2022-04-11 NOTE — TELEPHONE ENCOUNTER
Spoke with Peyton Henao @ [facility] on 04/08/22 and confirmed that there is not auth needed for an echo. Call tracking # [id number] for [facility] [id number]     Called and scheduled w/ Jonah Perez from [facility] cardiology and scheduled echo for 4.19.22 @ 1:00pm.    Spoke with Vincent Sharp and she is able to make this day and time, and will be calling her transportation today to set up this appointment.

## 2022-04-18 ENCOUNTER — TELEPHONE (OUTPATIENT)
Dept: NON INVASIVE DIAGNOSTICS | Age: 57
End: 2022-04-18

## 2022-04-19 ENCOUNTER — HOSPITAL ENCOUNTER (OUTPATIENT)
Dept: NON INVASIVE DIAGNOSTICS | Age: 57
Discharge: HOME | End: 2022-04-19
Payer: MEDICAID

## 2022-04-19 DIAGNOSIS — Z79.899 ENCOUNTER FOR MONITORING CARDIOTOXIC DRUG THERAPY: ICD-10-CM

## 2022-04-19 DIAGNOSIS — Z51.81 ENCOUNTER FOR MONITORING CARDIOTOXIC DRUG THERAPY: ICD-10-CM

## 2022-04-19 PROCEDURE — 93308 TTE F-UP OR LMTD: CPT

## 2022-04-29 ENCOUNTER — HOSPITAL ENCOUNTER (OUTPATIENT)
Dept: INFUSION THERAPY | Age: 57
Discharge: HOME | End: 2022-04-29
Payer: MEDICAID

## 2022-04-29 ENCOUNTER — OFFICE VISIT (OUTPATIENT)
Dept: ONCOLOGY | Age: 57
End: 2022-04-29
Payer: MEDICAID

## 2022-04-29 ENCOUNTER — HOSPITAL ENCOUNTER (OUTPATIENT)
Age: 57
Discharge: HOME | End: 2022-04-29
Payer: MEDICAID

## 2022-04-29 VITALS
WEIGHT: 153.4 LBS | BODY MASS INDEX: 20.78 KG/M2 | SYSTOLIC BLOOD PRESSURE: 92 MMHG | OXYGEN SATURATION: 100 % | HEIGHT: 72 IN | HEART RATE: 70 BPM | TEMPERATURE: 96.2 F | DIASTOLIC BLOOD PRESSURE: 72 MMHG

## 2022-04-29 VITALS — DIASTOLIC BLOOD PRESSURE: 59 MMHG | RESPIRATION RATE: 16 BRPM | SYSTOLIC BLOOD PRESSURE: 97 MMHG | HEART RATE: 66 BPM

## 2022-04-29 DIAGNOSIS — Z85.3 PERSONAL HISTORY OF BREAST CANCER: Primary | ICD-10-CM

## 2022-04-29 DIAGNOSIS — Z85.3 PERSONAL HISTORY OF BREAST CANCER: ICD-10-CM

## 2022-04-29 DIAGNOSIS — Z17.0 MALIGNANT NEOPLASM OF CENTRAL PORTION OF RIGHT BREAST IN FEMALE, ESTROGEN RECEPTOR POSITIVE (HCC): Primary | ICD-10-CM

## 2022-04-29 DIAGNOSIS — C50.111 MALIGNANT NEOPLASM OF CENTRAL PORTION OF RIGHT BREAST IN FEMALE, ESTROGEN RECEPTOR POSITIVE (HCC): Primary | ICD-10-CM

## 2022-04-29 LAB
ALBUMIN SERPL-MCNC: 3.8 G/DL (ref 3.5–5.2)
ALP BLD-CCNC: 194 U/L (ref 35–104)
ALT SERPL-CCNC: 37 U/L (ref 0–32)
ANION GAP SERPL CALCULATED.3IONS-SCNC: 8 MMOL/L (ref 7–16)
AST SERPL-CCNC: 31 U/L (ref 0–31)
BASOPHILS ABSOLUTE: 0.04 E9/L (ref 0–0.2)
BASOPHILS RELATIVE PERCENT: 0.5 % (ref 0–2)
BILIRUB SERPL-MCNC: 0.2 MG/DL (ref 0–1.2)
BUN BLDV-MCNC: 7 MG/DL (ref 6–20)
CALCIUM SERPL-MCNC: 9.5 MG/DL (ref 8.6–10.2)
CHLORIDE BLD-SCNC: 101 MMOL/L (ref 98–107)
CO2: 26 MMOL/L (ref 22–29)
CREAT SERPL-MCNC: 0.8 MG/DL (ref 0.5–1)
EOSINOPHILS ABSOLUTE: 0.04 E9/L (ref 0.05–0.5)
EOSINOPHILS RELATIVE PERCENT: 0.5 % (ref 0–6)
GFR AFRICAN AMERICAN: >60
GFR NON-AFRICAN AMERICAN: >60 ML/MIN/1.73
GLUCOSE BLD-MCNC: 175 MG/DL (ref 74–99)
HCT VFR BLD CALC: 39.4 % (ref 34–48)
HEMOGLOBIN: 12.5 G/DL (ref 11.5–15.5)
IMMATURE GRANULOCYTES #: 0.04 E9/L
IMMATURE GRANULOCYTES %: 0.5 % (ref 0–5)
LYMPHOCYTES ABSOLUTE: 1.3 E9/L (ref 1.5–4)
LYMPHOCYTES RELATIVE PERCENT: 17.1 % (ref 20–42)
MAGNESIUM: 1.7 MG/DL (ref 1.6–2.6)
MCH RBC QN AUTO: 31.5 PG (ref 26–35)
MCHC RBC AUTO-ENTMCNC: 31.7 % (ref 32–34.5)
MCV RBC AUTO: 99.2 FL (ref 80–99.9)
MONOCYTES ABSOLUTE: 0.62 E9/L (ref 0.1–0.95)
MONOCYTES RELATIVE PERCENT: 8.2 % (ref 2–12)
NEUTROPHILS ABSOLUTE: 5.55 E9/L (ref 1.8–7.3)
NEUTROPHILS RELATIVE PERCENT: 73.2 % (ref 43–80)
PDW BLD-RTO: 12.9 FL (ref 11.5–15)
PLATELET # BLD: 220 E9/L (ref 130–450)
PMV BLD AUTO: 10 FL (ref 7–12)
POTASSIUM SERPL-SCNC: 5.2 MMOL/L (ref 3.5–5)
RBC # BLD: 3.97 E12/L (ref 3.5–5.5)
SODIUM BLD-SCNC: 135 MMOL/L (ref 132–146)
TOTAL PROTEIN: 6.4 G/DL (ref 6.4–8.3)
WBC # BLD: 7.6 E9/L (ref 4.5–11.5)

## 2022-04-29 PROCEDURE — 96549 UNLISTED CHEMOTHERAPY PX: CPT

## 2022-04-29 PROCEDURE — 36415 COLL VENOUS BLD VENIPUNCTURE: CPT

## 2022-04-29 PROCEDURE — 99215 OFFICE O/P EST HI 40 MIN: CPT | Performed by: INTERNAL MEDICINE

## 2022-04-29 PROCEDURE — G8420 CALC BMI NORM PARAMETERS: HCPCS | Performed by: INTERNAL MEDICINE

## 2022-04-29 PROCEDURE — 85025 COMPLETE CBC W/AUTO DIFF WBC: CPT

## 2022-04-29 PROCEDURE — 96413 CHEMO IV INFUSION 1 HR: CPT

## 2022-04-29 PROCEDURE — G8427 DOCREV CUR MEDS BY ELIG CLIN: HCPCS | Performed by: INTERNAL MEDICINE

## 2022-04-29 PROCEDURE — G9899 SCRN MAM PERF RSLTS DOC: HCPCS | Performed by: INTERNAL MEDICINE

## 2022-04-29 PROCEDURE — 80053 COMPREHEN METABOLIC PANEL: CPT

## 2022-04-29 PROCEDURE — 1036F TOBACCO NON-USER: CPT | Performed by: INTERNAL MEDICINE

## 2022-04-29 PROCEDURE — 3017F COLORECTAL CA SCREEN DOC REV: CPT | Performed by: INTERNAL MEDICINE

## 2022-04-29 PROCEDURE — 83735 ASSAY OF MAGNESIUM: CPT

## 2022-04-29 PROCEDURE — 2580000003 HC RX 258: Performed by: INTERNAL MEDICINE

## 2022-04-29 PROCEDURE — 6360000002 HC RX W HCPCS: Performed by: INTERNAL MEDICINE

## 2022-04-29 RX ORDER — MEPERIDINE HYDROCHLORIDE 50 MG/ML
12.5 INJECTION INTRAMUSCULAR; INTRAVENOUS; SUBCUTANEOUS ONCE
OUTPATIENT
Start: 2022-04-29 | End: 2022-04-29

## 2022-04-29 RX ORDER — METHYLPREDNISOLONE SODIUM SUCCINATE 125 MG/2ML
125 INJECTION, POWDER, LYOPHILIZED, FOR SOLUTION INTRAMUSCULAR; INTRAVENOUS ONCE
OUTPATIENT
Start: 2022-04-29 | End: 2022-04-29

## 2022-04-29 RX ORDER — SODIUM CHLORIDE 0.9 % (FLUSH) 0.9 %
5 SYRINGE (ML) INJECTION PRN
OUTPATIENT
Start: 2022-04-29

## 2022-04-29 RX ORDER — SODIUM CHLORIDE 0.9 % (FLUSH) 0.9 %
10 SYRINGE (ML) INJECTION PRN
Status: CANCELLED | OUTPATIENT
Start: 2022-04-29

## 2022-04-29 RX ORDER — SODIUM CHLORIDE 9 MG/ML
20 INJECTION, SOLUTION INTRAVENOUS ONCE
Status: COMPLETED | OUTPATIENT
Start: 2022-04-29 | End: 2022-04-29

## 2022-04-29 RX ORDER — EPINEPHRINE 1 MG/ML
0.3 INJECTION, SOLUTION, CONCENTRATE INTRAVENOUS PRN
OUTPATIENT
Start: 2022-04-29

## 2022-04-29 RX ORDER — SODIUM CHLORIDE 0.9 % (FLUSH) 0.9 %
10 SYRINGE (ML) INJECTION PRN
Status: DISCONTINUED | OUTPATIENT
Start: 2022-04-29 | End: 2022-04-30 | Stop reason: HOSPADM

## 2022-04-29 RX ORDER — SODIUM CHLORIDE 9 MG/ML
INJECTION, SOLUTION INTRAVENOUS CONTINUOUS
OUTPATIENT
Start: 2022-04-29

## 2022-04-29 RX ORDER — SODIUM CHLORIDE 9 MG/ML
20 INJECTION, SOLUTION INTRAVENOUS ONCE
Status: CANCELLED | OUTPATIENT
Start: 2022-04-29 | End: 2022-04-29

## 2022-04-29 RX ORDER — HEPARIN SODIUM (PORCINE) LOCK FLUSH IV SOLN 100 UNIT/ML 100 UNIT/ML
500 SOLUTION INTRAVENOUS PRN
Status: CANCELLED | OUTPATIENT
Start: 2022-04-29

## 2022-04-29 RX ORDER — DIPHENHYDRAMINE HYDROCHLORIDE 50 MG/ML
50 INJECTION INTRAMUSCULAR; INTRAVENOUS ONCE
OUTPATIENT
Start: 2022-04-29 | End: 2022-04-29

## 2022-04-29 RX ORDER — FAMOTIDINE 10 MG/ML
20 INJECTION, SOLUTION INTRAVENOUS ONCE
OUTPATIENT
Start: 2022-04-29 | End: 2022-04-29

## 2022-04-29 RX ORDER — HEPARIN SODIUM (PORCINE) LOCK FLUSH IV SOLN 100 UNIT/ML 100 UNIT/ML
500 SOLUTION INTRAVENOUS PRN
Status: DISCONTINUED | OUTPATIENT
Start: 2022-04-29 | End: 2022-04-30 | Stop reason: HOSPADM

## 2022-04-29 RX ADMIN — SODIUM CHLORIDE 20 ML/HR: 9 INJECTION, SOLUTION INTRAVENOUS at 13:23

## 2022-04-29 RX ADMIN — SODIUM CHLORIDE, PRESERVATIVE FREE 10 ML: 5 INJECTION INTRAVENOUS at 13:45

## 2022-04-29 RX ADMIN — SODIUM CHLORIDE, PRESERVATIVE FREE 10 ML: 5 INJECTION INTRAVENOUS at 13:23

## 2022-04-29 RX ADMIN — SODIUM CHLORIDE, PRESERVATIVE FREE 10 ML: 5 INJECTION INTRAVENOUS at 14:31

## 2022-04-29 RX ADMIN — HEPARIN 500 UNITS: 100 SYRINGE at 14:31

## 2022-04-29 RX ADMIN — PERTUZUMAB 420 MG: 30 INJECTION, SOLUTION, CONCENTRATE INTRAVENOUS at 13:48

## 2022-04-29 RX ADMIN — TRASTUZUMAB-ANNS 399 MG: 420 INJECTION, POWDER, LYOPHILIZED, FOR SOLUTION INTRAVENOUS at 13:47

## 2022-04-29 NOTE — PROGRESS NOTES
Department of [facility]  Attending Clinic Note    Reason for Visit: Follow-up on a patient with Right Breast Cancer    PCP:  Drea Meredith DO    History of Present Illness: The mass was located in the 6 o'clock position of right breast    Breast cancer risk factors include infrequent SMEs and age and gender    Bilateral Screening Mammogram 10/06/2020: There are suspicious calcifications in the right breast at the 6 o'clock position which appear pleomorphic. These calcifications are in a segmental distribution. These clustered calcifications are suspicious for malignancy. Right breast, calcifications at the 6:00, core biopsy on 10/30/2020:    - Small focus of invasive ductal carcinoma, grade 3 (3+3+2 = 8)    - Ductal carcinoma in situ, high nuclear grade, comedo/cribriform/solid types;    - Microcalcifications in DCIS    - Breast receptor and biomarkers (per outside report without looking the   slides:     ER: Positive, 89.8%, strong intensity     [place]: Positive, 52.2%, moderate intensity     HER-2: Positive (score 3+)     Ki-67: High proliferation, 26.2%     P53: Negative, 0.2%     Comment:The invasive carcinoma is intermingled with DCIS and measures   3.0 x 2.0 mm in greatest dimension on the slides. CXR PA/Lateral 12/11/2020:  No acute process. Right Axillary U/S on 12/11/2020: There is no axillary LN.     MRI Bilateral Breast 01/05/2021:  Focal, heterogeneous non mass enhancement identified in the right breast 6 o'clock which measures approximately 1.9 x 1.4 x 1.7 cm (image 19 of the axial T1 post contrast series).    No additional foci of disease identified on the right  There is no lymphadenopathy    Needle localized Right lumpectomy with SLNBx with mediport placement on 02/05/2021  A.  Right axillary sentinel lymph node #1, excision: 1 lymph node negative for malignancy     B.  Right axillary contents, regional resection: 1 lymph node positive for metastatic, poorly differentiated ductal carcinoma (grade 3)   Extranodal extension present     C.  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;   positive for extensive intraductal component   Skin-uninvolved by malignancy   Invasive carcinoma margins-uninvolved by invasive carcinoma        Distance from closest margin: 4 mm from anterior margin   DCIS margins-inferior and medial margins involved by DCIS   Regional lymph nodes-involved by tumor cells        Number of lymph nodes with macrometastasis: 1        Number of lymph nodes with micrometastasis: 0        Number of lymph nodes with isolated tumor cells: 0        Size of largest metastatic deposit: 1.5 cm        Extranodal extension: Present        Total number of lymph nodes examined: 2        Number of sentinel nodes examined: 1   Treatment effect in the breast-no known presurgical therapy   TNM classification (AJCC eighth edition)-  pT1c N1a MX     Bone scan, CT chest/abdomen/pelvis 02/26/2021 negative for metastatic disease    Re-excision lumpectomy of inferior and medial margins on 03/09/2021  A. Right breast, new inferior margin, excision: Fat necrosis, foreign body-type giant cell reaction, chronic inflammation, and fibrosis consistent with previous procedure   No evidence of residual carcinoma   Final inferior margin negative for carcinoma     B.  Right breast, new medial margin, excision: Residual high-grade ductal carcinoma in situ   Ductal carcinoma in situ present less than 1 mm from red/superior margin and green/anterior margin   Fat necrosis, foreign body-type giant cell reaction, chronic   inflammation, and fibrosis consistent with previous procedure   Fibrocystic change   Microcalcifications associated with benign breast tissue and DCIS   Comment:Residual ductal carcinoma in situ is present in 5 out of 13 tissue blocks of part B    Recommended adjuvant chemotherapy (TCHP) for 6 cycles followed by adjuvant RT followed lastly by endocrine therapy. Side effects TCHP reviewed with patient. She agreed to proceed. 2d-echo 02/17/2021 noted EF 60-65%  Cycle # 1 adjuvant TCHP was on 04/08/2021. Cycle # 2 adjuvant TCHP was on 04/29/2021. Cycle # 3 adjuvant TCHP was on 05/20/2021. Cycle # 4 adjuvant TCHP was on 06/10/2021. Cycle # 5 adjuvant TCHP was on 07/22/2021. Cycle # 6 adjuvant TCHP was on 08/12/2021. Radiation Oncology consult appreciated. 2d-echo 10/12/2021 noted EF 55 to 60%  RT was started on 11/01/2021 and completed on 12/10/2021. DEXA scan 12/10/2021 osteopenia by WHO criteria  Arimidex 1 mg po daily was started on 12/11/2021 with fair tolerance  2D-echo 01/25/2022 noted EF 60%  Herceptin/Perjeta was on 01/28/2022. Discharged from [facility] on 02/13/2022 for pneumonia which was treated with Levaquin. She was taken to the [facility] ER 02/16/2022 via EMS d/t suicide attempt for ingestion of Xanax and Tyelnol. EMS administered Narcan. She received IV hydration and was admitted 02/17/2022 to [facility] psych unit for further observation  D/C 02/24/2022. D/C Arimidex and monitored if any recurrent episodes or attempts. No recurrent ER visits or suicide attempts. Re-started Arimidex 03/18/2022 with good tolerance. Herceptin/Perjeta today 04/29/2022. Today 04/29/2022. She is doing well. No recurrent depression suicide attempts. Fair appetite and energy level. Review of Systems;  CONSTITUTIONAL: No fever, chills.  Fair appetite and energy level  ENMT: Eyes: No diplopia; Nose: No epistaxis. Mouth: No sore throat. RESPIRATORY: No hemoptysis, SOB or coughing  CARDIOVASCULAR: No chest pain, palpitations. GASTROINTESTINAL: No nausea/vomiting, abdominal pain. GENITOURINARY: No dysuria, urinary frequency, hematuria. NEURO: No syncope, presyncope, headache. Remainder: ROS NEGATIVE    Past Medical History:      Diagnosis Date    Anxiety     Arthritis     Cancer ([facility] 2021    breast    Cervical radiculopathy     Chronic back pain     Dehydration 9/23/2021    Dehydration 9/23/2021    Depression     Diabetes mellitus type 2, uncontrolled ([facility] 2/12/2017    GERD (gastroesophageal reflux disease)     History of blood transfusion 01/2018    back surgery, lumbar, dr Kelly Johnson    Hypertension     Neuropathy     Right leg pain     seeing doctor currently problems with hardware     Medications:  Reviewed and reconciled. Allergies: Allergies   Allergen Reactions    Ceftriaxone Shortness Of Breath     Physical Exam:  BP 92/72   Pulse 70   Temp 96.2 °F (35.7 °C)   Ht 6' 1\" (1.854 m)   Wt 153 lb 6.4 oz (69.6 kg)   LMP 08/13/2013   SpO2 100%   BMI 20.24 kg/m²   GENERAL: Alert, oriented x 3, not in acute distress. HEENT: PERRLA; EOMI. Oropharynx clear. NECK: Supple. Without lymphadenopathy. LUNGS: Good air entry bilaterally. No wheezing, crackles or ronchi. CARDIOVASCULAR: Regular rate. No murmurs, rubs or gallops. ABDOMEN: Soft. Non-tender, non-distended. EXTREMITIES: Without clubbing, cyanosis, or edema. NEUROLOGIC: No focal deficits.    ECOG PS 1    Lab Results   Component Value Date    WBC 7.6 04/29/2022    HGB 12.5 04/29/2022    HCT 39.4 04/29/2022    MCV 99.2 04/29/2022     04/29/2022     Lab Results   Component Value Date     04/29/2022    K 5.2 (H) 04/29/2022     04/29/2022    CO2 26 04/29/2022    BUN 7 04/29/2022    CREATININE 0.8 04/29/2022    GLUCOSE 175 (H) 04/29/2022    CALCIUM 9.5 04/29/2022    PROT 6.4 breast, lumpectomy: Invasive, poorly differentiated ductal carcinoma (grade 3) and high-grade ductal carcinoma in situ   High-grade ductal carcinoma in situ involves inferior and medial surgical margins     CANCER CASE SUMMARY   Procedure-excision   Specimen laterality-right   Tumor size-1.1 x 0.8 x 0.5 cm   Histologic type-invasive carcinoma of no special type (ductal)   [place] histologic score-3 (tubule formation) +3 (nuclear   pleomorphism) +2 (mitotic count) = 8   Overall grade-grade 3   Ductal carcinoma in situ-high-grade DCIS with comedonecrosis is present;   positive for extensive intraductal component   Skin-uninvolved by malignancy   Invasive carcinoma margins-uninvolved by invasive carcinoma        Distance from closest margin: 4 mm from anterior margin   DCIS margins-inferior and medial margins involved by DCIS   Regional lymph nodes-involved by tumor cells        Number of lymph nodes with macrometastasis: 1        Number of lymph nodes with micrometastasis: 0        Number of lymph nodes with isolated tumor cells: 0        Size of largest metastatic deposit: 1.5 cm        Extranodal extension: Present        Total number of lymph nodes examined: 2        Number of sentinel nodes examined: 1   Treatment effect in the breast-no known presurgical therapy   TNM classification (AJCC eighth edition)-  pT1c N1a MX     Bone scan, CT chest/abdomen/pelvis 02/26/2021 negative for metastatic disease    Re-excision lumpectomy of inferior and medial margins on 03/09/2021  A. Right breast, new inferior margin, excision: Fat necrosis, foreign body-type giant cell reaction, chronic inflammation, and fibrosis consistent with previous procedure   No evidence of residual carcinoma   Final inferior margin negative for carcinoma     B.  Right breast, new medial margin, excision: Residual high-grade ductal carcinoma in situ   Ductal carcinoma in situ present less than 1 mm from red/superior margin and green/anterior margin Fat necrosis, foreign body-type giant cell reaction, chronic   inflammation, and fibrosis consistent with previous procedure   Fibrocystic change   Microcalcifications associated with benign breast tissue and DCIS   Comment:Residual ductal carcinoma in situ is present in 5 out of 13 tissue blocks of part B    Recommended adjuvant chemotherapy (TCHP) for 6 cycles followed by adjuvant RT followed lastly by endocrine therapy. Side effects TCHP reviewed with patient. She agreed to proceed. 2d-echo 02/17/2021 noted EF 60-65%  Cycle # 1 adjuvant TCHP was on 04/08/2021. Cycle # 2 adjuvant TCHP was on 04/29/2021. Cycle # 3 adjuvant TCHP was on 05/20/2021. Cycle # 4 adjuvant TCHP was on 06/10/2021. 2D-ECHO 06/29/2021 noted EF 60-65%  Cycle # 5 adjuvant TCHP was on 07/22/2021. Cycle # 6 adjuvant TCHP was on 08/12/2021. 2d-echo 10/12/2021 noted EF 55 to 60%  RT was started on 11/01/2021 and completed on 12/10/2021. DEXA scan 12/10/2021 osteopenia by WHO criteria  Arimidex 1 mg po daily was started on 12/11/2021 with fair tolerance   2D-echo 01/25/2022 noted EF 60%  Herceptin/Perjeta was on 01/28/2022. Discharged from [facility] on 02/13/2022 for pneumonia which was treated with Levaquin. [facility] was [facility] 02/16/2022 via EMS d/t suicide attempt for ingestion of Xanax and Tyelnol. EMS administered Narcan. She received IV hydration and was admitted 02/17/2022 to [facility] psych unit for further observation  D/C 02/24/2022. D/C Arimidex and monitored if any recurrent episodes or attempts. No recurrent ER visits or suicide attempts. Re-started Arimidex 03/18/2022 with good tolerance. 2d-echo 04/19/2022 noted EF 60-65%  Imaging reviewed. Herceptin/Perjeta (last one) today 04/29/2022. Labs reviewed ok to proceed. 1LNS0.9% over one hour today 04/29/2022. Continue Arimidex, Ca. VitD. Mammogram ordered    RTC 4 months with labs.  Mediport removal in the interim (order in Epic)     Krishna Ruiz MD 3/79/6289  Board Certified Medical Oncologist

## 2022-04-29 NOTE — PROGRESS NOTES
Patient tolerated infusions well, VSS discharged without complaints. Dr. Gautam Aguiar number provided to patient.

## 2022-05-16 ENCOUNTER — OFFICE VISIT (OUTPATIENT)
Dept: SURGERY | Age: 57
End: 2022-05-16
Payer: MEDICAID

## 2022-05-16 VITALS
RESPIRATION RATE: 16 BRPM | HEART RATE: 78 BPM | DIASTOLIC BLOOD PRESSURE: 74 MMHG | BODY MASS INDEX: 20.72 KG/M2 | TEMPERATURE: 98.1 F | HEIGHT: 72 IN | OXYGEN SATURATION: 99 % | WEIGHT: 153 LBS | SYSTOLIC BLOOD PRESSURE: 105 MMHG

## 2022-05-16 DIAGNOSIS — Z85.3 PERSONAL HISTORY OF BREAST CANCER: ICD-10-CM

## 2022-05-16 PROCEDURE — 36590 REMOVAL TUNNELED CV CATH: CPT | Performed by: SURGERY

## 2022-05-16 PROCEDURE — 99203 OFFICE O/P NEW LOW 30 MIN: CPT | Performed by: SURGERY

## 2022-05-16 RX ORDER — LIDOCAINE HYDROCHLORIDE AND EPINEPHRINE 10; 10 MG/ML; UG/ML
20 INJECTION, SOLUTION INFILTRATION; PERINEURAL ONCE
Status: COMPLETED | OUTPATIENT
Start: 2022-05-16 | End: 2022-05-16

## 2022-05-16 RX ADMIN — LIDOCAINE HYDROCHLORIDE AND EPINEPHRINE 20 ML: 10; 10 INJECTION, SOLUTION INFILTRATION; PERINEURAL at 16:19

## 2022-05-16 NOTE — PROGRESS NOTES
[facility]  PROGRESS NOTE  ATTENDING NOTE    Chief Complaint   Patient presents with    Mediport     here to have/discuss mediport being removed       Procedure note    DESCRIPTION OF PROCEDURE: The patient was identified and the procedure was confirmed. The left neck and chest were prepped and draped in the usual sterile fashion. Next, 1% lidocaine w/ epinephrine was used for local anesthesia. A skin incision was made through the previous incision scar and continue through the subcutaneous tissues. The reservoir and catheter were freed from the surrounding tissues and removed intact. Hemostasis was achieved, the wound was irrigated and the incision was closed with Vicryl sutures. Skin-Afix was applied to the incision in the operating room. Needle, sponge, and instrument counts were reported as correct x2. The patient tolerated the procedure and was transferred to the recovery area in satisfactory condition.     Patient advised to take a Tylenol when she gets home as the lidocaine will wear off  Okay to shower tomorrow like normal.  No swimming, hot tub, bathtub for 2 weeks  Monitor for signs of infection including warmth, erythema, fevers or chills and notify me if any of these happen  Return to clinic as needed, follow-up in breast clinic as scheduled    Cj Bangura MD, MSc, FACS  5/16/2022  12:09 PM

## 2022-05-24 ENCOUNTER — TELEPHONE (OUTPATIENT)
Dept: ONCOLOGY | Age: 57
End: 2022-05-24

## 2022-05-24 NOTE — TELEPHONE ENCOUNTER
Marlo Adams called the office with concerns of her Hormone pills( Arimidex) . They are making her depressed. She stopped taking the medication a few days ago. She would like either a different medication or a new prescription for her depression.

## 2022-05-26 ENCOUNTER — TELEPHONE (OUTPATIENT)
Dept: INFUSION THERAPY | Age: 57
End: 2022-05-26

## 2022-06-03 ENCOUNTER — OFFICE VISIT (OUTPATIENT)
Dept: ONCOLOGY | Age: 57
End: 2022-06-03
Payer: MEDICAID

## 2022-06-03 VITALS
SYSTOLIC BLOOD PRESSURE: 111 MMHG | OXYGEN SATURATION: 99 % | HEART RATE: 76 BPM | BODY MASS INDEX: 21.26 KG/M2 | HEIGHT: 72 IN | TEMPERATURE: 97.9 F | WEIGHT: 157 LBS | DIASTOLIC BLOOD PRESSURE: 75 MMHG

## 2022-06-03 DIAGNOSIS — Z85.3 PERSONAL HISTORY OF BREAST CANCER: Primary | ICD-10-CM

## 2022-06-03 PROCEDURE — G8420 CALC BMI NORM PARAMETERS: HCPCS | Performed by: INTERNAL MEDICINE

## 2022-06-03 PROCEDURE — 99214 OFFICE O/P EST MOD 30 MIN: CPT | Performed by: INTERNAL MEDICINE

## 2022-06-03 PROCEDURE — 1036F TOBACCO NON-USER: CPT | Performed by: INTERNAL MEDICINE

## 2022-06-03 PROCEDURE — 99212 OFFICE O/P EST SF 10 MIN: CPT

## 2022-06-03 PROCEDURE — G9899 SCRN MAM PERF RSLTS DOC: HCPCS | Performed by: INTERNAL MEDICINE

## 2022-06-03 PROCEDURE — G8427 DOCREV CUR MEDS BY ELIG CLIN: HCPCS | Performed by: INTERNAL MEDICINE

## 2022-06-03 PROCEDURE — 3017F COLORECTAL CA SCREEN DOC REV: CPT | Performed by: INTERNAL MEDICINE

## 2022-06-03 RX ORDER — LETROZOLE 2.5 MG/1
2.5 TABLET, FILM COATED ORAL DAILY
Qty: 30 TABLET | Refills: 0 | Status: SHIPPED
Start: 2022-06-03 | End: 2022-07-01 | Stop reason: SDUPTHER

## 2022-06-03 RX ORDER — PROCHLORPERAZINE MALEATE 10 MG
10 TABLET ORAL EVERY 6 HOURS PRN
Qty: 50 TABLET | Refills: 1 | Status: SHIPPED
Start: 2022-06-03 | End: 2022-08-17 | Stop reason: ALTCHOICE

## 2022-06-03 NOTE — PROGRESS NOTES
Department of [facility]  Attending Clinic Note    Reason for Visit: Follow-up on a patient with Right Breast Cancer    PCP:  Shaniqua Davalos DO    History of Present Illness: The mass was located in the 6 o'clock position of right breast    Breast cancer risk factors include infrequent SMEs and age and gender    Bilateral Screening Mammogram 10/06/2020: There are suspicious calcifications in the right breast at the 6 o'clock position which appear pleomorphic. These calcifications are in a segmental distribution. These clustered calcifications are suspicious for malignancy. Right breast, calcifications at the 6:00, core biopsy on 10/30/2020:    - Small focus of invasive ductal carcinoma, grade 3 (3+3+2 = 8)    - Ductal carcinoma in situ, high nuclear grade, comedo/cribriform/solid types;    - Microcalcifications in DCIS    - Breast receptor and biomarkers (per outside report without looking the   slides:     ER: Positive, 89.8%, strong intensity     LA: Positive, 52.2%, moderate intensity     HER-2: Positive (score 3+)     Ki-67: High proliferation, 26.2%     P53: Negative, 0.2%     Comment:The invasive carcinoma is intermingled with DCIS and measures   3.0 x 2.0 mm in greatest dimension on the slides. CXR PA/Lateral 12/11/2020:  No acute process. Right Axillary U/S on 12/11/2020: There is no axillary LN.     MRI Bilateral Breast 01/05/2021:  Focal, heterogeneous non mass enhancement identified in the right breast 6 o'clock which measures approximately 1.9 x 1.4 x 1.7 cm (image 19 of the axial T1 post contrast series).    No additional foci of disease identified on the right  There is no lymphadenopathy    Needle localized Right lumpectomy with SLNBx with mediport placement on 02/05/2021  A.  Right axillary sentinel lymph node #1, excision: 1 lymph node negative for malignancy     B.  Right axillary contents, regional resection: 1 lymph node positive for metastatic, poorly differentiated ductal carcinoma (grade 3)   Extranodal extension present     C.  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;   positive for extensive intraductal component   Skin-uninvolved by malignancy   Invasive carcinoma margins-uninvolved by invasive carcinoma        Distance from closest margin: 4 mm from anterior margin   DCIS margins-inferior and medial margins involved by DCIS   Regional lymph nodes-involved by tumor cells        Number of lymph nodes with macrometastasis: 1        Number of lymph nodes with micrometastasis: 0        Number of lymph nodes with isolated tumor cells: 0        Size of largest metastatic deposit: 1.5 cm        Extranodal extension: Present        Total number of lymph nodes examined: 2        Number of sentinel nodes examined: 1   Treatment effect in the breast-no known presurgical therapy   TNM classification (AJCC eighth edition)-  pT1c N1a MX     Bone scan, CT chest/abdomen/pelvis 02/26/2021 negative for metastatic disease    Re-excision lumpectomy of inferior and medial margins on 03/09/2021  A. Right breast, new inferior margin, excision: Fat necrosis, foreign body-type giant cell reaction, chronic inflammation, and fibrosis consistent with previous procedure   No evidence of residual carcinoma   Final inferior margin negative for carcinoma     B.  Right breast, new medial margin, excision: Residual high-grade ductal carcinoma in situ   Ductal carcinoma in situ present less than 1 mm from red/superior margin and green/anterior margin   Fat necrosis, foreign body-type giant cell reaction, chronic   inflammation, and fibrosis consistent with previous procedure   Fibrocystic change   Microcalcifications associated with benign breast tissue and DCIS   Comment:Residual ductal carcinoma in situ is present in 5 out of 13 tissue blocks of part B    Recommended adjuvant chemotherapy (TCHP) for 6 cycles followed by adjuvant RT followed lastly by endocrine therapy. Side effects TCHP reviewed with patient. She agreed to proceed. 2d-echo 02/17/2021 noted EF 60-65%  Cycle # 1 adjuvant TCHP was on 04/08/2021. Cycle # 2 adjuvant TCHP was on 04/29/2021. Cycle # 3 adjuvant TCHP was on 05/20/2021. Cycle # 4 adjuvant TCHP was on 06/10/2021. Cycle # 5 adjuvant TCHP was on 07/22/2021. Cycle # 6 adjuvant TCHP was on 08/12/2021. Radiation Oncology consult appreciated. 2d-echo 10/12/2021 noted EF 55 to 60%  RT was started on 11/01/2021 and completed on 12/10/2021. DEXA scan 12/10/2021 osteopenia by WHO criteria  Arimidex 1 mg po daily was started on 12/11/2021 with fair tolerance  2D-echo 01/25/2022 noted EF 60%  Herceptin/Perjeta was on 01/28/2022. Discharged from [facility] on 02/13/2022 for pneumonia which was treated with Levaquin. [facility] was [facility] 02/16/2022 via EMS d/t suicide attempt for ingestion of Xanax and Tyelnol. EMS administered Narcan. She received IV hydration and was admitted 02/17/2022 to [address] [place] [address] psych unit for further observation  D/C 02/24/2022. D/C Arimidex and monitored if any recurrent episodes or attempts. No recurrent ER visits or suicide attempts. Re-started Arimidex 03/18/2022 with good tolerance. Herceptin/Perjeta (last one) 04/29/2022. Today 06/03/2022. New depressive sx noted. D/C Arimidex 2 weeks ago. Felt better subsequently. Review of Systems;  CONSTITUTIONAL: No fever, chills.  Fair appetite and energy level  ENMT: Eyes: No diplopia; Nose: No epistaxis. Mouth: No sore throat. RESPIRATORY: No hemoptysis, SOB or coughing  CARDIOVASCULAR: No chest pain, palpitations. GASTROINTESTINAL: No nausea/vomiting, abdominal pain. GENITOURINARY: No dysuria, urinary frequency, hematuria. NEURO: No syncope, presyncope, headache. PSYCH: Depression sx noted   Remainder: ROS NEGATIVE    Past Medical History:      Diagnosis Date    Anxiety     Arthritis     Cancer ([facility] 2021    breast    Cervical radiculopathy     Chronic back pain     Dehydration 9/23/2021    Dehydration 9/23/2021    Depression     Diabetes mellitus type 2, uncontrolled ([facility] 2/12/2017    GERD (gastroesophageal reflux disease)     History of blood transfusion 01/2018    back surgery, lumbar, dr Abner Allan    Hypertension     Neuropathy     Right leg pain     seeing doctor currently problems with hardware     Medications:  Reviewed and reconciled. Allergies: Allergies   Allergen Reactions    Ceftriaxone Shortness Of Breath     Physical Exam:  /75   Pulse 76   Temp 97.9 °F (36.6 °C)   Ht 6' 1\" (1.854 m)   Wt 157 lb (71.2 kg)   LMP 08/13/2013   SpO2 99%   BMI 20.71 kg/m²   GENERAL: Alert, oriented x 3, not in acute distress. HEENT: PERRLA; EOMI. Oropharynx clear. NECK: Supple. Without lymphadenopathy. LUNGS: Good air entry bilaterally. No wheezing, crackles or ronchi. CARDIOVASCULAR: Regular rate. No murmurs, rubs or gallops. ABDOMEN: Soft. Non-tender, non-distended. EXTREMITIES: Without clubbing, cyanosis, or edema. NEUROLOGIC: No focal deficits.    ECOG PS 1    Lab Results   Component Value Date    WBC 7.6 04/29/2022    HGB 12.5 04/29/2022    HCT 39.4 04/29/2022    MCV 99.2 04/29/2022     04/29/2022     Lab Results   Component Value Date     04/29/2022    K 5.2 (H) 04/29/2022     04/29/2022    CO2 26 04/29/2022    BUN 7 04/29/2022    CREATININE 0.8 04/29/2022    GLUCOSE 175 (H) 04/29/2022    CALCIUM 9.5 04/29/2022    PROT 6.4 04/29/2022    LABALBU 3.8 04/29/2022    BILITOT 0.2 04/29/2022    ALKPHOS 194 (H) 04/29/2022    AST 31 04/29/2022    ALT 37 (H) 04/29/2022    LABGLOM >60 04/29/2022    GFRAA >60 04/29/2022     Impression/Plan:  62 y/o female with Right Breast Cancer    Bilateral Screening Mammogram 10/06/2020: There are suspicious calcifications in the right breast at the 6 o'clock position which appear pleomorphic. These calcifications are in a segmental distribution. These clustered calcifications are suspicious for malignancy. Right breast, calcifications at the 6:00, core biopsy on 10/30/2020:    - Small focus of invasive ductal carcinoma, grade 3 (3+3+2 = 8)    - Ductal carcinoma in situ, high nuclear grade, comedo/cribriform/solid types;    - Microcalcifications in DCIS    - Breast receptor and biomarkers (per outside report without looking the   slides:     ER: Positive, 89.8%, strong intensity     CT: Positive, 52.2%, moderate intensity     HER-2: Positive (score 3+)     Ki-67: High proliferation, 26.2%     P53: Negative, 0.2%     Comment:The invasive carcinoma is intermingled with DCIS and measures   3.0 x 2.0 mm in greatest dimension on the slides. CXR PA/Lateral 12/11/2020:  No acute process. Right Axillary U/S on 12/11/2020: There is no axillary LN.     MRI Bilateral Breast 01/05/2021:  Focal, heterogeneous non mass enhancement identified in the right breast 6 o'clock which measures approximately 1.9 x 1.4 x 1.7 cm (image 19 of the axial T1 post contrast series).    No additional foci of disease identified on the right  There is no lymphadenopathy    Needle localized Right lumpectomy with SLNBx with mediport placement on 02/05/2021  A.  Right axillary sentinel lymph node #1, excision: 1 lymph node negative for malignancy     B.  Right axillary contents, regional resection: 1 lymph node positive for metastatic, poorly differentiated ductal carcinoma (grade 3)   Extranodal extension present     C.  Right breast, lumpectomy: Invasive, poorly differentiated ductal carcinoma (grade 3) and high-grade ductal carcinoma in situ   High-grade ductal carcinoma in situ involves inferior and medial surgical margins     CANCER CASE SUMMARY   Procedure-excision   Specimen laterality-right   Tumor size-1.1 x 0.8 x 0.5 cm   Histologic type-invasive carcinoma of no special type (ductal)   [place] histologic score-3 (tubule formation) +3 (nuclear   pleomorphism) +2 (mitotic count) = 8   Overall grade-grade 3   Ductal carcinoma in situ-high-grade DCIS with comedonecrosis is present;   positive for extensive intraductal component   Skin-uninvolved by malignancy   Invasive carcinoma margins-uninvolved by invasive carcinoma        Distance from closest margin: 4 mm from anterior margin   DCIS margins-inferior and medial margins involved by DCIS   Regional lymph nodes-involved by tumor cells        Number of lymph nodes with macrometastasis: 1        Number of lymph nodes with micrometastasis: 0        Number of lymph nodes with isolated tumor cells: 0        Size of largest metastatic deposit: 1.5 cm        Extranodal extension: Present        Total number of lymph nodes examined: 2        Number of sentinel nodes examined: 1   Treatment effect in the breast-no known presurgical therapy   TNM classification (AJCC eighth edition)-  pT1c N1a MX     Bone scan, CT chest/abdomen/pelvis 02/26/2021 negative for metastatic disease    Re-excision lumpectomy of inferior and medial margins on 03/09/2021  A. Right breast, new inferior margin, excision: Fat necrosis, foreign body-type giant cell reaction, chronic inflammation, and fibrosis consistent with previous procedure   No evidence of residual carcinoma   Final inferior margin negative for carcinoma     B.  Right breast, new medial margin, excision: Residual high-grade ductal carcinoma in situ   Ductal carcinoma in situ present less than 1 mm from red/superior margin and green/anterior margin Fat necrosis, foreign body-type giant cell reaction, chronic   inflammation, and fibrosis consistent with previous procedure   Fibrocystic change   Microcalcifications associated with benign breast tissue and DCIS   Comment:Residual ductal carcinoma in situ is present in 5 out of 13 tissue blocks of part B    Recommended adjuvant chemotherapy (TCHP) for 6 cycles followed by adjuvant RT followed lastly by endocrine therapy. Side effects TCHP reviewed with patient. She agreed to proceed. 2d-echo 02/17/2021 noted EF 60-65%  Cycle # 1 adjuvant TCHP was on 04/08/2021. Cycle # 2 adjuvant TCHP was on 04/29/2021. Cycle # 3 adjuvant TCHP was on 05/20/2021. Cycle # 4 adjuvant TCHP was on 06/10/2021. 2D-ECHO 06/29/2021 noted EF 60-65%  Cycle # 5 adjuvant TCHP was on 07/22/2021. Cycle # 6 adjuvant TCHP was on 08/12/2021. 2d-echo 10/12/2021 noted EF 55 to 60%  RT was started on 11/01/2021 and completed on 12/10/2021. DEXA scan 12/10/2021 osteopenia by WHO criteria  Arimidex 1 mg po daily was started on 12/11/2021 with fair tolerance   2D-echo 01/25/2022 noted EF 60%  Herceptin/Perjeta was on 01/28/2022. Discharged from [facility] on 02/13/2022 for pneumonia which was treated with Levaquin. She was [facility] ER 02/16/2022 via EMS d/t suicide attempt for ingestion of Xanax and Tyelnol. EMS administered Narcan. She received IV hydration and was admitted 02/17/2022 to [address] [place] [address] unit for further observation  D/C 02/24/2022. D/C Arimidex and monitored if any recurrent episodes or attempts. No recurrent ER visits or suicide attempts. Re-started Arimidex 03/18/2022 with good tolerance. 2d-echo 04/19/2022 noted EF 60-65%  Herceptin/Perjeta (last one) 04/29/2022. New depressive sx noted; D/C Arimidex 2 weeks ago; Felt better subsequently. Recommended Femara 2.5 mg po daily instead. Side effects reviewed. She agreed to proceed. Ca.VitD while on Femara.  Mammogram ordered and pending  RTC 1 month for Femara toxicity check.  Continue to follow with counselor    Gucci Varela MD   4/9/9665  Board Certified Medical Oncologist

## 2022-06-23 ENCOUNTER — TELEPHONE (OUTPATIENT)
Dept: INFUSION THERAPY | Age: 57
End: 2022-06-23

## 2022-06-23 NOTE — TELEPHONE ENCOUNTER
Local Anesthesia Pre Procedure Assessment    Informed Consent:    Consent Obtained: Yes       Procedure Assessment:    Preop Diagnosis/Indications for Procedure: sacroiliac joint pain   Planned Procedure: Left SI joint injection   Planned Anesthetic: Local    Medical History/Comorbid Conditions:    Significant Medical/Surgical History: No  Normal Mental Status: Yes    Examination Pertinent to Procedure Being Performed:    Evaluation of Operative Site: CDI    Other Findings:    Reviewed Current Medications and Allergies: Yes    Pertinent Lab/Diagnostic Tests:    Pertinent Lab / Diagnostic Tests: None      Sukumar Hudson MD  6/18/2020     Pt called in requesting mammogram results from last week-Pt notified of overall assessment of mammogram as being benign-Pt states she will be here on 7/1/22 for appointment with Dr Hernandez Vera RN

## 2022-07-01 ENCOUNTER — HOSPITAL ENCOUNTER (OUTPATIENT)
Dept: INFUSION THERAPY | Age: 57
Discharge: HOME | End: 2022-07-01

## 2022-07-01 ENCOUNTER — HOSPITAL ENCOUNTER (OUTPATIENT)
Age: 57
Discharge: HOME | End: 2022-07-01
Payer: MEDICAID

## 2022-07-01 ENCOUNTER — OFFICE VISIT (OUTPATIENT)
Dept: ONCOLOGY | Age: 57
End: 2022-07-01
Payer: MEDICAID

## 2022-07-01 VITALS
HEIGHT: 72 IN | OXYGEN SATURATION: 99 % | SYSTOLIC BLOOD PRESSURE: 109 MMHG | BODY MASS INDEX: 21.78 KG/M2 | DIASTOLIC BLOOD PRESSURE: 68 MMHG | WEIGHT: 160.8 LBS | HEART RATE: 72 BPM | TEMPERATURE: 97.9 F

## 2022-07-01 DIAGNOSIS — Z85.3 PERSONAL HISTORY OF BREAST CANCER: ICD-10-CM

## 2022-07-01 DIAGNOSIS — Z51.81 ENCOUNTER FOR MONITORING CARDIOTOXIC DRUG THERAPY: ICD-10-CM

## 2022-07-01 DIAGNOSIS — Z85.3 PERSONAL HISTORY OF BREAST CANCER: Primary | ICD-10-CM

## 2022-07-01 DIAGNOSIS — Z79.899 ENCOUNTER FOR MONITORING CARDIOTOXIC DRUG THERAPY: ICD-10-CM

## 2022-07-01 LAB
ALBUMIN SERPL-MCNC: 4.1 G/DL (ref 3.5–5.2)
ALP BLD-CCNC: 140 U/L (ref 35–104)
ALT SERPL-CCNC: 35 U/L (ref 0–32)
ANION GAP SERPL CALCULATED.3IONS-SCNC: 13 MMOL/L (ref 7–16)
AST SERPL-CCNC: 30 U/L (ref 0–31)
BASOPHILS ABSOLUTE: 0.08 E9/L (ref 0–0.2)
BASOPHILS RELATIVE PERCENT: 1 % (ref 0–2)
BILIRUB SERPL-MCNC: 0.3 MG/DL (ref 0–1.2)
BUN BLDV-MCNC: 11 MG/DL (ref 6–20)
CALCIUM SERPL-MCNC: 9.4 MG/DL (ref 8.6–10.2)
CHLORIDE BLD-SCNC: 95 MMOL/L (ref 98–107)
CO2: 20 MMOL/L (ref 22–29)
CREAT SERPL-MCNC: 0.8 MG/DL (ref 0.5–1)
EOSINOPHILS ABSOLUTE: 0.11 E9/L (ref 0.05–0.5)
EOSINOPHILS RELATIVE PERCENT: 1.4 % (ref 0–6)
GFR AFRICAN AMERICAN: >60
GFR NON-AFRICAN AMERICAN: >60 ML/MIN/1.73
GLUCOSE BLD-MCNC: 248 MG/DL (ref 74–99)
HCT VFR BLD CALC: 36.3 % (ref 34–48)
HEMOGLOBIN: 12.2 G/DL (ref 11.5–15.5)
IMMATURE GRANULOCYTES #: 0.05 E9/L
IMMATURE GRANULOCYTES %: 0.6 % (ref 0–5)
LYMPHOCYTES ABSOLUTE: 1.47 E9/L (ref 1.5–4)
LYMPHOCYTES RELATIVE PERCENT: 18.2 % (ref 20–42)
MAGNESIUM: 1.6 MG/DL (ref 1.6–2.6)
MCH RBC QN AUTO: 32.1 PG (ref 26–35)
MCHC RBC AUTO-ENTMCNC: 33.6 % (ref 32–34.5)
MCV RBC AUTO: 95.5 FL (ref 80–99.9)
MONOCYTES ABSOLUTE: 0.84 E9/L (ref 0.1–0.95)
MONOCYTES RELATIVE PERCENT: 10.4 % (ref 2–12)
NEUTROPHILS ABSOLUTE: 5.52 E9/L (ref 1.8–7.3)
NEUTROPHILS RELATIVE PERCENT: 68.4 % (ref 43–80)
PDW BLD-RTO: 14.1 FL (ref 11.5–15)
PLATELET # BLD: 284 E9/L (ref 130–450)
PMV BLD AUTO: 9.3 FL (ref 7–12)
POTASSIUM SERPL-SCNC: 4.9 MMOL/L (ref 3.5–5)
RBC # BLD: 3.8 E12/L (ref 3.5–5.5)
SODIUM BLD-SCNC: 128 MMOL/L (ref 132–146)
TOTAL PROTEIN: 6.5 G/DL (ref 6.4–8.3)
WBC # BLD: 8.1 E9/L (ref 4.5–11.5)

## 2022-07-01 PROCEDURE — 83735 ASSAY OF MAGNESIUM: CPT

## 2022-07-01 PROCEDURE — G8427 DOCREV CUR MEDS BY ELIG CLIN: HCPCS | Performed by: INTERNAL MEDICINE

## 2022-07-01 PROCEDURE — G8420 CALC BMI NORM PARAMETERS: HCPCS | Performed by: INTERNAL MEDICINE

## 2022-07-01 PROCEDURE — 1036F TOBACCO NON-USER: CPT | Performed by: INTERNAL MEDICINE

## 2022-07-01 PROCEDURE — 3017F COLORECTAL CA SCREEN DOC REV: CPT | Performed by: INTERNAL MEDICINE

## 2022-07-01 PROCEDURE — 80053 COMPREHEN METABOLIC PANEL: CPT

## 2022-07-01 PROCEDURE — 99214 OFFICE O/P EST MOD 30 MIN: CPT | Performed by: INTERNAL MEDICINE

## 2022-07-01 PROCEDURE — 99212 OFFICE O/P EST SF 10 MIN: CPT

## 2022-07-01 PROCEDURE — 85025 COMPLETE CBC W/AUTO DIFF WBC: CPT

## 2022-07-01 PROCEDURE — G9899 SCRN MAM PERF RSLTS DOC: HCPCS | Performed by: INTERNAL MEDICINE

## 2022-07-01 PROCEDURE — 36415 COLL VENOUS BLD VENIPUNCTURE: CPT

## 2022-07-01 RX ORDER — ONDANSETRON 4 MG/1
TABLET, FILM COATED ORAL
Qty: 60 TABLET | Refills: 1 | Status: SHIPPED | OUTPATIENT
Start: 2022-07-01

## 2022-07-01 RX ORDER — LETROZOLE 2.5 MG/1
2.5 TABLET, FILM COATED ORAL DAILY
Qty: 90 TABLET | Refills: 1 | Status: SHIPPED | OUTPATIENT
Start: 2022-07-01

## 2022-07-01 RX ORDER — LETROZOLE 2.5 MG/1
2.5 TABLET, FILM COATED ORAL DAILY
Qty: 90 TABLET | Refills: 1 | Status: SHIPPED
Start: 2022-07-01 | End: 2022-07-01 | Stop reason: SDUPTHER

## 2022-07-01 NOTE — PROGRESS NOTES
Department of [facility]     Attending Clinic Note    Reason for Visit: Follow-up on a patient with Right Breast Cancer    PCP:  Tin Casas DO    History of Present Illness: The mass was located in the 6 o'clock position of right breast    Breast cancer risk factors include infrequent SMEs and age and gender    Bilateral Screening Mammogram 10/06/2020: There are suspicious calcifications in the right breast at the 6 o'clock position which appear pleomorphic. These calcifications are in a segmental distribution. These clustered calcifications are suspicious for malignancy. Right breast, calcifications at the 6:00, core biopsy on 10/30/2020:    - Small focus of invasive ductal carcinoma, grade 3 (3+3+2 = 8)    - Ductal carcinoma in situ, high nuclear grade, comedo/cribriform/solid types;    - Microcalcifications in DCIS    - Breast receptor and biomarkers (per outside report without looking the   slides:     ER: Positive, 89.8%, strong intensity     AK: Positive, 52.2%, moderate intensity     HER-2: Positive (score 3+)     Ki-67: High proliferation, 26.2%     P53: Negative, 0.2%     Comment:The invasive carcinoma is intermingled with DCIS and measures   3.0 x 2.0 mm in greatest dimension on the slides. CXR PA/Lateral 12/11/2020:  No acute process. Right Axillary U/S on 12/11/2020: There is no axillary LN.     MRI Bilateral Breast 01/05/2021:  Focal, heterogeneous non mass enhancement identified in the right breast 6 o'clock which measures approximately 1.9 x 1.4 x 1.7 cm (image 19 of the axial T1 post contrast series).    No additional foci of disease identified on the right  There is no lymphadenopathy    Needle localized Right lumpectomy with SLNBx with mediport placement on 02/05/2021  A.  Right axillary sentinel lymph node #1, excision: 1 lymph node negative for malignancy     B.  Right axillary contents, regional resection: 1 lymph node positive for metastatic, poorly differentiated ductal carcinoma (grade 3)   Extranodal extension present     C.  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;   positive for extensive intraductal component   Skin-uninvolved by malignancy   Invasive carcinoma margins-uninvolved by invasive carcinoma        Distance from closest margin: 4 mm from anterior margin   DCIS margins-inferior and medial margins involved by DCIS   Regional lymph nodes-involved by tumor cells        Number of lymph nodes with macrometastasis: 1        Number of lymph nodes with micrometastasis: 0        Number of lymph nodes with isolated tumor cells: 0        Size of largest metastatic deposit: 1.5 cm        Extranodal extension: Present        Total number of lymph nodes examined: 2        Number of sentinel nodes examined: 1   Treatment effect in the breast-no known presurgical therapy   TNM classification (AJCC eighth edition)-  pT1c N1a MX     Bone scan, CT chest/abdomen/pelvis 02/26/2021 negative for metastatic disease    Re-excision lumpectomy of inferior and medial margins on 03/09/2021  A. Right breast, new inferior margin, excision: Fat necrosis, foreign body-type giant cell reaction, chronic inflammation, and fibrosis consistent with previous procedure   No evidence of residual carcinoma   Final inferior margin negative for carcinoma     B.  Right breast, new medial margin, excision: Residual high-grade ductal carcinoma in situ   Ductal carcinoma in situ present less than 1 mm from red/superior margin and green/anterior margin   Fat necrosis, foreign body-type giant cell reaction, chronic   inflammation, and fibrosis consistent with previous procedure   Fibrocystic change   Microcalcifications associated with benign breast tissue and DCIS   Comment:Residual ductal carcinoma in situ is present in 5 out of 13 tissue blocks of part B    Recommended adjuvant chemotherapy (TCHP) for 6 cycles followed by adjuvant RT followed lastly by endocrine therapy. Side effects TCHP reviewed with patient. She agreed to proceed. 2d-echo 02/17/2021 noted EF 60-65%  Cycle # 1 adjuvant TCHP was on 04/08/2021. Cycle # 2 adjuvant TCHP was on 04/29/2021. Cycle # 3 adjuvant TCHP was on 05/20/2021. Cycle # 4 adjuvant TCHP was on 06/10/2021. Cycle # 5 adjuvant TCHP was on 07/22/2021. Cycle # 6 adjuvant TCHP was on 08/12/2021. Radiation Oncology consult appreciated. 2d-echo 10/12/2021 noted EF 55 to 60%  RT was started on 11/01/2021 and completed on 12/10/2021. DEXA scan 12/10/2021 osteopenia by WHO criteria  Arimidex 1 mg po daily was started on 12/11/2021 with fair tolerance  2D-echo 01/25/2022 noted EF 60%  Herceptin/Perjeta was on 01/28/2022. Discharged from [facility] on 02/13/2022 for pneumonia which was treated with Levaquin. She was taken to the [facility] ER 02/16/2022 via EMS d/t suicide attempt for ingestion of Xanax and Tyelnol. EMS administered Narcan. She received IV hydration and was admitted 02/17/2022 to [facility] psych unit for further observation  D/C 02/24/2022. D/C Arimidex and monitored if any recurrent episodes or attempts. No recurrent ER visits or suicide attempts. Re-started Arimidex 03/18/2022 with good tolerance. Herceptin/Perjeta (last one) 04/29/2022. New depressive sx noted; D/C Arimidex; Felt better subsequently. Recommended Femara 2.5 mg po daily instead. Side effects reviewed. She agreed to proceed.  Ca.VitD Femara 2.5 mg po daily was started on 06/03/2022 with better tolerance so far. Today 07/01/2022. Tolerating Femara better. No fever chills. Fair appetite and energy level. Review of Systems;  CONSTITUTIONAL: No fever, chills. Fair appetite and energy level  ENMT: Eyes: No diplopia; Nose: No epistaxis. Mouth: No sore throat. RESPIRATORY: No hemoptysis, SOB or coughing  CARDIOVASCULAR: No chest pain, palpitations. GASTROINTESTINAL: No nausea/vomiting, abdominal pain. GENITOURINARY: No dysuria, urinary frequency, hematuria. NEURO: No syncope, presyncope, headache. Remainder: ROS NEGATIVE    Past Medical History:      Diagnosis Date    Anxiety     Arthritis     Breast cancer ([facility] [address] 2020    Rt breast    Cancer ([facility] [address] 2021    breast    Cervical radiculopathy     Chronic back pain     Dehydration 9/23/2021    Dehydration 9/23/2021    Depression     Diabetes mellitus type 2, uncontrolled ([facility] [address] 2/12/2017    GERD (gastroesophageal reflux disease)     History of blood transfusion 01/2018    back surgery, lumbar, dr Leon Coronel    Hypertension     Neuropathy     Right leg pain     seeing doctor currently problems with hardware     Medications:  Reviewed and reconciled. Allergies: Allergies   Allergen Reactions    Ceftriaxone Shortness Of Breath     Physical Exam:  /68   Pulse 72   Temp 97.9 °F (36.6 °C)   Ht 6' 1\" (1.854 m)   Wt 160 lb 12.8 oz (72.9 kg)   LMP 08/13/2013   SpO2 99%   BMI 21.22 kg/m²   GENERAL: Alert, oriented x 3, not in acute distress. HEENT: PERRLA; EOMI. Oropharynx clear. NECK: Supple. Without lymphadenopathy. LUNGS: Good air entry bilaterally. No wheezing, crackles or ronchi. CARDIOVASCULAR: Regular rate. No murmurs, rubs or gallops. EXTREMITIES: Without clubbing, cyanosis, or edema. NEUROLOGIC: No focal deficits.    ECOG PS 1    Lab Results   Component Value Date    WBC 8.1 07/01/2022    HGB 12.2 07/01/2022    HCT 36.3 07/01/2022    MCV 95.5 07/01/2022     07/01/2022     Lab Results Component Value Date     (L) 07/01/2022    K 4.9 07/01/2022    CL 95 (L) 07/01/2022    CO2 20 (L) 07/01/2022    BUN 11 07/01/2022    CREATININE 0.8 07/01/2022    GLUCOSE 248 (H) 07/01/2022    CALCIUM 9.4 07/01/2022    PROT 6.5 07/01/2022    LABALBU 4.1 07/01/2022    BILITOT 0.3 07/01/2022    ALKPHOS 140 (H) 07/01/2022    AST 30 07/01/2022    ALT 35 (H) 07/01/2022    LABGLOM >60 07/01/2022    GFRAA >60 07/01/2022     Impression/Plan:  62 y/o female with Right Breast Cancer    Bilateral Screening Mammogram 10/06/2020: There are suspicious calcifications in the right breast at the 6 o'clock position which appear pleomorphic. These calcifications are in a segmental distribution. These clustered calcifications are suspicious for malignancy. Right breast, calcifications at the 6:00, core biopsy on 10/30/2020:    - Small focus of invasive ductal carcinoma, grade 3 (3+3+2 = 8)    - Ductal carcinoma in situ, high nuclear grade, comedo/cribriform/solid types;    - Microcalcifications in DCIS    - Breast receptor and biomarkers (per outside report without looking the   slides:     ER: Positive, 89.8%, strong intensity     ND: Positive, 52.2%, moderate intensity     HER-2: Positive (score 3+)     Ki-67: High proliferation, 26.2%     P53: Negative, 0.2%     Comment:The invasive carcinoma is intermingled with DCIS and measures   3.0 x 2.0 mm in greatest dimension on the slides. CXR PA/Lateral 12/11/2020:  No acute process. Right Axillary U/S on 12/11/2020: There is no axillary LN.     MRI Bilateral Breast 01/05/2021:  Focal, heterogeneous non mass enhancement identified in the right breast 6 o'clock which measures approximately 1.9 x 1.4 x 1.7 cm (image 19 of the axial T1 post contrast series).    No additional foci of disease identified on the right  There is no lymphadenopathy    Needle localized Right lumpectomy with SLNBx with mediport placement on 02/05/2021  A.  Right axillary sentinel lymph node #1, excision: 1 lymph node negative for malignancy     B.  Right axillary contents, regional resection: 1 lymph node positive for metastatic, poorly differentiated ductal carcinoma (grade 3)   Extranodal extension present     C.  Right breast, lumpectomy: Invasive, poorly differentiated ductal carcinoma (grade 3) and high-grade ductal carcinoma in situ   High-grade ductal carcinoma in situ involves inferior and medial surgical margins     CANCER CASE SUMMARY   Procedure-excision   Specimen laterality-right   Tumor size-1.1 x 0.8 x 0.5 cm   Histologic type-invasive carcinoma of no special type (ductal)   [place] histologic score-3 (tubule formation) +3 (nuclear   pleomorphism) +2 (mitotic count) = 8   Overall grade-grade 3   Ductal carcinoma in situ-high-grade DCIS with comedonecrosis is present;   positive for extensive intraductal component   Skin-uninvolved by malignancy   Invasive carcinoma margins-uninvolved by invasive carcinoma        Distance from closest margin: 4 mm from anterior margin   DCIS margins-inferior and medial margins involved by DCIS   Regional lymph nodes-involved by tumor cells        Number of lymph nodes with macrometastasis: 1        Number of lymph nodes with micrometastasis: 0        Number of lymph nodes with isolated tumor cells: 0        Size of largest metastatic deposit: 1.5 cm        Extranodal extension: Present        Total number of lymph nodes examined: 2        Number of sentinel nodes examined: 1   Treatment effect in the breast-no known presurgical therapy   TNM classification (AJCC eighth edition)-  pT1c N1a MX     Bone scan, CT chest/abdomen/pelvis 02/26/2021 negative for metastatic disease    Re-excision lumpectomy of inferior and medial margins on 03/09/2021  A.  Right breast, new inferior margin, excision: Fat necrosis, foreign body-type giant cell reaction, chronic inflammation, and fibrosis consistent with previous procedure   No evidence of residual carcinoma   Final inferior margin negative for carcinoma     B. Right breast, new medial margin, excision: Residual high-grade ductal carcinoma in situ   Ductal carcinoma in situ present less than 1 mm from red/superior margin and green/anterior margin   Fat necrosis, foreign body-type giant cell reaction, chronic   inflammation, and fibrosis consistent with previous procedure   Fibrocystic change   Microcalcifications associated with benign breast tissue and DCIS   Comment:Residual ductal carcinoma in situ is present in 5 out of 13 tissue blocks of part B    Recommended adjuvant chemotherapy (TCHP) for 6 cycles followed by adjuvant RT followed lastly by endocrine therapy. Side effects TCHP reviewed with patient. She agreed to proceed. 2d-echo 02/17/2021 noted EF 60-65%  Cycle # 1 adjuvant TCHP was on 04/08/2021. Cycle # 2 adjuvant TCHP was on 04/29/2021. Cycle # 3 adjuvant TCHP was on 05/20/2021. Cycle # 4 adjuvant TCHP was on 06/10/2021. 2D-ECHO 06/29/2021 noted EF 60-65%  Cycle # 5 adjuvant TCHP was on 07/22/2021. Cycle # 6 adjuvant TCHP was on 08/12/2021. 2d-echo 10/12/2021 noted EF 55 to 60%  RT was started on 11/01/2021 and completed on 12/10/2021. DEXA scan 12/10/2021 osteopenia by WHO criteria  Arimidex 1 mg po daily was started on 12/11/2021 with fair tolerance   2D-echo 01/25/2022 noted EF 60%  Herceptin/Perjeta was on 01/28/2022. Discharged from [facility] on 02/13/2022 for pneumonia which was treated with Levaquin. She was taken to the [facility] ER 02/16/2022 via EMS d/t suicide attempt for ingestion of Xanax and Tyelnol. EMS administered Narcan. She received IV hydration and was admitted 02/17/2022 to [facility] psych unit for further observation  D/C 02/24/2022. D/C Arimidex and monitored if any recurrent episodes or attempts. No recurrent ER visits or suicide attempts. Re-started Arimidex 03/18/2022 with good tolerance. 2d-echo 04/19/2022 noted EF 60-65%  Herceptin/Perjeta (last one) 04/29/2022.    New depressive sx noted; D/C Arimidex; Felt better subsequently. Recommended Femara 2.5 mg po daily instead. Side effects reviewed. She agreed to proceed. Ca.VitD Femara 2.5 mg po daily was started on 06/03/2022 with better tolerance so far. Bilateral Screening Mammogram 06/17/2022: Negative for malignancy. Imaging reviewed. Labs reviewed. BINU  Continue Femara, Ca. VitD    RTC 4 months with labs.  Continue to follow with counselor    Ramonita Burdick MD   7/1/5973  Board Certified Medical Oncologist

## 2022-07-11 ENCOUNTER — HOSPITAL ENCOUNTER (OUTPATIENT)
Dept: RADIATION ONCOLOGY | Age: 57
Discharge: HOME | End: 2022-07-11
Attending: RADIOLOGY
Payer: MEDICAID

## 2022-07-11 VITALS
TEMPERATURE: 97 F | RESPIRATION RATE: 18 BRPM | HEART RATE: 66 BPM | BODY MASS INDEX: 21.39 KG/M2 | WEIGHT: 162.13 LBS | DIASTOLIC BLOOD PRESSURE: 68 MMHG | SYSTOLIC BLOOD PRESSURE: 114 MMHG

## 2022-07-11 DIAGNOSIS — C50.111 MALIGNANT NEOPLASM OF CENTRAL PORTION OF RIGHT BREAST IN FEMALE, ESTROGEN RECEPTOR POSITIVE (HCC): Primary | ICD-10-CM

## 2022-07-11 DIAGNOSIS — Z17.0 MALIGNANT NEOPLASM OF CENTRAL PORTION OF RIGHT BREAST IN FEMALE, ESTROGEN RECEPTOR POSITIVE (HCC): Primary | ICD-10-CM

## 2022-07-11 PROCEDURE — 99212 OFFICE O/P EST SF 10 MIN: CPT | Performed by: RADIOLOGY

## 2022-07-11 PROCEDURE — 99212 OFFICE O/P EST SF 10 MIN: CPT

## 2022-07-11 NOTE — PROGRESS NOTES
Adriana Cooper  7/11/2022  2:37 PM      Vitals:    07/11/22 1340   BP: 114/68   Pulse: 66   Resp: 18   Temp: 97 °F (36.1 °C)    : Wt Readings from Last 3 Encounters:   07/11/22 162 lb 2 oz (73.5 kg)   07/01/22 160 lb 12.8 oz (72.9 kg)   06/03/22 157 lb (71.2 kg)                Current Outpatient Medications:     letrozole (FEMARA) 2.5 MG tablet, Take 1 tablet by mouth daily, Disp: 90 tablet, Rfl: 1    ondansetron (ZOFRAN) 4 MG tablet, take 1 tablet by mouth every 8 hours if needed for nausea and vomiting, Disp: 60 tablet, Rfl: 1    prochlorperazine (COMPAZINE) 10 mg tablet, Take 1 tablet by mouth every 6 hours as needed (nasuea), Disp: 50 tablet, Rfl: 1    divalproex (DEPAKOTE SPRINKLE) 125 MG capsule, Take 2 capsules by mouth every 12 hours, Disp: 120 capsule, Rfl: 0    escitalopram (LEXAPRO) 5 MG tablet, Take 1 tablet by mouth daily, Disp: 30 tablet, Rfl: 0    atenolol (TENORMIN) 25 MG tablet, Take 25 mg by mouth daily, Disp: , Rfl:     nicotine (NICODERM CQ) 21 MG/24HR, Place 1 patch onto the skin daily, Disp: 30 patch, Rfl: 3    Ascorbic Acid (VITAMIN C) 250 MG tablet, Take 250 mg by mouth daily, Disp: , Rfl:     vitamin D (ERGOCALCIFEROL) 1.25 MG (10428 UT) CAPS capsule, Take 50,000 Units by mouth once a week, Disp: , Rfl:     acetaminophen-codeine (TYLENOL/CODEINE #3) 300-30 MG per tablet, Take 1 tablet by mouth every 4 hours as needed for Pain., Disp: , Rfl:     magnesium oxide (MAG-OX) 400 MG tablet, Take 1 tablet by mouth 2 times daily , Disp: , Rfl:     lidocaine-prilocaine (EMLA) 2.5-2.5 % cream, Apply topically to port 30 minutes prior to chemotherapy.  (Patient not taking: Reported on 5/16/2022), Disp: 30 g, Rfl: 2    prochlorperazine (COMPAZINE) 10 MG tablet, Take 1 tablet by mouth every 6 hours as needed (nausea), Disp: 60 tablet, Rfl: 1    cholestyramine (QUESTRAN) 4 g packet, Take 1 packet by mouth 2 times daily, Disp: 90 packet, Rfl: 3    psyllium (KONSYL) 28.3 % PACK, Take 1 packet by mouth daily, Disp: 30 each, Rfl: 3    magnesium oxide (MAG-OX) 400 (240 Mg) MG tablet, Take 1 tablet by mouth 2 times daily, Disp: 60 tablet, Rfl: 0    MELATONIN PO, Take 3 mg by mouth nightly , Disp: , Rfl:     Multiple Vitamins-Minerals (HAIR SKIN AND NAILS FORMULA) TABS, Take by mouth STOP PREOP MED, Disp: , Rfl:     pregabalin (LYRICA) 75 MG capsule, Take 75 mg by mouth daily. , Disp: , Rfl:     famotidine (PEPCID) 20 MG tablet, Take 20 mg by mouth daily, Disp: , Rfl:     LORATADINE-D 24HR  MG per extended release tablet, Take 1 tablet by mouth daily , Disp: , Rfl:     LANTUS SOLOSTAR 100 UNIT/ML injection pen, Inject 30 Units into the skin nightly , Disp: , Rfl:     metFORMIN (GLUCOPHAGE) 500 MG tablet, Take 500 mg by mouth 2 times daily , Disp: , Rfl:     albuterol sulfate  (90 BASE) MCG/ACT inhaler, Inhale 2 puffs into the lungs every 6 hours as needed for Wheezing, Disp: , Rfl:       Patient is seen today in follow up for Right breast, apex, Spcl    Miguel Nixon is here for a 6 month follow up after completed radiation  Therapy to  Right breast 30 Fx/6000 cGy completed 12/08/2021. She is alert and oriented x 4, denies pain, no skin issues to RT site, pt arrived on a motorizes chair R/T she states she had multiple surgeries on her right LE. She had her mammogram 6/17/22 which showed no evidence of malignancies. She follows with Dr Lili Cox, medical oncology, her last appointment was 7/01/22. No other complaints, Dr Felisa Lozano updated. FALLS RISK SCREENING ASSESSMENT    Instructions:  Assess the patient and enter the appropriate indicators that are present for fall risk identification. Total the numbers entered and assign a fall risk score from Table 2.  Reassess patient at a minimum every 12 weeks or with status change. Assessment   Date  7/11/2022     1. Mental Ability: confusion/cognitively impaired No - 0       2.   Elimination Issues: incontinence, frequency No - 0       3.  Ambulatory: use of assistive devices (walker, cane, off-loading devices), attached to equipment (IV pole, oxygen) Yes - 2     4. Sensory Limitations: dizziness, vertigo, impaired vision No - 0       5. Age Less than 65 years - 0       6. Medication: diuretics, strong analgesics, hypnotics, sedatives, antihypertensive agents   Yes - 3   7. Falls:  recent history of falls within the last 3 months (not to include slipping or tripping)   No - 0   TOTAL 5    If score of 4 or greater was education given? Yes       TABLE 2   Risk Score Risk Level Plan of Care   0-3 Little or  No Risk 1. Provide assistance as indicated for ambulation activities  2. Reorient confused/cognitively impaired patient  3. Call-light/bell within patient's reach  4. Chair/bed in low position, stretcher/bed with siderails up except when performing patient care activities  5. Educate patient/family/caregiver on falls prevention  6.  Reassess in 12 weeks or with any noted change in patient condition which places them at a risk for a fall   4-6 Moderate Risk 1. Provide assistance as indicated for ambulation activities  2. Reorient confused/cognitively impaired patient  3. Call-light/bell within patient's reach  4. Chair/bed in low position, stretcher/bed with siderails up except when performing patient care activities  5. Educate patient/family/caregiver on falls prevention  6. Falls risk precaution (Yellow sticker Level II) placed on patient chart   7 or   Higher High Risk 1. Place patient in easily observable treatment room  2. Patient attended at all times by family member or staff  3. Provide assistance as indicated for ambulation activities  4. Reorient confused/cognitively impaired patient  5. Call-light/bell within patient's reach  6. Chair/bed in low position, stretcher/bed with siderails up except when performing patient care activities  7. Educate patient/family/caregiver on falls prevention  8.   Falls risk precaution (Yellow sticker Level III) placed on patient chart           MALNUTRITION RISK SCREENING ASSESSMENT    7/11/2022   Patient:  Chanell Butcher  Sex:  female    Instructions:  Assess the patient and enter the appropriate indicators that are present for nutrition risk identification. Total the numbers entered and assign a risk score. Follow the appropriate action for total score listed below. Assessment   Date  7/11/2022     1. Have you lost weight without trying? 0- No     2. Have you been eating poorly because of a decreased appetite? 0- No   3. Do you have a diagnosis of head and neck cancer?       0- No                                                                                    TOTAL 0          Score of 0-1: No action  Score 2 or greater:  · For Non-Diabetic Patient: Recommend adding Ensure Complete 2 x daily and provide patient with Ensure wellness bag with coupons  · For Diabetic Patient: Recommend adding Glucerna Shake 2 x daily and provide patient with Glucerna Wellness bag with coupons  · Route to the dietitian via Mihai Silverman RN

## 2022-07-11 NOTE — ADDENDUM NOTE
Encounter addended by: Saroj Jacob RN on: 7/11/2022 2:45 PM   Actions taken: Flowsheet accepted, Medication List reviewed, Charge Capture section accepted, Order Reconciliation Section accessed, Clinical Note Signed

## 2022-07-11 NOTE — PROGRESS NOTES
DEPARTMENT OF RADIATION ONCOLOGY   Follow up visit        2022    NAME:  Liza Echavarria    :  1965 61 y.o. female     PCP: Dae Villalpando DO    DIAGNOSIS:  1. Malignant neoplasm of central portion of right breast in female, estrogen receptor positive ([facility]        STAGING: Cancer Staging  Malignant neoplasm of central portion of right breast in female, estrogen receptor positive ([facility]  Staging form: Breast, AJCC 8th Edition  - Clinical stage from 2021: Stage IA (cT1c, cN0, cM0, G3, ER+, PA+, HER2+) - Signed by Sherrie Lizarraga MD on 2021  - Pathologic stage from 2021: Stage IA (pT1c, pN1(sn), cM0, G3, ER+, PA+, HER2+) - Signed by Sherrie Lizarraga MD on 2021      RECENT HISTORY: Liza Echavarria is now 6 months out from completion of RT to the right breast and ipsilateral lymph nodes. She is here for routine follow-up. She had a mammogram on 2022 that as a BI-RADS of 2. Past medical, surgical, social and family histories reviewed and updated as indicated.     ALLERGIES:  Ceftriaxone       MEDICATIONS:   Current Outpatient Medications:     letrozole (FEMARA) 2.5 MG tablet, Take 1 tablet by mouth daily, Disp: 90 tablet, Rfl: 1    ondansetron (ZOFRAN) 4 MG tablet, take 1 tablet by mouth every 8 hours if needed for nausea and vomiting, Disp: 60 tablet, Rfl: 1    prochlorperazine (COMPAZINE) 10 mg tablet, Take 1 tablet by mouth every 6 hours as needed (nasuea), Disp: 50 tablet, Rfl: 1    divalproex (DEPAKOTE SPRINKLE) 125 MG capsule, Take 2 capsules by mouth every 12 hours, Disp: 120 capsule, Rfl: 0    escitalopram (LEXAPRO) 5 MG tablet, Take 1 tablet by mouth daily, Disp: 30 tablet, Rfl: 0    atenolol (TENORMIN) 25 MG tablet, Take 25 mg by mouth daily, Disp: , Rfl:     nicotine (NICODERM CQ) 21 MG/24HR, Place 1 patch onto the skin daily, Disp: 30 patch, Rfl: 3    Ascorbic Acid (VITAMIN C) 250 MG tablet, Take 250 mg by mouth daily, Disp: , Rfl:     vitamin D (ERGOCALCIFEROL) 1.25 MG (90769 UT) CAPS capsule, Take 50,000 Units by mouth once a week, Disp: , Rfl:     acetaminophen-codeine (TYLENOL/CODEINE #3) 300-30 MG per tablet, Take 1 tablet by mouth every 4 hours as needed for Pain., Disp: , Rfl:     magnesium oxide (MAG-OX) 400 MG tablet, Take 1 tablet by mouth 2 times daily , Disp: , Rfl:     lidocaine-prilocaine (EMLA) 2.5-2.5 % cream, Apply topically to port 30 minutes prior to chemotherapy. (Patient not taking: Reported on 5/16/2022), Disp: 30 g, Rfl: 2    prochlorperazine (COMPAZINE) 10 MG tablet, Take 1 tablet by mouth every 6 hours as needed (nausea), Disp: 60 tablet, Rfl: 1    cholestyramine (QUESTRAN) 4 g packet, Take 1 packet by mouth 2 times daily, Disp: 90 packet, Rfl: 3    psyllium (KONSYL) 28.3 % PACK, Take 1 packet by mouth daily, Disp: 30 each, Rfl: 3    magnesium oxide (MAG-OX) 400 (240 Mg) MG tablet, Take 1 tablet by mouth 2 times daily, Disp: 60 tablet, Rfl: 0    MELATONIN PO, Take 3 mg by mouth nightly , Disp: , Rfl:     Multiple Vitamins-Minerals (HAIR SKIN AND NAILS FORMULA) TABS, Take by mouth STOP PREOP MED, Disp: , Rfl:     pregabalin (LYRICA) 75 MG capsule, Take 75 mg by mouth daily. , Disp: , Rfl:     famotidine (PEPCID) 20 MG tablet, Take 20 mg by mouth daily, Disp: , Rfl:     LORATADINE-D 24HR  MG per extended release tablet, Take 1 tablet by mouth daily , Disp: , Rfl:     LANTUS SOLOSTAR 100 UNIT/ML injection pen, Inject 30 Units into the skin nightly , Disp: , Rfl:     metFORMIN (GLUCOPHAGE) 500 MG tablet, Take 500 mg by mouth 2 times daily , Disp: , Rfl:     albuterol sulfate  (90 BASE) MCG/ACT inhaler, Inhale 2 puffs into the lungs every 6 hours as needed for Wheezing, Disp: , Rfl:     REVIEW OF SYSTEMS: Obtained from the patient, chart review and nursing assessment. 10-point ROS reviewed and negative except as per above. PHYSICAL EXAMINATION:    There were no vitals filed for this visit.     Wt Readings from Last 3 Encounters:   07/01/22 160 lb 12.8 oz (72.9 kg)   06/03/22 157 lb (71.2 kg)   05/16/22 153 lb (69.4 kg)       General: Well developed, no acute distress. HEENT: Normocephalic and atraumatic. Moist mucosae. Neck: No thyromegaly. Trachea at midline. No lymphadenopathy. Cardiorespiratory: Unlabored breathing. No edema. Abdomen: Nontender and nondistended. No hepatosplenomegaly appreciated. Back: No tenderness to palpation. Neuro: CNs grossly intact. Spontaneous movement with all limbs. Psych: Normal affect. Alert & oriented x3. Skin: No abnormal lesions throughout. ASSESSMENT/PLAN:      Patient is doing well, there are no signs of malignancy at her most recent mammogram.  In terms of post actinic side effects, the patient has a bit of swelling and increased consistency of the right breast.  No masses palpated bilaterally. Overall good cosmetic result.     April Maciel MD    Department of Radiation Oncology  [facility]) [facility]: [phone] (JGK: [phone])  [address] [place] [address]: [phone] (QWH: [phone])  [address] [facility]) [facility]:  [phone] (YPY:  [phone])

## 2022-07-21 RX ORDER — ANASTROZOLE 1 MG/1
TABLET ORAL
Qty: 90 TABLET | Refills: 1 | Status: SHIPPED | OUTPATIENT
Start: 2022-07-21

## 2022-08-17 ENCOUNTER — APPOINTMENT (OUTPATIENT)
Dept: CT IMAGING | Age: 57
DRG: 463 | End: 2022-08-17
Payer: MEDICAID

## 2022-08-17 ENCOUNTER — HOSPITAL ENCOUNTER (INPATIENT)
Age: 57
LOS: 1 days | Discharge: HOME | DRG: 463 | End: 2022-08-18
Attending: EMERGENCY MEDICINE | Admitting: INTERNAL MEDICINE
Payer: MEDICAID

## 2022-08-17 DIAGNOSIS — K57.32 DIVERTICULITIS OF COLON: Primary | ICD-10-CM

## 2022-08-17 DIAGNOSIS — E87.1 HYPONATREMIA: ICD-10-CM

## 2022-08-17 PROBLEM — K57.92 DIVERTICULITIS: Status: ACTIVE | Noted: 2022-08-17

## 2022-08-17 LAB
ALBUMIN SERPL-MCNC: 3.8 G/DL (ref 3.5–5.2)
ALP BLD-CCNC: 114 U/L (ref 35–104)
ALT SERPL-CCNC: 25 U/L (ref 0–32)
ANION GAP SERPL CALCULATED.3IONS-SCNC: 12 MMOL/L (ref 7–16)
AST SERPL-CCNC: 21 U/L (ref 0–31)
BACTERIA: NORMAL /HPF
BASOPHILS ABSOLUTE: 0.05 E9/L (ref 0–0.2)
BASOPHILS RELATIVE PERCENT: 0.4 % (ref 0–2)
BILIRUB SERPL-MCNC: 0.4 MG/DL (ref 0–1.2)
BILIRUBIN URINE: NEGATIVE
BLOOD, URINE: NEGATIVE
BUN BLDV-MCNC: 8 MG/DL (ref 6–20)
CALCIUM SERPL-MCNC: 9.3 MG/DL (ref 8.6–10.2)
CHLORIDE BLD-SCNC: 93 MMOL/L (ref 98–107)
CLARITY: CLEAR
CO2: 19 MMOL/L (ref 22–29)
COLOR: YELLOW
CREAT SERPL-MCNC: 0.7 MG/DL (ref 0.5–1)
EKG ATRIAL RATE: 64 BPM
EKG P AXIS: 57 DEGREES
EKG P-R INTERVAL: 186 MS
EKG Q-T INTERVAL: 402 MS
EKG QRS DURATION: 96 MS
EKG QTC CALCULATION (BAZETT): 414 MS
EKG R AXIS: -18 DEGREES
EKG T AXIS: 17 DEGREES
EKG VENTRICULAR RATE: 64 BPM
EOSINOPHILS ABSOLUTE: 0.05 E9/L (ref 0.05–0.5)
EOSINOPHILS RELATIVE PERCENT: 0.4 % (ref 0–6)
GFR AFRICAN AMERICAN: >60
GFR NON-AFRICAN AMERICAN: >60 ML/MIN/1.73
GLUCOSE BLD-MCNC: 209 MG/DL (ref 74–99)
GLUCOSE URINE: NEGATIVE MG/DL
HCT VFR BLD CALC: 34.2 % (ref 34–48)
HEMOGLOBIN: 11.9 G/DL (ref 11.5–15.5)
IMMATURE GRANULOCYTES #: 0.04 E9/L
IMMATURE GRANULOCYTES %: 0.4 % (ref 0–5)
KETONES, URINE: NEGATIVE MG/DL
LACTIC ACID: 1.8 MMOL/L (ref 0.5–2.2)
LEUKOCYTE ESTERASE, URINE: NEGATIVE
LIPASE: 10 U/L (ref 13–60)
LYMPHOCYTES ABSOLUTE: 1 E9/L (ref 1.5–4)
LYMPHOCYTES RELATIVE PERCENT: 8.8 % (ref 20–42)
MCH RBC QN AUTO: 32.8 PG (ref 26–35)
MCHC RBC AUTO-ENTMCNC: 34.8 % (ref 32–34.5)
MCV RBC AUTO: 94.2 FL (ref 80–99.9)
METER GLUCOSE: 53 MG/DL (ref 74–99)
METER GLUCOSE: 74 MG/DL (ref 74–99)
MONOCYTES ABSOLUTE: 0.9 E9/L (ref 0.1–0.95)
MONOCYTES RELATIVE PERCENT: 7.9 % (ref 2–12)
NEUTROPHILS ABSOLUTE: 9.3 E9/L (ref 1.8–7.3)
NEUTROPHILS RELATIVE PERCENT: 82.1 % (ref 43–80)
NITRITE, URINE: NEGATIVE
PDW BLD-RTO: 12.2 FL (ref 11.5–15)
PH UA: 6 (ref 5–9)
PLATELET # BLD: 265 E9/L (ref 130–450)
PMV BLD AUTO: 8.9 FL (ref 7–12)
POTASSIUM REFLEX MAGNESIUM: 4.3 MMOL/L (ref 3.5–5)
PRO-BNP: 97 PG/ML (ref 0–125)
PROTEIN UA: NEGATIVE MG/DL
RBC # BLD: 3.63 E12/L (ref 3.5–5.5)
RBC UA: NORMAL /HPF (ref 0–2)
SARS-COV-2, NAAT: NOT DETECTED
SODIUM BLD-SCNC: 124 MMOL/L (ref 132–146)
SPECIFIC GRAVITY UA: 1.02 (ref 1–1.03)
TOTAL PROTEIN: 6 G/DL (ref 6.4–8.3)
TROPONIN, HIGH SENSITIVITY: 17 NG/L (ref 0–9)
UROBILINOGEN, URINE: 0.2 E.U./DL
WBC # BLD: 11.3 E9/L (ref 4.5–11.5)
WBC UA: NORMAL /HPF (ref 0–5)

## 2022-08-17 PROCEDURE — 82962 GLUCOSE BLOOD TEST: CPT

## 2022-08-17 PROCEDURE — 96361 HYDRATE IV INFUSION ADD-ON: CPT

## 2022-08-17 PROCEDURE — 6370000000 HC RX 637 (ALT 250 FOR IP): Performed by: FAMILY MEDICINE

## 2022-08-17 PROCEDURE — G0378 HOSPITAL OBSERVATION PER HR: HCPCS

## 2022-08-17 PROCEDURE — 96375 TX/PRO/DX INJ NEW DRUG ADDON: CPT

## 2022-08-17 PROCEDURE — 96372 THER/PROPH/DIAG INJ SC/IM: CPT

## 2022-08-17 PROCEDURE — 6360000004 HC RX CONTRAST MEDICATION: Performed by: RADIOLOGY

## 2022-08-17 PROCEDURE — 6360000002 HC RX W HCPCS: Performed by: STUDENT IN AN ORGANIZED HEALTH CARE EDUCATION/TRAINING PROGRAM

## 2022-08-17 PROCEDURE — 85025 COMPLETE CBC W/AUTO DIFF WBC: CPT

## 2022-08-17 PROCEDURE — 80053 COMPREHEN METABOLIC PANEL: CPT

## 2022-08-17 PROCEDURE — 83605 ASSAY OF LACTIC ACID: CPT

## 2022-08-17 PROCEDURE — 2060000000 HC ICU INTERMEDIATE R&B

## 2022-08-17 PROCEDURE — 96365 THER/PROPH/DIAG IV INF INIT: CPT

## 2022-08-17 PROCEDURE — 84484 ASSAY OF TROPONIN QUANT: CPT

## 2022-08-17 PROCEDURE — 93005 ELECTROCARDIOGRAM TRACING: CPT | Performed by: STUDENT IN AN ORGANIZED HEALTH CARE EDUCATION/TRAINING PROGRAM

## 2022-08-17 PROCEDURE — 83880 ASSAY OF NATRIURETIC PEPTIDE: CPT

## 2022-08-17 PROCEDURE — 83690 ASSAY OF LIPASE: CPT

## 2022-08-17 PROCEDURE — 71275 CT ANGIOGRAPHY CHEST: CPT

## 2022-08-17 PROCEDURE — 74177 CT ABD & PELVIS W/CONTRAST: CPT

## 2022-08-17 PROCEDURE — 99285 EMERGENCY DEPT VISIT HI MDM: CPT

## 2022-08-17 PROCEDURE — 81001 URINALYSIS AUTO W/SCOPE: CPT

## 2022-08-17 PROCEDURE — 96374 THER/PROPH/DIAG INJ IV PUSH: CPT

## 2022-08-17 PROCEDURE — 2580000003 HC RX 258: Performed by: STUDENT IN AN ORGANIZED HEALTH CARE EDUCATION/TRAINING PROGRAM

## 2022-08-17 PROCEDURE — 6360000002 HC RX W HCPCS: Performed by: FAMILY MEDICINE

## 2022-08-17 PROCEDURE — 2580000003 HC RX 258: Performed by: FAMILY MEDICINE

## 2022-08-17 PROCEDURE — 87635 SARS-COV-2 COVID-19 AMP PRB: CPT

## 2022-08-17 RX ORDER — SODIUM CHLORIDE 9 MG/ML
INJECTION, SOLUTION INTRAVENOUS ONCE
Status: COMPLETED | OUTPATIENT
Start: 2022-08-17 | End: 2022-08-17

## 2022-08-17 RX ORDER — SODIUM CHLORIDE 9 MG/ML
INJECTION, SOLUTION INTRAVENOUS CONTINUOUS
Status: DISCONTINUED | OUTPATIENT
Start: 2022-08-17 | End: 2022-08-18 | Stop reason: HOSPADM

## 2022-08-17 RX ORDER — SODIUM CHLORIDE 9 MG/ML
INJECTION, SOLUTION INTRAVENOUS PRN
Status: DISCONTINUED | OUTPATIENT
Start: 2022-08-17 | End: 2022-08-18 | Stop reason: HOSPADM

## 2022-08-17 RX ORDER — SODIUM CHLORIDE 0.9 % (FLUSH) 0.9 %
10 SYRINGE (ML) INJECTION PRN
Status: DISCONTINUED | OUTPATIENT
Start: 2022-08-17 | End: 2022-08-18 | Stop reason: HOSPADM

## 2022-08-17 RX ORDER — FAMOTIDINE 20 MG/1
20 TABLET, FILM COATED ORAL DAILY
Status: DISCONTINUED | OUTPATIENT
Start: 2022-08-18 | End: 2022-08-18 | Stop reason: HOSPADM

## 2022-08-17 RX ORDER — ACETAMINOPHEN 650 MG/1
650 SUPPOSITORY RECTAL EVERY 6 HOURS PRN
Status: DISCONTINUED | OUTPATIENT
Start: 2022-08-17 | End: 2022-08-18 | Stop reason: HOSPADM

## 2022-08-17 RX ORDER — 0.9 % SODIUM CHLORIDE 0.9 %
500 INTRAVENOUS SOLUTION INTRAVENOUS ONCE
Status: COMPLETED | OUTPATIENT
Start: 2022-08-17 | End: 2022-08-17

## 2022-08-17 RX ORDER — SODIUM CHLORIDE 0.9 % (FLUSH) 0.9 %
5-40 SYRINGE (ML) INJECTION EVERY 12 HOURS SCHEDULED
Status: DISCONTINUED | OUTPATIENT
Start: 2022-08-17 | End: 2022-08-18 | Stop reason: HOSPADM

## 2022-08-17 RX ORDER — PREGABALIN 75 MG/1
75 CAPSULE ORAL DAILY
Status: DISCONTINUED | OUTPATIENT
Start: 2022-08-18 | End: 2022-08-18 | Stop reason: HOSPADM

## 2022-08-17 RX ORDER — DEXTROSE MONOHYDRATE 100 MG/ML
INJECTION, SOLUTION INTRAVENOUS CONTINUOUS PRN
Status: DISCONTINUED | OUTPATIENT
Start: 2022-08-17 | End: 2022-08-18 | Stop reason: HOSPADM

## 2022-08-17 RX ORDER — LANOLIN ALCOHOL/MO/W.PET/CERES
400 CREAM (GRAM) TOPICAL 2 TIMES DAILY
Status: DISCONTINUED | OUTPATIENT
Start: 2022-08-17 | End: 2022-08-18 | Stop reason: HOSPADM

## 2022-08-17 RX ORDER — MORPHINE SULFATE 4 MG/ML
4 INJECTION, SOLUTION INTRAMUSCULAR; INTRAVENOUS ONCE
Status: COMPLETED | OUTPATIENT
Start: 2022-08-17 | End: 2022-08-17

## 2022-08-17 RX ORDER — NICOTINE 21 MG/24HR
1 PATCH, TRANSDERMAL 24 HOURS TRANSDERMAL DAILY
Status: DISCONTINUED | OUTPATIENT
Start: 2022-08-17 | End: 2022-08-18 | Stop reason: HOSPADM

## 2022-08-17 RX ORDER — FENTANYL CITRATE 50 UG/ML
50 INJECTION, SOLUTION INTRAMUSCULAR; INTRAVENOUS ONCE
Status: COMPLETED | OUTPATIENT
Start: 2022-08-17 | End: 2022-08-17

## 2022-08-17 RX ORDER — POTASSIUM CHLORIDE 20 MEQ/1
40 TABLET, EXTENDED RELEASE ORAL PRN
Status: DISCONTINUED | OUTPATIENT
Start: 2022-08-17 | End: 2022-08-18 | Stop reason: HOSPADM

## 2022-08-17 RX ORDER — METRONIDAZOLE 500 MG/100ML
500 INJECTION, SOLUTION INTRAVENOUS EVERY 8 HOURS
Status: DISCONTINUED | OUTPATIENT
Start: 2022-08-18 | End: 2022-08-18 | Stop reason: HOSPADM

## 2022-08-17 RX ORDER — ANASTROZOLE 1 MG/1
1 TABLET ORAL DAILY
Status: DISCONTINUED | OUTPATIENT
Start: 2022-08-17 | End: 2022-08-17

## 2022-08-17 RX ORDER — ONDANSETRON 4 MG/1
4 TABLET, ORALLY DISINTEGRATING ORAL EVERY 8 HOURS PRN
Status: DISCONTINUED | OUTPATIENT
Start: 2022-08-17 | End: 2022-08-18 | Stop reason: HOSPADM

## 2022-08-17 RX ORDER — INSULIN GLARGINE 100 [IU]/ML
30 INJECTION, SOLUTION SUBCUTANEOUS NIGHTLY
Status: DISCONTINUED | OUTPATIENT
Start: 2022-08-17 | End: 2022-08-18 | Stop reason: HOSPADM

## 2022-08-17 RX ORDER — CHOLESTYRAMINE 4 G/9G
1 POWDER, FOR SUSPENSION ORAL 2 TIMES DAILY
Status: DISCONTINUED | OUTPATIENT
Start: 2022-08-17 | End: 2022-08-18 | Stop reason: HOSPADM

## 2022-08-17 RX ORDER — ALBUTEROL SULFATE 2.5 MG/3ML
2.5 SOLUTION RESPIRATORY (INHALATION) EVERY 6 HOURS PRN
Status: DISCONTINUED | OUTPATIENT
Start: 2022-08-17 | End: 2022-08-18 | Stop reason: HOSPADM

## 2022-08-17 RX ORDER — ALBUTEROL SULFATE 90 UG/1
2 AEROSOL, METERED RESPIRATORY (INHALATION) EVERY 6 HOURS PRN
Status: DISCONTINUED | OUTPATIENT
Start: 2022-08-17 | End: 2022-08-17

## 2022-08-17 RX ORDER — ACETAMINOPHEN 325 MG/1
650 TABLET ORAL EVERY 6 HOURS PRN
Status: DISCONTINUED | OUTPATIENT
Start: 2022-08-17 | End: 2022-08-18 | Stop reason: HOSPADM

## 2022-08-17 RX ORDER — ATENOLOL 25 MG/1
25 TABLET ORAL DAILY
Status: DISCONTINUED | OUTPATIENT
Start: 2022-08-18 | End: 2022-08-18 | Stop reason: HOSPADM

## 2022-08-17 RX ORDER — LETROZOLE 2.5 MG/1
2.5 TABLET, FILM COATED ORAL DAILY
Status: DISCONTINUED | OUTPATIENT
Start: 2022-08-17 | End: 2022-08-18 | Stop reason: HOSPADM

## 2022-08-17 RX ORDER — CIPROFLOXACIN 2 MG/ML
400 INJECTION, SOLUTION INTRAVENOUS ONCE
Status: COMPLETED | OUTPATIENT
Start: 2022-08-17 | End: 2022-08-17

## 2022-08-17 RX ORDER — CIPROFLOXACIN 2 MG/ML
400 INJECTION, SOLUTION INTRAVENOUS EVERY 12 HOURS
Status: DISCONTINUED | OUTPATIENT
Start: 2022-08-18 | End: 2022-08-18 | Stop reason: HOSPADM

## 2022-08-17 RX ORDER — POTASSIUM CHLORIDE 7.45 MG/ML
10 INJECTION INTRAVENOUS PRN
Status: DISCONTINUED | OUTPATIENT
Start: 2022-08-17 | End: 2022-08-18 | Stop reason: HOSPADM

## 2022-08-17 RX ORDER — ONDANSETRON 2 MG/ML
4 INJECTION INTRAMUSCULAR; INTRAVENOUS EVERY 6 HOURS PRN
Status: DISCONTINUED | OUTPATIENT
Start: 2022-08-17 | End: 2022-08-18 | Stop reason: HOSPADM

## 2022-08-17 RX ORDER — METRONIDAZOLE 500 MG/100ML
500 INJECTION, SOLUTION INTRAVENOUS ONCE
Status: DISCONTINUED | OUTPATIENT
Start: 2022-08-17 | End: 2022-08-18 | Stop reason: HOSPADM

## 2022-08-17 RX ORDER — ENOXAPARIN SODIUM 100 MG/ML
40 INJECTION SUBCUTANEOUS DAILY
Status: DISCONTINUED | OUTPATIENT
Start: 2022-08-17 | End: 2022-08-18 | Stop reason: HOSPADM

## 2022-08-17 RX ORDER — PROCHLORPERAZINE MALEATE 10 MG
10 TABLET ORAL EVERY 6 HOURS PRN
Status: DISCONTINUED | OUTPATIENT
Start: 2022-08-17 | End: 2022-08-18 | Stop reason: HOSPADM

## 2022-08-17 RX ADMIN — Medication 10 ML: at 20:13

## 2022-08-17 RX ADMIN — CIPROFLOXACIN 400 MG: 2 INJECTION, SOLUTION INTRAVENOUS at 17:14

## 2022-08-17 RX ADMIN — Medication 400 MG: at 20:12

## 2022-08-17 RX ADMIN — CHOLESTYRAMINE 4 G: 4 POWDER, FOR SUSPENSION ORAL at 20:12

## 2022-08-17 RX ADMIN — SODIUM CHLORIDE: 9 INJECTION, SOLUTION INTRAVENOUS at 18:35

## 2022-08-17 RX ADMIN — FENTANYL CITRATE 50 MCG: 50 INJECTION, SOLUTION INTRAMUSCULAR; INTRAVENOUS at 15:01

## 2022-08-17 RX ADMIN — IOPAMIDOL 75 ML: 755 INJECTION, SOLUTION INTRAVENOUS at 15:58

## 2022-08-17 RX ADMIN — ACETAMINOPHEN 650 MG: 325 TABLET ORAL at 20:12

## 2022-08-17 RX ADMIN — LETROZOLE 2.5 MG: 2.5 TABLET ORAL at 20:12

## 2022-08-17 RX ADMIN — ENOXAPARIN SODIUM 40 MG: 100 INJECTION SUBCUTANEOUS at 18:46

## 2022-08-17 RX ADMIN — SODIUM CHLORIDE: 9 INJECTION, SOLUTION INTRAVENOUS at 17:48

## 2022-08-17 RX ADMIN — SODIUM CHLORIDE 500 ML: 9 INJECTION, SOLUTION INTRAVENOUS at 15:02

## 2022-08-17 RX ADMIN — MORPHINE SULFATE 4 MG: 4 INJECTION, SOLUTION INTRAMUSCULAR; INTRAVENOUS at 16:46

## 2022-08-17 ASSESSMENT — PAIN - FUNCTIONAL ASSESSMENT
PAIN_FUNCTIONAL_ASSESSMENT: 0-10

## 2022-08-17 ASSESSMENT — ENCOUNTER SYMPTOMS
VOMITING: 0
BACK PAIN: 0
COLOR CHANGE: 0
COUGH: 0
NAUSEA: 0
ABDOMINAL PAIN: 1
SHORTNESS OF BREATH: 1
DIARRHEA: 0

## 2022-08-17 ASSESSMENT — PAIN DESCRIPTION - ORIENTATION
ORIENTATION: MID;LOWER
ORIENTATION: LOWER;MID
ORIENTATION_2: MID;ANTERIOR
ORIENTATION: MID
ORIENTATION_2: ANTERIOR;MID

## 2022-08-17 ASSESSMENT — PAIN DESCRIPTION - FREQUENCY
FREQUENCY: CONTINUOUS
FREQUENCY: CONTINUOUS

## 2022-08-17 ASSESSMENT — LIFESTYLE VARIABLES
HOW MANY STANDARD DRINKS CONTAINING ALCOHOL DO YOU HAVE ON A TYPICAL DAY: PATIENT DOES NOT DRINK
HOW OFTEN DO YOU HAVE A DRINK CONTAINING ALCOHOL: NEVER
HOW OFTEN DO YOU HAVE A DRINK CONTAINING ALCOHOL: NEVER
HOW MANY STANDARD DRINKS CONTAINING ALCOHOL DO YOU HAVE ON A TYPICAL DAY: PATIENT DOES NOT DRINK

## 2022-08-17 ASSESSMENT — PAIN DESCRIPTION - LOCATION
LOCATION_2: CHEST
LOCATION_2: CHEST
LOCATION: BACK;CHEST
LOCATION: BACK
LOCATION: BACK

## 2022-08-17 ASSESSMENT — PAIN DESCRIPTION - INTENSITY
RATING_2: 8
RATING_2: 8

## 2022-08-17 ASSESSMENT — PAIN DESCRIPTION - DESCRIPTORS
DESCRIPTORS_2: SHARP
DESCRIPTORS: ACHING

## 2022-08-17 ASSESSMENT — PAIN DESCRIPTION - PAIN TYPE: TYPE: ACUTE PAIN

## 2022-08-17 ASSESSMENT — PAIN SCALES - GENERAL
PAINLEVEL_OUTOF10: 0
PAINLEVEL_OUTOF10: 8
PAINLEVEL_OUTOF10: 4
PAINLEVEL_OUTOF10: 8

## 2022-08-17 NOTE — ED NOTES
Per Dr. Cherry Toledo patient can be transported without telemetry.      Isaac Nava RN  08/17/22 2700

## 2022-08-17 NOTE — ED PROVIDER NOTES
HPI   67-year-old female patient presented to emergency department for evaluation of chest pain, back pain abdominal pain and shortness of breath. States that she was diagnosed with a kidney infection a week ago and was on antibiotics, unsure of which ones. Today she was post to follow-up with her primary doctor however she developed some chest pain she is describing is wrapping around her chest and aching. Denies any tearing sensation no numbness or weakness in her extremities. Denies any fevers, chills. She does have some generalized abdominal pain as well as bilateral low back pain. She has a past history of breast cancer currently on oral therapy denies any past history of blood clots. Review of Systems   Constitutional:  Negative for chills and fever. HENT:  Negative for congestion. Respiratory:  Positive for shortness of breath. Negative for cough. Cardiovascular:  Positive for chest pain. Negative for leg swelling. Gastrointestinal:  Positive for abdominal pain. Negative for diarrhea, nausea and vomiting. Genitourinary:  Negative for difficulty urinating, dysuria and hematuria. Musculoskeletal:  Negative for back pain. Skin:  Negative for color change. All other systems reviewed and are negative. Physical Exam  Vitals and nursing note reviewed. Constitutional:       Appearance: Normal appearance. HENT:      Head: Normocephalic and atraumatic. Nose: Nose normal. No congestion. Mouth/Throat:      Mouth: Mucous membranes are moist.      Pharynx: Oropharynx is clear. Eyes:      Conjunctiva/sclera: Conjunctivae normal.      Pupils: Pupils are equal, round, and reactive to light. Cardiovascular:      Rate and Rhythm: Normal rate and regular rhythm. Pulses: Normal pulses. Heart sounds: Normal heart sounds. Pulmonary:      Comments: Crackles right base  Abdominal:      General: Bowel sounds are normal. There is no distension. Tenderness:  There is no abdominal tenderness. Comments: Mild bilateral CVAT   Musculoskeletal:         General: Normal range of motion. Cervical back: Normal range of motion and neck supple. Right lower leg: No edema. Left lower leg: No edema. Skin:     General: Skin is warm and dry. Capillary Refill: Capillary refill takes less than 2 seconds. Neurological:      General: No focal deficit present. Mental Status: She is alert. Procedures     MDM  39-year-old female here for evaluation of  abdominal pain back pain shortness of breath. Initial labs obtained. She was found to be hyponatremic with a sodium of 124. CTA of her chest was performed found to be negative. CT scan of her abdomen and pelvis showing sigmoid diverticulitis. She does have anaphylaxis to Rocephin so we gave her dose of ciprofloxacin and Flagyl here. She will be admitted at this time. EKG: This EKG is signed and interpreted by me. Rate: 64  Rhythm: Sinus  Interpretation: no acute changes, no acute ST elevations or depressions. Normal QTC  Comparison: stable as compared to patient's most recent EKG      ED Course as of 08/17/22 2121   Wed Aug 17, 2022   1730 Spoke with Massimo MariettaGreenphire MARIA M for OLIVIA will admit. [FG]   5291 Spoke with Dr. Neo Dhillon will see patient  [FG]      ED Course User Index  [FG] Radha Rodrigues DO     ED Course as of 08/17/22 2121   Wed Aug 17, 2022   1730 Spoke with Massimokacey McmanusGreenphire MARIA M for OLIVIA will admit.  [FG]   0999 Spoke with Dr. Neo Dhillon will see patient  [FG]      ED Course User Index  [FG] Radha Rodrigues DO       --------------------------------------------- PAST HISTORY ---------------------------------------------  Past Medical History:  has a past medical history of Anxiety, Arthritis, [facility] Cancer ([facility] Cervical radiculopathy, Chronic back pain, Dehydration, Dehydration, Depression, Diabetes mellitus type 2, uncontrolled ([address] GERD (gastroesophageal reflux disease), History of blood transfusion, Hypertension, Neuropathy, and Right leg pain. Past Surgical History:  has a past surgical history that includes Cholecystectomy (2000); Foot surgery (1996); ERCP; Tubal ligation (1991); cervical fusion (2008); cervical fusion (2015); Colonoscopy; [place] tooth extraction; Dilation and curettage of uterus (2013); Hysterectomy (2013); cervical fusion (04/25/16); Carpal tunnel release (Right, 04/09/2017); back surgery (01/08/2018); Spine surgery; other surgical history (Right, 05/14/2018); pr open rx tibia shaft fx,screws (Right, 5/14/2018); pr office/outpt visit,procedure only (Right, 7/5/2018); Tibia fracture surgery (Right, 1/10/2019); Port Surgery (Left, 2/5/2021); Breast biopsy (Right, 2/5/2021); and Breast surgery (Right, 3/9/2021). Social History:  reports that she quit smoking about 7 years ago. Her smoking use included cigarettes. She has a 5.00 pack-year smoking history. She has never used smokeless tobacco. She reports that she does not drink alcohol and does not use drugs. Family History: family history includes Diabetes in her mother. The patients home medications have been reviewed.     Allergies: Ceftriaxone    -------------------------------------------------- RESULTS -------------------------------------------------    LABS:  Results for orders placed or performed during the hospital encounter of 08/17/22   COVID-19, Rapid    Specimen: Nasopharyngeal Swab   Result Value Ref Range    SARS-CoV-2, NAAT Not Detected Not Detected   CBC with Auto Differential   Result Value Ref Range    WBC 11.3 4.5 - 11.5 E9/L    RBC 3.63 3.50 - 5.50 E12/L    Hemoglobin 11.9 11.5 - 15.5 g/dL    Hematocrit 34.2 34.0 - 48.0 %    MCV 94.2 80.0 - 99.9 fL    MCH 32.8 26.0 - 35.0 pg    MCHC 34.8 (H) 32.0 - 34.5 %    RDW 12.2 11.5 - 15.0 fL    Platelets 534 628 - 041 E9/L    MPV 8.9 7.0 - 12.0 fL    Neutrophils % 82.1 (H) 43.0 - 80.0 %    Immature Granulocytes % 0.4 0.0 - 5.0 %    Lymphocytes % 8.8 (L) 20.0 - 42.0 %    Monocytes % 7.9 2.0 - 12.0 %    Eosinophils % 0.4 0.0 - 6.0 %    Basophils % 0.4 0.0 - 2.0 %    Neutrophils Absolute 9.30 (H) 1.80 - 7.30 E9/L    Immature Granulocytes # 0.04 E9/L    Lymphocytes Absolute 1.00 (L) 1.50 - 4.00 E9/L    Monocytes Absolute 0.90 0.10 - 0.95 E9/L    Eosinophils Absolute 0.05 0.05 - 0.50 E9/L    Basophils Absolute 0.05 0.00 - 0.20 E9/L   Comprehensive Metabolic Panel w/ Reflex to MG   Result Value Ref Range    Sodium 124 (L) 132 - 146 mmol/L    Potassium reflex Magnesium 4.3 3.5 - 5.0 mmol/L    Chloride 93 (L) 98 - 107 mmol/L    CO2 19 (L) 22 - 29 mmol/L    Anion Gap 12 7 - 16 mmol/L    Glucose 209 (H) 74 - 99 mg/dL    BUN 8 6 - 20 mg/dL    Creatinine 0.7 0.5 - 1.0 mg/dL    GFR Non-African American >60 >=60 mL/min/1.73    GFR African American >60     Calcium 9.3 8.6 - 10.2 mg/dL    Total Protein 6.0 (L) 6.4 - 8.3 g/dL    Albumin 3.8 3.5 - 5.2 g/dL    Total Bilirubin 0.4 0.0 - 1.2 mg/dL    Alkaline Phosphatase 114 (H) 35 - 104 U/L    ALT 25 0 - 32 U/L    AST 21 0 - 31 U/L   Brain Natriuretic Peptide   Result Value Ref Range    Pro-BNP 97 0 - 125 pg/mL   Urinalysis with Microscopic   Result Value Ref Range    Color, UA Yellow Straw/Yellow    Clarity, UA Clear Clear    Glucose, Ur Negative Negative mg/dL    Bilirubin Urine Negative Negative    Ketones, Urine Negative Negative mg/dL    Specific Gravity, UA 1.025 1.005 - 1.030    Blood, Urine Negative Negative    pH, UA 6.0 5.0 - 9.0    Protein, UA Negative Negative mg/dL    Urobilinogen, Urine 0.2 <2.0 E.U./dL    Nitrite, Urine Negative Negative    Leukocyte Esterase, Urine Negative Negative    WBC, UA NONE 0 - 5 /HPF    RBC, UA NONE 0 - 2 /HPF    Bacteria, UA NONE SEEN None Seen /HPF   Lipase   Result Value Ref Range    Lipase 10 (L) 13 - 60 U/L   Lactic Acid   Result Value Ref Range    Lactic Acid 1.8 0.5 - 2.2 mmol/L   Troponin   Result Value Ref Range    Troponin, High Sensitivity 17 (H) 0 - 9 ng/L   POCT Glucose   Result Value Ref Range    Meter Glucose 74 74 - 99 mg/dL   EKG 12 Lead   Result Value Ref Range    Ventricular Rate 64 BPM    Atrial Rate 64 BPM    P-R Interval 186 ms    QRS Duration 96 ms    Q-T Interval 402 ms    QTc Calculation (Bazett) 414 ms    P Axis 57 degrees    R Axis -18 degrees    T Axis 17 degrees       RADIOLOGY:  CTA PULMONARY W CONTRAST   Final Result   1. No evidence of pulmonary embolism or acute cardiopulmonary disease. 2. Sigmoid diverticulitis with mild inflammatory change involving 2 separate   diverticula which are  by several centimeters. 3. Degenerating uterine fibroids. 4. Chronic calcific pancreatitis. CT ABDOMEN PELVIS W IV CONTRAST Additional Contrast? None   Final Result   1. No evidence of pulmonary embolism or acute cardiopulmonary disease. 2. Sigmoid diverticulitis with mild inflammatory change involving 2 separate   diverticula which are  by several centimeters. 3. Degenerating uterine fibroids. 4. Chronic calcific pancreatitis.               ------------------------- NURSING NOTES AND VITALS REVIEWED ---------------------------  Date / Time Roomed:  8/17/2022  2:17 PM  ED Bed Assignment:  [id number]    The nursing notes within the ED encounter and vital signs as below have been reviewed.      Patient Vitals for the past 24 hrs:   BP Temp Temp src Pulse Resp SpO2 Height Weight   08/17/22 1917 116/76 98.3 °F (36.8 °C) Oral 60 18 99 % -- 165 lb 12.8 oz (75.2 kg)   08/17/22 1830 126/77 98.3 °F (36.8 °C) Oral 62 18 100 % -- --   08/17/22 1749 124/72 97.6 °F (36.4 °C) -- 64 16 98 % -- --   08/17/22 1638 112/73 -- -- 64 18 100 % -- --   08/17/22 1505 106/68 -- -- 62 16 98 % -- --   08/17/22 1430 109/65 97.9 °F (36.6 °C) Oral 63 18 98 % 6' 1\" (1.854 m) 163 lb (73.9 kg)       Oxygen Saturation Interpretation: Normal    ------------------------------------------ PROGRESS NOTES ------------------------------------------    Counseling:  I have spoken with the patient and discussed todays results, in addition to providing specific details for the plan of care and counseling regarding the diagnosis and prognosis. Their questions are answered at this time and they are agreeable with the plan of admission.    --------------------------------- ADDITIONAL PROVIDER NOTES ---------------------------------  Consultations:   Spoke with Vijay Abraham. Discussed case. They will admit the patient. This patient's ED course included: a personal history and physicial examination, multiple bedside re-evaluations, and IV medications    This patient has remained hemodynamically stable during their ED course. Diagnosis:  1. Diverticulitis of colon    2. Hyponatremia        Disposition:  Patient's disposition: Admit to med/surg floor  Patient's condition is stable.          Deedee Sandoval DO  Resident  08/17/22 1272

## 2022-08-17 NOTE — ED NOTES
Patient is presently being treated with antibiotics for a UTI. Patient is having lower back pain, shortness of breath, and chest pain. Patient was given 1 nitro with no relief, with patient stating that it caused a headache. Patient was also given 324 mg of aspirin PTA.      Kaelyn Barry RN  08/17/22 8269

## 2022-08-17 NOTE — PROGRESS NOTES
Database initiated pharmacy and medications verified with the patient. She is A&O from home alone. Ambulates with a cane and is RA at baseline. She had breast cancer and was getting chemo through a chest port but states they took the port out and she's on a hormone chemo pill but doesn't remember the name of it. She states she does not have a limb restriction.

## 2022-08-18 VITALS
RESPIRATION RATE: 16 BRPM | TEMPERATURE: 98.1 F | DIASTOLIC BLOOD PRESSURE: 71 MMHG | SYSTOLIC BLOOD PRESSURE: 117 MMHG | HEART RATE: 59 BPM | OXYGEN SATURATION: 97 % | HEIGHT: 72 IN | BODY MASS INDEX: 22.47 KG/M2 | WEIGHT: 165.9 LBS

## 2022-08-18 PROBLEM — K57.32 DIVERTICULITIS OF COLON: Status: ACTIVE | Noted: 2022-08-18

## 2022-08-18 LAB
ANION GAP SERPL CALCULATED.3IONS-SCNC: 10 MMOL/L (ref 7–16)
ANION GAP SERPL CALCULATED.3IONS-SCNC: 9 MMOL/L (ref 7–16)
BASOPHILS ABSOLUTE: 0.06 E9/L (ref 0–0.2)
BASOPHILS RELATIVE PERCENT: 1.1 % (ref 0–2)
BUN BLDV-MCNC: 5 MG/DL (ref 6–20)
BUN BLDV-MCNC: 5 MG/DL (ref 6–20)
CALCIUM SERPL-MCNC: 9.1 MG/DL (ref 8.6–10.2)
CALCIUM SERPL-MCNC: 9.5 MG/DL (ref 8.6–10.2)
CHLORIDE BLD-SCNC: 98 MMOL/L (ref 98–107)
CHLORIDE BLD-SCNC: 99 MMOL/L (ref 98–107)
CO2: 21 MMOL/L (ref 22–29)
CO2: 21 MMOL/L (ref 22–29)
CREAT SERPL-MCNC: 0.6 MG/DL (ref 0.5–1)
CREAT SERPL-MCNC: 0.7 MG/DL (ref 0.5–1)
EOSINOPHILS ABSOLUTE: 0.11 E9/L (ref 0.05–0.5)
EOSINOPHILS RELATIVE PERCENT: 2.1 % (ref 0–6)
GFR AFRICAN AMERICAN: >60
GFR AFRICAN AMERICAN: >60
GFR NON-AFRICAN AMERICAN: >60 ML/MIN/1.73
GFR NON-AFRICAN AMERICAN: >60 ML/MIN/1.73
GLUCOSE BLD-MCNC: 119 MG/DL (ref 74–99)
GLUCOSE BLD-MCNC: 84 MG/DL (ref 74–99)
HCT VFR BLD CALC: 33.2 % (ref 34–48)
HEMOGLOBIN: 11.4 G/DL (ref 11.5–15.5)
IMMATURE GRANULOCYTES #: 0.02 E9/L
IMMATURE GRANULOCYTES %: 0.4 % (ref 0–5)
LYMPHOCYTES ABSOLUTE: 1.28 E9/L (ref 1.5–4)
LYMPHOCYTES RELATIVE PERCENT: 24.5 % (ref 20–42)
MCH RBC QN AUTO: 31.8 PG (ref 26–35)
MCHC RBC AUTO-ENTMCNC: 34.3 % (ref 32–34.5)
MCV RBC AUTO: 92.7 FL (ref 80–99.9)
METER GLUCOSE: 102 MG/DL (ref 74–99)
METER GLUCOSE: 112 MG/DL (ref 74–99)
METER GLUCOSE: 136 MG/DL (ref 74–99)
METER GLUCOSE: 142 MG/DL (ref 74–99)
MONOCYTES ABSOLUTE: 0.68 E9/L (ref 0.1–0.95)
MONOCYTES RELATIVE PERCENT: 13 % (ref 2–12)
NEUTROPHILS ABSOLUTE: 3.07 E9/L (ref 1.8–7.3)
NEUTROPHILS RELATIVE PERCENT: 58.9 % (ref 43–80)
PDW BLD-RTO: 12.5 FL (ref 11.5–15)
PLATELET # BLD: 246 E9/L (ref 130–450)
PMV BLD AUTO: 8.8 FL (ref 7–12)
POTASSIUM REFLEX MAGNESIUM: 4 MMOL/L (ref 3.5–5)
POTASSIUM SERPL-SCNC: 4.6 MMOL/L (ref 3.5–5)
RBC # BLD: 3.58 E12/L (ref 3.5–5.5)
SODIUM BLD-SCNC: 128 MMOL/L (ref 132–146)
SODIUM BLD-SCNC: 130 MMOL/L (ref 132–146)
WBC # BLD: 5.2 E9/L (ref 4.5–11.5)

## 2022-08-18 PROCEDURE — 96367 TX/PROPH/DG ADDL SEQ IV INF: CPT

## 2022-08-18 PROCEDURE — 85025 COMPLETE CBC W/AUTO DIFF WBC: CPT

## 2022-08-18 PROCEDURE — 96375 TX/PRO/DX INJ NEW DRUG ADDON: CPT

## 2022-08-18 PROCEDURE — G0378 HOSPITAL OBSERVATION PER HR: HCPCS

## 2022-08-18 PROCEDURE — 82962 GLUCOSE BLOOD TEST: CPT

## 2022-08-18 PROCEDURE — 36415 COLL VENOUS BLD VENIPUNCTURE: CPT

## 2022-08-18 PROCEDURE — 2580000003 HC RX 258: Performed by: FAMILY MEDICINE

## 2022-08-18 PROCEDURE — 80048 BASIC METABOLIC PNL TOTAL CA: CPT

## 2022-08-18 PROCEDURE — 2500000003 HC RX 250 WO HCPCS: Performed by: FAMILY MEDICINE

## 2022-08-18 PROCEDURE — 96372 THER/PROPH/DIAG INJ SC/IM: CPT

## 2022-08-18 PROCEDURE — 96366 THER/PROPH/DIAG IV INF ADDON: CPT

## 2022-08-18 PROCEDURE — 6370000000 HC RX 637 (ALT 250 FOR IP): Performed by: FAMILY MEDICINE

## 2022-08-18 PROCEDURE — 99223 1ST HOSP IP/OBS HIGH 75: CPT | Performed by: SURGERY

## 2022-08-18 PROCEDURE — 6370000000 HC RX 637 (ALT 250 FOR IP): Performed by: INTERNAL MEDICINE

## 2022-08-18 PROCEDURE — 6360000002 HC RX W HCPCS: Performed by: FAMILY MEDICINE

## 2022-08-18 RX ORDER — TRAMADOL HYDROCHLORIDE 50 MG/1
50 TABLET ORAL ONCE
Status: COMPLETED | OUTPATIENT
Start: 2022-08-18 | End: 2022-08-18

## 2022-08-18 RX ORDER — PANTOPRAZOLE SODIUM 40 MG/1
40 TABLET, DELAYED RELEASE ORAL
Qty: 60 TABLET | Refills: 0 | Status: SHIPPED | OUTPATIENT
Start: 2022-08-18 | End: 2022-09-17

## 2022-08-18 RX ORDER — CIPROFLOXACIN 500 MG/1
500 TABLET, FILM COATED ORAL 2 TIMES DAILY
Qty: 14 TABLET | Refills: 0 | Status: SHIPPED | OUTPATIENT
Start: 2022-08-18 | End: 2022-08-25

## 2022-08-18 RX ORDER — METRONIDAZOLE 500 MG/1
500 TABLET ORAL 3 TIMES DAILY
Qty: 21 TABLET | Refills: 0 | Status: SHIPPED | OUTPATIENT
Start: 2022-08-18 | End: 2022-08-25

## 2022-08-18 RX ADMIN — METFORMIN HYDROCHLORIDE 500 MG: 500 TABLET ORAL at 08:54

## 2022-08-18 RX ADMIN — TRAMADOL HYDROCHLORIDE 50 MG: 50 TABLET, COATED ORAL at 09:52

## 2022-08-18 RX ADMIN — Medication 10 ML: at 10:00

## 2022-08-18 RX ADMIN — METRONIDAZOLE 500 MG: 500 INJECTION, SOLUTION INTRAVENOUS at 03:24

## 2022-08-18 RX ADMIN — DEXTROSE MONOHYDRATE 125 ML: 10 INJECTION, SOLUTION INTRAVENOUS at 00:06

## 2022-08-18 RX ADMIN — ACETAMINOPHEN 650 MG: 325 TABLET ORAL at 14:20

## 2022-08-18 RX ADMIN — PREGABALIN 75 MG: 75 CAPSULE ORAL at 08:54

## 2022-08-18 RX ADMIN — Medication 400 MG: at 08:54

## 2022-08-18 RX ADMIN — ACETAMINOPHEN 650 MG: 325 TABLET ORAL at 03:25

## 2022-08-18 RX ADMIN — ENOXAPARIN SODIUM 40 MG: 100 INJECTION SUBCUTANEOUS at 08:54

## 2022-08-18 RX ADMIN — ATENOLOL 25 MG: 25 TABLET ORAL at 08:54

## 2022-08-18 RX ADMIN — CHOLESTYRAMINE 4 G: 4 POWDER, FOR SUSPENSION ORAL at 08:54

## 2022-08-18 RX ADMIN — FAMOTIDINE 20 MG: 20 TABLET ORAL at 08:54

## 2022-08-18 RX ADMIN — METRONIDAZOLE 500 MG: 500 INJECTION, SOLUTION INTRAVENOUS at 11:40

## 2022-08-18 RX ADMIN — CIPROFLOXACIN 400 MG: 2 INJECTION, SOLUTION INTRAVENOUS at 05:38

## 2022-08-18 ASSESSMENT — PAIN DESCRIPTION - LOCATION
LOCATION: BACK
LOCATION: ABDOMEN
LOCATION: ABDOMEN;BACK

## 2022-08-18 ASSESSMENT — PAIN - FUNCTIONAL ASSESSMENT
PAIN_FUNCTIONAL_ASSESSMENT: ACTIVITIES ARE NOT PREVENTED
PAIN_FUNCTIONAL_ASSESSMENT: PREVENTS OR INTERFERES SOME ACTIVE ACTIVITIES AND ADLS

## 2022-08-18 ASSESSMENT — PAIN SCALES - WONG BAKER: WONGBAKER_NUMERICALRESPONSE: 0

## 2022-08-18 ASSESSMENT — PAIN SCALES - GENERAL
PAINLEVEL_OUTOF10: 7
PAINLEVEL_OUTOF10: 0

## 2022-08-18 ASSESSMENT — PAIN DESCRIPTION - DESCRIPTORS
DESCRIPTORS: ACHING
DESCRIPTORS: CRAMPING
DESCRIPTORS: ACHING;STABBING

## 2022-08-18 ASSESSMENT — PAIN DESCRIPTION - ORIENTATION: ORIENTATION: LOWER

## 2022-08-18 NOTE — PLAN OF CARE
Problem: Discharge Planning  Goal: Discharge to home or other facility with appropriate resources  8/18/2022 1651 by Roverto Marquez RN  Outcome: Completed  8/18/2022 1651 by Roverto Marquez RN  Outcome: Adequate for Discharge     Problem: Pain  Goal: Verbalizes/displays adequate comfort level or baseline comfort level  8/18/2022 1651 by Roverto Marquez RN  Outcome: Completed  8/18/2022 1651 by Roverto Marquez RN  Outcome: Adequate for Discharge     Problem: Safety - Adult  Goal: Free from fall injury  8/18/2022 1651 by Roverto Marquez RN  Outcome: Completed  8/18/2022 1651 by Roverto Marquez RN  Outcome: Adequate for Discharge

## 2022-08-18 NOTE — PROGRESS NOTES
Spoke with Dr Kris Montenegro regarding need for pain medication for intermittent ABD pain. Order for 1 dose ultram received.

## 2022-08-18 NOTE — H&P
[facility]  History and Physical      CHIEF COMPLAINT:    Chief Complaint   Patient presents with    Lower Back Pain     Finishing tx for a UTI    Shortness of Breath    Chest Pain        Patient of Gunjan Horvath DO presents with:  Diverticulitis    History of Present Illness:   Patient is a 50-year-old female with past medical history of anxiety, depression, DM, GERD, HTN, neuropathy, right breast cancer with excision of mass status post completion of IV chemotherapy while still receiving radiation who presents to the emergency room for abdominal pain, SOB and chest pain. Patient states that she was diagnosed with a kidney infection and was receiving antibiotics about a week ago in which she was supposed to have a follow-up with her PCP yesterday when she developed chest pain and was instructed to come to the emergency room. Patient did have a CTA pulmonary which was negative for any pulmonary embolus or cardiopulmonary process. CT abdomen was obtained which did reveal mild sigmoid diverticulitis. Labs were revealing of hyponatremia 124, hyperglycemia 209. Patient will be admitted to intermediate telemetry for further work-up and treatment. Complains of back pain and abdominal pain on the right. Blood work gives no evidence of infection-no evidence of pyelonephritis is noted on CT abdomen pelvis      REVIEW OF SYSTEMS:  Pertinent negatives are above in HPI. 10 point ROS otherwise negative.       Past Medical History:   Diagnosis Date    Anxiety     Arthritis     Breast cancer ([address] 2020    Rt breast    Cancer ([address] 2021    breast    Cervical radiculopathy     Chronic back pain     Dehydration 9/23/2021    Dehydration 9/23/2021    Depression     Diabetes mellitus type 2, uncontrolled ([address] 2/12/2017    GERD (gastroesophageal reflux disease)     History of blood transfusion 01/2018    back surgery, lumbar, dr Galen Cohen    Hypertension     Neuropathy     Right leg pain     seeing doctor currently problems with hardware         Past Surgical History:   Procedure Laterality Date    BACK SURGERY  01/08/2018    PLIF L2-L5    BREAST BIOPSY Right 2/5/2021    RIGHT BREAST LUMPECTOMY WITH  sentinel LYMPHNODE biopsy performed by Alix Davis MD at [facility] Right 3/9/2021    RIGHT BREAST RE-EXCISION LUMPECTOMY OF INFERIOR AND MEDIAL MARGINS performed by Alix Davis MD at [address] Right 04/09/2017    Dr. 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 WITH REMOVAL OF HARDWARE & O.R. I. F ------BOP performed by Amparo Colbert MD at [address] [place] [address] Right 5/14/2018    REPAIR NON-UNION RIGHT TIBIA AND FIBULA WITH REMOVAL OF HARDWARE AND BONE GRAFT performed by Amparo Colbert MD at [facility] Right 1/10/2019    RIGHT TIBIA REMOVAL HARDWARE, RIGHT TIBIA OPEN  TREATMENT OF NON UNION [place]bert [place] [facility]         Medications Prior to Admission:    Medications Prior to Admission: anastrozole (ARIMIDEX) 1 MG tablet, take 1 tablet by mouth once daily  letrozole (FEMARA) 2.5 MG tablet, Take 1 tablet by mouth daily  ondansetron (ZOFRAN) 4 MG tablet, take 1 tablet by mouth every 8 hours if needed for nausea and vomiting  atenolol (TENORMIN) 25 MG tablet, Take 25 mg by mouth daily  nicotine (NICODERM CQ) 21 MG/24HR, Place 1 patch onto the skin daily  Ascorbic Acid (VITAMIN C) 250 MG tablet, Take 250 mg by mouth daily  vitamin D (ERGOCALCIFEROL) 1.25 MG (65708 UT) CAPS capsule, Take 50,000 Units by mouth once a week sundays  acetaminophen-codeine (TYLENOL #3) 300-30 MG per tablet, Take 1 tablet by mouth every 4 hours as needed for Pain.  prochlorperazine (COMPAZINE) 10 MG tablet, Take 1 tablet by mouth every 6 hours as needed (nausea)  cholestyramine (QUESTRAN) 4 g packet, Take 1 packet by mouth 2 times daily  psyllium (KONSYL) 28.3 % PACK, Take 1 packet by mouth daily  magnesium oxide (MAG-OX) 400 (240 Mg) MG tablet, Take 1 tablet by mouth 2 times daily  MELATONIN PO, Take 3 mg by mouth nightly   Multiple Vitamins-Minerals (HAIR SKIN AND NAILS FORMULA) TABS, Take by mouth STOP PREOP MED  pregabalin (LYRICA) 75 MG capsule, Take 75 mg by mouth daily. famotidine (PEPCID) 20 MG tablet, Take 20 mg by mouth daily  LORATADINE-D 24HR  MG per extended release tablet, Take 1 tablet by mouth daily   LANTUS SOLOSTAR 100 UNIT/ML injection pen, Inject 30 Units into the skin nightly   metFORMIN (GLUCOPHAGE) 500 MG tablet, Take 500 mg by mouth 2 times daily   albuterol sulfate  (90 BASE) MCG/ACT inhaler, Inhale 2 puffs into the lungs every 6 hours as needed for Wheezing    Note that the patient's home medications were reviewed and the above list is accurate to the best of my knowledge at the time of the exam.    Allergies:    Ceftriaxone    Social History:    reports that she quit smoking about 7 years ago. Her smoking use included cigarettes. She has a 5.00 pack-year smoking history. She has never used smokeless tobacco. She reports that she does not drink alcohol and does not use drugs.     Family History:   family history includes Diabetes in her mother. PHYSICAL EXAM:    Vitals:  /71   Pulse 58   Temp 98 °F (36.7 °C) (Oral)   Resp 16   Ht 6' 1\" (1.854 m)   Wt 165 lb 14.4 oz (75.3 kg)   LMP 08/13/2013   SpO2 99%   BMI 21.89 kg/m²       General appearance: NAD, conversant  Eyes: Sclerae anicteric, PERRLA  HEENT: AT/NC, MMM  Neck: FROM, supple, no thyromegaly  Lymph: No cervical / supraclavicular lymphadenopathy  Lungs: Clear to auscultation, WOB normal  CV: RRR, no MRGs, no lower extremity edema  Abdomen: Soft, non-tender; no masses or HSM, +BS  Extremities: FROM without synovitis. No clubbing or cyanosis of the hands. Skin: no rash, induration, lesions, or ulcers  Psych: Calm and cooperative. Normal judgement and insight. Normal mood and affect. Neuro: Alert and interactive, face symmetric, speech fluent. LABS:  All labs reviewed.   Of note:  CBC:   Lab Results   Component Value Date/Time    WBC 5.2 08/18/2022 05:33 AM    RBC 3.58 08/18/2022 05:33 AM    HGB 11.4 08/18/2022 05:33 AM    HCT 33.2 08/18/2022 05:33 AM    MCV 92.7 08/18/2022 05:33 AM    MCH 31.8 08/18/2022 05:33 AM    MCHC 34.3 08/18/2022 05:33 AM    RDW 12.5 08/18/2022 05:33 AM     08/18/2022 05:33 AM    MPV 8.8 08/18/2022 05:33 AM     CMP:    Lab Results   Component Value Date/Time     08/18/2022 05:33 AM    K 4.0 08/18/2022 05:33 AM    CL 98 08/18/2022 05:33 AM    CO2 21 08/18/2022 05:33 AM    BUN 5 08/18/2022 05:33 AM    CREATININE 0.6 08/18/2022 05:33 AM    GFRAA >60 08/18/2022 05:33 AM    LABGLOM >60 08/18/2022 05:33 AM    GLUCOSE 84 08/18/2022 05:33 AM    GLUCOSE 96 05/24/2012 12:39 PM    PROT 6.0 08/17/2022 02:39 PM    LABALBU 3.8 08/17/2022 02:39 PM    LABALBU 4.5 05/24/2012 12:39 PM    CALCIUM 9.1 08/18/2022 05:33 AM    BILITOT 0.4 08/17/2022 02:39 PM    ALKPHOS 114 08/17/2022 02:39 PM    AST 21 08/17/2022 02:39 PM    ALT 25 08/17/2022 02:39 PM       Imaging:  CT abdomen pelvis: Sigmoid diverticulitis with mild inflammatory change involving 2 separate diverticula which are  by several centimeters. CTA pulmonary: No evidence of pulmonary embolism or acute cardiopulmonary disease. EKG:  Normal sinus rhythm  Incomplete right bundle branch block    Telemetry:  I've personally reviewed the patient's telemetry:      ASSESSMENT/PLAN:  Principal Problem:    Diverticulitis  Resolved Problems:    * No resolved hospital problems. *    Patient is a 77-year-old female admitted to [facility] for  Diverticulitis  -Monitor labs  -IVF NS @ 75  -GS following-no further plans, has clear discharge  -NPO > diet advanced per GS to clears-advance diet  -Pain control-on several pain medications which she has been getting regularly  -IV flagyl and cipro-no further need according to GI/general surgery  -No evidence of pyelonephritis on CT abdomen pelvis    Hyponatremia  -Monitor labs  -Na+ 124 > 128-repeat BMP at 4 PM-if 130 or greater sodium, patient can be discharged  -Continue IVF NS @ 74    Diabetes Mellitus  -Monitor labs  -ISS glucose control  -Hypoglycemia protocol initiated      Medication for other comorbidities continue as appropriate dose adjustment as necessary.     DVT prophylaxis lovenox   PT OT  Discharge planning    Code status: Full  Requires Inpatient level of care  Ragini Polanco MD

## 2022-08-18 NOTE — PROGRESS NOTES
CLINICAL PHARMACY NOTE: MEDS TO BEDS    Total # of Prescriptions Filled: 2   The following medications were delivered to the patient:  Ciprofloxasin 500mg  Metronidazole 500mg      Additional Documentation:

## 2022-08-18 NOTE — PROGRESS NOTES
P Quality Flow/Interdisciplinary Rounds Progress Note        Quality Flow Rounds held on August 18, 2022    Disciplines Attending:  Bedside Nurse, , , and Nursing Unit Leadership    Mercy Bauman was admitted on 8/17/2022  2:17 PM    Anticipated Discharge Date:       Disposition:    Ryan Score:  Ryan Scale Score: 22    Readmission Risk              Risk of Unplanned Readmission:  25           Discussed patient goal for the day, patient clinical progression, and barriers to discharge. The following Goal(s) of the Day/Commitment(s) have been identified:  Per Gen Surg-Advance diet as tolerated.       Marti Perez RN  August 18, 2022

## 2022-08-18 NOTE — CARE COORDINATION
Met w/ patient. . Explained role of  and plan of care. Lives alone with her cat in a 3 story duplex- 3 steps to entrance. Uses cane, electric Payoff. Drives. Hx HHC-agency unknown. No Hx MATEO. PCP is Dr. Neil Molina and pharmacy is [facility]. Currently on iv abx-await final abx plan. No HHC preference if home iv abx are required on discharge. Per pt, plan is to return home on discharge-states a girlfriend will provide transportation.  Will follow Mitchell Kathleen RNcase manager

## 2022-08-18 NOTE — CONSULTS
GENERAL SURGERY  CONSULT NOTE  8/18/2022    Physician Consulted: Dr. Kevin Berry  Reason for Consult: Back and abdominal pain  Referring Physician: Dr. Ortega Mina    HPI  Esau Lewis is a 61 y.o. female with PMHx breast cancer s/p excision on chemotherapy who presents for evaluation of back and abdominal pain. She states that she was diagnosed with pyelonephritis and had left sided back pain with radiation to the left hemiabdomen. She denies any primary abdominal pain. She does report some nausea/vomiting. She does report having flatus and BM. She is not on any anticoagulation. She has had a prior cholecystectomy and DANNY.       Past Medical History:   Diagnosis Date    Anxiety     Arthritis     Breast cancer ([facility] 2020    Rt breast    Cancer ([facility] 2021    breast    Cervical radiculopathy     Chronic back pain     Dehydration 9/23/2021    Dehydration 9/23/2021    Depression     Diabetes mellitus type 2, uncontrolled ([facility] 2/12/2017    GERD (gastroesophageal reflux disease)     History of blood transfusion 01/2018    back surgery, lumbar, dr Galen Cohen    Hypertension     Neuropathy     Right leg pain     seeing doctor currently problems with hardware       Past Surgical History:   Procedure Laterality Date    BACK SURGERY  01/08/2018    PLIF L2-L5    BREAST BIOPSY Right 2/5/2021    RIGHT BREAST LUMPECTOMY WITH  sentinel LYMPHNODE biopsy performed by Winifred Reagan MD at [address] [place] [address] Right 3/9/2021    RIGHT BREAST RE-EXCISION LUMPECTOMY OF INFERIOR AND MEDIAL MARGINS performed by Winifred Reagan MD at [place] [address] Right 04/09/2017    Dr. Hubert Lowry  2008    dr hu anterior    CERVICAL FUSION  2015    dr Galen Cohen anterior    CERVICAL FUSION  04/25/16    ANTERIOR C4-C5, C6-C7 PLATES AND SCREWS    CHOLECYSTECTOMY  2000    COLONOSCOPY      patient did cologuard in 2018    [facility]  2013    dr Chao Vera    heel spurs bilateral HYSTERECTOMY (CERVIX STATUS UNKNOWN)  2013    dr Aleksandr Wolf partial    OTHER SURGICAL HISTORY Right 05/14/2018    removal of hardware repair nonunion right tibia/fibula    PORT SURGERY Left 2/5/2021    LEFT MEDI PORT INSERTION performed by Sabrina Cervantes MD at [address] [facility] [address] OFFICE/OUTPT VISIT,PROCEDURE ONLY Right 7/5/2018    REPAIR NON UNION FAILED FIXATION RIGHT DISTAL TIB / FIB PILON FX. WITH REMOVAL OF HARDWARE & O.R. I. F ------BOP performed by Gloria Carbajal MD at [address] [place] [address] Right 5/14/2018    REPAIR NON-UNION RIGHT TIBIA AND FIBULA WITH REMOVAL OF HARDWARE AND BONE GRAFT performed by Gloria Carbajal MD at [facility] Right 1/10/2019    RIGHT TIBIA REMOVAL HARDWARE, RIGHT TIBIA OPEN  TREATMENT OF NON UNION performed by Gloria Carbajal MD at [facility]         Medications Prior to Admission:    Prior to Admission medications    Medication Sig Start Date End Date Taking? Authorizing Provider   anastrozole (ARIMIDEX) 1 MG tablet take 1 tablet by mouth once daily 7/21/22   WES Maldonado CNP   letrozole [facility]) 2.5 MG tablet Take 1 tablet by mouth daily 7/1/22   WES Charles CNP   ondansetron (ZOFRAN) 4 MG tablet take 1 tablet by mouth every 8 hours if needed for nausea and vomiting 7/1/22   WES Charles CNP   atenolol (TENORMIN) 25 MG tablet Take 25 mg by mouth daily    Historical Provider, MD   nicotine (NICODERM CQ) 21 MG/24HR Place 1 patch onto the skin daily 2/10/22   WES Lynn CNP   Ascorbic Acid (VITAMIN C) 250 MG tablet Take 250 mg by mouth daily    Historical Provider, MD   vitamin D (ERGOCALCIFEROL) 1.25 MG (00778 UT) CAPS capsule Take 50,000 Units by mouth once a week sundays    Historical Provider, MD   acetaminophen-codeine (TYLENOL #3) 300-30 MG per tablet Take 1 tablet by mouth every 4 hours as needed for Pain. Historical Provider, MD   prochlorperazine (COMPAZINE) 10 MG tablet Take 1 tablet by mouth every 6 hours as needed (nausea) 21   Fabián Acuña MD   cholestyramine Lacretia Medin) 4 g packet Take 1 packet by mouth 2 times daily 21   Linda Tom MD   psyllium (KONSYL) 28.3 % PACK Take 1 packet by mouth daily 21   Linda Tom MD   magnesium oxide (MAG-OX) 400 (240 Mg) MG tablet Take 1 tablet by mouth 2 times daily 21   Linda Tom MD   MELATONIN PO Take 3 mg by mouth nightly     Historical Provider, MD   Multiple Vitamins-Minerals (HAIR SKIN AND NAILS FORMULA) TABS Take by mouth STOP PREOP MED    Historical Provider, MD   pregabalin (LYRICA) 75 MG capsule Take 75 mg by mouth daily.   10/26/20   Historical Provider, MD   famotidine (PEPCID) 20 MG tablet Take 20 mg by mouth daily    Historical Provider, MD   LORATADINE-D 24HR  MG per extended release tablet Take 1 tablet by mouth daily  20   Historical Provider, MD   LANYANDYUS SOLOSTAR 100 UNIT/ML injection pen Inject 30 Units into the skin nightly  20   Historical Provider, MD   metFORMIN (GLUCOPHAGE) 500 MG tablet Take 500 mg by mouth 2 times daily     Historical Provider, MD   albuterol sulfate  (90 BASE) MCG/ACT inhaler Inhale 2 puffs into the lungs every 6 hours as needed for Wheezing    Historical Provider, MD       Allergies   Allergen Reactions    Ceftriaxone Shortness Of Breath       Family History   Problem Relation Age of Onset    Diabetes Mother        Social History     Tobacco Use    Smoking status: Former     Packs/day: 0.50     Years: 10.00     Pack years: 5.00     Types: Cigarettes     Quit date: 3/15/2015     Years since quittin.4    Smokeless tobacco: Never    Tobacco comments:     stop    Vaping Use    Vaping Use: Never used   Substance Use Topics    Alcohol use: No    Drug use: No         Review of Systems   General ROS: negative  Hematological and Lymphatic ROS: negative  Respiratory ROS: negative  Cardiovascular ROS: negative  Gastrointestinal ROS: As above  Genito-Urinary ROS: negative  Musculoskeletal ROS: negative      PHYSICAL EXAM:    Vitals:    08/17/22 2357   BP: 116/69   Pulse: 62   Resp: 18   Temp: 97.7 °F (36.5 °C)   SpO2: 97%       General Appearance:  awake, alert, oriented, in no acute distress  Skin:  Skin color, texture, turgor normal. No rashes or lesions. Head/face:  NCAT  Eyes:  No gross abnormalities. Lungs:  No increased work of breathing on room air  Heart:  RR and normotensive  Abdomen:  Soft, minimal TTP LLQ, moderate L CVA TTP, nondistended  Extremities: Extremities warm to touch, pink, with no edema. LABS:    CBC  Recent Labs     08/18/22  0533   WBC 5.2   HGB 11.4*   HCT 33.2*        BMP  Recent Labs     08/17/22  1439   *   K 4.3   CL 93*   CO2 19*   BUN 8   CREATININE 0.7   CALCIUM 9.3     Liver Function  Recent Labs     08/17/22  1439   LIPASE 10*   BILITOT 0.4   AST 21   ALT 25   ALKPHOS 114*   PROT 6.0*   LABALBU 3.8     No results for input(s): LACTATE in the last 72 hours. No results for input(s): INR, PTT in the last 72 hours. Invalid input(s): PT    RADIOLOGY    CT ABDOMEN PELVIS W IV CONTRAST Additional Contrast? None    Result Date: 8/17/2022  EXAMINATION: CTA OF THE CHEST; CT OF THE ABDOMEN AND PELVIS WITH CONTRAST 8/17/2022 3:58 pm TECHNIQUE: CTA of the chest was performed after the administration of intravenous contrast.  Multiplanar reformatted images are provided for review. MIP images are provided for review. Automated exposure control, iterative reconstruction, and/or weight based adjustment of the mA/kV was utilized to reduce the radiation dose to as low as reasonably achievable.; CT of the abdomen and pelvis was performed with the administration of intravenous contrast. Multiplanar reformatted images are provided for review.  Automated exposure control, iterative reconstruction, and/or weight based adjustment of the mA/kV was utilized to reduce the radiation dose to as low as reasonably achievable. COMPARISON: CTA chest 02/06/2022, CT abdomen and pelvis 02/26/2021 HISTORY: ORDERING SYSTEM PROVIDED HISTORY: r/o pe TECHNOLOGIST PROVIDED HISTORY: Reason for exam:->r/o pe Decision Support Exception - unselect if not a suspected or confirmed emergency medical condition->Emergency Medical Condition (MA) FINDINGS: CTA chest: There is adequate opacification of the pulmonary arteries. There is no evidence of filling defect to suggest pulmonary embolism. There is no evidence of thoracic aortic aneurysm or dissection. No evidence of pleural or pericardial effusion. There is no evidence of lymphadenopathy within the thorax. The central airways are widely patent. There is no pneumothorax. Again noted are reticular opacities extending to the pleural surface in the bilateral lungs. There is no focal alveolar consolidation. There are hazy dependent opacities in the bilateral lungs that may represent subsegmental atelectasis. There is no focal alveolar consolidation. There is stable scarring within the left upper lobe and lingula. There are degenerative changes in the spine. There is diffuse heterogeneous appearance of the osseous structures. There is stable focal lucent lesion at T1 which has been present since at least 02/12/2017. CT abdomen and pelvis: There is pneumobilia within the left hepatic lobe. There is no evidence of focal hepatic lesion. The spleen is unremarkable in appearance. There is atrophy of the pancreas with diffusely scattered coarse calcifications from chronic calcific pancreatitis. There is pancreatic ductal dilatation. There is thickening of the bilateral adrenal glands which is likely benign in nature. The bilateral kidneys are unremarkable. The patient is status post cholecystectomy. There is no evidence of bowel obstruction, pneumoperitoneum, or ascites.   There are calcified fibroids within the uterus. There is colonic diverticulosis with mild inflammatory change involving a diverticulum in the sigmoid colon. There is no evidence of perforation or abscess. There is possible mild inflammation involving a diverticulum in the more proximal sigmoid colon several centimeters away, as well. The appendix is unremarkable in appearance. There is arteriosclerosis without abdominal aortic aneurysm. There are degenerative and postoperative changes within the spine. There is diffuse heterogeneous appearance of the osseous structures. 1. No evidence of pulmonary embolism or acute cardiopulmonary disease. 2. Sigmoid diverticulitis with mild inflammatory change involving 2 separate diverticula which are  by several centimeters. 3. Degenerating uterine fibroids. 4. Chronic calcific pancreatitis. CTA PULMONARY W CONTRAST    Result Date: 8/17/2022  EXAMINATION: CTA OF THE CHEST; CT OF THE ABDOMEN AND PELVIS WITH CONTRAST 8/17/2022 3:58 pm TECHNIQUE: CTA of the chest was performed after the administration of intravenous contrast.  Multiplanar reformatted images are provided for review. MIP images are provided for review. Automated exposure control, iterative reconstruction, and/or weight based adjustment of the mA/kV was utilized to reduce the radiation dose to as low as reasonably achievable.; CT of the abdomen and pelvis was performed with the administration of intravenous contrast. Multiplanar reformatted images are provided for review. Automated exposure control, iterative reconstruction, and/or weight based adjustment of the mA/kV was utilized to reduce the radiation dose to as low as reasonably achievable.  COMPARISON: CTA chest 02/06/2022, CT abdomen and pelvis 02/26/2021 HISTORY: ORDERING SYSTEM PROVIDED HISTORY: r/o pe TECHNOLOGIST PROVIDED HISTORY: Reason for exam:->r/o pe Decision Support Exception - unselect if not a suspected or confirmed emergency medical condition->Emergency Medical Condition (MA) FINDINGS: CTA chest: There is adequate opacification of the pulmonary arteries. There is no evidence of filling defect to suggest pulmonary embolism. There is no evidence of thoracic aortic aneurysm or dissection. No evidence of pleural or pericardial effusion. There is no evidence of lymphadenopathy within the thorax. The central airways are widely patent. There is no pneumothorax. Again noted are reticular opacities extending to the pleural surface in the bilateral lungs. There is no focal alveolar consolidation. There are hazy dependent opacities in the bilateral lungs that may represent subsegmental atelectasis. There is no focal alveolar consolidation. There is stable scarring within the left upper lobe and lingula. There are degenerative changes in the spine. There is diffuse heterogeneous appearance of the osseous structures. There is stable focal lucent lesion at T1 which has been present since at least 02/12/2017. CT abdomen and pelvis: There is pneumobilia within the left hepatic lobe. There is no evidence of focal hepatic lesion. The spleen is unremarkable in appearance. There is atrophy of the pancreas with diffusely scattered coarse calcifications from chronic calcific pancreatitis. There is pancreatic ductal dilatation. There is thickening of the bilateral adrenal glands which is likely benign in nature. The bilateral kidneys are unremarkable. The patient is status post cholecystectomy. There is no evidence of bowel obstruction, pneumoperitoneum, or ascites. There are calcified fibroids within the uterus. There is colonic diverticulosis with mild inflammatory change involving a diverticulum in the sigmoid colon. There is no evidence of perforation or abscess. There is possible mild inflammation involving a diverticulum in the more proximal sigmoid colon several centimeters away, as well. The appendix is unremarkable in appearance. There is arteriosclerosis without abdominal aortic aneurysm. There are degenerative and postoperative changes within the spine. There is diffuse heterogeneous appearance of the osseous structures. 1. No evidence of pulmonary embolism or acute cardiopulmonary disease. 2. Sigmoid diverticulitis with mild inflammatory change involving 2 separate diverticula which are  by several centimeters. 3. Degenerating uterine fibroids. 4. Chronic calcific pancreatitis.        ASSESSMENT:  61 y.o. female with likely pyelonephritis and CT A/P reading diverticulitis, but unlikely    PLAN:  - Okay for low fiber diet as tolerated  - Pain control PRN  - No need for antibiotics from surgical POV, will defer to primary  - Can follow up outpatient with Dr. Bala Sheth for colonoscopy  - Discussed with Dr. Bala Sheth    Electronically signed by Venessa Mack MD on 8/18/22 at 6:35 AM EDT

## 2022-08-18 NOTE — PLAN OF CARE
Problem: Discharge Planning  Goal: Discharge to home or other facility with appropriate resources  Outcome: Adequate for Discharge     Problem: Safety - Adult  Goal: Free from fall injury  Outcome: Adequate for Discharge     Problem: Pain  Goal: Verbalizes/displays adequate comfort level or baseline comfort level  Outcome: Adequate for Discharge

## 2022-08-22 NOTE — PROGRESS NOTES
Physician Progress Note      PATIENT:               Reuben Wood  CSN #:                  [id number]  :                       1965  ADMIT DATE:       2022 2:17 PM  [address] [place] DATE:        2022 5:23 PM  RESPONDING  PROVIDER #:        SARAH Brunner MD          QUERY TEXT:    Patient admitted with abdominal pain, chest pain. Noted documentation of   diverticulitis in H&P and Pyelonephritis in surgery consult. If possible,   please document in progress notes and discharge summary if you are evaluating   and /or treating any of the following: The medical record reflects the following:  Risk Factors: GERD, outpatient treatment for kidney infection  Clinical Indicators: H&P \"Blood work gives no evidence of infection-no   evidence of pyelonephritis is noted on CT abdomen pelvis. ..50-year-old female   admitted to [facility] for Diverticulitis. \"  Surgery consult \"63 y.o. female with likely pyelonephritis and CT A/P reading   diverticulitis, but unlikely. Viji Rey No need for antibiotics from surgical POV, will   defer to primary. \"  Treatment: Cipro and Flagyl, low fiber diet    Thank you,  Jl Marcos RN, CCDS  Clinical Documentation Improvement Specialist  [email or web address]  Options provided:  -- Diverticulitis confirmed and pyelonephritis ruled out  -- Pyelonephritis confirmed and diverticulitis ruled out  -- Diverticulitis and pyelonephritis confirmed  -- Other - I will add my own diagnosis  -- Disagree - Not applicable / Not valid  -- Disagree - Clinically unable to determine / Unknown  -- Refer to Clinical Documentation Reviewer    PROVIDER RESPONSE TEXT:    After study, pyelonephritis confirmed and diverticulitis ruled out.     Query created by: Mary Levy on 2022 12:38 PM      Electronically signed by:  SARAH Brunner MD 2022 6:10 PM

## 2022-11-02 DIAGNOSIS — Z79.811 USE OF AROMATASE INHIBITORS: Primary | ICD-10-CM

## 2022-11-04 ENCOUNTER — HOSPITAL ENCOUNTER (OUTPATIENT)
Age: 57
Discharge: HOME | End: 2022-11-04
Payer: MEDICAID

## 2022-11-04 ENCOUNTER — OFFICE VISIT (OUTPATIENT)
Dept: ONCOLOGY | Age: 57
End: 2022-11-04
Payer: MEDICAID

## 2022-11-04 ENCOUNTER — HOSPITAL ENCOUNTER (OUTPATIENT)
Dept: INFUSION THERAPY | Age: 57
Discharge: HOME | End: 2022-11-04

## 2022-11-04 VITALS
SYSTOLIC BLOOD PRESSURE: 135 MMHG | WEIGHT: 170.4 LBS | OXYGEN SATURATION: 93 % | HEIGHT: 72 IN | TEMPERATURE: 97.5 F | BODY MASS INDEX: 23.08 KG/M2 | HEART RATE: 66 BPM | DIASTOLIC BLOOD PRESSURE: 93 MMHG

## 2022-11-04 DIAGNOSIS — Z79.811 USE OF AROMATASE INHIBITORS: ICD-10-CM

## 2022-11-04 DIAGNOSIS — Z17.0 MALIGNANT NEOPLASM OF CENTRAL PORTION OF RIGHT BREAST IN FEMALE, ESTROGEN RECEPTOR POSITIVE (HCC): ICD-10-CM

## 2022-11-04 DIAGNOSIS — Z79.811 USE OF AROMATASE INHIBITORS: Primary | ICD-10-CM

## 2022-11-04 DIAGNOSIS — C50.111 MALIGNANT NEOPLASM OF CENTRAL PORTION OF RIGHT BREAST IN FEMALE, ESTROGEN RECEPTOR POSITIVE (HCC): ICD-10-CM

## 2022-11-04 LAB
ALBUMIN SERPL-MCNC: 3.9 G/DL (ref 3.5–5.2)
ALP BLD-CCNC: 140 U/L (ref 35–104)
ALT SERPL-CCNC: 32 U/L (ref 0–32)
ANION GAP SERPL CALCULATED.3IONS-SCNC: 10 MMOL/L (ref 7–16)
AST SERPL-CCNC: 38 U/L (ref 0–31)
BASOPHILS ABSOLUTE: 0.03 E9/L (ref 0–0.2)
BASOPHILS RELATIVE PERCENT: 0.5 % (ref 0–2)
BILIRUB SERPL-MCNC: <0.2 MG/DL (ref 0–1.2)
BUN BLDV-MCNC: 12 MG/DL (ref 6–20)
CALCIUM SERPL-MCNC: 9.5 MG/DL (ref 8.6–10.2)
CHLORIDE BLD-SCNC: 97 MMOL/L (ref 98–107)
CO2: 25 MMOL/L (ref 22–29)
CREAT SERPL-MCNC: 0.7 MG/DL (ref 0.5–1)
EOSINOPHILS ABSOLUTE: 0.06 E9/L (ref 0.05–0.5)
EOSINOPHILS RELATIVE PERCENT: 0.9 % (ref 0–6)
GFR SERPL CREATININE-BSD FRML MDRD: >60 ML/MIN/1.73
GLUCOSE BLD-MCNC: 152 MG/DL (ref 74–99)
HCT VFR BLD CALC: 36.4 % (ref 34–48)
HEMOGLOBIN: 12.1 G/DL (ref 11.5–15.5)
IMMATURE GRANULOCYTES #: 0.05 E9/L
IMMATURE GRANULOCYTES %: 0.8 % (ref 0–5)
LYMPHOCYTES ABSOLUTE: 1.42 E9/L (ref 1.5–4)
LYMPHOCYTES RELATIVE PERCENT: 21.6 % (ref 20–42)
MCH RBC QN AUTO: 33 PG (ref 26–35)
MCHC RBC AUTO-ENTMCNC: 33.2 % (ref 32–34.5)
MCV RBC AUTO: 99.2 FL (ref 80–99.9)
MONOCYTES ABSOLUTE: 0.62 E9/L (ref 0.1–0.95)
MONOCYTES RELATIVE PERCENT: 9.4 % (ref 2–12)
NEUTROPHILS ABSOLUTE: 4.39 E9/L (ref 1.8–7.3)
NEUTROPHILS RELATIVE PERCENT: 66.8 % (ref 43–80)
PDW BLD-RTO: 13.1 FL (ref 11.5–15)
PLATELET # BLD: 298 E9/L (ref 130–450)
PMV BLD AUTO: 8.7 FL (ref 7–12)
POTASSIUM SERPL-SCNC: 4.7 MMOL/L (ref 3.5–5)
RBC # BLD: 3.67 E12/L (ref 3.5–5.5)
SODIUM BLD-SCNC: 132 MMOL/L (ref 132–146)
TOTAL PROTEIN: 6.3 G/DL (ref 6.4–8.3)
WBC # BLD: 6.6 E9/L (ref 4.5–11.5)

## 2022-11-04 PROCEDURE — 1036F TOBACCO NON-USER: CPT | Performed by: INTERNAL MEDICINE

## 2022-11-04 PROCEDURE — 36415 COLL VENOUS BLD VENIPUNCTURE: CPT

## 2022-11-04 PROCEDURE — 3017F COLORECTAL CA SCREEN DOC REV: CPT | Performed by: INTERNAL MEDICINE

## 2022-11-04 PROCEDURE — 3078F DIAST BP <80 MM HG: CPT | Performed by: INTERNAL MEDICINE

## 2022-11-04 PROCEDURE — G9899 SCRN MAM PERF RSLTS DOC: HCPCS | Performed by: INTERNAL MEDICINE

## 2022-11-04 PROCEDURE — 80053 COMPREHEN METABOLIC PANEL: CPT

## 2022-11-04 PROCEDURE — 85025 COMPLETE CBC W/AUTO DIFF WBC: CPT

## 2022-11-04 PROCEDURE — G8427 DOCREV CUR MEDS BY ELIG CLIN: HCPCS | Performed by: INTERNAL MEDICINE

## 2022-11-04 PROCEDURE — 99212 OFFICE O/P EST SF 10 MIN: CPT

## 2022-11-04 PROCEDURE — 99214 OFFICE O/P EST MOD 30 MIN: CPT | Performed by: INTERNAL MEDICINE

## 2022-11-04 PROCEDURE — G8484 FLU IMMUNIZE NO ADMIN: HCPCS | Performed by: INTERNAL MEDICINE

## 2022-11-04 PROCEDURE — G8420 CALC BMI NORM PARAMETERS: HCPCS | Performed by: INTERNAL MEDICINE

## 2022-11-04 PROCEDURE — 3074F SYST BP LT 130 MM HG: CPT | Performed by: INTERNAL MEDICINE

## 2022-11-04 NOTE — PROGRESS NOTES
Department of [facility]     Attending Clinic Note    Reason for Visit: Follow-up on a patient with Right Breast Cancer    PCP:  Sabino Aguila DO    History of Present Illness: The mass was located in the 6 o'clock position of right breast    Breast cancer risk factors include infrequent SMEs and age and gender    Bilateral Screening Mammogram 10/06/2020: There are suspicious calcifications in the right breast at the 6 o'clock position which appear pleomorphic. These calcifications are in a segmental distribution. These clustered calcifications are suspicious for malignancy. Right breast, calcifications at the 6:00, core biopsy on 10/30/2020:    - Small focus of invasive ductal carcinoma, grade 3 (3+3+2 = 8)    - Ductal carcinoma in situ, high nuclear grade, comedo/cribriform/solid types;    - Microcalcifications in DCIS    - Breast receptor and biomarkers (per outside report without looking the   slides:     ER: Positive, 89.8%, strong intensity     IA: Positive, 52.2%, moderate intensity     HER-2: Positive (score 3+)     Ki-67: High proliferation, 26.2%     P53: Negative, 0.2%     Comment:The invasive carcinoma is intermingled with DCIS and measures   3.0 x 2.0 mm in greatest dimension on the slides. CXR PA/Lateral 12/11/2020:  No acute process. Right Axillary U/S on 12/11/2020: There is no axillary LN. MRI Bilateral Breast 01/05/2021:  Focal, heterogeneous non mass enhancement identified in the right breast 6 o'clock which measures approximately 1.9 x 1.4 x 1.7 cm (image 19 of the axial T1 post contrast series). No additional foci of disease identified on the right  There is no lymphadenopathy    Needle localized Right lumpectomy with SLNBx with mediport placement on 02/05/2021  A. Right axillary sentinel lymph node #1, excision: 1 lymph node negative for malignancy     B.   Right axillary contents, regional resection: 1 lymph node positive for metastatic, poorly differentiated ductal carcinoma (grade 3)   Extranodal extension present     C. 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;   positive for extensive intraductal component   Skin-uninvolved by malignancy   Invasive carcinoma margins-uninvolved by invasive carcinoma        Distance from closest margin: 4 mm from anterior margin   DCIS margins-inferior and medial margins involved by DCIS   Regional lymph nodes-involved by tumor cells        Number of lymph nodes with macrometastasis: 1        Number of lymph nodes with micrometastasis: 0        Number of lymph nodes with isolated tumor cells: 0        Size of largest metastatic deposit: 1.5 cm        Extranodal extension: Present        Total number of lymph nodes examined: 2        Number of sentinel nodes examined: 1   Treatment effect in the breast-no known presurgical therapy   TNM classification (AJCC eighth edition)-  pT1c N1a MX     Bone scan, CT chest/abdomen/pelvis 02/26/2021 negative for metastatic disease    Re-excision lumpectomy of inferior and medial margins on 03/09/2021  A. Right breast, new inferior margin, excision: Fat necrosis, foreign body-type giant cell reaction, chronic inflammation, and fibrosis consistent with previous procedure   No evidence of residual carcinoma   Final inferior margin negative for carcinoma     B.  Right breast, new medial margin, excision: Residual high-grade ductal carcinoma in situ   Ductal carcinoma in situ present less than 1 mm from red/superior margin and green/anterior margin   Fat necrosis, foreign body-type giant cell reaction, chronic   inflammation, and fibrosis consistent with previous procedure   Fibrocystic change   Microcalcifications associated with benign breast tissue and DCIS   Comment:Residual ductal carcinoma in situ is present in 5 out of 13 tissue blocks of part B    Recommended adjuvant chemotherapy (TCHP) for 6 cycles followed by adjuvant RT followed lastly by endocrine therapy. Side effects TCHP reviewed with patient. She agreed to proceed. 2d-echo 02/17/2021 noted EF 60-65%  Cycle # 1 adjuvant TCHP was on 04/08/2021. Cycle # 2 adjuvant TCHP was on 04/29/2021. Cycle # 3 adjuvant TCHP was on 05/20/2021. Cycle # 4 adjuvant TCHP was on 06/10/2021. Cycle # 5 adjuvant TCHP was on 07/22/2021. Cycle # 6 adjuvant TCHP was on 08/12/2021. Radiation Oncology consult appreciated. 2d-echo 10/12/2021 noted EF 55 to 60%  RT was started on 11/01/2021 and completed on 12/10/2021. DEXA scan 12/10/2021 osteopenia by WHO criteria  Arimidex 1 mg po daily was started on 12/11/2021 with fair tolerance  2D-echo 01/25/2022 noted EF 60%  Herceptin/Perjeta was on 01/28/2022. Discharged from [facility] on 02/13/2022 for pneumonia which was treated with Levaquin. She was taken to the [facility] [place] ER 02/16/2022 via EMS d/t suicide attempt for ingestion of Xanax and Tyelnol. EMS administered Narcan. She received IV hydration and was admitted 02/17/2022 to [facility] for further observation  D/C 02/24/2022. D/C Arimidex and monitored if any recurrent episodes or attempts. No recurrent ER visits or suicide attempts. Re-started Arimidex 03/18/2022 with good tolerance. Herceptin/Perjeta (last one) 04/29/2022. New depressive sx noted; D/C Arimidex; Felt better subsequently. Recommended Femara 2.5 mg po daily instead. Side effects reviewed. She agreed to proceed.  Ca.VitD Femara 2.5 mg po daily was started on 06/03/2022 with better tolerance so far. Today 11/04/2022; No fever chills. Fair appetite and energy level. Tolerating Femara well    Review of Systems;  CONSTITUTIONAL: No fever, chills. Fair appetite and energy level  ENMT: Eyes: No diplopia; Nose: No epistaxis. Mouth: No sore throat. RESPIRATORY: No hemoptysis, SOB or coughing  CARDIOVASCULAR: No chest pain, palpitations. GASTROINTESTINAL: No nausea/vomiting, abdominal pain. GENITOURINARY: No dysuria, urinary frequency, hematuria. NEURO: No syncope, presyncope, headache. Remainder: ROS NEGATIVE    Past Medical History:      Diagnosis Date    Anxiety     Arthritis     Breast cancer ([facility] [address] 2020    Rt breast    Cancer ([facility] [address] 2021    breast    Cervical radiculopathy     Chronic back pain     Dehydration 9/23/2021    Dehydration 9/23/2021    Depression     Diabetes mellitus type 2, uncontrolled ([facility] [address] 2/12/2017    GERD (gastroesophageal reflux disease)     History of blood transfusion 01/2018    back surgery, lumbar, dr Israel Abreu    Hypertension     Neuropathy     Right leg pain     seeing doctor currently problems with hardware     Medications:  Reviewed and reconciled. Allergies: Allergies   Allergen Reactions    Ceftriaxone Shortness Of Breath     Physical Exam:  BP (!) 135/93   Pulse 66   Temp 97.5 °F (36.4 °C)   Ht 6' 1\" (1.854 m)   Wt 170 lb 6.4 oz (77.3 kg)   LMP 08/13/2013   SpO2 93%   BMI 22.48 kg/m²   GENERAL: Alert, oriented x 3, not in acute distress. HEENT: PERRLA; EOMI. Oropharynx clear. NECK: Supple. Without lymphadenopathy. LUNGS: Good air entry bilaterally. No wheezing, crackles or ronchi. CARDIOVASCULAR: Regular rate. No murmurs, rubs or gallops. EXTREMITIES: Without clubbing, cyanosis, or edema. NEUROLOGIC: No focal deficits.    ECOG PS 1    Lab Results   Component Value Date    WBC 6.6 11/04/2022    HGB 12.1 11/04/2022    HCT 36.4 11/04/2022    MCV 99.2 11/04/2022     11/04/2022     Lab Results   Component Value Date  11/04/2022    K 4.7 11/04/2022    CL 97 (L) 11/04/2022    CO2 25 11/04/2022    BUN 12 11/04/2022    CREATININE 0.7 11/04/2022    GLUCOSE 152 (H) 11/04/2022    CALCIUM 9.5 11/04/2022    PROT 6.3 (L) 11/04/2022    LABALBU 3.9 11/04/2022    BILITOT <0.2 11/04/2022    ALKPHOS 140 (H) 11/04/2022    AST 38 (H) 11/04/2022    ALT 32 11/04/2022    LABGLOM >60 11/04/2022    GFRAA >60 08/18/2022     Impression/Plan:  60 y/o female with Right Breast Cancer    Bilateral Screening Mammogram 10/06/2020: There are suspicious calcifications in the right breast at the 6 o'clock position which appear pleomorphic. These calcifications are in a segmental distribution. These clustered calcifications are suspicious for malignancy. Right breast, calcifications at the 6:00, core biopsy on 10/30/2020:    - Small focus of invasive ductal carcinoma, grade 3 (3+3+2 = 8)    - Ductal carcinoma in situ, high nuclear grade, comedo/cribriform/solid types;    - Microcalcifications in DCIS    - Breast receptor and biomarkers (per outside report without looking the   slides:     ER: Positive, 89.8%, strong intensity     SD: Positive, 52.2%, moderate intensity     HER-2: Positive (score 3+)     Ki-67: High proliferation, 26.2%     P53: Negative, 0.2%     Comment:The invasive carcinoma is intermingled with DCIS and measures   3.0 x 2.0 mm in greatest dimension on the slides. CXR PA/Lateral 12/11/2020:  No acute process. Right Axillary U/S on 12/11/2020: There is no axillary LN. MRI Bilateral Breast 01/05/2021:  Focal, heterogeneous non mass enhancement identified in the right breast 6 o'clock which measures approximately 1.9 x 1.4 x 1.7 cm (image 19 of the axial T1 post contrast series). No additional foci of disease identified on the right  There is no lymphadenopathy    Needle localized Right lumpectomy with SLNBx with mediport placement on 02/05/2021  A.   Right axillary sentinel lymph node #1, excision: 1 lymph node negative for malignancy     B. Right axillary contents, regional resection: 1 lymph node positive for metastatic, poorly differentiated ductal carcinoma (grade 3)   Extranodal extension present     C. 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;   positive for extensive intraductal component   Skin-uninvolved by malignancy   Invasive carcinoma margins-uninvolved by invasive carcinoma        Distance from closest margin: 4 mm from anterior margin   DCIS margins-inferior and medial margins involved by DCIS   Regional lymph nodes-involved by tumor cells        Number of lymph nodes with macrometastasis: 1        Number of lymph nodes with micrometastasis: 0        Number of lymph nodes with isolated tumor cells: 0        Size of largest metastatic deposit: 1.5 cm        Extranodal extension: Present        Total number of lymph nodes examined: 2        Number of sentinel nodes examined: 1   Treatment effect in the breast-no known presurgical therapy   TNM classification (AJCC eighth edition)-  pT1c N1a MX     Bone scan, CT chest/abdomen/pelvis 02/26/2021 negative for metastatic disease    Re-excision lumpectomy of inferior and medial margins on 03/09/2021  A.  Right breast, new inferior margin, excision: Fat necrosis, foreign body-type giant cell reaction, chronic inflammation, and fibrosis consistent with previous procedure   No evidence of residual carcinoma   Final inferior margin negative for carcinoma     B. Right breast, new medial margin, excision: Residual high-grade ductal carcinoma in situ   Ductal carcinoma in situ present less than 1 mm from red/superior margin and green/anterior margin   Fat necrosis, foreign body-type giant cell reaction, chronic   inflammation, and fibrosis consistent with previous procedure   Fibrocystic change   Microcalcifications associated with benign breast tissue and DCIS   Comment:Residual ductal carcinoma in situ is present in 5 out of 13 tissue blocks of part B    Recommended adjuvant chemotherapy (TCHP) for 6 cycles followed by adjuvant RT followed lastly by endocrine therapy. Side effects TCHP reviewed with patient. She agreed to proceed. 2d-echo 02/17/2021 noted EF 60-65%  Cycle # 1 adjuvant TCHP was on 04/08/2021. Cycle # 2 adjuvant TCHP was on 04/29/2021. Cycle # 3 adjuvant TCHP was on 05/20/2021. Cycle # 4 adjuvant TCHP was on 06/10/2021. 2D-ECHO 06/29/2021 noted EF 60-65%  Cycle # 5 adjuvant TCHP was on 07/22/2021. Cycle # 6 adjuvant TCHP was on 08/12/2021. 2d-echo 10/12/2021 noted EF 55 to 60%  RT was started on 11/01/2021 and completed on 12/10/2021. DEXA scan 12/10/2021 osteopenia by WHO criteria  Arimidex 1 mg po daily was started on 12/11/2021 with fair tolerance   2D-echo 01/25/2022 noted EF 60%  Herceptin/Perjeta was on 01/28/2022. Discharged from [facility] on 02/13/2022 for pneumonia which was treated with Levaquin. She was [facility] 02/16/2022 via EMS d/t suicide attempt for ingestion of Xanax and Tyelnol. EMS administered Narcan. She received IV hydration and was admitted 02/17/2022 to [facility] for further observation  D/C 02/24/2022. D/C Arimidex and monitored if any recurrent episodes or attempts. No recurrent ER visits or suicide attempts. Re-started Arimidex 03/18/2022 with good tolerance. 2d-echo 04/19/2022 noted EF 60-65%  Herceptin/Perjeta (last one) 04/29/2022.    New depressive sx noted; D/C Arimidex; Felt better subsequently. Recommended Femara 2.5 mg po daily instead. Side effects reviewed. She agreed to proceed. Ca.VitD Femara 2.5 mg po daily was started on 06/03/2022 with better tolerance so far. Bilateral Screening Mammogram 06/17/2022: Negative for malignancy. CTA chest 08/17/2022 noted no evidence of PE  No evidence of metastatic disease  CT abdomen/pelvis 08/17/2022 noted No evidence of metastatic disease. Imaging reviewed. Labs reviewed. BINU  Continue Femara, Ca. VitD    RTC 4 months with labs.  Continue to follow with counselor  DEXA scan Dec 2023    Renato Valencia MD   [id number]  Board Certified Medical Oncologist

## 2022-11-04 NOTE — PROGRESS NOTES
Patient did NOT stop at check out window does NOT have My Chart. We will call patient with appt. time and day.

## 2022-12-22 ENCOUNTER — HOSPITAL ENCOUNTER (OUTPATIENT)
Dept: MAMMOGRAPHY | Age: 57
Discharge: HOME | End: 2022-12-24
Payer: MEDICAID

## 2022-12-22 DIAGNOSIS — Z00.00 ENCOUNTER FOR GENERAL ADULT MEDICAL EXAMINATION WITHOUT ABNORMAL FINDINGS: ICD-10-CM

## 2022-12-22 PROCEDURE — 77080 DXA BONE DENSITY AXIAL: CPT

## 2023-01-19 RX ORDER — ANASTROZOLE 1 MG/1
TABLET ORAL
Qty: 90 TABLET | Refills: 1 | Status: SHIPPED | OUTPATIENT
Start: 2023-01-19

## 2023-01-23 ENCOUNTER — TELEPHONE (OUTPATIENT)
Dept: ONCOLOGY | Age: 58
End: 2023-01-23

## 2023-03-03 ENCOUNTER — OFFICE VISIT (OUTPATIENT)
Dept: ONCOLOGY | Age: 58
End: 2023-03-03

## 2023-03-03 ENCOUNTER — HOSPITAL ENCOUNTER (OUTPATIENT)
Age: 58
Discharge: HOME | End: 2023-03-03
Payer: MEDICAID

## 2023-03-03 ENCOUNTER — HOSPITAL ENCOUNTER (OUTPATIENT)
Dept: INFUSION THERAPY | Age: 58
Discharge: HOME | End: 2023-03-03

## 2023-03-03 VITALS
HEART RATE: 69 BPM | HEIGHT: 72 IN | OXYGEN SATURATION: 99 % | TEMPERATURE: 97.4 F | DIASTOLIC BLOOD PRESSURE: 78 MMHG | WEIGHT: 180.2 LBS | SYSTOLIC BLOOD PRESSURE: 112 MMHG | BODY MASS INDEX: 24.41 KG/M2

## 2023-03-03 DIAGNOSIS — Z12.31 SCREENING MAMMOGRAM FOR HIGH-RISK PATIENT: Primary | ICD-10-CM

## 2023-03-03 DIAGNOSIS — C50.111 MALIGNANT NEOPLASM OF CENTRAL PORTION OF RIGHT BREAST IN FEMALE, ESTROGEN RECEPTOR POSITIVE (HCC): ICD-10-CM

## 2023-03-03 DIAGNOSIS — M81.0 OSTEOPOROSIS WITHOUT CURRENT PATHOLOGICAL FRACTURE, UNSPECIFIED OSTEOPOROSIS TYPE: Primary | ICD-10-CM

## 2023-03-03 DIAGNOSIS — M81.0 OSTEOPOROSIS WITHOUT CURRENT PATHOLOGICAL FRACTURE, UNSPECIFIED OSTEOPOROSIS TYPE: ICD-10-CM

## 2023-03-03 DIAGNOSIS — Z79.811 USE OF AROMATASE INHIBITORS: ICD-10-CM

## 2023-03-03 DIAGNOSIS — Z17.0 MALIGNANT NEOPLASM OF CENTRAL PORTION OF RIGHT BREAST IN FEMALE, ESTROGEN RECEPTOR POSITIVE (HCC): ICD-10-CM

## 2023-03-03 LAB
ALBUMIN SERPL-MCNC: 4.1 G/DL (ref 3.5–5.2)
ALP BLD-CCNC: 149 U/L (ref 35–104)
ALT SERPL-CCNC: 21 U/L (ref 0–32)
ANION GAP SERPL CALCULATED.3IONS-SCNC: 10 MMOL/L (ref 7–16)
AST SERPL-CCNC: 15 U/L (ref 0–31)
BASOPHILS ABSOLUTE: 0.05 E9/L (ref 0–0.2)
BASOPHILS RELATIVE PERCENT: 0.5 % (ref 0–2)
BILIRUB SERPL-MCNC: 0.3 MG/DL (ref 0–1.2)
BUN BLDV-MCNC: 13 MG/DL (ref 6–20)
CALCIUM SERPL-MCNC: 10.2 MG/DL (ref 8.6–10.2)
CHLORIDE BLD-SCNC: 99 MMOL/L (ref 98–107)
CO2: 28 MMOL/L (ref 22–29)
CREAT SERPL-MCNC: 0.7 MG/DL (ref 0.5–1)
EOSINOPHILS ABSOLUTE: 0.03 E9/L (ref 0.05–0.5)
EOSINOPHILS RELATIVE PERCENT: 0.3 % (ref 0–6)
GFR SERPL CREATININE-BSD FRML MDRD: >60 ML/MIN/1.73
GLUCOSE BLD-MCNC: 164 MG/DL (ref 74–99)
HCT VFR BLD CALC: 41 % (ref 34–48)
HEMOGLOBIN: 13.4 G/DL (ref 11.5–15.5)
IMMATURE GRANULOCYTES #: 0.04 E9/L
IMMATURE GRANULOCYTES %: 0.4 % (ref 0–5)
LYMPHOCYTES ABSOLUTE: 1.84 E9/L (ref 1.5–4)
LYMPHOCYTES RELATIVE PERCENT: 19.4 % (ref 20–42)
MCH RBC QN AUTO: 31.5 PG (ref 26–35)
MCHC RBC AUTO-ENTMCNC: 32.7 % (ref 32–34.5)
MCV RBC AUTO: 96.2 FL (ref 80–99.9)
MONOCYTES ABSOLUTE: 0.88 E9/L (ref 0.1–0.95)
MONOCYTES RELATIVE PERCENT: 9.3 % (ref 2–12)
NEUTROPHILS ABSOLUTE: 6.65 E9/L (ref 1.8–7.3)
NEUTROPHILS RELATIVE PERCENT: 70.1 % (ref 43–80)
PDW BLD-RTO: 12 FL (ref 11.5–15)
PLATELET # BLD: 295 E9/L (ref 130–450)
PMV BLD AUTO: 8.9 FL (ref 7–12)
POTASSIUM SERPL-SCNC: 4.3 MMOL/L (ref 3.5–5)
RBC # BLD: 4.26 E12/L (ref 3.5–5.5)
SODIUM BLD-SCNC: 137 MMOL/L (ref 132–146)
TOTAL PROTEIN: 7 G/DL (ref 6.4–8.3)
WBC # BLD: 9.5 E9/L (ref 4.5–11.5)

## 2023-03-03 PROCEDURE — 36415 COLL VENOUS BLD VENIPUNCTURE: CPT

## 2023-03-03 PROCEDURE — 80053 COMPREHEN METABOLIC PANEL: CPT

## 2023-03-03 PROCEDURE — 85025 COMPLETE CBC W/AUTO DIFF WBC: CPT

## 2023-03-03 RX ORDER — LETROZOLE 2.5 MG/1
2.5 TABLET, FILM COATED ORAL DAILY
Qty: 90 TABLET | Refills: 1 | Status: SHIPPED | OUTPATIENT
Start: 2023-03-03

## 2023-03-03 RX ORDER — EPINEPHRINE 1 MG/ML
0.3 INJECTION, SOLUTION, CONCENTRATE INTRAVENOUS PRN
OUTPATIENT
Start: 2023-03-03

## 2023-03-03 RX ORDER — HEPARIN SODIUM (PORCINE) LOCK FLUSH IV SOLN 100 UNIT/ML 100 UNIT/ML
500 SOLUTION INTRAVENOUS PRN
OUTPATIENT
Start: 2023-03-03

## 2023-03-03 RX ORDER — SODIUM CHLORIDE 9 MG/ML
INJECTION, SOLUTION INTRAVENOUS CONTINUOUS
OUTPATIENT
Start: 2023-03-03

## 2023-03-03 RX ORDER — ALBUTEROL SULFATE 90 UG/1
4 AEROSOL, METERED RESPIRATORY (INHALATION) PRN
OUTPATIENT
Start: 2023-03-03

## 2023-03-03 RX ORDER — ANASTROZOLE 1 MG/1
TABLET ORAL
Qty: 90 TABLET | Refills: 1 | Status: CANCELLED | OUTPATIENT
Start: 2023-03-03

## 2023-03-03 RX ORDER — ACETAMINOPHEN 325 MG/1
650 TABLET ORAL
OUTPATIENT
Start: 2023-03-03

## 2023-03-03 RX ORDER — SODIUM CHLORIDE 0.9 % (FLUSH) 0.9 %
5-40 SYRINGE (ML) INJECTION PRN
OUTPATIENT
Start: 2023-03-03

## 2023-03-03 RX ORDER — DIPHENHYDRAMINE HYDROCHLORIDE 50 MG/ML
50 INJECTION INTRAMUSCULAR; INTRAVENOUS
OUTPATIENT
Start: 2023-03-03

## 2023-03-03 RX ORDER — SODIUM CHLORIDE 9 MG/ML
5-250 INJECTION, SOLUTION INTRAVENOUS PRN
OUTPATIENT
Start: 2023-03-03

## 2023-03-03 RX ORDER — ONDANSETRON 2 MG/ML
8 INJECTION INTRAMUSCULAR; INTRAVENOUS
OUTPATIENT
Start: 2023-03-03

## 2023-03-03 RX ORDER — SODIUM CHLORIDE 9 MG/ML
INJECTION, SOLUTION INTRAVENOUS ONCE
OUTPATIENT
Start: 2023-03-03 | End: 2023-03-03

## 2023-03-03 NOTE — PROGRESS NOTES
Department of [facility]     Attending Clinic Note    Reason for Visit: Follow-up on a patient with Right Breast Cancer    PCP:  Zohreh Cosby DO    History of Present Illness: The mass was located in the 6 o'clock position of right breast    Breast cancer risk factors include infrequent SMEs and age and gender    Bilateral Screening Mammogram 10/06/2020: There are suspicious calcifications in the right breast at the 6 o'clock position which appear pleomorphic. These calcifications are in a segmental distribution. These clustered calcifications are suspicious for malignancy. Right breast, calcifications at the 6:00, core biopsy on 10/30/2020:    - Small focus of invasive ductal carcinoma, grade 3 (3+3+2 = 8)    - Ductal carcinoma in situ, high nuclear grade, comedo/cribriform/solid types;    - Microcalcifications in DCIS    - Breast receptor and biomarkers (per outside report without looking the   slides:     ER: Positive, 89.8%, strong intensity     [place]: Positive, 52.2%, moderate intensity     HER-2: Positive (score 3+)     Ki-67: High proliferation, 26.2%     P53: Negative, 0.2%     Comment:The invasive carcinoma is intermingled with DCIS and measures   3.0 x 2.0 mm in greatest dimension on the slides. CXR PA/Lateral 12/11/2020:  No acute process. Right Axillary U/S on 12/11/2020: There is no axillary LN. MRI Bilateral Breast 01/05/2021:  Focal, heterogeneous non mass enhancement identified in the right breast 6 o'clock which measures approximately 1.9 x 1.4 x 1.7 cm (image 19 of the axial T1 post contrast series). No additional foci of disease identified on the right  There is no lymphadenopathy    Needle localized Right lumpectomy with SLNBx with mediport placement on 02/05/2021  A. Right axillary sentinel lymph node #1, excision: 1 lymph node negative for malignancy     B.   Right axillary contents, regional resection: 1 lymph node positive for metastatic, poorly differentiated ductal carcinoma (grade 3)   Extranodal extension present     C. 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;   positive for extensive intraductal component   Skin-uninvolved by malignancy   Invasive carcinoma margins-uninvolved by invasive carcinoma        Distance from closest margin: 4 mm from anterior margin   DCIS margins-inferior and medial margins involved by DCIS   Regional lymph nodes-involved by tumor cells        Number of lymph nodes with macrometastasis: 1        Number of lymph nodes with micrometastasis: 0        Number of lymph nodes with isolated tumor cells: 0        Size of largest metastatic deposit: 1.5 cm        Extranodal extension: Present        Total number of lymph nodes examined: 2        Number of sentinel nodes examined: 1   Treatment effect in the breast-no known presurgical therapy   TNM classification (AJCC eighth edition)-  pT1c N1a MX     Bone scan, CT chest/abdomen/pelvis 02/26/2021 negative for metastatic disease    Re-excision lumpectomy of inferior and medial margins on 03/09/2021  A. Right breast, new inferior margin, excision: Fat necrosis, foreign body-type giant cell reaction, chronic inflammation, and fibrosis consistent with previous procedure   No evidence of residual carcinoma   Final inferior margin negative for carcinoma     B.  Right breast, new medial margin, excision: Residual high-grade ductal carcinoma in situ   Ductal carcinoma in situ present less than 1 mm from red/superior margin and green/anterior margin   Fat necrosis, foreign body-type giant cell reaction, chronic   inflammation, and fibrosis consistent with previous procedure   Fibrocystic change   Microcalcifications associated with benign breast tissue and DCIS   Comment:Residual ductal carcinoma in situ is present in 5 out of 13 tissue blocks of part B    Recommended adjuvant chemotherapy (TCHP) for 6 cycles followed by adjuvant RT followed lastly by endocrine therapy. Side effects TCHP reviewed with patient. She agreed to proceed. 2d-echo 02/17/2021 noted EF 60-65%  Cycle # 1 adjuvant TCHP was on 04/08/2021. Cycle # 2 adjuvant TCHP was on 04/29/2021. Cycle # 3 adjuvant TCHP was on 05/20/2021. Cycle # 4 adjuvant TCHP was on 06/10/2021. Cycle # 5 adjuvant TCHP was on 07/22/2021. Cycle # 6 adjuvant TCHP was on 08/12/2021. Radiation Oncology consult appreciated. 2d-echo 10/12/2021 noted EF 55 to 60%  RT was started on 11/01/2021 and completed on 12/10/2021. DEXA scan 12/10/2021 osteopenia by WHO criteria  Arimidex 1 mg po daily was started on 12/11/2021 with fair tolerance  2D-echo 01/25/2022 noted EF 60%  Herceptin/Perjeta was on 01/28/2022. Discharged from [facility] on 02/13/2022 for pneumonia which was treated with Levaquin. She was taken to the [facility] ER 02/16/2022 via EMS d/t suicide attempt for ingestion of Xanax and Tyelnol. EMS administered Narcan. She received IV hydration and was admitted 02/17/2022 to [address] [place] [address] psych unit for further observation  D/C 02/24/2022. D/C Arimidex and monitored if any recurrent episodes or attempts. No recurrent ER visits or suicide attempts. Re-started Arimidex 03/18/2022 with good tolerance. Herceptin/Perjeta (last one) 04/29/2022. New depressive sx noted; D/C Arimidex; Felt better subsequently. Recommended Femara 2.5 mg po daily instead. Side effects reviewed. She agreed to proceed.  Ca.VitD   Femara 2.5 mg po daily was started on 06/03/2022 with better tolerance so far. Today 03/03/2023; No fever, chills. Fair appetite and energy level. Tolerating Femara well. Review of Systems;  CONSTITUTIONAL: No fever, chills. Fair appetite and energy level  ENMT: Eyes: No diplopia; Nose: No epistaxis. Mouth: No sore throat. RESPIRATORY: No hemoptysis, SOB or coughing  CARDIOVASCULAR: No chest pain, palpitations. GASTROINTESTINAL: No nausea/vomiting, abdominal pain. GENITOURINARY: No dysuria, urinary frequency, hematuria. NEURO: No syncope, presyncope, headache. Remainder: ROS NEGATIVE    Past Medical History:      Diagnosis Date    Anxiety     Arthritis     Breast cancer ([facility] 2020    Rt breast    Cancer ([facility] [address] 2021    breast    Cervical radiculopathy     Chronic back pain     Dehydration 9/23/2021    Dehydration 9/23/2021    Depression     Diabetes mellitus type 2, uncontrolled ([facility] [address] 2/12/2017    GERD (gastroesophageal reflux disease)     History of blood transfusion 01/2018    back surgery, lumbar, dr Murry Saliva    Hypertension     Neuropathy     Right leg pain     seeing doctor currently problems with hardware     Medications:  Reviewed and reconciled. Allergies: Allergies   Allergen Reactions    Ceftriaxone Shortness Of Breath     Physical Exam:  Ht 6' 1\" (1.854 m)   Wt 180 lb 3.2 oz (81.7 kg)   LMP 08/13/2013   BMI 23.77 kg/m²   GENERAL: Alert, oriented x 3, not in acute distress. HEENT: PERRLA; EOMI. Oropharynx clear. LUNGS: Good air entry bilaterally. No wheezing, crackles or ronchi. CARDIOVASCULAR: Regular rate. No murmurs, rubs or gallops. EXTREMITIES: Without clubbing, cyanosis, or edema. NEUROLOGIC: No focal deficits.    ECOG PS 1    Lab Results   Component Value Date    WBC 9.5 03/03/2023    HGB 13.4 03/03/2023    HCT 41.0 03/03/2023    MCV 96.2 03/03/2023     03/03/2023     Lab Results   Component Value Date     03/03/2023    K 4.3 03/03/2023    CL 99 03/03/2023    CO2 28 03/03/2023    BUN 13 03/03/2023    CREATININE 0.7 03/03/2023    GLUCOSE 164 (H) 03/03/2023    CALCIUM 10.2 03/03/2023    PROT 7.0 03/03/2023    LABALBU 4.1 03/03/2023    BILITOT 0.3 03/03/2023    ALKPHOS 149 (H) 03/03/2023    AST 15 03/03/2023    ALT 21 03/03/2023    LABGLOM >60 03/03/2023    GFRAA >60 08/18/2022     Impression/Plan:  62 y/o female with Right Breast Cancer    Bilateral Screening Mammogram 10/06/2020: There are suspicious calcifications in the right breast at the 6 o'clock position which appear pleomorphic. These calcifications are in a segmental distribution. These clustered calcifications are suspicious for malignancy. Right breast, calcifications at the 6:00, core biopsy on 10/30/2020:    - Small focus of invasive ductal carcinoma, grade 3 (3+3+2 = 8)    - Ductal carcinoma in situ, high nuclear grade, comedo/cribriform/solid types;    - Microcalcifications in DCIS    - Breast receptor and biomarkers (per outside report without looking the   slides:     ER: Positive, 89.8%, strong intensity     [place]: Positive, 52.2%, moderate intensity     HER-2: Positive (score 3+)     Ki-67: High proliferation, 26.2%     P53: Negative, 0.2%     Comment:The invasive carcinoma is intermingled with DCIS and measures   3.0 x 2.0 mm in greatest dimension on the slides. CXR PA/Lateral 12/11/2020:  No acute process. Right Axillary U/S on 12/11/2020: There is no axillary LN. MRI Bilateral Breast 01/05/2021:  Focal, heterogeneous non mass enhancement identified in the right breast 6 o'clock which measures approximately 1.9 x 1.4 x 1.7 cm (image 19 of the axial T1 post contrast series). No additional foci of disease identified on the right  There is no lymphadenopathy    Needle localized Right lumpectomy with SLNBx with mediport placement on 02/05/2021  A. Right axillary sentinel lymph node #1, excision: 1 lymph node negative for malignancy     B.   Right axillary contents, regional resection: 1 lymph node positive for metastatic, poorly differentiated ductal carcinoma (grade 3)   Extranodal extension present     C. Right breast, lumpectomy: Invasive, poorly differentiated ductal carcinoma (grade 3) and high-grade ductal carcinoma in situ   High-grade ductal carcinoma in situ involves inferior and medial surgical margins     CANCER CASE SUMMARY   Procedure-excision   Specimen laterality-right   Tumor size-1.1 x 0.8 x 0.5 cm   Histologic type-invasive carcinoma of no special type (ductal)   Duarte histologic score-3 (tubule formation) +3 (nuclear   pleomorphism) +2 (mitotic count) = 8   Overall grade-grade 3   Ductal carcinoma in situ-high-grade DCIS with comedonecrosis is present;   positive for extensive intraductal component   Skin-uninvolved by malignancy   Invasive carcinoma margins-uninvolved by invasive carcinoma        Distance from closest margin: 4 mm from anterior margin   DCIS margins-inferior and medial margins involved by DCIS   Regional lymph nodes-involved by tumor cells        Number of lymph nodes with macrometastasis: 1        Number of lymph nodes with micrometastasis: 0        Number of lymph nodes with isolated tumor cells: 0        Size of largest metastatic deposit: 1.5 cm        Extranodal extension: Present        Total number of lymph nodes examined: 2        Number of sentinel nodes examined: 1   Treatment effect in the breast-no known presurgical therapy   TNM classification (AJCC eighth edition)-  pT1c N1a MX     Bone scan, CT chest/abdomen/pelvis 02/26/2021 negative for metastatic disease    Re-excision lumpectomy of inferior and medial margins on 03/09/2021  A. Right breast, new inferior margin, excision: Fat necrosis, foreign body-type giant cell reaction, chronic inflammation, and fibrosis consistent with previous procedure   No evidence of residual carcinoma   Final inferior margin negative for carcinoma     B.  Right breast, new medial margin, excision: Residual high-grade ductal carcinoma in situ   Ductal carcinoma in situ present less than 1 mm from red/superior margin and green/anterior margin   Fat necrosis, foreign body-type giant cell reaction, chronic   inflammation, and fibrosis consistent with previous procedure   Fibrocystic change   Microcalcifications associated with benign breast tissue and DCIS   Comment:Residual ductal carcinoma in situ is present in 5 out of 13 tissue blocks of part B    Recommended adjuvant chemotherapy (TCHP) for 6 cycles followed by adjuvant RT followed lastly by endocrine therapy. Side effects TCHP reviewed with patient. She agreed to proceed. 2d-echo 02/17/2021 noted EF 60-65%  Cycle # 1 adjuvant TCHP was on 04/08/2021. Cycle # 2 adjuvant TCHP was on 04/29/2021. Cycle # 3 adjuvant TCHP was on 05/20/2021. Cycle # 4 adjuvant TCHP was on 06/10/2021. 2D-ECHO 06/29/2021 noted EF 60-65%  Cycle # 5 adjuvant TCHP was on 07/22/2021. Cycle # 6 adjuvant TCHP was on 08/12/2021. 2d-echo 10/12/2021 noted EF 55 to 60%  RT was started on 11/01/2021 and completed on 12/10/2021. DEXA scan 12/10/2021 osteopenia by WHO criteria  Arimidex 1 mg po daily was started on 12/11/2021 with fair tolerance   2D-echo 01/25/2022 noted EF 60%  Herceptin/Perjeta was on 01/28/2022. Discharged from [facility] on 02/13/2022 for pneumonia which was treated with Levaquin. She was taken to [facility] ER 02/16/2022 via EMS d/t suicide attempt for ingestion of Xanax and Tyelnol. EMS administered Narcan. She received IV hydration and was admitted 02/17/2022 to [facility] psych unit for further observation  D/C 02/24/2022. D/C Arimidex and monitored if any recurrent episodes or attempts. No recurrent ER visits or suicide attempts. Re-started Arimidex 03/18/2022 with good tolerance. 2d-echo 04/19/2022 noted EF 60-65%  Herceptin/Perjeta (last one) 04/29/2022. New depressive sx noted; D/C Arimidex; Felt better subsequently. Recommended Femara 2.5 mg po daily instead. Side effects reviewed. She agreed to proceed. Ca.VitD Femara 2.5 mg po daily was started on 06/03/2022 with better tolerance so far. Bilateral Screening Mammogram 06/17/2022: Negative for malignancy. CTA chest 08/17/2022 noted no evidence of PE. No evidence of metastatic disease  CT abdomen/pelvis 08/17/2022 noted No evidence of metastatic disease. DEXA scan 12/22/2022: Osteoporosis in L4; osteopenia efrain femoral necks; normal in left forearm. Imaging reviewed. Labs reviewed. BINU  Continue Femara, Ca. VitD. Suggested Prolia every 6 month for osteoporosis. Prolia declined by insurance. Therefore recommended Zometa. Side effects reviewed. She agreed to proceed. Authorization to be obtained. Prior dental clearance to be obtained. Refilled Femara.   Mammogram ordered for June 2023  RTC June 2023 with prior mammogram    Pete Johnson MD   6/5/1659  Board Certified Medical Oncologist

## 2023-03-07 ENCOUNTER — TELEPHONE (OUTPATIENT)
Dept: INFUSION THERAPY | Age: 58
End: 2023-03-07

## 2023-03-07 NOTE — TELEPHONE ENCOUNTER
Pt was given dental clearance for to have her dentist complete before Zometa can be started-Pt voiced Nehal Rosas RN

## 2023-06-21 ENCOUNTER — OFFICE VISIT (OUTPATIENT)
Dept: ORTHOPEDIC SURGERY | Age: 58
End: 2023-06-21
Payer: MEDICAID

## 2023-06-21 VITALS — BODY MASS INDEX: 24.38 KG/M2 | WEIGHT: 180 LBS | HEIGHT: 72 IN

## 2023-06-21 DIAGNOSIS — M25.571 CHRONIC PAIN OF RIGHT ANKLE: Primary | ICD-10-CM

## 2023-06-21 DIAGNOSIS — G89.29 CHRONIC PAIN OF RIGHT ANKLE: Primary | ICD-10-CM

## 2023-06-21 DIAGNOSIS — T84.84XA PAINFUL ORTHOPAEDIC HARDWARE (HCC): ICD-10-CM

## 2023-06-21 PROCEDURE — 1036F TOBACCO NON-USER: CPT | Performed by: PHYSICIAN ASSISTANT

## 2023-06-21 PROCEDURE — 99202 OFFICE O/P NEW SF 15 MIN: CPT | Performed by: PHYSICIAN ASSISTANT

## 2023-06-21 PROCEDURE — G8420 CALC BMI NORM PARAMETERS: HCPCS | Performed by: PHYSICIAN ASSISTANT

## 2023-06-21 PROCEDURE — G8427 DOCREV CUR MEDS BY ELIG CLIN: HCPCS | Performed by: PHYSICIAN ASSISTANT

## 2023-06-21 PROCEDURE — 3017F COLORECTAL CA SCREEN DOC REV: CPT | Performed by: PHYSICIAN ASSISTANT

## 2023-06-21 NOTE — PROGRESS NOTES
[address] [facility],[address] In Care  New Patient Note      CHIEF COMPLAINT:   Chief Complaint   Patient presents with    Leg Pain     Pt presents this PM with R LE pain. Hx of fracture/surgery. Hx of sciatica as well. C/o pain from hip to foot. Numbness and tingling. Would like x-ray of ankle to check on hardware in foot. Takes Advil for pain. Does not help. Ankle Pain       HISTORY OF PRESENT ILLNESS:                The patient is a 61 y.o. female who presents today with complaints of right ankle pain that she has had for the last year. She has a remote history in 2019 of tibia-fibula fracture with surgical fixation with Dr. Laura Workman. She states there were problems with the surgery, she had a nonunion, he did end up removing her tibial nail leaving her with the fibular and tibial plate. She states she also has neuropathy into her toes. She states her pain is worse with ambulation. She has tried at home therapies of Advil for symptomatic relief. She also has an AFO brace which she wears. She is interested in a prescription for a new AFO as hers is starting to deteriorate. Requesting an x-ray of the ankle today to check hardware.     Past Medical History:        Diagnosis Date    Anxiety     Arthritis     Breast cancer ([address] 2020    Rt breast    Cancer ([address] 2021    breast    Cervical radiculopathy     Chronic back pain     Dehydration 9/23/2021    Dehydration 9/23/2021    Depression     Diabetes mellitus type 2, uncontrolled ([address] 2/12/2017    GERD (gastroesophageal reflux disease)     History of blood transfusion 01/2018    back surgery, lumbar, dr Shana Edmond    Hypertension     Neuropathy     Right leg pain     seeing doctor currently problems with hardware     Past Surgical History:        Procedure Laterality Date    BACK SURGERY  01/08/2018    PLIF L2-L5    BREAST BIOPSY Right 2/5/2021    RIGHT BREAST LUMPECTOMY WITH  sentinel LYMPHNODE biopsy performed by Toña Knight MD at [address] [facility]

## 2023-06-23 ENCOUNTER — OFFICE VISIT (OUTPATIENT)
Dept: ONCOLOGY | Age: 58
End: 2023-06-23

## 2023-06-23 ENCOUNTER — HOSPITAL ENCOUNTER (OUTPATIENT)
Age: 58
Discharge: HOME | End: 2023-06-23
Payer: MEDICAID

## 2023-06-23 ENCOUNTER — HOSPITAL ENCOUNTER (OUTPATIENT)
Dept: INFUSION THERAPY | Age: 58
Discharge: HOME | End: 2023-06-23

## 2023-06-23 VITALS
DIASTOLIC BLOOD PRESSURE: 62 MMHG | BODY MASS INDEX: 24.92 KG/M2 | OXYGEN SATURATION: 99 % | HEIGHT: 72 IN | SYSTOLIC BLOOD PRESSURE: 101 MMHG | TEMPERATURE: 96.9 F | HEART RATE: 70 BPM | WEIGHT: 184 LBS

## 2023-06-23 DIAGNOSIS — Z79.811 USE OF AROMATASE INHIBITORS: ICD-10-CM

## 2023-06-23 LAB
ALBUMIN SERPL-MCNC: 3.7 G/DL (ref 3.5–5.2)
ALP SERPL-CCNC: 163 U/L (ref 35–104)
ALT SERPL-CCNC: 33 U/L (ref 0–32)
ANION GAP SERPL CALCULATED.3IONS-SCNC: 8 MMOL/L (ref 7–16)
AST SERPL-CCNC: 29 U/L (ref 0–31)
BASOPHILS # BLD: 0.04 E9/L (ref 0–0.2)
BASOPHILS NFR BLD: 0.4 % (ref 0–2)
BILIRUB SERPL-MCNC: 0.3 MG/DL (ref 0–1.2)
BUN SERPL-MCNC: 9 MG/DL (ref 6–20)
CALCIUM SERPL-MCNC: 9.6 MG/DL (ref 8.6–10.2)
CHLORIDE SERPL-SCNC: 97 MMOL/L (ref 98–107)
CO2 SERPL-SCNC: 24 MMOL/L (ref 22–29)
CREAT SERPL-MCNC: 0.7 MG/DL (ref 0.5–1)
EOSINOPHIL # BLD: 0.12 E9/L (ref 0.05–0.5)
EOSINOPHIL NFR BLD: 1.3 % (ref 0–6)
ERYTHROCYTE [DISTWIDTH] IN BLOOD BY AUTOMATED COUNT: 12.7 FL (ref 11.5–15)
GLUCOSE SERPL-MCNC: 175 MG/DL (ref 74–99)
HCT VFR BLD AUTO: 39.4 % (ref 34–48)
HGB BLD-MCNC: 13.1 G/DL (ref 11.5–15.5)
IMM GRANULOCYTES # BLD: 0.03 E9/L
IMM GRANULOCYTES NFR BLD: 0.3 % (ref 0–5)
LYMPHOCYTES # BLD: 1.89 E9/L (ref 1.5–4)
LYMPHOCYTES NFR BLD: 20.9 % (ref 20–42)
MCH RBC QN AUTO: 31.8 PG (ref 26–35)
MCHC RBC AUTO-ENTMCNC: 33.2 % (ref 32–34.5)
MCV RBC AUTO: 95.6 FL (ref 80–99.9)
MONOCYTES # BLD: 1.01 E9/L (ref 0.1–0.95)
MONOCYTES NFR BLD: 11.2 % (ref 2–12)
NEUTROPHILS # BLD: 5.94 E9/L (ref 1.8–7.3)
NEUTS SEG NFR BLD: 65.9 % (ref 43–80)
PLATELET # BLD AUTO: 269 E9/L (ref 130–450)
PMV BLD AUTO: 9.1 FL (ref 7–12)
POTASSIUM SERPL-SCNC: 4.8 MMOL/L (ref 3.5–5)
PROT SERPL-MCNC: 6.5 G/DL (ref 6.4–8.3)
RBC # BLD AUTO: 4.12 E12/L (ref 3.5–5.5)
SODIUM SERPL-SCNC: 129 MMOL/L (ref 132–146)
WBC # BLD: 9 E9/L (ref 4.5–11.5)

## 2023-06-23 PROCEDURE — 85025 COMPLETE CBC W/AUTO DIFF WBC: CPT

## 2023-06-23 PROCEDURE — 36415 COLL VENOUS BLD VENIPUNCTURE: CPT

## 2023-06-23 PROCEDURE — 80053 COMPREHEN METABOLIC PANEL: CPT

## 2023-06-23 NOTE — PROGRESS NOTES
Department of [facility]     Attending Clinic Note    Reason for Visit: Follow-up on a patient with Right Breast Cancer    PCP:  Ga Bahena DO    History of Present Illness: The mass was located in the 6 o'clock position of right breast    Breast cancer risk factors include infrequent SMEs and age and gender    Bilateral Screening Mammogram 10/06/2020: There are suspicious calcifications in the right breast at the 6 o'clock position which appear pleomorphic. These calcifications are in a segmental distribution. These clustered calcifications are suspicious for malignancy. Right breast, calcifications at the 6:00, core biopsy on 10/30/2020:    - Small focus of invasive ductal carcinoma, grade 3 (3+3+2 = 8)    - Ductal carcinoma in situ, high nuclear grade, comedo/cribriform/solid types;    - Microcalcifications in DCIS    - Breast receptor and biomarkers (per outside report without looking the   slides:     ER: Positive, 89.8%, strong intensity     [place]: Positive, 52.2%, moderate intensity     HER-2: Positive (score 3+)     Ki-67: High proliferation, 26.2%     P53: Negative, 0.2%     Comment:The invasive carcinoma is intermingled with DCIS and measures   3.0 x 2.0 mm in greatest dimension on the slides. CXR PA/Lateral 12/11/2020:  No acute process. Right Axillary U/S on 12/11/2020: There is no axillary LN. MRI Bilateral Breast 01/05/2021:  Focal, heterogeneous non mass enhancement identified in the right breast 6 o'clock which measures approximately 1.9 x 1.4 x 1.7 cm (image 19 of the axial T1 post contrast series). No additional foci of disease identified on the right  There is no lymphadenopathy    Needle localized Right lumpectomy with SLNBx with mediport placement on 02/05/2021  A. Right axillary sentinel lymph node #1, excision: 1 lymph node negative for malignancy     B.   Right axillary contents, regional resection: 1 lymph node positive for metastatic, poorly differentiated

## 2023-07-06 RX ORDER — ANASTROZOLE 1 MG/1
TABLET ORAL
Qty: 28 TABLET | Refills: 1 | Status: SHIPPED | OUTPATIENT
Start: 2023-07-06

## 2023-08-10 RX ORDER — LETROZOLE 2.5 MG/1
2.5 TABLET, FILM COATED ORAL DAILY
Qty: 28 TABLET | Refills: 0 | Status: SHIPPED | OUTPATIENT
Start: 2023-08-10

## 2023-09-07 RX ORDER — LETROZOLE 2.5 MG/1
2.5 TABLET, FILM COATED ORAL DAILY
Qty: 28 TABLET | Refills: 0 | Status: SHIPPED | OUTPATIENT
Start: 2023-09-07

## 2023-10-05 RX ORDER — LETROZOLE 2.5 MG/1
2.5 TABLET, FILM COATED ORAL DAILY
Qty: 90 TABLET | Refills: 1 | Status: SHIPPED | OUTPATIENT
Start: 2023-10-05

## 2023-12-28 DIAGNOSIS — C50.111 MALIGNANT NEOPLASM OF CENTRAL PORTION OF RIGHT BREAST IN FEMALE, ESTROGEN RECEPTOR POSITIVE (HCC): Primary | ICD-10-CM

## 2023-12-28 DIAGNOSIS — Z17.0 MALIGNANT NEOPLASM OF CENTRAL PORTION OF RIGHT BREAST IN FEMALE, ESTROGEN RECEPTOR POSITIVE (HCC): Primary | ICD-10-CM

## 2024-01-03 NOTE — PROGRESS NOTES
ONCOLOGY  NP VISIT         1/3/2024      NAME:  Sidra Alberts    YOB: 1965      ALLERGIES:  Ceftriaxone         Current Outpatient Medications   Medication Sig Dispense Refill    letrozole (FEMARA) 2.5 MG tablet TAKE 1 TABLET BY MOUTH DAILY 90 tablet 1    pantoprazole (PROTONIX) 40 MG tablet Take 1 tablet by mouth 2 times daily (before meals) 60 tablet 0    ondansetron (ZOFRAN) 4 MG tablet take 1 tablet by mouth every 8 hours if needed for nausea and vomiting 60 tablet 1    atenolol (TENORMIN) 25 MG tablet Take 1 tablet by mouth daily      nicotine (NICODERM CQ) 21 MG/24HR Place 1 patch onto the skin daily 30 patch 3    Ascorbic Acid (VITAMIN C) 250 MG tablet Take 1 tablet by mouth daily      vitamin D (ERGOCALCIFEROL) 1.25 MG (31319 UT) CAPS capsule Take 1 capsule by mouth once a week sundays      acetaminophen-codeine (TYLENOL #3) 300-30 MG per tablet Take 1 tablet by mouth every 4 hours as needed for Pain.      prochlorperazine (COMPAZINE) 10 MG tablet Take 1 tablet by mouth every 6 hours as needed (nausea) 60 tablet 1    cholestyramine (QUESTRAN) 4 g packet Take 1 packet by mouth 2 times daily 90 packet 3    psyllium (KONSYL) 28.3 % PACK Take 1 packet by mouth daily 30 each 3    magnesium oxide (MAG-OX) 400 (240 Mg) MG tablet Take 1 tablet by mouth 2 times daily 60 tablet 0    MELATONIN PO Take 3 mg by mouth nightly      Multiple Vitamins-Minerals (HAIR SKIN AND NAILS FORMULA) TABS Take by mouth STOP PREOP MED      pregabalin (LYRICA) 75 MG capsule Take 1 capsule by mouth daily.      famotidine (PEPCID) 20 MG tablet Take 1 tablet by mouth daily      LORATADINE-D 24HR  MG per extended release tablet Take 1 tablet by mouth daily      LANTUS SOLOSTAR 100 UNIT/ML injection pen Inject 30 Units into the skin nightly      metFORMIN (GLUCOPHAGE) 500 MG tablet Take 1 tablet by mouth in the morning and 1 tablet in the evening.      albuterol sulfate  (90 BASE) MCG/ACT inhaler Inhale 2

## 2024-01-04 ENCOUNTER — HOSPITAL ENCOUNTER (OUTPATIENT)
Age: 59
Discharge: HOME | End: 2024-01-04
Payer: MEDICAID

## 2024-01-04 ENCOUNTER — OFFICE VISIT (OUTPATIENT)
Dept: ONCOLOGY | Age: 59
End: 2024-01-04
Payer: MEDICAID

## 2024-01-04 ENCOUNTER — HOSPITAL ENCOUNTER (OUTPATIENT)
Dept: INFUSION THERAPY | Age: 59
Discharge: HOME | End: 2024-01-04
Payer: MEDICAID

## 2024-01-04 VITALS
BODY MASS INDEX: 23.57 KG/M2 | OXYGEN SATURATION: 100 % | HEIGHT: 72 IN | SYSTOLIC BLOOD PRESSURE: 118 MMHG | HEART RATE: 95 BPM | TEMPERATURE: 97.1 F | WEIGHT: 174 LBS | DIASTOLIC BLOOD PRESSURE: 69 MMHG

## 2024-01-04 DIAGNOSIS — Z17.0 MALIGNANT NEOPLASM OF CENTRAL PORTION OF RIGHT BREAST IN FEMALE, ESTROGEN RECEPTOR POSITIVE (HCC): ICD-10-CM

## 2024-01-04 DIAGNOSIS — C50.111 MALIGNANT NEOPLASM OF CENTRAL PORTION OF RIGHT BREAST IN FEMALE, ESTROGEN RECEPTOR POSITIVE (HCC): Primary | ICD-10-CM

## 2024-01-04 DIAGNOSIS — Z17.0 MALIGNANT NEOPLASM OF CENTRAL PORTION OF RIGHT BREAST IN FEMALE, ESTROGEN RECEPTOR POSITIVE (HCC): Primary | ICD-10-CM

## 2024-01-04 DIAGNOSIS — C50.111 MALIGNANT NEOPLASM OF CENTRAL PORTION OF RIGHT BREAST IN FEMALE, ESTROGEN RECEPTOR POSITIVE (HCC): ICD-10-CM

## 2024-01-04 DIAGNOSIS — Z12.31 SCREENING MAMMOGRAM FOR HIGH-RISK PATIENT: ICD-10-CM

## 2024-01-04 LAB
ALBUMIN SERPL-MCNC: 4 G/DL (ref 3.5–5.2)
ALP SERPL-CCNC: 182 U/L (ref 35–104)
ALT SERPL-CCNC: 39 U/L (ref 0–32)
ANION GAP SERPL CALCULATED.3IONS-SCNC: 13 MMOL/L (ref 7–16)
AST SERPL-CCNC: 33 U/L (ref 0–31)
BASOPHILS # BLD: 0.06 K/UL (ref 0–0.2)
BASOPHILS NFR BLD: 1 % (ref 0–2)
BILIRUB SERPL-MCNC: 0.3 MG/DL (ref 0–1.2)
BUN SERPL-MCNC: 9 MG/DL (ref 6–20)
CALCIUM SERPL-MCNC: 9.3 MG/DL (ref 8.6–10.2)
CHLORIDE SERPL-SCNC: 99 MMOL/L (ref 98–107)
CO2 SERPL-SCNC: 22 MMOL/L (ref 22–29)
CREAT SERPL-MCNC: 0.7 MG/DL (ref 0.5–1)
EOSINOPHIL # BLD: 0.06 K/UL (ref 0.05–0.5)
EOSINOPHILS RELATIVE PERCENT: 1 % (ref 0–6)
ERYTHROCYTE [DISTWIDTH] IN BLOOD BY AUTOMATED COUNT: 12.5 % (ref 11.5–15)
GFR SERPL CREATININE-BSD FRML MDRD: >60 ML/MIN/1.73M2
GLUCOSE SERPL-MCNC: 352 MG/DL (ref 74–99)
HCT VFR BLD AUTO: 39.4 % (ref 34–48)
HGB BLD-MCNC: 13.2 G/DL (ref 11.5–15.5)
IMM GRANULOCYTES # BLD AUTO: 0.04 K/UL (ref 0–0.58)
IMM GRANULOCYTES NFR BLD: 1 % (ref 0–5)
LYMPHOCYTES NFR BLD: 1.78 K/UL (ref 1.5–4)
LYMPHOCYTES RELATIVE PERCENT: 21 % (ref 20–42)
MCH RBC QN AUTO: 32.2 PG (ref 26–35)
MCHC RBC AUTO-ENTMCNC: 33.5 G/DL (ref 32–34.5)
MCV RBC AUTO: 96.1 FL (ref 80–99.9)
MONOCYTES NFR BLD: 0.79 K/UL (ref 0.1–0.95)
MONOCYTES NFR BLD: 9 % (ref 2–12)
NEUTROPHILS NFR BLD: 68 % (ref 43–80)
NEUTS SEG NFR BLD: 5.84 K/UL (ref 1.8–7.3)
PLATELET # BLD AUTO: 264 K/UL (ref 130–450)
PMV BLD AUTO: 10.3 FL (ref 7–12)
POTASSIUM SERPL-SCNC: 4.5 MMOL/L (ref 3.5–5)
PROT SERPL-MCNC: 6.9 G/DL (ref 6.4–8.3)
RBC # BLD AUTO: 4.1 M/UL (ref 3.5–5.5)
SODIUM SERPL-SCNC: 134 MMOL/L (ref 132–146)
WBC OTHER # BLD: 8.6 K/UL (ref 4.5–11.5)

## 2024-01-04 PROCEDURE — 36415 COLL VENOUS BLD VENIPUNCTURE: CPT

## 2024-01-04 PROCEDURE — 99213 OFFICE O/P EST LOW 20 MIN: CPT | Performed by: NURSE PRACTITIONER

## 2024-01-04 PROCEDURE — 85025 COMPLETE CBC W/AUTO DIFF WBC: CPT

## 2024-01-04 PROCEDURE — 3078F DIAST BP <80 MM HG: CPT | Performed by: NURSE PRACTITIONER

## 2024-01-04 PROCEDURE — G8420 CALC BMI NORM PARAMETERS: HCPCS | Performed by: NURSE PRACTITIONER

## 2024-01-04 PROCEDURE — 3074F SYST BP LT 130 MM HG: CPT | Performed by: NURSE PRACTITIONER

## 2024-01-04 PROCEDURE — G8484 FLU IMMUNIZE NO ADMIN: HCPCS | Performed by: NURSE PRACTITIONER

## 2024-01-04 PROCEDURE — 80053 COMPREHEN METABOLIC PANEL: CPT

## 2024-01-04 PROCEDURE — 1036F TOBACCO NON-USER: CPT | Performed by: NURSE PRACTITIONER

## 2024-01-04 PROCEDURE — 3017F COLORECTAL CA SCREEN DOC REV: CPT | Performed by: NURSE PRACTITIONER

## 2024-01-04 PROCEDURE — 99213 OFFICE O/P EST LOW 20 MIN: CPT

## 2024-01-04 PROCEDURE — G8427 DOCREV CUR MEDS BY ELIG CLIN: HCPCS | Performed by: NURSE PRACTITIONER

## 2024-01-04 RX ORDER — LETROZOLE 2.5 MG/1
2.5 TABLET, FILM COATED ORAL DAILY
Qty: 90 TABLET | Refills: 1 | Status: SHIPPED | OUTPATIENT
Start: 2024-01-04

## 2024-01-04 NOTE — PROGRESS NOTES
Patient provided with discharge instructions.  All questions answered.  Patient understands follow up plan of care.

## 2024-01-08 DIAGNOSIS — Z17.0 MALIGNANT NEOPLASM OF CENTRAL PORTION OF RIGHT BREAST IN FEMALE, ESTROGEN RECEPTOR POSITIVE (HCC): ICD-10-CM

## 2024-01-08 DIAGNOSIS — C50.111 MALIGNANT NEOPLASM OF CENTRAL PORTION OF RIGHT BREAST IN FEMALE, ESTROGEN RECEPTOR POSITIVE (HCC): ICD-10-CM

## 2024-01-08 RX ORDER — LETROZOLE 2.5 MG/1
2.5 TABLET, FILM COATED ORAL DAILY
Qty: 90 TABLET | Refills: 1 | Status: CANCELLED | OUTPATIENT
Start: 2024-01-08

## 2024-03-11 DIAGNOSIS — C50.111 MALIGNANT NEOPLASM OF CENTRAL PORTION OF RIGHT BREAST IN FEMALE, ESTROGEN RECEPTOR POSITIVE (HCC): ICD-10-CM

## 2024-03-11 DIAGNOSIS — Z17.0 MALIGNANT NEOPLASM OF CENTRAL PORTION OF RIGHT BREAST IN FEMALE, ESTROGEN RECEPTOR POSITIVE (HCC): ICD-10-CM

## 2024-03-11 RX ORDER — LETROZOLE 2.5 MG/1
2.5 TABLET, FILM COATED ORAL DAILY
Qty: 28 TABLET | Refills: 3 | Status: SHIPPED | OUTPATIENT
Start: 2024-03-11

## 2024-03-23 NOTE — PROGRESS NOTES
LEFT MESSAGE ON PATIENT'S VOICEMAIL REGARDING OR TIME CHANGE TO 0930. INSTRUCTED TO BE AT HOSPITAL 0730. ASKED THAT SHE RETURN CALL SO I KNOW IT WAS RECEIVED. PATIENT CALLED BACK THAT SHE RECEIVED MESSAGE. Induction

## 2024-07-01 DIAGNOSIS — Z17.0 MALIGNANT NEOPLASM OF CENTRAL PORTION OF RIGHT BREAST IN FEMALE, ESTROGEN RECEPTOR POSITIVE (HCC): ICD-10-CM

## 2024-07-01 DIAGNOSIS — C50.111 MALIGNANT NEOPLASM OF CENTRAL PORTION OF RIGHT BREAST IN FEMALE, ESTROGEN RECEPTOR POSITIVE (HCC): ICD-10-CM

## 2024-07-01 RX ORDER — LETROZOLE 2.5 MG/1
2.5 TABLET, FILM COATED ORAL DAILY
Qty: 28 TABLET | Refills: 3 | Status: SHIPPED | OUTPATIENT
Start: 2024-07-01

## 2024-07-11 ENCOUNTER — HOSPITAL ENCOUNTER (OUTPATIENT)
Age: 59
Discharge: HOME | End: 2024-07-11
Payer: MEDICAID

## 2024-07-11 ENCOUNTER — HOSPITAL ENCOUNTER (OUTPATIENT)
Dept: INFUSION THERAPY | Age: 59
Discharge: HOME | End: 2024-07-11

## 2024-07-11 ENCOUNTER — OFFICE VISIT (OUTPATIENT)
Dept: ONCOLOGY | Age: 59
End: 2024-07-11
Payer: MEDICAID

## 2024-07-11 VITALS
HEART RATE: 79 BPM | OXYGEN SATURATION: 100 % | TEMPERATURE: 97.4 F | SYSTOLIC BLOOD PRESSURE: 105 MMHG | HEIGHT: 72 IN | BODY MASS INDEX: 21.86 KG/M2 | WEIGHT: 161.4 LBS | DIASTOLIC BLOOD PRESSURE: 81 MMHG

## 2024-07-11 DIAGNOSIS — Z17.0 MALIGNANT NEOPLASM OF CENTRAL PORTION OF RIGHT BREAST IN FEMALE, ESTROGEN RECEPTOR POSITIVE (HCC): Primary | ICD-10-CM

## 2024-07-11 DIAGNOSIS — Z17.0 MALIGNANT NEOPLASM OF CENTRAL PORTION OF RIGHT BREAST IN FEMALE, ESTROGEN RECEPTOR POSITIVE (HCC): ICD-10-CM

## 2024-07-11 DIAGNOSIS — C50.111 MALIGNANT NEOPLASM OF CENTRAL PORTION OF RIGHT BREAST IN FEMALE, ESTROGEN RECEPTOR POSITIVE (HCC): Primary | ICD-10-CM

## 2024-07-11 DIAGNOSIS — C50.111 MALIGNANT NEOPLASM OF CENTRAL PORTION OF RIGHT BREAST IN FEMALE, ESTROGEN RECEPTOR POSITIVE (HCC): ICD-10-CM

## 2024-07-11 LAB
ALBUMIN SERPL-MCNC: 3.9 G/DL (ref 3.5–5.2)
ALP SERPL-CCNC: 157 U/L (ref 35–104)
ALT SERPL-CCNC: 23 U/L (ref 0–32)
ANION GAP SERPL CALCULATED.3IONS-SCNC: 13 MMOL/L (ref 7–16)
AST SERPL-CCNC: 24 U/L (ref 0–31)
BASOPHILS # BLD: 0.05 K/UL (ref 0–0.2)
BASOPHILS NFR BLD: 1 % (ref 0–2)
BILIRUB SERPL-MCNC: 0.3 MG/DL (ref 0–1.2)
BUN SERPL-MCNC: 11 MG/DL (ref 6–20)
CALCIUM SERPL-MCNC: 9.3 MG/DL (ref 8.6–10.2)
CHLORIDE SERPL-SCNC: 98 MMOL/L (ref 98–107)
CO2 SERPL-SCNC: 26 MMOL/L (ref 22–29)
CREAT SERPL-MCNC: 0.8 MG/DL (ref 0.5–1)
EOSINOPHIL # BLD: 0.09 K/UL (ref 0.05–0.5)
EOSINOPHILS RELATIVE PERCENT: 1 % (ref 0–6)
ERYTHROCYTE [DISTWIDTH] IN BLOOD BY AUTOMATED COUNT: 12.6 % (ref 11.5–15)
GFR, ESTIMATED: 87 ML/MIN/1.73M2
GLUCOSE SERPL-MCNC: 218 MG/DL (ref 74–99)
HCT VFR BLD AUTO: 41.7 % (ref 34–48)
HGB BLD-MCNC: 13.6 G/DL (ref 11.5–15.5)
IMM GRANULOCYTES # BLD AUTO: 0.03 K/UL (ref 0–0.58)
IMM GRANULOCYTES NFR BLD: 0 % (ref 0–5)
LYMPHOCYTES NFR BLD: 2.34 K/UL (ref 1.5–4)
LYMPHOCYTES RELATIVE PERCENT: 28 % (ref 20–42)
MCH RBC QN AUTO: 31.4 PG (ref 26–35)
MCHC RBC AUTO-ENTMCNC: 32.6 G/DL (ref 32–34.5)
MCV RBC AUTO: 96.3 FL (ref 80–99.9)
MONOCYTES NFR BLD: 0.7 K/UL (ref 0.1–0.95)
MONOCYTES NFR BLD: 8 % (ref 2–12)
NEUTROPHILS NFR BLD: 62 % (ref 43–80)
NEUTS SEG NFR BLD: 5.25 K/UL (ref 1.8–7.3)
PLATELET # BLD AUTO: 265 K/UL (ref 130–450)
PMV BLD AUTO: 10.2 FL (ref 7–12)
POTASSIUM SERPL-SCNC: 4.8 MMOL/L (ref 3.5–5)
PROT SERPL-MCNC: 7 G/DL (ref 6.4–8.3)
RBC # BLD AUTO: 4.33 M/UL (ref 3.5–5.5)
SODIUM SERPL-SCNC: 137 MMOL/L (ref 132–146)
WBC OTHER # BLD: 8.5 K/UL (ref 4.5–11.5)

## 2024-07-11 PROCEDURE — G8427 DOCREV CUR MEDS BY ELIG CLIN: HCPCS | Performed by: CLINICAL NURSE SPECIALIST

## 2024-07-11 PROCEDURE — 99213 OFFICE O/P EST LOW 20 MIN: CPT | Performed by: CLINICAL NURSE SPECIALIST

## 2024-07-11 PROCEDURE — 1036F TOBACCO NON-USER: CPT | Performed by: CLINICAL NURSE SPECIALIST

## 2024-07-11 PROCEDURE — G8420 CALC BMI NORM PARAMETERS: HCPCS | Performed by: CLINICAL NURSE SPECIALIST

## 2024-07-11 PROCEDURE — 99213 OFFICE O/P EST LOW 20 MIN: CPT

## 2024-07-11 PROCEDURE — 36415 COLL VENOUS BLD VENIPUNCTURE: CPT

## 2024-07-11 PROCEDURE — 3074F SYST BP LT 130 MM HG: CPT | Performed by: CLINICAL NURSE SPECIALIST

## 2024-07-11 PROCEDURE — 3017F COLORECTAL CA SCREEN DOC REV: CPT | Performed by: CLINICAL NURSE SPECIALIST

## 2024-07-11 PROCEDURE — 85025 COMPLETE CBC W/AUTO DIFF WBC: CPT

## 2024-07-11 PROCEDURE — 3079F DIAST BP 80-89 MM HG: CPT | Performed by: CLINICAL NURSE SPECIALIST

## 2024-07-11 PROCEDURE — 80053 COMPREHEN METABOLIC PANEL: CPT

## 2024-07-11 NOTE — PROGRESS NOTES
Department of [facility]     Attending Clinic Note    Reason for Visit: Follow-up on a patient with Right Breast Cancer    PCP:  Keyon Quintero DO    History of Present Illness:  The mass was located in the 6 o'clock position of right breast    Breast cancer risk factors include infrequent SMEs and age and gender    Bilateral Screening Mammogram 10/06/2020:  There are suspicious calcifications in the right breast at the 6 o'clock position which appear pleomorphic. These calcifications are in a segmental distribution. These clustered calcifications are suspicious for malignancy.     Right breast, calcifications at the 6:00, core biopsy on 10/30/2020:    - Small focus of invasive ductal carcinoma, grade 3 (3+3+2 = 8)    - Ductal carcinoma in situ, high nuclear grade, comedo/cribriform/solid types;    - Microcalcifications in DCIS    - Breast receptor and biomarkers (per outside report without looking the   slides:     ER: Positive, 89.8%, strong intensity     [place]: Positive, 52.2%, moderate intensity     HER-2: Positive (score 3+)     Ki-67: High proliferation, 26.2%     P53: Negative, 0.2%     Comment:The invasive carcinoma is intermingled with DCIS and measures   3.0 x 2.0 mm in greatest dimension on the slides.     CXR PA/Lateral 12/11/2020:  No acute process.     Right Axillary U/S on 12/11/2020:  There is no axillary LN.    MRI Bilateral Breast 01/05/2021:  Focal, heterogeneous non mass enhancement identified in the right breast 6 o'clock which measures approximately 1.9 x 1.4 x 1.7 cm (image 19 of the axial T1 post contrast series).    No additional foci of disease identified on the right  There is no lymphadenopathy    Needle localized Right lumpectomy with SLNBx with mediport placement on 02/05/2021  A.  Right axillary sentinel lymph node #1, excision: 1 lymph node negative for malignancy     B.  Right axillary contents, regional resection: 1 lymph node positive for metastatic, poorly differentiated

## 2024-07-15 ENCOUNTER — CLINICAL DOCUMENTATION (OUTPATIENT)
Dept: GENERAL RADIOLOGY | Age: 59
End: 2024-07-15

## 2024-08-06 ENCOUNTER — HOSPITAL ENCOUNTER (OUTPATIENT)
Dept: MRI IMAGING | Age: 59
Discharge: HOME | End: 2024-08-08
Payer: MEDICAID

## 2024-08-06 DIAGNOSIS — C50.111 MALIGNANT NEOPLASM OF CENTRAL PORTION OF RIGHT BREAST IN FEMALE, ESTROGEN RECEPTOR POSITIVE (HCC): ICD-10-CM

## 2024-08-06 DIAGNOSIS — Z17.0 MALIGNANT NEOPLASM OF CENTRAL PORTION OF RIGHT BREAST IN FEMALE, ESTROGEN RECEPTOR POSITIVE (HCC): ICD-10-CM

## 2024-08-06 PROCEDURE — C8908 MRI W/O FOL W/CONT, BREAST,: HCPCS

## 2024-08-06 PROCEDURE — 6360000004 HC RX CONTRAST MEDICATION: Performed by: RADIOLOGY

## 2024-08-06 PROCEDURE — A9585 GADOBUTROL INJECTION: HCPCS | Performed by: RADIOLOGY

## 2024-08-06 RX ORDER — GADOBUTROL 604.72 MG/ML
7 INJECTION INTRAVENOUS
Status: COMPLETED | OUTPATIENT
Start: 2024-08-06 | End: 2024-08-06

## 2024-08-06 RX ADMIN — GADOBUTROL 7 ML: 604.72 INJECTION INTRAVENOUS at 12:30

## 2024-08-07 ENCOUNTER — TELEPHONE (OUTPATIENT)
Dept: GENERAL RADIOLOGY | Age: 59
End: 2024-08-07

## 2024-08-07 DIAGNOSIS — N63.10 MASS OF RIGHT BREAST, UNSPECIFIED QUADRANT: Primary | ICD-10-CM

## 2024-08-07 NOTE — TELEPHONE ENCOUNTER
Call to patient in reference to her MRI performed at [facility] on August 6, 2024.    Instructed patient that the radiologist has recommended some additional breast imaging in order to make a final determination/result. Informed her that a Rx has been obtained from the ordering provider. Next, a  from [facility] will contact her to schedule the additional imaging study/studies. Verbalizes understanding and is agreeable to proceed.

## 2024-08-22 ENCOUNTER — HOSPITAL ENCOUNTER (OUTPATIENT)
Dept: INFUSION THERAPY | Age: 59
Discharge: HOME | End: 2024-08-22

## 2024-08-22 ENCOUNTER — OFFICE VISIT (OUTPATIENT)
Dept: ONCOLOGY | Age: 59
End: 2024-08-22
Payer: MEDICAID

## 2024-08-22 VITALS
TEMPERATURE: 97.8 F | HEART RATE: 81 BPM | SYSTOLIC BLOOD PRESSURE: 110 MMHG | WEIGHT: 162 LBS | BODY MASS INDEX: 21.94 KG/M2 | HEIGHT: 72 IN | OXYGEN SATURATION: 100 % | DIASTOLIC BLOOD PRESSURE: 79 MMHG

## 2024-08-22 DIAGNOSIS — Z85.3 PERSONAL HISTORY OF BREAST CANCER: Primary | ICD-10-CM

## 2024-08-22 PROCEDURE — 99212 OFFICE O/P EST SF 10 MIN: CPT

## 2024-08-22 RX ORDER — DULAGLUTIDE 1.5 MG/.5ML
INJECTION, SOLUTION SUBCUTANEOUS
COMMUNITY
Start: 2024-05-08

## 2024-08-22 NOTE — PROGRESS NOTES
Patient provided with discharge instructions.  All questions answered.  Patient understands follow up plan of care.     
ductal carcinoma (grade 3)   Extranodal extension present     C.  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;   positive for extensive intraductal component   Skin-uninvolved by malignancy   Invasive carcinoma margins-uninvolved by invasive carcinoma        Distance from closest margin: 4 mm from anterior margin   DCIS margins-inferior and medial margins involved by DCIS   Regional lymph nodes-involved by tumor cells        Number of lymph nodes with macrometastasis: 1        Number of lymph nodes with micrometastasis: 0        Number of lymph nodes with isolated tumor cells: 0        Size of largest metastatic deposit: 1.5 cm        Extranodal extension: Present        Total number of lymph nodes examined: 2        Number of sentinel nodes examined: 1   Treatment effect in the breast-no known presurgical therapy   TNM classification (AJCC eighth edition)-  pT1c N1a MX     Bone scan, CT chest/abdomen/pelvis 02/26/2021 negative for metastatic disease    Re-excision lumpectomy of inferior and medial margins on 03/09/2021  A. Right breast, new inferior margin, excision: Fat necrosis, foreign body-type giant cell reaction, chronic inflammation, and fibrosis consistent with previous procedure   No evidence of residual carcinoma   Final inferior margin negative for carcinoma     B. Right breast, new medial margin, excision: Residual high-grade ductal carcinoma in situ   Ductal carcinoma in situ

## 2024-08-23 ENCOUNTER — HOSPITAL ENCOUNTER (OUTPATIENT)
Dept: GENERAL RADIOLOGY | Age: 59
End: 2024-08-23
Payer: MEDICAID

## 2024-08-23 DIAGNOSIS — N63.10 MASS OF RIGHT BREAST, UNSPECIFIED QUADRANT: ICD-10-CM

## 2024-08-23 PROCEDURE — 76642 ULTRASOUND BREAST LIMITED: CPT

## 2024-09-30 NOTE — PROGRESS NOTES
sx noted; D/C Arimidex; Felt better subsequently.   Recommended Femara 2.5 mg po daily instead. Side effects reviewed. She agreed to proceed. Ca.VitD Femara 2.5 mg po daily was started on 06/03/2022 with better tolerance so far.  Bilateral Screening Mammogram 06/17/2022: Negative for malignancy.    CTA chest 08/17/2022 noted no evidence of PE. No evidence of metastatic disease  CT abdomen/pelvis 08/17/2022 noted No evidence of metastatic disease.    DEXA scan 12/22/2022: Osteoporosis in L4; osteopenia efrain femoral necks; normal in left forearm. Suggested Prolia every 6 month for osteoporosis.  Prolia declined by insurance.  Therefore recommended Zometa.  Side effects reviewed.  She agreed to proceed.  Authorization to be obtained. Prior dental clearance to be obtained.    Bilateral Screening Mammogram 06/21/2023: No evidence of malignancy.  Imaging reviewed. Labs reviewed.  BINU  Continue Femara, Ca.VitD  BINU      6/24/2024 bilateral screening mammogram no evidence of disease, The National Comprehensive Cancer Network (NCCN) and the American Cancer Society recommend that routine yearly screening includes both yearly MRI's and yearly screening mammograms.  MRI should be in addition to, not instead of, a screening mammogram.will place order for Breast MRI  Labs reviewed  Continue from aero calcium vitamin D  Doing well with [place] will continue  Continue monthly SBE    8/22/2024    ## hx of Right breast cancer:   -HR+, Her2 +  -S/p lumpectomy 2/2021   - s/p adjuvant TCHP , 6 cycles, (Cycle # 6 adjuvant TCHP was on 08/12/2021.) , followed by [facility] last cycle was on 4/29/2022.   - s/p adjuvant radiation completed on 12/10/2021.   - started adjuvant Arimidex 1 mg po daily started  on 12/11/2021 ( she had sucidical ideation/attempt during that period and finally switched to femara)   - Femara 2.5 mg po daily was started on 06/03/2022  - on surveillance.   - per risk score assessment in radiology, she was determined to have

## 2024-10-01 ENCOUNTER — SCHEDULED TELEPHONE ENCOUNTER (OUTPATIENT)
Dept: ONCOLOGY | Age: 59
End: 2024-10-01
Payer: MEDICAID

## 2024-10-01 DIAGNOSIS — C50.111 MALIGNANT NEOPLASM OF CENTRAL PORTION OF RIGHT BREAST IN FEMALE, ESTROGEN RECEPTOR POSITIVE (HCC): Primary | ICD-10-CM

## 2024-10-01 DIAGNOSIS — Z17.0 MALIGNANT NEOPLASM OF CENTRAL PORTION OF RIGHT BREAST IN FEMALE, ESTROGEN RECEPTOR POSITIVE (HCC): Primary | ICD-10-CM

## 2024-10-01 PROCEDURE — 99441 PR PHYS/QHP TELEPHONE EVALUATION 5-10 MIN: CPT | Performed by: INTERNAL MEDICINE

## 2024-10-18 DIAGNOSIS — Z17.0 MALIGNANT NEOPLASM OF CENTRAL PORTION OF RIGHT BREAST IN FEMALE, ESTROGEN RECEPTOR POSITIVE (HCC): ICD-10-CM

## 2024-10-18 DIAGNOSIS — C50.111 MALIGNANT NEOPLASM OF CENTRAL PORTION OF RIGHT BREAST IN FEMALE, ESTROGEN RECEPTOR POSITIVE (HCC): ICD-10-CM

## 2024-10-18 RX ORDER — LETROZOLE 2.5 MG/1
2.5 TABLET, FILM COATED ORAL DAILY
Qty: 28 TABLET | Refills: 3 | Status: SHIPPED | OUTPATIENT
Start: 2024-10-18

## 2024-10-20 NOTE — CONSULTS
Department of [facility] Oncology  Attending Consult Note      Reason for Visit:   Suicidal ideation     Referring Physician:  Dr Emilee Zendejas     PCP:  Janice Cardenas DO    History of Present Illness:  61year old female known to medical oncology for treatmennt of triple positive breast cancer. She received TCHP which was completed started on 04/08/2021 and completed 08/12/2021, continue H/P for one year-last treatment was 01/28/2022. EF 60% on last echo 1/25/2022  RT completed 12/10/2021 and Arimidex started 12/11/2021. She recetently was diachrged from the hospital 2/13/2022 for SOB with pneumonia which was treated with Levaquin. Patientw as taken to the [facility] ER 02/16/2022 via EMS d/t suicide attempt for ingestion of antifreeze as well as Xanax and Tyelnol. EMS administer Narcan. She received IV hydration and was admitted to [facility] psych unit for further observation. Patient was due for treatment today 02/18/2022 but will not receive this while inpatient.        Review of Systems;  CONSTITUTIONAL:Low energy levels, denies fevers or chills  HEENT: no change in vision hearing or swallowing   Respiratory: Denies SOB or cough  Cardiac: Denies chest pain or palpitation  Abdomen: Denies emesis  GI: reports intermittent diarrhea   : Denies hematuria   Neuro: Alert and oriented    Review of Systems    Remainder:  ROS NEGATIVE    Past Medical History:      Diagnosis Date    Anxiety     Arthritis     Cancer ([address] 2021    breast    Cervical radiculopathy     Chronic back pain     Dehydration 9/23/2021    Dehydration 9/23/2021    Depression     Diabetes mellitus type 2, uncontrolled ([address] 2/12/2017    GERD (gastroesophageal reflux disease)     History of blood transfusion 01/2018    back surgery, lumbar, dr Brandy Tanner    Hypertension     Neuropathy     Right leg pain     seeing doctor currently problems with hardware       Past Surgical History:      Procedure Laterality Date    BACK SURGERY  01/08/2018 file   Occupational History    Not on file   Tobacco Use    Smoking status: Former Smoker     Packs/day: 0.50     Years: 10.00     Pack years: 5.00     Types: Cigarettes     Quit date: 3/15/2015     Years since quittin.9    Smokeless tobacco: Never Used    Tobacco comment: stop    Vaping Use    Vaping Use: Never used   Substance and Sexual Activity    Alcohol use: No    Drug use: No    Sexual activity: Not Currently   Other Topics Concern    Not on file   Social History Narrative    Not on file     Social Determinants of Health     Financial Resource Strain:     Difficulty of Paying Living Expenses: Not on file   Food Insecurity:     Worried About Running Out of Food in the Last Year: Not on file    [place] of Food in the Last Year: Not on file   Transportation Needs:     Lack of Transportation (Medical): Not on file    Lack of Transportation (Non-Medical): Not on file   Physical Activity:     Days of Exercise per Week: Not on file    Minutes of Exercise per Session: Not on file   Stress:     Feeling of Stress : Not on file   Social Connections:     Frequency of Communication with Friends and Family: Not on file    Frequency of Social Gatherings with Friends and Family: Not on file    Attends Buddhist Services: Not on file    Active Member of Clubs or Organizations: Not on file    Attends Club or Organization Meetings: Not on file    Marital Status: Not on file   Intimate Partner Violence:     Fear of Current or Ex-Partner: Not on file    Emotionally Abused: Not on file    Physically Abused: Not on file    Sexually Abused: Not on file   Housing Stability:     Unable to Pay for Housing in the Last Year: Not on file    Number of [place] in the Last Year: Not on file    Unstable Housing in the Last Year: Not on file       Allergies:   Allergies   Allergen Reactions    Ceftriaxone Shortness Of Breath       Physical Exam:  /84   Pulse 99   Temp 97.1 °F (36.2 °C) (Temporal)   Resp 18   Ht 6' 1\" (1.854 m)   Wt 151 lb (68.5 kg)   LMP 08/13/2013   SpO2 94%   BMI 19.92 kg/m²   Physical Exam   General: fatigued, laying in bed   HEENT: PERRL, EOMI, oropharynx clear  Respiratory: Clear to auscultation  Cardiac: regular rate and rhythm, pulses intact  Abdomen: Soft, non tender  Extremities: no clubbing or cyanosis     ECOG PS 1    Impression/Plan:  61year old female known to medical oncology for treatmennt of triple positive breast cancer. She received TCHP which was completed started on 04/08/2021 and completed 08/12/2021, continue H/P for one year-last treatment was 01/28/2022. EF 60% on last echo 1/25/2022  RT completed 12/10/2021 and Arimidex started 12/11/2021. She recetently was diachrged from the hospital 2/13/2022 for SOB with pneumonia which was treated with Levaquin. Patientw as taken to the [place] ER 02/16/2022 via EMS d/t suicide attempt for ingestion of antifreeze as well as Xanax and Tyelnol. EMS administer Narcan. She received IV hydration and was admitted to [facility] unit for further observation. Patient was due for treatment today 02/18/2022 but will not receive this while inpatient.   -Patient is on the [place] schedule for next Friday to continue treatment for her breast cancer  -Psych is managing patient's care-no need for internal medicine   -D/C home per Psych.  -Nothing further to add from hem/onc POV during this hospitalization,we will sign off at this time, please call or PS for any questions. Thank you for allowing us to participate in the care of Elizabeth Pro APRN - CNP      The patient was seen and examined, I agree with Steven BURTONP's findings, assessment and plan as outlined.      Kevin Cadena MD   HEMATOLOGY/MEDICAL [address] MED ONCOLOGY  [address] [place] [id number]  [facility]pt: [facility]: [phone] Low Risk (score 7-11)

## 2025-01-09 DIAGNOSIS — Z17.0 MALIGNANT NEOPLASM OF CENTRAL PORTION OF RIGHT BREAST IN FEMALE, ESTROGEN RECEPTOR POSITIVE (HCC): Primary | ICD-10-CM

## 2025-01-09 DIAGNOSIS — C50.111 MALIGNANT NEOPLASM OF CENTRAL PORTION OF RIGHT BREAST IN FEMALE, ESTROGEN RECEPTOR POSITIVE (HCC): Primary | ICD-10-CM

## 2025-01-16 ENCOUNTER — HOSPITAL ENCOUNTER (OUTPATIENT)
Dept: INFUSION THERAPY | Age: 60
Discharge: HOME | End: 2025-01-16

## 2025-01-16 ENCOUNTER — HOSPITAL ENCOUNTER (OUTPATIENT)
Age: 60
Discharge: HOME | End: 2025-01-16
Payer: MEDICAID

## 2025-01-16 ENCOUNTER — OFFICE VISIT (OUTPATIENT)
Dept: ONCOLOGY | Age: 60
End: 2025-01-16
Payer: MEDICAID

## 2025-01-16 VITALS
DIASTOLIC BLOOD PRESSURE: 90 MMHG | WEIGHT: 164.5 LBS | SYSTOLIC BLOOD PRESSURE: 140 MMHG | HEART RATE: 83 BPM | TEMPERATURE: 96.9 F | OXYGEN SATURATION: 99 % | HEIGHT: 72 IN | BODY MASS INDEX: 22.28 KG/M2

## 2025-01-16 DIAGNOSIS — C50.111 MALIGNANT NEOPLASM OF CENTRAL PORTION OF RIGHT BREAST IN FEMALE, ESTROGEN RECEPTOR POSITIVE (HCC): ICD-10-CM

## 2025-01-16 DIAGNOSIS — Z17.0 MALIGNANT NEOPLASM OF CENTRAL PORTION OF RIGHT BREAST IN FEMALE, ESTROGEN RECEPTOR POSITIVE (HCC): ICD-10-CM

## 2025-01-16 LAB
ALBUMIN SERPL-MCNC: 3.9 G/DL (ref 3.5–5.2)
ALP SERPL-CCNC: 181 U/L (ref 35–104)
ALT SERPL-CCNC: 42 U/L (ref 0–32)
ANION GAP SERPL CALCULATED.3IONS-SCNC: 11 MMOL/L (ref 7–16)
AST SERPL-CCNC: 28 U/L (ref 0–31)
BASOPHILS # BLD: 0.04 K/UL (ref 0–0.2)
BASOPHILS NFR BLD: 1 % (ref 0–2)
BILIRUB SERPL-MCNC: 0.3 MG/DL (ref 0–1.2)
BUN SERPL-MCNC: 12 MG/DL (ref 6–23)
CALCIUM SERPL-MCNC: 9.5 MG/DL (ref 8.6–10.2)
CHLORIDE SERPL-SCNC: 103 MMOL/L (ref 98–107)
CO2 SERPL-SCNC: 24 MMOL/L (ref 22–29)
CREAT SERPL-MCNC: 0.8 MG/DL (ref 0.5–1)
EOSINOPHIL # BLD: 0.12 K/UL (ref 0.05–0.5)
EOSINOPHILS RELATIVE PERCENT: 2 % (ref 0–6)
ERYTHROCYTE [DISTWIDTH] IN BLOOD BY AUTOMATED COUNT: 12.3 % (ref 11.5–15)
GFR, ESTIMATED: 81 ML/MIN/1.73M2
GLUCOSE SERPL-MCNC: 251 MG/DL (ref 74–99)
HCT VFR BLD AUTO: 41.7 % (ref 34–48)
HGB BLD-MCNC: 13.3 G/DL (ref 11.5–15.5)
IMM GRANULOCYTES # BLD AUTO: <0.03 K/UL (ref 0–0.58)
IMM GRANULOCYTES NFR BLD: 0 % (ref 0–5)
LYMPHOCYTES NFR BLD: 1.88 K/UL (ref 1.5–4)
LYMPHOCYTES RELATIVE PERCENT: 24 % (ref 20–42)
MCH RBC QN AUTO: 31.8 PG (ref 26–35)
MCHC RBC AUTO-ENTMCNC: 31.9 G/DL (ref 32–34.5)
MCV RBC AUTO: 99.8 FL (ref 80–99.9)
MONOCYTES NFR BLD: 0.7 K/UL (ref 0.1–0.95)
MONOCYTES NFR BLD: 9 % (ref 2–12)
NEUTROPHILS NFR BLD: 65 % (ref 43–80)
NEUTS SEG NFR BLD: 5.19 K/UL (ref 1.8–7.3)
PLATELET # BLD AUTO: 224 K/UL (ref 130–450)
PMV BLD AUTO: 11.1 FL (ref 7–12)
POTASSIUM SERPL-SCNC: 5 MMOL/L (ref 3.5–5)
PROT SERPL-MCNC: 6.7 G/DL (ref 6.4–8.3)
RBC # BLD AUTO: 4.18 M/UL (ref 3.5–5.5)
SODIUM SERPL-SCNC: 138 MMOL/L (ref 132–146)
WBC OTHER # BLD: 8 K/UL (ref 4.5–11.5)

## 2025-01-16 PROCEDURE — 3077F SYST BP >= 140 MM HG: CPT | Performed by: CLINICAL NURSE SPECIALIST

## 2025-01-16 PROCEDURE — G8420 CALC BMI NORM PARAMETERS: HCPCS | Performed by: CLINICAL NURSE SPECIALIST

## 2025-01-16 PROCEDURE — 3017F COLORECTAL CA SCREEN DOC REV: CPT | Performed by: CLINICAL NURSE SPECIALIST

## 2025-01-16 PROCEDURE — 85025 COMPLETE CBC W/AUTO DIFF WBC: CPT

## 2025-01-16 PROCEDURE — 3080F DIAST BP >= 90 MM HG: CPT | Performed by: CLINICAL NURSE SPECIALIST

## 2025-01-16 PROCEDURE — 99213 OFFICE O/P EST LOW 20 MIN: CPT | Performed by: CLINICAL NURSE SPECIALIST

## 2025-01-16 PROCEDURE — 1036F TOBACCO NON-USER: CPT | Performed by: CLINICAL NURSE SPECIALIST

## 2025-01-16 PROCEDURE — 99213 OFFICE O/P EST LOW 20 MIN: CPT

## 2025-01-16 PROCEDURE — 36415 COLL VENOUS BLD VENIPUNCTURE: CPT

## 2025-01-16 PROCEDURE — G8427 DOCREV CUR MEDS BY ELIG CLIN: HCPCS | Performed by: CLINICAL NURSE SPECIALIST

## 2025-01-16 PROCEDURE — 80053 COMPREHEN METABOLIC PANEL: CPT

## 2025-01-16 RX ORDER — LETROZOLE 2.5 MG/1
2.5 TABLET, FILM COATED ORAL DAILY
Qty: 28 TABLET | Refills: 3 | Status: SHIPPED | OUTPATIENT
Start: 2025-01-16

## 2025-01-16 NOTE — PROGRESS NOTES
Patient provided discharge AVS, all questions answered  
margin, excision: Fat necrosis, foreign body-type giant cell reaction, chronic inflammation, and fibrosis consistent with previous procedure   No evidence of residual carcinoma   Final inferior margin negative for carcinoma     B. Right breast, new medial margin, excision: Residual high-grade ductal carcinoma in situ   Ductal carcinoma in situ present less than 1 mm from red/superior margin and green/anterior margin   Fat necrosis, foreign body-type giant cell reaction, chronic   inflammation, and fibrosis consistent with previous procedure   Fibrocystic change   Microcalcifications associated with benign breast tissue and DCIS   Comment:Residual ductal carcinoma in situ is present in 5 out of 13 tissue blocks of part B    Recommended adjuvant chemotherapy (TCHP) for 6 cycles followed by adjuvant RT followed lastly by endocrine therapy. Side effects TCHP reviewed with patient. She agreed to proceed. 2d-echo 02/17/2021 noted EF 60-65%  Cycle # 1 adjuvant TCHP was on 04/08/2021.  Cycle # 2 adjuvant TCHP was on 04/29/2021.  Cycle # 3 adjuvant TCHP was on 05/20/2021.  Cycle # 4 adjuvant TCHP was on 06/10/2021.    2D-ECHO 06/29/2021 noted EF 60-65%  Cycle # 5 adjuvant TCHP was on 07/22/2021.   Cycle # 6 adjuvant TCHP was on 08/12/2021.  2d-echo 10/12/2021 noted EF 55 to 60%  RT was started on 11/01/2021 and completed on 12/10/2021.   DEXA scan 12/10/2021 osteopenia by WHO criteria  Arimidex 1 mg po daily was started on 12/11/2021 with fair tolerance   2D-echo 01/25/2022 noted EF 60%  Herceptin/Perjeta was on 01/28/2022.   Discharged from [facility] on 02/13/2022 for pneumonia which was treated with Levaquin.  She was taken to the [place] ER 02/16/2022 via EMS d/t suicide attempt for ingestion of Xanax and Tyelnol. EMS administered Narcan.   She received IV hydration and was admitted 02/17/2022 to [facility] unit for further observation  D/C 02/24/2022. D/C Arimidex and monitored if any recurrent episodes or

## 2025-01-30 DIAGNOSIS — C50.111 MALIGNANT NEOPLASM OF CENTRAL PORTION OF RIGHT BREAST IN FEMALE, ESTROGEN RECEPTOR POSITIVE (HCC): ICD-10-CM

## 2025-01-30 DIAGNOSIS — Z17.0 MALIGNANT NEOPLASM OF CENTRAL PORTION OF RIGHT BREAST IN FEMALE, ESTROGEN RECEPTOR POSITIVE (HCC): ICD-10-CM

## 2025-01-30 RX ORDER — LETROZOLE 2.5 MG/1
2.5 TABLET, FILM COATED ORAL DAILY
Qty: 28 TABLET | Refills: 3 | OUTPATIENT
Start: 2025-01-30

## 2025-02-13 ENCOUNTER — CLINICAL DOCUMENTATION (OUTPATIENT)
Dept: GENERAL RADIOLOGY | Age: 60
End: 2025-02-13

## 2025-02-14 ENCOUNTER — CLINICAL DOCUMENTATION (OUTPATIENT)
Dept: GENERAL RADIOLOGY | Age: 60
End: 2025-02-14

## 2025-03-27 NOTE — PROGRESS NOTES
- urinary retention   -Hallie Carrera   -urethral   -bactrim  -16 fr coude latex    Department of [facility] Oncology  Attending Clinic Note    Reason for Visit: Follow-up on a patient with Right Breast Cancer    PCP:  Yolis Trotter DO    History of Present Illness: The mass was located in the 6 o'clock position of right breast    Breast cancer risk factors include infrequent SMEs and age and gender    Bilateral Screening Mammogram 10/06/2020: There are suspicious calcifications in the right breast at the 6 o'clock position which appear pleomorphic. These calcifications are in a segmental distribution. These clustered calcifications are suspicious for malignancy. Right breast, calcifications at the 6:00, core biopsy on 10/30/2020:    - Small focus of invasive ductal carcinoma, grade 3 (3+3+2 = 8)    - Ductal carcinoma in situ, high nuclear grade, comedo/cribriform/solid types;    - Microcalcifications in DCIS    - Breast receptor and biomarkers (per outside report without looking the   slides:     ER: Positive, 89.8%, strong intensity     NE: Positive, 52.2%, moderate intensity     HER-2: Positive (score 3+)     Ki-67: High proliferation, 26.2%     P53: Negative, 0.2%     Comment:The invasive carcinoma is intermingled with DCIS and measures   3.0 x 2.0 mm in greatest dimension on the slides. CXR PA/Lateral 12/11/2020:  No acute process. Right Axillary U/S on 12/11/2020: There is no axillary LN.     MRI Bilateral Breast 01/05/2021:  Focal, heterogeneous non mass enhancement identified in the right breast 6 o'clock which measures approximately 1.9 x 1.4 x 1.7 cm (image 19 of the axial T1 post contrast series).    No additional foci of disease identified on the right  There is no lymphadenopathy    Needle localized Right lumpectomy with SLNBx with mediport placement on 02/05/2021  A.  Right axillary sentinel lymph node #1, excision: 1 lymph node negative for malignancy     B.  Right axillary contents, regional resection: 1 lymph node positive for metastatic, poorly differentiated ductal carcinoma (grade 3)   Extranodal extension present     C.  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;   positive for extensive intraductal component   Skin-uninvolved by malignancy   Invasive carcinoma margins-uninvolved by invasive carcinoma        Distance from closest margin: 4 mm from anterior margin   DCIS margins-inferior and medial margins involved by DCIS   Regional lymph nodes-involved by tumor cells        Number of lymph nodes with macrometastasis: 1        Number of lymph nodes with micrometastasis: 0        Number of lymph nodes with isolated tumor cells: 0        Size of largest metastatic deposit: 1.5 cm        Extranodal extension: Present        Total number of lymph nodes examined: 2        Number of sentinel nodes examined: 1   Treatment effect in the breast-no known presurgical therapy   TNM classification (AJCC eighth edition)-  pT1c N1a MX     Bone scan, CT chest/abdomen/pelvis 02/26/2021 negative for metastatic disease    Re-excision lumpectomy of inferior and medial margins on 03/09/2021  A. Right breast, new inferior margin, excision: Fat necrosis, foreign body-type giant cell reaction, chronic inflammation, and fibrosis consistent with previous procedure   No evidence of residual carcinoma   Final inferior margin negative for carcinoma     B.  Right breast, new medial margin, excision: Residual high-grade ductal carcinoma in situ   Ductal carcinoma in situ present less than 1 mm from red/superior margin and green/anterior margin   Fat necrosis, foreign body-type giant cell reaction, chronic   inflammation, and fibrosis consistent with previous procedure   Fibrocystic change   Microcalcifications associated with benign breast tissue and DCIS   Comment:Residual ductal carcinoma in situ is present in 5 out of 13 tissue blocks of part B    Recommended adjuvant chemotherapy (TCHP) for 6 cycles followed by adjuvant RT followed lastly by endocrine therapy. Side effects TCHP reviewed with patient. She agreed to proceed. 2d-echo 02/17/2021 noted EF 60-65%  Cycle # 1 adjuvant TCHP was on 04/08/2021. Cycle # 2 adjuvant TCHP was on04/29/2021. Cycle #3 adjuvant TCHP is today 05/20/2021  No fever, chills. Fair appetite and energy level. No N/V. [address] imodium and lomotil-today was the first day without diarrhea. Review of Systems;  CONSTITUTIONAL: No fever, chills. Fair appetite and energy level. ENMT: Eyes: No diplopia; Nose: No epistaxis. Mouth: No sore throat. RESPIRATORY: No hemoptysis, shortness of breath, cough. CARDIOVASCULAR: No chest pain, palpitations. GASTROINTESTINAL: No nausea/vomiting, abdominal pain. Diarrhea controlled with imodium and lomotil. GENITOURINARY: No dysuria, urinary frequency, hematuria. NEURO: No syncope, presyncope, headache. Remainder:  ROS NEGATIVE    Past Medical History:      Diagnosis Date    Anxiety     Arthritis     Cancer ([address] 2021    breast    Cervical radiculopathy     Chronic back pain     Depression     Diabetes mellitus type 2, uncontrolled ([address] 2/12/2017    GERD (gastroesophageal reflux disease)     History of blood transfusion 01/2018    back surgery, lumbar, dr Mena Plants    Hypertension     Right leg pain     seeing doctor currently problems with hardware     Medications:  Reviewed and reconciled. Allergies:   Allergies   Allergen Reactions    Ceftriaxone Shortness Of Breath     Physical Exam:  BP (!) 88/58   Pulse 78   Temp 97.2 °F (36.2 °C)   Resp 16   Ht 6' 1\" (1.854 m)   Wt 159 lb (72.1 kg)   LMP 08/13/2013   SpO2 97%   BMI 20.98 kg/m²   GENERAL: Alert, oriented x 3, not in acute distress. HEENT: PERRLA; EOMI. Oropharynx clear. NECK: Supple. Without lymphadenopathy. LUNGS: Good air entry bilaterally. No wheezing, crackles or ronchi. CARDIOVASCULAR: Regular rate. No murmurs, rubs or gallops. ABDOMEN: Soft. Non-tender, non-distended. Positive bowel sounds. EXTREMITIES: Without clubbing, cyanosis, or edema. Dry skin  NEUROLOGIC: No focal deficits. ECOG PS 1    Impression/Plan:  64 y/o female with Right Breast Cancer    Bilateral Screening Mammogram 10/06/2020: There are suspicious calcifications in the right breast at the 6 o'clock position which appear pleomorphic. These calcifications are in a segmental distribution. These clustered calcifications are suspicious for malignancy. Right breast, calcifications at the 6:00, core biopsy on 10/30/2020:    - Small focus of invasive ductal carcinoma, grade 3 (3+3+2 = 8)    - Ductal carcinoma in situ, high nuclear grade, comedo/cribriform/solid types;    - Microcalcifications in DCIS    - Breast receptor and biomarkers (per outside report without looking the   slides:     ER: Positive, 89.8%, strong intensity     MI: Positive, 52.2%, moderate intensity     HER-2: Positive (score 3+)     Ki-67: High proliferation, 26.2%     P53: Negative, 0.2%     Comment:The invasive carcinoma is intermingled with DCIS and measures   3.0 x 2.0 mm in greatest dimension on the slides. CXR PA/Lateral 12/11/2020:  No acute process. Right Axillary U/S on 12/11/2020: There is no axillary LN.     MRI Bilateral Breast 01/05/2021:  Focal, heterogeneous non mass enhancement identified in the right breast 6 o'clock which measures approximately 1.9 x 1.4 x 1.7 cm (image 19 of the axial T1 post contrast series).    No additional foci of disease identified on the right  There is no lymphadenopathy    Needle localized Right lumpectomy with SLNBx with mediport placement on 02/05/2021  A.  Right axillary sentinel lymph node #1, excision: 1 lymph node negative for malignancy     B.  Right axillary contents, regional resection: 1 lymph node positive for metastatic, poorly differentiated ductal carcinoma (grade 3)   Extranodal extension present     C.  Right breast, lumpectomy: Invasive, poorly differentiated ductal carcinoma (grade 3) and high-grade ductal carcinoma in situ   High-grade ductal carcinoma in situ involves inferior and medial surgical margins     CANCER CASE SUMMARY   Procedure-excision   Specimen laterality-right   Tumor size-1.1 x 0.8 x 0.5 cm   Histologic type-invasive carcinoma of no special type (ductal)   [place] histologic score-3 (tubule formation) +3 (nuclear   pleomorphism) +2 (mitotic count) = 8   Overall grade-grade 3   Ductal carcinoma in situ-high-grade DCIS with comedonecrosis is present;   positive for extensive intraductal component   Skin-uninvolved by malignancy   Invasive carcinoma margins-uninvolved by invasive carcinoma        Distance from closest margin: 4 mm from anterior margin   DCIS margins-inferior and medial margins involved by DCIS   Regional lymph nodes-involved by tumor cells        Number of lymph nodes with macrometastasis: 1        Number of lymph nodes with micrometastasis: 0        Number of lymph nodes with isolated tumor cells: 0        Size of largest metastatic deposit: 1.5 cm        Extranodal extension: Present        Total number of lymph nodes examined: 2        Number of sentinel nodes examined: 1   Treatment effect in the breast-no known presurgical therapy   TNM classification (AJCC eighth edition)-  pT1c N1a MX     Bone scan, CT chest/abdomen/pelvis 02/26/2021 negative for metastatic disease    Re-excision lumpectomy of inferior and medial margins on 03/09/2021  A.  Right breast, new inferior margin, excision: Fat necrosis, foreign body-type giant cell reaction, chronic inflammation, and fibrosis consistent with previous procedure   No evidence of residual carcinoma   Final inferior margin negative for carcinoma     B. Right breast, new medial margin, excision: Residual high-grade ductal carcinoma in situ   Ductal carcinoma in situ present less than 1 mm from red/superior margin and green/anterior margin   Fat necrosis, foreign body-type giant cell reaction, chronic   inflammation, and fibrosis consistent with previous procedure   Fibrocystic change   Microcalcifications associated with benign breast tissue and DCIS   Comment:Residual ductal carcinoma in situ is present in 5 out of 13 tissue blocks of part B    Recommended adjuvant chemotherapy (TCHP) for 6 cycles followed by adjuvant RT followed lastly by endocrine therapy. Side effects TCHP reviewed with patient. She agreed to proceed. 2d-echo 02/17/2021 noted EF 60-65%  Cycle # 1 adjuvant TCHP was on 04/08/2021. Cycle # 2 adjuvant TCHP was on04/29/2021. Cycle #3 adjuvant TCHP is today 05/20/2021   Labs reviewed; ok to proceed. Use skin moisturizers for dry skin.  Hydration today for post treatment diarrhea and slight hypotension   -Follow with palliative for symptom management-diarrhea     RTC 3 weeks for Cycle # [facility]   [phone]

## 2025-06-11 DIAGNOSIS — Z01.812 BLOOD TESTS PRIOR TO TREATMENT OR PROCEDURE: Primary | ICD-10-CM

## 2025-06-16 DIAGNOSIS — Z01.812 BLOOD TESTS PRIOR TO TREATMENT OR PROCEDURE: ICD-10-CM

## 2025-06-16 LAB
CREAT SERPL-MCNC: 0.8 MG/DL (ref 0.5–1)
GFR, ESTIMATED: 80 ML/MIN/1.73M2

## 2025-06-19 ENCOUNTER — HOSPITAL ENCOUNTER (OUTPATIENT)
Dept: MRI IMAGING | Age: 60
Discharge: HOME | End: 2025-06-21
Payer: MEDICAID

## 2025-06-19 DIAGNOSIS — C50.111 MALIGNANT NEOPLASM OF CENTRAL PORTION OF RIGHT BREAST IN FEMALE, ESTROGEN RECEPTOR POSITIVE (HCC): ICD-10-CM

## 2025-06-19 DIAGNOSIS — C50.111 MALIGNANT NEOPLASM OF CENTRAL PORTION OF RIGHT BREAST IN FEMALE, ESTROGEN RECEPTOR POSITIVE (HCC): Primary | ICD-10-CM

## 2025-06-19 DIAGNOSIS — Z17.0 MALIGNANT NEOPLASM OF CENTRAL PORTION OF RIGHT BREAST IN FEMALE, ESTROGEN RECEPTOR POSITIVE (HCC): ICD-10-CM

## 2025-06-19 DIAGNOSIS — Z17.0 MALIGNANT NEOPLASM OF CENTRAL PORTION OF RIGHT BREAST IN FEMALE, ESTROGEN RECEPTOR POSITIVE (HCC): Primary | ICD-10-CM

## 2025-06-19 PROCEDURE — A9585 GADOBUTROL INJECTION: HCPCS | Performed by: RADIOLOGY

## 2025-06-19 PROCEDURE — C8908 MRI W/O FOL W/CONT, BREAST,: HCPCS

## 2025-06-19 PROCEDURE — 6360000004 HC RX CONTRAST MEDICATION: Performed by: RADIOLOGY

## 2025-06-19 RX ORDER — GADOBUTROL 604.72 MG/ML
7 INJECTION INTRAVENOUS
Status: COMPLETED | OUTPATIENT
Start: 2025-06-19 | End: 2025-06-19

## 2025-06-19 RX ADMIN — GADOBUTROL 7 ML: 604.72 INJECTION INTRAVENOUS at 12:13

## 2025-07-17 ENCOUNTER — HOSPITAL ENCOUNTER (OUTPATIENT)
Age: 60
Discharge: HOME | End: 2025-07-17
Payer: MEDICAID

## 2025-07-17 ENCOUNTER — OFFICE VISIT (OUTPATIENT)
Age: 60
End: 2025-07-17
Payer: MEDICAID

## 2025-07-17 ENCOUNTER — HOSPITAL ENCOUNTER (OUTPATIENT)
Dept: INFUSION THERAPY | Age: 60
End: 2025-07-17

## 2025-07-17 VITALS
WEIGHT: 150 LBS | BODY MASS INDEX: 20.32 KG/M2 | DIASTOLIC BLOOD PRESSURE: 95 MMHG | HEIGHT: 72 IN | OXYGEN SATURATION: 100 % | HEART RATE: 75 BPM | TEMPERATURE: 97.6 F | SYSTOLIC BLOOD PRESSURE: 132 MMHG

## 2025-07-17 DIAGNOSIS — C50.111 MALIGNANT NEOPLASM OF CENTRAL PORTION OF RIGHT BREAST IN FEMALE, ESTROGEN RECEPTOR POSITIVE (HCC): ICD-10-CM

## 2025-07-17 DIAGNOSIS — Z17.0 MALIGNANT NEOPLASM OF CENTRAL PORTION OF RIGHT BREAST IN FEMALE, ESTROGEN RECEPTOR POSITIVE (HCC): ICD-10-CM

## 2025-07-17 DIAGNOSIS — R79.89 ELEVATED LFTS: ICD-10-CM

## 2025-07-17 DIAGNOSIS — Z12.31 SCREENING MAMMOGRAM FOR BREAST CANCER: Primary | ICD-10-CM

## 2025-07-17 LAB
ALBUMIN SERPL-MCNC: 4 G/DL (ref 3.5–5.2)
ALP SERPL-CCNC: 154 U/L (ref 35–104)
ALT SERPL-CCNC: 30 U/L (ref 0–35)
ANION GAP SERPL CALCULATED.3IONS-SCNC: 11 MMOL/L (ref 7–16)
AST SERPL-CCNC: 36 U/L (ref 0–35)
BASOPHILS # BLD: 0.06 K/UL (ref 0–0.2)
BASOPHILS NFR BLD: 1 % (ref 0–2)
BILIRUB SERPL-MCNC: 0.3 MG/DL (ref 0–1.2)
BUN SERPL-MCNC: 20 MG/DL (ref 8–23)
CALCIUM SERPL-MCNC: 10 MG/DL (ref 8.8–10.2)
CHLORIDE SERPL-SCNC: 104 MMOL/L (ref 98–107)
CO2 SERPL-SCNC: 23 MMOL/L (ref 22–29)
CREAT SERPL-MCNC: 0.9 MG/DL (ref 0.5–1)
EOSINOPHIL # BLD: 0.09 K/UL (ref 0.05–0.5)
EOSINOPHILS RELATIVE PERCENT: 1 % (ref 0–6)
ERYTHROCYTE [DISTWIDTH] IN BLOOD BY AUTOMATED COUNT: 12.7 % (ref 11.5–15)
GFR, ESTIMATED: 78 ML/MIN/1.73M2
GLUCOSE SERPL-MCNC: 171 MG/DL (ref 74–99)
HCT VFR BLD AUTO: 38.4 % (ref 34–48)
HGB BLD-MCNC: 13 G/DL (ref 11.5–15.5)
IMM GRANULOCYTES # BLD AUTO: 0.03 K/UL (ref 0–0.58)
IMM GRANULOCYTES NFR BLD: 0 % (ref 0–5)
LYMPHOCYTES NFR BLD: 2.04 K/UL (ref 1.5–4)
LYMPHOCYTES RELATIVE PERCENT: 24 % (ref 20–42)
MCH RBC QN AUTO: 32.4 PG (ref 26–35)
MCHC RBC AUTO-ENTMCNC: 33.9 G/DL (ref 32–34.5)
MCV RBC AUTO: 95.8 FL (ref 80–99.9)
MONOCYTES NFR BLD: 0.68 K/UL (ref 0.1–0.95)
MONOCYTES NFR BLD: 8 % (ref 2–12)
NEUTROPHILS NFR BLD: 65 % (ref 43–80)
NEUTS SEG NFR BLD: 5.5 K/UL (ref 1.8–7.3)
PLATELET # BLD AUTO: 225 K/UL (ref 130–450)
PMV BLD AUTO: 10.3 FL (ref 7–12)
POTASSIUM SERPL-SCNC: 4.9 MMOL/L (ref 3.5–5.1)
PROT SERPL-MCNC: 6.6 G/DL (ref 6.4–8.3)
RBC # BLD AUTO: 4.01 M/UL (ref 3.5–5.5)
SODIUM SERPL-SCNC: 139 MMOL/L (ref 136–145)
WBC OTHER # BLD: 8.4 K/UL (ref 4.5–11.5)

## 2025-07-17 PROCEDURE — 80053 COMPREHEN METABOLIC PANEL: CPT

## 2025-07-17 PROCEDURE — 85025 COMPLETE CBC W/AUTO DIFF WBC: CPT

## 2025-07-17 PROCEDURE — 36415 COLL VENOUS BLD VENIPUNCTURE: CPT

## 2025-07-17 PROCEDURE — 99212 OFFICE O/P EST SF 10 MIN: CPT | Performed by: CLINICAL NURSE SPECIALIST

## 2025-07-17 RX ORDER — LETROZOLE 2.5 MG/1
2.5 TABLET, FILM COATED ORAL DAILY
Qty: 28 TABLET | Refills: 3 | Status: SHIPPED | OUTPATIENT
Start: 2025-07-17

## 2025-07-17 NOTE — PROGRESS NOTES
Department of [facility]     Attending Clinic Note          Reason for Visit: Follow-up on a patient with Right Breast Cancer    PCP:  Keyon Quintero DO    History of Present Illness:  The mass was located in the 6 o'clock position of right breast    Breast cancer risk factors include infrequent SMEs and age and gender    Bilateral Screening Mammogram 10/06/2020:  There are suspicious calcifications in the right breast at the 6 o'clock position which appear pleomorphic. These calcifications are in a segmental distribution. These clustered calcifications are suspicious for malignancy.     Right breast, calcifications at the 6:00, core biopsy on 10/30/2020:    - Small focus of invasive ductal carcinoma, grade 3 (3+3+2 = 8)    - Ductal carcinoma in situ, high nuclear grade, comedo/cribriform/solid types;    - Microcalcifications in DCIS    - Breast receptor and biomarkers (per outside report without looking the   slides:     ER: Positive, 89.8%, strong intensity     [place]: Positive, 52.2%, moderate intensity     HER-2: Positive (score 3+)     Ki-67: High proliferation, 26.2%     P53: Negative, 0.2%     Comment:The invasive carcinoma is intermingled with DCIS and measures   3.0 x 2.0 mm in greatest dimension on the slides.     CXR PA/Lateral 12/11/2020:  No acute process.     Right Axillary U/S on 12/11/2020:  There is no axillary LN.    MRI Bilateral Breast 01/05/2021:  Focal, heterogeneous non mass enhancement identified in the right breast 6 o'clock which measures approximately 1.9 x 1.4 x 1.7 cm (image 19 of the axial T1 post contrast series).    No additional foci of disease identified on the right  There is no lymphadenopathy    Needle localized Right lumpectomy with SLNBx with mediport placement on 02/05/2021  A.  Right axillary sentinel lymph node #1, excision: 1 lymph node negative for malignancy     B.  Right axillary contents, regional resection: 1 lymph node positive for metastatic, poorly 
Patient provided discharge AVS, all questions answered  
cyanosis, or edema.   NEUROLOGIC: No focal deficits.   ECOG PS 1        8/23/2024: breast US  IMPRESSION:  No suspicious finding right breast ultrasound performed in follow-up to a  breast MRI     Recommendation: Six-month follow-up breast MRI is recommended to document  stability of appearance of postoperative right breast.  On the right breast  sagittal view there were similar appearing findings on a prior breast MRI  from January 5, 2021.     BIRADS:  BIRADS - CATEGORY 3     Probably Benign Findings. A short interval follow-up is recommended in 6  months.     OVERALL ASSESSMENT - PROBABLY BENIGN.      Impression/Plan:  56 y/o female with Right Breast Cancer    Bilateral Screening Mammogram 10/06/2020:  There are suspicious calcifications in the right breast at the 6 o'clock position which appear pleomorphic. These calcifications are in a segmental distribution. These clustered calcifications are suspicious for malignancy.    Right breast, calcifications at the 6:00, core biopsy on 10/30/2020:    - Small focus of invasive ductal carcinoma, grade 3 (3+3+2 = 8)    - Ductal carcinoma in situ, high nuclear grade, comedo/cribriform/solid types;    - Microcalcifications in DCIS    - Breast receptor and biomarkers (per outside report without looking the   slides:     ER: Positive, 89.8%, strong intensity     [place]: Positive, 52.2%, moderate intensity     HER-2: Positive (score 3+)     Ki-67: High proliferation, 26.2%     P53: Negative, 0.2%     Comment:The invasive carcinoma is intermingled with DCIS and measures   3.0 x 2.0 mm in greatest dimension on the slides.     CXR PA/Lateral 12/11/2020:  No acute process.     Right Axillary U/S on 12/11/2020:  There is no axillary LN.    MRI Bilateral Breast 01/05/2021:  Focal, heterogeneous non mass enhancement identified in the right breast 6 o'clock which measures approximately 1.9 x 1.4 x 1.7 cm (image 19 of the axial T1 post contrast series).    No additional foci of disease

## 2025-08-14 ENCOUNTER — HOSPITAL ENCOUNTER (OUTPATIENT)
Dept: ULTRASOUND IMAGING | Age: 60
Discharge: HOME | End: 2025-08-16
Payer: MEDICAID

## 2025-08-14 DIAGNOSIS — R74.01 TRANSAMINITIS: Primary | ICD-10-CM

## 2025-08-14 DIAGNOSIS — R79.89 ELEVATED LFTS: ICD-10-CM

## 2025-08-14 PROCEDURE — 76705 ECHO EXAM OF ABDOMEN: CPT

## (undated) DEVICE — RACK TUBE CURETTE

## (undated) DEVICE — DRESSING,GAUZE,XEROFORM,CURAD,5"X9",ST: Brand: CURAD

## (undated) DEVICE — TUBING, SUCTION, 3/16" X 12', STRAIGHT: Brand: MEDLINE

## (undated) DEVICE — DISCONTINUED NO SUB IDED TG GLOVE SURG SENSICARE ALOE LT LF PF ST GRN SZ 8

## (undated) DEVICE — SURGICAL PROCEDURE PACK BASIC

## (undated) DEVICE — BIT DRL L200MM DIA2.8MM CALIB L100MM FOR 3.5MM VA LCP PROX

## (undated) DEVICE — Device

## (undated) DEVICE — Z INACTIVE USE 2660664 SOLUTION IRRIG 3000ML 0.9% SOD CHL USP UROMATIC PLAS CONT

## (undated) DEVICE — TOTAL TRAY, DB, 100% SILI FOLEY, 16FR 10: Brand: MEDLINE

## (undated) DEVICE — Z DUP USE 2701075 SYSTEM SKIN CLSR 42CM DERMBND PRINEO

## (undated) DEVICE — SHEET,DRAPE,53X77,STERILE: Brand: MEDLINE

## (undated) DEVICE — SET INST MINOR PROCEDURE

## (undated) DEVICE — DRIP REDUCTION MANIFOLD

## (undated) DEVICE — KIT PLT RATIO DISPNS KT 2IN CANN TIP SPRY TIP DISP MAGELLAN

## (undated) DEVICE — SET ORTHO STD STORTSTD1

## (undated) DEVICE — NEEDLE FLTR 18GA L1.5IN MEM THK5UM BLNT DISP

## (undated) DEVICE — GOWN,BREATHABLE SLV,AURORA,XLG,STRL: Brand: MEDLINE

## (undated) DEVICE — 3M™ STERI-DRAPE™ U-DRAPE 1015: Brand: STERI-DRAPE™

## (undated) DEVICE — Z CONVERTED USE 2275207 CLOTH PREP W7.5XL7.5IN 2% CHG SKIN ALC AND RNS FREE

## (undated) DEVICE — MARKER SURG MARGIN STD 6 CLR INK ASST CORR CLP

## (undated) DEVICE — DRILL SYSTEM 7

## (undated) DEVICE — ELECTRODE PT RET AD L9FT HI MOIST COND ADH HYDRGEL CORDED

## (undated) DEVICE — SYRINGE 20ML LL S/C 50

## (undated) DEVICE — TOTAL KNEE PK

## (undated) DEVICE — DRAPE THER FLUID WARMING 66X44 IN FLAT SLUSH DBL DISC ORS

## (undated) DEVICE — GLOVE SURG SZ 8 L12IN FNGR THK79MIL GRN LTX FREE

## (undated) DEVICE — SCREW BNE L30MM DIA3.5MM CORT S STL ST LOK FULL THRD
Type: IMPLANTABLE DEVICE | Site: ANKLE | Status: NON-FUNCTIONAL
Removed: 2018-07-05

## (undated) DEVICE — SOLUTION IV 100ML 0.9% SOD CHL PLAS CONT USP VIAFLX 1 PER

## (undated) DEVICE — PADDING,UNDERCAST,COTTON, 4"X4YD STERILE: Brand: MEDLINE

## (undated) DEVICE — CONTROL SYRINGE LUER-LOCK TIP: Brand: MONOJECT

## (undated) DEVICE — DRAPE,REIN 53X77,STERILE: Brand: MEDLINE

## (undated) DEVICE — NEEDLE BNE MAR ASPIR 11GA L4IN TWO PC HNDL W/ PRB JAMSH

## (undated) DEVICE — SOLUTION IV IRRIG POUR BRL 0.9% SODIUM CHL 2F7124

## (undated) DEVICE — BIT DRL L112MM DIA3.5MM ST QUIK CPL NONRADIOPAQUE W/O STP

## (undated) DEVICE — PADDING CAST W6INXL4YD COT LO LINTING WYTEX

## (undated) DEVICE — SYRINGE MED 10ML TRNSLUC BRL PLUNG BLK MRK POLYPR CTRL

## (undated) DEVICE — ADHESIVE SKIN CLOSURE 0.7CC TOP MICROBIAL APPL DERMBND ADV

## (undated) DEVICE — DRAPE C ARM W41XL74IN UNIV MOB W RUBBERBAND CLP

## (undated) DEVICE — INTENDED FOR TISSUE SEPARATION, AND OTHER PROCEDURES THAT REQUIRE A SHARP SURGICAL BLADE TO PUNCTURE OR CUT.: Brand: BARD-PARKER ® STAINLESS STEEL BLADES

## (undated) DEVICE — SUTURE BOOTIES, YELLOW, STERILE, 5 PAIR/PAD; 5 PADS/BOX: Brand: KEY SURGICAL SUTURE BOOTIES

## (undated) DEVICE — BANDAGE COMPR W4INXL10YD WHITE/BEIGE E MTRX HK LOOP CLSR

## (undated) DEVICE — TOWEL,OR,DSP,ST,BLUE,STD,6/PK,12PK/CS: Brand: MEDLINE

## (undated) DEVICE — DRAPE C ARM UNIV W41XL74IN CLR PLAS XR VELC CLSR POLY STRP

## (undated) DEVICE — CHLORAPREP 26ML ORANGE

## (undated) DEVICE — GOWN,AURORA,BRTHSLV,2XL,18/CS: Brand: MEDLINE

## (undated) DEVICE — STRIP,CLOSURE,WOUND,MEDI-STRIP,1/2X4: Brand: MEDLINE

## (undated) DEVICE — PLASMABLADE PS210-030S 3.0S LOCK: Brand: PLASMABLADE™

## (undated) DEVICE — 18 GA N.G. KIT, 10 PACK: Brand: SITE-RITE

## (undated) DEVICE — 1000 S-DRAPE TOWEL DRAPE 10/BX: Brand: STERI-DRAPE™

## (undated) DEVICE — Z DISCONTINUED USE 2275686 GLOVE SURG SZ 8 L12IN FNGR THK13MIL WHT ISOLEX POLYISOPRENE

## (undated) DEVICE — SCREW BNE L30MM DIA3.5MM CORT S STL ST NONCANNULATED LOK
Type: IMPLANTABLE DEVICE | Site: TIBIA | Status: FUNCTIONAL
Removed: 2018-05-14

## (undated) DEVICE — GLOVE ORANGE PI 7   MSG9070

## (undated) DEVICE — ZIMMER® STERILE DISPOSABLE TOURNIQUET CUFF WITH PROTECTIVE SLEEVE AND PLC, DUAL PORT, SINGLE BLADDER, 34 IN. (86 CM)

## (undated) DEVICE — SHEARS ENDOSCP L9CM CRV HARM FOCS +

## (undated) DEVICE — GLOVE SURG SZ 65 THK91MIL LTX FREE SYN POLYISOPRENE

## (undated) DEVICE — BIT DRL L110MM DIA2.5MM ST G QUIK CPL NONRADIOPAQUE W/O STP

## (undated) DEVICE — PATIENT RETURN ELECTRODE, SINGLE-USE, CONTACT QUALITY MONITORING, ADULT, WITH 9FT CORD, FOR PATIENTS WEIGING OVER 33LBS. (15KG): Brand: MEGADYNE

## (undated) DEVICE — PACK,UNIV, II AURORA: Brand: MEDLINE

## (undated) DEVICE — 3M™ IOBAN™ 2 ANTIMICROBIAL INCISE DRAPE 6650EZ: Brand: IOBAN™ 2

## (undated) DEVICE — GARMENT,MEDLINE,DVT,INT,CALF,LG, GEN2: Brand: MEDLINE

## (undated) DEVICE — SOLUTION IV IRRIG WATER 1000ML POUR BRL 2F7114

## (undated) DEVICE — GAUZE,SPONGE,AVANT,4"X4",4PLY,STRL,10/TR: Brand: MEDLINE

## (undated) DEVICE — GLOVE ORANGE PI 8   MSG9080

## (undated) DEVICE — STANDARD HYPODERMIC NEEDLE,ALUMINUM HUB: Brand: MONOJECT

## (undated) DEVICE — LABEL MED 4 IN SURG PANEL W/ PEN STRL

## (undated) DEVICE — ADHESIVE SKIN CLOSURE TOP 36 CC HI VISC DERMBND MINI

## (undated) DEVICE — TUBING SUCT 12FR MAL ALUM SHFT FN CAP VENT UNIV CONN W/ OBT

## (undated) DEVICE — SCREW BNE L32MM DIA3.5MM CORT S STL ST NONCANNULATED LOK
Type: IMPLANTABLE DEVICE | Site: TIBIA | Status: NON-FUNCTIONAL
Removed: 2018-05-14

## (undated) DEVICE — TRAP,MUCUS SPECIMEN,40CC: Brand: MEDLINE

## (undated) DEVICE — APPLICATOR MEDICATED 26 CC SOLUTION HI LT ORNG CHLORAPREP

## (undated) DEVICE — SET ORTHO STD STORTSTD2

## (undated) DEVICE — GOWN,SIRUS,FABRNF,XL,20/CS: Brand: MEDLINE

## (undated) DEVICE — PROBE GAM TRUNODE DISP EACH

## (undated) DEVICE — SET SURG INSTR MINI VASC

## (undated) DEVICE — PACK,UNIVERSAL,NO GOWNS: Brand: MEDLINE

## (undated) DEVICE — HANDPIECE SET WITH BONE CLEANING TIP AND SUCTION TUBE: Brand: INTERPULSE

## (undated) DEVICE — BANDAGE COMPR W6INXL12FT SMOOTH FOR LIMB EXSANG ESMARCH

## (undated) DEVICE — DECANTER BAG 9": Brand: MEDLINE INDUSTRIES, INC.

## (undated) DEVICE — SET LAMBOTTE

## (undated) DEVICE — BLADE CLIPPER GEN PURP NS

## (undated) DEVICE — Z DUP USE 2257490 ADHESIVE SKIN CLSRE 036ML TPCL 2CTL CNCRLTE HIGH VSCSTY DRMB

## (undated) DEVICE — SCREW BNE L40MM DIA3.5MM CORT S STL ST NONCANNULATED LOK
Type: IMPLANTABLE DEVICE | Site: TIBIA | Status: NON-FUNCTIONAL
Removed: 2018-05-14

## (undated) DEVICE — APPLIER LIG CLP M L11IN TI STR RNG HNDL FOR 20 CLP DISP

## (undated) DEVICE — SYRINGE IRRIG 60ML SFT PLIABLE BLB EZ TO GRP 1 HND USE W/